# Patient Record
Sex: FEMALE | Race: WHITE | NOT HISPANIC OR LATINO | Employment: OTHER | URBAN - METROPOLITAN AREA
[De-identification: names, ages, dates, MRNs, and addresses within clinical notes are randomized per-mention and may not be internally consistent; named-entity substitution may affect disease eponyms.]

---

## 2023-02-01 PROBLEM — M25.562 LEFT KNEE PAIN: Status: ACTIVE | Noted: 2023-02-01

## 2023-02-01 PROBLEM — R41.82 CHANGE IN MENTAL STATUS: Status: ACTIVE | Noted: 2023-02-01

## 2023-02-01 PROBLEM — M25.561 RIGHT KNEE PAIN: Status: ACTIVE | Noted: 2023-02-01

## 2023-02-01 PROBLEM — M25.511 RIGHT SHOULDER PAIN: Status: ACTIVE | Noted: 2023-02-01

## 2023-02-01 PROBLEM — N18.32 ANEMIA DUE TO STAGE 3B CHRONIC KIDNEY DISEASE (MULTI): Status: ACTIVE | Noted: 2023-02-01

## 2023-02-01 PROBLEM — M48.061 SPINAL STENOSIS OF LUMBAR REGION WITHOUT NEUROGENIC CLAUDICATION: Status: ACTIVE | Noted: 2023-02-01

## 2023-02-01 PROBLEM — E83.52 HYPERCALCEMIA: Status: ACTIVE | Noted: 2023-02-01

## 2023-02-01 PROBLEM — E03.9 ACQUIRED HYPOTHYROIDISM: Status: ACTIVE | Noted: 2023-02-01

## 2023-02-01 PROBLEM — D63.1 ANEMIA DUE TO STAGE 3B CHRONIC KIDNEY DISEASE (MULTI): Status: ACTIVE | Noted: 2023-02-01

## 2023-02-01 PROBLEM — I10 ESSENTIAL HYPERTENSION: Status: ACTIVE | Noted: 2023-02-01

## 2023-02-01 PROBLEM — M54.16 LUMBAR RADICULOPATHY, CHRONIC: Status: ACTIVE | Noted: 2023-02-01

## 2023-02-01 PROBLEM — M19.012 ARTHRITIS OF LEFT SHOULDER REGION: Status: ACTIVE | Noted: 2023-02-01

## 2023-02-01 PROBLEM — E55.9 VITAMIN D DEFICIENCY: Status: ACTIVE | Noted: 2023-02-01

## 2023-02-01 PROBLEM — D53.9 MACROCYTIC ANEMIA: Status: ACTIVE | Noted: 2023-02-01

## 2023-02-01 PROBLEM — C90.00 MULTIPLE MYELOMA (MULTI): Status: ACTIVE | Noted: 2023-02-01

## 2023-02-01 PROBLEM — R05.1 ACUTE COUGH: Status: ACTIVE | Noted: 2023-02-01

## 2023-02-01 PROBLEM — G56.01 CARPAL TUNNEL SYNDROME OF RIGHT WRIST: Status: ACTIVE | Noted: 2023-02-01

## 2023-02-01 PROBLEM — M54.9 CHRONIC BACK PAIN: Status: ACTIVE | Noted: 2023-02-01

## 2023-02-01 PROBLEM — E73.9 LACTOSE INTOLERANCE: Status: ACTIVE | Noted: 2023-02-01

## 2023-02-01 PROBLEM — K21.9 GERD (GASTROESOPHAGEAL REFLUX DISEASE): Status: ACTIVE | Noted: 2023-02-01

## 2023-02-01 PROBLEM — G56.02 CARPAL TUNNEL SYNDROME ON LEFT: Status: ACTIVE | Noted: 2023-02-01

## 2023-02-01 PROBLEM — D47.2 MONOCLONAL GAMMOPATHY OF UNKNOWN SIGNIFICANCE: Status: ACTIVE | Noted: 2023-02-01

## 2023-02-01 PROBLEM — N18.2 CKD (CHRONIC KIDNEY DISEASE), STAGE II: Status: ACTIVE | Noted: 2023-02-01

## 2023-02-01 PROBLEM — M54.2 CERVICALGIA: Status: ACTIVE | Noted: 2023-02-01

## 2023-02-01 PROBLEM — G89.29 CHRONIC BACK PAIN: Status: ACTIVE | Noted: 2023-02-01

## 2023-02-01 PROBLEM — M46.1 SACROILIITIS (CMS-HCC): Status: ACTIVE | Noted: 2023-02-01

## 2023-02-01 PROBLEM — M75.112 NONTRAUMATIC INCOMPLETE TEAR OF LEFT ROTATOR CUFF: Status: ACTIVE | Noted: 2023-02-01

## 2023-02-01 PROBLEM — G60.9 IDIOPATHIC PERIPHERAL NEUROPATHY: Status: ACTIVE | Noted: 2023-02-01

## 2023-02-01 PROBLEM — I51.89 DIASTOLIC DYSFUNCTION: Status: ACTIVE | Noted: 2023-02-01

## 2023-02-01 RX ORDER — CHLORTHALIDONE 25 MG/1
0.5 TABLET ORAL DAILY
COMMUNITY
Start: 2022-06-01 | End: 2023-04-17

## 2023-02-01 RX ORDER — ACETAMINOPHEN 500 MG
2 TABLET ORAL EVERY 8 HOURS PRN
COMMUNITY
Start: 2015-06-29

## 2023-02-01 RX ORDER — LOSARTAN POTASSIUM 100 MG/1
1 TABLET ORAL DAILY
COMMUNITY
Start: 2019-10-22 | End: 2023-05-12

## 2023-02-01 RX ORDER — LEVOTHYROXINE SODIUM 88 UG/1
1 TABLET ORAL DAILY
COMMUNITY
Start: 2013-12-31 | End: 2023-03-17 | Stop reason: DRUGHIGH

## 2023-02-01 RX ORDER — ONDANSETRON HYDROCHLORIDE 8 MG/1
1 TABLET, FILM COATED ORAL EVERY 8 HOURS PRN
COMMUNITY
Start: 2022-12-08 | End: 2023-07-19 | Stop reason: ALTCHOICE

## 2023-02-01 RX ORDER — AMLODIPINE BESYLATE 2.5 MG/1
1 TABLET ORAL DAILY
COMMUNITY
Start: 2016-01-18 | End: 2023-04-17

## 2023-02-01 RX ORDER — LORATADINE 10 MG/1
1 TABLET ORAL DAILY
COMMUNITY
Start: 2021-01-22

## 2023-02-01 RX ORDER — MELOXICAM 15 MG/1
1 TABLET ORAL DAILY
COMMUNITY
Start: 2022-10-18 | End: 2023-10-13 | Stop reason: HOSPADM

## 2023-02-01 RX ORDER — MULTIVIT,IRON,MINERALS/LUTEIN
1 TABLET ORAL DAILY
COMMUNITY

## 2023-02-01 RX ORDER — OMEPRAZOLE 40 MG/1
1 CAPSULE, DELAYED RELEASE ORAL DAILY
COMMUNITY
Start: 2013-07-02 | End: 2023-12-08 | Stop reason: SDUPTHER

## 2023-02-01 RX ORDER — GABAPENTIN 100 MG/1
3 CAPSULE ORAL NIGHTLY
COMMUNITY
Start: 2020-07-23 | End: 2024-01-05 | Stop reason: SDUPTHER

## 2023-02-01 RX ORDER — PRILOCAINE 100 %
POWDER (GRAM) MISCELLANEOUS
COMMUNITY
Start: 2022-10-18 | End: 2023-03-15 | Stop reason: ALTCHOICE

## 2023-02-01 RX ORDER — CHOLECALCIFEROL (VITAMIN D3) 125 MCG
1 CAPSULE ORAL AS NEEDED
COMMUNITY
Start: 2017-03-31

## 2023-03-15 ENCOUNTER — OFFICE VISIT (OUTPATIENT)
Dept: PRIMARY CARE | Facility: CLINIC | Age: 88
End: 2023-03-15
Payer: MEDICARE

## 2023-03-15 ENCOUNTER — LAB (OUTPATIENT)
Dept: LAB | Facility: LAB | Age: 88
End: 2023-03-15
Payer: MEDICARE

## 2023-03-15 VITALS
HEART RATE: 73 BPM | OXYGEN SATURATION: 96 % | DIASTOLIC BLOOD PRESSURE: 66 MMHG | WEIGHT: 154 LBS | SYSTOLIC BLOOD PRESSURE: 140 MMHG | BODY MASS INDEX: 29.07 KG/M2 | HEIGHT: 61 IN

## 2023-03-15 DIAGNOSIS — C90.01 MULTIPLE MYELOMA IN REMISSION (MULTI): ICD-10-CM

## 2023-03-15 DIAGNOSIS — M46.1 SACROILIITIS (CMS-HCC): ICD-10-CM

## 2023-03-15 DIAGNOSIS — N18.32 ANEMIA DUE TO STAGE 3B CHRONIC KIDNEY DISEASE (MULTI): ICD-10-CM

## 2023-03-15 DIAGNOSIS — Z23 NEED FOR INFLUENZA VACCINATION: Primary | ICD-10-CM

## 2023-03-15 DIAGNOSIS — D63.1 ANEMIA DUE TO STAGE 3B CHRONIC KIDNEY DISEASE (MULTI): ICD-10-CM

## 2023-03-15 DIAGNOSIS — E03.9 ACQUIRED HYPOTHYROIDISM: ICD-10-CM

## 2023-03-15 DIAGNOSIS — L98.491 SKIN ULCER, LIMITED TO BREAKDOWN OF SKIN (MULTI): ICD-10-CM

## 2023-03-15 LAB — THYROTROPIN (MIU/L) IN SER/PLAS BY DETECTION LIMIT <= 0.05 MIU/L: 6.22 MIU/L (ref 0.44–3.98)

## 2023-03-15 PROCEDURE — 36415 COLL VENOUS BLD VENIPUNCTURE: CPT

## 2023-03-15 PROCEDURE — 1160F RVW MEDS BY RX/DR IN RCRD: CPT | Performed by: INTERNAL MEDICINE

## 2023-03-15 PROCEDURE — 1170F FXNL STATUS ASSESSED: CPT | Performed by: INTERNAL MEDICINE

## 2023-03-15 PROCEDURE — 99214 OFFICE O/P EST MOD 30 MIN: CPT | Performed by: INTERNAL MEDICINE

## 2023-03-15 PROCEDURE — 84443 ASSAY THYROID STIM HORMONE: CPT

## 2023-03-15 PROCEDURE — G0439 PPPS, SUBSEQ VISIT: HCPCS | Performed by: INTERNAL MEDICINE

## 2023-03-15 PROCEDURE — 1159F MED LIST DOCD IN RCRD: CPT | Performed by: INTERNAL MEDICINE

## 2023-03-15 PROCEDURE — 3078F DIAST BP <80 MM HG: CPT | Performed by: INTERNAL MEDICINE

## 2023-03-15 PROCEDURE — 3077F SYST BP >= 140 MM HG: CPT | Performed by: INTERNAL MEDICINE

## 2023-03-15 PROCEDURE — 1036F TOBACCO NON-USER: CPT | Performed by: INTERNAL MEDICINE

## 2023-03-15 RX ORDER — ELECTROLYTES/DEXTROSE
5 SOLUTION, ORAL ORAL DAILY
COMMUNITY

## 2023-03-15 RX ORDER — LORAZEPAM 2 MG/1
TABLET ORAL
COMMUNITY
Start: 2023-02-20 | End: 2023-03-27

## 2023-03-15 ASSESSMENT — ENCOUNTER SYMPTOMS
DEPRESSION: 0
LOSS OF SENSATION IN FEET: 0
OCCASIONAL FEELINGS OF UNSTEADINESS: 1

## 2023-03-15 ASSESSMENT — ACTIVITIES OF DAILY LIVING (ADL)
HEARING - LEFT EAR: FUNCTIONAL
HEARING - RIGHT EAR: FUNCTIONAL
ADEQUATE_TO_COMPLETE_ADL: YES
TOILETING: INDEPENDENT
FEEDING YOURSELF: INDEPENDENT
TAKING_MEDICATION: INDEPENDENT
MANAGING_FINANCES: INDEPENDENT
GROOMING: INDEPENDENT
WALKS IN HOME: INDEPENDENT
BATHING: INDEPENDENT
JUDGMENT_ADEQUATE_SAFELY_COMPLETE_DAILY_ACTIVITIES: YES
DOING_HOUSEWORK: INDEPENDENT
ASSISTIVE_DEVICE: EYEGLASSES;CANE;DENTURES PARTIAL
GROCERY_SHOPPING: INDEPENDENT
PATIENT'S MEMORY ADEQUATE TO SAFELY COMPLETE DAILY ACTIVITIES?: YES
DRESSING YOURSELF: INDEPENDENT

## 2023-03-15 ASSESSMENT — COLUMBIA-SUICIDE SEVERITY RATING SCALE - C-SSRS
2. HAVE YOU ACTUALLY HAD ANY THOUGHTS OF KILLING YOURSELF?: NO
6. HAVE YOU EVER DONE ANYTHING, STARTED TO DO ANYTHING, OR PREPARED TO DO ANYTHING TO END YOUR LIFE?: NO

## 2023-03-15 NOTE — PROGRESS NOTES
Subjective   Reason for Visit: Bindu Cali is an 89 y.o. female here for a Medicare Wellness visit.     Past Medical, Surgical, and Family History reviewed and updated in chart.    Reviewed all medications by prescribing practitioner or clinical pharmacist (such as prescriptions, OTCs, herbal therapies and supplements) and documented in the medical record.    HPI  Patient doing well now receiving chemotherapy every 2 weeks and tolerating very well.  Did have a fall and scraped the left forearm suffering a an abrasion that is slowly been healing much better never treated with antibiotics just surface treatment    Patient Self Assessment of Health Status  Patient Self Assessment: Good    Nutrition and Exercise  Current Diet: Well Balanced Diet  Adequate Fluid Intake: Yes  Caffeine: No  Exercise Frequency: Regularly    Functional Ability/Level of Safety  Cognitive Impairment Observed: No cognitive impairment observed    Home Safety Risk Factors: None    Patient Care Team:  Radha Garcia MD as PCP - General     Review of Systems  Gen-no wt loss,wt gain,fevers,apnea at night,snoring  HEENT- no ear pain, no oral pain, no hearing loss no sinus pain or drainage  Pulm- no sob,weezing,cough,hemoptysis,productive mucous  Cardio-no cp,dash,orthopnea,palpitations  Gi- no diarrhea,constipation,n,v,hematemesis,dysphagia,change in bowel habits  Endo- no polydipsia,polyuria,wt gain,extreme fatigue,polyphagia,blurred vision  Neuro-no ha,loc,paresthesias,transient limb weakness,acute memory loss,visual changes  Heme- no blood loss,transfusions,unusual bleeding from skin and/or mucosa  Urology- no dysuria,hematuria,urinary frequency,bladder pain  Gyn- no pelvic pain,vaginal dc,vaginal bleeding,  Psych- no depression,mood swings,anxiety,insomnia,hallucinations  Musculoskeletal- no joint aches / pains,muscle pains,new back pain  Vascular- no pain in calve w walkin,discolored painful toes    Objective   Vitals:  /66   " Pulse 73   Ht 1.549 m (5' 1\")   Wt 69.9 kg (154 lb)   SpO2 96%   BMI 29.10 kg/m²       Physical Exam NAD  HEENT- nl ear tm bilat,nl pharynx, mucosa moist,no icterus  Lungs clear, good AE, no wheezing  Heart-normal S1-S2 no murmur gallop rub, rhythm normal  Neuro alert and oriented ×3, cranial nerves II through XII normal,   Extrem- no edema, no tenderness, no cords  Skin- nl turgor, excoriated area irregularly shaped 7 cm long 2 to 3 cm wide redness around the edges but no active cellulitis    Assessment/Plan   Problem List Items Addressed This Visit          Genitourinary    Anemia due to stage 3b chronic kidney disease       Musculoskeletal    Sacroiliitis (CMS/HCC)       Endocrine/Metabolic    Acquired hypothyroidism    Relevant Orders    TSH       Other    Multiple myeloma (CMS/HCC)     Other Visit Diagnoses       Need for influenza vaccination    -  Primary    Skin ulcer, limited to breakdown of skin (CMS/HCC)                   "

## 2023-03-15 NOTE — PROGRESS NOTES
Subjective   Reason for Visit: Bindu Cali is an 89 y.o. female here for a Medicare Wellness visit.     Past Medical, Surgical, and Family History reviewed and updated in chart.    Reviewed all medications by prescribing practitioner or clinical pharmacist (such as prescriptions, OTCs, herbal therapies and supplements) and documented in the medical record.    HPI  Patient did fall accidentally several days ago scraping left arm suffering abrasions treating at home just with superficial treatment no antibiotics provided  Otherwise compliant with medications doing well receiving chemotherapy every 2 weeks    Patient Self Assessment of Health Status  Patient Self Assessment: Good    Nutrition and Exercise  Current Diet: Well Balanced Diet  Adequate Fluid Intake: Yes  Caffeine: No  Exercise Frequency: Regularly    Functional Ability/Level of Safety  Cognitive Impairment Observed: No cognitive impairment observed    Home Safety Risk Factors: None    Patient Care Team:  Radha Garcia MD as PCP - General     Review of Systems  Gen-no wt loss,wt gain,fevers,apnea at night,snoring  HEENT- no ear pain, no oral pain, no hearing loss no sinus pain or drainage  Pulm- no sob,weezing,cough,hemoptysis,productive mucous  Cardio-no cp,dash,orthopnea,palpitations  Gi- no diarrhea,constipation,n,v,hematemesis,dysphagia,change in bowel habits  Endo- no polydipsia,polyuria,wt gain,extreme fatigue,polyphagia,blurred vision  Neuro-no ha,loc,paresthesias,transient limb weakness,acute memory loss,visual changes  Heme- no blood loss,transfusions,unusual bleeding from skin and/or mucosa  Urology- no dysuria,hematuria,urinary frequency,bladder pain  Gyn- no pelvic pain,vaginal dc,vaginal bleeding,  Psych- no depression,mood swings,anxiety,insomnia,hallucinations  Musculoskeletal- no joint aches / pains,muscle pains,new back pain  Vascular- no pain in calve w walkin,discolored painful toes    Objective   Vitals:  /66    "Pulse 73   Ht 1.549 m (5' 1\")   Wt 69.9 kg (154 lb)   SpO2 96%   BMI 29.10 kg/m²       Physical Exam  NAD  HEENT- nl ear tm bilat,nl pharynx, mucosa moist,no icterus  Lungs clear, good AE, no wheezing  Heart-normal S1-S2 no murmur gallop rub, rhythm normal  Neuro alert and oriented ×3, cranial nerves II through XII normal, motor function symmetrical,sensory exam symmetrical ,gait nl  Extrem- no edema, no tenderness, no cords  Skin- nl turgor, irregularly shaped abrasion 3 x 7 cm rim with redness no active cellulitis no discharge        Assessment/Plan   Problem List Items Addressed This Visit          Genitourinary    Anemia due to stage 3b chronic kidney disease       Musculoskeletal    Sacroiliitis (CMS/HCC)       Endocrine/Metabolic    Acquired hypothyroidism    Relevant Orders    TSH       Other    Multiple myeloma (CMS/HCC)     Other Visit Diagnoses       Need for influenza vaccination    -  Primary    Skin ulcer, limited to breakdown of skin (CMS/HCC)                   "

## 2023-03-17 ENCOUNTER — DOCUMENTATION (OUTPATIENT)
Dept: PRIMARY CARE | Facility: CLINIC | Age: 88
End: 2023-03-17
Payer: MEDICARE

## 2023-03-17 DIAGNOSIS — E03.9 ACQUIRED HYPOTHYROIDISM: Primary | ICD-10-CM

## 2023-03-17 RX ORDER — LEVOTHYROXINE SODIUM 100 UG/1
100 TABLET ORAL DAILY
Qty: 90 TABLET | Refills: 1 | Status: SHIPPED | OUTPATIENT
Start: 2023-03-17 | End: 2023-10-18

## 2023-03-27 DIAGNOSIS — E03.9 ACQUIRED HYPOTHYROIDISM: ICD-10-CM

## 2023-03-27 RX ORDER — LEVOTHYROXINE SODIUM 100 UG/1
100 TABLET ORAL
COMMUNITY
End: 2023-03-27 | Stop reason: SDUPTHER

## 2023-03-27 RX ORDER — LEVOTHYROXINE SODIUM 100 UG/1
100 TABLET ORAL
Qty: 90 TABLET | Refills: 1 | Status: SHIPPED | OUTPATIENT
Start: 2023-03-27 | End: 2023-05-17 | Stop reason: SDUPTHER

## 2023-03-31 ENCOUNTER — DOCUMENTATION (OUTPATIENT)
Dept: PRIMARY CARE | Facility: CLINIC | Age: 88
End: 2023-03-31
Payer: MEDICARE

## 2023-03-31 ENCOUNTER — TELEPHONE (OUTPATIENT)
Dept: PRIMARY CARE | Facility: CLINIC | Age: 88
End: 2023-03-31
Payer: MEDICARE

## 2023-03-31 NOTE — TELEPHONE ENCOUNTER
Not sure why your legs are swelling. Need to be seen to make sure. I am not here next week and you should come in to be seen by other doctors.

## 2023-04-26 LAB
ALANINE AMINOTRANSFERASE (SGPT) (U/L) IN SER/PLAS: 6 U/L (ref 7–45)
ALBUMIN (G/DL) IN SER/PLAS: 3 G/DL (ref 3.4–5)
ALKALINE PHOSPHATASE (U/L) IN SER/PLAS: 49 U/L (ref 33–136)
ANION GAP IN SER/PLAS: 13 MMOL/L (ref 10–20)
ASPARTATE AMINOTRANSFERASE (SGOT) (U/L) IN SER/PLAS: 31 U/L (ref 9–39)
BILIRUBIN TOTAL (MG/DL) IN SER/PLAS: 0.5 MG/DL (ref 0–1.2)
CALCIUM (MG/DL) IN SER/PLAS: 9.1 MG/DL (ref 8.6–10.3)
CARBON DIOXIDE, TOTAL (MMOL/L) IN SER/PLAS: 25 MMOL/L (ref 21–32)
CHLORIDE (MMOL/L) IN SER/PLAS: 94 MMOL/L (ref 98–107)
CREATININE (MG/DL) IN SER/PLAS: 1.01 MG/DL (ref 0.5–1.05)
ERYTHROCYTE DISTRIBUTION WIDTH (RATIO) BY AUTOMATED COUNT: 12.7 % (ref 11.5–14.5)
ERYTHROCYTE MEAN CORPUSCULAR HEMOGLOBIN CONCENTRATION (G/DL) BY AUTOMATED: 32.3 G/DL (ref 32–36)
ERYTHROCYTE MEAN CORPUSCULAR VOLUME (FL) BY AUTOMATED COUNT: 95 FL (ref 80–100)
ERYTHROCYTES (10*6/UL) IN BLOOD BY AUTOMATED COUNT: 2.77 X10E12/L (ref 4–5.2)
GFR FEMALE: 53 ML/MIN/1.73M2
GLUCOSE (MG/DL) IN SER/PLAS: 95 MG/DL (ref 74–99)
HEMATOCRIT (%) IN BLOOD BY AUTOMATED COUNT: 26.3 % (ref 36–46)
HEMOGLOBIN (G/DL) IN BLOOD: 8.5 G/DL (ref 12–16)
LEUKOCYTES (10*3/UL) IN BLOOD BY AUTOMATED COUNT: 9.6 X10E9/L (ref 4.4–11.3)
PLATELETS (10*3/UL) IN BLOOD AUTOMATED COUNT: 297 X10E9/L (ref 150–450)
POTASSIUM (MMOL/L) IN SER/PLAS: 4.4 MMOL/L (ref 3.5–5.3)
PROTEIN TOTAL: 5.4 G/DL (ref 6.4–8.2)
SODIUM (MMOL/L) IN SER/PLAS: 128 MMOL/L (ref 136–145)
UREA NITROGEN (MG/DL) IN SER/PLAS: 19 MG/DL (ref 6–23)

## 2023-04-27 LAB
ANION GAP IN SER/PLAS: 13 MMOL/L (ref 10–20)
CALCIUM (MG/DL) IN SER/PLAS: 9.4 MG/DL (ref 8.6–10.3)
CARBON DIOXIDE, TOTAL (MMOL/L) IN SER/PLAS: 28 MMOL/L (ref 21–32)
CHLORIDE (MMOL/L) IN SER/PLAS: 91 MMOL/L (ref 98–107)
CREATININE (MG/DL) IN SER/PLAS: 1.19 MG/DL (ref 0.5–1.05)
ERYTHROCYTE DISTRIBUTION WIDTH (RATIO) BY AUTOMATED COUNT: 12.9 % (ref 11.5–14.5)
ERYTHROCYTE MEAN CORPUSCULAR HEMOGLOBIN CONCENTRATION (G/DL) BY AUTOMATED: 31.6 G/DL (ref 32–36)
ERYTHROCYTE MEAN CORPUSCULAR VOLUME (FL) BY AUTOMATED COUNT: 95 FL (ref 80–100)
ERYTHROCYTES (10*6/UL) IN BLOOD BY AUTOMATED COUNT: 2.88 X10E12/L (ref 4–5.2)
GFR FEMALE: 43 ML/MIN/1.73M2
GLUCOSE (MG/DL) IN SER/PLAS: 97 MG/DL (ref 74–99)
HEMATOCRIT (%) IN BLOOD BY AUTOMATED COUNT: 27.5 % (ref 36–46)
HEMOGLOBIN (G/DL) IN BLOOD: 8.7 G/DL (ref 12–16)
LEUKOCYTES (10*3/UL) IN BLOOD BY AUTOMATED COUNT: 8.8 X10E9/L (ref 4.4–11.3)
PLATELETS (10*3/UL) IN BLOOD AUTOMATED COUNT: 337 X10E9/L (ref 150–450)
POTASSIUM (MMOL/L) IN SER/PLAS: 4.7 MMOL/L (ref 3.5–5.3)
SODIUM (MMOL/L) IN SER/PLAS: 127 MMOL/L (ref 136–145)
UREA NITROGEN (MG/DL) IN SER/PLAS: 24 MG/DL (ref 6–23)

## 2023-04-28 LAB
ANION GAP IN SER/PLAS: 15 MMOL/L (ref 10–20)
CALCIUM (MG/DL) IN SER/PLAS: 9.5 MG/DL (ref 8.6–10.3)
CARBON DIOXIDE, TOTAL (MMOL/L) IN SER/PLAS: 25 MMOL/L (ref 21–32)
CHLORIDE (MMOL/L) IN SER/PLAS: 91 MMOL/L (ref 98–107)
CREATININE (MG/DL) IN SER/PLAS: 1.32 MG/DL (ref 0.5–1.05)
GFR FEMALE: 38 ML/MIN/1.73M2
GLUCOSE (MG/DL) IN SER/PLAS: 101 MG/DL (ref 74–99)
POTASSIUM (MMOL/L) IN SER/PLAS: 4.6 MMOL/L (ref 3.5–5.3)
SODIUM (MMOL/L) IN SER/PLAS: 126 MMOL/L (ref 136–145)
UREA NITROGEN (MG/DL) IN SER/PLAS: 29 MG/DL (ref 6–23)

## 2023-04-29 LAB
ANION GAP IN SER/PLAS: 13 MMOL/L (ref 10–20)
CALCIUM (MG/DL) IN SER/PLAS: 9.1 MG/DL (ref 8.6–10.3)
CARBON DIOXIDE, TOTAL (MMOL/L) IN SER/PLAS: 25 MMOL/L (ref 21–32)
CHLORIDE (MMOL/L) IN SER/PLAS: 92 MMOL/L (ref 98–107)
CREATININE (MG/DL) IN SER/PLAS: 1.23 MG/DL (ref 0.5–1.05)
GFR FEMALE: 42 ML/MIN/1.73M2
GLUCOSE (MG/DL) IN SER/PLAS: 95 MG/DL (ref 74–99)
POTASSIUM (MMOL/L) IN SER/PLAS: 4.9 MMOL/L (ref 3.5–5.3)
SODIUM (MMOL/L) IN SER/PLAS: 125 MMOL/L (ref 136–145)
UREA NITROGEN (MG/DL) IN SER/PLAS: 26 MG/DL (ref 6–23)

## 2023-05-02 LAB
ALANINE AMINOTRANSFERASE (SGPT) (U/L) IN SER/PLAS: 12 U/L (ref 7–45)
ALBUMIN (G/DL) IN SER/PLAS: 3.5 G/DL (ref 3.4–5)
ALKALINE PHOSPHATASE (U/L) IN SER/PLAS: 69 U/L (ref 33–136)
ANION GAP IN SER/PLAS: 14 MMOL/L (ref 10–20)
ASPARTATE AMINOTRANSFERASE (SGOT) (U/L) IN SER/PLAS: 22 U/L (ref 9–39)
BILIRUBIN TOTAL (MG/DL) IN SER/PLAS: 0.5 MG/DL (ref 0–1.2)
CALCIUM (MG/DL) IN SER/PLAS: 9.5 MG/DL (ref 8.6–10.3)
CARBON DIOXIDE, TOTAL (MMOL/L) IN SER/PLAS: 26 MMOL/L (ref 21–32)
CHLORIDE (MMOL/L) IN SER/PLAS: 90 MMOL/L (ref 98–107)
CREATININE (MG/DL) IN SER/PLAS: 1.33 MG/DL (ref 0.5–1.05)
ERYTHROCYTE DISTRIBUTION WIDTH (RATIO) BY AUTOMATED COUNT: 12.7 % (ref 11.5–14.5)
ERYTHROCYTE MEAN CORPUSCULAR HEMOGLOBIN CONCENTRATION (G/DL) BY AUTOMATED: 33.3 G/DL (ref 32–36)
ERYTHROCYTE MEAN CORPUSCULAR VOLUME (FL) BY AUTOMATED COUNT: 95 FL (ref 80–100)
ERYTHROCYTES (10*6/UL) IN BLOOD BY AUTOMATED COUNT: 2.86 X10E12/L (ref 4–5.2)
GFR FEMALE: 38 ML/MIN/1.73M2
GLUCOSE (MG/DL) IN SER/PLAS: 87 MG/DL (ref 74–99)
HEMATOCRIT (%) IN BLOOD BY AUTOMATED COUNT: 27.3 % (ref 36–46)
HEMOGLOBIN (G/DL) IN BLOOD: 9.1 G/DL (ref 12–16)
LEUKOCYTES (10*3/UL) IN BLOOD BY AUTOMATED COUNT: 8 X10E9/L (ref 4.4–11.3)
PLATELETS (10*3/UL) IN BLOOD AUTOMATED COUNT: 522 X10E9/L (ref 150–450)
POTASSIUM (MMOL/L) IN SER/PLAS: 5.2 MMOL/L (ref 3.5–5.3)
PROTEIN TOTAL: 6.6 G/DL (ref 6.4–8.2)
SODIUM (MMOL/L) IN SER/PLAS: 125 MMOL/L (ref 136–145)
UREA NITROGEN (MG/DL) IN SER/PLAS: 27 MG/DL (ref 6–23)

## 2023-05-04 LAB
ANION GAP IN SER/PLAS: 16 MMOL/L (ref 10–20)
CALCIUM (MG/DL) IN SER/PLAS: 9 MG/DL (ref 8.6–10.3)
CARBON DIOXIDE, TOTAL (MMOL/L) IN SER/PLAS: 26 MMOL/L (ref 21–32)
CHLORIDE (MMOL/L) IN SER/PLAS: 91 MMOL/L (ref 98–107)
CREATININE (MG/DL) IN SER/PLAS: 1.19 MG/DL (ref 0.5–1.05)
ERYTHROCYTE DISTRIBUTION WIDTH (RATIO) BY AUTOMATED COUNT: 12.9 % (ref 11.5–14.5)
ERYTHROCYTE MEAN CORPUSCULAR HEMOGLOBIN CONCENTRATION (G/DL) BY AUTOMATED: 31.5 G/DL (ref 32–36)
ERYTHROCYTE MEAN CORPUSCULAR VOLUME (FL) BY AUTOMATED COUNT: 97 FL (ref 80–100)
ERYTHROCYTES (10*6/UL) IN BLOOD BY AUTOMATED COUNT: 2.67 X10E12/L (ref 4–5.2)
GFR FEMALE: 43 ML/MIN/1.73M2
GLUCOSE (MG/DL) IN SER/PLAS: 74 MG/DL (ref 74–99)
HEMATOCRIT (%) IN BLOOD BY AUTOMATED COUNT: 26 % (ref 36–46)
HEMOGLOBIN (G/DL) IN BLOOD: 8.2 G/DL (ref 12–16)
LEUKOCYTES (10*3/UL) IN BLOOD BY AUTOMATED COUNT: 6.5 X10E9/L (ref 4.4–11.3)
PLATELETS (10*3/UL) IN BLOOD AUTOMATED COUNT: 555 X10E9/L (ref 150–450)
POTASSIUM (MMOL/L) IN SER/PLAS: 5.1 MMOL/L (ref 3.5–5.3)
SODIUM (MMOL/L) IN SER/PLAS: 128 MMOL/L (ref 136–145)
UREA NITROGEN (MG/DL) IN SER/PLAS: 25 MG/DL (ref 6–23)

## 2023-05-07 PROCEDURE — G0180 MD CERTIFICATION HHA PATIENT: HCPCS | Performed by: INTERNAL MEDICINE

## 2023-05-11 DIAGNOSIS — I10 ESSENTIAL HYPERTENSION: Primary | ICD-10-CM

## 2023-05-12 RX ORDER — LOSARTAN POTASSIUM 100 MG/1
TABLET ORAL
Qty: 90 TABLET | Refills: 0 | Status: SHIPPED | OUTPATIENT
Start: 2023-05-12 | End: 2023-06-21 | Stop reason: SDUPTHER

## 2023-05-17 ENCOUNTER — OFFICE VISIT (OUTPATIENT)
Dept: PRIMARY CARE | Facility: CLINIC | Age: 88
End: 2023-05-17
Payer: MEDICARE

## 2023-05-17 VITALS
DIASTOLIC BLOOD PRESSURE: 50 MMHG | HEIGHT: 61 IN | HEART RATE: 75 BPM | BODY MASS INDEX: 28.32 KG/M2 | OXYGEN SATURATION: 97 % | WEIGHT: 150 LBS | SYSTOLIC BLOOD PRESSURE: 120 MMHG

## 2023-05-17 DIAGNOSIS — N18.2 CKD (CHRONIC KIDNEY DISEASE), STAGE II: ICD-10-CM

## 2023-05-17 DIAGNOSIS — I51.89 DIASTOLIC DYSFUNCTION: ICD-10-CM

## 2023-05-17 DIAGNOSIS — K21.9 GASTROESOPHAGEAL REFLUX DISEASE WITHOUT ESOPHAGITIS: ICD-10-CM

## 2023-05-17 DIAGNOSIS — I10 ESSENTIAL HYPERTENSION: ICD-10-CM

## 2023-05-17 DIAGNOSIS — Z96.659 PERIPROSTHETIC FRACTURE OF PROXIMAL END OF TIBIA, INITIAL ENCOUNTER: ICD-10-CM

## 2023-05-17 DIAGNOSIS — G60.9 IDIOPATHIC PERIPHERAL NEUROPATHY: Primary | ICD-10-CM

## 2023-05-17 DIAGNOSIS — M97.8XXA PERIPROSTHETIC FRACTURE OF PROXIMAL END OF TIBIA, INITIAL ENCOUNTER: ICD-10-CM

## 2023-05-17 PROBLEM — S82.209A TIBIA FRACTURE: Status: ACTIVE | Noted: 2023-05-17

## 2023-05-17 PROBLEM — S61.419A HAND LACERATION: Status: ACTIVE | Noted: 2023-05-17

## 2023-05-17 PROBLEM — Z96.653 STATUS POST TOTAL BILATERAL KNEE REPLACEMENT: Status: ACTIVE | Noted: 2023-05-17
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
## 2023-05-17 PROBLEM — M19.90 ARTHRITIS: Status: ACTIVE | Noted: 2023-05-17

## 2023-05-17 PROCEDURE — 1160F RVW MEDS BY RX/DR IN RCRD: CPT | Performed by: INTERNAL MEDICINE

## 2023-05-17 PROCEDURE — 3078F DIAST BP <80 MM HG: CPT | Performed by: INTERNAL MEDICINE

## 2023-05-17 PROCEDURE — 1159F MED LIST DOCD IN RCRD: CPT | Performed by: INTERNAL MEDICINE

## 2023-05-17 PROCEDURE — 1036F TOBACCO NON-USER: CPT | Performed by: INTERNAL MEDICINE

## 2023-05-17 PROCEDURE — 99214 OFFICE O/P EST MOD 30 MIN: CPT | Performed by: INTERNAL MEDICINE

## 2023-05-17 PROCEDURE — 3074F SYST BP LT 130 MM HG: CPT | Performed by: INTERNAL MEDICINE

## 2023-05-17 RX ORDER — ASPIRIN 81 MG/1
TABLET ORAL
COMMUNITY
Start: 2023-05-06 | End: 2023-07-19 | Stop reason: ALTCHOICE

## 2023-05-17 RX ORDER — MULTIVITAMIN
TABLET ORAL 2 TIMES DAILY
COMMUNITY
Start: 2023-04-25 | End: 2023-05-24

## 2023-05-17 RX ORDER — AMLODIPINE BESYLATE 5 MG/1
5 TABLET ORAL DAILY
COMMUNITY
End: 2023-06-02 | Stop reason: ALTCHOICE

## 2023-05-17 RX ORDER — TRAMADOL HYDROCHLORIDE 50 MG/1
TABLET ORAL
COMMUNITY
Start: 2023-05-06 | End: 2023-05-26 | Stop reason: SDUPTHER

## 2023-05-17 RX ORDER — CHOLECALCIFEROL (VITAMIN D3) 50 MCG
1 TABLET ORAL DAILY
COMMUNITY
End: 2023-10-13 | Stop reason: HOSPADM

## 2023-05-17 NOTE — PROGRESS NOTES
"Subjective   Patient ID: Bindu Cali is a 89 y.o. female who presents for Hospital Follow-up.  HPI        Post fibular fracture, overall doing well, compliant with medications  Daughters present   Doing well  No new meds now        :        ROS:      General: denies fever/chills/weight loss      Head: denies HA/trauma/masses/dizziness      Eyes: denies vision change/loss of vision/blurry vision/diplopia/eye pain      Ears: denies hearing loss/tinnitus/otalgia/otorrhea      Nose: denies nasal drainage/anosmia      Throat: denies dysphagia/odynophagia      Lymphatics: denies lymph node swelling      Cardiac: denies CP/palpitations/orthopnea/PND      Pulmonary: denies dyspnea/cough/wheezing      GI: denies abd pain/n/v/diarrhea/melena/hematochezia/hematemesis      : denies dysuria/hematuria/change frequency      Genital: denies genital discharge/lesions      Skin: denies rashes/lesions/masses      MSK: denies weakness/swelling/edema/gait imbalance/pain      Neuro: denies paresthesias/seizures/dysarthria      Psych: denies depression/anxiety/suicidal or homicidal ideations            Objective   /50   Pulse 75   Ht 1.549 m (5' 1\")   Wt 68 kg (150 lb)   SpO2 97%   BMI 28.34 kg/m²      Physical Exam:     General: AO3, NAD       Breast: not examined     Chest: no deformity or tenderness to palpation     Pulm: CTA b/l, no wheeze/rhonchi/rales. nonlabored     Cardiac: RRR +s1s2, no m/r/g.      GI: soft, NT/ND. Normoactive Bsx4. No rebound/guarding.     Rectal: no examined     MSK: 5/5 strength UE LE. No edema/clubbing/cyanosis     Skin: no rashes/lesions, surgical wound healing well.     Vascular: 2+ palp DP PT radials b/l. Negative carotid bruit     Neuro: CNII-XII intact. No focal deficits. Reflexes 2/4 brachioradialis bicep tricep patellar achilles. Finger to nose intact.     Psych: appropriate mood/affect                    No results found for: BMPR1A, CBCDIF      Assessment/Plan   Diagnoses and all " orders for this visit:  Idiopathic peripheral neuropathy  Essential hypertension  Diastolic dysfunction  CKD (chronic kidney disease), stage II  Periprosthetic fracture of proximal end of tibia, initial encounter  Gastroesophageal reflux disease without esophagitis    Np new issues continue esperanza Garcia MD

## 2023-05-26 DIAGNOSIS — I10 ESSENTIAL HYPERTENSION: ICD-10-CM

## 2023-05-26 DIAGNOSIS — M19.90 ARTHRITIS: ICD-10-CM

## 2023-05-26 RX ORDER — TRAMADOL HYDROCHLORIDE 50 MG/1
50 TABLET ORAL EVERY 6 HOURS PRN
Qty: 10 TABLET | Refills: 0 | Status: SHIPPED | OUTPATIENT
Start: 2023-05-26 | End: 2023-07-19 | Stop reason: ALTCHOICE

## 2023-05-26 RX ORDER — CHLORTHALIDONE 25 MG/1
TABLET ORAL
Qty: 45 TABLET | Refills: 1 | Status: SHIPPED | OUTPATIENT
Start: 2023-05-26 | End: 2023-10-13 | Stop reason: HOSPADM

## 2023-06-02 DIAGNOSIS — I10 ESSENTIAL HYPERTENSION: Primary | ICD-10-CM

## 2023-06-02 RX ORDER — AMLODIPINE BESYLATE 2.5 MG/1
2.5 TABLET ORAL DAILY
Qty: 90 TABLET | Refills: 1 | Status: SHIPPED | OUTPATIENT
Start: 2023-06-02 | End: 2023-10-25

## 2023-06-02 RX ORDER — AMLODIPINE BESYLATE 2.5 MG/1
2.5 TABLET ORAL DAILY
COMMUNITY
End: 2023-06-02 | Stop reason: SDUPTHER

## 2023-06-16 ENCOUNTER — APPOINTMENT (OUTPATIENT)
Dept: PRIMARY CARE | Facility: CLINIC | Age: 88
End: 2023-06-16
Payer: MEDICARE

## 2023-06-21 DIAGNOSIS — I10 ESSENTIAL HYPERTENSION: ICD-10-CM

## 2023-06-25 RX ORDER — LOSARTAN POTASSIUM 100 MG/1
100 TABLET ORAL DAILY
Qty: 90 TABLET | Refills: 1 | Status: SHIPPED | OUTPATIENT
Start: 2023-06-25 | End: 2024-03-05 | Stop reason: SDUPTHER

## 2023-07-06 PROCEDURE — G0179 MD RECERTIFICATION HHA PT: HCPCS | Performed by: INTERNAL MEDICINE

## 2023-07-19 ENCOUNTER — OFFICE VISIT (OUTPATIENT)
Dept: PRIMARY CARE | Facility: CLINIC | Age: 88
End: 2023-07-19
Payer: MEDICARE

## 2023-07-19 VITALS
HEIGHT: 61 IN | BODY MASS INDEX: 26.81 KG/M2 | OXYGEN SATURATION: 97 % | HEART RATE: 75 BPM | SYSTOLIC BLOOD PRESSURE: 148 MMHG | DIASTOLIC BLOOD PRESSURE: 64 MMHG | WEIGHT: 142 LBS

## 2023-07-19 DIAGNOSIS — N18.32 ANEMIA DUE TO STAGE 3B CHRONIC KIDNEY DISEASE (MULTI): ICD-10-CM

## 2023-07-19 DIAGNOSIS — I51.89 DIASTOLIC DYSFUNCTION: ICD-10-CM

## 2023-07-19 DIAGNOSIS — E03.9 ACQUIRED HYPOTHYROIDISM: ICD-10-CM

## 2023-07-19 DIAGNOSIS — L03.011 PARONYCHIA OF FINGER OF RIGHT HAND: Primary | ICD-10-CM

## 2023-07-19 DIAGNOSIS — D63.1 ANEMIA DUE TO STAGE 3B CHRONIC KIDNEY DISEASE (MULTI): ICD-10-CM

## 2023-07-19 DIAGNOSIS — I10 ESSENTIAL HYPERTENSION: ICD-10-CM

## 2023-07-19 DIAGNOSIS — N18.2 CKD (CHRONIC KIDNEY DISEASE), STAGE II: ICD-10-CM

## 2023-07-19 PROBLEM — R05.1 ACUTE COUGH: Status: RESOLVED | Noted: 2023-02-01 | Resolved: 2023-07-19

## 2023-07-19 PROBLEM — R41.82 CHANGE IN MENTAL STATUS: Status: RESOLVED | Noted: 2023-02-01 | Resolved: 2023-07-19

## 2023-07-19 PROCEDURE — 1159F MED LIST DOCD IN RCRD: CPT | Performed by: INTERNAL MEDICINE

## 2023-07-19 PROCEDURE — 1160F RVW MEDS BY RX/DR IN RCRD: CPT | Performed by: INTERNAL MEDICINE

## 2023-07-19 PROCEDURE — 99214 OFFICE O/P EST MOD 30 MIN: CPT | Performed by: INTERNAL MEDICINE

## 2023-07-19 PROCEDURE — 3078F DIAST BP <80 MM HG: CPT | Performed by: INTERNAL MEDICINE

## 2023-07-19 PROCEDURE — 3077F SYST BP >= 140 MM HG: CPT | Performed by: INTERNAL MEDICINE

## 2023-07-19 PROCEDURE — 1036F TOBACCO NON-USER: CPT | Performed by: INTERNAL MEDICINE

## 2023-07-19 RX ORDER — IBUPROFEN 200 MG
400 TABLET ORAL EVERY 6 HOURS PRN
COMMUNITY
End: 2023-11-03 | Stop reason: HOSPADM

## 2023-07-19 RX ORDER — CEPHALEXIN 500 MG/1
500 CAPSULE ORAL 2 TIMES DAILY
Qty: 14 CAPSULE | Refills: 0 | Status: SHIPPED | OUTPATIENT
Start: 2023-07-19 | End: 2023-07-19 | Stop reason: SDUPTHER

## 2023-07-19 RX ORDER — CEPHALEXIN 500 MG/1
500 CAPSULE ORAL 2 TIMES DAILY
Qty: 14 CAPSULE | Refills: 0 | Status: SHIPPED | OUTPATIENT
Start: 2023-07-19 | End: 2023-07-26

## 2023-07-19 NOTE — PROGRESS NOTES
"Subjective   Patient ID: Bindu Cali is a 89 y.o. female who presents for Follow-up, referral (For hearing test.), and Hand Pain (Right first finger pain, thinks it may be infected.).  HPI        Can only use walker. Able to ambulate beter. Has a swollen index finger  Hearing loss reported by daughter  To visit wit ortho in am            ROS:      General: denies fever/chills/weight loss      Head: denies HA/trauma/masses/dizziness      Eyes: denies vision change/loss of vision/blurry vision/diplopia/eye pain      Ears: denies hearing loss/tinnitus/otalgia/otorrhea      Nose: denies nasal drainage/anosmia      Throat: denies dysphagia/odynophagia      Lymphatics: denies lymph node swelling      Cardiac: denies CP/palpitations/orthopnea/PND      Pulmonary: denies dyspnea/cough/wheezing      GI: denies abd pain/n/v/diarrhea/melena/hematochezia/hematemesis      : denies dysuria/hematuria/change frequency      Genital: denies genital discharge/lesions      Skin: denies rashes/lesions/masses      MSK: denies weakness/swelling/edema/gait imbalance/pain      Neuro: denies paresthesias/seizures/dysarthria      Psych: denies depression/anxiety/suicidal or homicidal ideations            Objective   /64   Pulse 75   Ht 1.549 m (5' 1\")   Wt 64.4 kg (142 lb)   SpO2 97%   BMI 26.83 kg/m²      Physical Exam:     General: AO3, NAD     Head: atraumatic/NC        Breast: not examined        Pulm: CTA b/l, no wheeze/rhonchi/rales. nonlabored     Cardiac: RRR +s1s2, no m/r/g.      GI: soft, NT/ND. Normoactive Bsx4. No rebound/guarding.     Rectal: no examined        Skin: no rashes/lesions     Vascular: 2+ palp DP PT radials b/l. Negative carotid bruit     Neuro: CNII-XII intact. No focal deficits. Reflexes 2/4 brachioradialis bicep tricep patellar achilles. Finger to nose intact.        Assessment/Plan   Diagnoses and all orders for this visit:  Paronychia of finger of right hand  -     cephalexin (Keflex) 500 mg " capsule; Take 1 capsule (500 mg) by mouth 2 times a day for 7 days.  Diastolic dysfunction  Essential hypertension  Acquired hypothyroidism  Anemia due to stage 3b chronic kidney disease  CKD (chronic kidney disease), stage II     Will treat with keflex bid prn,    Radha Garcia MD

## 2023-09-20 VITALS — BODY MASS INDEX: 28.62 KG/M2 | WEIGHT: 141.98 LBS | HEIGHT: 59 IN

## 2023-09-20 DIAGNOSIS — C90.00 MULTIPLE MYELOMA, REMISSION STATUS UNSPECIFIED (MULTI): Primary | ICD-10-CM

## 2023-09-20 RX ORDER — DIPHENHYDRAMINE HYDROCHLORIDE 50 MG/ML
50 INJECTION INTRAMUSCULAR; INTRAVENOUS AS NEEDED
Status: CANCELLED | OUTPATIENT
Start: 2023-11-02

## 2023-09-20 RX ORDER — HEPARIN 100 UNIT/ML
500 SYRINGE INTRAVENOUS AS NEEDED
Status: CANCELLED | OUTPATIENT
Start: 2023-10-02

## 2023-09-20 RX ORDER — PROCHLORPERAZINE MALEATE 5 MG
10 TABLET ORAL ONCE
Status: CANCELLED
Start: 2023-11-02

## 2023-09-20 RX ORDER — EPINEPHRINE 0.3 MG/.3ML
0.3 INJECTION SUBCUTANEOUS EVERY 5 MIN PRN
Status: CANCELLED | OUTPATIENT
Start: 2023-10-19

## 2023-09-20 RX ORDER — METHYLPREDNISOLONE SODIUM SUCCINATE 40 MG/ML
40 INJECTION INTRAMUSCULAR; INTRAVENOUS AS NEEDED
Status: CANCELLED | OUTPATIENT
Start: 2023-10-19

## 2023-09-20 RX ORDER — DIPHENHYDRAMINE HYDROCHLORIDE 50 MG/ML
50 INJECTION INTRAMUSCULAR; INTRAVENOUS AS NEEDED
Status: CANCELLED | OUTPATIENT
Start: 2023-10-19

## 2023-09-20 RX ORDER — ALBUTEROL SULFATE 0.83 MG/ML
3 SOLUTION RESPIRATORY (INHALATION) AS NEEDED
Status: CANCELLED | OUTPATIENT
Start: 2023-11-02

## 2023-09-20 RX ORDER — ALBUTEROL SULFATE 0.83 MG/ML
3 SOLUTION RESPIRATORY (INHALATION) AS NEEDED
Status: CANCELLED | OUTPATIENT
Start: 2023-10-19

## 2023-09-20 RX ORDER — FAMOTIDINE 10 MG/ML
20 INJECTION INTRAVENOUS ONCE AS NEEDED
Status: CANCELLED | OUTPATIENT
Start: 2023-10-19

## 2023-09-20 RX ORDER — HEPARIN SODIUM,PORCINE/PF 10 UNIT/ML
50 SYRINGE (ML) INTRAVENOUS AS NEEDED
Status: CANCELLED | OUTPATIENT
Start: 2023-10-02

## 2023-09-20 RX ORDER — PROCHLORPERAZINE MALEATE 5 MG
10 TABLET ORAL ONCE
Status: CANCELLED
Start: 2023-10-19

## 2023-09-20 RX ORDER — FAMOTIDINE 10 MG/ML
20 INJECTION INTRAVENOUS ONCE AS NEEDED
Status: CANCELLED | OUTPATIENT
Start: 2023-11-02

## 2023-09-20 RX ORDER — METHYLPREDNISOLONE SODIUM SUCCINATE 40 MG/ML
40 INJECTION INTRAMUSCULAR; INTRAVENOUS AS NEEDED
Status: CANCELLED | OUTPATIENT
Start: 2023-11-02

## 2023-09-20 RX ORDER — EPINEPHRINE 0.3 MG/.3ML
0.3 INJECTION SUBCUTANEOUS EVERY 5 MIN PRN
Status: CANCELLED | OUTPATIENT
Start: 2023-11-02

## 2023-10-03 ENCOUNTER — TREATMENT (OUTPATIENT)
Dept: PHYSICAL THERAPY | Facility: CLINIC | Age: 88
End: 2023-10-03
Payer: MEDICARE

## 2023-10-03 DIAGNOSIS — S82.101D CLOSED FRACTURE OF PROXIMAL END OF RIGHT TIBIA WITH ROUTINE HEALING, UNSPECIFIED FRACTURE MORPHOLOGY, SUBSEQUENT ENCOUNTER: ICD-10-CM

## 2023-10-03 DIAGNOSIS — S82.209A TIBIA FRACTURE: Primary | ICD-10-CM

## 2023-10-03 DIAGNOSIS — M25.561 ACUTE PAIN OF RIGHT KNEE: ICD-10-CM

## 2023-10-03 PROCEDURE — 97110 THERAPEUTIC EXERCISES: CPT | Mod: GP | Performed by: PHYSICAL THERAPIST

## 2023-10-03 ASSESSMENT — ENCOUNTER SYMPTOMS
OCCASIONAL FEELINGS OF UNSTEADINESS: 1
DEPRESSION: 0
LOSS OF SENSATION IN FEET: 0

## 2023-10-03 ASSESSMENT — PATIENT HEALTH QUESTIONNAIRE - PHQ9
1. LITTLE INTEREST OR PLEASURE IN DOING THINGS: NOT AT ALL
2. FEELING DOWN, DEPRESSED OR HOPELESS: NOT AT ALL
SUM OF ALL RESPONSES TO PHQ9 QUESTIONS 1 AND 2: 0

## 2023-10-03 ASSESSMENT — PAIN SCALES - GENERAL: PAINLEVEL_OUTOF10: 0 - NO PAIN

## 2023-10-03 ASSESSMENT — PAIN - FUNCTIONAL ASSESSMENT: PAIN_FUNCTIONAL_ASSESSMENT: 0-10

## 2023-10-03 NOTE — LETTER
October 3, 2023    Ivana Umaña, PT  [address] [facility]  Re[facility] Services, [address]  [facility] [address]    Patient: Bindu Cali   YOB: 1932   Date of Visit: 10/3/2023       Dear No referring provider defined for this encounter.    The attached plan of care is being sent to you because your patient’s medical reimbursement requires that you certify the plan of care. Your signature is required to allow uninterrupted insurance coverage.      You may indicate your approval by signing below and faxing this form back to us at Dept Fax: [phone].    Please call Dept: [phone] with any questions or concerns.    Thank you for this referral,        Ivana Umaña, PT  [facility] [address] [facility]  [address] [facility] [id number] [phone]    Payer: Payor: MEDICARE / Plan: MEDICARE PART A AND B / Product Type: *No Product type* /                                                                         Date:     Dear Ivana Umaña, PT,     Re: Ms. Bindu Cali, MRN:[id number]    I certify that I have reviewed the attached plan of care and it is medically necessary for Ms. Bindu Cali (10/14/1932) who is under my care.          ______________________________________                    _________________  Provider name and credentials                                           Date and time                                                                                           Plan of Care 8/8/23   Effective from: 8/8/2023  Effective to: 11/8/2023    Plan ID: [id number]            Participants as of Finalize on 10/3/2023    Name Type Comments Contact Info    Ivana Umaña PT Physical Therapist  [phone]    Missy Hermosillo PA-C Physician Assistant PT POC was never signed by referring provider. Please sign. Thank you! [phone]       Last Plan Note     Author: Ivana Umaña PT Status: Incomplete Last  "edited: 10/3/2023 11:00 AM       Physical Therapy    Physical Therapy Treatment    Patient Name: Bindu Cali  MRN: [id number]  Today's Date: 10/3/2023     Visit #: 14  Auth Required? No    Cert dates: 8/8/23-11/8/23    Assessment:  Focused on progressing static and dynamic balance exercises in clinic today as she is still unstable when ambulating with cane. Pt did well but requires CGAX1 for both static and dynamic balance exercises.  Good muscle fatigue achieved today and pt demonstrates understanding of addition to HEP.  Pt continues to be a good candidate for skilled PT intervention to improve quality of gait, decrease fall risk, and improve functional mobilty.        Plan:   Continue 1x per week for the month of October    Current Problem  1. Tibia fracture        2. Acute pain of right knee            Subjective  Pt states she doesn't have pain often but there is a small area of incision that isn't healing well.  Last week they cleaned out wound and now she has to pack it each day with fresh gauze.      Pain  Pain Assessment: 0-10  Pain Score: 0 - No pain    Precautions:  High fall risk    Objective      Treatments:  - Nustep L4 8 minutes (legs only)  - slant board stretch 30\" x 3  All balance exercises SB A x1 or CG A x 1  - dynamic marching and butt kicks at p-bar 2 laps each-attempting not to hold on  - DL narrow stance on airex EO 60\"  - \"\" with manual perturbations at pelvis and shoulders all directions 60\"  -modified tandem stance on level 30\"x2 R/L  -toe taps 4 inch without UE support 10x R/Lx2  -obstacle course next to bar: forward (4 inch step, over 1/2 foam, on airex, around 3 cones) x 2, lateral x 2  -alt forward lunge on bosu with UE support (flat side up) 15x R/L      Added to HEP:  Access Code: [email or web address]  URL: [email or web address]  Date: 10/03/2023  Prepared by: Ivana Umaña    Exercises  - Standing Romberg to 3/4 Tandem Stance  - 2 x daily - 7 x weekly - 2 sets - " 3 reps - 30 hold  - Standing Toe Taps  - 2 x daily - 7 x weekly - 2 sets - 10 reps    Plan of Care    Goals: Goals set and discussed today.     1. Decrease both pain frequency and intensity <0-1/10 with ADL'S and recreational activities  2. Pt will increase RLE strength >/=4/5 throughout to help ascend/descend stairs safely, stoop for dressing/cleaning, etc.  3. Pt will walk with least restrictive AD > 1 block without pain or loss of balance to help patient return to normal exercise routine  4. LEFS > 50  5. Pt will tolerate standing > 5-10 minutes continuous to cook light meals, by week 12     Planned interventions include: aquatic therapy, biofeedback, cryotherapy, dry needling, education/instruction, electrical stimulation, gait training, home program, hot pack, kinesiotaping, manual therapy, neuromuscular re-education, self care/home management, therapeutic activities, therapeutic exercises and vasopneumatic device w/ cold.   Frequency and duration: 1-2 time(s) a week, for 8-10 weeks.   Potential to achieve rehab goals is good      Plan of care was developed with input and agreement by the patient and pt son present.                 Current Participants as of 10/3/2023    Name Type Comments Contact Info    Ivana Umaña, PT Physical Therapist  [phone]    Signature pending    Missy Hermosillo PA-C Physician Assistant PT POC was never signed by referring provider. Please sign. Thank you! [phone]    Signature pending

## 2023-10-05 ENCOUNTER — LAB (OUTPATIENT)
Dept: LAB | Facility: CLINIC | Age: 88
End: 2023-10-05
Payer: MEDICARE

## 2023-10-05 ENCOUNTER — INFUSION (OUTPATIENT)
Dept: HEMATOLOGY/ONCOLOGY | Facility: CLINIC | Age: 88
DRG: 857 | End: 2023-10-05
Payer: MEDICARE

## 2023-10-05 ENCOUNTER — APPOINTMENT (OUTPATIENT)
Dept: LAB | Facility: CLINIC | Age: 88
End: 2023-10-05
Payer: MEDICARE

## 2023-10-05 ENCOUNTER — TELEPHONE (OUTPATIENT)
Dept: ORTHOPEDIC SURGERY | Facility: CLINIC | Age: 88
End: 2023-10-05
Payer: MEDICARE

## 2023-10-05 VITALS
OXYGEN SATURATION: 96 % | SYSTOLIC BLOOD PRESSURE: 161 MMHG | BODY MASS INDEX: 28.68 KG/M2 | HEART RATE: 77 BPM | WEIGHT: 141.31 LBS | DIASTOLIC BLOOD PRESSURE: 68 MMHG | TEMPERATURE: 98.2 F | RESPIRATION RATE: 20 BRPM

## 2023-10-05 DIAGNOSIS — C90.00 MULTIPLE MYELOMA, REMISSION STATUS UNSPECIFIED (MULTI): ICD-10-CM

## 2023-10-05 DIAGNOSIS — L24.A9 WOUND DRAINAGE: ICD-10-CM

## 2023-10-05 PROBLEM — D63.8 ANEMIA IN OTHER CHRONIC DISEASES CLASSIFIED ELSEWHERE: Status: ACTIVE | Noted: 2023-10-05

## 2023-10-05 LAB
ALBUMIN SERPL BCP-MCNC: 3.5 G/DL (ref 3.4–5)
ALP SERPL-CCNC: 67 U/L (ref 33–136)
ALT SERPL W P-5'-P-CCNC: 12 U/L (ref 7–45)
ANION GAP SERPL CALC-SCNC: 14 MMOL/L (ref 10–20)
AST SERPL W P-5'-P-CCNC: 20 U/L (ref 9–39)
BASOPHILS # BLD AUTO: 0.04 X10*3/UL (ref 0–0.1)
BASOPHILS NFR BLD AUTO: 0.2 %
BILIRUB SERPL-MCNC: 0.5 MG/DL (ref 0–1.2)
BUN SERPL-MCNC: 37 MG/DL (ref 6–23)
CALCIUM SERPL-MCNC: 10.6 MG/DL (ref 8.6–10.3)
CHLORIDE SERPL-SCNC: 105 MMOL/L (ref 98–107)
CO2 SERPL-SCNC: 23 MMOL/L (ref 21–32)
CREAT SERPL-MCNC: 1.53 MG/DL (ref 0.5–1.05)
EOSINOPHIL # BLD AUTO: 0.16 X10*3/UL (ref 0–0.4)
EOSINOPHIL NFR BLD AUTO: 0.7 %
ERYTHROCYTE [DISTWIDTH] IN BLOOD BY AUTOMATED COUNT: 15.8 % (ref 11.5–14.5)
GFR SERPL CREATININE-BSD FRML MDRD: 32 ML/MIN/1.73M*2
GLUCOSE SERPL-MCNC: 154 MG/DL (ref 74–99)
HBV CORE AB SER QL: NONREACTIVE
HBV SURFACE AB SER-ACNC: <3.1 MIU/ML
HBV SURFACE AG SERPL QL IA: NONREACTIVE
HCT VFR BLD AUTO: 28.9 % (ref 36–46)
HGB BLD-MCNC: 9.3 G/DL (ref 12–16)
IMM GRANULOCYTES # BLD AUTO: 0.14 X10*3/UL (ref 0–0.5)
IMM GRANULOCYTES NFR BLD AUTO: 0.6 % (ref 0–0.9)
LYMPHOCYTES # BLD AUTO: 1.22 X10*3/UL (ref 0.8–3)
LYMPHOCYTES NFR BLD AUTO: 5.2 %
MCH RBC QN AUTO: 29.4 PG (ref 26–34)
MCHC RBC AUTO-ENTMCNC: 32.2 G/DL (ref 32–36)
MCV RBC AUTO: 92 FL (ref 80–100)
MONOCYTES # BLD AUTO: 1.42 X10*3/UL (ref 0.05–0.8)
MONOCYTES NFR BLD AUTO: 6 %
NEUTROPHILS # BLD AUTO: 20.7 X10*3/UL (ref 1.6–5.5)
NEUTROPHILS NFR BLD AUTO: 87.3 %
NRBC BLD-RTO: 0 /100 WBCS (ref 0–0)
PLATELET # BLD AUTO: 392 X10*3/UL (ref 150–450)
PMV BLD AUTO: 9.8 FL (ref 7.5–11.5)
POTASSIUM SERPL-SCNC: 4 MMOL/L (ref 3.5–5.3)
PROT SERPL-MCNC: 6.8 G/DL (ref 6.4–8.2)
PROT SERPL-MCNC: 6.8 G/DL (ref 6.4–8.2)
RBC # BLD AUTO: 3.16 X10*6/UL (ref 4–5.2)
SODIUM SERPL-SCNC: 138 MMOL/L (ref 136–145)
WBC # BLD AUTO: 23.7 X10*3/UL (ref 4.4–11.3)

## 2023-10-05 PROCEDURE — 82232 ASSAY OF BETA-2 PROTEIN: CPT | Mod: CMCLAB,PARLAB | Performed by: INTERNAL MEDICINE

## 2023-10-05 PROCEDURE — 84155 ASSAY OF PROTEIN SERUM: CPT | Performed by: INTERNAL MEDICINE

## 2023-10-05 PROCEDURE — 36415 COLL VENOUS BLD VENIPUNCTURE: CPT | Mod: PARLAB

## 2023-10-05 PROCEDURE — 85025 COMPLETE CBC W/AUTO DIFF WBC: CPT | Performed by: INTERNAL MEDICINE

## 2023-10-05 PROCEDURE — 86320 SERUM IMMUNOELECTROPHORESIS: CPT | Performed by: INTERNAL MEDICINE

## 2023-10-05 PROCEDURE — 83521 IG LIGHT CHAINS FREE EACH: CPT | Mod: CMCLAB,PARLAB | Performed by: INTERNAL MEDICINE

## 2023-10-05 PROCEDURE — 80053 COMPREHEN METABOLIC PANEL: CPT | Performed by: INTERNAL MEDICINE

## 2023-10-05 PROCEDURE — 86704 HEP B CORE ANTIBODY TOTAL: CPT | Mod: CMCLAB,PARLAB | Performed by: INTERNAL MEDICINE

## 2023-10-05 PROCEDURE — 82784 ASSAY IGA/IGD/IGG/IGM EACH: CPT | Mod: CMCLAB,PARLAB | Performed by: INTERNAL MEDICINE

## 2023-10-05 PROCEDURE — 84165 PROTEIN E-PHORESIS SERUM: CPT | Mod: CMCLAB,PARLAB | Performed by: INTERNAL MEDICINE

## 2023-10-05 PROCEDURE — 87340 HEPATITIS B SURFACE AG IA: CPT | Mod: CMCLAB,PARLAB | Performed by: INTERNAL MEDICINE

## 2023-10-05 PROCEDURE — 84165 PROTEIN E-PHORESIS SERUM: CPT | Performed by: INTERNAL MEDICINE

## 2023-10-05 PROCEDURE — 86706 HEP B SURFACE ANTIBODY: CPT | Mod: CMCLAB,PARLAB | Performed by: INTERNAL MEDICINE

## 2023-10-05 RX ORDER — SULFAMETHOXAZOLE AND TRIMETHOPRIM 800; 160 MG/1; MG/1
1 TABLET ORAL 2 TIMES DAILY
Qty: 20 TABLET | Refills: 0 | Status: CANCELLED | OUTPATIENT
Start: 2023-10-05 | End: 2023-10-15

## 2023-10-05 RX ORDER — OXYCODONE HYDROCHLORIDE 5 MG/1
5 TABLET ORAL EVERY 4 HOURS PRN
COMMUNITY
Start: 2015-06-21 | End: 2023-10-26 | Stop reason: ENTERED-IN-ERROR

## 2023-10-05 ASSESSMENT — PATIENT HEALTH QUESTIONNAIRE - PHQ9
8. MOVING OR SPEAKING SO SLOWLY THAT OTHER PEOPLE COULD HAVE NOTICED. OR THE OPPOSITE, BEING SO FIGETY OR RESTLESS THAT YOU HAVE BEEN MOVING AROUND A LOT MORE THAN USUAL: NOT AT ALL
3. TROUBLE FALLING OR STAYING ASLEEP OR SLEEPING TOO MUCH: NOT AT ALL
5. POOR APPETITE OR OVEREATING: NOT AT ALL
9. THOUGHTS THAT YOU WOULD BE BETTER OFF DEAD, OR OF HURTING YOURSELF: 0
9. THOUGHTS THAT YOU WOULD BE BETTER OFF DEAD, OR OF HURTING YOURSELF: NOT AT ALL
SUM OF ALL RESPONSES TO PHQ QUESTIONS 1-9: 0
4. FEELING TIRED OR HAVING LITTLE ENERGY: NOT AT ALL
6. FEELING BAD ABOUT YOURSELF - OR THAT YOU ARE A FAILURE OR HAVE LET YOURSELF OR YOUR FAMILY DOWN: 0
2. FEELING DOWN, DEPRESSED, IRRITABLE, OR HOPELESS: NOT AT ALL
9. THOUGHTS THAT YOU WOULD BE BETTER OFF DEAD, OR OF HURTING YOURSELF: NOT AT ALL
1. LITTLE INTEREST OR PLEASURE IN DOING THINGS: NOT AT ALL
7. TROUBLE CONCENTRATING ON THINGS, SUCH AS READING THE NEWSPAPER OR WATCHING TELEVISION: NOT AT ALL
2. FEELING DOWN, DEPRESSED OR HOPELESS: NOT AT ALL
6. FEELING BAD ABOUT YOURSELF - OR THAT YOU ARE A FAILURE OR HAVE LET YOURSELF OR YOUR FAMILY DOWN: NOT AT ALL
SUM OF ALL RESPONSES TO PHQ QUESTIONS 1-9: 0
2. FEELING DOWN, DEPRESSED OR HOPELESS: NOT AT ALL
3. TROUBLE FALLING OR STAYING ASLEEP OR SLEEPING TOO MUCH: NOT AT ALL
1. LITTLE INTEREST OR PLEASURE IN DOING THINGS: NOT AT ALL
1. LITTLE INTEREST OR PLEASURE IN DOING THINGS: 0
5. POOR APPETITE OR OVEREATING: 0
2. FEELING DOWN, DEPRESSED, IRRITABLE, OR HOPELESS: 0
4. FEELING TIRED OR HAVING LITTLE ENERGY: 0
6. FEELING BAD ABOUT YOURSELF - OR THAT YOU ARE A FAILURE OR HAVE LET YOURSELF OR YOUR FAMILY DOWN: NOT AT ALL
8. MOVING OR SPEAKING SO SLOWLY THAT OTHER PEOPLE COULD HAVE NOTICED. OR THE OPPOSITE, BEING SO FIGETY OR RESTLESS THAT YOU HAVE BEEN MOVING AROUND A LOT MORE THAN USUAL: 0
3. TROUBLE FALLING OR STAYING ASLEEP OR SLEEPING TOO MUCH: 0
7. TROUBLE CONCENTRATING ON THINGS, SUCH AS READING THE NEWSPAPER OR WATCHING TELEVISION: NOT AT ALL
4. FEELING TIRED OR HAVING LITTLE ENERGY: NOT AT ALL
7. TROUBLE CONCENTRATING ON THINGS, SUCH AS READING THE NEWSPAPER OR WATCHING TELEVISION: 0
10. IF YOU CHECKED OFF ANY PROBLEMS, HOW DIFFICULT HAVE THESE PROBLEMS MADE IT FOR YOU TO DO YOUR WORK, TAKE CARE OF THINGS AT HOME, OR GET ALONG WITH OTHER PEOPLE: NOT DIFFICULT AT ALL
8. MOVING OR SPEAKING SO SLOWLY THAT OTHER PEOPLE COULD HAVE NOTICED. OR THE OPPOSITE, BEING SO FIGETY OR RESTLESS THAT YOU HAVE BEEN MOVING AROUND A LOT MORE THAN USUAL: NOT AT ALL
1. LITTLE INTEREST OR PLEASURE IN DOING THINGS: NOT AT ALL
10. IF YOU CHECKED OFF ANY PROBLEMS, HOW DIFFICULT HAVE THESE PROBLEMS MADE IT FOR YOU TO DO YOUR WORK, TAKE CARE OF THINGS AT HOME, OR GET ALONG WITH OTHER PEOPLE: NOT DIFFICULT AT ALL
5. POOR APPETITE OR OVEREATING: NOT AT ALL
SUM OF ALL RESPONSES TO PHQ9 QUESTIONS 1 AND 2: 0

## 2023-10-05 ASSESSMENT — PAIN SCALES - GENERAL: PAINLEVEL: 8

## 2023-10-05 ASSESSMENT — COLUMBIA-SUICIDE SEVERITY RATING SCALE - C-SSRS
1. IN THE PAST MONTH, HAVE YOU WISHED YOU WERE DEAD OR WISHED YOU COULD GO TO SLEEP AND NOT WAKE UP?: NO
2. HAVE YOU ACTUALLY HAD ANY THOUGHTS OF KILLING YOURSELF?: NO
6. HAVE YOU EVER DONE ANYTHING, STARTED TO DO ANYTHING, OR PREPARED TO DO ANYTHING TO END YOUR LIFE?: NO

## 2023-10-05 NOTE — TELEPHONE ENCOUNTER
Pt is calling from her oncology office. She said she was not allowed to get her infusion d/t elevated WBC. Her wound is draining serosanguineous drainage, which was increased more than her LOV. Her leg is swollen and red and painful to walk on .      Per Dr. Weller fill Bactrim DS for 10 days. To see us next week.     Pt scheduled for 10/12/2023 at 9am w Dr. Weller.     Siena Loaiza LPN

## 2023-10-06 ENCOUNTER — APPOINTMENT (OUTPATIENT)
Dept: RADIOLOGY | Facility: HOSPITAL | Age: 88
DRG: 857 | End: 2023-10-06
Payer: MEDICARE

## 2023-10-06 ENCOUNTER — HOSPITAL ENCOUNTER (INPATIENT)
Facility: HOSPITAL | Age: 88
LOS: 7 days | Discharge: LEFT BEFORE DISCHARGE | DRG: 857 | End: 2023-10-13
Attending: EMERGENCY MEDICINE | Admitting: INTERNAL MEDICINE
Payer: MEDICARE

## 2023-10-06 DIAGNOSIS — M97.8XXA PERIPROSTHETIC FRACTURE OF PROXIMAL END OF TIBIA, INITIAL ENCOUNTER: ICD-10-CM

## 2023-10-06 DIAGNOSIS — L03.115 CELLULITIS AND ABSCESS OF RIGHT LOWER EXTREMITY: Primary | ICD-10-CM

## 2023-10-06 DIAGNOSIS — M16.9 OSTEOARTHRITIS OF HIP, UNSPECIFIED LATERALITY, UNSPECIFIED OSTEOARTHRITIS TYPE: ICD-10-CM

## 2023-10-06 DIAGNOSIS — M86.9 OSTEOMYELITIS, UNSPECIFIED SITE, UNSPECIFIED TYPE (MULTI): ICD-10-CM

## 2023-10-06 DIAGNOSIS — T81.49XA SURGICAL SITE INFECTION: ICD-10-CM

## 2023-10-06 DIAGNOSIS — Z96.659 PERIPROSTHETIC FRACTURE OF PROXIMAL END OF TIBIA, INITIAL ENCOUNTER: ICD-10-CM

## 2023-10-06 DIAGNOSIS — L02.415 CELLULITIS AND ABSCESS OF RIGHT LOWER EXTREMITY: Primary | ICD-10-CM

## 2023-10-06 LAB
ABO GROUP (TYPE) IN BLOOD: NORMAL
ALBUMIN SERPL BCP-MCNC: 3.7 G/DL (ref 3.4–5)
ALP SERPL-CCNC: 92 U/L (ref 33–136)
ALT SERPL W P-5'-P-CCNC: 22 U/L (ref 7–45)
ANION GAP SERPL CALC-SCNC: 16 MMOL/L (ref 10–20)
ANTIBODY SCREEN: NORMAL
APTT PPP: 31 SECONDS (ref 27–38)
AST SERPL W P-5'-P-CCNC: 24 U/L (ref 9–39)
B2 MICROGLOB SERPL-MCNC: 6.7 MG/L (ref 0.7–2.2)
BASOPHILS # BLD AUTO: 0.05 X10*3/UL (ref 0–0.1)
BASOPHILS NFR BLD AUTO: 0.2 %
BILIRUB SERPL-MCNC: 0.4 MG/DL (ref 0–1.2)
BUN SERPL-MCNC: 45 MG/DL (ref 6–23)
CALCIUM SERPL-MCNC: 11.4 MG/DL (ref 8.6–10.6)
CHLORIDE SERPL-SCNC: 101 MMOL/L (ref 98–107)
CO2 SERPL-SCNC: 23 MMOL/L (ref 21–32)
CREAT SERPL-MCNC: 1.72 MG/DL (ref 0.5–1.05)
CRP SERPL-MCNC: 24.69 MG/DL
EOSINOPHIL # BLD AUTO: 0.03 X10*3/UL (ref 0–0.4)
EOSINOPHIL NFR BLD AUTO: 0.1 %
ERYTHROCYTE [DISTWIDTH] IN BLOOD BY AUTOMATED COUNT: 15.5 % (ref 11.5–14.5)
ERYTHROCYTE [SEDIMENTATION RATE] IN BLOOD BY WESTERGREN METHOD: 90 MM/H (ref 0–30)
GFR SERPL CREATININE-BSD FRML MDRD: 28 ML/MIN/1.73M*2
GLUCOSE SERPL-MCNC: 109 MG/DL (ref 74–99)
HCT VFR BLD AUTO: 28.6 % (ref 36–46)
HGB BLD-MCNC: 9.6 G/DL (ref 12–16)
IGA SERPL-MCNC: 889 MG/DL (ref 70–400)
IGG SERPL-MCNC: 460 MG/DL (ref 700–1600)
IGM SERPL-MCNC: 12 MG/DL (ref 40–230)
IMM GRANULOCYTES # BLD AUTO: 0.19 X10*3/UL (ref 0–0.5)
IMM GRANULOCYTES NFR BLD AUTO: 0.6 % (ref 0–0.9)
INR PPP: 1.1 (ref 0.9–1.1)
KAPPA LC SERPL-MCNC: 16.73 MG/DL (ref 0.33–1.94)
KAPPA LC/LAMBDA SER: 12.67 {RATIO} (ref 0.26–1.65)
LAMBDA LC SERPL-MCNC: 1.32 MG/DL (ref 0.57–2.63)
LYMPHOCYTES # BLD AUTO: 2.58 X10*3/UL (ref 0.8–3)
LYMPHOCYTES NFR BLD AUTO: 8.1 %
MCH RBC QN AUTO: 29.6 PG (ref 26–34)
MCHC RBC AUTO-ENTMCNC: 33.6 G/DL (ref 32–36)
MCV RBC AUTO: 88 FL (ref 80–100)
MONOCYTES # BLD AUTO: 1.79 X10*3/UL (ref 0.05–0.8)
MONOCYTES NFR BLD AUTO: 5.6 %
NEUTROPHILS # BLD AUTO: 27.1 X10*3/UL (ref 1.6–5.5)
NEUTROPHILS NFR BLD AUTO: 85.4 %
NRBC BLD-RTO: 0 /100 WBCS (ref 0–0)
PLATELET # BLD AUTO: 387 X10*3/UL (ref 150–450)
PMV BLD AUTO: 10.4 FL (ref 7.5–11.5)
POTASSIUM SERPL-SCNC: 4.2 MMOL/L (ref 3.5–5.3)
PROT SERPL-MCNC: 6.9 G/DL (ref 6.4–8.2)
PROTHROMBIN TIME: 12.6 SECONDS (ref 9.8–12.8)
RBC # BLD AUTO: 3.24 X10*6/UL (ref 4–5.2)
RH FACTOR (ANTIGEN D): NORMAL
SODIUM SERPL-SCNC: 136 MMOL/L (ref 136–145)
WBC # BLD AUTO: 31.7 X10*3/UL (ref 4.4–11.3)

## 2023-10-06 PROCEDURE — 96365 THER/PROPH/DIAG IV INF INIT: CPT

## 2023-10-06 PROCEDURE — 85610 PROTHROMBIN TIME: CPT | Performed by: EMERGENCY MEDICINE

## 2023-10-06 PROCEDURE — 99285 EMERGENCY DEPT VISIT HI MDM: CPT | Performed by: EMERGENCY MEDICINE

## 2023-10-06 PROCEDURE — 36415 COLL VENOUS BLD VENIPUNCTURE: CPT

## 2023-10-06 PROCEDURE — 73564 X-RAY EXAM KNEE 4 OR MORE: CPT | Mod: RIGHT SIDE | Performed by: RADIOLOGY

## 2023-10-06 PROCEDURE — 83930 ASSAY OF BLOOD OSMOLALITY: CPT | Performed by: NURSE PRACTITIONER

## 2023-10-06 PROCEDURE — 86140 C-REACTIVE PROTEIN: CPT

## 2023-10-06 PROCEDURE — 86900 BLOOD TYPING SEROLOGIC ABO: CPT | Performed by: EMERGENCY MEDICINE

## 2023-10-06 PROCEDURE — 85025 COMPLETE CBC W/AUTO DIFF WBC: CPT | Performed by: EMERGENCY MEDICINE

## 2023-10-06 PROCEDURE — 36415 COLL VENOUS BLD VENIPUNCTURE: CPT | Performed by: EMERGENCY MEDICINE

## 2023-10-06 PROCEDURE — G1004 CDSM NDSC: HCPCS

## 2023-10-06 PROCEDURE — 73700 CT LOWER EXTREMITY W/O DYE: CPT | Mod: RT,MG

## 2023-10-06 PROCEDURE — 2500000004 HC RX 250 GENERAL PHARMACY W/ HCPCS (ALT 636 FOR OP/ED)

## 2023-10-06 PROCEDURE — 76377 3D RENDER W/INTRP POSTPROCES: CPT | Mod: RIGHT SIDE | Performed by: RADIOLOGY

## 2023-10-06 PROCEDURE — 85730 THROMBOPLASTIN TIME PARTIAL: CPT | Performed by: EMERGENCY MEDICINE

## 2023-10-06 PROCEDURE — 73590 X-RAY EXAM OF LOWER LEG: CPT | Mod: RT,FY,FR

## 2023-10-06 PROCEDURE — 73590 X-RAY EXAM OF LOWER LEG: CPT | Mod: RIGHT SIDE | Performed by: RADIOLOGY

## 2023-10-06 PROCEDURE — 73701 CT LOWER EXTREMITY W/DYE: CPT | Mod: RIGHT SIDE | Performed by: RADIOLOGY

## 2023-10-06 PROCEDURE — 73564 X-RAY EXAM KNEE 4 OR MORE: CPT | Mod: RT

## 2023-10-06 PROCEDURE — 84075 ASSAY ALKALINE PHOSPHATASE: CPT | Performed by: EMERGENCY MEDICINE

## 2023-10-06 PROCEDURE — 1210000001 HC SEMI-PRIVATE ROOM DAILY

## 2023-10-06 PROCEDURE — 85652 RBC SED RATE AUTOMATED: CPT

## 2023-10-06 RX ORDER — AMLODIPINE BESYLATE 5 MG/1
2.5 TABLET ORAL DAILY
Status: DISCONTINUED | OUTPATIENT
Start: 2023-10-07 | End: 2023-10-10

## 2023-10-06 RX ORDER — LEVOTHYROXINE SODIUM 50 UG/1
100 TABLET ORAL DAILY
Status: DISCONTINUED | OUTPATIENT
Start: 2023-10-07 | End: 2023-10-07

## 2023-10-06 RX ORDER — LORATADINE 10 MG/1
10 TABLET ORAL DAILY PRN
Status: DISCONTINUED | OUTPATIENT
Start: 2023-10-06 | End: 2023-10-13 | Stop reason: HOSPADM

## 2023-10-06 RX ORDER — CHOLECALCIFEROL (VITAMIN D3) 25 MCG
50 TABLET ORAL DAILY
Status: DISCONTINUED | OUTPATIENT
Start: 2023-10-07 | End: 2023-10-13 | Stop reason: HOSPADM

## 2023-10-06 RX ORDER — GABAPENTIN 300 MG/1
300 CAPSULE ORAL NIGHTLY
Status: DISCONTINUED | OUTPATIENT
Start: 2023-10-06 | End: 2023-10-07

## 2023-10-06 RX ORDER — PANTOPRAZOLE SODIUM 40 MG/1
40 TABLET, DELAYED RELEASE ORAL
Status: DISCONTINUED | OUTPATIENT
Start: 2023-10-07 | End: 2023-10-13 | Stop reason: HOSPADM

## 2023-10-06 RX ORDER — VANCOMYCIN HYDROCHLORIDE 1 G/200ML
1000 INJECTION, SOLUTION INTRAVENOUS ONCE
Status: DISCONTINUED | OUTPATIENT
Start: 2023-10-06 | End: 2023-10-06

## 2023-10-06 RX ADMIN — TAZOBACTAM SODIUM AND PIPERACILLIN SODIUM 2.25 G: 250; 2 INJECTION, SOLUTION INTRAVENOUS at 21:02

## 2023-10-06 ASSESSMENT — PAIN DESCRIPTION - FREQUENCY: FREQUENCY: CONSTANT/CONTINUOUS

## 2023-10-06 ASSESSMENT — COLUMBIA-SUICIDE SEVERITY RATING SCALE - C-SSRS
2. HAVE YOU ACTUALLY HAD ANY THOUGHTS OF KILLING YOURSELF?: NO
6. HAVE YOU EVER DONE ANYTHING, STARTED TO DO ANYTHING, OR PREPARED TO DO ANYTHING TO END YOUR LIFE?: NO
1. IN THE PAST MONTH, HAVE YOU WISHED YOU WERE DEAD OR WISHED YOU COULD GO TO SLEEP AND NOT WAKE UP?: NO

## 2023-10-06 ASSESSMENT — PAIN SCALES - GENERAL
PAINLEVEL_OUTOF10: 5 - MODERATE PAIN
PAINLEVEL_OUTOF10: 8

## 2023-10-06 ASSESSMENT — PAIN DESCRIPTION - LOCATION: LOCATION: KNEE

## 2023-10-06 ASSESSMENT — LIFESTYLE VARIABLES
HAVE PEOPLE ANNOYED YOU BY CRITICIZING YOUR DRINKING: NO
EVER FELT BAD OR GUILTY ABOUT YOUR DRINKING: NO
HAVE YOU EVER FELT YOU SHOULD CUT DOWN ON YOUR DRINKING: NO
EVER HAD A DRINK FIRST THING IN THE MORNING TO STEADY YOUR NERVES TO GET RID OF A HANGOVER: NO

## 2023-10-06 ASSESSMENT — PAIN DESCRIPTION - PAIN TYPE: TYPE: ACUTE PAIN

## 2023-10-06 ASSESSMENT — PAIN DESCRIPTION - ORIENTATION: ORIENTATION: RIGHT

## 2023-10-06 ASSESSMENT — PAIN - FUNCTIONAL ASSESSMENT: PAIN_FUNCTIONAL_ASSESSMENT: 0-10

## 2023-10-06 ASSESSMENT — PAIN DESCRIPTION - PROGRESSION: CLINICAL_PROGRESSION: GRADUALLY WORSENING

## 2023-10-06 ASSESSMENT — PAIN DESCRIPTION - DESCRIPTORS: DESCRIPTORS: ACHING

## 2023-10-06 NOTE — ED PROVIDER NOTES
HPI   Chief Complaint   Patient presents with    Wound Infection       HPI  Ms. Bindu Cali, 89 yo, F, with PMH of HTN, CKD, Hypothyroidism, GERD, Multiple Myeloma presenting with rt leg wound.    Pt is presenting with an open wound on the rt shin. She was operated for an ORIF rt tibia # in 04/2023 which was healing with secondary intention. She has regular wound cleaning at [facility] dept for the same. According to the pt, since her last wound dressing on 09/28/2023, she has noticed a a whitish discharge from the wound. Over the last week, her rt lower leg has become progressively worse with redness, swelling, pain and increasing discharge. Pt states she hasn't been able to walk and drive since the last few days due to pain. Denies any fever, chills, night sweats. Denies any change in sensation or motor weakness in the rt leg. Denies any other systemic symptoms including chest pain, breathlessness, abdominal pain, headache, n/v.    Pt was diagnosed with Multiple myeloma in Dec 2022 and receives Bortezomib infusion every 2 weeks. She was scheduled for an infusion yesterday which was canceled due to elevated WBC count and risk for systemic infection.                   [place] Coma Scale Score: 15                  Patient History   Past Medical History:   Diagnosis Date    Chronic kidney disease, stage 3b (CMS/HCC) 02/25/2022    Anemia due to stage 3b chronic kidney disease    Essential (primary) hypertension 12/14/2022    Essential hypertension    Other specified postprocedural states     H/O colonoscopy    Personal history of other medical treatment     History of mammogram    Personal history of other medical treatment     History of bone density study    Personal history of other medical treatment     H/O bone density study    Procedure and treatment not carried out because of patient's decision for unspecified reasons     Colonoscopy refused    Vitamin D deficiency, unspecified     Vitamin D  insufficiency     Past Surgical History:   Procedure Laterality Date    BREAST BIOPSY  01/22/2015    Biopsy Breast Open    CHOLECYSTECTOMY  01/22/2015    Cholecystectomy    HYSTERECTOMY  01/22/2015    Hysterectomy    OTHER SURGICAL HISTORY  04/28/2022    Shoulder surgery    ROTATOR CUFF REPAIR  01/22/2015    Rotator Cuff Repair    TONSILLECTOMY  01/22/2015    Tonsillectomy    TOTAL KNEE ARTHROPLASTY  01/22/2015    Knee Replacement     Family History   Problem Relation Name Age of Onset    Cancer Mother      Other (cardiac disorder) Mother      Other (cardiac disorder) Father      Other (cardiac disorder) Brother      Other (CVA) Brother      Other (HEART BASE TUMOR) Brother       Social History     Tobacco Use    Smoking status: Never    Smokeless tobacco: Never   Substance Use Topics    Alcohol use: Yes     Alcohol/week: 1.0 standard drink of alcohol     Types: 1 Glasses of wine per week    Drug use: Never       Physical Exam   ED Triage Vitals   Temp Heart Rate Resp BP   10/06/23 1331 10/06/23 1331 10/06/23 1331 10/06/23 1331   36.5 °C (97.7 °F) 80 16 130/60      SpO2 Temp Source Heart Rate Source Patient Position   10/06/23 1331 10/06/23 1646 -- 10/06/23 1646   100 % Tympanic  Sitting      BP Location FiO2 (%)     10/06/23 1646 --     Right arm        Physical Exam  Constitutional:       Appearance: Normal appearance.   HENT:      Head: Normocephalic and atraumatic.   Eyes:      Extraocular Movements: Extraocular movements intact.      Conjunctiva/sclera: Conjunctivae normal.      Pupils: Pupils are equal, round, and reactive to light.   Cardiovascular:      Rate and Rhythm: Normal rate and regular rhythm.      Heart sounds: No murmur heard.  Pulmonary:      Effort: Pulmonary effort is normal.      Breath sounds: Normal breath sounds. No stridor. No wheezing.   Abdominal:      Palpations: Abdomen is soft.      Tenderness: There is no abdominal tenderness.   Musculoskeletal:      Comments: Rt LE:  Edema +,  Erythema +, Tender to palpation  Extending from the ankle to the knee   Skin:     Comments: 2cm open wound on the anterior aspect of the rt lower leg, packing +, thick whitish discharge +   Neurological:      Mental Status: She is alert.   Psychiatric:         Mood and Affect: Mood normal.         Behavior: Behavior normal.       ED Course & MDM   Diagnoses as of 10/06/23 2248   Cellulitis and abscess of right lower extremity       Medical Decision Making    Presentation at arrival most suggestive of Rt Lower leg cellulitis with suspicion for infected hardware    Labs ordered: CBC, CMP, Coag screen, CRP, ESR, Type and Screen    Imaging ordered: XR rt knee and rt tibia-fibula.     Treatments: Pt was started on Vancomycin and Zo-syn empirically. Labs reviewed and notable for leucocytosis and CKD. Imaging reviewed. No acute changes noted On XR knee and lower leg. Ortho consulted and want the pt admitted to Medicine. They will be taking her for surgical debridement in the AM. Ortho team requested that antibiotics be discontinued and that they will culture in the OR.    On re-examination of patient, pt is stable.     I reviewed the case with the attending ED physician, Dr. Chadd Willard. The attending ED physician agrees with the plan. Patient and/or patient's representative was counseled regarding labs, imaging, likely diagnosis, and plan. All questions were answered.  ------------------------------------------------------------  Assessment: Ms. Bindu Cali, 89 yo, F, with PMH of HTN, CKD, Hypothyroidism, GERD, Multiple Myeloma presenting with rt leg wound with cellulitis. Stopped antibiotics as per Ortho recs. Pt admitted to medicine and will be taken for surgical debridement tomorrow AM.  ------------------------------------------------------------  Procedure  Procedures      Pura Celestin MD  Resident  10/06/23 2248    Attending Note:    I saw and evaluated the patient.  I personally obtained the key and  critical portions of the history and physical exam or was physically present for key and critical portions performed by the resident/fellow/ALVIN.  I reviewed the resident/fellow/ALVIN’s documentation and discussed the patient with the resident/fellow/ALVIN.  I agree with the resident/fellow/ALVIN’s medical decision making as documented in the resident/fellow/ALVIN’s note with the exception/addition of the following:         HPI:   Bindu Cali is a 89 year old female 89 year old female with history of HTN, CKD, hypothyroidism, GERD, MGUS, MM, and right periprosthetic tibia fracture (4/2023 s/p ORIF), presenting to the ED for concern for wound infection.  The patient reports a chronic open wound associated with ORIF since 4/2023.  She notes that it was left to close by secondary intention.  She reports that the “scab fell off” last week and she subsequently developed cloudy discharge and associated erythema, with pain with ROM of the lower extremity.  She reports that she called her orthopedic surgeon's office and was told someone would call in a prescription for her but she never received a return call.  She notes that she went to her oncology appointment today for an infusion for MM, but was told her “WBC count was too high.” Per chart review, labs notable for leukocytosis of 23.7.  She denies fevers, chills, night sweats, trauma or fall.     Additional History Obtained from: See HPI       Physical Exam:  General: Grossly well-developed, awake, alert, NAD    Head: Normocephalic, no overt external signs of trauma    ENT: Mucus membranes moist, nares patent    Neck: Supple, trachea midline    Neurologic: A&Ox4, FS, TM, EOMI, PERRL, WU x 4 with grossly symmetric strength, SILT grossly intact BUE and BLE    Cardiovascular: RRR, no MRG, pulses grossly symmetric    Respiratory: CTA B/L, no wheeze, rales or rhonchi    Abdomen: Soft, not appreciably tender, no evident rebound/guarding, no pulsatile masses palpable. Negative  Bennett’s sign.      Back: No apparent CVA TTP, no midline TLS spine TTP, stepoffs, or acute deformities    MSK: RLE with approximately 1 cm x 0.5 cm circular wound to proximal lower RLE with associated white discharge.  RLE with associated pretibial erythema and increased warmth to touch, no crepitus or fluctuance otherwise.  No gross bony deformities in BUE and BLE    Skin/Integumentary: Warm and dry    Psychiatric: Calm and cooperative. Normal mood and affect.      Differential Diagnoses Considered:  See MDM below    Chronic Medical Conditions Significantly Affecting Care:  See MDM below    External Records Reviewed:  I reviewed recent and relevant outside records as noted in HPI above and MDM below.     Independent Interpretation of Studies:    Laboratory/Imaging Studies:  Laboratory results personally reviewed and independently interpreted:  CBC with leukocytosis of 31.7, H/H 9.6/28.6  Metabolic panel without HAGMA or significant electrolyte anomalies. Creatinine 1.72, prior 1.53 and 1.37.   HS troponin not elevated.  Delta troponin stable  CRP 24.69  ESR pending  INR 1.1  APTT 31  A positive    Radiology imaging and preliminary and/or final reports personally reviewed/independently interpreted:  XR knee RLE  IMPRESSION:  No acute abnormality seen. Postsurgical changes in the knee.        Social Determinants of Health Significantly Affecting Care:  See HPI/MDM    Prescription Drug Consideration:  See MDM    Diagnostic testing considered:  See MDM and relevant sections      Medical Decision Making/Course:  89 year old female 89 year old female with history of HTN, CKD, hypothyroidism, GERD, MGUS, MM, and right periprosthetic tibia fracture (4/2023 s/p ORIF), presenting to the ED for concern for wound infection. The patient was afebrile, non-toxic and HDS.  She had no crepitus, rapidly progressive erythema, or signs of necrotizing fasciitis.  She was neurovascularly intact in the affected extremity.  She was  ordered broad spectrum antibiotics and orthopedics was consulted.  They requested that antibiotics be held for now for intraoperative cultures.  She was normotensive, afebrile and without signs of sepsis.  Orthopedics requested that the patient be admitted to medicine due to age, and she was accepted to medicine service.  She remained stable during ED shift. An opportunity to ask questions was provided and all were addressed at that time.  The patient and family member verbalized understanding and were agreeable to plan.         Escalation of Care:  Appropriate for: Admission    Discussion of Management with Other Providers:  We discussed the patient/results with: Orthopedics, internal medicine admissions coordinator     Chadd Willard MD  10/07/23 2024

## 2023-10-06 NOTE — ED TRIAGE NOTES
Pt had a broken right tibia in April. Had surgery then. Right leg has been red for a week. Wound is infected

## 2023-10-06 NOTE — TELEPHONE ENCOUNTER
Spoke with Dr. Janee Otto and Missy[facility] , this afternoon, and she told me that the patient is now in the ED at [facility] for infection and worsening knee pain. Disregard request for ATB.     Just RUPERT Loaiza LPN

## 2023-10-07 PROBLEM — T81.49XA SURGICAL SITE INFECTION: Status: ACTIVE | Noted: 2023-10-06

## 2023-10-07 LAB — OSMOLALITY SERPL: 291 MOSM/KG (ref 280–300)

## 2023-10-07 PROCEDURE — 2500000004 HC RX 250 GENERAL PHARMACY W/ HCPCS (ALT 636 FOR OP/ED): Performed by: INTERNAL MEDICINE

## 2023-10-07 PROCEDURE — 36415 COLL VENOUS BLD VENIPUNCTURE: CPT | Performed by: NURSE PRACTITIONER

## 2023-10-07 PROCEDURE — 2500000001 HC RX 250 WO HCPCS SELF ADMINISTERED DRUGS (ALT 637 FOR MEDICARE OP): Performed by: NURSE PRACTITIONER

## 2023-10-07 PROCEDURE — 81001 URINALYSIS AUTO W/SCOPE: CPT | Performed by: NURSE PRACTITIONER

## 2023-10-07 PROCEDURE — 2500000004 HC RX 250 GENERAL PHARMACY W/ HCPCS (ALT 636 FOR OP/ED): Performed by: NURSE PRACTITIONER

## 2023-10-07 PROCEDURE — 1210000001 HC SEMI-PRIVATE ROOM DAILY

## 2023-10-07 PROCEDURE — 87040 BLOOD CULTURE FOR BACTERIA: CPT | Performed by: NURSE PRACTITIONER

## 2023-10-07 PROCEDURE — 2500000001 HC RX 250 WO HCPCS SELF ADMINISTERED DRUGS (ALT 637 FOR MEDICARE OP): Performed by: INTERNAL MEDICINE

## 2023-10-07 PROCEDURE — 99223 1ST HOSP IP/OBS HIGH 75: CPT | Performed by: NURSE PRACTITIONER

## 2023-10-07 RX ORDER — BUTYROSPERMUM PARKII(SHEA BUTTER), SIMMONDSIA CHINENSIS (JOJOBA) SEED OIL, ALOE BARBADENSIS LEAF EXTRACT .01; 1; 3.5 G/100G; G/100G; G/100G
250 LIQUID TOPICAL 2 TIMES DAILY
Status: DISCONTINUED | OUTPATIENT
Start: 2023-10-07 | End: 2023-10-13 | Stop reason: HOSPADM

## 2023-10-07 RX ORDER — OXYCODONE HYDROCHLORIDE 5 MG/1
5 TABLET ORAL EVERY 6 HOURS PRN
Status: DISCONTINUED | OUTPATIENT
Start: 2023-10-07 | End: 2023-10-13 | Stop reason: HOSPADM

## 2023-10-07 RX ORDER — VANCOMYCIN HYDROCHLORIDE 1 G/200ML
15 INJECTION, SOLUTION INTRAVENOUS ONCE
Status: COMPLETED | OUTPATIENT
Start: 2023-10-07 | End: 2023-10-07

## 2023-10-07 RX ORDER — ACETAMINOPHEN 325 MG/1
650 TABLET ORAL EVERY 6 HOURS PRN
Status: DISCONTINUED | OUTPATIENT
Start: 2023-10-07 | End: 2023-10-08

## 2023-10-07 RX ORDER — SODIUM CHLORIDE 9 MG/ML
100 INJECTION, SOLUTION INTRAVENOUS CONTINUOUS
Status: DISCONTINUED | OUTPATIENT
Start: 2023-10-07 | End: 2023-10-08

## 2023-10-07 RX ORDER — MULTIVIT-MIN/IRON FUM/FOLIC AC 7.5 MG-4
1 TABLET ORAL DAILY
Status: DISCONTINUED | OUTPATIENT
Start: 2023-10-07 | End: 2023-10-13 | Stop reason: HOSPADM

## 2023-10-07 RX ORDER — LEVOTHYROXINE SODIUM 50 UG/1
100 TABLET ORAL
Status: DISCONTINUED | OUTPATIENT
Start: 2023-10-08 | End: 2023-10-13 | Stop reason: HOSPADM

## 2023-10-07 RX ORDER — GABAPENTIN 300 MG/1
300 CAPSULE ORAL NIGHTLY
Status: DISCONTINUED | OUTPATIENT
Start: 2023-10-07 | End: 2023-10-13 | Stop reason: HOSPADM

## 2023-10-07 RX ADMIN — GABAPENTIN 300 MG: 300 CAPSULE ORAL at 01:58

## 2023-10-07 RX ADMIN — VANCOMYCIN HYDROCHLORIDE 1000 MG: 1 INJECTION, SOLUTION INTRAVENOUS at 15:45

## 2023-10-07 RX ADMIN — PIPERACILLIN SODIUM AND TAZOBACTAM SODIUM 2.25 G: 2; .25 INJECTION, SOLUTION INTRAVENOUS at 13:00

## 2023-10-07 RX ADMIN — AMLODIPINE BESYLATE 2.5 MG: 5 TABLET ORAL at 08:26

## 2023-10-07 RX ADMIN — SODIUM CHLORIDE 100 ML/HR: 9 INJECTION, SOLUTION INTRAVENOUS at 13:00

## 2023-10-07 RX ADMIN — PANTOPRAZOLE SODIUM 40 MG: 40 TABLET, DELAYED RELEASE ORAL at 06:25

## 2023-10-07 RX ADMIN — PIPERACILLIN SODIUM AND TAZOBACTAM SODIUM 2.25 G: 2; .25 INJECTION, SOLUTION INTRAVENOUS at 17:03

## 2023-10-07 RX ADMIN — PIPERACILLIN SODIUM AND TAZOBACTAM SODIUM 2.25 G: 2; .25 INJECTION, SOLUTION INTRAVENOUS at 21:33

## 2023-10-07 RX ADMIN — LEVOTHYROXINE SODIUM 100 MCG: 50 TABLET ORAL at 08:26

## 2023-10-07 RX ADMIN — Medication 250 MG: at 18:49

## 2023-10-07 RX ADMIN — GABAPENTIN 300 MG: 300 CAPSULE ORAL at 21:22

## 2023-10-07 RX ADMIN — SODIUM CHLORIDE 100 ML/HR: 9 INJECTION, SOLUTION INTRAVENOUS at 01:58

## 2023-10-07 RX ADMIN — PSYLLIUM HUSK 1 PACKET: 3.4 POWDER ORAL at 17:15

## 2023-10-07 RX ADMIN — Medication 50 MCG: at 08:26

## 2023-10-07 RX ADMIN — Medication 1 TABLET: at 08:26

## 2023-10-07 SDOH — SOCIAL STABILITY: SOCIAL INSECURITY: DO YOU FEEL ANYONE HAS EXPLOITED OR TAKEN ADVANTAGE OF YOU FINANCIALLY OR OF YOUR PERSONAL PROPERTY?: NO

## 2023-10-07 SDOH — HEALTH STABILITY: PHYSICAL HEALTH: ON AVERAGE, HOW MANY MINUTES DO YOU ENGAGE IN EXERCISE AT THIS LEVEL?: 0 MIN

## 2023-10-07 SDOH — SOCIAL STABILITY: SOCIAL INSECURITY
WITHIN THE LAST YEAR, HAVE YOU BEEN KICKED, HIT, SLAPPED, OR OTHERWISE PHYSICALLY HURT BY YOUR PARTNER OR EX-PARTNER?: NO

## 2023-10-07 SDOH — SOCIAL STABILITY: SOCIAL INSECURITY: ABUSE: ADULT

## 2023-10-07 SDOH — SOCIAL STABILITY: SOCIAL NETWORK: IN A TYPICAL WEEK, HOW MANY TIMES DO YOU TALK ON THE PHONE WITH FAMILY, FRIENDS, OR NEIGHBORS?: PATIENT DECLINED

## 2023-10-07 SDOH — SOCIAL STABILITY: SOCIAL INSECURITY: WITHIN THE LAST YEAR, HAVE YOU BEEN AFRAID OF YOUR PARTNER OR EX-PARTNER?: NO

## 2023-10-07 SDOH — ECONOMIC STABILITY: TRANSPORTATION INSECURITY
IN THE PAST 12 MONTHS, HAS THE LACK OF TRANSPORTATION KEPT YOU FROM MEDICAL APPOINTMENTS OR FROM GETTING MEDICATIONS?: NO

## 2023-10-07 SDOH — ECONOMIC STABILITY: TRANSPORTATION INSECURITY
IN THE PAST 12 MONTHS, HAS LACK OF TRANSPORTATION KEPT YOU FROM MEETINGS, WORK, OR FROM GETTING THINGS NEEDED FOR DAILY LIVING?: NO

## 2023-10-07 SDOH — ECONOMIC STABILITY: INCOME INSECURITY: IN THE PAST 12 MONTHS, HAS THE ELECTRIC, GAS, OIL, OR WATER COMPANY THREATENED TO SHUT OFF SERVICE IN YOUR HOME?: NO

## 2023-10-07 SDOH — ECONOMIC STABILITY: INCOME INSECURITY: IN THE LAST 12 MONTHS, WAS THERE A TIME WHEN YOU WERE NOT ABLE TO PAY THE MORTGAGE OR RENT ON TIME?: NO

## 2023-10-07 SDOH — SOCIAL STABILITY: SOCIAL INSECURITY: WITHIN THE LAST YEAR, HAVE YOU BEEN HUMILIATED OR EMOTIONALLY ABUSED IN OTHER WAYS BY YOUR PARTNER OR EX-PARTNER?: NO

## 2023-10-07 SDOH — ECONOMIC STABILITY: FOOD INSECURITY: WITHIN THE PAST 12 MONTHS, THE FOOD YOU BOUGHT JUST DIDN'T LAST AND YOU DIDN'T HAVE MONEY TO GET MORE.: NEVER TRUE

## 2023-10-07 SDOH — ECONOMIC STABILITY: FOOD INSECURITY: WITHIN THE PAST 12 MONTHS, YOU WORRIED THAT YOUR FOOD WOULD RUN OUT BEFORE YOU GOT MONEY TO BUY MORE.: NEVER TRUE

## 2023-10-07 SDOH — SOCIAL STABILITY: SOCIAL INSECURITY
WITHIN THE LAST YEAR, HAVE TO BEEN RAPED OR FORCED TO HAVE ANY KIND OF SEXUAL ACTIVITY BY YOUR PARTNER OR EX-PARTNER?: NO

## 2023-10-07 SDOH — SOCIAL STABILITY: SOCIAL NETWORK: HOW OFTEN DO YOU ATTENT MEETINGS OF THE CLUB OR ORGANIZATION YOU BELONG TO?: PATIENT DECLINED

## 2023-10-07 SDOH — SOCIAL STABILITY: SOCIAL INSECURITY: WERE YOU ABLE TO COMPLETE ALL THE BEHAVIORAL HEALTH SCREENINGS?: YES

## 2023-10-07 SDOH — HEALTH STABILITY: PHYSICAL HEALTH: ON AVERAGE, HOW MANY DAYS PER WEEK DO YOU ENGAGE IN MODERATE TO STRENUOUS EXERCISE (LIKE A BRISK WALK)?: 0 DAYS

## 2023-10-07 SDOH — SOCIAL STABILITY: SOCIAL INSECURITY: ARE YOU OR HAVE YOU BEEN THREATENED OR ABUSED PHYSICALLY, EMOTIONALLY, OR SEXUALLY BY ANYONE?: NO

## 2023-10-07 SDOH — HEALTH STABILITY: MENTAL HEALTH
STRESS IS WHEN SOMEONE FEELS TENSE, NERVOUS, ANXIOUS, OR CAN'T SLEEP AT NIGHT BECAUSE THEIR MIND IS TROUBLED. HOW STRESSED ARE YOU?: NOT AT ALL

## 2023-10-07 SDOH — ECONOMIC STABILITY: HOUSING INSECURITY
IN THE LAST 12 MONTHS, WAS THERE A TIME WHEN YOU DID NOT HAVE A STEADY PLACE TO SLEEP OR SLEPT IN A SHELTER (INCLUDING NOW)?: NO

## 2023-10-07 SDOH — SOCIAL STABILITY: SOCIAL INSECURITY: HAVE YOU HAD THOUGHTS OF HARMING ANYONE ELSE?: NO

## 2023-10-07 SDOH — SOCIAL STABILITY: SOCIAL NETWORK: HOW OFTEN DO YOU GET TOGETHER WITH FRIENDS OR RELATIVES?: PATIENT DECLINED

## 2023-10-07 SDOH — SOCIAL STABILITY: SOCIAL NETWORK
DO YOU BELONG TO ANY CLUBS OR ORGANIZATIONS SUCH AS CHURCH GROUPS UNIONS, FRATERNAL OR ATHLETIC GROUPS, OR SCHOOL GROUPS?: PATIENT DECLINED

## 2023-10-07 SDOH — SOCIAL STABILITY: SOCIAL INSECURITY: ARE THERE ANY APPARENT SIGNS OF INJURIES/BEHAVIORS THAT COULD BE RELATED TO ABUSE/NEGLECT?: NO

## 2023-10-07 SDOH — ECONOMIC STABILITY: HOUSING INSECURITY: IN THE LAST 12 MONTHS, HOW MANY PLACES HAVE YOU LIVED?: 1

## 2023-10-07 SDOH — SOCIAL STABILITY: SOCIAL NETWORK: ARE YOU MARRIED, WIDOWED, DIVORCED, SEPARATED, NEVER MARRIED, OR LIVING WITH A PARTNER?: PATIENT DECLINED

## 2023-10-07 SDOH — SOCIAL STABILITY: SOCIAL INSECURITY: HAS ANYONE EVER THREATENED TO HURT YOUR FAMILY OR YOUR PETS?: NO

## 2023-10-07 SDOH — SOCIAL STABILITY: SOCIAL NETWORK: HOW OFTEN DO YOU ATTEND CHURCH OR RELIGIOUS SERVICES?: PATIENT DECLINED

## 2023-10-07 SDOH — ECONOMIC STABILITY: INCOME INSECURITY: HOW HARD IS IT FOR YOU TO PAY FOR THE VERY BASICS LIKE FOOD, HOUSING, MEDICAL CARE, AND HEATING?: NOT HARD AT ALL

## 2023-10-07 SDOH — SOCIAL STABILITY: SOCIAL INSECURITY: DOES ANYONE TRY TO KEEP YOU FROM HAVING/CONTACTING OTHER FRIENDS OR DOING THINGS OUTSIDE YOUR HOME?: NO

## 2023-10-07 SDOH — SOCIAL STABILITY: SOCIAL INSECURITY: DO YOU FEEL UNSAFE GOING BACK TO THE PLACE WHERE YOU ARE LIVING?: NO

## 2023-10-07 ASSESSMENT — LIFESTYLE VARIABLES
AUDIT-C TOTAL SCORE: 1
SKIP TO QUESTIONS 9-10: 1
PRESCIPTION_ABUSE_PAST_12_MONTHS: NO
SUBSTANCE_ABUSE_PAST_12_MONTHS: NO
HOW OFTEN DO YOU HAVE 6 OR MORE DRINKS ON ONE OCCASION: NEVER
AUDIT-C TOTAL SCORE: 1
HOW MANY STANDARD DRINKS CONTAINING ALCOHOL DO YOU HAVE ON A TYPICAL DAY: 1 OR 2
HOW OFTEN DO YOU HAVE A DRINK CONTAINING ALCOHOL: MONTHLY OR LESS

## 2023-10-07 ASSESSMENT — ACTIVITIES OF DAILY LIVING (ADL)
FEEDING YOURSELF: INDEPENDENT
HEARING - LEFT EAR: FUNCTIONAL
BATHING: INDEPENDENT
GROOMING: INDEPENDENT
JUDGMENT_ADEQUATE_SAFELY_COMPLETE_DAILY_ACTIVITIES: YES
ADEQUATE_TO_COMPLETE_ADL: YES
ASSISTIVE_DEVICE: WALKER
HEARING - RIGHT EAR: FUNCTIONAL
DRESSING YOURSELF: INDEPENDENT
PATIENT'S MEMORY ADEQUATE TO SAFELY COMPLETE DAILY ACTIVITIES?: YES
TOILETING: INDEPENDENT
WALKS IN HOME: INDEPENDENT

## 2023-10-07 ASSESSMENT — ENCOUNTER SYMPTOMS
CHILLS: 0
DIZZINESS: 0
NAUSEA: 0
MUSCULOSKELETAL NEGATIVE: 1
ENDOCRINE NEGATIVE: 1
COLOR CHANGE: 1
PALPITATIONS: 0
WHEEZING: 0
TROUBLE SWALLOWING: 0
VOMITING: 0
DIAPHORESIS: 0
DIARRHEA: 0
DYSURIA: 0
NERVOUS/ANXIOUS: 0
SLEEP DISTURBANCE: 0
FREQUENCY: 0
FEVER: 0
FLANK PAIN: 0
WOUND: 1
CONSTIPATION: 0
ACTIVITY CHANGE: 0
APPETITE CHANGE: 0
EYES NEGATIVE: 1
ABDOMINAL PAIN: 0
AGITATION: 0
HEMATURIA: 0
SORE THROAT: 0
SHORTNESS OF BREATH: 0
TREMORS: 0
COUGH: 0
NUMBNESS: 0
CONFUSION: 0

## 2023-10-07 ASSESSMENT — COGNITIVE AND FUNCTIONAL STATUS - GENERAL
WALKING IN HOSPITAL ROOM: A LITTLE
PATIENT BASELINE BEDBOUND: NO
DAILY ACTIVITIY SCORE: 23
MOBILITY SCORE: 23
DAILY ACTIVITIY SCORE: 24
CLIMB 3 TO 5 STEPS WITH RAILING: A LITTLE
MOBILITY SCORE: 21
MOVING TO AND FROM BED TO CHAIR: A LITTLE
DRESSING REGULAR LOWER BODY CLOTHING: A LITTLE
CLIMB 3 TO 5 STEPS WITH RAILING: A LITTLE

## 2023-10-07 ASSESSMENT — PATIENT HEALTH QUESTIONNAIRE - PHQ9
2. FEELING DOWN, DEPRESSED OR HOPELESS: NOT AT ALL
SUM OF ALL RESPONSES TO PHQ9 QUESTIONS 1 & 2: 0
1. LITTLE INTEREST OR PLEASURE IN DOING THINGS: NOT AT ALL

## 2023-10-07 ASSESSMENT — COLUMBIA-SUICIDE SEVERITY RATING SCALE - C-SSRS
6. HAVE YOU EVER DONE ANYTHING, STARTED TO DO ANYTHING, OR PREPARED TO DO ANYTHING TO END YOUR LIFE?: NO
1. IN THE PAST MONTH, HAVE YOU WISHED YOU WERE DEAD OR WISHED YOU COULD GO TO SLEEP AND NOT WAKE UP?: NO
2. HAVE YOU ACTUALLY HAD ANY THOUGHTS OF KILLING YOURSELF?: NO

## 2023-10-07 NOTE — NURSING NOTE
This morning patient ambulated to bathroom. Then patient had an accident so this nurse changed patients linen, gown, and underwear. Patient then needed assistance ambulating to bathroom again. Afterwards patient complained of dry mouth. This nurse took wet sponges swabbed throughout the patients mouth. The patient is ready to get the surgery process going and home before her birthday. Her daughter was able to visit today but she is ready to get home to her other 6 children.

## 2023-10-07 NOTE — PROGRESS NOTES
Vancomycin Dosing by Pharmacy- INITIAL    Bindu Cali is a 89 y.o. year old female who Pharmacy has been consulted for vancomycin dosing for cellulitis, skin and soft tissue and prosthetic device infection. Based on the patient's indication and renal status this patient will be dosed based on a goal trough/random level of 15-20 due to severity of infection (probes to bone per ortho note)    Renal function is currently stable but at baseline patient has CKD.     Visit Vitals  /56   Pulse 68   Temp 37 °C (98.6 °F)   Resp 18        Lab Results   Component Value Date    CREATININE 1.72 (H) 10/06/2023    CREATININE 1.53 (H) 10/05/2023    CREATININE 1.37 (H) 09/21/2023    CREATININE 1.25 (H) 09/07/2023    CREATININE 1.22 (H) 08/24/2023    CREATININE 1.42 (H) 08/10/2023        Patient weight is 64 kg    10/7 blood cx pending, other cultures to be obtained in the OR today    I/O last 3 completed shifts:  In: 50 (0.8 mL/kg) [IV Piggyback:50]  Out: - (0 mL/kg)   Weight: 64 kg       Afebrile      Assessment/Plan     Patient will be given a loading dose of 1000 mg. (15mg/kg)  Will plan to dose Q24H based on patient-specific clearance, but no additional doses ordered at this time.   Follow-up level will be ordered on 10/8 at AM labs unless clinically indicated sooner.  Will continue to monitor renal function daily while on vancomycin and order serum creatinine at least every 48 hours if not already ordered.  Follow for continued vancomycin needs, clinical response, and signs/symptoms of toxicity.       Luz Maria Staley, PharmD

## 2023-10-07 NOTE — CONSULTS
Orthopaedic Surgery Consult H&P    HPI:   Orthopaedic Problems/Injuries: SSI R proximal tibia  Other Injuries: None    89 y.o. female 89F PMH HTN, CKD2, hypothyroid, GERD, Multiple Myeloma (actively on chemo vs immunotherapy), s/p ORIF R periprosthetic proximal tibia fx on 4/23/23 c/b wound dehiscence. Seen in clinic on 9/28, had <2mm open wound at that time managed with wet to dry dressings. Since  9/28, has had progressive worsening of wound and erythema.     PMH: per above/EMR  PSH: per above/EMR  SocHx:      -  Denies tobacco use      -  Denies EtOH use      -  Denies other drug use  FamHx:  Non-contributory to this patient's acute orthopaedic problem.   Allergies: Reviewed in EMR  Meds: Reviewed in EMR    ROS      - 14 point ROS negative except as above    Physical Exam:  Gen: AOx3, NAD  HEENT: normocephalic atraumatic  Psych: appropriate mood and affect  Resp: nonlabored breathing  Cardiac: Extremities WWP, RRR to peripheral palpation  Neuro: CN 2-12 grossly intact  Skin: no rashes    Right lower extremity:  - 1x1 cm pus-filled wound with draining sinus tract in distal ? aspect of otherwise healed surgical incision, probes to bone.   - Fires EHL/DF/PF.  - Sensation intact to light touch in sural, saphenous, superficial/deep peroneal, tibial nerve distributions.  - 2+ DP pulse, < 2 seconds capillary refill.    A full secondary exam was performed and all relevant findings discussed and noted above.    Imaging:  AP and lateral radiographs of the R knee display well fixed hardware.    CT R tibia displays fracture healing.    Assessment:  Orthopaedic Problems/Injuries: R proximal tibia SSI    89F PMH HTN, CKD2, hypothyroid, GERD, Multiple Myeloma (actively on chemo vs immunotherapy), s/p ORIF R periprosthetic proximal tibia fx on 4/23/23 c/b wound dehiscence. Seen in clinic on 9/28, had <2mm open wound at that time managed with wet to dry dressings. Since  9/28, has had progressive worsening of wound and erythema.  Exam: 1x1 cm pus-filled wound with draining sinus tract in distal ? aspect of otherwise healed surgical incision, probes to bone. WBC 31.7, CRP 24.7, ESR 90. XR with well fixed hardware. CT demonstrates fx healing.       Plan:  - Consented, posted R proximal tibia Removal of hardware + I&D w/ Dr Dominguez 10/7  - WB: WBAT RLE  -appreciate documentation of medical optimization per medicine team  - Abx: hold until OR if able  - Diet: NPO  - DVT: hold until after OR if able  - Preop labs obtained    - Dispo: Pending OR    Kwasi Barton MD  PGY-3 Orthopaedic Surgery  On-call Resident    This patient was seen and staffed within 30 minutes of initial consult.  _________________________________________________________    While admitted, this patient will be followed by the Ortho Trauma Team. Please contact below residents with any questions (available via Epic Chat).     First call: Laci Vang, PGY-2   Second call: Narayan Inman, PGY-3

## 2023-10-07 NOTE — H&P
History Of Present Illness  Bindu Cali is a 89 y.o. female who presented to the ED for further evaluation of right leg wound on her right shin. Pt had ORIF R periprosthetic proximal tibia fracture on 4/23/23 and surgical incision was healing with secondary intention. Bindu receives wound care at [facility] with ortho. She reports that last drsg change was done on 9/28/23. Since her last drsg change she has noticed a whitish drainage from the wound, but over the last week wound has become painful with increased drainage, and started to swell. Erythema is also present around wound on R shin. Bindu has not been able to walk or drive due to pain in her right leg. She denies fever. Pt is not sure if she had chills because she gets cold easily. Pain is only present in right leg when pt attempts to bind R knee or bears weight to RLE. Pt also reports that her Bortezomib infusion for multiple myeloma had to be held yesterday due to leukocytosis. Pt normally gets infusions every 2 wks. Ortho was consulted by ED provider and pt was seen by the consulting team while in the ED. Plan per ortho is to go to OR tomorrow for washout with plans to obtain cultures. Labs obtained on admit notable for BIBIANA, anemia, leukocytosis, and elevated inflammatory markers. Decision made to admit pt to medicine service for further treatment and management of RLE cellulitis and BIBIANA.      Past Medical History  Past Medical History:   Diagnosis Date    Chronic kidney disease, stage 3b (CMS/HCC) 02/25/2022    Anemia due to stage 3b chronic kidney disease    Essential (primary) hypertension 12/14/2022    Essential hypertension    Other specified postprocedural states     H/O colonoscopy    Personal history of other medical treatment     History of mammogram    Personal history of other medical treatment     History of bone density study    Personal history of other medical treatment     H/O bone density study    Procedure and treatment  not carried out because of patient's decision for unspecified reasons     Colonoscopy refused    Vitamin D deficiency, unspecified     Vitamin D insufficiency       Surgical History  Past Surgical History:   Procedure Laterality Date    BREAST BIOPSY  01/22/2015    Biopsy Breast Open    CHOLECYSTECTOMY  01/22/2015    Cholecystectomy    HYSTERECTOMY  01/22/2015    Hysterectomy    OTHER SURGICAL HISTORY  04/28/2022    Shoulder surgery    ROTATOR CUFF REPAIR  01/22/2015    Rotator Cuff Repair    TONSILLECTOMY  01/22/2015    Tonsillectomy    TOTAL KNEE ARTHROPLASTY  01/22/2015    Knee Replacement        Social History  She reports that she has never smoked. She has never used smokeless tobacco. She reports current alcohol use of about 1.0 standard drink of alcohol per week. She reports that she does not use drugs.    Family History  Family History   Problem Relation Name Age of Onset    Cancer Mother      Other (cardiac disorder) Mother      Other (cardiac disorder) Father      Other (cardiac disorder) Brother      Other (CVA) Brother      Other (HEART BASE TUMOR) Brother          Allergies  Nsaids (non-steroidal anti-inflammatory drug), Codeine, Morphine, and Pollen extracts    Review of Systems   Review of Systems   Constitutional:  Negative for activity change, appetite change, chills, diaphoresis and fever.   HENT:  Negative for congestion, hearing loss, sore throat and trouble swallowing.    Eyes: Negative.    Respiratory:  Negative for cough, shortness of breath and wheezing.    Cardiovascular:  Negative for chest pain and palpitations.   Gastrointestinal:  Negative for abdominal pain, constipation, diarrhea, nausea and vomiting.   Endocrine: Negative.    Genitourinary:  Negative for dysuria, flank pain, frequency, hematuria and urgency.   Musculoskeletal: Negative.    Skin:  Positive for color change and wound. Negative for pallor.   Neurological:  Negative for dizziness, tremors, syncope and numbness.  "  Psychiatric/Behavioral:  Negative for agitation, confusion and sleep disturbance. The patient is not nervous/anxious.       Physical Exam  Physical Exam  Constitutional:       General: She is not in acute distress.     Appearance: Normal appearance. She is not ill-appearing.   HENT:      Head: Normocephalic and atraumatic.      Right Ear: External ear normal.      Left Ear: External ear normal.      Nose: Nose normal.      Mouth/Throat:      Mouth: Mucous membranes are moist.      Pharynx: Oropharynx is clear.   Eyes:      Extraocular Movements: Extraocular movements intact.      Conjunctiva/sclera: Conjunctivae normal.      Pupils: Pupils are equal, round, and reactive to light.   Cardiovascular:      Rate and Rhythm: Normal rate and regular rhythm.      Pulses: Normal pulses.      Heart sounds: Normal heart sounds. No murmur heard.  Pulmonary:      Effort: Pulmonary effort is normal. No respiratory distress.      Breath sounds: Normal breath sounds. No wheezing.   Abdominal:      General: Abdomen is flat. Bowel sounds are normal.      Palpations: Abdomen is soft.   Musculoskeletal:         General: Normal range of motion.      Cervical back: Normal range of motion and neck supple.   Skin:     General: Skin is warm and dry.      Comments: Janice sized wound under R knee with surrounding erythema and whitish drainage   Neurological:      Mental Status: She is alert and oriented to person, place, and time. Mental status is at baseline.      Motor: No weakness.   Psychiatric:         Mood and Affect: Mood normal.         Behavior: Behavior normal.         Thought Content: Thought content normal.           Last Recorded Vitals  Blood pressure 132/58, pulse 60, temperature 36.6 °C (97.9 °F), temperature source Oral, resp. rate 16, height 1.473 m (4' 10\"), weight 64 kg (141 lb), SpO2 96 %.    Relevant Results  Scheduled medications  amLODIPine, 2.5 mg, oral, Daily  cholecalciferol, 50 mcg, oral, Daily  gabapentin, 300 " mg, oral, Nightly  levothyroxine, 100 mcg, oral, Daily  multivitamin with minerals, 1 tablet, oral, Daily  pantoprazole, 40 mg, oral, Daily before breakfast      Continuous medications  sodium chloride 0.9%, 100 mL/hr      PRN medications  PRN medications: acetaminophen, loratadine, oxyCODONE        CT tibia fibula right wo IV contrast    Result Date: 10/6/2023  Interpreted By:  Finkelstein, Evan,  and Leonardo Villagran STUDY: CT TIBIA FIBULA RIGHT WO IV CONTRAST;  10/6/2023 11:11 pm   INDICATION: Signs/Symptoms:SSI. needs hardware removed. Evaluate for healing of proximal tibia fx.   COMPARISON: Same day radiographs of the tibia/fibula. CT lower extremity 04/22/2023   ACCESSION NUMBER(S): [id number]   ORDERING CLINICIAN: ELIOT SUGGS   TECHNIQUE: Multiple contiguous axial images of the  were obtained after the intravenous administration of  Omnipaque 350 contrast in the arterial phase.  Coronal and sagittal reformatted images were reconstructed from the axial data. MIP images and 3D reconstructions were created on an independent workstation and reviewed.   FINDINGS: OSSEOUS STRUCTURES: Periprosthetic proximal tibial fracture is again seen with persistent sclerotic fracture margins consistent with healing fracture. No new, acute fractures or malalignment. Postsurgical changes from total knee arthroplasty and plate and lateral screw fixation of the proximal tibia. The bones are demineralized.   SOFT TISSUES: Moderate knee joint effusion. Extensive soft tissue edema of the lower extremity. No definite fluid seen deep to the fascia. No subcutaneous gas. No hematoma or other focal fluid collection. No evidence of radiodense foreign body.       1. Postsurgical changes from total knee arthroplasty and lateral plate and screw fixation of the proximal tibia due to a remote periprosthetic fracture. No definite new, acute fracture is appreciated. 2. Moderate knee joint effusion. 3. Lower extremity edema. 4. Osteopenia.      I personally reviewed the images/study and I agree with the findings as stated. This study was interpreted at [facility], [place], [place].   MACRO: None.   Signed by: Evan Finkelstein 10/6/2023 11:11 PM Dictation workstation:   [id number]    XR tibia fibula right 2 views    Result Date: 10/6/2023  STUDY: Tibia and Fibula Radiographs; 10/06/2023 5:51 PM. INDICATION: Infected hardware after ORIF. COMPARISON: 04/22/2023 CT Lower Extremity Without Contrast. ACCESSION NUMBER(S): [id number] ORDERING CLINICIAN: BLAKE CRUZ TECHNIQUE:  Two views (three images) of the right tibia and fibula. FINDINGS:  Right knee arthroplasty is seen, with additional plate and screws traversing the right proximal tibia. Alignment is anatomic. There is no acute fracture or dislocation. There is no potential hardware loosening or dislocation. Minimal edema is seen, especially near the right ankle.    Postsurgical changes at the right knee and right proximal tibia, as described. Remainder as above. Signed by Jm Galeas MD    XR knee right 4+ views    Result Date: 10/6/2023  Interpreted By:  Edwina Payne, STUDY: XR KNEE RIGHT 4+ VIEWS; ;  10/6/2023 6:50 pm   INDICATION: Signs/Symptoms:infected hardware s/p prosthesis.   COMPARISON: 09/28/2023   ACCESSION NUMBER(S): [id number]   ORDERING CLINICIAN: BLAKE CRUZ   FINDINGS: Right knee, four views   Right total knee arthroplasty in place. Lateral plate and screw fixation of the proximal tibia. No acute fracture dislocation seen. Normal alignment. There is a small knee effusion. There is inferior subluxation of the patella, similar to the prior       No acute abnormality seen. Postsurgical changes in the knee.     MACRO: None   Signed by: Edwina Payne 10/6/2023 6:56 PM Dictation workstation:   [id number]    XR knee    Result Date: 9/28/2023  Interpreted By:  EDWINA PAYNE MD MRN: [id number] Patient Name: SUMIT OTT  STUDY: TIBIA ;  KNEE; 1 OR 2 VIEWS;  9/28/2023 11:13 am  INDICATION: s/p fracture  S82.209A: Tibia fracture.  COMPARISON: 07/20/2023  ACCESSION NUMBER(S): [id number]; [id number]  ORDERING CLINICIAN: GILSON GOODWIN  FINDINGS: Right knee, two views. Right tibia, two views.  Redemonstration of plate and screw fixation of proximal tibial fracture deformity. Right total knee arthroplasty in place. No new periprosthetic fracture lucency seen. There is no dislocation. There is no effusion. Redemonstration of inferior subluxation of the patella, similar to the prior. Otherwise there is no malalignment.      Redemonstration of ORIF of proximal tibial periprosthetic fracture. No change in alignment otherwise. Right total knee arthroplasty in place. Inferior subluxation of the patella is similar to the prior and may reflect a prior injury to the quadriceps tendon    XR tibia and fibia    Result Date: 9/28/2023  Interpreted By:  EDWINA EDMONDSON MD MRN: [id number] Patient Name: SUMIT OTT  STUDY: TIBIA ; KNEE; 1 OR 2 VIEWS;  9/28/2023 11:13 am  INDICATION: s/p fracture  S82.209A: Tibia fracture.  COMPARISON: 07/20/2023  ACCESSION NUMBER(S): [id number]; [id number]  ORDERING CLINICIAN: GILSON GOODWIN  FINDINGS: Right knee, two views. Right tibia, two views.  Redemonstration of plate and screw fixation of proximal tibial fracture deformity. Right total knee arthroplasty in place. No new periprosthetic fracture lucency seen. There is no dislocation. There is no effusion. Redemonstration of inferior subluxation of the patella, similar to the prior. Otherwise there is no malalignment.      Redemonstration of ORIF of proximal tibial periprosthetic fracture. No change in alignment otherwise. Right total knee arthroplasty in place. Inferior subluxation of the patella is similar to the prior and may reflect a prior injury to the quadriceps tendon     Results for orders placed or performed during the hospital encounter of 10/06/23 (from the  past 96 hour(s))   Comprehensive metabolic panel   Result Value Ref Range    Glucose 109 (H) 74 - 99 mg/dL    Sodium 136 136 - 145 mmol/L    Potassium 4.2 3.5 - 5.3 mmol/L    Chloride 101 98 - 107 mmol/L    Bicarbonate 23 21 - 32 mmol/L    Anion Gap 16 10 - 20 mmol/L    Urea Nitrogen 45 (H) 6 - 23 mg/dL    Creatinine 1.72 (H) 0.50 - 1.05 mg/dL    eGFR 28 (L) >60 mL/min/1.73m*2    Calcium 11.4 (H) 8.6 - 10.6 mg/dL    Albumin 3.7 3.4 - 5.0 g/dL    Alkaline Phosphatase 92 33 - 136 U/L    Total Protein 6.9 6.4 - 8.2 g/dL    AST 24 9 - 39 U/L    Bilirubin, Total 0.4 0.0 - 1.2 mg/dL    ALT 22 7 - 45 U/L   CBC and Auto Differential   Result Value Ref Range    WBC 31.7 (H) 4.4 - 11.3 x10*3/uL    nRBC 0.0 0.0 - 0.0 /100 WBCs    RBC 3.24 (L) 4.00 - 5.20 x10*6/uL    Hemoglobin 9.6 (L) 12.0 - 16.0 g/dL    Hematocrit 28.6 (L) 36.0 - 46.0 %    MCV 88 80 - 100 fL    MCH 29.6 26.0 - 34.0 pg    MCHC 33.6 32.0 - 36.0 g/dL    RDW 15.5 (H) 11.5 - 14.5 %    Platelets 387 150 - 450 x10*3/uL    MPV 10.4 7.5 - 11.5 fL    Neutrophils % 85.4 40.0 - 80.0 %    Immature Granulocytes %, Automated 0.6 0.0 - 0.9 %    Lymphocytes % 8.1 13.0 - 44.0 %    Monocytes % 5.6 2.0 - 10.0 %    Eosinophils % 0.1 0.0 - 6.0 %    Basophils % 0.2 0.0 - 2.0 %    Neutrophils Absolute 27.10 (H) 1.60 - 5.50 x10*3/uL    Immature Granulocytes Absolute, Automated 0.19 0.00 - 0.50 x10*3/uL    Lymphocytes Absolute 2.58 0.80 - 3.00 x10*3/uL    Monocytes Absolute 1.79 (H) 0.05 - 0.80 x10*3/uL    Eosinophils Absolute 0.03 0.00 - 0.40 x10*3/uL    Basophils Absolute 0.05 0.00 - 0.10 x10*3/uL   Type and screen   Result Value Ref Range    ABO TYPE A     Rh TYPE POS     ANTIBODY SCREEN NEG    Coagulation Screen   Result Value Ref Range    Protime 12.6 9.8 - 12.8 seconds    INR 1.1 0.9 - 1.1    aPTT 31 27 - 38 seconds   Sedimentation rate, automated   Result Value Ref Range    Sedimentation Rate 90 (H) 0 - 30 mm/h   C-reactive protein   Result Value Ref Range    C-Reactive Protein  24.69 (H) <1.00 mg/dL        Assessment/Plan   Principal Problem:    Cellulitis and abscess of right lower extremity    #RLE cellulitis  #leukocytosis  - WBC 31.7 on admit, ESR 90, and CRP 24.69  - ortho consulted, appreciate recs  - NPO at [facility] 10/7 for washout with ortho in AM  - Pain regimen: Tylenol 650 mg q6h PRN mild pain and Oxycodone 5 mg q6h PRN severe pain  - Antibiotic regimen: Vancomycin Rph to dose (10/7-) and Zosyn 2.25g q6h (10/6-)  - PT/OT consulted  - pt likely optimized for surgical intervention with ortho in AM    #BIBIANA on CKD  - Cr 1.72 GFR 28 on admit, Cr 1.22 GFR 42 on 8/24/23  - Etiology possibly dehydration vs medication induced  - holding home dose of Chlorthalidone and Losartan d/t BIBIANA  - NS @ 100 cc/hr   - Goal urine output >0.5cc/kg/hr.   - Avoid nephrotoxic medication administration   - Labs ordered: Urine electrolytes, Serum osmolality, Urine osmolality, UA C&S  - Nephrology consult if renal function does not improve  - Monitor kidney function with repeat labs as well as urine output   - RFP in AM    #hypothyroidism  - continue home dose of Synthroid 100 mcg PO every day    #HTN  - last /53  - continue to monitor BP  - continue home dose of Norvasc 2.5 mg PO every day  - holding home dose of Chlorthalidone and Losartan d/t BIBIANA         I spent 90 minutes in the professional and overall care of this patient.      Lakshmi Kahn, APRN-CNP

## 2023-10-07 NOTE — CARE PLAN
The patient's goals for the shift include  calling prior to ambulating.     Pt will verbalize all needs. Will continue to monitor and assess.

## 2023-10-07 NOTE — H&P
H&P reviewed. The patient was examined and there are no changes to the H&P. Patient electing to proceed with surgery. Patient marked and consented.     Kwasi Barton MD  PGY-3 Orthopedic Surgery  [facility]  Phone: [phone]  Pager: [phone]  Available by Epic Message

## 2023-10-08 ENCOUNTER — ANESTHESIA (OUTPATIENT)
Dept: OPERATING ROOM | Facility: HOSPITAL | Age: 88
DRG: 857 | End: 2023-10-08
Payer: MEDICARE

## 2023-10-08 ENCOUNTER — ANESTHESIA EVENT (OUTPATIENT)
Dept: OPERATING ROOM | Facility: HOSPITAL | Age: 88
DRG: 857 | End: 2023-10-08
Payer: MEDICARE

## 2023-10-08 ENCOUNTER — APPOINTMENT (OUTPATIENT)
Dept: RADIOLOGY | Facility: HOSPITAL | Age: 88
DRG: 857 | End: 2023-10-08
Payer: MEDICARE

## 2023-10-08 LAB
ALBUMIN SERPL BCP-MCNC: 3.2 G/DL (ref 3.4–5)
ALP SERPL-CCNC: 78 U/L (ref 33–136)
ALT SERPL W P-5'-P-CCNC: 15 U/L (ref 7–45)
ANION GAP SERPL CALC-SCNC: 15 MMOL/L (ref 10–20)
APPEARANCE UR: CLEAR
AST SERPL W P-5'-P-CCNC: 15 U/L (ref 9–39)
BASOPHILS # BLD AUTO: 0.04 X10*3/UL (ref 0–0.1)
BASOPHILS NFR BLD AUTO: 0.4 %
BILIRUB SERPL-MCNC: 0.5 MG/DL (ref 0–1.2)
BILIRUB UR STRIP.AUTO-MCNC: NEGATIVE MG/DL
BUN SERPL-MCNC: 27 MG/DL (ref 6–23)
CALCIUM SERPL-MCNC: 11 MG/DL (ref 8.6–10.6)
CHLORIDE SERPL-SCNC: 106 MMOL/L (ref 98–107)
CO2 SERPL-SCNC: 23 MMOL/L (ref 21–32)
COLOR UR: YELLOW
CREAT SERPL-MCNC: 1.37 MG/DL (ref 0.5–1.05)
EOSINOPHIL # BLD AUTO: 0.32 X10*3/UL (ref 0–0.4)
EOSINOPHIL NFR BLD AUTO: 3.1 %
ERYTHROCYTE [DISTWIDTH] IN BLOOD BY AUTOMATED COUNT: 15.8 % (ref 11.5–14.5)
GFR SERPL CREATININE-BSD FRML MDRD: 37 ML/MIN/1.73M*2
GLUCOSE SERPL-MCNC: 79 MG/DL (ref 74–99)
GLUCOSE UR STRIP.AUTO-MCNC: NEGATIVE MG/DL
HCT VFR BLD AUTO: 27.2 % (ref 36–46)
HGB BLD-MCNC: 8.6 G/DL (ref 12–16)
IMM GRANULOCYTES # BLD AUTO: 0.03 X10*3/UL (ref 0–0.5)
IMM GRANULOCYTES NFR BLD AUTO: 0.3 % (ref 0–0.9)
KETONES UR STRIP.AUTO-MCNC: NEGATIVE MG/DL
LEUKOCYTE ESTERASE UR QL STRIP.AUTO: NEGATIVE
LYMPHOCYTES # BLD AUTO: 1.34 X10*3/UL (ref 0.8–3)
LYMPHOCYTES NFR BLD AUTO: 13 %
MAGNESIUM SERPL-MCNC: 1.62 MG/DL (ref 1.6–2.4)
MCH RBC QN AUTO: 28.7 PG (ref 26–34)
MCHC RBC AUTO-ENTMCNC: 31.6 G/DL (ref 32–36)
MCV RBC AUTO: 91 FL (ref 80–100)
MONOCYTES # BLD AUTO: 1 X10*3/UL (ref 0.05–0.8)
MONOCYTES NFR BLD AUTO: 9.7 %
NEUTROPHILS # BLD AUTO: 7.55 X10*3/UL (ref 1.6–5.5)
NEUTROPHILS NFR BLD AUTO: 73.5 %
NITRITE UR QL STRIP.AUTO: NEGATIVE
NRBC BLD-RTO: 0 /100 WBCS (ref 0–0)
PH UR STRIP.AUTO: 6 [PH]
PLATELET # BLD AUTO: 380 X10*3/UL (ref 150–450)
PMV BLD AUTO: 9.9 FL (ref 7.5–11.5)
POTASSIUM SERPL-SCNC: 3.8 MMOL/L (ref 3.5–5.3)
PROT SERPL-MCNC: 6 G/DL (ref 6.4–8.2)
PROT UR STRIP.AUTO-MCNC: ABNORMAL MG/DL
RBC # BLD AUTO: 3 X10*6/UL (ref 4–5.2)
RBC # UR STRIP.AUTO: NEGATIVE /UL
RBC #/AREA URNS AUTO: NORMAL /HPF
SODIUM SERPL-SCNC: 140 MMOL/L (ref 136–145)
SP GR UR STRIP.AUTO: 1.01
UROBILINOGEN UR STRIP.AUTO-MCNC: <2 MG/DL
VANCOMYCIN SERPL-MCNC: 8.4 UG/ML (ref 5–20)
WBC # BLD AUTO: 10.3 X10*3/UL (ref 4.4–11.3)
WBC #/AREA URNS AUTO: NORMAL /HPF

## 2023-10-08 PROCEDURE — 2500000004 HC RX 250 GENERAL PHARMACY W/ HCPCS (ALT 636 FOR OP/ED): Performed by: ANESTHESIOLOGIST ASSISTANT

## 2023-10-08 PROCEDURE — 1210000001 HC SEMI-PRIVATE ROOM DAILY

## 2023-10-08 PROCEDURE — 3600000008 HC OR TIME - EACH INCREMENTAL 1 MINUTE - PROCEDURE LEVEL THREE: Performed by: ORTHOPAEDIC SURGERY

## 2023-10-08 PROCEDURE — 87070 CULTURE OTHR SPECIMN AEROBIC: CPT | Mod: OUT | Performed by: INTERNAL MEDICINE

## 2023-10-08 PROCEDURE — 83735 ASSAY OF MAGNESIUM: CPT | Performed by: INTERNAL MEDICINE

## 2023-10-08 PROCEDURE — 2500000004 HC RX 250 GENERAL PHARMACY W/ HCPCS (ALT 636 FOR OP/ED): Performed by: INTERNAL MEDICINE

## 2023-10-08 PROCEDURE — 0QBG0ZZ EXCISION OF RIGHT TIBIA, OPEN APPROACH: ICD-10-PCS | Performed by: ORTHOPAEDIC SURGERY

## 2023-10-08 PROCEDURE — 2500000004 HC RX 250 GENERAL PHARMACY W/ HCPCS (ALT 636 FOR OP/ED): Performed by: ORTHOPAEDIC SURGERY

## 2023-10-08 PROCEDURE — 7100000002 HC RECOVERY ROOM TIME - EACH INCREMENTAL 1 MINUTE: Performed by: ORTHOPAEDIC SURGERY

## 2023-10-08 PROCEDURE — 2500000005 HC RX 250 GENERAL PHARMACY W/O HCPCS: Performed by: STUDENT IN AN ORGANIZED HEALTH CARE EDUCATION/TRAINING PROGRAM

## 2023-10-08 PROCEDURE — 2500000004 HC RX 250 GENERAL PHARMACY W/ HCPCS (ALT 636 FOR OP/ED): Performed by: STUDENT IN AN ORGANIZED HEALTH CARE EDUCATION/TRAINING PROGRAM

## 2023-10-08 PROCEDURE — 80053 COMPREHEN METABOLIC PANEL: CPT | Performed by: INTERNAL MEDICINE

## 2023-10-08 PROCEDURE — 76000 FLUOROSCOPY <1 HR PHYS/QHP: CPT

## 2023-10-08 PROCEDURE — 36415 COLL VENOUS BLD VENIPUNCTURE: CPT | Performed by: INTERNAL MEDICINE

## 2023-10-08 PROCEDURE — 2500000005 HC RX 250 GENERAL PHARMACY W/O HCPCS: Performed by: ANESTHESIOLOGIST ASSISTANT

## 2023-10-08 PROCEDURE — 99233 SBSQ HOSP IP/OBS HIGH 50: CPT | Performed by: INTERNAL MEDICINE

## 2023-10-08 PROCEDURE — 3600000003 HC OR TIME - INITIAL BASE CHARGE - PROCEDURE LEVEL THREE: Performed by: ORTHOPAEDIC SURGERY

## 2023-10-08 PROCEDURE — 99222 1ST HOSP IP/OBS MODERATE 55: CPT | Performed by: ORTHOPAEDIC SURGERY

## 2023-10-08 PROCEDURE — 7100000001 HC RECOVERY ROOM TIME - INITIAL BASE CHARGE: Performed by: ORTHOPAEDIC SURGERY

## 2023-10-08 PROCEDURE — 87206 SMEAR FLUORESCENT/ACID STAI: CPT | Performed by: ORTHOPAEDIC SURGERY

## 2023-10-08 PROCEDURE — 3700000002 HC GENERAL ANESTHESIA TIME - EACH INCREMENTAL 1 MINUTE: Performed by: ORTHOPAEDIC SURGERY

## 2023-10-08 PROCEDURE — 2720000007 HC OR 272 NO HCPCS: Performed by: ORTHOPAEDIC SURGERY

## 2023-10-08 PROCEDURE — 99222 1ST HOSP IP/OBS MODERATE 55: CPT | Performed by: STUDENT IN AN ORGANIZED HEALTH CARE EDUCATION/TRAINING PROGRAM

## 2023-10-08 PROCEDURE — 87070 CULTURE OTHR SPECIMN AEROBIC: CPT

## 2023-10-08 PROCEDURE — 87186 SC STD MICRODIL/AGAR DIL: CPT | Mod: CMCLAB | Performed by: ORTHOPAEDIC SURGERY

## 2023-10-08 PROCEDURE — 87075 CULTR BACTERIA EXCEPT BLOOD: CPT

## 2023-10-08 PROCEDURE — 2500000001 HC RX 250 WO HCPCS SELF ADMINISTERED DRUGS (ALT 637 FOR MEDICARE OP): Performed by: INTERNAL MEDICINE

## 2023-10-08 PROCEDURE — 87102 FUNGUS ISOLATION CULTURE: CPT | Performed by: ORTHOPAEDIC SURGERY

## 2023-10-08 PROCEDURE — A4217 STERILE WATER/SALINE, 500 ML: HCPCS | Performed by: ORTHOPAEDIC SURGERY

## 2023-10-08 PROCEDURE — 0QPG04Z REMOVAL OF INTERNAL FIXATION DEVICE FROM RIGHT TIBIA, OPEN APPROACH: ICD-10-PCS | Performed by: ORTHOPAEDIC SURGERY

## 2023-10-08 PROCEDURE — 3700000001 HC GENERAL ANESTHESIA TIME - INITIAL BASE CHARGE: Performed by: ORTHOPAEDIC SURGERY

## 2023-10-08 PROCEDURE — 13160 SEC CLSR SURG WND/DEHSN XTN: CPT | Performed by: ORTHOPAEDIC SURGERY

## 2023-10-08 PROCEDURE — 80202 ASSAY OF VANCOMYCIN: CPT | Performed by: INTERNAL MEDICINE

## 2023-10-08 PROCEDURE — 2500000001 HC RX 250 WO HCPCS SELF ADMINISTERED DRUGS (ALT 637 FOR MEDICARE OP): Performed by: NURSE PRACTITIONER

## 2023-10-08 PROCEDURE — 85025 COMPLETE CBC W/AUTO DIFF WBC: CPT | Performed by: INTERNAL MEDICINE

## 2023-10-08 PROCEDURE — 2580000001 HC RX 258 IV SOLUTIONS: Performed by: STUDENT IN AN ORGANIZED HEALTH CARE EDUCATION/TRAINING PROGRAM

## 2023-10-08 PROCEDURE — 27640 PARTIAL REMOVAL OF TIBIA: CPT | Performed by: ORTHOPAEDIC SURGERY

## 2023-10-08 RX ORDER — ONDANSETRON HYDROCHLORIDE 2 MG/ML
INJECTION, SOLUTION INTRAVENOUS AS NEEDED
Status: DISCONTINUED | OUTPATIENT
Start: 2023-10-08 | End: 2023-10-08

## 2023-10-08 RX ORDER — LIDOCAINE HYDROCHLORIDE 20 MG/ML
INJECTION, SOLUTION INFILTRATION; PERINEURAL AS NEEDED
Status: DISCONTINUED | OUTPATIENT
Start: 2023-10-08 | End: 2023-10-08

## 2023-10-08 RX ORDER — FENTANYL CITRATE 50 UG/ML
50 INJECTION, SOLUTION INTRAMUSCULAR; INTRAVENOUS EVERY 5 MIN PRN
Status: DISCONTINUED | OUTPATIENT
Start: 2023-10-08 | End: 2023-10-08 | Stop reason: HOSPADM

## 2023-10-08 RX ORDER — ACETAMINOPHEN 325 MG/1
650 TABLET ORAL EVERY 4 HOURS PRN
Status: DISCONTINUED | OUTPATIENT
Start: 2023-10-08 | End: 2023-10-08 | Stop reason: HOSPADM

## 2023-10-08 RX ORDER — LIDOCAINE HYDROCHLORIDE 10 MG/ML
0.1 INJECTION, SOLUTION EPIDURAL; INFILTRATION; INTRACAUDAL; PERINEURAL ONCE
Status: DISCONTINUED | OUTPATIENT
Start: 2023-10-08 | End: 2023-10-08 | Stop reason: HOSPADM

## 2023-10-08 RX ORDER — SODIUM CHLORIDE, SODIUM LACTATE, POTASSIUM CHLORIDE, CALCIUM CHLORIDE 600; 310; 30; 20 MG/100ML; MG/100ML; MG/100ML; MG/100ML
100 INJECTION, SOLUTION INTRAVENOUS CONTINUOUS
Status: DISCONTINUED | OUTPATIENT
Start: 2023-10-08 | End: 2023-10-08 | Stop reason: HOSPADM

## 2023-10-08 RX ORDER — FENTANYL CITRATE 50 UG/ML
INJECTION, SOLUTION INTRAMUSCULAR; INTRAVENOUS AS NEEDED
Status: DISCONTINUED | OUTPATIENT
Start: 2023-10-08 | End: 2023-10-08

## 2023-10-08 RX ORDER — CEFAZOLIN 1 G/1
INJECTION, POWDER, FOR SOLUTION INTRAVENOUS AS NEEDED
Status: DISCONTINUED | OUTPATIENT
Start: 2023-10-08 | End: 2023-10-08

## 2023-10-08 RX ORDER — HYDRALAZINE HYDROCHLORIDE 20 MG/ML
INJECTION INTRAMUSCULAR; INTRAVENOUS AS NEEDED
Status: DISCONTINUED | OUTPATIENT
Start: 2023-10-08 | End: 2023-10-08

## 2023-10-08 RX ORDER — ROCURONIUM BROMIDE 10 MG/ML
INJECTION, SOLUTION INTRAVENOUS AS NEEDED
Status: DISCONTINUED | OUTPATIENT
Start: 2023-10-08 | End: 2023-10-08

## 2023-10-08 RX ORDER — ACETAMINOPHEN 325 MG/1
650 TABLET ORAL EVERY 6 HOURS PRN
Status: DISCONTINUED | OUTPATIENT
Start: 2023-10-08 | End: 2023-10-13 | Stop reason: HOSPADM

## 2023-10-08 RX ORDER — SODIUM CHLORIDE, SODIUM LACTATE, POTASSIUM CHLORIDE, CALCIUM CHLORIDE 600; 310; 30; 20 MG/100ML; MG/100ML; MG/100ML; MG/100ML
INJECTION, SOLUTION INTRAVENOUS CONTINUOUS PRN
Status: DISCONTINUED | OUTPATIENT
Start: 2023-10-08 | End: 2023-10-08

## 2023-10-08 RX ORDER — FENTANYL CITRATE 50 UG/ML
25 INJECTION, SOLUTION INTRAMUSCULAR; INTRAVENOUS EVERY 5 MIN PRN
Status: DISCONTINUED | OUTPATIENT
Start: 2023-10-08 | End: 2023-10-08 | Stop reason: HOSPADM

## 2023-10-08 RX ORDER — VANCOMYCIN HYDROCHLORIDE 1 G/20ML
INJECTION, POWDER, LYOPHILIZED, FOR SOLUTION INTRAVENOUS AS NEEDED
Status: DISCONTINUED | OUTPATIENT
Start: 2023-10-08 | End: 2023-10-08 | Stop reason: HOSPADM

## 2023-10-08 RX ORDER — PROPOFOL 10 MG/ML
INJECTION, EMULSION INTRAVENOUS AS NEEDED
Status: DISCONTINUED | OUTPATIENT
Start: 2023-10-08 | End: 2023-10-08

## 2023-10-08 RX ORDER — SODIUM CHLORIDE 0.9 G/100ML
IRRIGANT IRRIGATION AS NEEDED
Status: DISCONTINUED | OUTPATIENT
Start: 2023-10-08 | End: 2023-10-08 | Stop reason: HOSPADM

## 2023-10-08 RX ORDER — ONDANSETRON HYDROCHLORIDE 2 MG/ML
4 INJECTION, SOLUTION INTRAVENOUS ONCE AS NEEDED
Status: DISCONTINUED | OUTPATIENT
Start: 2023-10-08 | End: 2023-10-08 | Stop reason: HOSPADM

## 2023-10-08 RX ORDER — VANCOMYCIN HYDROCHLORIDE 750 MG/150ML
750 INJECTION, SOLUTION INTRAVENOUS EVERY 24 HOURS
Status: DISCONTINUED | OUTPATIENT
Start: 2023-10-08 | End: 2023-10-13

## 2023-10-08 RX ADMIN — Medication 250 MG: at 20:07

## 2023-10-08 RX ADMIN — DEXAMETHASONE SODIUM PHOSPHATE 8 MG: 4 INJECTION, SOLUTION INTRAMUSCULAR; INTRAVENOUS at 11:55

## 2023-10-08 RX ADMIN — HYDRALAZINE HYDROCHLORIDE 2 MG: 20 INJECTION INTRAMUSCULAR; INTRAVENOUS at 12:40

## 2023-10-08 RX ADMIN — LEVOTHYROXINE SODIUM 100 MCG: 50 TABLET ORAL at 06:35

## 2023-10-08 RX ADMIN — PROPOFOL 30 MG: 10 INJECTION, EMULSION INTRAVENOUS at 12:34

## 2023-10-08 RX ADMIN — PROPOFOL 40 MG: 10 INJECTION, EMULSION INTRAVENOUS at 12:52

## 2023-10-08 RX ADMIN — ACETAMINOPHEN 650 MG: 325 TABLET ORAL at 17:18

## 2023-10-08 RX ADMIN — AMLODIPINE BESYLATE 2.5 MG: 5 TABLET ORAL at 08:55

## 2023-10-08 RX ADMIN — VANCOMYCIN HYDROCHLORIDE 750 MG: 750 INJECTION, SOLUTION INTRAVENOUS at 17:18

## 2023-10-08 RX ADMIN — PANTOPRAZOLE SODIUM 40 MG: 40 TABLET, DELAYED RELEASE ORAL at 06:35

## 2023-10-08 RX ADMIN — FENTANYL CITRATE 25 MCG: 50 INJECTION, SOLUTION INTRAMUSCULAR; INTRAVENOUS at 12:49

## 2023-10-08 RX ADMIN — PSYLLIUM HUSK 1 PACKET: 3.4 POWDER ORAL at 21:00

## 2023-10-08 RX ADMIN — ROCURONIUM BROMIDE 50 MG: 10 INJECTION, SOLUTION INTRAVENOUS at 11:46

## 2023-10-08 RX ADMIN — SODIUM CHLORIDE, POTASSIUM CHLORIDE, SODIUM LACTATE AND CALCIUM CHLORIDE: 600; 310; 30; 20 INJECTION, SOLUTION INTRAVENOUS at 11:38

## 2023-10-08 RX ADMIN — Medication 1 TABLET: at 08:55

## 2023-10-08 RX ADMIN — LIDOCAINE HYDROCHLORIDE 70 MG: 20 INJECTION, SOLUTION INFILTRATION; PERINEURAL at 11:46

## 2023-10-08 RX ADMIN — PIPERACILLIN SODIUM AND TAZOBACTAM SODIUM 2.25 G: 2; .25 INJECTION, SOLUTION INTRAVENOUS at 22:31

## 2023-10-08 RX ADMIN — PROPOFOL 30 MG: 10 INJECTION, EMULSION INTRAVENOUS at 13:00

## 2023-10-08 RX ADMIN — PIPERACILLIN SODIUM AND TAZOBACTAM SODIUM 2.25 G: 2; .25 INJECTION, SOLUTION INTRAVENOUS at 16:11

## 2023-10-08 RX ADMIN — HYDRALAZINE HYDROCHLORIDE 4 MG: 20 INJECTION INTRAMUSCULAR; INTRAVENOUS at 12:52

## 2023-10-08 RX ADMIN — GABAPENTIN 300 MG: 300 CAPSULE ORAL at 20:06

## 2023-10-08 RX ADMIN — SUGAMMADEX 200 MG: 100 INJECTION, SOLUTION INTRAVENOUS at 12:52

## 2023-10-08 RX ADMIN — FENTANYL CITRATE 50 MCG: 50 INJECTION, SOLUTION INTRAMUSCULAR; INTRAVENOUS at 11:46

## 2023-10-08 RX ADMIN — HYDRALAZINE HYDROCHLORIDE 2 MG: 20 INJECTION INTRAMUSCULAR; INTRAVENOUS at 12:26

## 2023-10-08 RX ADMIN — FENTANYL CITRATE 25 MCG: 50 INJECTION, SOLUTION INTRAMUSCULAR; INTRAVENOUS at 12:53

## 2023-10-08 RX ADMIN — PROPOFOL 70 MG: 10 INJECTION, EMULSION INTRAVENOUS at 11:46

## 2023-10-08 RX ADMIN — PROPOFOL 20 MG: 10 INJECTION, EMULSION INTRAVENOUS at 13:05

## 2023-10-08 RX ADMIN — PIPERACILLIN SODIUM AND TAZOBACTAM SODIUM 2.25 G: 2; .25 INJECTION, SOLUTION INTRAVENOUS at 04:30

## 2023-10-08 RX ADMIN — Medication 50 MCG: at 08:55

## 2023-10-08 RX ADMIN — CEFAZOLIN SODIUM 2 G: 1 POWDER, FOR SOLUTION INTRAMUSCULAR; INTRAVENOUS at 11:51

## 2023-10-08 RX ADMIN — ONDANSETRON 4 MG: 2 INJECTION INTRAMUSCULAR; INTRAVENOUS at 12:52

## 2023-10-08 RX ADMIN — FENTANYL CITRATE 25 MCG: 50 INJECTION, SOLUTION INTRAMUSCULAR; INTRAVENOUS at 13:54

## 2023-10-08 RX ADMIN — HYDRALAZINE HYDROCHLORIDE 2 MG: 20 INJECTION INTRAMUSCULAR; INTRAVENOUS at 12:31

## 2023-10-08 SDOH — HEALTH STABILITY: MENTAL HEALTH: CURRENT SMOKER: 0

## 2023-10-08 ASSESSMENT — COGNITIVE AND FUNCTIONAL STATUS - GENERAL
DAILY ACTIVITIY SCORE: 20
DRESSING REGULAR UPPER BODY CLOTHING: A LITTLE
WALKING IN HOSPITAL ROOM: A LOT
MOVING FROM LYING ON BACK TO SITTING ON SIDE OF FLAT BED WITH BEDRAILS: A LITTLE
MOVING TO AND FROM BED TO CHAIR: A LITTLE
DRESSING REGULAR LOWER BODY CLOTHING: A LITTLE
TOILETING: A LITTLE
CLIMB 3 TO 5 STEPS WITH RAILING: TOTAL
MOBILITY SCORE: 15
STANDING UP FROM CHAIR USING ARMS: A LITTLE
HELP NEEDED FOR BATHING: A LITTLE
TURNING FROM BACK TO SIDE WHILE IN FLAT BAD: A LITTLE

## 2023-10-08 ASSESSMENT — PAIN SCALES - GENERAL
PAINLEVEL_OUTOF10: 2
PAINLEVEL_OUTOF10: 2
PAIN_LEVEL: 0
PAINLEVEL_OUTOF10: 6
PAINLEVEL_OUTOF10: 6
PAINLEVEL_OUTOF10: 0 - NO PAIN

## 2023-10-08 ASSESSMENT — PAIN - FUNCTIONAL ASSESSMENT
PAIN_FUNCTIONAL_ASSESSMENT: 0-10

## 2023-10-08 NOTE — PROGRESS NOTES
Bindu Cali is a 89 y.o. female on day 2 of admission presenting with Cellulitis and abscess of right lower extremity.      Subjective   Patient doing well.  Yesterday did not go for surgery.  This morning going for surgery around 10:15 AM.  Daughter accompanying the patient at bedside.  No significant drainage on right shin area.       Objective     Last Recorded Vitals  /61   Pulse 73   Temp 36.6 °C (97.9 °F)   Resp 18   Wt 64 kg (141 lb)   SpO2 96%   Intake/Output last 3 Shifts:    Intake/Output Summary (Last 24 hours) at 10/8/2023 1223  Last data filed at 10/8/2023 0022  Gross per 24 hour   Intake 50 ml   Output 1 ml   Net 49 ml       Admission Weight  Weight: 64 kg (141 lb) (10/06/23 1331)    Daily Weight  10/06/23 : 64 kg (141 lb)      Physical Exam:  General: Not in acute distress, alert  HEENT: PERRLA, head intact and normocephalic  Neck: Normal to inspection  Lungs: Clear to auscultation, work of breathing within normal limit  Cardiac: Regular rate and rhythm  Abdomen: Soft nontender, positive bowel sounds  : Exam deferred  Skin: Small open area noted on right shin area with erythema around it.  There is purulent drainage at the base  Hematology: No petechia or excessive ecchymosis  Musculoskeletal: As mentioned above  Neurological: Alert awake oriented, no focal deficit, cranial nerves grossly intact  Psych: No suicidal ideation or homicidal ideation    Relevant Results  Scheduled medications  amLODIPine, 2.5 mg, oral, Daily  cholecalciferol, 50 mcg, oral, Daily  gabapentin, 300 mg, oral, Nightly  levothyroxine, 100 mcg, oral, Daily  multivitamin with minerals, 1 tablet, oral, Daily  pantoprazole, 40 mg, oral, Daily before breakfast  piperacillin-tazobactam, 2.25 g, intravenous, q6h  psyllium, 1 packet, oral, TID  saccharomyces boulardii, 250 mg, oral, BID      Continuous medications  sodium chloride 0.9%, 100 mL/hr, Last Rate: 100 mL/hr (10/07/23 1300)      PRN medications  PRN  medications: acetaminophen, loratadine, oxyCODONE, sodium chloride   Results for orders placed or performed during the hospital encounter of 10/06/23 (from the past 24 hour(s))   CBC and Auto Differential   Result Value Ref Range    WBC 10.3 4.4 - 11.3 x10*3/uL    nRBC 0.0 0.0 - 0.0 /100 WBCs    RBC 3.00 (L) 4.00 - 5.20 x10*6/uL    Hemoglobin 8.6 (L) 12.0 - 16.0 g/dL    Hematocrit 27.2 (L) 36.0 - 46.0 %    MCV 91 80 - 100 fL    MCH 28.7 26.0 - 34.0 pg    MCHC 31.6 (L) 32.0 - 36.0 g/dL    RDW 15.8 (H) 11.5 - 14.5 %    Platelets 380 150 - 450 x10*3/uL    MPV 9.9 7.5 - 11.5 fL    Neutrophils % 73.5 40.0 - 80.0 %    Immature Granulocytes %, Automated 0.3 0.0 - 0.9 %    Lymphocytes % 13.0 13.0 - 44.0 %    Monocytes % 9.7 2.0 - 10.0 %    Eosinophils % 3.1 0.0 - 6.0 %    Basophils % 0.4 0.0 - 2.0 %    Neutrophils Absolute 7.55 (H) 1.60 - 5.50 x10*3/uL    Immature Granulocytes Absolute, Automated 0.03 0.00 - 0.50 x10*3/uL    Lymphocytes Absolute 1.34 0.80 - 3.00 x10*3/uL    Monocytes Absolute 1.00 (H) 0.05 - 0.80 x10*3/uL    Eosinophils Absolute 0.32 0.00 - 0.40 x10*3/uL    Basophils Absolute 0.04 0.00 - 0.10 x10*3/uL   Comprehensive Metabolic Panel   Result Value Ref Range    Glucose 79 74 - 99 mg/dL    Sodium 140 136 - 145 mmol/L    Potassium 3.8 3.5 - 5.3 mmol/L    Chloride 106 98 - 107 mmol/L    Bicarbonate 23 21 - 32 mmol/L    Anion Gap 15 10 - 20 mmol/L    Urea Nitrogen 27 (H) 6 - 23 mg/dL    Creatinine 1.37 (H) 0.50 - 1.05 mg/dL    eGFR 37 (L) >60 mL/min/1.73m*2    Calcium 11.0 (H) 8.6 - 10.6 mg/dL    Albumin 3.2 (L) 3.4 - 5.0 g/dL    Alkaline Phosphatase 78 33 - 136 U/L    Total Protein 6.0 (L) 6.4 - 8.2 g/dL    AST 15 9 - 39 U/L    Bilirubin, Total 0.5 0.0 - 1.2 mg/dL    ALT 15 7 - 45 U/L   Magnesium   Result Value Ref Range    Magnesium 1.62 1.60 - 2.40 mg/dL   Vancomycin   Result Value Ref Range    Vancomycin 8.4 5.0 - 20.0 ug/mL        CT tibia fibula right wo IV contrast    Result Date: 10/6/2023  Interpreted  By:  Finkelstein, Evan, and Stephens Katherine STUDY: CT TIBIA FIBULA RIGHT WO IV CONTRAST;  10/6/2023 11:11 pm   INDICATION: Signs/Symptoms:SSI. needs hardware removed. Evaluate for healing of proximal tibia fx.   COMPARISON: Same day radiographs of the tibia/fibula. CT lower extremity 04/22/2023   ACCESSION NUMBER(S): [id number]   ORDERING CLINICIAN: ELIOT SUGGS   TECHNIQUE: Multiple contiguous axial images of the  were obtained after the intravenous administration of  Omnipaque 350 contrast in the arterial phase.  Coronal and sagittal reformatted images were reconstructed from the axial data. MIP images and 3D reconstructions were created on an independent workstation and reviewed.   FINDINGS: OSSEOUS STRUCTURES: Periprosthetic proximal tibial fracture is again seen with persistent sclerotic fracture margins consistent with healing fracture. No new, acute fractures or malalignment. Postsurgical changes from total knee arthroplasty and plate and lateral screw fixation of the proximal tibia. The bones are demineralized.   SOFT TISSUES: Moderate knee joint effusion. Extensive soft tissue edema of the lower extremity. No definite fluid seen deep to the fascia. No subcutaneous gas. No hematoma or other focal fluid collection. No evidence of radiodense foreign body.       1. Postsurgical changes from total knee arthroplasty and lateral plate and screw fixation of the proximal tibia due to a remote periprosthetic fracture. No definite new, acute fracture is appreciated. 2. Moderate knee joint effusion. 3. Lower extremity edema. 4. Osteopenia.     I personally reviewed the images/study and I agree with the findings as stated. This study was interpreted at [place], [place].   MACRO: None.   Signed by: Evan Finkelstein 10/6/2023 11:11 PM Dictation workstation:   [id number]    XR tibia fibula right 2 views    Result Date: 10/6/2023  STUDY: Tibia and Fibula Radiographs;  10/06/2023 5:51 PM. INDICATION: Infected hardware after ORIF. COMPARISON: 04/22/2023 CT Lower Extremity Without Contrast. ACCESSION NUMBER(S): [id number] ORDERING CLINICIAN: BLAKE CRUZ TECHNIQUE:  Two views (three images) of the right tibia and fibula. FINDINGS:  Right knee arthroplasty is seen, with additional plate and screws traversing the right proximal tibia. Alignment is anatomic. There is no acute fracture or dislocation. There is no potential hardware loosening or dislocation. Minimal edema is seen, especially near the right ankle.    Postsurgical changes at the right knee and right proximal tibia, as described. Remainder as above. Signed by Jm Galeas MD    XR knee right 4+ views    Result Date: 10/6/2023  Interpreted By:  Edwina Payne, STUDY: XR KNEE RIGHT 4+ VIEWS; ;  10/6/2023 6:50 pm   INDICATION: Signs/Symptoms:infected hardware s/p prosthesis.   COMPARISON: 09/28/2023   ACCESSION NUMBER(S): [id number]   ORDERING CLINICIAN: BLAKE CRUZ   FINDINGS: Right knee, four views   Right total knee arthroplasty in place. Lateral plate and screw fixation of the proximal tibia. No acute fracture dislocation seen. Normal alignment. There is a small knee effusion. There is inferior subluxation of the patella, similar to the prior       No acute abnormality seen. Postsurgical changes in the knee.     MACRO: None   Signed by: Edwina Payne 10/6/2023 6:56 PM Dictation workstation:   [id number]    XR knee    Result Date: 9/28/2023  Interpreted By:  EDWINA PAYNE MD MRN: [id number] Patient Name: SUMIT OTT  STUDY: TIBIA ; KNEE; 1 OR 2 VIEWS;  9/28/2023 11:13 am  INDICATION: s/p fracture  S82.209A: Tibia fracture.  COMPARISON: 07/20/2023  ACCESSION NUMBER(S): [id number]; [id number]  ORDERING CLINICIAN: GILSON GOODWIN  FINDINGS: Right knee, two views. Right tibia, two views.  Redemonstration of plate and screw fixation of proximal tibial fracture deformity. Right total knee arthroplasty in  place. No new periprosthetic fracture lucency seen. There is no dislocation. There is no effusion. Redemonstration of inferior subluxation of the patella, similar to the prior. Otherwise there is no malalignment.      Redemonstration of ORIF of proximal tibial periprosthetic fracture. No change in alignment otherwise. Right total knee arthroplasty in place. Inferior subluxation of the patella is similar to the prior and may reflect a prior injury to the quadriceps tendon    XR tibia and fibia    Result Date: 9/28/2023  Interpreted By:  EDWINA EDMONDSON MD MRN: [id number] Patient Name: BINDU OTT  STUDY: TIBIA ; KNEE; 1 OR 2 VIEWS;  9/28/2023 11:13 am  INDICATION: s/p fracture  S82.209A: Tibia fracture.  COMPARISON: 07/20/2023  ACCESSION NUMBER(S): [id number]; [id number]  ORDERING CLINICIAN: GILSON GOODWIN  FINDINGS: Right knee, two views. Right tibia, two views.  Redemonstration of plate and screw fixation of proximal tibial fracture deformity. Right total knee arthroplasty in place. No new periprosthetic fracture lucency seen. There is no dislocation. There is no effusion. Redemonstration of inferior subluxation of the patella, similar to the prior. Otherwise there is no malalignment.      Redemonstration of ORIF of proximal tibial periprosthetic fracture. No change in alignment otherwise. Right total knee arthroplasty in place. Inferior subluxation of the patella is similar to the prior and may reflect a prior injury to the quadriceps tendon          Assessment/Plan   Bindu Ott is a 89 y.o. female on day 2 of admission presenting with Cellulitis and abscess of right lower extremity.  Principal Problem:    Cellulitis and abscess of right lower extremity  Active Problems:    Hypothyroidism    Anemia due to stage 3b chronic kidney disease (CMS/HCC)    Diastolic dysfunction    Essential hypertension    GERD (gastroesophageal reflux disease)    Hypertension    Surgical site  infection    Plan:    Acute kidney injury has resolved  Anemia of chronic disease  Antibiotics after the procedure  Appreciate orthopedic surgery input  Patient can stop IV fluids  Follow-up culture results  PT OT postprocedure  Pain control  We will continue with vancomycin and Zosyn  Patient had some chills after receiving vancomycin which resolved after receiving Tylenol  Spoke to nurse regarding premedicating for vancomycin with Tylenol alone 30 minutes prior to administration  Pharmacy to dose vancomycin  Continue with other chronic home medication  Continue with gabapentin, levothyroxine, Protonix  Bowel regimen     Plan discussed with patient and daughter at bedside    High level of MDM based on above issue and discussing plan  Disposition: Potential discharge in 48 to 72 hours based on final recommendation from orthopedic surgery and PT OT recommendations.  This note is created using voice recognition software. All efforts are made to minimize errors, if there are errors there due to transcription.    Destin Marrufo  Hospitalist

## 2023-10-08 NOTE — ANESTHESIA PREPROCEDURE EVALUATION
Patient: Bindu Cali    Procedure Information       Date/Time: 10/08/23 1600    Procedures:       Debridement Lower Extremity (Right)      Removal Hardware Lower Extremity (Right) - R tibia    Location: [facility] OR 01 / [facility]    Surgeons: Tyler Dominguez MD            Relevant Problems   Cardiovascular   (+) Essential hypertension   (+) HTN (hypertension)      Endocrine   (+) Acquired hypothyroidism      GI   (+) GERD (gastroesophageal reflux disease)      /Renal   (+) Anemia due to stage 3b chronic kidney disease (CMS/HCC)   (+) CKD (chronic kidney disease), stage II      Neuro/Psych   (+) Carpal tunnel syndrome of right wrist   (+) Carpal tunnel syndrome on left   (+) Idiopathic peripheral neuropathy   (+) Lumbar radiculopathy, chronic      Hematology   (+) Anemia   (+) Anemia due to stage 3b chronic kidney disease (CMS/HCC)   (+) Anemia in other chronic diseases classified elsewhere   (+) Macrocytic anemia   (+) Multiple myeloma (CMS/HCC)      Musculoskeletal   (+) Carpal tunnel syndrome of right wrist   (+) Carpal tunnel syndrome on left   (+) Lumbar spondylosis   (+) Osteoarthritis of hip   (+) Spinal stenosis   (+) Spinal stenosis of lumbar region without neurogenic claudication      Infectious Disease   (+) Surgical site infection      Other   (+) Arthritis   (+) Sacroiliitis (CMS/HCC)       Clinical information reviewed:   Tobacco  Allergies  Meds  Problems  Med Hx  Surg Hx  OB Status    Fam Hx  Soc Hx        NPO Detail:  No data recorded     Physical Exam    Airway  Mallampati: I  TM distance: >3 FB  Neck ROM: full     Cardiovascular   Rhythm: regular     Dental    Pulmonary   Breath sounds clear to auscultation     Abdominal            Anesthesia Plan    ASA 3     general     The patient is not a current smoker.  Patient was not previously instructed to abstain from smoking on day of procedure.  Patient did not smoke on day of procedure.    intravenous induction    Anesthetic plan and risks discussed with patient.  Use of blood products discussed with patient who consented to blood products.    Plan discussed with attending and resident.

## 2023-10-08 NOTE — PROGRESS NOTES
"Vancomycin Dosing by Pharmacy- FOLLOW UP    Bindu Cali is a 89 y.o. year old female who Pharmacy has been consulted for vancomycin dosing for bone and joint infection. Based on the patient's indication and renal status this patient is being dosed based on a goal AUC of 400-600.     Renal function is currently stable but poor    Current vancomycin dose: 1000 mg given as a loading dose    Estimated vancomycin AUC on current dose: 226 mg/L.hr     Visit Vitals  /72   Pulse 76   Temp 36.8 °C (98.2 °F) (Skin)   Resp 15        Lab Results   Component Value Date    CREATININE 1.37 (H) 10/08/2023    CREATININE 1.72 (H) 10/06/2023    CREATININE 1.53 (H) 10/05/2023    CREATININE 1.37 (H) 09/21/2023    CREATININE 1.25 (H) 09/07/2023    CREATININE 1.22 (H) 08/24/2023    CREATININE 1.42 (H) 08/10/2023        Patient weight is No results found for: \"PTWEIGHT\"    No results found for: \"CULTURE\"     I/O last 3 completed shifts:  In: 100 (1.6 mL/kg) [IV Piggyback:100]  Out: 1 (0 mL/kg) [Other:1]  Weight: 64 kg   [unfilled]    No results found for: \"PATIENTTEMP\"     Assessment/Plan    Below goal AUC. Orders placed for new vancomcyin regimen of 750 every 24 hours to begin at now. Provider requested antibiotics be held today until after the patient returned from the OR    This dosing regimen is predicted by [employer] to result in the following pharmacokinetic parameters:  Loading dose: N/A  Regimen: 750 mg IV every 24 hours.  Start time: 15:11 on 10/08/2023  Exposure target: AUC24 (range)400-600 mg/L.hr   AUC24,ss: 514 mg/L.hr  Probability of AUC24 > 400: 87 %  Ctrough,ss: 17.4 mg/L  Probability of Ctrough,ss > 20: 30 %  Probability of nephrotoxicity (Lodise MARKELL 2009): 13 %    The next level will be obtained on 10/9 at with AM labs. May be obtained sooner if clinically indicated.   Will continue to monitor renal function daily while on vancomycin and order serum creatinine at least every 48 hours if not already " ordered.  Follow for continued vancomycin needs, clinical response, and signs/symptoms of toxicity.       Savanna Law, PharmD

## 2023-10-08 NOTE — CARE PLAN
Problem: Fall/Injury  Goal: Not fall by end of shift  Outcome: Progressing     Problem: Fall/Injury  Goal: Be free from injury by end of the shift  Outcome: Progressing     Problem: Fall/Injury  Goal: Verbalize understanding of personal risk factors for fall in the hospital  Outcome: Progressing     Problem: Fall/Injury  Goal: Verbalize understanding of risk factor reduction measures to prevent injury from fall in the home  Outcome: Progressing     Problem: Infection related to problem list condition  Goal: Infection will resolve through treatment  Outcome: Progressing

## 2023-10-08 NOTE — ANESTHESIA POSTPROCEDURE EVALUATION
Patient: Bindu Cali    Procedure Summary       Date: 10/08/23 Room / Location: [facility] OR 01 / [facility]    Anesthesia Start: 1130 Anesthesia Stop: 1331    Procedures:       Debridement Lower Extremity (Right: Leg Lower)      Removal Hardware Lower Extremity (Right: Leg Lower) Diagnosis:       Surgical site infection      (Surgical site infection [T81.49XA])    Surgeons: Tyler Dominguez MD Responsible Provider: Sergey Pritchard MD    Anesthesia Type: general ASA Status: 3            Anesthesia Type: general    Vitals Value Taken Time   /100 10/08/23 1331   Temp 36.8 °C (98.2 °F) 10/08/23 1329   Pulse 71 10/08/23 1329   Resp 18 10/08/23 1329   SpO2 99 % 10/08/23 1329       Anesthesia Post Evaluation    Patient location during evaluation: bedside  Patient participation: complete - patient participated  Pain score: 0  Pain management: adequate  Multimodal analgesia pain management approach  Airway patency: patent  Cardiovascular status: acceptable  Respiratory status: acceptable  Hydration status: acceptable        No notable events documented.

## 2023-10-08 NOTE — ANESTHESIA PROCEDURE NOTES
Airway  Date/Time: 10/8/2023 11:47 AM  Urgency: elective    Airway not difficult    Staffing  Performed: resident   Authorized by: Narendra Reynaga DO    Performed by: Narendra Reynaga DO  Patient location during procedure: OR    Indications and Patient Condition  Indications for airway management: anesthesia  Spontaneous Ventilation: absent  Sedation level: deep  Preoxygenated: yes  Patient position: sniffing  Mask difficulty assessment: 1 - vent by mask    Final Airway Details  Final airway type: endotracheal airway      Successful airway: ETT  Cuffed: yes   Successful intubation technique: direct laryngoscopy  Facilitating devices/methods: intubating stylet and cricoid pressure  Endotracheal tube insertion site: oral  Blade: [place]  Blade size: #4  ETT size (mm): 7.0  Cormack-Lehane Classification: grade IIa - partial view of glottis  Placement verified by: chest auscultation and capnometry   Cuff volume (mL): 5  Measured from: lips  ETT to lips (cm): 21  Number of attempts at approach: 1

## 2023-10-08 NOTE — H&P
H&P reviewed. The patient was examined and there are no changes to the H&P. Patient electing to proceed with surgery. Patient marked and consented.      Juan Manuel Hidalgo MD  PGY-2 Orthopedic Surgery  [facility]  [place] Message Preferred  Pager: [phone]

## 2023-10-08 NOTE — PROGRESS NOTES
"Orthopaedic Surgery Progress Note    S:  No acute events overnight. Pain well controlled. Denies chest pain, shortness of breath, or fevers.    O:  /54   Pulse 69   Temp 36.6 °C (97.9 °F)   Resp 16   Ht 1.473 m (4' 10\")   Wt 64 kg (141 lb)   SpO2 94%   BMI 29.47 kg/m²     Gen: arousable, NAD, appropriately conversational  Cardiac: RRR to peripheral palpation  Resp: nonlabored on RA  GI: soft, nondistended    MSK:  Right Lower Extremity:   - Dressing c/d/i  -Fires DF/PF/EHL/FHL  -SILT in saph/sural/SPN/DPN distributions  -Foot warm, well perfused  -Palpable DP pulse, brisk cap refill  -Compartments soft and compressible      A/P: 89 y.o. female with R proximal tibia surgical site infection, plan for right proximal tibia removal of hardware and I&D with Dr Dominguez today 10/8/23    Plan:  - appreciate documentation of medical optimization per medicine team  - Weight bearing: WBAT RLE  - DVT ppx: hold for OR if able  - Diet: NPO since midnight  - Antibiotics: hold until OR if able  - preop labs obtained    Dispo: Pending OR    Juan Manuel Hidalgo MD  PGY-2 Orthopedic Surgery  [facility]  [place] Message Preferred  Pager: [phone]    While admitted, this patient will be followed by the Ortho Trauma Team. Please contact below residents with any questions (available via Epic Chat).     First call: Laci Vang, PGY-2   Second call: Narayan Inman, PGY-3        "

## 2023-10-08 NOTE — CONSULTS
Inpatient consult to Infectious Diseases  Consult performed by: Mo Aguilar MD  Consult ordered by: Destin Marrufo MD        Referred by: Dr. Destin Marrufo    Primary MD: Radha Garcia MD    Reason For Consult: Infection in lower extremity with hardware involvement    History Of Present Illness  Bindu Cali is a 89 year old female with pmh of CKD III, HTN, hypothyroid, MM on Bortezomib, and ORIF of right proximal tibiain 4/23/23 due to fall with healing with secondary intention presenting with purulent discharge from wound. ID consulted for wound.     Shara presented on 10/7 for purulent drainage and erythema around wound on R. Barker. On admission WBC was 31.7. Patient had ESR of 90 and CRP of 24.69. NPO with possible plan for OR today. Of note patient was given a course of 7 days of cephalexin in 7/2023.      Past Medical History  She has a past medical history of Chronic kidney disease, stage 3b (CMS/HCC) (02/25/2022), Essential (primary) hypertension (12/14/2022), Other specified postprocedural states, Personal history of other medical treatment, Personal history of other medical treatment, Personal history of other medical treatment, Procedure and treatment not carried out because of patient's decision for unspecified reasons, and Vitamin D deficiency, unspecified.    Surgical History  She has a past surgical history that includes Other surgical history (04/28/2022); Total knee arthroplasty (01/22/2015); Cholecystectomy (01/22/2015); Tonsillectomy (01/22/2015); Rotator cuff repair (01/22/2015); Hysterectomy (01/22/2015); and Breast biopsy (01/22/2015).     Social History     Occupational History    Not on file   Tobacco Use    Smoking status: Never    Smokeless tobacco: Never   Substance and Sexual Activity    Alcohol use: Yes     Alcohol/week: 1.0 standard drink of alcohol     Types: 1 Glasses of wine per week    Drug use: Never    Sexual activity: Defer     Travel History   Travel since  09/08/23    No documented travel since 09/08/23              Family History  Family History   Problem Relation Name Age of Onset    Cancer Mother      Other (cardiac disorder) Mother      Other (cardiac disorder) Father      Other (cardiac disorder) Brother      Other (CVA) Brother      Other (HEART BASE TUMOR) Brother       Allergies  Nsaids (non-steroidal anti-inflammatory drug), Codeine, Morphine, and Pollen extracts     Immunization History   Administered Date(s) Administered    Hepatitis A vaccine, pediatric/adolescent (HAVRIX, VAQTA) 04/12/2007    Influenza, High Dose Seasonal, Preservative Free 12/02/2018, 02/13/2022    Influenza, Seasonal, Quadrivalent, Adjuvanted 10/27/2020    Moderna SARS-CoV-2 Vaccination 01/26/2021, 02/23/2021    Pneumococcal conjugate vaccine, 13-valent (PREVNAR 13) 12/01/2016, 01/04/2017    Pneumococcal polysaccharide vaccine, 23-valent, age 2 years and older (PNEUMOVAX 23) 10/28/2010, 01/04/2017    Tdap vaccine, age 7 year and older (BOOSTRIX) 06/01/2018    Tetanus toxoid, adsorbed 04/12/2007    Zoster, live 05/01/2011     Medications  Home medications:  Medications Prior to Admission   Medication Sig Dispense Refill Last Dose    acetaminophen (Tylenol) 500 mg tablet Take 2 tablets (1,000 mg) by mouth every 8 hours.       amLODIPine (Norvasc) 2.5 mg tablet Take 1 tablet (2.5 mg) by mouth once daily. 90 tablet 1     biotin 5 mg capsule Take by mouth.       chlorthalidone (Hygroton) 25 mg tablet TAKE ONE-HALF (1/2) TABLET DAILY 45 tablet 1     cholecalciferol (Vitamin D-3) 50 MCG (2000 UT) tablet Take 1 tablet (50 mcg) by mouth once daily.       gabapentin (Neurontin) 100 mg capsule Take 3 capsules (300 mg) by mouth once daily at bedtime.       ibuprofen 200 mg tablet Take by mouth every 6 hours if needed for mild pain (1 - 3).       lactase (Lactaid) 3,000 unit tablet Take 1 tablet (3,000 Units) by mouth once daily.       levothyroxine (Synthroid, Levoxyl) 100 mcg tablet Take 1 tablet  (100 mcg) by mouth once daily. 90 tablet 1     loratadine (Claritin) 10 mg tablet Take 1 tablet (10 mg) by mouth once daily as needed.       losartan (Cozaar) 100 mg tablet Take 1 tablet (100 mg) by mouth once daily. 90 tablet 1     meloxicam (Mobic) 15 mg tablet Take 1 tablet (15 mg) by mouth once daily.       multivit-min-iron-FA-lutein (Centrum Silver Women) 8 mg iron-400 mcg-300 mcg tablet Take by mouth.       omeprazole (PriLOSEC) 40 mg DR capsule Take 1 capsule (40 mg) by mouth 2 times a day.       oxyCODONE (Roxicodone) 5 mg immediate release tablet Take 1 tablet (5 mg) by mouth every 4 hours if needed.        Current medications:  Scheduled medications  amLODIPine, 2.5 mg, oral, Daily  cholecalciferol, 50 mcg, oral, Daily  gabapentin, 300 mg, oral, Nightly  levothyroxine, 100 mcg, oral, Daily  multivitamin with minerals, 1 tablet, oral, Daily  pantoprazole, 40 mg, oral, Daily before breakfast  piperacillin-tazobactam, 2.25 g, intravenous, q6h  psyllium, 1 packet, oral, TID  saccharomyces boulardii, 250 mg, oral, BID      Continuous medications  sodium chloride 0.9%, 100 mL/hr, Last Rate: 100 mL/hr (10/07/23 1300)      PRN medications  PRN medications: acetaminophen, loratadine, oxyCODONE    Review of Systems    Review of systems otherwise negative    Objective  Range of Vitals (last 24 hours)  Heart Rate:  [68-98]   Temp:  [36.3 °C (97.3 °F)-37 °C (98.6 °F)]   Resp:  [16-18]   BP: (133-177)/(54-99)   SpO2:  [94 %-95 %]   Daily Weight  10/06/23 : 64 kg (141 lb)    Body mass index is 29.47 kg/m².     Physical Exam     General: in no acute distress, able to answer questions  HEENT: no conjunctival injection. anicteric.  CVS: RRR. Normal S1 and S2. No m/r/g.   RESP: ctab no w/r/r, no increased wob  Abd: +BS. Soft and lax. ND.   Ext: Right lower extremity with wound vac on side, no surrounding erythema  Neuro: Answers questions appropriately.  Integumentary: no obvious lesions   Rheumatologic: No joint swelling  "or edema    Relevant Results    Labs  Results from last 72 hours   Lab Units 10/08/23  0552 10/06/23  1645 10/05/23  0945   WBC AUTO x10*3/uL 10.3 31.7* 23.7*   HEMOGLOBIN g/dL 8.6* 9.6* 9.3*   HEMATOCRIT % 27.2* 28.6* 28.9*   PLATELETS AUTO x10*3/uL 380 387 392   NEUTROS PCT AUTO % 73.5 85.4 87.3   LYMPHS PCT AUTO % 13.0 8.1 5.2   MONOS PCT AUTO % 9.7 5.6 6.0   EOS PCT AUTO % 3.1 0.1 0.7     Results from last 72 hours   Lab Units 10/06/23  1645 10/05/23  0945   SODIUM mmol/L 136 138   POTASSIUM mmol/L 4.2 4.0   CHLORIDE mmol/L 101 105   CO2 mmol/L 23 23   BUN mg/dL 45* 37*   CREATININE mg/dL 1.72* 1.53*   GLUCOSE mg/dL 109* 154*   CALCIUM mg/dL 11.4* 10.6*   ANION GAP mmol/L 16 14   EGFR mL/min/1.73m*2 28* 32*     Results from last 72 hours   Lab Units 10/06/23  1645 10/05/23  0945   ALK PHOS U/L 92 67   BILIRUBIN TOTAL mg/dL 0.4 0.5   PROTEIN TOTAL g/dL 6.9 6.8  6.8   ALT U/L 22 12   AST U/L 24 20   ALBUMIN g/dL 3.7 3.5     Estimated Creatinine Clearance: 16.5 mL/min (A) (by C-G formula based on SCr of 1.72 mg/dL (H)).  C-Reactive Protein   Date Value Ref Range Status   10/06/2023 24.69 (H) <1.00 mg/dL Final     Sedimentation Rate   Date Value Ref Range Status   10/06/2023 90 (H) 0 - 30 mm/h Final     No results found for: \"HIV1X2\", \"HIVCONF\", \"ZCTETW4KU\"  No results found for: \"HEPCABINIT\", \"HEPCAB\", \"HCVPCRQUANT\"  Microbiology  No results found for the last 14 days.    Micro 10/7 BC NGTD    Abx:  Vancomycin (10/6-current)  Zosyn (10/6-current)    Imaging    CT tibia (10/6/23)  IMPRESSION:  1. Postsurgical changes from total knee arthroplasty and lateral  plate and screw fixation of the proximal tibia due to a remote  periprosthetic fracture. No definite new, acute fracture is  appreciated.  2. Moderate knee joint effusion.  3. Lower extremity edema.  4. Osteopenia.    Xray knee  IMPRESSION:  No acute abnormality seen. Postsurgical changes in the knee.    Xray tibia  IMPRESSION:  Postsurgical changes at the " right knee and right proximal tibia, as  described. Remainder as above.  Signed by Jm Galeas MD'      Assessment/Plan     #Chronic sinus tract with osteomyelitis, s/p washout and hardware removal on 10/8/23  #ORIF of right proximal tibia in 4/23/23     Patient with continued sinus tract since initial surgery in 4/23/23. Patient did have hardware that was removed in OR on 10/8/23. Cultures showing gram positive organisms    -Continue vancomycin  -Continue zosyn  -We will continue to follow cx    ID will continue to follow. If any questions regarding this patient please call Team pager.  Discussed with Dr. Connor Aguilar  Infectious disease, PGY IV  Team B pager [phone]  General ID pager [phone]

## 2023-10-08 NOTE — OP NOTE
ORTHOPAEDIC SURGERY OPERATIVE REPORT      Date of Surgery: 10/8/2023    Surgeon: Tyler Dominguez MD    Assistants:  Juan Manuel Paige MD    Preoperative Diagnosis:  Deep surgical site infection with wound dehiscence, right tibial plateau  Right tibia osteomyelitis and infected hardware    Postoperative Diagnosis:  Deep surgical site infection with wound dehiscence, right tibial plateau  Right tibia osteomyelitis and infected hardware    Procedures Performed:  I&D Right lower extremity abscess  Saucerization right tibial plateau  Removal of hardware right tibial plateau  Secondary wound closure right leg    Anesthesia: General anesthesia with regional block    IV Fluids: Per anesthesia record    Tourniquet Time: 25 minutes at 300 mm Hg    Estimated Blood Loss:  50 ml    Complications: None    Implants: None    Specimens: Tibia fluid cultures x3, tissue cultures x2    Intraoperative Findings: Draining sinus tract that probed deep to hardware with gross purulence, healed fracture    Indications For Procedure:  This is a 89 year-old F who presented to the ED on 10/6with a draining sinus tract from the right proximal tibia.  She underwent ORIF of a right periprosthetic proximal tibia fracture in April 2023 with Dr. Weller. She recovered uneventfully but over the last month or so developed wound dehiscence of the mid substance of her lateral incision with purulent drainage.  She was last seen in clinic on 20 September and was found to have a less than 1 cm area of wound dehiscence which was treated with wet-to-dry dressings.  She returned to the emergency department on the sixth due to worsening of her symptoms.  Examination and imaging studies demonstrated a 2 x 2 cm area of wound dehiscence at the mid area of her incision with purulent drainage with exposed hardware as well as healed prior fracture on imaging. Surgery was recommended to debride infection, improve pain, provide stability, expedite healing, and improve  function. Risks, benefits, and possible complications of the surgery were discussed with the patient/family and informed consent was obtained. Patient/family agreed to proceed with irrigation debridement and removal of hardware of right tibia. All questions about the procedure were answered to the patient's/family's satisfaction.     Procedure Detail:  On the day of surgery, Patient was met in the preoperative holding bay prior to surgery.  Informed consent was reviewed.  All risk and benefits were explained which included but were not limited to bleeding, infection, damage to surrounding structures, blood clots, need for further surgery, anesthesia complications, and death.  All questions were answered.  The operative extremity was marked.  Patient was then brought back from the preoperative holding bay to the operating room.   A timeout was performed confirming correct patient identity, laterality, OR safety, and planned procedure.  Patient was then transferred from the hospital bed to the operating room table.  They were placed under general anesthesia without complication. Appropriate antibiotics were given by the anesthesia team. Patient was positioned supine.  All bony prominences were padded.  Compressive devices placed on all nonoperative lower extremities. The patient's right lower extremity was then prepped and draped in the usual sterile fashion. A preprocedure timeout was performed which again confirmed the patient's identity, procedure to be performed, and correct operative site. A tourniquet was inflated and let down prior to 120 minutes.     We began by making incision over the site of the prior incision from her tibial plateau ORIF, but we did not use the whole incident.  We made an ellipse for resection of the sinus tract ensuring that there was enough skin mobility for closure after the case.  We followed the incision deep down to the fascia and noted that the sinus tract probed directly to the  plate.  We followed this sinus tract to the plate and dissected anterior and posterior to the plate to completely free of soft tissue attachments.  There were multiple areas with significant gross purulent abscesses and at this time we sent 3 fluid and 2 tissue cultures for antibiotic specification.  We then removed all the screws and the plate from the lateral aspect of the tibia confirmed on fluoroscopy that all hardware was removed.  At the hardware removal we removed any necrotic appearing bone including the screw holes and debrided any further areas of abscess within the soft tissues. We performed extensive excisional debridement of the abscess pockets and the tibia bone.  Being satisfied with the debridement we copiously irrigated with 6 L of normal saline while continually debriding as the wound was washed.  After irrigation and debridement the wound looked clean and had excellent bleeding tissue throughout the wound bed.  Vancomycin and tobramycin powder were placed into the wound.  The remnant fascial layer was closed with 0 PDS in a running fashion.  The subcutaneous layer was closed with limited 2-0 Monocryl.  Skin was closed with 2-0 nylons.  A Prevena dressing was placed over the wound to assist with wound healing.  Patient was awoken from anesthesia and transferred to the PACU without complication.    Postoperative Plan:  Patient will be admitted to the medicine service postoperatively.  They will continue antibiotics per the primary team and will receive an infectious disease consult for antibiotic management.  They may be weightbearing as tolerated on the right lower extremity.  They will maintain the Nahomi for 7 days which point may be removed and dry dressings can be utilized until there is no further wound drainage.  Patient should receive DVT prophylaxis for 6 weeks per the primary team. The patient will follow up with Dr. Weller in approximately 2 weeks time for clinical check, suture  removal, and 2 view xrays of the knee at that time.      Dr. Tyler Dominguez MD was present for the critical portions of the case and was otherwise immediately available to assist.      Juan Manuel Paige MD, PGY-4  Orthopedic Surgery  [phone]  Available on [employer]/Epic Chat

## 2023-10-08 NOTE — SIGNIFICANT EVENT
Orthopedic surgical consent is in media tab and on call antoine resident has original paper consent available if needed    Kwasi Barton MD  PGY-3 Orthopedic Surgery  [facility]  Phone: [phone]  Pager: [phone]  Available by Epic Message

## 2023-10-08 NOTE — PROGRESS NOTES
"Orthopaedic Surgery Progress Note    S:    Evaluated post-operatively. Pain controlled on current regimen.  Denies any new onset numbness, tingling or weakness. Denies nausea, vomiting, chest pain, dyspnea, or calf tenderness. Denies any fever or chills.     O:  /72   Pulse 75   Temp 36.8 °C (98.2 °F) (Skin)   Resp 19   Ht 1.473 m (4' 10\")   Wt 64 kg (141 lb)   SpO2 96%   BMI 29.47 kg/m²     GEN - NAD, resting comfortably in hospital bed  HEENT - MMM, EOMI, NCAT   CV - RRR by peripheral palpation, limbs wwp  PULM - NWOB on RA  ABD - Non-distended  NEURO - WU spontaneously, CNs II - XII grossly intact  PSYCH - Appropriate mood and affect    MSK:  RLE:   -Post-operative dressing/splint in place without strikethrough bleeding. Prevena in place holding suction  -Motor intact in DF/PF/EHL/FHL, SILT in saph/sural/SPN/DPN/tibial distributions    -Foot wwp, brisk cap refill, 2+ DP/PT pulse  -Compartments soft and compressible, no pain with passive dorsiflexion      A/P: 89 y.o. female PMH HTN, CKD2, hypothyroid, GERD, Multiple Myeloma (actively on chemo vs immunotherapy) s/p I&D, OPAL R Tibia on 10/8/23 with Dr. Dominguez.    Plan:  - Weight bearing: WBAT RLE  - DVT ppx: SCDs, okay for chemo PPx per primary; recommend at least ASA 81 mg BID for 4 weeks post-op  - Diet: Okay for diet from orthopedic perspective   - Perioperative Antibiotics: broad spectrum per primary/ID; follow up cultures from OR  - Please send with Calcium (as carbonate)-Vitamin D 600mg-400IU PO BID for 30 days upon discharge   - Drain: Maintain prevena 7 days  - Post-operative Imaging: None   - No plans to return to the OR with orthopedic surgery this admission.     This plan was discussed with the attending, Dr. Dominguez.    Juan Manuel Paige MD, PGY-4  Orthopedic Surgery  [phone]  Epic Chat Preferred'    After 0700, this patient will be followed by the orthopedic trauma team. Please page with questions/updates.    Laci Vang, PGY-2 " ([phone])  Narayan Inman, PGY-3 ([phone])    Please call on call resident ([phone]) nights after 6pm and weekends.

## 2023-10-09 LAB
ANION GAP SERPL CALC-SCNC: 14 MMOL/L (ref 10–20)
BASOPHILS # BLD AUTO: 0.01 X10*3/UL (ref 0–0.1)
BASOPHILS NFR BLD AUTO: 0.1 %
BUN SERPL-MCNC: 25 MG/DL (ref 6–23)
CALCIUM SERPL-MCNC: 10.2 MG/DL (ref 8.6–10.6)
CHLORIDE SERPL-SCNC: 104 MMOL/L (ref 98–107)
CO2 SERPL-SCNC: 26 MMOL/L (ref 21–32)
CREAT SERPL-MCNC: 1.44 MG/DL (ref 0.5–1.05)
EOSINOPHIL # BLD AUTO: 0.02 X10*3/UL (ref 0–0.4)
EOSINOPHIL NFR BLD AUTO: 0.2 %
ERYTHROCYTE [DISTWIDTH] IN BLOOD BY AUTOMATED COUNT: 15.5 % (ref 11.5–14.5)
GFR SERPL CREATININE-BSD FRML MDRD: 35 ML/MIN/1.73M*2
GLUCOSE SERPL-MCNC: 125 MG/DL (ref 74–99)
HCT VFR BLD AUTO: 23.9 % (ref 36–46)
HGB BLD-MCNC: 7.6 G/DL (ref 12–16)
IMM GRANULOCYTES # BLD AUTO: 0.04 X10*3/UL (ref 0–0.5)
IMM GRANULOCYTES NFR BLD AUTO: 0.4 % (ref 0–0.9)
LYMPHOCYTES # BLD AUTO: 0.79 X10*3/UL (ref 0.8–3)
LYMPHOCYTES NFR BLD AUTO: 8.8 %
MAGNESIUM SERPL-MCNC: 1.62 MG/DL (ref 1.6–2.4)
MCH RBC QN AUTO: 29.6 PG (ref 26–34)
MCHC RBC AUTO-ENTMCNC: 31.8 G/DL (ref 32–36)
MCV RBC AUTO: 93 FL (ref 80–100)
MONOCYTES # BLD AUTO: 0.84 X10*3/UL (ref 0.05–0.8)
MONOCYTES NFR BLD AUTO: 9.3 %
NEUTROPHILS # BLD AUTO: 7.31 X10*3/UL (ref 1.6–5.5)
NEUTROPHILS NFR BLD AUTO: 81.2 %
NRBC BLD-RTO: 0 /100 WBCS (ref 0–0)
PLATELET # BLD AUTO: 334 X10*3/UL (ref 150–450)
PMV BLD AUTO: 10.1 FL (ref 7.5–11.5)
POTASSIUM SERPL-SCNC: 3.8 MMOL/L (ref 3.5–5.3)
RBC # BLD AUTO: 2.57 X10*6/UL (ref 4–5.2)
SODIUM SERPL-SCNC: 140 MMOL/L (ref 136–145)
VANCOMYCIN SERPL-MCNC: 11.5 UG/ML (ref 5–20)
WBC # BLD AUTO: 9 X10*3/UL (ref 4.4–11.3)

## 2023-10-09 PROCEDURE — 2500000004 HC RX 250 GENERAL PHARMACY W/ HCPCS (ALT 636 FOR OP/ED): Performed by: INTERNAL MEDICINE

## 2023-10-09 PROCEDURE — 2500000001 HC RX 250 WO HCPCS SELF ADMINISTERED DRUGS (ALT 637 FOR MEDICARE OP): Performed by: INTERNAL MEDICINE

## 2023-10-09 PROCEDURE — 36415 COLL VENOUS BLD VENIPUNCTURE: CPT | Performed by: INTERNAL MEDICINE

## 2023-10-09 PROCEDURE — 80048 BASIC METABOLIC PNL TOTAL CA: CPT | Performed by: INTERNAL MEDICINE

## 2023-10-09 PROCEDURE — 97161 PT EVAL LOW COMPLEX 20 MIN: CPT | Mod: GP

## 2023-10-09 PROCEDURE — 99232 SBSQ HOSP IP/OBS MODERATE 35: CPT | Performed by: INTERNAL MEDICINE

## 2023-10-09 PROCEDURE — 2500000001 HC RX 250 WO HCPCS SELF ADMINISTERED DRUGS (ALT 637 FOR MEDICARE OP): Performed by: NURSE PRACTITIONER

## 2023-10-09 PROCEDURE — 97530 THERAPEUTIC ACTIVITIES: CPT | Mod: GP

## 2023-10-09 PROCEDURE — 83735 ASSAY OF MAGNESIUM: CPT | Performed by: INTERNAL MEDICINE

## 2023-10-09 PROCEDURE — 85025 COMPLETE CBC W/AUTO DIFF WBC: CPT | Performed by: INTERNAL MEDICINE

## 2023-10-09 PROCEDURE — 80202 ASSAY OF VANCOMYCIN: CPT | Performed by: INTERNAL MEDICINE

## 2023-10-09 PROCEDURE — 1210000001 HC SEMI-PRIVATE ROOM DAILY

## 2023-10-09 RX ADMIN — PANTOPRAZOLE SODIUM 40 MG: 40 TABLET, DELAYED RELEASE ORAL at 06:20

## 2023-10-09 RX ADMIN — Medication 50 MCG: at 15:18

## 2023-10-09 RX ADMIN — ACETAMINOPHEN 650 MG: 325 TABLET ORAL at 22:47

## 2023-10-09 RX ADMIN — Medication 1 TABLET: at 08:53

## 2023-10-09 RX ADMIN — AMLODIPINE BESYLATE 2.5 MG: 5 TABLET ORAL at 08:53

## 2023-10-09 RX ADMIN — LEVOTHYROXINE SODIUM 100 MCG: 50 TABLET ORAL at 06:20

## 2023-10-09 RX ADMIN — PIPERACILLIN SODIUM AND TAZOBACTAM SODIUM 2.25 G: 2; .25 INJECTION, SOLUTION INTRAVENOUS at 22:44

## 2023-10-09 RX ADMIN — Medication 250 MG: at 10:29

## 2023-10-09 RX ADMIN — PIPERACILLIN SODIUM AND TAZOBACTAM SODIUM 2.25 G: 2; .25 INJECTION, SOLUTION INTRAVENOUS at 10:29

## 2023-10-09 RX ADMIN — VANCOMYCIN HYDROCHLORIDE 750 MG: 750 INJECTION, SOLUTION INTRAVENOUS at 15:18

## 2023-10-09 RX ADMIN — PIPERACILLIN SODIUM AND TAZOBACTAM SODIUM 2.25 G: 2; .25 INJECTION, SOLUTION INTRAVENOUS at 16:36

## 2023-10-09 RX ADMIN — PSYLLIUM HUSK 1 PACKET: 3.4 POWDER ORAL at 20:45

## 2023-10-09 RX ADMIN — Medication 250 MG: at 20:24

## 2023-10-09 RX ADMIN — GABAPENTIN 300 MG: 300 CAPSULE ORAL at 20:24

## 2023-10-09 RX ADMIN — ACETAMINOPHEN 650 MG: 325 TABLET ORAL at 10:57

## 2023-10-09 RX ADMIN — PIPERACILLIN SODIUM AND TAZOBACTAM SODIUM 2.25 G: 2; .25 INJECTION, SOLUTION INTRAVENOUS at 04:24

## 2023-10-09 ASSESSMENT — COGNITIVE AND FUNCTIONAL STATUS - GENERAL
HELP NEEDED FOR BATHING: A LITTLE
DRESSING REGULAR LOWER BODY CLOTHING: A LITTLE
DRESSING REGULAR UPPER BODY CLOTHING: A LITTLE
CLIMB 3 TO 5 STEPS WITH RAILING: A LITTLE
TURNING FROM BACK TO SIDE WHILE IN FLAT BAD: A LITTLE
MOVING FROM LYING ON BACK TO SITTING ON SIDE OF FLAT BED WITH BEDRAILS: A LITTLE
CLIMB 3 TO 5 STEPS WITH RAILING: A LOT
MOBILITY SCORE: 19
STANDING UP FROM CHAIR USING ARMS: A LITTLE
TOILETING: A LITTLE
MOVING TO AND FROM BED TO CHAIR: A LITTLE
MOBILITY SCORE: 18
DAILY ACTIVITIY SCORE: 20
WALKING IN HOSPITAL ROOM: A LITTLE
WALKING IN HOSPITAL ROOM: A LITTLE
STANDING UP FROM CHAIR USING ARMS: A LITTLE
MOVING TO AND FROM BED TO CHAIR: A LITTLE

## 2023-10-09 ASSESSMENT — PAIN SCALES - GENERAL
PAINLEVEL_OUTOF10: 2
PAINLEVEL_OUTOF10: 0 - NO PAIN
PAINLEVEL_OUTOF10: 3
PAINLEVEL_OUTOF10: 0 - NO PAIN

## 2023-10-09 ASSESSMENT — PAIN - FUNCTIONAL ASSESSMENT
PAIN_FUNCTIONAL_ASSESSMENT: 0-10
PAIN_FUNCTIONAL_ASSESSMENT: 0-10

## 2023-10-09 NOTE — CARE PLAN
The patient's goals for the shift include      Problem: Fall/Injury  Goal: Not fall by end of shift  Outcome: Progressing  Goal: Be free from injury by end of the shift  Outcome: Progressing  Goal: Verbalize understanding of personal risk factors for fall in the hospital  Outcome: Progressing  Goal: Verbalize understanding of risk factor reduction measures to prevent injury from fall in the home  Outcome: Progressing  Goal: Use assistive devices by end of the shift  Outcome: Progressing     Problem: Infection related to problem list condition  Goal: Infection will resolve through treatment  Outcome: Progressing     Problem: Skin  Goal: Decreased wound size/increased tissue granulation at next dressing change  Outcome: Progressing  Goal: Participates in plan/prevention/treatment measures  Outcome: Progressing  Goal: Prevent/manage excess moisture  Outcome: Progressing  Goal: Prevent/minimize sheer/friction injuries  Outcome: Progressing  Goal: Promote/optimize nutrition  Outcome: Progressing  Goal: Promote skin healing  Outcome: Progressing     The clinical goals for the shift include The pt will remain safe and free of fall/injury during this shift.

## 2023-10-09 NOTE — CARE PLAN
Problem: Mobility  Goal: STG - Patient will ascend and descend four to six stairs with (S).  Outcome: Progressing     Problem: Pain  Goal: Pt will be able to increase bouts of activity without an increase in pain.   Outcome: Progressing

## 2023-10-09 NOTE — PROGRESS NOTES
"Vancomycin Dosing by Pharmacy- FOLLOW UP    Bindu Cali is a 89 y.o. year old female who Pharmacy has been consulted for vancomycin dosing for Prosthetic Device Infection. Based on the patient's indication and renal status this patient is being dosed based on a goal AUC of 400-600.     Renal function is currently stable.    Current vancomycin dose: 750 mg given every 24 hours    Estimated vancomycin AUC on current dose: 546 mg/L.hr     Visit Vitals  /55   Pulse 62   Temp 37.5 °C (99.5 °F)   Resp 16        Lab Results   Component Value Date    CREATININE 1.44 (H) 10/09/2023    CREATININE 1.37 (H) 10/08/2023    CREATININE 1.72 (H) 10/06/2023    CREATININE 1.53 (H) 10/05/2023        Patient weight is 64 Kg.    No results found for: \"CULTURE\"     I/O last 3 completed shifts:  In: 560 (8.8 mL/kg) [P.O.:360; IV Piggyback:200]  Out: 1 (0 mL/kg) [Other:1]  Weight: 64 kg   [unfilled]    No results found for: \"PATIENTTEMP\"     Assessment/Plan    Within goal AUC range. Continue current vancomycin regimen.    This dosing regimen is predicted by [employer] to result in the following pharmacokinetic parameters:  Loading dose: N/A  Regimen: 750 mg IV every 24 hours.  Start time: 15:18 on 10/10/2023  Exposure target: AUC24 (range)400-600 mg/L.hr   AUC24,ss: 546 mg/L.hr  Probability of AUC24 > 400: 93 %  Ctrough,ss: 18.7 mg/L  Probability of Ctrough,ss > 20: 40 %  Probability of nephrotoxicity (Lodise MARKELL 2009): 15 %      The next level will be obtained on 10/12 at AM labs. May be obtained sooner if clinically indicated.   Will continue to monitor renal function daily while on vancomycin and order serum creatinine at least every 48 hours if not already ordered.  Follow for continued vancomycin needs, clinical response, and signs/symptoms of toxicity.       Jose Woody, PharmD           "

## 2023-10-09 NOTE — PROGRESS NOTES
10/9/23 1892 Discharge Planning Notes:    Met with patient to complete discharge assessment. Patient lives alone but has support from her daughters. She uses a walker for ambulation assistance. PT/OT evaluated patient and recommending low intensity. Patient agreeable to using [facility] if they can accept. Daughters will transport patient home at time of discharge.

## 2023-10-09 NOTE — PROGRESS NOTES
"Bindu Cali is a 89 y.o. female on day 3 of admission presenting with Cellulitis and abscess of right lower extremity.    Subjective     Seen and evaluated at bedside. Reports extremity pain improved.        Objective     Physical Exam    Alert and oriented x3  Chest good air entry  Cardiac s1 and s2  Abdomen soft  RLE with wound vac, mild erythema and warmth surrounding    Last Recorded Vitals  Blood pressure 153/55, pulse 62, temperature 37.5 °C (99.5 °F), resp. rate 16, height 1.473 m (4' 10\"), weight 64 kg (141 lb), SpO2 94 %.  Intake/Output last 3 Shifts:  I/O last 3 completed shifts:  In: 560 (8.8 mL/kg) [P.O.:360; IV Piggyback:200]  Out: 1 (0 mL/kg) [Other:1]  Weight: 64 kg     Relevant Results  Scheduled medications  amLODIPine, 2.5 mg, oral, Daily  cholecalciferol, 50 mcg, oral, Daily  gabapentin, 300 mg, oral, Nightly  levothyroxine, 100 mcg, oral, Daily  multivitamin with minerals, 1 tablet, oral, Daily  pantoprazole, 40 mg, oral, Daily before breakfast  piperacillin-tazobactam, 2.25 g, intravenous, q6h  psyllium, 1 packet, oral, TID  saccharomyces boulardii, 250 mg, oral, BID  vancomycin, 750 mg, intravenous, q24h      Continuous medications     PRN medications  PRN medications: acetaminophen, loratadine, oxyCODONE  '                       Assessment/Plan   Principal Problem:    Cellulitis and abscess of right lower extremity  Active Problems:    Hypothyroidism    Anemia due to stage 3b chronic kidney disease (CMS/HCC)    Diastolic dysfunction    Essential hypertension    GERD (gastroesophageal reflux disease)    Hypertension    Surgical site infection    Cellulitis and abscess in RLE s/p I&D   OPAL R tibia on 10/8/23     Cultures no growth  On vanc and zosyn  ID consulted by prior provider, will follow recommendations  Per Ortho  Calcium (as carbonate)-Vitamin D 600mg-400IU PO BID for 30 days upon discharge   Drain: Maintain prevena 7 days then ok to remove and leave wound to air  PT/OT " consulted      BIBIANA  Avoid nephrotoxic medications  On vanc and zosyn, hoping to de-escalate     MM    Hypothyroidism  On Levothyroxine    Plan of care discussed with patient and grand daughter who was at bedside     I spent 30  minutes in the professional and overall care of this patient.      Anthony Dye MD

## 2023-10-09 NOTE — CARE PLAN
Problem: Mobility  Goal: STG - Patient will ambulate 150 ft with (S)->mod (I)  Outcome: Progressing

## 2023-10-09 NOTE — PROGRESS NOTES
"Orthopaedic Surgery Progress Note    S:    Evaluated post-operatively. Pain controlled on current regimen.  Denies any new onset numbness, tingling or weakness. Denies nausea, vomiting, chest pain, dyspnea, or calf tenderness. Denies any fever or chills.     O:  /53   Pulse 65   Temp 36.8 °C (98.2 °F) (Temporal)   Resp 18   Ht 1.473 m (4' 10\")   Wt 64 kg (141 lb)   SpO2 97%   BMI 29.47 kg/m²     GEN - NAD, resting comfortably in hospital bed  HEENT - MMM, EOMI, NCAT   CV - RRR by peripheral palpation, limbs wwp  PULM - NWOB on RA  ABD - Non-distended  NEURO - WU spontaneously, CNs II - XII grossly intact  PSYCH - Appropriate mood and affect    MSK:  RLE:   -Post-operative dressing/splint in place without strikethrough bleeding. Prevena in place holding suction  -Motor intact in DF/PF/EHL/FHL, SILT in saph/sural/SPN/DPN/tibial distributions    -Foot wwp, brisk cap refill, 2+ DP/PT pulse  -Compartments soft and compressible, no pain with passive dorsiflexion      A/P: 89 y.o. female PMH HTN, CKD2, hypothyroid, GERD, Multiple Myeloma (actively on chemo vs immunotherapy) s/p I&D, OPAL R Tibia on 10/8/23 with Dr. Dominguez.    Plan:  - No plan to RTOR during admission   - Weight bearing: WBAT RLE  - DVT ppx: SCDs, okay for chemo PPx per primary; recommend at least ASA 81 mg BID for 4 weeks post-op  - Diet: Okay for diet from orthopedic perspective   - Perioperative Antibiotics: broad spectrum per primary/ID; follow up cultures from OR, ID consult  - Please send with Calcium (as carbonate)-Vitamin D 600mg-400IU PO BID for 30 days upon discharge   - Drain: Maintain prevena 7 days then ok to remove and leave wound to air   - Post-operative Imaging: None     -Orthopaedics will follow peripherally while the patient remains in the hospital.  -Please have the patient follow up with Dr. Weller in 2 weeks for a postoperative visit. Appointments made by having the patient/their family call " [phone].  -Postoperative dressing may be removed on POD7 and incision left open to air. Reinforce with ABDs as necessarry. No baths/hot tubs, no scrubbing on incision, no lotions or creams on incision.        This plan was discussed with the attending, Dr. Janee Inman MD  Orthopedic Surgery, PGY3  P: [phone] (Doc halo Preferred)    Please page [phone] after 6pm or on weekends for urgent questions.

## 2023-10-09 NOTE — PROGRESS NOTES
Physical Therapy    Physical Therapy Evaluation & Treatment    Patient Name: Bindu Cali  MRN: [id number]  Today's Date: 10/9/2023    [id number]    Assessment/Plan   PT Assessment  PT Assessment Results: Impaired balance, Decreased mobility  Rehab Prognosis: Good  Medical Staff Made Aware: Yes  End of Session Communication: Bedside nurse  Assessment Comment: SPT: supervising therapist present and assisted with clinical decisions  End of Session Patient Position: Alarm off, not on at start of session (EOB with all needs within reach)  IP OR SWING BED PT PLAN  Inpatient or Swing Bed: Inpatient  PT Plan  Treatment/Interventions: Transfer training, Gait training, Stair training, Balance training, Strengthening, Therapeutic exercise, Therapeutic activity  PT Plan: Skilled PT  PT Frequency: 3 times per week  PT Discharge Recommendations: Low intensity level of continued care      Subjective     General Visit Information:  General  Reason for Referral: cellulitis and abscess of RLE s/p I&D, saucerization of R tibial plateau, OPAL on 10/8.  Past Medical History Relevant to Rehab: HTN, CKD, hypothyroidism, GERD (recovering from R LE wound debridement on 10/8)  Prior to Session Communication: Bedside nurse  General Comment: Supine in bed upon entry. Agreeable to work with. Verbalized several times that she would prefer to have FWW as opposed to standard walker in her room for her use during her stay. Able to locate FWW for her. Very appreciative. Will continue to follow.  Home Living:  Home Living  Type of Home: House  Lives With: Adult children (son works in the mornings, cooks and cleans for her)  Home Layout: Two level (able to dwell on main level, 2 LEILANI that pt can navigate)  Prior Level of Function:  Prior Function Per Pt/Caregiver Report  Level of [place]: Independent with ADLs and functional transfers, Independent with homemaking with ambulation (furniture walks and cane for indoor dwelling, uses FWW when out  of house)  Precautions:  Precautions  LE Weight Bearing Status: Weight Bearing as Tolerated (R LE)       Objective   Pain:  Pain Assessment  Pain Assessment: 0-10 (stated that 3 days ago, pain in R LE was 10/10 when she believed infection started)  Pain Score: 0 - No pain (0/10 upon entry, 2/10 in R LE after amb)  Cognition:  Cognition  Overall Cognitive Status: Within Functional Limits (A&Ox4, verbablized that she calls for help from nursing staff prior to walking to restroom)    General Assessments:    Static Sitting Balance  Static Sitting-Balance Support: No upper extremity supported  Static Sitting-Level of Assistance: Close supervision  Static Sitting-Comment/Number of Minutes: 2    Static Standing Balance  Static Standing-Balance Support: Bilateral upper extremity supported (FWW)  Static Standing-Level of Assistance: Close supervision  Dynamic Standing Balance  Dynamic Standing-Balance Support: Bilateral upper extremity supported (FWW)  Dynamic Standing-Comments:  (pt able to put some weight through RLE, honoring WBAT status)  Functional Assessments:  Bed Mobility  Bed Mobility: Yes  Bed Mobility 1  Bed Mobility 1: Supine to sitting  Level of Assistance 1: Close supervision  Bed Mobility Comments 1: pt able to complete mobility with ease and in one fluid motion    Transfers  Transfer: Yes  Transfer 1  Transfer From 1: Sit to  Transfer to 1: Stand  Transfer Level of Assistance 1: Contact guard  Transfers 2  Transfer From 2: Stand to  Transfer to 2: Sit  Transfer Level of Assistance 2: Contact guard  Trials/Comments 2: pt opted to stay seated EOB upon conclusion of eval stating her granddtr was bringing her lunch soon    Ambulation/Gait Training  Ambulation/Gait Training Performed: Yes  Ambulation/Gait Training 1  Surface 1: Level tile  Device 1:  (FWW)  Assistance 1: Contact guard  Comments/Distance (ft) 1: 2 x 30 feet (pt inquired about her gait pattern and was told that she demonstrates good posture and  weight acceptance on R LE)       Extremity/Trunk Assessments:  RLE   RLE : Within Functional Limits  LLE   LLE : Within Functional Limits  Treatments:  Therapeutic Exercise  Therapeutic Exercise Performed: Yes  Therapeutic Exercise Activity 1: 5x BL ankle pumps (pt noted slight pain in her R ankle during this)  Therapeutic Exercise Activity 2: 5x BL heel slides  Therapeutic Exercise Activity 3: 5x BL EOB marching (pt able to perform without UE suport)         Bed Mobility  Bed Mobility: Yes  Bed Mobility 1  Bed Mobility 1: Supine to sitting  Level of Assistance 1: Close supervision  Bed Mobility Comments 1: pt able to complete mobility with ease and in one fluid motion    Ambulation/Gait Training  Ambulation/Gait Training Performed: Yes  Ambulation/Gait Training 1  Surface 1: Level tile  Device 1:  (FWW)  Assistance 1: Contact guard  Comments/Distance (ft) 1: 2 x 30 feet (pt inquired about her gait pattern and was told that she demonstrates good posture and weight acceptance on R LE)  Transfers  Transfer: Yes  Transfer 1  Transfer From 1: Sit to  Transfer to 1: Stand  Transfer Level of Assistance 1: Contact guard  Transfers 2  Transfer From 2: Stand to  Transfer to 2: Sit  Transfer Level of Assistance 2: Contact guard  Trials/Comments 2: pt opted to stay seated EOB upon conclusion of eval stating her granddtr was bringing her lunch soon  Outcome Measures:  [facility] Basic Mobility  Turning from your back to your side while in a flat bed without using bedrails: A little  Moving from lying on your back to sitting on the side of a flat bed without using bedrails: A little  Moving to and from bed to chair (including a wheelchair): None  Standing up from a chair using your arms (e.g. wheelchair or bedside chair): None  To walk in hospital room: A little  Climbing 3-5 steps with railing: A little  Basic Mobility - Total Score: 20    Encounter Problems       Encounter Problems (Active)       Compromised Skin Integrity        STG - Patient exhibits signs of wound healing. (Progressing)       Start:  10/09/23    Expected End:  10/23/23               Mobility       STG - Patient will ascend and descend four to six stairs (Progressing)       Start:  10/09/23    Expected End:  10/23/23               Pain       Pt will be able to increase bouts of activity without an increase in pain.  (Progressing)       Start:  10/09/23    Expected End:  10/23/23

## 2023-10-10 ENCOUNTER — LAB REQUISITION (OUTPATIENT)
Dept: LAB | Facility: HOSPITAL | Age: 88
End: 2023-10-10
Payer: MEDICARE

## 2023-10-10 ENCOUNTER — APPOINTMENT (OUTPATIENT)
Dept: PHYSICAL THERAPY | Facility: CLINIC | Age: 88
End: 2023-10-10
Payer: MEDICARE

## 2023-10-10 DIAGNOSIS — T81.49XA INFECTION FOLLOWING A PROCEDURE, OTHER SURGICAL SITE, INITIAL ENCOUNTER: ICD-10-CM

## 2023-10-10 LAB
ANION GAP SERPL CALC-SCNC: 14 MMOL/L (ref 10–20)
BASOPHILS # BLD AUTO: 0.03 X10*3/UL (ref 0–0.1)
BASOPHILS NFR BLD AUTO: 0.4 %
BUN SERPL-MCNC: 27 MG/DL (ref 6–23)
CALCIUM SERPL-MCNC: 10 MG/DL (ref 8.6–10.6)
CHLORIDE SERPL-SCNC: 105 MMOL/L (ref 98–107)
CO2 SERPL-SCNC: 22 MMOL/L (ref 21–32)
CREAT SERPL-MCNC: 1.44 MG/DL (ref 0.5–1.05)
EOSINOPHIL # BLD AUTO: 0.3 X10*3/UL (ref 0–0.4)
EOSINOPHIL NFR BLD AUTO: 4 %
ERYTHROCYTE [DISTWIDTH] IN BLOOD BY AUTOMATED COUNT: 15.6 % (ref 11.5–14.5)
FERRITIN SERPL-MCNC: 134 NG/ML (ref 8–150)
GFR SERPL CREATININE-BSD FRML MDRD: 35 ML/MIN/1.73M*2
GLUCOSE SERPL-MCNC: 89 MG/DL (ref 74–99)
HCT VFR BLD AUTO: 22.5 % (ref 36–46)
HGB BLD-MCNC: 7.2 G/DL (ref 12–16)
IMM GRANULOCYTES # BLD AUTO: 0.04 X10*3/UL (ref 0–0.5)
IMM GRANULOCYTES NFR BLD AUTO: 0.5 % (ref 0–0.9)
IRON SATN MFR SERPL: 13 % (ref 25–45)
IRON SERPL-MCNC: 30 UG/DL (ref 35–150)
LYMPHOCYTES # BLD AUTO: 1.02 X10*3/UL (ref 0.8–3)
LYMPHOCYTES NFR BLD AUTO: 13.5 %
MCH RBC QN AUTO: 28.9 PG (ref 26–34)
MCHC RBC AUTO-ENTMCNC: 32 G/DL (ref 32–36)
MCV RBC AUTO: 90 FL (ref 80–100)
MONOCYTES # BLD AUTO: 0.82 X10*3/UL (ref 0.05–0.8)
MONOCYTES NFR BLD AUTO: 10.8 %
NEUTROPHILS # BLD AUTO: 5.37 X10*3/UL (ref 1.6–5.5)
NEUTROPHILS NFR BLD AUTO: 70.8 %
NRBC BLD-RTO: 0 /100 WBCS (ref 0–0)
PLATELET # BLD AUTO: 302 X10*3/UL (ref 150–450)
PMV BLD AUTO: 9.9 FL (ref 7.5–11.5)
POTASSIUM SERPL-SCNC: 3.7 MMOL/L (ref 3.5–5.3)
RBC # BLD AUTO: 2.49 X10*6/UL (ref 4–5.2)
SODIUM SERPL-SCNC: 137 MMOL/L (ref 136–145)
TIBC SERPL-MCNC: 230 UG/DL (ref 240–445)
UIBC SERPL-MCNC: 200 UG/DL (ref 110–370)
WBC # BLD AUTO: 7.6 X10*3/UL (ref 4.4–11.3)

## 2023-10-10 PROCEDURE — 83550 IRON BINDING TEST: CPT | Performed by: INTERNAL MEDICINE

## 2023-10-10 PROCEDURE — 36415 COLL VENOUS BLD VENIPUNCTURE: CPT | Performed by: INTERNAL MEDICINE

## 2023-10-10 PROCEDURE — 2500000004 HC RX 250 GENERAL PHARMACY W/ HCPCS (ALT 636 FOR OP/ED): Performed by: INTERNAL MEDICINE

## 2023-10-10 PROCEDURE — 99232 SBSQ HOSP IP/OBS MODERATE 35: CPT | Performed by: INTERNAL MEDICINE

## 2023-10-10 PROCEDURE — 2500000001 HC RX 250 WO HCPCS SELF ADMINISTERED DRUGS (ALT 637 FOR MEDICARE OP): Performed by: INTERNAL MEDICINE

## 2023-10-10 PROCEDURE — 2500000001 HC RX 250 WO HCPCS SELF ADMINISTERED DRUGS (ALT 637 FOR MEDICARE OP): Performed by: NURSE PRACTITIONER

## 2023-10-10 PROCEDURE — 85025 COMPLETE CBC W/AUTO DIFF WBC: CPT | Performed by: INTERNAL MEDICINE

## 2023-10-10 PROCEDURE — 87116 MYCOBACTERIA CULTURE: CPT

## 2023-10-10 PROCEDURE — 82374 ASSAY BLOOD CARBON DIOXIDE: CPT | Performed by: INTERNAL MEDICINE

## 2023-10-10 PROCEDURE — 83540 ASSAY OF IRON: CPT | Performed by: INTERNAL MEDICINE

## 2023-10-10 PROCEDURE — 87102 FUNGUS ISOLATION CULTURE: CPT

## 2023-10-10 PROCEDURE — 87015 SPECIMEN INFECT AGNT CONCNTJ: CPT

## 2023-10-10 PROCEDURE — 99231 SBSQ HOSP IP/OBS SF/LOW 25: CPT | Performed by: STUDENT IN AN ORGANIZED HEALTH CARE EDUCATION/TRAINING PROGRAM

## 2023-10-10 PROCEDURE — 82728 ASSAY OF FERRITIN: CPT | Performed by: INTERNAL MEDICINE

## 2023-10-10 PROCEDURE — 1210000001 HC SEMI-PRIVATE ROOM DAILY

## 2023-10-10 RX ORDER — AMLODIPINE BESYLATE 5 MG/1
5 TABLET ORAL DAILY
Status: DISCONTINUED | OUTPATIENT
Start: 2023-10-10 | End: 2023-10-13 | Stop reason: HOSPADM

## 2023-10-10 RX ORDER — HYDRALAZINE HYDROCHLORIDE 20 MG/ML
5 INJECTION INTRAMUSCULAR; INTRAVENOUS ONCE
Status: COMPLETED | OUTPATIENT
Start: 2023-10-10 | End: 2023-10-10

## 2023-10-10 RX ORDER — HYDRALAZINE HYDROCHLORIDE 20 MG/ML
5 INJECTION INTRAMUSCULAR; INTRAVENOUS EVERY 6 HOURS PRN
Status: DISCONTINUED | OUTPATIENT
Start: 2023-10-10 | End: 2023-10-13 | Stop reason: HOSPADM

## 2023-10-10 RX ADMIN — ACETAMINOPHEN 650 MG: 325 TABLET ORAL at 20:49

## 2023-10-10 RX ADMIN — PIPERACILLIN SODIUM AND TAZOBACTAM SODIUM 2.25 G: 2; .25 INJECTION, SOLUTION INTRAVENOUS at 04:50

## 2023-10-10 RX ADMIN — Medication 1 TABLET: at 09:58

## 2023-10-10 RX ADMIN — Medication 250 MG: at 09:59

## 2023-10-10 RX ADMIN — HYDRALAZINE HYDROCHLORIDE 5 MG: 20 INJECTION INTRAMUSCULAR; INTRAVENOUS at 15:02

## 2023-10-10 RX ADMIN — PIPERACILLIN SODIUM AND TAZOBACTAM SODIUM 2.25 G: 2; .25 INJECTION, SOLUTION INTRAVENOUS at 22:19

## 2023-10-10 RX ADMIN — Medication 250 MG: at 20:42

## 2023-10-10 RX ADMIN — PIPERACILLIN SODIUM AND TAZOBACTAM SODIUM 2.25 G: 2; .25 INJECTION, SOLUTION INTRAVENOUS at 16:50

## 2023-10-10 RX ADMIN — Medication 50 MCG: at 09:58

## 2023-10-10 RX ADMIN — PSYLLIUM HUSK 1 PACKET: 3.4 POWDER ORAL at 09:00

## 2023-10-10 RX ADMIN — ACETAMINOPHEN 650 MG: 325 TABLET ORAL at 09:59

## 2023-10-10 RX ADMIN — GABAPENTIN 300 MG: 300 CAPSULE ORAL at 20:41

## 2023-10-10 RX ADMIN — VANCOMYCIN HYDROCHLORIDE 750 MG: 750 INJECTION, SOLUTION INTRAVENOUS at 15:05

## 2023-10-10 RX ADMIN — PANTOPRAZOLE SODIUM 40 MG: 40 TABLET, DELAYED RELEASE ORAL at 06:05

## 2023-10-10 RX ADMIN — PIPERACILLIN SODIUM AND TAZOBACTAM SODIUM 2.25 G: 2; .25 INJECTION, SOLUTION INTRAVENOUS at 09:57

## 2023-10-10 RX ADMIN — AMLODIPINE BESYLATE 5 MG: 5 TABLET ORAL at 15:05

## 2023-10-10 RX ADMIN — HYDRALAZINE HYDROCHLORIDE 5 MG: 20 INJECTION INTRAMUSCULAR; INTRAVENOUS at 18:06

## 2023-10-10 RX ADMIN — LEVOTHYROXINE SODIUM 100 MCG: 50 TABLET ORAL at 06:05

## 2023-10-10 ASSESSMENT — PAIN SCALES - GENERAL
PAINLEVEL_OUTOF10: 3
PAINLEVEL_OUTOF10: 0 - NO PAIN
PAINLEVEL_OUTOF10: 3
PAINLEVEL_OUTOF10: 0 - NO PAIN

## 2023-10-10 ASSESSMENT — PAIN - FUNCTIONAL ASSESSMENT
PAIN_FUNCTIONAL_ASSESSMENT: 0-10

## 2023-10-10 ASSESSMENT — COGNITIVE AND FUNCTIONAL STATUS - GENERAL
CLIMB 3 TO 5 STEPS WITH RAILING: A LOT
TURNING FROM BACK TO SIDE WHILE IN FLAT BAD: A LITTLE
STANDING UP FROM CHAIR USING ARMS: A LITTLE
MOBILITY SCORE: 18
DAILY ACTIVITIY SCORE: 23
MOVING TO AND FROM BED TO CHAIR: A LITTLE
WALKING IN HOSPITAL ROOM: A LITTLE
EATING MEALS: A LITTLE

## 2023-10-10 NOTE — PROGRESS NOTES
"Bindu Cali is a 89 y.o. female on day 4 of admission presenting with Cellulitis and abscess of right lower extremity.    Subjective     Seen and evaluated at bedside. No active complaints     Objective     Physical Exam    Alert and oriented x3  Chest good air entry  Cardiac s1 and s2  Abdomen soft  RLE with wound vac, mild erythema and warmth surrounding    Last Recorded Vitals  Blood pressure 178/65, pulse 59, temperature 36.5 °C (97.7 °F), resp. rate 16, height 1.473 m (4' 10\"), weight 64 kg (141 lb), SpO2 93 %.  Intake/Output last 3 Shifts:  I/O last 3 completed shifts:  In: 300 (4.7 mL/kg) [P.O.:300]  Out: 0 (0 mL/kg)   Weight: 64 kg     Relevant Results  Scheduled medications  amLODIPine, 2.5 mg, oral, Daily  cholecalciferol, 50 mcg, oral, Daily  gabapentin, 300 mg, oral, Nightly  levothyroxine, 100 mcg, oral, Daily  multivitamin with minerals, 1 tablet, oral, Daily  pantoprazole, 40 mg, oral, Daily before breakfast  piperacillin-tazobactam, 2.25 g, intravenous, q6h  psyllium, 1 packet, oral, TID  saccharomyces boulardii, 250 mg, oral, BID  vancomycin, 750 mg, intravenous, q24h      Continuous medications     PRN medications  PRN medications: acetaminophen, loratadine, oxyCODONE  '                       Assessment/Plan   Principal Problem:    Cellulitis and abscess of right lower extremity  Active Problems:    Hypothyroidism    Anemia due to stage 3b chronic kidney disease (CMS/HCC)    Diastolic dysfunction    Essential hypertension    GERD (gastroesophageal reflux disease)    Hypertension    Surgical site infection    Cellulitis and abscess in RLE s/p I&D   Tibial OM  OPAL R tibia on 10/8/23     Cultures no growth  On vanc and zosyn  ID following appreciate recommendations   Per Ortho  Calcium (as carbonate)-Vitamin D 600mg-400IU PO BID for 30 days upon discharge   Drain: Maintain prevena 7 days then ok to remove and leave wound to air  PT/OT consulted      BIBIANA  Avoid nephrotoxic medications  Renal " function currently stable     MM    Hypothyroidism  On Levothyroxine    Plan of care discussed with patient and grand daughter who was at bedside     I spent 30  minutes in the professional and overall care of this patient.      Anthony Dye MD

## 2023-10-10 NOTE — PROGRESS NOTES
Occupational Therapy    Evaluation    Patient Name: Bindu Cali  MRN: [id number]  Today's Date: 10/10/2023       Assessment    OT Assessment: No acute needs; Evaluation not complete; services discontinued      General:    Reason for Referral: (OT for ADL and safety assessment s/p I&D RLE abscess, saucerization of R tibial plateau with wound vac applied to site)    General Comment: Pt educated on reason for OT consultation and acute services  Pt demos understanding of services and denies need for OT skilled intervention this acute LOS.

## 2023-10-10 NOTE — TELEPHONE ENCOUNTER
Update, Mrs. Cali had a washout and OPAL by Dr. Dominguez this past weekend. She is following up with Missy on 10/26/2023.

## 2023-10-10 NOTE — CARE PLAN
Problem: Fall/Injury  Goal: Not fall by end of shift  Outcome: Progressing  Goal: Be free from injury by end of the shift  Outcome: Progressing  Goal: Verbalize understanding of personal risk factors for fall in the hospital  Outcome: Progressing  Goal: Verbalize understanding of risk factor reduction measures to prevent injury from fall in the home  Outcome: Progressing  Goal: Use assistive devices by end of the shift  Outcome: Progressing  Goal: Pace activities to prevent fatigue by end of the shift  Outcome: Progressing     Problem: Infection related to problem list condition  Goal: Infection will resolve through treatment  Outcome: Progressing   The patient's goals for the shift include  remain safe and comfortable throughout shift.     The clinical goals for the shift include receive antibiotics as prescribed. Ambulate throughout the shift.

## 2023-10-10 NOTE — PROGRESS NOTES
Bindu Cali is a 89 y.o. female on day 4 of admission presenting with Cellulitis and abscess of right lower extremity.    Subjective   Interval History: No fevers overnight    Review of Systems    Review of systems otherwise negative    Objective   Range of Vitals (last 24 hours)  Heart Rate:  [62-70]   Temp:  [36.8 °C (98.2 °F)-37.5 °C (99.5 °F)]   Resp:  [16-18]   BP: ()/(53-64)   SpO2:  [94 %-97 %]   Daily Weight  10/06/23 : 64 kg (141 lb)    Body mass index is 29.47 kg/m².    Physical Exam  General: in no acute distress, able to answer questions  HEENT: no conjunctival injection. anicteric.  CVS: RRR. Normal S1 and S2. No m/r/g.   RESP: ctab no w/r/r, no increased wob  Abd: +BS. Soft and lax. ND.   Ext: Right lower extremity with wound vac on side, no surrounding erythema  Neuro: Answers questions appropriately.  Integumentary: no obvious lesions   Rheumatologic: No joint swelling or edema    Antibiotics  vancomycin in dextrose 5 % (Vancocin) IVPB 1,000 mg  piperacillin-tazobactam-dextrose (Zosyn) IV 2.25 g  amLODIPine (Norvasc) tablet 2.5 mg  cholecalciferol (Vitamin D-3) tablet 50 mcg  gabapentin (Neurontin) capsule 300 mg  levothyroxine (Synthroid, Levoxyl) tablet 100 mcg  loratadine (Claritin) tablet 10 mg  multivit-min-iron-FA-vit K-lut 8 mg iron-400 mcg-50 mcg tablet 1 tablet  pantoprazole (ProtoNix) EC tablet 40 mg  multivitamin 1 capsule  sodium chloride 0.9% infusion  acetaminophen (Tylenol) tablet 650 mg  multivitamin with minerals 1 tablet  oxyCODONE (Roxicodone) immediate release tablet 5 mg  gabapentin (Neurontin) capsule 300 mg  piperacillin-tazobactam-dextrose (Zosyn) IV 2.25 g  vancomycin in dextrose 5 % (Vancocin) IVPB 1,000 mg  levothyroxine (Synthroid, Levoxyl) tablet 100 mcg  psyllium (Metamucil) 3.4 gram packet 1 packet  saccharomyces boulardii (Florastor) capsule 250 mg  sodium chloride 0.9 % irrigation solution  vancomycin (Vancocin) vial for injection  lidocaine PF  (Xylocaine) 10 mg/mL (1 %) injection 1 mg  lactated Ringer's infusion  acetaminophen (Tylenol) tablet 650 mg  promethazine (Phenergan) 6.25 mg in sodium chloride 0.9% 50 mL IV  ondansetron (Zofran) injection 4 mg  fentaNYL PF (Sublimaze) injection 50 mcg  fentaNYL PF (Sublimaze) injection 25 mcg  vancomycin in dextrose 5 % (Vancocin) IVPB 750 mg  acetaminophen (Tylenol) tablet 650 mg      Relevant Results  Labs  Results from last 72 hours   Lab Units 10/10/23  0537 10/09/23  1143 10/08/23  0552   WBC AUTO x10*3/uL 7.6 9.0 10.3   HEMOGLOBIN g/dL 7.2* 7.6* 8.6*   HEMATOCRIT % 22.5* 23.9* 27.2*   PLATELETS AUTO x10*3/uL 302 334 380   NEUTROS PCT AUTO % 70.8 81.2 73.5   LYMPHS PCT AUTO % 13.5 8.8 13.0   MONOS PCT AUTO % 10.8 9.3 9.7   EOS PCT AUTO % 4.0 0.2 3.1     Results from last 72 hours   Lab Units 10/10/23  0537 10/09/23  1143 10/08/23  0552   SODIUM mmol/L 137 140 140   POTASSIUM mmol/L 3.7 3.8 3.8   CHLORIDE mmol/L 105 104 106   CO2 mmol/L 22 26 23   BUN mg/dL 27* 25* 27*   CREATININE mg/dL 1.44* 1.44* 1.37*   GLUCOSE mg/dL 89 125* 79   CALCIUM mg/dL 10.0 10.2 11.0*   ANION GAP mmol/L 14 14 15   EGFR mL/min/1.73m*2 35* 35* 37*     Results from last 72 hours   Lab Units 10/08/23  0552   ALK PHOS U/L 78   BILIRUBIN TOTAL mg/dL 0.5   PROTEIN TOTAL g/dL 6.0*   ALT U/L 15   AST U/L 15   ALBUMIN g/dL 3.2*     Estimated Creatinine Clearance: 19.7 mL/min (A) (by C-G formula based on SCr of 1.44 mg/dL (H)).  C-Reactive Protein   Date Value Ref Range Status   10/06/2023 24.69 (H) <1.00 mg/dL Final     Microbiology  No results found for the last 14 days.  Micro 10/7 BC NGTD  10/8 OR cx GPC     Abx:  Vancomycin (10/6-current)  Zosyn (10/6-current)            Assessment/Plan   #Chronic sinus tract with osteomyelitis, s/p washout and hardware removal on 10/8/23  #ORIF of right proximal tibia in 4/23/23      Patient with continued sinus tract since initial surgery in 4/23/23. Patient did have hardware that was removed in OR on  10/8/23. Cultures showing gram positive organisms    -Continue vancomycin  -Continue zosyn  -We will continue to follow cultures      ID will continue to follow. If any questions regarding this patient please call Team pager.  Discussed with Dr. Connor Aguilar MD

## 2023-10-11 LAB
ANION GAP SERPL CALC-SCNC: 15 MMOL/L (ref 10–20)
BACTERIA BLD CULT: NORMAL
BACTERIA BLD CULT: NORMAL
BASOPHILS # BLD AUTO: 0.04 X10*3/UL (ref 0–0.1)
BASOPHILS NFR BLD AUTO: 0.8 %
BUN SERPL-MCNC: 19 MG/DL (ref 6–23)
CALCIUM SERPL-MCNC: 9.9 MG/DL (ref 8.6–10.6)
CHLORIDE SERPL-SCNC: 105 MMOL/L (ref 98–107)
CO2 SERPL-SCNC: 23 MMOL/L (ref 21–32)
CREAT SERPL-MCNC: 1.36 MG/DL (ref 0.5–1.05)
EOSINOPHIL # BLD AUTO: 0.41 X10*3/UL (ref 0–0.4)
EOSINOPHIL NFR BLD AUTO: 8 %
ERYTHROCYTE [DISTWIDTH] IN BLOOD BY AUTOMATED COUNT: 15.1 % (ref 11.5–14.5)
GFR SERPL CREATININE-BSD FRML MDRD: 37 ML/MIN/1.73M*2
GLUCOSE SERPL-MCNC: 147 MG/DL (ref 74–99)
HCT VFR BLD AUTO: 23.9 % (ref 36–46)
HGB BLD-MCNC: 7.9 G/DL (ref 12–16)
IMM GRANULOCYTES # BLD AUTO: 0.06 X10*3/UL (ref 0–0.5)
IMM GRANULOCYTES NFR BLD AUTO: 1.2 % (ref 0–0.9)
LYMPHOCYTES # BLD AUTO: 0.73 X10*3/UL (ref 0.8–3)
LYMPHOCYTES NFR BLD AUTO: 14.3 %
MCH RBC QN AUTO: 28.6 PG (ref 26–34)
MCHC RBC AUTO-ENTMCNC: 33.1 G/DL (ref 32–36)
MCV RBC AUTO: 87 FL (ref 80–100)
MONOCYTES # BLD AUTO: 0.8 X10*3/UL (ref 0.05–0.8)
MONOCYTES NFR BLD AUTO: 15.7 %
NEUTROPHILS # BLD AUTO: 3.07 X10*3/UL (ref 1.6–5.5)
NEUTROPHILS NFR BLD AUTO: 60 %
NRBC BLD-RTO: 0 /100 WBCS (ref 0–0)
PLATELET # BLD AUTO: 387 X10*3/UL (ref 150–450)
PMV BLD AUTO: 10.3 FL (ref 7.5–11.5)
POTASSIUM SERPL-SCNC: 3.7 MMOL/L (ref 3.5–5.3)
RBC # BLD AUTO: 2.76 X10*6/UL (ref 4–5.2)
SODIUM SERPL-SCNC: 139 MMOL/L (ref 136–145)
WBC # BLD AUTO: 5.1 X10*3/UL (ref 4.4–11.3)

## 2023-10-11 PROCEDURE — 97110 THERAPEUTIC EXERCISES: CPT | Mod: GP,CQ

## 2023-10-11 PROCEDURE — 1210000001 HC SEMI-PRIVATE ROOM DAILY

## 2023-10-11 PROCEDURE — 2500000004 HC RX 250 GENERAL PHARMACY W/ HCPCS (ALT 636 FOR OP/ED): Performed by: INTERNAL MEDICINE

## 2023-10-11 PROCEDURE — 36415 COLL VENOUS BLD VENIPUNCTURE: CPT | Performed by: INTERNAL MEDICINE

## 2023-10-11 PROCEDURE — 97116 GAIT TRAINING THERAPY: CPT | Mod: GP,CQ

## 2023-10-11 PROCEDURE — 2500000001 HC RX 250 WO HCPCS SELF ADMINISTERED DRUGS (ALT 637 FOR MEDICARE OP): Performed by: NURSE PRACTITIONER

## 2023-10-11 PROCEDURE — 99232 SBSQ HOSP IP/OBS MODERATE 35: CPT | Performed by: INTERNAL MEDICINE

## 2023-10-11 PROCEDURE — 2500000001 HC RX 250 WO HCPCS SELF ADMINISTERED DRUGS (ALT 637 FOR MEDICARE OP): Performed by: INTERNAL MEDICINE

## 2023-10-11 PROCEDURE — 85025 COMPLETE CBC W/AUTO DIFF WBC: CPT | Performed by: INTERNAL MEDICINE

## 2023-10-11 PROCEDURE — 80048 BASIC METABOLIC PNL TOTAL CA: CPT | Performed by: INTERNAL MEDICINE

## 2023-10-11 PROCEDURE — 99231 SBSQ HOSP IP/OBS SF/LOW 25: CPT | Performed by: INTERNAL MEDICINE

## 2023-10-11 RX ORDER — LOSARTAN POTASSIUM 50 MG/1
50 TABLET ORAL DAILY
Status: DISCONTINUED | OUTPATIENT
Start: 2023-10-11 | End: 2023-10-13 | Stop reason: HOSPADM

## 2023-10-11 RX ADMIN — PIPERACILLIN SODIUM AND TAZOBACTAM SODIUM 2.25 G: 2; .25 INJECTION, SOLUTION INTRAVENOUS at 04:05

## 2023-10-11 RX ADMIN — PIPERACILLIN SODIUM AND TAZOBACTAM SODIUM 2.25 G: 2; .25 INJECTION, SOLUTION INTRAVENOUS at 22:56

## 2023-10-11 RX ADMIN — LEVOTHYROXINE SODIUM 100 MCG: 50 TABLET ORAL at 06:28

## 2023-10-11 RX ADMIN — Medication 1 TABLET: at 08:19

## 2023-10-11 RX ADMIN — Medication 250 MG: at 08:18

## 2023-10-11 RX ADMIN — Medication 50 MCG: at 08:18

## 2023-10-11 RX ADMIN — VANCOMYCIN HYDROCHLORIDE 750 MG: 750 INJECTION, SOLUTION INTRAVENOUS at 18:29

## 2023-10-11 RX ADMIN — Medication 250 MG: at 20:50

## 2023-10-11 RX ADMIN — GABAPENTIN 300 MG: 300 CAPSULE ORAL at 20:50

## 2023-10-11 RX ADMIN — PSYLLIUM HUSK 1 PACKET: 3.4 POWDER ORAL at 09:00

## 2023-10-11 RX ADMIN — ACETAMINOPHEN 650 MG: 325 TABLET ORAL at 23:06

## 2023-10-11 RX ADMIN — PIPERACILLIN SODIUM AND TAZOBACTAM SODIUM 2.25 G: 2; .25 INJECTION, SOLUTION INTRAVENOUS at 10:20

## 2023-10-11 RX ADMIN — PANTOPRAZOLE SODIUM 40 MG: 40 TABLET, DELAYED RELEASE ORAL at 06:28

## 2023-10-11 RX ADMIN — AMLODIPINE BESYLATE 5 MG: 5 TABLET ORAL at 08:18

## 2023-10-11 RX ADMIN — ACETAMINOPHEN 650 MG: 325 TABLET ORAL at 08:41

## 2023-10-11 RX ADMIN — LOSARTAN POTASSIUM 50 MG: 50 TABLET, FILM COATED ORAL at 08:40

## 2023-10-11 RX ADMIN — PIPERACILLIN SODIUM AND TAZOBACTAM SODIUM 2.25 G: 2; .25 INJECTION, SOLUTION INTRAVENOUS at 16:18

## 2023-10-11 ASSESSMENT — COGNITIVE AND FUNCTIONAL STATUS - GENERAL
WALKING IN HOSPITAL ROOM: A LITTLE
WALKING IN HOSPITAL ROOM: A LITTLE
CLIMB 3 TO 5 STEPS WITH RAILING: A LITTLE
PERSONAL GROOMING: A LITTLE
TURNING FROM BACK TO SIDE WHILE IN FLAT BAD: A LITTLE
STANDING UP FROM CHAIR USING ARMS: A LITTLE
DRESSING REGULAR LOWER BODY CLOTHING: A LITTLE
MOBILITY SCORE: 18
CLIMB 3 TO 5 STEPS WITH RAILING: A LITTLE
DAILY ACTIVITIY SCORE: 21
MOVING TO AND FROM BED TO CHAIR: A LITTLE
STANDING UP FROM CHAIR USING ARMS: A LITTLE
CLIMB 3 TO 5 STEPS WITH RAILING: A LITTLE
MOBILITY SCORE: 21
MOBILITY SCORE: 21
DAILY ACTIVITIY SCORE: 24
DRESSING REGULAR UPPER BODY CLOTHING: A LITTLE
STANDING UP FROM CHAIR USING ARMS: A LITTLE
MOVING FROM LYING ON BACK TO SITTING ON SIDE OF FLAT BED WITH BEDRAILS: A LITTLE
WALKING IN HOSPITAL ROOM: A LITTLE

## 2023-10-11 ASSESSMENT — PAIN DESCRIPTION - DESCRIPTORS: DESCRIPTORS: ACHING

## 2023-10-11 ASSESSMENT — PAIN SCALES - GENERAL
PAINLEVEL_OUTOF10: 2
PAINLEVEL_OUTOF10: 0 - NO PAIN
PAINLEVEL_OUTOF10: 3

## 2023-10-11 ASSESSMENT — PAIN - FUNCTIONAL ASSESSMENT
PAIN_FUNCTIONAL_ASSESSMENT: 0-10

## 2023-10-11 NOTE — CARE PLAN
Problem: Infection related to problem list condition  Goal: Infection will resolve through treatment  Outcome: Progressing     Problem: Skin  Goal: Decreased wound size/increased tissue granulation at next dressing change  Outcome: Progressing  Goal: Promote/optimize nutrition  Outcome: Progressing  Goal: Promote skin healing  Outcome: Progressing     Problem: Fall/Injury  Goal: Not fall by end of shift  10/11/2023 0817 by Estefani Huertas RN  Outcome: Met  10/11/2023 0738 by Estefani Huertas RN  Outcome: Progressing  Goal: Be free from injury by end of the shift  10/11/2023 0817 by Estefani Huertas RN  Outcome: Met  10/11/2023 0738 by Estefani Huertas RN  Outcome: Progressing  Goal: Verbalize understanding of personal risk factors for fall in the hospital  10/11/2023 0817 by Estefani Huertas RN  Outcome: Met  10/11/2023 0738 by Estefani Huertas RN  Outcome: Progressing  Goal: Verbalize understanding of risk factor reduction measures to prevent injury from fall in the home  Outcome: Met  Goal: Use assistive devices by end of the shift  Outcome: Met  Goal: Pace activities to prevent fatigue by end of the shift  Outcome: Met   The patient's goals for the shift include  to have no sign and symptoms of infection.    Over the shift, the patient  made progress toward the following goals. Patient remained safe and stable throughout the shift.Patient slept well last . Patient complained of headache and tylenol was given . Patient  has no sign and symptoms of infection. Vitals are stable .

## 2023-10-11 NOTE — PROGRESS NOTES
Physical Therapy    Physical Therapy Treatment    Patient Name: Bindu Cali  MRN: [id number]  Today's Date: 10/11/2023  Time Calculation  Start Time: 1429  Stop Time: 1500  Time Calculation (min): 31 min       Assessment/Plan   PT Assessment  PT Assessment Results: Decreased strength  Rehab Prognosis: Excellent  Evaluation/Treatment Tolerance: Patient tolerated treatment well  Medical Staff Made Aware: Yes  Strengths: Attitude of self  End of Session Communication: Bedside nurse  Assessment Comment: Pt is very motivated to engage in PT session. Pt is improving in regards to mobility and is able to ambulate 150 feet with FWW and SBA x1 and ascend/descend 4 stairs with HHA x1. Pt looking forward to going home and states that she has FWW already at home.  End of Session Patient Position:  (seated on EOB)  PT Plan  Inpatient/Swing Bed or Outpatient: Inpatient  PT Plan  Treatment/Interventions: Transfer training, Gait training, Stair training, Balance training, Strengthening, Therapeutic exercise, Therapeutic activity  PT Plan: Skilled PT  PT Frequency: 3 times per week  PT Discharge Recommendations: Low intensity level of continued care      General Visit Information:   PT  Visit  PT Received On: 10/11/23  Response to Previous Treatment: Patient with no complaints from previous session.  General  Reason for Referral: cellulitis and abscess of RLE  Past Medical History Relevant to Rehab: HTN, CKD, hypothyroidism, GERD, TKA, (R) tibia ORIF 4/2023  General Comment: Pt pleasant and agreeable to engage in PT session-stating that she has been getting up and walking down the [place] with mobility aid.    Subjective   Precautions:  Precautions  LE Weight Bearing Status: Weight Bearing as Tolerated (R LE)  Medical Precautions: Fall precautions    Objective   Pain:  Pain Assessment  Pain Assessment: 0-10  Pain Score: 0 - No pain  Cognition:  Cognition  Overall Cognitive Status: Within Functional Limits  Orientation Level:  Oriented X4    Treatments:  Therapeutic Exercise  Therapeutic Exercise Performed: Yes  Therapeutic Exercise Activity 1: Seated Bilateral LE: ankle pumps, LAQ, marching, Hip ABD/ADD, GS x10 each    Ambulation/Gait Training  Ambulation/Gait Training Performed: Yes  Ambulation/Gait Training 1  Surface 1: Level tile  Device 1: Rolling walker  Assistance 1: Close supervision  Quality of Gait 1:  (no LOB demonstrated)  Comments/Distance (ft) 1:  (150 feet)  Transfer 1  Transfer From 1: Sit to  Transfer to 1: Stand  Transfer Device 1: Walker  Transfer Level of Assistance 1: Close supervision  Transfers 2  Transfer From 2: Stand to  Transfer to 2: Sit  Transfer Device 2: Walker  Transfer Level of Assistance 2: Close supervision    Stairs  Stairs: Yes  Stairs  Rails 1: Left  Device 1: No device  Assistance 1: Hand held assistance  Comment/Number of Steps 1: 4 steps    Outcome Measures:  [facility] Basic Mobility  Turning from your back to your side while in a flat bed without using bedrails: A little  Moving from lying on your back to sitting on the side of a flat bed without using bedrails: A little  Moving to and from bed to chair (including a wheelchair): A little  Standing up from a chair using your arms (e.g. wheelchair or bedside chair): A little  To walk in hospital room: A little  Climbing 3-5 steps with railing: A little  Basic Mobility - Total Score: 18    Education Documentation  Mobility Training, taught by Yaneth Quiñonez PTA at 10/11/2023  3:47 PM.  Learner: Patient  Readiness: Eager  Method: Explanation  Response: Verbalizes Understanding    Education Comments  No comments found.      Encounter Problems       Encounter Problems (Active)       Compromised Skin Integrity       STG - Patient exhibits signs of wound healing. (Progressing)       Start:  10/09/23    Expected End:  10/23/23                  Encounter Problems (Resolved)       Mobility       STG - Patient will ascend and descend four to six stairs (Met)        Start:  10/09/23    Expected End:  10/23/23    Resolved:  10/11/23            Mobility       STG - Patient will ambulate 150 ft with (S)->mod (I) (Met)       Start:  10/09/23    Expected End:  10/23/23    Resolved:  10/11/23            Pain       Pt will be able to increase bouts of activity without an increase in pain.  (Met)       Start:  10/09/23    Expected End:  10/23/23    Resolved:  10/11/23

## 2023-10-11 NOTE — PROGRESS NOTES
10/11/23 1331 Transitional Care Coordinator Notes:    Per MD, awaiting for final antibiotic therapy recommendation from [facility] when patient discharges. Will continue to monitor for discharge needs.                    Assessment/Plan   Principal Problem:    Cellulitis and abscess of right lower extremity  Active Problems:    Hypothyroidism    Anemia due to stage 3b chronic kidney disease (CMS/HCC)    Diastolic dysfunction    Essential hypertension    GERD (gastroesophageal reflux disease)    Hypertension    Surgical site infection    Discharge Plan: discharge home with home care           Brianne Correia RN

## 2023-10-11 NOTE — CARE PLAN
The patient's goals for the shift include      The clinical goals for the shift include pt will remain safe throughout shift.    Over the shift, the patient did not make progress toward the following goals. Barriers to progression include   Problem: Fall/Injury  Goal: Not fall by end of shift  Outcome: Progressing   . Recommendations to address these barriers include ***.

## 2023-10-11 NOTE — PROGRESS NOTES
"Bindu Cali is a 89 y.o. female on day 5 of admission presenting with Cellulitis and abscess of right lower extremity.    Subjective     Seen and evaluated at bedside. No active complaints. Asking about potential dc    Objective     Physical Exam    Alert and oriented x3  Chest good air entry  Cardiac s1 and s2  Abdomen soft  RLE with wound vac, 1+ edema    Last Recorded Vitals  Blood pressure (!) 182/70, pulse 63, temperature 36.5 °C (97.7 °F), temperature source Temporal, resp. rate 18, height 1.473 m (4' 10\"), weight 64 kg (141 lb), SpO2 94 %.  Intake/Output last 3 Shifts:  I/O last 3 completed shifts:  In: 390 (6.1 mL/kg) [P.O.:240; IV Piggyback:150]  Out: 0 (0 mL/kg)   Weight: 64 kg     Relevant Results  Scheduled medications  amLODIPine, 5 mg, oral, Daily  cholecalciferol, 50 mcg, oral, Daily  gabapentin, 300 mg, oral, Nightly  levothyroxine, 100 mcg, oral, Daily  losartan, 50 mg, oral, Daily  multivitamin with minerals, 1 tablet, oral, Daily  pantoprazole, 40 mg, oral, Daily before breakfast  piperacillin-tazobactam, 2.25 g, intravenous, q6h  psyllium, 1 packet, oral, TID  saccharomyces boulardii, 250 mg, oral, BID  vancomycin, 750 mg, intravenous, q24h      Continuous medications     PRN medications  PRN medications: acetaminophen, hydrALAZINE, loratadine, oxyCODONE  '           Assessment/Plan   Principal Problem:    Cellulitis and abscess of right lower extremity  Active Problems:    Hypothyroidism    Anemia due to stage 3b chronic kidney disease (CMS/HCC)    Diastolic dysfunction    Essential hypertension    GERD (gastroesophageal reflux disease)    Hypertension    Surgical site infection    Cellulitis and abscess in RLE s/p I&D   Tibial OM  OPAL R tibia on 10/8/23     Tissue biopsy from 10/8 with no growth. AFB and Fungal culture added yest  On vanc and zosyn  ID following appreciate recommendations   Per Ortho  Calcium (as carbonate)-Vitamin D 600mg-400IU PO BID for 30 days upon discharge "   Drain: Maintain prevena 7 days then ok to remove and leave wound to air  PT/OT following       CKD.  Concern for BIBIANA on admission  Avoid nephrotoxic medications    Hypertension  On home meds norvasc  Resumed losartan     MM    Hypothyroidism  On Levothyroxine    Plan of care discussed with patient and grand daughter who was at bedside     I spent 30  minutes in the professional and overall care of this patient.      Anthony Dye MD

## 2023-10-12 ENCOUNTER — APPOINTMENT (OUTPATIENT)
Dept: ORTHOPEDIC SURGERY | Facility: CLINIC | Age: 88
End: 2023-10-12
Payer: MEDICARE

## 2023-10-12 LAB
ANION GAP SERPL CALC-SCNC: 14 MMOL/L (ref 10–20)
BACTERIA SPEC CULT: ABNORMAL
BASOPHILS # BLD AUTO: 0.03 X10*3/UL (ref 0–0.1)
BASOPHILS NFR BLD AUTO: 0.6 %
BUN SERPL-MCNC: 17 MG/DL (ref 6–23)
CALCIUM SERPL-MCNC: 9.7 MG/DL (ref 8.6–10.6)
CHLORIDE SERPL-SCNC: 105 MMOL/L (ref 98–107)
CO2 SERPL-SCNC: 25 MMOL/L (ref 21–32)
CREAT SERPL-MCNC: 1.08 MG/DL (ref 0.5–1.05)
EOSINOPHIL # BLD AUTO: 0.51 X10*3/UL (ref 0–0.4)
EOSINOPHIL NFR BLD AUTO: 9.6 %
ERYTHROCYTE [DISTWIDTH] IN BLOOD BY AUTOMATED COUNT: 15.8 % (ref 11.5–14.5)
GFR SERPL CREATININE-BSD FRML MDRD: 49 ML/MIN/1.73M*2
GLUCOSE SERPL-MCNC: 86 MG/DL (ref 74–99)
GRAM STN SPEC: ABNORMAL
GRAM STN SPEC: ABNORMAL
HCT VFR BLD AUTO: 24 % (ref 36–46)
HGB BLD-MCNC: 7.7 G/DL (ref 12–16)
IMM GRANULOCYTES # BLD AUTO: 0.06 X10*3/UL (ref 0–0.5)
IMM GRANULOCYTES NFR BLD AUTO: 1.1 % (ref 0–0.9)
LYMPHOCYTES # BLD AUTO: 1.1 X10*3/UL (ref 0.8–3)
LYMPHOCYTES NFR BLD AUTO: 20.8 %
MCH RBC QN AUTO: 28.9 PG (ref 26–34)
MCHC RBC AUTO-ENTMCNC: 32.1 G/DL (ref 32–36)
MCV RBC AUTO: 90 FL (ref 80–100)
MONOCYTES # BLD AUTO: 0.82 X10*3/UL (ref 0.05–0.8)
MONOCYTES NFR BLD AUTO: 15.5 %
NEUTROPHILS # BLD AUTO: 2.78 X10*3/UL (ref 1.6–5.5)
NEUTROPHILS NFR BLD AUTO: 52.4 %
NRBC BLD-RTO: 0 /100 WBCS (ref 0–0)
PLATELET # BLD AUTO: 391 X10*3/UL (ref 150–450)
PMV BLD AUTO: 9.7 FL (ref 7.5–11.5)
POTASSIUM SERPL-SCNC: 3.8 MMOL/L (ref 3.5–5.3)
RBC # BLD AUTO: 2.66 X10*6/UL (ref 4–5.2)
SODIUM SERPL-SCNC: 140 MMOL/L (ref 136–145)
VANCOMYCIN SERPL-MCNC: 17.1 UG/ML (ref 5–20)
WBC # BLD AUTO: 5.3 X10*3/UL (ref 4.4–11.3)

## 2023-10-12 PROCEDURE — 2500000001 HC RX 250 WO HCPCS SELF ADMINISTERED DRUGS (ALT 637 FOR MEDICARE OP): Performed by: INTERNAL MEDICINE

## 2023-10-12 PROCEDURE — 36415 COLL VENOUS BLD VENIPUNCTURE: CPT | Performed by: INTERNAL MEDICINE

## 2023-10-12 PROCEDURE — 80202 ASSAY OF VANCOMYCIN: CPT | Performed by: INTERNAL MEDICINE

## 2023-10-12 PROCEDURE — 1210000001 HC SEMI-PRIVATE ROOM DAILY

## 2023-10-12 PROCEDURE — 2500000001 HC RX 250 WO HCPCS SELF ADMINISTERED DRUGS (ALT 637 FOR MEDICARE OP): Performed by: NURSE PRACTITIONER

## 2023-10-12 PROCEDURE — 80048 BASIC METABOLIC PNL TOTAL CA: CPT | Performed by: INTERNAL MEDICINE

## 2023-10-12 PROCEDURE — 97116 GAIT TRAINING THERAPY: CPT | Mod: GP,CQ

## 2023-10-12 PROCEDURE — 99232 SBSQ HOSP IP/OBS MODERATE 35: CPT | Performed by: INTERNAL MEDICINE

## 2023-10-12 PROCEDURE — 97530 THERAPEUTIC ACTIVITIES: CPT | Mod: GP,CQ

## 2023-10-12 PROCEDURE — 99231 SBSQ HOSP IP/OBS SF/LOW 25: CPT | Performed by: INTERNAL MEDICINE

## 2023-10-12 PROCEDURE — 2500000004 HC RX 250 GENERAL PHARMACY W/ HCPCS (ALT 636 FOR OP/ED): Performed by: INTERNAL MEDICINE

## 2023-10-12 PROCEDURE — 85025 COMPLETE CBC W/AUTO DIFF WBC: CPT | Performed by: INTERNAL MEDICINE

## 2023-10-12 RX ORDER — CEFAZOLIN 1 G/1
2 INJECTION, POWDER, FOR SOLUTION INTRAVENOUS EVERY 12 HOURS
Status: DISCONTINUED | OUTPATIENT
Start: 2023-10-12 | End: 2023-10-13

## 2023-10-12 RX ORDER — LABETALOL HYDROCHLORIDE 5 MG/ML
10 INJECTION, SOLUTION INTRAVENOUS ONCE
Status: COMPLETED | OUTPATIENT
Start: 2023-10-12 | End: 2023-10-12

## 2023-10-12 RX ADMIN — Medication 1 TABLET: at 08:53

## 2023-10-12 RX ADMIN — PIPERACILLIN SODIUM AND TAZOBACTAM SODIUM 2.25 G: 2; .25 INJECTION, SOLUTION INTRAVENOUS at 04:22

## 2023-10-12 RX ADMIN — LABETALOL HYDROCHLORIDE 10 MG: 5 INJECTION INTRAVENOUS at 10:24

## 2023-10-12 RX ADMIN — PSYLLIUM HUSK 1 PACKET: 3.4 POWDER ORAL at 21:00

## 2023-10-12 RX ADMIN — AMLODIPINE BESYLATE 5 MG: 5 TABLET ORAL at 08:53

## 2023-10-12 RX ADMIN — PANTOPRAZOLE SODIUM 40 MG: 40 TABLET, DELAYED RELEASE ORAL at 07:04

## 2023-10-12 RX ADMIN — Medication 250 MG: at 08:53

## 2023-10-12 RX ADMIN — GABAPENTIN 300 MG: 300 CAPSULE ORAL at 20:40

## 2023-10-12 RX ADMIN — VANCOMYCIN HYDROCHLORIDE 750 MG: 750 INJECTION, SOLUTION INTRAVENOUS at 16:31

## 2023-10-12 RX ADMIN — Medication 50 MCG: at 08:53

## 2023-10-12 RX ADMIN — LOSARTAN POTASSIUM 50 MG: 50 TABLET, FILM COATED ORAL at 08:53

## 2023-10-12 RX ADMIN — Medication 250 MG: at 20:40

## 2023-10-12 RX ADMIN — LEVOTHYROXINE SODIUM 100 MCG: 50 TABLET ORAL at 05:53

## 2023-10-12 RX ADMIN — ACETAMINOPHEN 650 MG: 325 TABLET ORAL at 20:45

## 2023-10-12 RX ADMIN — ACETAMINOPHEN 650 MG: 325 TABLET ORAL at 08:53

## 2023-10-12 ASSESSMENT — PAIN SCALES - GENERAL
PAINLEVEL_OUTOF10: 0 - NO PAIN
PAINLEVEL_OUTOF10: 2

## 2023-10-12 ASSESSMENT — COGNITIVE AND FUNCTIONAL STATUS - GENERAL
MOBILITY SCORE: 18
MOVING TO AND FROM BED TO CHAIR: A LITTLE
WALKING IN HOSPITAL ROOM: A LITTLE
TURNING FROM BACK TO SIDE WHILE IN FLAT BAD: A LITTLE
STANDING UP FROM CHAIR USING ARMS: A LITTLE
MOVING FROM LYING ON BACK TO SITTING ON SIDE OF FLAT BED WITH BEDRAILS: A LITTLE
CLIMB 3 TO 5 STEPS WITH RAILING: A LITTLE

## 2023-10-12 ASSESSMENT — PAIN - FUNCTIONAL ASSESSMENT
PAIN_FUNCTIONAL_ASSESSMENT: 0-10
PAIN_FUNCTIONAL_ASSESSMENT: 0-10

## 2023-10-12 NOTE — PROGRESS NOTES
Physical Therapy    Physical Therapy Treatment    Patient Name: Bindu Cali  MRN: [id number]  Today's Date: 10/12/2023  Time Calculation  Start Time: 1033  Stop Time: 1058  Time Calculation (min): 25 min     Assessment/Plan   PT Assessment  PT Assessment Results: Decreased strength  Rehab Prognosis: Excellent  Evaluation/Treatment Tolerance: Patient tolerated treatment well  Medical Staff Made Aware: Yes  Strengths: Attitude of self, Support of extended family/friends  End of Session Communication: Bedside nurse  Assessment Comment: Pt is consistently very motivated to engage in PT session. Pt is able to ambulate increased distances with FWW and SBA x1. Pt expressed that her 89st birthday is in a few days and that she hopes to be home in time to celebrate it.  End of Session Patient Position: Up in chair  PT Plan  Inpatient/Swing Bed or Outpatient: Inpatient  PT Plan  Treatment/Interventions: Transfer training, Gait training, Stair training, Balance training, Strengthening, Therapeutic exercise, Therapeutic activity  PT Plan: Skilled PT  PT Frequency: 3 times per week  PT Discharge Recommendations: Low intensity level of continued care    General Visit Information:   PT  Visit  PT Received On: 10/12/23  Response to Previous Treatment: Patient with no complaints from previous session.  General  Reason for Referral: cellulitis and abscess of RLE  Past Medical History Relevant to Rehab: HTN, CKD, hypothyroidism, GERD, TKA, (R) tibia ORIF 4/2023  Patient Position Received: Up in chair  General Comment: Pt pleasant and agreeable to engage in PT session.    Subjective   Precautions:  Precautions  LE Weight Bearing Status: Weight Bearing as Tolerated (R LE)  Medical Precautions: Fall precautions    Objective   Pain:  Pain Assessment  Pain Assessment: 0-10  Pain Score: 0 - No pain  Cognition:  Cognition  Overall Cognitive Status: Within Functional Limits  Orientation Level: Oriented X4    Activity  Tolerance:  Activity Tolerance  Endurance: Tolerates 10 - 20 min exercise with multiple rests  Treatments:  Therapeutic Exercise  Therapeutic Exercise Performed: Yes  Therapeutic Exercise Activity 1: Seated Bilateral LE: ankle pumps, LAQ, marching, Hip ABD/ADD, GS x10 each    Ambulation/Gait Training  Ambulation/Gait Training Performed: Yes  Ambulation/Gait Training 1  Surface 1: Level tile  Device 1: Rolling walker  Assistance 1: Close supervision  Quality of Gait 1:  (no LOB demonstrated)  Comments/Distance (ft) 1: 100 feet  Transfer 1  Transfer From 1: Sit to  Transfer to 1: Stand  Transfer Device 1: Walker  Transfer Level of Assistance 1: Close supervision  Transfers 2  Transfer From 2: Stand to  Transfer to 2: Sit  Transfer Device 2: Walker  Transfer Level of Assistance 2: Close supervision    Stairs  Stairs: Yes  Stairs  Rails 1: Left  Device 1: No device  Assistance 1: Hand held assistance  Comment/Number of Steps 1: 4 steps    Outcome Measures:  [facility] Basic Mobility  Turning from your back to your side while in a flat bed without using bedrails: A little  Moving from lying on your back to sitting on the side of a flat bed without using bedrails: A little  Moving to and from bed to chair (including a wheelchair): A little  Standing up from a chair using your arms (e.g. wheelchair or bedside chair): A little  To walk in hospital room: A little  Climbing 3-5 steps with railing: A little  Basic Mobility - Total Score: 18    Education Documentation  Mobility Training, taught by Yaneth Quiñonez PTA at 10/12/2023  1:33 PM.  Learner: Patient  Readiness: Acceptance  Method: Explanation  Response: Verbalizes Understanding    Mobility Training, taught by Yaneth Quiñonez PTA at 10/11/2023  3:47 PM.  Learner: Patient  Readiness: Eager  Method: Explanation  Response: Verbalizes Understanding    Education Comments  No comments found.        OP EDUCATION:       Encounter Problems       Encounter Problems (Active)        Compromised Skin Integrity       STG - Patient exhibits signs of wound healing. (Progressing)       Start:  10/09/23    Expected End:  10/23/23                  Encounter Problems (Resolved)       Mobility       STG - Patient will ascend and descend four to six stairs (Met)       Start:  10/09/23    Expected End:  10/23/23    Resolved:  10/11/23            Mobility       STG - Patient will ambulate 150 ft with (S)->mod (I) (Met)       Start:  10/09/23    Expected End:  10/23/23    Resolved:  10/11/23            Pain       Pt will be able to increase bouts of activity without an increase in pain.  (Met)       Start:  10/09/23    Expected End:  10/23/23    Resolved:  10/11/23

## 2023-10-12 NOTE — PROGRESS NOTES
Bindu aCli is a 89 y.o. female on day 6 of admission presenting with Cellulitis and abscess of right lower extremity.    Subjective   Interval History: No fevers overnight.     Review of Systems    Review of systems otherwise negative    Objective   Range of Vitals (last 24 hours)  Heart Rate:  [68-72]   Temp:  [36.2 °C (97.2 °F)-37 °C (98.6 °F)]   Resp:  [16-18]   BP: (157-194)/(58-70)   SpO2:  [94 %-98 %]   Daily Weight  10/06/23 : 64 kg (141 lb)    Body mass index is 29.47 kg/m².    Relevant Results  Labs  Results from last 72 hours   Lab Units 10/12/23  0523 10/11/23  1023 10/10/23  0537   WBC AUTO x10*3/uL 5.3 5.1 7.6   HEMOGLOBIN g/dL 7.7* 7.9* 7.2*   HEMATOCRIT % 24.0* 23.9* 22.5*   PLATELETS AUTO x10*3/uL 391 387 302   NEUTROS PCT AUTO % 52.4 60.0 70.8   LYMPHS PCT AUTO % 20.8 14.3 13.5   MONOS PCT AUTO % 15.5 15.7 10.8   EOS PCT AUTO % 9.6 8.0 4.0       Results from last 72 hours   Lab Units 10/12/23  0523 10/11/23  1023 10/10/23  0537   SODIUM mmol/L 140 139 137   POTASSIUM mmol/L 3.8 3.7 3.7   CHLORIDE mmol/L 105 105 105   CO2 mmol/L 25 23 22   BUN mg/dL 17 19 27*   CREATININE mg/dL 1.08* 1.36* 1.44*   GLUCOSE mg/dL 86 147* 89   CALCIUM mg/dL 9.7 9.9 10.0   ANION GAP mmol/L 14 15 14   EGFR mL/min/1.73m*2 49* 37* 35*             Estimated Creatinine Clearance: 26.2 mL/min (A) (by C-G formula based on SCr of 1.08 mg/dL (H)).  C-Reactive Protein   Date Value Ref Range Status   10/06/2023 24.69 (H) <1.00 mg/dL Final     Microbiology  Susceptibility data from last 14 days.  Collected Specimen Info Organism Clindamycin Erythromycin Oxacillin Tetracycline Trimethoprim/Sulfamethoxazole Vancomycin   10/08/23 Swab from ABSCESS Methicillin Susceptible Staphylococcus aureus (MSSA) R R S S S S   10/08/23 Tissue/Biopsy Staphylococcus aureus           Micro 10/7 BC NGTD  10/8 OR cx S. aureus     Abx:  Vancomycin (10/6-current)  Zosyn (10/6-current)       Assessment/Plan   #Chronic sinus tract with  osteomyelitis, s/p washout and hardware removal on 10/8/23  #ORIF of right proximal tibia in 4/23/23      Patient with continued sinus tract since initial surgery in 4/23/23. Patient did have hardware that was removed in OR on 10/8/23. Cultures showing MSSA. Would stop vanco, and switch to cefazolin for a total 6 week course.    Recommendations  -Please discontinue vancomycin and switch to cefazolin 2g q12h: end date 11/16  -Please insert PICC line for longterm antibiotics   -Please arrange home health care for PICC line care and weekly labs including CBC with diff and BMP and fax labs to ID office (fax:[phone]).   -Patient is advised to follow up with Dr. Whitley on 11/14/23 at 10 am ([address], [address] [address]. Tel:[phone]. Fax: [phone]).  -Patient is advised to call ID office if any adverse effects from antibiotics or fever or diarrhea.    Discussed with Dr. Ryan. Please text me via Epic chat if you have any questions or concerns regarding this patient. ID will sign off.    Vandana Hays MD  ID fellow [id number]  Team B Pager [phone]

## 2023-10-12 NOTE — PROGRESS NOTES
Bindu Cali is a 89 y.o. female on day 5 of admission presenting with Cellulitis and abscess of right lower extremity.    Subjective   Interval History: No fevers overnight. She stated doing well and can walk with side walker.    Review of Systems    Review of systems otherwise negative    Objective   Range of Vitals (last 24 hours)  Heart Rate:  [63-69]   Temp:  [36.5 °C (97.7 °F)-37 °C (98.6 °F)]   Resp:  [18-19]   BP: (155-182)/(57-70)   SpO2:  [92 %-97 %]   Daily Weight  10/06/23 : 64 kg (141 lb)    Body mass index is 29.47 kg/m².    Physical Exam  General: in no acute distress, able to answer questions  HEENT: no conjunctival injection. anicteric.  CVS: RRR. Normal S1 and S2. No m/r/g.   RESP: ctab no w/r/r, no increased wob  Abd: +BS. Soft and lax. ND.   Ext: Right lower extremity with wound vac on side, no surrounding erythema  Neuro: Answers questions appropriately.  Integumentary: no obvious lesions   Rheumatologic: No joint swelling or edema    Antibiotics  vancomycin in dextrose 5 % (Vancocin) IVPB 1,000 mg  piperacillin-tazobactam-dextrose (Zosyn) IV 2.25 g  amLODIPine (Norvasc) tablet 2.5 mg  cholecalciferol (Vitamin D-3) tablet 50 mcg  gabapentin (Neurontin) capsule 300 mg  levothyroxine (Synthroid, Levoxyl) tablet 100 mcg  loratadine (Claritin) tablet 10 mg  multivit-min-iron-FA-vit K-lut 8 mg iron-400 mcg-50 mcg tablet 1 tablet  pantoprazole (ProtoNix) EC tablet 40 mg  multivitamin 1 capsule  sodium chloride 0.9% infusion  acetaminophen (Tylenol) tablet 650 mg  multivitamin with minerals 1 tablet  oxyCODONE (Roxicodone) immediate release tablet 5 mg  gabapentin (Neurontin) capsule 300 mg  piperacillin-tazobactam-dextrose (Zosyn) IV 2.25 g  vancomycin in dextrose 5 % (Vancocin) IVPB 1,000 mg  levothyroxine (Synthroid, Levoxyl) tablet 100 mcg  psyllium (Metamucil) 3.4 gram packet 1 packet  saccharomyces boulardii (Florastor) capsule 250 mg  sodium chloride 0.9 % irrigation solution  vancomycin  (Vancocin) vial for injection  lidocaine PF (Xylocaine) 10 mg/mL (1 %) injection 1 mg  lactated Ringer's infusion  acetaminophen (Tylenol) tablet 650 mg  promethazine (Phenergan) 6.25 mg in sodium chloride 0.9% 50 mL IV  ondansetron (Zofran) injection 4 mg  fentaNYL PF (Sublimaze) injection 50 mcg  fentaNYL PF (Sublimaze) injection 25 mcg  vancomycin in dextrose 5 % (Vancocin) IVPB 750 mg  acetaminophen (Tylenol) tablet 650 mg      Relevant Results  Labs  Results from last 72 hours   Lab Units 10/11/23  1023 10/10/23  0537 10/09/23  1143   WBC AUTO x10*3/uL 5.1 7.6 9.0   HEMOGLOBIN g/dL 7.9* 7.2* 7.6*   HEMATOCRIT % 23.9* 22.5* 23.9*   PLATELETS AUTO x10*3/uL 387 302 334   NEUTROS PCT AUTO % 60.0 70.8 81.2   LYMPHS PCT AUTO % 14.3 13.5 8.8   MONOS PCT AUTO % 15.7 10.8 9.3   EOS PCT AUTO % 8.0 4.0 0.2       Results from last 72 hours   Lab Units 10/11/23  1023 10/10/23  0537 10/09/23  1143   SODIUM mmol/L 139 137 140   POTASSIUM mmol/L 3.7 3.7 3.8   CHLORIDE mmol/L 105 105 104   CO2 mmol/L 23 22 26   BUN mg/dL 19 27* 25*   CREATININE mg/dL 1.36* 1.44* 1.44*   GLUCOSE mg/dL 147* 89 125*   CALCIUM mg/dL 9.9 10.0 10.2   ANION GAP mmol/L 15 14 14   EGFR mL/min/1.73m*2 37* 35* 35*             Estimated Creatinine Clearance: 20.8 mL/min (A) (by C-G formula based on SCr of 1.36 mg/dL (H)).  C-Reactive Protein   Date Value Ref Range Status   10/06/2023 24.69 (H) <1.00 mg/dL Final     Microbiology  Susceptibility data from last 14 days.  Collected Specimen Info Organism   10/08/23 Swab from ABSCESS Staphylococcus aureus     Micro 10/7 BC NGTD  10/8 OR cx S. aureus     Abx:  Vancomycin (10/6-current)  Zosyn (10/6-current)       Assessment/Plan   #Chronic sinus tract with osteomyelitis, s/p washout and hardware removal on 10/8/23  #ORIF of right proximal tibia in 4/23/23      Patient with continued sinus tract since initial surgery in 4/23/23. Patient did have hardware that was removed in OR on 10/8/23. Cultures showing S.  Aureus (susceptibility pending). Would stop Zosyn and continue vanco, and await the finalized result.     Recommendations  -Please discontinue Zosyn  -Please continue vancomycin per pharmacy protocol  -Will follow to the identification from OR on 10/8  -Anticipate a 6 week course of antibiotics therapy    Discussed with Dr. Ryan. Please text me via Epic chat if you have any questions or concerns regarding this patient. ID will continue to follow up this patient.    Vadnana Hays MD  ID fellow [id number]  Team B Pager [phone]  General ID pager [phone]

## 2023-10-12 NOTE — PROGRESS NOTES
"Bindu Cali is a 89 y.o. female on day 6 of admission presenting with Cellulitis and abscess of right lower extremity.    Subjective     Seen and evaluated at bedside. No active complaints.     Objective     Physical Exam    Alert and oriented x3  Chest good air entry  Cardiac s1 and s2  Abdomen soft  RLE with wound vac, 1+ edema    Last Recorded Vitals  Blood pressure (!) 194/68, pulse 72, temperature 36.5 °C (97.7 °F), temperature source Temporal, resp. rate 17, height 1.473 m (4' 10\"), weight 64 kg (141 lb), SpO2 96 %.  Intake/Output last 3 Shifts:  I/O last 3 completed shifts:  In: 1160 (18.1 mL/kg) [P.O.:960; IV Piggyback:200]  Out: 0 (0 mL/kg)   Weight: 64 kg     Relevant Results  Scheduled medications  amLODIPine, 5 mg, oral, Daily  cholecalciferol, 50 mcg, oral, Daily  gabapentin, 300 mg, oral, Nightly  levothyroxine, 100 mcg, oral, Daily  losartan, 50 mg, oral, Daily  multivitamin with minerals, 1 tablet, oral, Daily  pantoprazole, 40 mg, oral, Daily before breakfast  psyllium, 1 packet, oral, TID  saccharomyces boulardii, 250 mg, oral, BID  vancomycin, 750 mg, intravenous, q24h      Continuous medications     PRN medications  PRN medications: acetaminophen, hydrALAZINE, loratadine, oxyCODONE  '           Assessment/Plan   Principal Problem:    Cellulitis and abscess of right lower extremity  Active Problems:    Hypothyroidism    Anemia due to stage 3b chronic kidney disease (CMS/HCC)    Diastolic dysfunction    Essential hypertension    GERD (gastroesophageal reflux disease)    Hypertension    Surgical site infection    Cellulitis and abscess in RLE s/p I&D   Tibial OM  OPAL R tibia on 10/8/23     Tissue biopsy from 10/8 . AFB and fungal   Discontinued vanco. Continue zosyn   ID following appreciate recommendations   Per Ortho  Calcium (as carbonate)-Vitamin D 600mg-400IU PO BID for 30 days upon discharge   Drain: Maintain prevena 7 days then ok to remove and leave wound to air  PT/OT following "     CKD.  Concern for BIBIANA on admission  Avoid nephrotoxic medications    Hypertension  On norvasc and losartan     MM    Hypothyroidism  On Levothyroxine    Plan of care discussed with patient and grand daughter who was at bedside     I spent 30  minutes in the professional and overall care of this patient.      Anthony Dye MD

## 2023-10-12 NOTE — CARE PLAN
Problem: Infection related to problem list condition  Goal: Infection will resolve through treatment  Outcome: Progressing     Problem: Skin  Goal: Decreased wound size/increased tissue granulation at next dressing change  Outcome: Progressing  Goal: Participates in plan/prevention/treatment measures  Outcome: Progressing  Goal: Prevent/manage excess moisture  Outcome: Progressing  Goal: Prevent/minimize sheer/friction injuries  Outcome: Progressing  Goal: Promote/optimize nutrition  Outcome: Progressing  Goal: Promote skin healing  Outcome: Progressing       The patient's goals for the shift to have no signs and symptoms of infection.      The clinical goals for the shift include pt will remain safe throughout shift.    Over the shift, the patient did  make progress toward the following goals. Patient slept well last night. Vitals stable except BP. Plan is discharge to home with PICC Line .

## 2023-10-12 NOTE — PROGRESS NOTES
Vancomycin Dosing by Pharmacy- FOLLOW UP    Bindu Cali is a 89 y.o. year old female who Pharmacy has been consulted for vancomycin dosing for other prosthetic device infection . Based on the patient's indication and renal status this patient is being dosed based on a goal AUC of 400-600.     Renal function is currently improving (BIBIANA on CKD).     Current vancomycin dose: 750 mg given every 24 hours    Estimated vancomycin AUC on current dose: 417 mg/L.hr     Visit Vitals  BP (!) 192/70 (BP Location: Right arm, Patient Position: Lying)   Pulse 68   Temp 36.5 °C (97.7 °F) (Temporal)   Resp 17        Lab Results   Component Value Date    CREATININE 1.08 (H) 10/12/2023    CREATININE 1.36 (H) 10/11/2023    CREATININE 1.44 (H) 10/10/2023    CREATININE 1.44 (H) 10/09/2023        I/O last 3 completed shifts:  In: 1160 (18.1 mL/kg) [P.O.:960; IV Piggyback:200]  Out: 0 (0 mL/kg)   Weight: 64 kg     Assessment/Plan    Within goal AUC range. Continue current vancomycin regimen.    This dosing regimen is predicted by [employer] to result in the following pharmacokinetic parameters:    Regimen: 750 mg IV every 24 hours.  Start time: 18:29 on 10/12/2023  Exposure target: AUC24 (range)400-600 mg/L.hr   AUC24,ss: 417 mg/L.hr  Probability of AUC24 > 400: 58 %  Ctrough,ss: 13.3 mg/L  Probability of Ctrough,ss > 20: 8 %  Probability of nephrotoxicity (Lodise MARKELL 2009): 8 %    The next level will be obtained on 10/15 with AM labs. May be obtained sooner if clinically indicated.   Will continue to monitor renal function daily while on vancomycin and order serum creatinine at least every 48 hours if not already ordered.  Follow for continued vancomycin needs, clinical response, and signs/symptoms of toxicity.       Tara Henry [facility]

## 2023-10-13 ENCOUNTER — HOME HEALTH ADMISSION (OUTPATIENT)
Dept: HOME HEALTH SERVICES | Facility: HOME HEALTH | Age: 88
End: 2023-10-13
Payer: MEDICARE

## 2023-10-13 VITALS
RESPIRATION RATE: 16 BRPM | SYSTOLIC BLOOD PRESSURE: 171 MMHG | HEIGHT: 58 IN | OXYGEN SATURATION: 95 % | DIASTOLIC BLOOD PRESSURE: 66 MMHG | HEART RATE: 84 BPM | TEMPERATURE: 98.8 F | BODY MASS INDEX: 29.6 KG/M2 | WEIGHT: 141 LBS

## 2023-10-13 LAB
BACTERIA SPEC CULT: ABNORMAL
GRAM STN SPEC: ABNORMAL
GRAM STN SPEC: ABNORMAL

## 2023-10-13 PROCEDURE — 2500000001 HC RX 250 WO HCPCS SELF ADMINISTERED DRUGS (ALT 637 FOR MEDICARE OP): Performed by: INTERNAL MEDICINE

## 2023-10-13 PROCEDURE — 2500000001 HC RX 250 WO HCPCS SELF ADMINISTERED DRUGS (ALT 637 FOR MEDICARE OP): Performed by: NURSE PRACTITIONER

## 2023-10-13 PROCEDURE — 2500000004 HC RX 250 GENERAL PHARMACY W/ HCPCS (ALT 636 FOR OP/ED): Performed by: INTERNAL MEDICINE

## 2023-10-13 PROCEDURE — C1751 CATH, INF, PER/CENT/MIDLINE: HCPCS

## 2023-10-13 PROCEDURE — 99239 HOSP IP/OBS DSCHRG MGMT >30: CPT | Performed by: INTERNAL MEDICINE

## 2023-10-13 PROCEDURE — 36568 INSJ PICC <5 YR W/O IMAGING: CPT

## 2023-10-13 PROCEDURE — 2780000003 HC OR 278 NO HCPCS

## 2023-10-13 PROCEDURE — 2500000005 HC RX 250 GENERAL PHARMACY W/O HCPCS: Performed by: INTERNAL MEDICINE

## 2023-10-13 PROCEDURE — 02HV33Z INSERTION OF INFUSION DEVICE INTO SUPERIOR VENA CAVA, PERCUTANEOUS APPROACH: ICD-10-PCS | Performed by: INTERNAL MEDICINE

## 2023-10-13 RX ORDER — CEFAZOLIN 1 G/1
2 INJECTION, POWDER, FOR SOLUTION INTRAVENOUS EVERY 12 HOURS
Qty: 140000 MG | Refills: 0 | Status: ON HOLD
Start: 2023-10-13 | End: 2023-11-02 | Stop reason: SDUPTHER

## 2023-10-13 RX ORDER — CALCIUM CARBONATE 600 MG
600 TABLET ORAL
Qty: 60 TABLET | Refills: 0 | Status: SHIPPED
Start: 2023-10-13 | End: 2023-10-13 | Stop reason: SDUPTHER

## 2023-10-13 RX ORDER — CYANOCOBALAMIN (VITAMIN B-12) 500 MCG
400 TABLET ORAL 2 TIMES DAILY
Qty: 60 TABLET | Refills: 0 | Status: ON HOLD
Start: 2023-10-13 | End: 2023-11-02 | Stop reason: SDUPTHER

## 2023-10-13 RX ORDER — LIDOCAINE HYDROCHLORIDE 10 MG/ML
5 INJECTION INFILTRATION; PERINEURAL ONCE
Status: COMPLETED | OUTPATIENT
Start: 2023-10-13 | End: 2023-10-13

## 2023-10-13 RX ORDER — CEFAZOLIN SODIUM 2 G/100ML
2 INJECTION, SOLUTION INTRAVENOUS EVERY 12 HOURS
Status: DISCONTINUED | OUTPATIENT
Start: 2023-10-13 | End: 2023-10-13 | Stop reason: HOSPADM

## 2023-10-13 RX ORDER — CALCIUM CARBONATE 600 MG
600 TABLET ORAL
Qty: 60 TABLET | Refills: 0 | Status: SHIPPED | OUTPATIENT
Start: 2023-10-13 | End: 2023-10-26 | Stop reason: ENTERED-IN-ERROR

## 2023-10-13 RX ORDER — CEFAZOLIN 1 G/1
2 INJECTION, POWDER, FOR SOLUTION INTRAVENOUS EVERY 12 HOURS
Status: DISCONTINUED | OUTPATIENT
Start: 2023-10-13 | End: 2023-10-13 | Stop reason: HOSPADM

## 2023-10-13 RX ADMIN — ACETAMINOPHEN 650 MG: 325 TABLET ORAL at 16:33

## 2023-10-13 RX ADMIN — AMLODIPINE BESYLATE 5 MG: 5 TABLET ORAL at 08:23

## 2023-10-13 RX ADMIN — Medication 50 MCG: at 08:23

## 2023-10-13 RX ADMIN — PANTOPRAZOLE SODIUM 40 MG: 40 TABLET, DELAYED RELEASE ORAL at 06:14

## 2023-10-13 RX ADMIN — Medication 250 MG: at 08:23

## 2023-10-13 RX ADMIN — LOSARTAN POTASSIUM 50 MG: 50 TABLET, FILM COATED ORAL at 08:23

## 2023-10-13 RX ADMIN — LIDOCAINE HYDROCHLORIDE 50 MG: 10 INJECTION, SOLUTION INFILTRATION; PERINEURAL at 11:00

## 2023-10-13 RX ADMIN — CEFAZOLIN SODIUM 2 G: 2 INJECTION, SOLUTION INTRAVENOUS at 20:23

## 2023-10-13 RX ADMIN — Medication 250 MG: at 21:33

## 2023-10-13 RX ADMIN — Medication 1 TABLET: at 08:23

## 2023-10-13 RX ADMIN — GABAPENTIN 300 MG: 300 CAPSULE ORAL at 21:33

## 2023-10-13 RX ADMIN — LEVOTHYROXINE SODIUM 100 MCG: 50 TABLET ORAL at 06:14

## 2023-10-13 ASSESSMENT — PAIN SCALES - GENERAL: PAINLEVEL_OUTOF10: 0 - NO PAIN

## 2023-10-13 ASSESSMENT — COGNITIVE AND FUNCTIONAL STATUS - GENERAL
DAILY ACTIVITIY SCORE: 21
WALKING IN HOSPITAL ROOM: A LITTLE
CLIMB 3 TO 5 STEPS WITH RAILING: A LITTLE
DRESSING REGULAR UPPER BODY CLOTHING: A LITTLE
HELP NEEDED FOR BATHING: A LITTLE
STANDING UP FROM CHAIR USING ARMS: A LITTLE
DRESSING REGULAR LOWER BODY CLOTHING: A LITTLE
MOBILITY SCORE: 21

## 2023-10-13 NOTE — PROCEDURES
Vascular Access Team Procedure Note     Visit Date: 10/13/2023      Patient Name: Bindu Cali         MRN: [id number]                Procedure:PICC    Pre-Procedure Checklist:  Emergent Line Insertion: No  Type of Line to be Placed: PICC  Consent Obtained: Yes  Emergency Medication Necessary: No  Patient Identified with 2 Independent Identifiers: Yes  Review of Allergies, Anticoagulation, Relevant Labs, ECG/Telemetry: Yes  Risks/Benefits/Alternatives Discussed with Patient/POA/Legal Representative: Yes  Stop Sign on Door: Yes  Time Out Performed: Yes  Catheter Exchange: No    Positioning Checklist:  All People, Including Patient, in the Room with Cap and Mask: Yes  Fluoroscopy Used to Identify Vessel and Guide Insertion: No   Sterile Cover Used: Yes  Full Barrier Precautions Followed (Mask, Cap, Gown, Gloves): Yes  Hands Washed: Yes  Monitors Attached with Sound Alarms On: No  Full Body Sterile Drape (Head-to-Toe) Used to Cover Patient: Yes  Trendelenburg Position (For IJ and Subclavian): No  CHG Skin Prep Used and Allowed to Air Dry to Skin Procedure: Yes    Procedure Checklist:  Blood Aspirated From All Lumens, All Ports Subsequently Flushed: Yes  Catheter Caps Placed on All Lumens; Lumens Clamped: Yes  Maintain Guidewire Control Throughout, Ensuring Guidewire Removal: Yes  Maintain Sterile Field Throughout Insertion: Yes  Catheter Secured: Yes  Confirmatory Test of Venous Placement: Non-Pulsatile Blood    Post Procedure Checklist:  Date and Time Written on Dressing: Yes  Sharp and Wire Count and Safe Disposal of all Sharps/Wires: Yes  Sterile Dressing Applied Per Protocol: Yes  X-ray Ordered or ECG Image: Yes    PICC Insertion Details:  Size (Fr): 4  Lumen Type: single lumen  Catheter to Vein Ratio Less Than 50%: Yes  Total Length (cm): 29.5  External Length (cm): 0  Orientation: right  Location: brachial  Site Prep: Chlorohexidine; Usual sterile procedure followed  Local Anesthetic:  Injectable/Subcutaneous  Indication: antibiotics  Insertion Team Members in the Room: Nurse, LPN  Initial Extremity Circumference (cm): 28  Insertion Attempts:1  Patient Tolerance: Tolerated Well, Age Appropriate  Comfort Measures: Subcutaneous anesthetic; Verbal  Procedure Location: Bedside  Safety Measures: Patient specific safety measures addressed with RN  Estimated Blood Loss (mL): 0  Vessel Fully Compressible Proximally and Distally to Insertion Site: Yes  Brisk Blood Return Obtained and Line Draws Easily: Yes  Tip Location: Superior Vena Cava  Line Confirmation: 3CG  Lot #:[id number]  : Bard  PICC Line Exp Date:04/30/2024  Securement: Stat Lock  Post Procedure Checklist: Handoff with RN; Obtain all new IV tubing prior to use; Bed at lowest level and wheels locked; Line discharge information at bedside.  Additional Details: Line was inserted using Modified Seldinger's Technique.   Placed by: Kamini Florence RN-[facility]   PICC - Adult 10/13/23 Single lumen Right Brachial vein (Active)   Placement Date/Time: 10/13/23 1442   Earliest Known Present: 10/13/23  Hand Hygiene Completed: Yes  Catheter Time Out Checklist Completed: Yes  Size (Fr): 4  Lumen Type: Single lumen  Catheter to Vein Ratio Less Than 50%: Yes  Total Length (cm): 29.5 ...   Number of days: 0              PICC - Adult 10/13/23 Single lumen Right Brachial vein (Active)   Placement Date/Time: 10/13/23 1442   Earliest Known Present: 10/13/23  Hand Hygiene Completed: Yes  Catheter Time Out Checklist Completed: Yes  Size (Fr): 4  Lumen Type: Single lumen  Catheter to Vein Ratio Less Than 50%: Yes  Total Length (cm): 29.5 ...   Number of days: 0              Peripheral IV 10/11/23 20 G Right;Anterior Forearm (Active)   Placement Date/Time: 10/11/23 2030   Size (Gauge): 20 G  Orientation: Right;Anterior  Location: Forearm   Number of days: 1               Kamini Florence RN  10/13/2023  2:50 PM

## 2023-10-13 NOTE — DISCHARGE INSTRUCTIONS
-Patient is advised to follow up with Dr. Whitley on 11/14/23 at 10 am ([address], [address] [address] [address]. Tel:[phone]. Fax: [phone]).  -Patient is advised to call Infectious disease office if any adverse effects from antibiotics or fever or diarrhea.      -Please have the patient follow up with Dr. Weller in 2 weeks for a postoperative visit. Appointments made by having the patient/their family call [phone].

## 2023-10-13 NOTE — DISCHARGE SUMMARY
Discharge Diagnosis  Cellulitis and abscess of right lower extremity    Issues Requiring Follow-Up    Follow up with ID     Test Results Pending At Discharge  Pending Labs       Order Current Status    Urine electrolytes Collected (10/07/23 0707)    AFB Culture/Smear Preliminary result    AFB Culture/Smear Preliminary result    Fungal Culture/Smear Preliminary result    Fungal Culture/Smear Preliminary result            Hospital Course   Bindu Cali is a 89 y.o. female who presented to the ED for further evaluation of right leg wound on her right shin. Pt had ORIF R periprosthetic proximal tibia fracture on 4/23/23 and surgical incision was healing with secondary intention. Admitted and treated for chronic sinus tract with Oseto. Underwent wash out and hardware removal on 10/8. Was on vanco and zosyn then switched to ancef 2 g q 12 hours per ID recommendations as cultures grew MSSA. Per ortho plan for OP follow up. Home care was set up for IV antibiotics. PICC line ordered. Patient seen and evaluated day of dc with no active complaints. ROS negative     Pertinent Physical Exam At Time of Discharge  Physical Exam  Alert and oriented x3  Chest good air entry  Cardiac s1 and s2  Abdomen soft  RLE with wound vac  1+ edema  Home Medications     Medication List      START taking these medications     calcium carbonate 600 mg calcium (1,500 mg) tablet; Take 1 tablet (600   mg) by mouth 2 times a day with meals.   ceFAZolin 1 gram injection; Commonly known as: Ancef; Infuse 2,000 mg (2   g) into a venous catheter every 12 hours.     CHANGE how you take these medications     cholecalciferol 10 MCG (400 UNIT) tablet; Commonly known as: Vitamin   D-3; Take 1 tablet (400 Units) by mouth 2 times a day.; What changed:   medication strength, how much to take, when to take this     CONTINUE taking these medications     acetaminophen 500 mg tablet; Commonly known as: Tylenol   amLODIPine 2.5 mg tablet; Commonly known as:  Norvasc; Take 1 tablet (2.5   mg) by mouth once daily.   biotin 5 mg capsule   Centrum Silver Women 8 mg iron-400 mcg-50 mcg tablet; Generic drug:   multivit-min-iron-FA-vit K-lut   gabapentin 100 mg capsule; Commonly known as: Neurontin   ibuprofen 200 mg tablet   lactase 3,000 unit tablet; Commonly known as: Lactaid   levothyroxine 100 mcg tablet; Commonly known as: Synthroid, Levoxyl;   Take 1 tablet (100 mcg) by mouth once daily.   loratadine 10 mg tablet; Commonly known as: Claritin   losartan 100 mg tablet; Commonly known as: Cozaar; Take 1 tablet (100   mg) by mouth once daily.   omeprazole 40 mg DR capsule; Commonly known as: PriLOSEC   oxyCODONE 5 mg immediate release tablet; Commonly known as: Roxicodone     STOP taking these medications     chlorthalidone 25 mg tablet; Commonly known as: [place]   meloxicam 15 mg tablet; Commonly known as: Mobic     ASK your doctor about these medications     alteplase 2 mg injection; Commonly known as: Cathflo Activase; 2 mL (2   mg) by intra-catheter route 1 time for 1 dose.; Ask about: Should I take   this medication?       Outpatient Follow-Up  Future Appointments   Date Time Provider Department Center   10/19/2023 10:00 AM PAR [facility] MARC3 [facility]   10/19/2023 10:30 AM INF 02 [facility]   10/26/2023  9:35 AM Missy Hermosillo PA-C [id number] [facility]   11/2/2023 10:00 AM PAR [facility] M[facility]3 [facility]   11/2/2023 10:30 AM INF 02 [facility] [place]   11/14/2023 10:00 AM Valery SANDHU MD [id number] [facility]   11/16/2023 10:00 AM PAR [facility] MARC3 [facility]   11/16/2023 10:30 AM INF 02 [facility] [place]   11/30/2023 10:00 AM PAR [facility] MARC3 [facility]   11/30/2023 10:30 AM INF 02 [facility] [place]   12/14/2023  9:30 AM PAR [facility]3  [facility] [place]   12/14/2023  9:50 AM Leopoldo Gray MD [id number] [place]   12/14/2023 10:30 AM INF 02 [facility] [id number] [place]   12/28/2023 10:00 AM PAR  [facility]  [facility] [place]   12/28/2023 10:30 AM INF 02 [facility]       Anthony Dye MD

## 2023-10-13 NOTE — CARE PLAN
Problem: Infection related to problem list condition  Goal: Infection will resolve through treatment  Outcome: Progressing   The patient's goals for the shift include      The clinical goals for the shift include Pt will remain free of falls and injury during the shift

## 2023-10-14 ENCOUNTER — HOME HEALTH ADMISSION (OUTPATIENT)
Dept: HOME HEALTH SERVICES | Facility: HOME HEALTH | Age: 88
End: 2023-10-14
Payer: MEDICARE

## 2023-10-14 DIAGNOSIS — L03.90 CELLULITIS, UNSPECIFIED CELLULITIS SITE: Primary | ICD-10-CM

## 2023-10-14 LAB
ALBUMIN: 3.5 G/DL (ref 3.4–5)
ALPHA 1 GLOBULIN: 0.5 G/DL (ref 0.2–0.6)
ALPHA 2 GLOBULIN: 0.8 G/DL (ref 0.4–1.1)
BETA GLOBULIN: 1.6 G/DL (ref 0.5–1.2)
GAMMA GLOBULIN: 0.3 G/DL (ref 0.5–1.4)
IMMUNOFIXATION COMMENT: ABNORMAL
M-PROTEIN 1: 0.4 G/DL
PATH REVIEW - SERUM IMMUNOFIXATION: ABNORMAL
PATH REVIEW-SERUM PROTEIN ELECTROPHORESIS: ABNORMAL
PROTEIN ELECTROPHORESIS COMMENT: ABNORMAL

## 2023-10-14 NOTE — SIGNIFICANT EVENT
Nurse paged provider, patient wishes to leave AMA.  Went to see patient at bedside, she reports that her PICC line was placed but she is tired of waiting.  Patient has pending home care set up with pending antibiotics.  She reports feeling well at this time, reports her PCP is Dr. Garcia that she will try to follow-up with him tomorrow.  Patient has a wound VAC in place and has a friend or family member who will try to give her a dose of antibiotics tomorrow as well as pending home wound care for Sunday.  Advised patient that leaving now could cause risk of worsening infection, patient said she understands but wants to leave anyway.  Patient signed AMA form in view of nurse who signed as witness.  Advised nurse to try to notify care coordinator for day team for possible follow-up.    Arjun Pritchard MD, MPH, [facility]  Hospitalist

## 2023-10-14 NOTE — CARE PLAN
Unfortunately despite attempts to get home care set up with  home care, informed by home care agency they wont be able to get nursing till Monday due to staff availability. Informed CM we will work on finding another home care agency for hopeful dc Saturday. Unfortunately patient opted to leave AMA

## 2023-10-15 ENCOUNTER — HOSPITAL ENCOUNTER (EMERGENCY)
Facility: HOSPITAL | Age: 88
Discharge: HOME HEALTH | End: 2023-10-15
Attending: EMERGENCY MEDICINE
Payer: MEDICARE

## 2023-10-15 ENCOUNTER — HOME INFUSION (OUTPATIENT)
Dept: INFUSION THERAPY | Age: 88
End: 2023-10-15

## 2023-10-15 VITALS
HEART RATE: 76 BPM | HEIGHT: 60 IN | OXYGEN SATURATION: 94 % | SYSTOLIC BLOOD PRESSURE: 150 MMHG | TEMPERATURE: 96.6 F | DIASTOLIC BLOOD PRESSURE: 64 MMHG | BODY MASS INDEX: 27.68 KG/M2 | RESPIRATION RATE: 16 BRPM | WEIGHT: 141 LBS

## 2023-10-15 DIAGNOSIS — T81.40XD POSTOPERATIVE INFECTION, UNSPECIFIED TYPE, SUBSEQUENT ENCOUNTER: Primary | ICD-10-CM

## 2023-10-15 LAB
ANION GAP SERPL CALC-SCNC: 13 MMOL/L (ref 10–20)
BASOPHILS # BLD AUTO: 0.04 X10*3/UL (ref 0–0.1)
BASOPHILS NFR BLD AUTO: 0.4 %
BUN SERPL-MCNC: 19 MG/DL (ref 6–23)
CALCIUM SERPL-MCNC: 10.5 MG/DL (ref 8.6–10.3)
CHLORIDE SERPL-SCNC: 100 MMOL/L (ref 98–107)
CO2 SERPL-SCNC: 26 MMOL/L (ref 21–32)
CREAT SERPL-MCNC: 1.04 MG/DL (ref 0.5–1.05)
EOSINOPHIL # BLD AUTO: 0.32 X10*3/UL (ref 0–0.4)
EOSINOPHIL NFR BLD AUTO: 3.5 %
ERYTHROCYTE [DISTWIDTH] IN BLOOD BY AUTOMATED COUNT: 15.9 % (ref 11.5–14.5)
GFR SERPL CREATININE-BSD FRML MDRD: 51 ML/MIN/1.73M*2
GLUCOSE SERPL-MCNC: 98 MG/DL (ref 74–99)
HCT VFR BLD AUTO: 26 % (ref 36–46)
HGB BLD-MCNC: 8.9 G/DL (ref 12–16)
IMM GRANULOCYTES # BLD AUTO: 0.04 X10*3/UL (ref 0–0.5)
IMM GRANULOCYTES NFR BLD AUTO: 0.4 % (ref 0–0.9)
LYMPHOCYTES # BLD AUTO: 1.34 X10*3/UL (ref 0.8–3)
LYMPHOCYTES NFR BLD AUTO: 14.6 %
MCH RBC QN AUTO: 29.1 PG (ref 26–34)
MCHC RBC AUTO-ENTMCNC: 34.2 G/DL (ref 32–36)
MCV RBC AUTO: 85 FL (ref 80–100)
MONOCYTES # BLD AUTO: 0.85 X10*3/UL (ref 0.05–0.8)
MONOCYTES NFR BLD AUTO: 9.2 %
NEUTROPHILS # BLD AUTO: 6.61 X10*3/UL (ref 1.6–5.5)
NEUTROPHILS NFR BLD AUTO: 71.9 %
NRBC BLD-RTO: 0 /100 WBCS (ref 0–0)
PLATELET # BLD AUTO: 463 X10*3/UL (ref 150–450)
PMV BLD AUTO: 9.2 FL (ref 7.5–11.5)
POTASSIUM SERPL-SCNC: 4 MMOL/L (ref 3.5–5.3)
RBC # BLD AUTO: 3.06 X10*6/UL (ref 4–5.2)
SODIUM SERPL-SCNC: 135 MMOL/L (ref 136–145)
WBC # BLD AUTO: 9.2 X10*3/UL (ref 4.4–11.3)

## 2023-10-15 PROCEDURE — 2500000004 HC RX 250 GENERAL PHARMACY W/ HCPCS (ALT 636 FOR OP/ED): Performed by: STUDENT IN AN ORGANIZED HEALTH CARE EDUCATION/TRAINING PROGRAM

## 2023-10-15 PROCEDURE — 96365 THER/PROPH/DIAG IV INF INIT: CPT

## 2023-10-15 PROCEDURE — 80048 BASIC METABOLIC PNL TOTAL CA: CPT | Performed by: STUDENT IN AN ORGANIZED HEALTH CARE EDUCATION/TRAINING PROGRAM

## 2023-10-15 PROCEDURE — 2500000004 HC RX 250 GENERAL PHARMACY W/ HCPCS (ALT 636 FOR OP/ED): Performed by: EMERGENCY MEDICINE

## 2023-10-15 PROCEDURE — 99284 EMERGENCY DEPT VISIT MOD MDM: CPT | Performed by: EMERGENCY MEDICINE

## 2023-10-15 PROCEDURE — 96366 THER/PROPH/DIAG IV INF ADDON: CPT

## 2023-10-15 PROCEDURE — 85025 COMPLETE CBC W/AUTO DIFF WBC: CPT | Performed by: STUDENT IN AN ORGANIZED HEALTH CARE EDUCATION/TRAINING PROGRAM

## 2023-10-15 RX ORDER — CEFAZOLIN SODIUM 2 G/100ML
2 INJECTION, SOLUTION INTRAVENOUS ONCE
Status: COMPLETED | OUTPATIENT
Start: 2023-10-15 | End: 2023-10-15

## 2023-10-15 RX ADMIN — CEFAZOLIN SODIUM 2 G: 2 INJECTION, SOLUTION INTRAVENOUS at 10:51

## 2023-10-15 RX ADMIN — CEFAZOLIN SODIUM 2 G: 2 INJECTION, SOLUTION INTRAVENOUS at 21:57

## 2023-10-15 ASSESSMENT — COLUMBIA-SUICIDE SEVERITY RATING SCALE - C-SSRS
1. IN THE PAST MONTH, HAVE YOU WISHED YOU WERE DEAD OR WISHED YOU COULD GO TO SLEEP AND NOT WAKE UP?: NO
6. HAVE YOU EVER DONE ANYTHING, STARTED TO DO ANYTHING, OR PREPARED TO DO ANYTHING TO END YOUR LIFE?: NO
2. HAVE YOU ACTUALLY HAD ANY THOUGHTS OF KILLING YOURSELF?: NO

## 2023-10-15 ASSESSMENT — PAIN SCALES - GENERAL: PAINLEVEL_OUTOF10: 0 - NO PAIN

## 2023-10-15 ASSESSMENT — PAIN - FUNCTIONAL ASSESSMENT: PAIN_FUNCTIONAL_ASSESSMENT: 0-10

## 2023-10-15 NOTE — ED PROVIDER NOTES
[facility]   Chief Complaint   Patient presents with    antibiotics     Pt is coming in today because she needs a dose of antibiotics. Was suppose to get a dose during the weekend but home health couldn't make it out. Home health couldnt be schedule so pt left. Last infusion was Saturday morning.        HPI     Patient is a 89-year-old female with a past medical history of a right periprosthetic proximal tibia fracture complicated by chronic sinus tract with osteomyelitis s/p washout and hardware removal 10/8, multiple myeloma, hypertension presenting to the emergency department with concern for antibiotic needs.  Patient was initially scheduled to be discharged on Friday with home health to manage scheduled IV Ancef through PICC line with an end date of 11/16.  Additional needs include removing the patient's wound VAC over her right lower extremity scheduled for tomorrow.  However, although the patient was set up for discharge on Friday, at the last moment, patient was told that the home health of been unable to be arranged to meet these needs and they were recommending that she stay for another 3 days until Monday.  Patient then left AMA.  Reportedly, last dose of Ancef was Saturday morning.  Patient is coming in today requesting a dose of IV antibiotics through her PICC line.  States that she has been in contact with a home health organization who are supposed to call her back at 1 PM today to help arrange for home health and IV antibiotics.  She denies any acute medical complaints including no fevers, chills, chest pain, abdominal pain, nausea, vomiting, pain with urination, blood in urine or stool, pain near her surgical site, leg pain, rash.  Presents with daughter who gives ancillary history.               No data recorded                Patient History   Past Medical History:   Diagnosis Date    Chronic kidney disease, stage 3b (CMS/HCC) 02/25/2022    Anemia due to stage 3b chronic kidney disease    Essential  (primary) hypertension 12/14/2022    Essential hypertension    Other specified postprocedural states     H/O colonoscopy    Personal history of other medical treatment     History of mammogram    Personal history of other medical treatment     History of bone density study    Personal history of other medical treatment     H/O bone density study    Procedure and treatment not carried out because of patient's decision for unspecified reasons     Colonoscopy refused    Vitamin D deficiency, unspecified     Vitamin D insufficiency     Past Surgical History:   Procedure Laterality Date    BREAST BIOPSY  01/22/2015    Biopsy Breast Open    CHOLECYSTECTOMY  01/22/2015    Cholecystectomy    HYSTERECTOMY  01/22/2015    Hysterectomy    OTHER SURGICAL HISTORY  04/28/2022    Shoulder surgery    ROTATOR CUFF REPAIR  01/22/2015    Rotator Cuff Repair    TONSILLECTOMY  01/22/2015    Tonsillectomy    TOTAL KNEE ARTHROPLASTY  01/22/2015    Knee Replacement     Family History   Problem Relation Name Age of Onset    Cancer Mother      Other (cardiac disorder) Mother      Other (cardiac disorder) Father      Other (cardiac disorder) Brother      Other (CVA) Brother      Other (HEART BASE TUMOR) Brother       Social History     Tobacco Use    Smoking status: Never    Smokeless tobacco: Never   Substance Use Topics    Alcohol use: Yes     Alcohol/week: 1.0 standard drink of alcohol     Types: 1 Glasses of wine per week    Drug use: Never       Physical Exam   ED Triage Vitals [10/15/23 1023]   Temp Heart Rate Resp BP   35.9 °C (96.6 °F) 77 16 172/59      SpO2 Temp Source Heart Rate Source Patient Position   97 % Temporal -- --      BP Location FiO2 (%)     -- --       Physical Exam  Constitutional:       Appearance: She is not toxic-appearing or diaphoretic.   HENT:      Head: Normocephalic and atraumatic.      Nose: Nose normal.   Eyes:      General: No scleral icterus.        Right eye: No discharge.         Left eye: No discharge.       Conjunctiva/sclera: Conjunctivae normal.   Cardiovascular:      Rate and Rhythm: Normal rate.      Heart sounds: No murmur heard.     No friction rub. No gallop.   Pulmonary:      Effort: No respiratory distress.      Breath sounds: Normal breath sounds.   Abdominal:      General: There is no distension.      Palpations: Abdomen is soft.   Musculoskeletal:         General: No deformity.      Cervical back: Neck supple. No rigidity.      Comments: Wound VAC in place over a right tibial surgical site.  No obvious overlying cellulitis or purulence.  Somewhat obscured by the presence of the wound VAC   Skin:     General: Skin is warm and dry.   Neurological:      Mental Status: She is alert.   Psychiatric:         Mood and Affect: Mood normal.         Behavior: Behavior normal.         ED Course & MDM        Medical Decision Making  Patient is a 89-year-old female presenting to the emergency department with concern for a need for IV antibiotics.  Social work was consulted to evaluate for the patient's at home needs.  First dose of Ancef was ordered.  Given most recent progress note, patient had been scheduled for weekly labs as well including a CBC and a BMP which were ordered to evaluate for new or severe anemia, electrolyte derangements in the setting of her treatment course.  Patient's labs resulted normal.  Discussed patient extensively with social work.  They were able to call the companyto deliver their IV medicines and get a receipt proving that they would be delivered late this evening.  Patient was given her first and second doses of her antibiotics on a every 12 schedule while here in the emergency department.  Will be subsequently discharged in stable condition with an intact plan for outpatient follow-up with a company there was a liver her IV medicines to be delivered at home through her PICC line and follow-up with discharge planning.    Procedure  Procedures     Elbert Marroquin MD  Resident  10/15/23  [id number]

## 2023-10-15 NOTE — Clinical Note
Bindu Cali was seen and treated in our emergency department on 10/15/2023.  She may return to work on 10/16/2023.       If you have any questions or concerns, please don't hesitate to call.      Arnulfo Tarango MD

## 2023-10-15 NOTE — PROGRESS NOTES
DIAGNOSIS RLE abscess/OM  Allergies   Allergen Reactions    Nsaids (Non-Steroidal Anti-Inflammatory Drug) Unknown     RACING HEART- Vioxx, Celebrex. Aleve and Naprosyn.     CAN TAKE ADVIL    Codeine Unknown    Morphine Unknown and Nausea/vomiting    Pollen Extracts Other     Runny nose, sneezing, PND affecting throat and voice. Used to take allergy shots, now controlled with Claritin daily.      REVIEW OF LABS AT DISCHARGE  LINE INFO: PT HAS A SL PICC TO BE MANAGED PER [facility] PROTOCOL  PT ORDERED cefazolin 2 gm q12h THRU 11/17/23  LABS ORDERED INCLUDE CBC, BMP  SYSTEM: IV push  CARE PLAN DONE    Bindu Cali IS A 89 y.o. female ORDERED cefazolin  FOLLOWED BY Dr Whitley    [facility] spoke with pt and daughter Jesi regarding IV therapy and delivery. They verbalized understanding of receipt and storage of package. 3-day supply of meds with supplies and flushes to match to be delivered to [address] [facility] [address] [place] 10/15. Ok to leave on front porch if no one home. Daughter Nancy to be likely learner, phone #[phone].    RX DISPENSED THE FOLLOWING WITH SUPPLIES TO MATCH SUN 10/15:  6x cefazolin syringes    DOS 10/16-10/18    FOLLOW UP 10/17 PROGRESS, INV, DELIVERY OVN

## 2023-10-15 NOTE — PROGRESS NOTES
Pharmacy Medication History Review    Bindu Cali is a 89 y.o. female admitted for No Principal Problem: There is no principal problem currently on the Problem List. Please update the Problem List and refresh.. Pharmacy reviewed the patient's [id number] medications and allergies for accuracy.    The list below reflectives the updated PTA list. Please review each medication in order reconciliation for additional clarification and justification.  Prior to Admission Medications   Prescriptions Last Dose Informant Patient Reported? Taking?   acetaminophen (Tylenol) 500 mg tablet   Yes No   Sig: Take 2 tablets (1,000 mg) by mouth every 8 hours.   alteplase (Cathflo Activase) 2 mg injection   No No   Si mL (2 mg) by intra-catheter route 1 time for 1 dose.   alteplase (Cathflo Activase) 2 mg injection   No No   Si mL (2 mg) by intra-catheter route 1 time for 1 dose.   amLODIPine (Norvasc) 2.5 mg tablet 10/15/2023  No No   Sig: Take 1 tablet (2.5 mg) by mouth once daily.   biotin 5 mg capsule 10/15/2023  Yes No   Sig: Take by mouth.   calcium carbonate 600 mg calcium (1,500 mg) tablet   No No   Sig: Take 1 tablet (600 mg) by mouth 2 times a day with meals.   ceFAZolin 1 gram injection   No No   Sig: Infuse 2,000 mg (2 g) into a venous catheter every 12 hours.   cholecalciferol (Vitamin D-3) 10 MCG (400 UNIT) tablet 10/15/2023  No No   Sig: Take 1 tablet (400 Units) by mouth 2 times a day.   Patient taking differently: Take 1 tablet (10 mcg) by mouth once daily.   gabapentin (Neurontin) 100 mg capsule 10/14/2023  Yes No   Sig: Take 3 capsules (300 mg) by mouth once daily at bedtime.   ibuprofen 200 mg tablet   Yes No   Sig: Take by mouth every 6 hours if needed for mild pain (1 - 3).   lactase (Lactaid) 3,000 unit tablet   Yes No   Sig: Take 1 tablet (3,000 Units) by mouth once daily.   levothyroxine (Synthroid, Levoxyl) 100 mcg tablet 10/15/2023  No No   Sig: Take 1 tablet (100 mcg) by mouth once  daily.   loratadine (Claritin) 10 mg tablet   Yes No   Sig: Take 1 tablet (10 mg) by mouth once daily as needed.   losartan (Cozaar) 100 mg tablet 10/15/2023  No No   Sig: Take 1 tablet (100 mg) by mouth once daily.   multivit-min-iron-FA-lutein (Centrum Silver Women) 8 mg iron-400 mcg-300 mcg tablet 10/15/2023  Yes No   Sig: Take 1 tablet by mouth once daily.   omeprazole (PriLOSEC) 40 mg DR capsule 10/15/2023  Yes No   Sig: Take 1 capsule (40 mg) by mouth 2 times a day.   oxyCODONE (Roxicodone) 5 mg immediate release tablet   Yes No   Sig: Take 1 tablet (5 mg) by mouth every 4 hours if needed.      Facility-Administered Medications: None         The list below reflectives the updated allergy list. Please review each documented allergy for additional clarification and justification.  Allergies  Reviewed by Lolis Lopez CPhT on 10/15/2023        Severity Reactions Comments    Nsaids (non-steroidal Anti-inflammatory Drug) Medium Unknown RACING HEART- Vioxx, Celebrex. Aleve and Naprosyn.     CAN TAKE ADVIL    Codeine Not Specified Unknown     Morphine Low Unknown, Nausea/vomiting     Pollen Extracts Low Other Runny nose, sneezing, PND affecting throat and voice. Used to take allergy shots, now controlled with Claritin daily.            Below are additional concerns with the patient's PTA list.  Patient verified all medications and doses    Lolis Lopez CPhT

## 2023-10-15 NOTE — Clinical Note
Was contacted by Lakia, nursing supervisor to see if HHC could be set up for patient.  It appears that patient was at [facility] and was set up with [facility] for IV infusions.  Patient left [facility] AMA with PICC in place because she was upset that SOC was not the next day.      I discussed the patient with AUDREY Ritchie in the ED.  It sounds like the patient only needs IV antibiotics through Monday.  I suggested to Erma and Dr Marroquin that it would be best for patient to be admitted for the last day of antibiotics.  I do not think patient would be able to be set up with infusions for tomorrow.  Have not heard back from provider.    [facility] are checking with their scheduling dept to see SOC for the patient.  I do not know what pharmacy they were using for the antibiotics.

## 2023-10-16 ENCOUNTER — APPOINTMENT (OUTPATIENT)
Dept: HOME HEALTH SERVICES | Facility: HOME HEALTH | Age: 88
End: 2023-10-16
Payer: MEDICARE

## 2023-10-16 ENCOUNTER — PATIENT OUTREACH (OUTPATIENT)
Dept: PRIMARY CARE | Facility: CLINIC | Age: 88
End: 2023-10-16

## 2023-10-16 ENCOUNTER — HOME CARE VISIT (OUTPATIENT)
Dept: HOME HEALTH SERVICES | Facility: HOME HEALTH | Age: 88
End: 2023-10-16
Payer: MEDICARE

## 2023-10-16 ENCOUNTER — DOCUMENTATION (OUTPATIENT)
Dept: PRIMARY CARE | Facility: CLINIC | Age: 88
End: 2023-10-16

## 2023-10-16 DIAGNOSIS — E03.9 ACQUIRED HYPOTHYROIDISM: ICD-10-CM

## 2023-10-16 PROCEDURE — 1090000002 HH PPS REVENUE DEBIT

## 2023-10-16 PROCEDURE — 169592 NO-PAY CLAIM PROCEDURE

## 2023-10-16 PROCEDURE — 0023 HH SOC

## 2023-10-16 PROCEDURE — 1090000001 HH PPS REVENUE CREDIT

## 2023-10-16 PROCEDURE — G0299 HHS/HOSPICE OF RN EA 15 MIN: HCPCS | Mod: HHH

## 2023-10-16 ASSESSMENT — ENCOUNTER SYMPTOMS
STOOL FREQUENCY: TWICE DAILY
BOWEL PATTERN NORMAL: 1
LAST BOWEL MOVEMENT: 66763
APPETITE LEVEL: GOOD
CHANGE IN APPETITE: UNCHANGED

## 2023-10-16 NOTE — PROGRESS NOTES
Discharge Facility: [facility]   Discharge Diagnosis: Cellulitis and abscess of right lower ext.   Admission Date: 10/7/2023   Discharge Date: 10/13/2023     PCP Appointment Date: 10/25/2023   Specialist Appointment Date: I.D on 11/14/2023   Hospital Encounter and Summary:Linked     See discharge assessment below for further details     Engagement  Call Start Time: 1222 (10/16/2023 12:22 PM)    Medications  Medications reviewed with patient/caregiver?: Yes (10/16/2023 12:22 PM)  Is the patient having any side effects they believe may be caused by any medication additions or changes?: No (10/16/2023 12:22 PM)  Does the patient have all medications ordered at discharge?: Yes (10/16/2023 12:22 PM)  Care Management Interventions: No intervention needed (10/16/2023 12:22 PM)  Prescription Comments: Patient following DC medication list (10/16/2023 12:22 PM)  Is the patient taking all medications as directed (includes completed medication regime)?: Yes (10/16/2023 12:22 PM)  Care Management Interventions: Provided patient education (10/16/2023 12:22 PM)  Medication Comments: Patient sttaes she has no questions (10/16/2023 12:22 PM)    Appointments  Does the patient have a primary care provider?: Yes (10/16/2023 12:22 PM)  Care Management Interventions: Verified appointment date/time/provider (Set up with PCP 10/25) (10/16/2023 12:22 PM)  Has the patient kept scheduled appointments due by today?: Yes (10/16/2023 12:22 PM)  Care Management Interventions: Advised patient to keep appointment; Advised to schedule with specialist (Patient has I.D set up 11/14) (10/16/2023 12:22 PM)    Self Management  What is the home health agency?: [facility] in home this am (10/16/2023 12:22 PM)  Has home health visited the patient within 72 hours of discharge?: Yes (10/16/2023 12:22 PM)  What Durable Medical Equipment (DME) was ordered?: NA (10/16/2023 12:22 PM)    Patient Teaching  Does the patient have access to their discharge  instructions?: Yes (10/16/2023 12:22 PM)  Care Management Interventions: Reviewed instructions with patient (10/16/2023 12:22 PM)  What is the patient's perception of their health status since discharge?: Improving (10/16/2023 12:22 PM)  Is the patient/caregiver able to teach back the hierarchy of who to call/visit for symptoms/problems? PCP, Specialist, Home Health nurse, Urgent Care, ED, 911: Yes (10/16/2023 12:22 PM)  Patient/Caregiver Education Comments: Patient aware to call providers fro any questions concerns or change  in condition (10/16/2023 12:22 PM)

## 2023-10-17 ENCOUNTER — HOME INFUSION (OUTPATIENT)
Dept: INFUSION THERAPY | Age: 88
End: 2023-10-17

## 2023-10-17 ENCOUNTER — APPOINTMENT (OUTPATIENT)
Dept: PHYSICAL THERAPY | Facility: CLINIC | Age: 88
End: 2023-10-17
Payer: MEDICARE

## 2023-10-17 VITALS — BODY MASS INDEX: 27.54 KG/M2 | SYSTOLIC BLOOD PRESSURE: 150 MMHG | HEIGHT: 60 IN | DIASTOLIC BLOOD PRESSURE: 80 MMHG

## 2023-10-17 PROCEDURE — 1090000002 HH PPS REVENUE DEBIT

## 2023-10-17 PROCEDURE — 1090000001 HH PPS REVENUE CREDIT

## 2023-10-17 ASSESSMENT — ENCOUNTER SYMPTOMS
LOSS OF SENSATION IN FEET: 1
PAIN: 1
MUSCLE WEAKNESS: 1
PERSON REPORTING PAIN: PATIENT
OCCASIONAL FEELINGS OF UNSTEADINESS: 0
SUBJECTIVE PAIN PROGRESSION: GRADUALLY IMPROVING
PAIN SEVERITY GOAL: 1/10
HIGHEST PAIN SEVERITY IN PAST 24 HOURS: 4/10
LOWEST PAIN SEVERITY IN PAST 24 HOURS: 5/10
DEPRESSION: 0

## 2023-10-17 ASSESSMENT — PAIN SCALES - PAIN ASSESSMENT IN ADVANCED DEMENTIA (PAINAD)
BODYLANGUAGE: 0 - RELAXED.
NEGVOCALIZATION: 0 - NONE.
FACIALEXPRESSION: 0
BREATHING: 0
BODYLANGUAGE: 0
FACIALEXPRESSION: 0 - SMILING OR INEXPRESSIVE.
NEGVOCALIZATION: 0
CONSOLABILITY: 0 - NO NEED TO CONSOLE.
CONSOLABILITY: 0
TOTALSCORE: 0

## 2023-10-17 ASSESSMENT — ACTIVITIES OF DAILY LIVING (ADL)
DRESSING_UB_CURRENT_FUNCTION: MODERATE ASSIST
AMBULATION ASSISTANCE: STAND BY ASSIST
ENTERING_EXITING_HOME: CONTACT GUARD ASSIST
DRESSING_LB_CURRENT_FUNCTION: MODERATE ASSIST
AMBULATION ASSISTANCE: 1

## 2023-10-17 NOTE — PROGRESS NOTES
DIAGNOSIS Cellulitis of Right Lower extremity  Allergies   Allergen Reactions    Nsaids (Non-Steroidal Anti-Inflammatory Drug) Unknown     RACING HEART- Vioxx, Celebrex. Aleve and Naprosyn.     CAN TAKE ADVIL    Codeine Unknown    Morphine Unknown and Nausea/vomiting    Pollen Extracts Other     Runny nose, sneezing, PND affecting throat and voice. Used to take allergy shots, now controlled with Claritin daily.      REVIEW OF LABS AT DISCHARGE  LINE INFO: PT HAS A sl PICC TO BE MANAGED PER [facility] PROTOCOL  PT ORDERED cefazolin 2Gm IV q12h THRU 11/17/23  LABS ORDERED INCLUDE cbc, bmp  SYSTEM IV Push  CARE PLAN DONE    Bindu Cali IS A 89 y.o. female ORDERED cefazolin through 11/17/23  FOLLOWED BY Dr Valery Whitley    Home Care RN note reviewed. No new labs at this time.  [facility] called and left message with patient checking on progress and that delivery of 1 week supply will be made 10/18/23 by 6pm. No callback at this time. [facility].    RX DISPENSED THE FOLLOWING WITH SUPPLIES TO MATCH WITH DELIVERY BY 6pm on 10/18/23  14 syringes cefazolin  DOS 10/19/23 through 10/25/23    FOLLOW UP 10/24/23 with PROGRESS, LABS, DELIVERY OVN

## 2023-10-18 ENCOUNTER — DOCUMENTATION (OUTPATIENT)
Dept: PHARMACY | Facility: CLINIC | Age: 88
End: 2023-10-18

## 2023-10-18 PROCEDURE — 1090000001 HH PPS REVENUE CREDIT

## 2023-10-18 PROCEDURE — 1090000002 HH PPS REVENUE DEBIT

## 2023-10-18 RX ORDER — LEVOTHYROXINE SODIUM 100 UG/1
TABLET ORAL
Qty: 90 TABLET | Refills: 0 | Status: SHIPPED | OUTPATIENT
Start: 2023-10-18 | End: 2024-05-13

## 2023-10-18 NOTE — PROGRESS NOTES
CALLED PT AND LEFT VM - DELIVERY IS SCHEDULED FOR TODAY BY  6  PM.  LET PT KNOW WE'RE SENDING STND SUPPLIES AND ASKED TO CALL W/ ANY QUESTIONS.

## 2023-10-19 ENCOUNTER — APPOINTMENT (OUTPATIENT)
Dept: HEMATOLOGY/ONCOLOGY | Facility: CLINIC | Age: 88
End: 2023-10-19
Payer: MEDICARE

## 2023-10-19 PROCEDURE — 1090000001 HH PPS REVENUE CREDIT

## 2023-10-19 PROCEDURE — 1090000002 HH PPS REVENUE DEBIT

## 2023-10-20 PROCEDURE — 1090000001 HH PPS REVENUE CREDIT

## 2023-10-20 PROCEDURE — 1090000002 HH PPS REVENUE DEBIT

## 2023-10-21 DIAGNOSIS — I10 ESSENTIAL HYPERTENSION: ICD-10-CM

## 2023-10-21 PROCEDURE — 1090000001 HH PPS REVENUE CREDIT

## 2023-10-21 PROCEDURE — 1090000002 HH PPS REVENUE DEBIT

## 2023-10-22 PROCEDURE — 1090000002 HH PPS REVENUE DEBIT

## 2023-10-22 PROCEDURE — 1090000001 HH PPS REVENUE CREDIT

## 2023-10-23 ENCOUNTER — HOME CARE VISIT (OUTPATIENT)
Dept: HOME HEALTH SERVICES | Facility: HOME HEALTH | Age: 88
End: 2023-10-23
Payer: MEDICARE

## 2023-10-23 VITALS
DIASTOLIC BLOOD PRESSURE: 72 MMHG | OXYGEN SATURATION: 96 % | HEART RATE: 73 BPM | SYSTOLIC BLOOD PRESSURE: 150 MMHG | TEMPERATURE: 97.2 F | RESPIRATION RATE: 16 BRPM

## 2023-10-23 LAB
FUNGUS SPEC CULT: NORMAL
FUNGUS SPEC FUNGUS STN: NORMAL

## 2023-10-23 PROCEDURE — G0299 HHS/HOSPICE OF RN EA 15 MIN: HCPCS | Mod: HHH

## 2023-10-23 PROCEDURE — 1090000002 HH PPS REVENUE DEBIT

## 2023-10-23 PROCEDURE — 85027 COMPLETE CBC AUTOMATED: CPT

## 2023-10-23 PROCEDURE — 1090000001 HH PPS REVENUE CREDIT

## 2023-10-23 PROCEDURE — 1090000003 HH PPS REVENUE ADJ

## 2023-10-23 PROCEDURE — 80048 BASIC METABOLIC PNL TOTAL CA: CPT

## 2023-10-24 ENCOUNTER — DOCUMENTATION (OUTPATIENT)
Dept: PHARMACY | Facility: CLINIC | Age: 88
End: 2023-10-24

## 2023-10-24 ENCOUNTER — HOME INFUSION (OUTPATIENT)
Dept: INFUSION THERAPY | Age: 88
End: 2023-10-24
Payer: MEDICARE

## 2023-10-24 ENCOUNTER — APPOINTMENT (OUTPATIENT)
Dept: PHYSICAL THERAPY | Facility: CLINIC | Age: 88
End: 2023-10-24
Payer: MEDICARE

## 2023-10-24 PROCEDURE — 1090000002 HH PPS REVENUE DEBIT

## 2023-10-24 PROCEDURE — 1090000001 HH PPS REVENUE CREDIT

## 2023-10-24 NOTE — PROGRESS NOTES
PATIENT CONTINUES IV CEFAZOLIN THROUGH 11/17 FOR RLE CELLULITIS.  LABS THIS WEEK REVIEWED AND THEY ARE STABLE.  RBC/H/H ARE ALL RUNNING LOW.  SPOKE WITH PATIENT AND SHE IS DOING FINE.  EXCEPT FOR A BOUT OF DIARRHEA SHE IS HAVING.  SHE TOOK SOME IMODIUM AND IS DOING MUCH BETTER.  SHE WOULD LIKE DELIVERY TONIGHT 5-7 PM BECAUSE SHE HAS MD APPT. WED AND WON'T BE HOME.  PROCESSED IFLL FOR 14 CEFAZOLIN FOR MIX AND DEL. TODAY.  DOS ARE 10/26 THRU 11/1.   NF FOR 10/31

## 2023-10-24 NOTE — PROGRESS NOTES
SPOKE W/ PT - DELIVERY IS SCHEDULED FOR TODAY BY 5-7 PM.  ASKED PT IF THERE ARE ANY QUESTIONS TO A PHARMACIST. PT SAID: NO QUESTIONS.

## 2023-10-25 ENCOUNTER — HOSPITAL ENCOUNTER (INPATIENT)
Facility: HOSPITAL | Age: 88
LOS: 9 days | Discharge: NURSING, REHAB OR LONG-TERM CARE | DRG: 641 | End: 2023-11-03
Attending: EMERGENCY MEDICINE | Admitting: STUDENT IN AN ORGANIZED HEALTH CARE EDUCATION/TRAINING PROGRAM
Payer: MEDICARE

## 2023-10-25 ENCOUNTER — HOME CARE VISIT (OUTPATIENT)
Dept: HOME HEALTH SERVICES | Facility: HOME HEALTH | Age: 88
End: 2023-10-25
Payer: MEDICARE

## 2023-10-25 ENCOUNTER — OFFICE VISIT (OUTPATIENT)
Dept: PRIMARY CARE | Facility: CLINIC | Age: 88
End: 2023-10-25
Payer: MEDICARE

## 2023-10-25 VITALS
HEIGHT: 60 IN | WEIGHT: 139 LBS | SYSTOLIC BLOOD PRESSURE: 168 MMHG | HEART RATE: 71 BPM | BODY MASS INDEX: 27.29 KG/M2 | OXYGEN SATURATION: 98 % | DIASTOLIC BLOOD PRESSURE: 70 MMHG

## 2023-10-25 DIAGNOSIS — N18.32 ANEMIA DUE TO STAGE 3B CHRONIC KIDNEY DISEASE (MULTI): ICD-10-CM

## 2023-10-25 DIAGNOSIS — I10 ESSENTIAL HYPERTENSION: Primary | ICD-10-CM

## 2023-10-25 DIAGNOSIS — N17.9 ACUTE KIDNEY INJURY (CMS-HCC): ICD-10-CM

## 2023-10-25 DIAGNOSIS — L03.115 CELLULITIS AND ABSCESS OF RIGHT LOWER EXTREMITY: ICD-10-CM

## 2023-10-25 DIAGNOSIS — I10 ESSENTIAL HYPERTENSION: ICD-10-CM

## 2023-10-25 DIAGNOSIS — A04.72 CLOSTRIDIUM DIFFICILE COLITIS: ICD-10-CM

## 2023-10-25 DIAGNOSIS — E83.52 HYPERCALCEMIA: ICD-10-CM

## 2023-10-25 DIAGNOSIS — M86.9 OSTEOMYELITIS, UNSPECIFIED SITE, UNSPECIFIED TYPE (MULTI): ICD-10-CM

## 2023-10-25 DIAGNOSIS — E83.42 HYPOMAGNESEMIA: ICD-10-CM

## 2023-10-25 DIAGNOSIS — D63.1 ANEMIA DUE TO STAGE 3B CHRONIC KIDNEY DISEASE (MULTI): ICD-10-CM

## 2023-10-25 DIAGNOSIS — E83.52 HYPERCALCEMIA: Primary | ICD-10-CM

## 2023-10-25 DIAGNOSIS — C90.00 MULTIPLE MYELOMA, REMISSION STATUS UNSPECIFIED (MULTI): ICD-10-CM

## 2023-10-25 DIAGNOSIS — L02.415 CELLULITIS AND ABSCESS OF RIGHT LOWER EXTREMITY: ICD-10-CM

## 2023-10-25 DIAGNOSIS — M16.9 OSTEOARTHRITIS OF HIP, UNSPECIFIED LATERALITY, UNSPECIFIED OSTEOARTHRITIS TYPE: ICD-10-CM

## 2023-10-25 DIAGNOSIS — R33.9 URINARY RETENTION: ICD-10-CM

## 2023-10-25 DIAGNOSIS — I27.20 PULMONARY HYPERTENSION, UNSPECIFIED (MULTI): ICD-10-CM

## 2023-10-25 LAB
ALBUMIN SERPL BCP-MCNC: 3.6 G/DL (ref 3.4–5)
ALP SERPL-CCNC: 75 U/L (ref 33–136)
ALT SERPL W P-5'-P-CCNC: <3 U/L (ref 7–45)
ANION GAP SERPL CALC-SCNC: 15 MMOL/L (ref 10–20)
APPEARANCE UR: CLEAR
APTT PPP: 28 SECONDS (ref 27–38)
AST SERPL W P-5'-P-CCNC: 17 U/L (ref 9–39)
BASOPHILS # BLD AUTO: 0.04 X10*3/UL (ref 0–0.1)
BASOPHILS NFR BLD AUTO: 0.6 %
BILIRUB DIRECT SERPL-MCNC: 0.1 MG/DL (ref 0–0.3)
BILIRUB SERPL-MCNC: 0.3 MG/DL (ref 0–1.2)
BILIRUB UR STRIP.AUTO-MCNC: NEGATIVE MG/DL
BUN SERPL-MCNC: 20 MG/DL (ref 6–23)
CALCIUM SERPL-MCNC: 13.1 MG/DL (ref 8.6–10.3)
CHLORIDE SERPL-SCNC: 96 MMOL/L (ref 98–107)
CO2 SERPL-SCNC: 24 MMOL/L (ref 21–32)
COLOR UR: YELLOW
CREAT SERPL-MCNC: 1.58 MG/DL (ref 0.5–1.05)
EOSINOPHIL # BLD AUTO: 0.24 X10*3/UL (ref 0–0.4)
EOSINOPHIL NFR BLD AUTO: 3.5 %
ERYTHROCYTE [DISTWIDTH] IN BLOOD BY AUTOMATED COUNT: 15.9 % (ref 11.5–14.5)
GFR SERPL CREATININE-BSD FRML MDRD: 31 ML/MIN/1.73M*2
GLUCOSE SERPL-MCNC: 101 MG/DL (ref 74–99)
GLUCOSE UR STRIP.AUTO-MCNC: NEGATIVE MG/DL
HCT VFR BLD AUTO: 28.3 % (ref 36–46)
HGB BLD-MCNC: 9.1 G/DL (ref 12–16)
HYALINE CASTS #/AREA URNS AUTO: ABNORMAL /LPF
IMM GRANULOCYTES # BLD AUTO: 0.02 X10*3/UL (ref 0–0.5)
IMM GRANULOCYTES NFR BLD AUTO: 0.3 % (ref 0–0.9)
INR PPP: 1.1 (ref 0.9–1.1)
KETONES UR STRIP.AUTO-MCNC: NEGATIVE MG/DL
LEUKOCYTE ESTERASE UR QL STRIP.AUTO: ABNORMAL
LYMPHOCYTES # BLD AUTO: 1.33 X10*3/UL (ref 0.8–3)
LYMPHOCYTES NFR BLD AUTO: 19.6 %
MAGNESIUM SERPL-MCNC: 1.24 MG/DL (ref 1.6–2.4)
MCH RBC QN AUTO: 29.7 PG (ref 26–34)
MCHC RBC AUTO-ENTMCNC: 32.2 G/DL (ref 32–36)
MCV RBC AUTO: 93 FL (ref 80–100)
MONOCYTES # BLD AUTO: 1.04 X10*3/UL (ref 0.05–0.8)
MONOCYTES NFR BLD AUTO: 15.3 %
MUCOUS THREADS #/AREA URNS AUTO: ABNORMAL /LPF
NEUTROPHILS # BLD AUTO: 4.11 X10*3/UL (ref 1.6–5.5)
NEUTROPHILS NFR BLD AUTO: 60.7 %
NITRITE UR QL STRIP.AUTO: NEGATIVE
NRBC BLD-RTO: 0 /100 WBCS (ref 0–0)
PH UR STRIP.AUTO: 5 [PH]
PLATELET # BLD AUTO: 315 X10*3/UL (ref 150–450)
PMV BLD AUTO: 10.2 FL (ref 7.5–11.5)
POTASSIUM SERPL-SCNC: 3.6 MMOL/L (ref 3.5–5.3)
PROT SERPL-MCNC: 6.8 G/DL (ref 6.4–8.2)
PROT UR STRIP.AUTO-MCNC: ABNORMAL MG/DL
PROTHROMBIN TIME: 12.2 SECONDS (ref 9.8–12.8)
RBC # BLD AUTO: 3.06 X10*6/UL (ref 4–5.2)
RBC # UR STRIP.AUTO: NEGATIVE /UL
RBC #/AREA URNS AUTO: ABNORMAL /HPF
SODIUM SERPL-SCNC: 131 MMOL/L (ref 136–145)
SP GR UR STRIP.AUTO: 1.01
UROBILINOGEN UR STRIP.AUTO-MCNC: <2 MG/DL
WBC # BLD AUTO: 6.8 X10*3/UL (ref 4.4–11.3)
WBC #/AREA URNS AUTO: ABNORMAL /HPF

## 2023-10-25 PROCEDURE — 36415 COLL VENOUS BLD VENIPUNCTURE: CPT | Performed by: NURSE PRACTITIONER

## 2023-10-25 PROCEDURE — 2500000004 HC RX 250 GENERAL PHARMACY W/ HCPCS (ALT 636 FOR OP/ED): Performed by: EMERGENCY MEDICINE

## 2023-10-25 PROCEDURE — 81001 URINALYSIS AUTO W/SCOPE: CPT | Performed by: NURSE PRACTITIONER

## 2023-10-25 PROCEDURE — 1090000002 HH PPS REVENUE DEBIT

## 2023-10-25 PROCEDURE — 1210000001 HC SEMI-PRIVATE ROOM DAILY

## 2023-10-25 PROCEDURE — 99285 EMERGENCY DEPT VISIT HI MDM: CPT | Performed by: EMERGENCY MEDICINE

## 2023-10-25 PROCEDURE — 1125F AMNT PAIN NOTED PAIN PRSNT: CPT | Performed by: INTERNAL MEDICINE

## 2023-10-25 PROCEDURE — 3078F DIAST BP <80 MM HG: CPT | Performed by: INTERNAL MEDICINE

## 2023-10-25 PROCEDURE — 1159F MED LIST DOCD IN RCRD: CPT | Performed by: INTERNAL MEDICINE

## 2023-10-25 PROCEDURE — 80053 COMPREHEN METABOLIC PANEL: CPT | Performed by: NURSE PRACTITIONER

## 2023-10-25 PROCEDURE — 82248 BILIRUBIN DIRECT: CPT | Performed by: NURSE PRACTITIONER

## 2023-10-25 PROCEDURE — 1111F DSCHRG MED/CURRENT MED MERGE: CPT | Performed by: INTERNAL MEDICINE

## 2023-10-25 PROCEDURE — 83735 ASSAY OF MAGNESIUM: CPT | Performed by: NURSE PRACTITIONER

## 2023-10-25 PROCEDURE — 1036F TOBACCO NON-USER: CPT | Performed by: INTERNAL MEDICINE

## 2023-10-25 PROCEDURE — 87040 BLOOD CULTURE FOR BACTERIA: CPT | Mod: PARLAB | Performed by: NURSE PRACTITIONER

## 2023-10-25 PROCEDURE — 1160F RVW MEDS BY RX/DR IN RCRD: CPT | Performed by: INTERNAL MEDICINE

## 2023-10-25 PROCEDURE — 96375 TX/PRO/DX INJ NEW DRUG ADDON: CPT

## 2023-10-25 PROCEDURE — 85025 COMPLETE CBC W/AUTO DIFF WBC: CPT | Performed by: NURSE PRACTITIONER

## 2023-10-25 PROCEDURE — 1090000001 HH PPS REVENUE CREDIT

## 2023-10-25 PROCEDURE — 96374 THER/PROPH/DIAG INJ IV PUSH: CPT

## 2023-10-25 PROCEDURE — 3077F SYST BP >= 140 MM HG: CPT | Performed by: INTERNAL MEDICINE

## 2023-10-25 PROCEDURE — 99215 OFFICE O/P EST HI 40 MIN: CPT | Performed by: INTERNAL MEDICINE

## 2023-10-25 PROCEDURE — 85610 PROTHROMBIN TIME: CPT | Performed by: NURSE PRACTITIONER

## 2023-10-25 RX ORDER — FUROSEMIDE 10 MG/ML
40 INJECTION INTRAMUSCULAR; INTRAVENOUS ONCE
Status: COMPLETED | OUTPATIENT
Start: 2023-10-25 | End: 2023-10-25

## 2023-10-25 RX ORDER — MAGNESIUM SULFATE 1 G/100ML
1 INJECTION INTRAVENOUS ONCE
Status: COMPLETED | OUTPATIENT
Start: 2023-10-25 | End: 2023-10-26

## 2023-10-25 RX ORDER — AMLODIPINE BESYLATE 2.5 MG/1
2.5 TABLET ORAL DAILY
Qty: 90 TABLET | Refills: 0 | Status: ON HOLD | OUTPATIENT
Start: 2023-10-25 | End: 2023-11-03 | Stop reason: SDUPTHER

## 2023-10-25 RX ADMIN — FUROSEMIDE 40 MG: 10 INJECTION, SOLUTION INTRAVENOUS at 22:21

## 2023-10-25 RX ADMIN — MAGNESIUM SULFATE HEPTAHYDRATE 1 G: 1 INJECTION, SOLUTION INTRAVENOUS at 22:21

## 2023-10-25 RX ADMIN — SODIUM CHLORIDE, POTASSIUM CHLORIDE, SODIUM LACTATE AND CALCIUM CHLORIDE 1000 ML: 600; 310; 30; 20 INJECTION, SOLUTION INTRAVENOUS at 22:25

## 2023-10-25 ASSESSMENT — PAIN SCALES - PAIN ASSESSMENT IN ADVANCED DEMENTIA (PAINAD)
BODYLANGUAGE: 0 - RELAXED.
FACIALEXPRESSION: 0
CONSOLABILITY: 0
NEGVOCALIZATION: 0 - NONE.
CONSOLABILITY: 0 - NO NEED TO CONSOLE.
FACIALEXPRESSION: 0 - SMILING OR INEXPRESSIVE.
TOTALSCORE: 0
BODYLANGUAGE: 0
BREATHING: 0
NEGVOCALIZATION: 0

## 2023-10-25 ASSESSMENT — LIFESTYLE VARIABLES
HAVE PEOPLE ANNOYED YOU BY CRITICIZING YOUR DRINKING: NO
EVER HAD A DRINK FIRST THING IN THE MORNING TO STEADY YOUR NERVES TO GET RID OF A HANGOVER: NO
EVER FELT BAD OR GUILTY ABOUT YOUR DRINKING: NO
HAVE YOU EVER FELT YOU SHOULD CUT DOWN ON YOUR DRINKING: NO
REASON UNABLE TO ASSESS: NO

## 2023-10-25 ASSESSMENT — ACTIVITIES OF DAILY LIVING (ADL)
SHOPPING: NEEDS ASSISTANCE
AMBULATION ASSISTANCE: STAND BY ASSIST
SHOPPING ASSESSED: 1
AMBULATION ASSISTANCE: 1

## 2023-10-25 ASSESSMENT — ENCOUNTER SYMPTOMS
BOWEL PATTERN NORMAL: 1
HYPERTENSION: 1
LAST BOWEL MOVEMENT: 66770

## 2023-10-25 NOTE — ED TRIAGE NOTES
Pt presents to ED sent by PCP for elevated calcium. Pt has multiple myeloma, currently undergoing treatment. Pt reports feeling increased weakness and diarrhea x 5 days. Pt denies n/v. Denies abdominal pain, SOB, chest pain. Pt currently on abx for left knee infection.

## 2023-10-25 NOTE — PROGRESS NOTES
Chief complaint   Hospital follow up, shin infection.    HPI  Here with son. Feels very tired.  Nurse came in yesterday to help with pic line.  Confused,  turning off tv with telephone.   Diarrhea for last 5 days.busing abx for cellkulitis.  ROS  Gen-no wt loss,wt gain,fevers,apnea at night,snoring  HEENT- no ear pain, no oral pain, no hearing loss no sinus pain or drainage  Pulm- no sob,weezing,cough,hemoptysis,productive mucous  Cardio-no cp,dash,orthopnea,palpitations  Gi- no diarrhea,constipation,n,v,hematemesis,dysphagia,change in bowel habits  Endo- no polydipsia,polyuria,wt gain,extreme fatigue,polyphagia,blurred vision  Neuro-no ha,loc,paresthesias,transient limb weakness,visual changes  Heme- no blood loss,transfusions,unusual bleeding from skin and/or mucosa  Urology- no dysuria,hematuria,urinary frequency,bladder pain  Gyn- no pelvic pain,vaginal dc,vaginal bleeding,  Psych- no depression,mood swings,anxiety,insomnia,hallucinations  Musculoskeletal- no joint aches / pains,muscle pains,new back pain  Vascular- no pain in calve w walkin,discolored painful toes      Vitals:    10/25/23 1446   BP: 168/70   Pulse: 71   SpO2: 98%       Physical Exam  NAD  HEENT- nl ear tm bilat,nl pharynx, mucosa moist,no icterus  Lungs clear, good AE, no wheezing  Heart-normal S1-S2 no murmur gallop rub, rhythm normal  Neck-no JVD, thyroid normal, no carotid bruits, no LN  Neuro alert and oriented ×3, speech slowed, appears very fatigued cranial nerves II through XII normal, motor function symmetrical,sensory exam symmetrical ,gait nl  Extrem- no edema, no tenderness, no cords       Assessment/Plan   Diagnoses and all orders for this visit:  Essential hypertension  Hypercalcemia  Multiple myeloma, remission status unspecified (CMS/HCC)  Anemia due to stage 3b chronic kidney disease (CMS/HCC)  Pulmonary hypertension, unspecified (CMS/HCC)    Symptomatic hypercalcemia referred to emergency room at [facility] spoke to ER physician  related to patient's diarrhea, active lower extremity cellulitis, and hypocalcemia with symptomatic change in mental status

## 2023-10-25 NOTE — ED TRIAGE NOTES
This patient was seen and examined in triage    HPI:  Patient is a nontoxic-appearing 89-year-old female with past medical history of anemia due to stage III chronic kidney disease, hypertension, vitamin D insufficiency, cholecystectomy, knee replacement surgery, presents emergency room today for complaint of elevated calcium level.  Patient states she was in to see primary care provider earlier today.  Patient states she was told she has elevated calcium level and to go to the emergency room.  Patient family at the bedside and states patient is more confused and attempted to turn off the TV with cell phone last night.  He states patient has been in the hospital several times due to hardware infection complications from recent surgery to the right lower extremity.  Patient denies any bleeding or drainage or pain from surgical site.  Denies any chest pain, shortness of breath, fever, shaking, or chills.    Focused PE:  Cardiac: Regular rate and rhythm  Pulmonary: No adventitious lung sounds auscultated  Abdomen: No reproducible abdominal pain note, physical exam may be limited by patient positioning sitting up in a chair  Skin: Surgical site to right lower extremity is intact with no dehiscence, stitches in place, no active bleeding or drainage present, no underlying fluctuance or induration    Plan:  Lab work was ordered from triage    For the remainder the patient's work-up and ED course, please see the main ED provider note.  We discussed need for diagnostic testing including lab studies and imaging.  We also discussed that they may be asked to wait in the waiting room while these test are pending.  They understand that if they choose to leave without having the testing completed or resulted that we cannot rule out acute life-threatening illnesses and the risks involved to lead to worsening condition, permanent disability or even death.

## 2023-10-25 NOTE — Clinical Note
ED UM Review Complete - Patient Meets Inpatient Criteria  @[email or web address] [email or web address]@

## 2023-10-26 ENCOUNTER — HOME CARE VISIT (OUTPATIENT)
Dept: HOME HEALTH SERVICES | Facility: HOME HEALTH | Age: 88
End: 2023-10-26
Payer: MEDICARE

## 2023-10-26 ENCOUNTER — APPOINTMENT (OUTPATIENT)
Dept: RADIOLOGY | Facility: CLINIC | Age: 88
End: 2023-10-26
Payer: MEDICARE

## 2023-10-26 ENCOUNTER — APPOINTMENT (OUTPATIENT)
Dept: ORTHOPEDIC SURGERY | Facility: CLINIC | Age: 88
End: 2023-10-26
Payer: MEDICARE

## 2023-10-26 LAB
ANION GAP SERPL CALC-SCNC: 13 MMOL/L (ref 10–20)
BUN SERPL-MCNC: 23 MG/DL (ref 6–23)
CALCIUM SERPL-MCNC: 12.6 MG/DL (ref 8.6–10.3)
CHLORIDE SERPL-SCNC: 96 MMOL/L (ref 98–107)
CO2 SERPL-SCNC: 26 MMOL/L (ref 21–32)
CREAT SERPL-MCNC: 1.46 MG/DL (ref 0.5–1.05)
GFR SERPL CREATININE-BSD FRML MDRD: 34 ML/MIN/1.73M*2
GLUCOSE BLD MANUAL STRIP-MCNC: 118 MG/DL (ref 74–99)
GLUCOSE SERPL-MCNC: 93 MG/DL (ref 74–99)
MAGNESIUM SERPL-MCNC: 1.44 MG/DL (ref 1.6–2.4)
POTASSIUM SERPL-SCNC: 3.1 MMOL/L (ref 3.5–5.3)
SODIUM SERPL-SCNC: 132 MMOL/L (ref 136–145)

## 2023-10-26 PROCEDURE — 99223 1ST HOSP IP/OBS HIGH 75: CPT

## 2023-10-26 PROCEDURE — 2500000004 HC RX 250 GENERAL PHARMACY W/ HCPCS (ALT 636 FOR OP/ED)

## 2023-10-26 PROCEDURE — 1090000001 HH PPS REVENUE CREDIT

## 2023-10-26 PROCEDURE — 2500000004 HC RX 250 GENERAL PHARMACY W/ HCPCS (ALT 636 FOR OP/ED): Performed by: STUDENT IN AN ORGANIZED HEALTH CARE EDUCATION/TRAINING PROGRAM

## 2023-10-26 PROCEDURE — 83735 ASSAY OF MAGNESIUM: CPT | Performed by: STUDENT IN AN ORGANIZED HEALTH CARE EDUCATION/TRAINING PROGRAM

## 2023-10-26 PROCEDURE — 99222 1ST HOSP IP/OBS MODERATE 55: CPT

## 2023-10-26 PROCEDURE — 1090000002 HH PPS REVENUE DEBIT

## 2023-10-26 PROCEDURE — 2500000001 HC RX 250 WO HCPCS SELF ADMINISTERED DRUGS (ALT 637 FOR MEDICARE OP)

## 2023-10-26 PROCEDURE — 1200000002 HC GENERAL ROOM WITH TELEMETRY DAILY

## 2023-10-26 PROCEDURE — 82947 ASSAY GLUCOSE BLOOD QUANT: CPT

## 2023-10-26 PROCEDURE — 82652 VIT D 1 25-DIHYDROXY: CPT

## 2023-10-26 PROCEDURE — 82374 ASSAY BLOOD CARBON DIOXIDE: CPT

## 2023-10-26 RX ORDER — AMLODIPINE BESYLATE 5 MG/1
2.5 TABLET ORAL DAILY
Status: DISCONTINUED | OUTPATIENT
Start: 2023-10-26 | End: 2023-10-28

## 2023-10-26 RX ORDER — GABAPENTIN 300 MG/1
300 CAPSULE ORAL NIGHTLY
Status: DISCONTINUED | OUTPATIENT
Start: 2023-10-26 | End: 2023-11-03 | Stop reason: HOSPADM

## 2023-10-26 RX ORDER — CEFAZOLIN 1 G/1
2 INJECTION, POWDER, FOR SOLUTION INTRAVENOUS EVERY 12 HOURS
Status: DISCONTINUED | OUTPATIENT
Start: 2023-10-26 | End: 2023-10-26

## 2023-10-26 RX ORDER — CEFAZOLIN SODIUM 1 G/50ML
1 SOLUTION INTRAVENOUS EVERY 12 HOURS
Status: DISCONTINUED | OUTPATIENT
Start: 2023-10-26 | End: 2023-10-29

## 2023-10-26 RX ORDER — CEFAZOLIN SODIUM 2 G/100ML
2 INJECTION, SOLUTION INTRAVENOUS EVERY 12 HOURS
Status: DISCONTINUED | OUTPATIENT
Start: 2023-10-26 | End: 2023-10-26

## 2023-10-26 RX ORDER — PANTOPRAZOLE SODIUM 40 MG/1
40 TABLET, DELAYED RELEASE ORAL
Status: DISCONTINUED | OUTPATIENT
Start: 2023-10-26 | End: 2023-11-03 | Stop reason: HOSPADM

## 2023-10-26 RX ORDER — MAGNESIUM SULFATE HEPTAHYDRATE 40 MG/ML
2 INJECTION, SOLUTION INTRAVENOUS ONCE
Status: COMPLETED | OUTPATIENT
Start: 2023-10-26 | End: 2023-10-26

## 2023-10-26 RX ORDER — ACETAMINOPHEN 325 MG/1
650 TABLET ORAL EVERY 6 HOURS PRN
Status: DISCONTINUED | OUTPATIENT
Start: 2023-10-26 | End: 2023-11-03 | Stop reason: HOSPADM

## 2023-10-26 RX ORDER — CHLORTHALIDONE 25 MG/1
0.5 TABLET ORAL DAILY
COMMUNITY
End: 2023-11-03 | Stop reason: HOSPADM

## 2023-10-26 RX ORDER — SODIUM CHLORIDE 0.9 % (FLUSH) 0.9 %
10 SYRINGE (ML) INJECTION DAILY
Status: DISCONTINUED | OUTPATIENT
Start: 2023-10-26 | End: 2023-11-03 | Stop reason: HOSPADM

## 2023-10-26 RX ORDER — SODIUM CHLORIDE 9 MG/ML
125 INJECTION, SOLUTION INTRAVENOUS CONTINUOUS
Status: DISCONTINUED | OUTPATIENT
Start: 2023-10-26 | End: 2023-10-27

## 2023-10-26 RX ORDER — LEVOTHYROXINE SODIUM 100 UG/1
100 TABLET ORAL DAILY
Status: DISCONTINUED | OUTPATIENT
Start: 2023-10-26 | End: 2023-11-03 | Stop reason: HOSPADM

## 2023-10-26 RX ORDER — ENOXAPARIN SODIUM 100 MG/ML
30 INJECTION SUBCUTANEOUS EVERY 24 HOURS
Status: DISCONTINUED | OUTPATIENT
Start: 2023-10-26 | End: 2023-11-03 | Stop reason: HOSPADM

## 2023-10-26 RX ORDER — POTASSIUM CHLORIDE 20 MEQ/1
40 TABLET, EXTENDED RELEASE ORAL ONCE
Status: COMPLETED | OUTPATIENT
Start: 2023-10-26 | End: 2023-10-26

## 2023-10-26 RX ORDER — LOSARTAN POTASSIUM 50 MG/1
100 TABLET ORAL DAILY
Status: DISCONTINUED | OUTPATIENT
Start: 2023-10-26 | End: 2023-11-03 | Stop reason: HOSPADM

## 2023-10-26 RX ADMIN — LEVOTHYROXINE SODIUM 100 MCG: 100 TABLET ORAL at 06:44

## 2023-10-26 RX ADMIN — AMLODIPINE BESYLATE 2.5 MG: 5 TABLET ORAL at 10:06

## 2023-10-26 RX ADMIN — ENOXAPARIN SODIUM 30 MG: 30 INJECTION SUBCUTANEOUS at 04:15

## 2023-10-26 RX ADMIN — Medication 10 ML: at 12:26

## 2023-10-26 RX ADMIN — SODIUM CHLORIDE 125 ML/HR: 9 INJECTION, SOLUTION INTRAVENOUS at 12:25

## 2023-10-26 RX ADMIN — LOSARTAN POTASSIUM 100 MG: 50 TABLET, FILM COATED ORAL at 22:08

## 2023-10-26 RX ADMIN — MAGNESIUM SULFATE HEPTAHYDRATE 2 G: 40 INJECTION, SOLUTION INTRAVENOUS at 10:05

## 2023-10-26 RX ADMIN — PANTOPRAZOLE SODIUM 40 MG: 40 TABLET, DELAYED RELEASE ORAL at 10:05

## 2023-10-26 RX ADMIN — GABAPENTIN 300 MG: 300 CAPSULE ORAL at 22:09

## 2023-10-26 RX ADMIN — SODIUM CHLORIDE 125 ML/HR: 9 INJECTION, SOLUTION INTRAVENOUS at 04:15

## 2023-10-26 RX ADMIN — SODIUM CHLORIDE 125 ML/HR: 9 INJECTION, SOLUTION INTRAVENOUS at 21:58

## 2023-10-26 RX ADMIN — CEFAZOLIN SODIUM 1 G: 1 INJECTION, SOLUTION INTRAVENOUS at 21:58

## 2023-10-26 RX ADMIN — POTASSIUM CHLORIDE 40 MEQ: 1500 TABLET, EXTENDED RELEASE ORAL at 10:06

## 2023-10-26 RX ADMIN — CEFAZOLIN SODIUM 1 G: 1 INJECTION, SOLUTION INTRAVENOUS at 10:00

## 2023-10-26 SDOH — SOCIAL STABILITY: SOCIAL INSECURITY: ABUSE: ADULT

## 2023-10-26 SDOH — SOCIAL STABILITY: SOCIAL INSECURITY: DOES ANYONE TRY TO KEEP YOU FROM HAVING/CONTACTING OTHER FRIENDS OR DOING THINGS OUTSIDE YOUR HOME?: NO

## 2023-10-26 SDOH — SOCIAL STABILITY: SOCIAL INSECURITY: ARE YOU OR HAVE YOU BEEN THREATENED OR ABUSED PHYSICALLY, EMOTIONALLY, OR SEXUALLY BY ANYONE?: NO

## 2023-10-26 SDOH — SOCIAL STABILITY: SOCIAL INSECURITY: HAVE YOU HAD THOUGHTS OF HARMING ANYONE ELSE?: NO

## 2023-10-26 SDOH — SOCIAL STABILITY: SOCIAL INSECURITY: DO YOU FEEL UNSAFE GOING BACK TO THE PLACE WHERE YOU ARE LIVING?: NO

## 2023-10-26 SDOH — SOCIAL STABILITY: SOCIAL INSECURITY: HAS ANYONE EVER THREATENED TO HURT YOUR FAMILY OR YOUR PETS?: NO

## 2023-10-26 SDOH — SOCIAL STABILITY: SOCIAL INSECURITY: WERE YOU ABLE TO COMPLETE ALL THE BEHAVIORAL HEALTH SCREENINGS?: YES

## 2023-10-26 SDOH — SOCIAL STABILITY: SOCIAL INSECURITY: ARE THERE ANY APPARENT SIGNS OF INJURIES/BEHAVIORS THAT COULD BE RELATED TO ABUSE/NEGLECT?: NO

## 2023-10-26 SDOH — SOCIAL STABILITY: SOCIAL INSECURITY: DO YOU FEEL ANYONE HAS EXPLOITED OR TAKEN ADVANTAGE OF YOU FINANCIALLY OR OF YOUR PERSONAL PROPERTY?: NO

## 2023-10-26 ASSESSMENT — COGNITIVE AND FUNCTIONAL STATUS - GENERAL
MOBILITY SCORE: 18
PERSONAL GROOMING: A LITTLE
STANDING UP FROM CHAIR USING ARMS: A LITTLE
CLIMB 3 TO 5 STEPS WITH RAILING: A LOT
WALKING IN HOSPITAL ROOM: A LITTLE
CLIMB 3 TO 5 STEPS WITH RAILING: A LOT
DRESSING REGULAR UPPER BODY CLOTHING: A LITTLE
HELP NEEDED FOR BATHING: A LITTLE
WALKING IN HOSPITAL ROOM: A LITTLE
TURNING FROM BACK TO SIDE WHILE IN FLAT BAD: A LITTLE
STANDING UP FROM CHAIR USING ARMS: A LITTLE
TOILETING: A LITTLE
PERSONAL GROOMING: A LITTLE
PATIENT BASELINE BEDBOUND: NO
DAILY ACTIVITIY SCORE: 19
MOVING TO AND FROM BED TO CHAIR: A LITTLE
DRESSING REGULAR LOWER BODY CLOTHING: A LITTLE
PATIENT BASELINE BEDBOUND: NO
DRESSING REGULAR LOWER BODY CLOTHING: A LITTLE
DRESSING REGULAR UPPER BODY CLOTHING: A LITTLE
MOBILITY SCORE: 19
MOVING TO AND FROM BED TO CHAIR: A LITTLE
TOILETING: A LITTLE

## 2023-10-26 ASSESSMENT — ACTIVITIES OF DAILY LIVING (ADL)
ASSISTIVE_DEVICE: WALKER
JUDGMENT_ADEQUATE_SAFELY_COMPLETE_DAILY_ACTIVITIES: YES
HEARING - RIGHT EAR: FUNCTIONAL
GROOMING: INDEPENDENT
LACK_OF_TRANSPORTATION: NO
DRESSING YOURSELF: INDEPENDENT
WALKS IN HOME: INDEPENDENT
PATIENT'S MEMORY ADEQUATE TO SAFELY COMPLETE DAILY ACTIVITIES?: YES
FEEDING YOURSELF: INDEPENDENT
TOILETING: INDEPENDENT
ADEQUATE_TO_COMPLETE_ADL: YES
BATHING: INDEPENDENT
HEARING - LEFT EAR: FUNCTIONAL

## 2023-10-26 ASSESSMENT — LIFESTYLE VARIABLES
HOW MANY STANDARD DRINKS CONTAINING ALCOHOL DO YOU HAVE ON A TYPICAL DAY: 1 OR 2
HOW OFTEN DO YOU HAVE A DRINK CONTAINING ALCOHOL: MONTHLY OR LESS
AUDIT-C TOTAL SCORE: 2
HOW OFTEN DO YOU HAVE 6 OR MORE DRINKS ON ONE OCCASION: LESS THAN MONTHLY
SKIP TO QUESTIONS 9-10: 0
AUDIT-C TOTAL SCORE: 2

## 2023-10-26 ASSESSMENT — PATIENT HEALTH QUESTIONNAIRE - PHQ9
2. FEELING DOWN, DEPRESSED OR HOPELESS: SEVERAL DAYS
SUM OF ALL RESPONSES TO PHQ9 QUESTIONS 1 & 2: 1
1. LITTLE INTEREST OR PLEASURE IN DOING THINGS: NOT AT ALL

## 2023-10-26 ASSESSMENT — PAIN SCALES - GENERAL
PAINLEVEL_OUTOF10: 0 - NO PAIN
PAINLEVEL_OUTOF10: 0 - NO PAIN

## 2023-10-26 ASSESSMENT — PAIN - FUNCTIONAL ASSESSMENT: PAIN_FUNCTIONAL_ASSESSMENT: 0-10

## 2023-10-26 NOTE — PROGRESS NOTES
Bindu Cali is a 89 y.o. female on day 1 of admission presenting with Hypercalcemia.    SUBJECTIVE  No acute overnight events.  Patient continues to have weakness and fatigue.  Patient was alert and oriented to self, place, and time.  Patient denying any pain, shortness of breath, fevers, chills, or coughs.  Patient states this episode feels similar to her prior hospitalization for hypercalcemia.    OBJECTIVE    Physical Exam:  General:  Laying down comfortably. Pleasant and cooperative. No apparent distress.  HEENT:  Normocephalic, atraumatic  Chest:  Clear to auscultation bilaterally. No wheezes, rales, or rhonchi.  CV:  Regular rate and rhythm. No murmurs    Abdomen: Abdomen is soft, non-tender, non-distended  Extremities:  No lower extremity edema or cyanosis.   Neurological:  AAOx3. No focal deficits.  Skin:  Warm and dry.      Last Recorded Vitals  Blood pressure 160/70, pulse 61, temperature 36.6 °C (97.9 °F), temperature source Temporal, resp. rate 16, height 1.524 m (5'), weight 63 kg (139 lb), SpO2 92 %.  Intake/Output last 3 Shifts:  I/O last 3 completed shifts:  In: 1000 (15.9 mL/kg) [IV Piggyback:1000]  Out: 900 (14.3 mL/kg) [Urine:900 (0.4 mL/kg/hr)]  Weight: 63 kg     Last CBC & BMP  Lab Results   Component Value Date    GLUCOSE 93 10/26/2023    CALCIUM 12.6 (H) 10/26/2023     (L) 10/26/2023    K 3.1 (L) 10/26/2023    CO2 26 10/26/2023    CL 96 (L) 10/26/2023    BUN 23 10/26/2023    CREATININE 1.46 (H) 10/26/2023     Lab Results   Component Value Date    WBC 6.8 10/25/2023    HGB 9.1 (L) 10/25/2023    HCT 28.3 (L) 10/25/2023    MCV 93 10/25/2023     10/25/2023       ASSESSMENT & PLAN    Hypercalcemia secondary to multiple myeloma  Weakness & Fatique   -Patient's hypercalcemia is likely secondary to her multiple myeloma.  She has had similar episode in the past requiring hospitalization in past her calcium was the main problem  Plan:   -Patient given 1 L  in ED  -Continue with 125  NS continuous  -Heme-onc consulted for further recs  -Bisphosphonate/calcitonin not indicated at this time  -Corrected calcium 12.9, albumin 3.6  -Repeat calcium later today      Right proximal tibial fracture complicated by hardware infection  Cellulitis  Plan:   -Continue with Ancef, end date 11/17    Chronic kidney disease stage II   Creatinine elevation  Plan:   -Continue to trend, low concern for BIBIANA  -Patient getting fluids     Electrolyte abnormalities:  Hypomagnesemia  Hyperkalemia  Plan:   -Replete K & Mg as needed     Chronic medical conditions:  Hypertension  Hypothyroidism  GERD  Plan:   -Continue with home amlodipine, Lasix, gabapentin, Synthroid, PPIs  -Held losartan     DVT ppx: Lovenox   GI ppx: PPI  IVF: 125 NS  Diet: Regular  Consults: Heme/Onc    Juvenal Ugarte MD   PGY-1, Internal Medicine  Please SecureChat for any further questions  This is a preliminary note, please await attending attestation for final A/P

## 2023-10-26 NOTE — H&P
History Of Present Illness  Bindu Cali is a 89 y.o. female with past medical history of HTN, CKD 2, hypothyroidism, GERD, MGUS, multiple myeloma presenting to [facility] with chief complaint of weakness and confusion.  Patient states that she has had progressively worsening weakness and confusion for the past week.  The patient was recently admitted for lower extremity cellulitis and hardware infection.  The patient underwent surgical debridement and had a PICC line placed and has been on Ancef twice daily since discharge.  Due to recent hospitalization patient missed her at her oncologist office where she normally receives infusions for her multiple myeloma.  Patient denies any fever/chills, nausea/vomiting, abdominal pain, changes in BM or urination, chest pain or palpitations.  On presentation to the ED patient's /70, HR 64, satting 95% on room air.  Initial labs show WBC 6.8, Hgb 9.1, , creatinine 1.58, calcium 13.1, magnesium 1.24.  In the ED the patient was given 1 L LR bolus, 40 mg Lasix and magnesium was repleted.  Patient will be admitted for hypercalcemia.     Past Medical History  Past Medical History:   Diagnosis Date    Chronic kidney disease, stage 3b (CMS/HCC) 02/25/2022    Anemia due to stage 3b chronic kidney disease    Essential (primary) hypertension 12/14/2022    Essential hypertension    Hypertension 11/07/2011    Multiple myeloma (CMS/[facility])     Other specified postprocedural states     H/O colonoscopy    Personal history of other medical treatment     History of mammogram    Personal history of other medical treatment     History of bone density study    Personal history of other medical treatment     H/O bone density study    Procedure and treatment not carried out because of patient's decision for unspecified reasons     Colonoscopy refused    Vitamin D deficiency, unspecified     Vitamin D insufficiency       Surgical History  Past Surgical History:   Procedure Laterality  Date    BREAST BIOPSY  01/22/2015    Biopsy Breast Open    CHOLECYSTECTOMY  01/22/2015    Cholecystectomy    HYSTERECTOMY  01/22/2015    Hysterectomy    OTHER SURGICAL HISTORY  04/28/2022    Shoulder surgery    ROTATOR CUFF REPAIR  01/22/2015    Rotator Cuff Repair    TONSILLECTOMY  01/22/2015    Tonsillectomy    TOTAL KNEE ARTHROPLASTY  01/22/2015    Knee Replacement        Social History  She reports that she has never smoked. She has never used smokeless tobacco. She reports current alcohol use of about 1.0 standard drink of alcohol per week. She reports that she does not use drugs.    Family History  Family History   Problem Relation Name Age of Onset    Cancer Mother      Other (cardiac disorder) Mother      Other (cardiac disorder) Father      Other (cardiac disorder) Brother      Other (CVA) Brother      Other (HEART BASE TUMOR) Brother          Allergies  Nsaids (non-steroidal anti-inflammatory drug), Codeine, Morphine, and Pollen extracts    Review of Systems  10 point ROS was completed and is negative unless otherwise stated in the HPI  Physical Exam   General: No acute distress  HEENT:  Normocephalic, atraumatic, mucus membranes moist.   Neck:  Trachea midline.  No JVD.    Chest: clear on auscultation bilaterally.  CV:  Regular rate and rhythm.  Positive S1/S2.   Abdomen: Bowel sounds present in all four quadrants, abdomen is soft, non-tender, non-distended.   Extremities:  Mild lower extremity edema or cyanosis.   Neurological:  AAOx3. No focal deficits.  Skin:  Warm and dry  Last Recorded Vitals  Blood pressure (!) 197/83, pulse 64, temperature 36.6 °C (97.9 °F), temperature source Tympanic, resp. rate 20, height 1.524 m (5'), weight 63 kg (139 lb), SpO2 96 %.    Relevant Results           Assessment/Plan   Principal Problem:    Hypercalcemia    #Hypercalcemia  #Multiple myeloma  -Patient received 1 L LR bolus in ED, will start  MLS per hour  -Recheck BMP in a.m., will consider administration of  calcitonin and bisphosphonates if needed  -Patient follows with Dr. Gray, recently missed infusion appointment for multiple myeloma due to hospitalization, will consult oncology    #Right proximal tibial fracture complicated by hardware infection  -Continue Ancef    #Hypomagnesemia  -Replete electrolytes as needed    Chronic conditions  #HTN  #CKD 2  #Hypothyroidism  #GERD  -Restart home medications as appropriate    Code: Full  Diet: Regular  Dispo: General medical floor             Josafat Bahena DO

## 2023-10-26 NOTE — PROGRESS NOTES
Care transitions assessment completed via bedside with patient. TCC introduced self and explained role. Demographics verified. Patient from home with son.  Independent PTA. Uses a walker. Patient active w/ [facility] for IV abx cefazolin 2 gm q12h and PT/OT. Son, Hank, and daughter, Nancy, [employer] for IV infusion at home.  Denies SW needs at this time. Dispo pending hospital course, PT/OT evals. Patient's family to transport home at time of discharge. TCC to continue to follow for discharge planning needs.      PCP: Radha Garcia Last seen: 10/25/23       10/26/23 8848   Discharge Planning   Living Arrangements Children   Support Systems Children   Assistance Needed Independent w/ walker   Type of Residence Private residence   Number of Stairs to Enter Residence 2  (w/ railing)   Number of Stairs Within Residence 0  (1st floor living)   Do you have animals or pets at home? No   Who is requesting discharge planning? Provider   Home or Post Acute Services In home services   Type of Home Care Services Home nursing visits;Home OT;Home PT   Patient expects to be discharged to: Home w/ R HC   Does the patient need discharge transport arranged? No   Financial Resource Strain   How hard is it for you to pay for the very basics like food, housing, medical care, and heating? Not hard   Housing Stability   In the last 12 months, was there a time when you were not able to pay the mortgage or rent on time? N   In the last 12 months, how many places have you lived? 1   In the last 12 months, was there a time when you did not have a steady place to sleep or slept in a shelter (including now)? N   Transportation Needs   In the past 12 months, has lack of transportation kept you from medical appointments or from getting medications? no   In the past 12 months, has lack of transportation kept you from meetings, work, or from getting things needed for daily living? No   Patient Choice   Provider Choice list and CMS website  (https://medicare.gov/care-compare#search) for post-acute Quality and Resource Measure Data were provided and reviewed with: Patient   Patient / Family choosing to utilize agency / facility established prior to hospitalization Yes           Shira Carrillo RN ED TCC

## 2023-10-26 NOTE — ED PROVIDER NOTES
HPI   Chief Complaint   Patient presents with    Weakness, Gen       Patient is a very pleasant 89-year-old female, medical history significant for chronic kidney disease, hypertension, multiple myeloma who presents to the emergency department with confusion and elevated calcium.  Patient has had a recent complex medical course.  She was hospitalized with lower extremity cellulitis and hardware infection.  She underwent operative debridement and had a PICC line placed.  She has been on Ancef at direction of infectious disease twice a day.  During her recent hospitalization, she missed her last appointment for oncology where she gets treatment for her multiple myeloma.  She has been at home and had been doing well.  However, over the past 24 hours, she has had some confusion.  She did see her primary care in the office today who did lab work.  She was found to have an elevated calcium and a mildly hypomagnesia.                          No data recorded                Patient History   Past Medical History:   Diagnosis Date    Chronic kidney disease, stage 3b (CMS/HCC) 02/25/2022    Anemia due to stage 3b chronic kidney disease    Essential (primary) hypertension 12/14/2022    Essential hypertension    Hypertension 11/07/2011    Multiple myeloma (CMS/[facility])     Other specified postprocedural states     H/O colonoscopy    Personal history of other medical treatment     History of mammogram    Personal history of other medical treatment     History of bone density study    Personal history of other medical treatment     H/O bone density study    Procedure and treatment not carried out because of patient's decision for unspecified reasons     Colonoscopy refused    Vitamin D deficiency, unspecified     Vitamin D insufficiency     Past Surgical History:   Procedure Laterality Date    BREAST BIOPSY  01/22/2015    Biopsy Breast Open    CHOLECYSTECTOMY  01/22/2015    Cholecystectomy    HYSTERECTOMY  01/22/2015    Hysterectomy     OTHER SURGICAL HISTORY  04/28/2022    Shoulder surgery    ROTATOR CUFF REPAIR  01/22/2015    Rotator Cuff Repair    TONSILLECTOMY  01/22/2015    Tonsillectomy    TOTAL KNEE ARTHROPLASTY  01/22/2015    Knee Replacement     Family History   Problem Relation Name Age of Onset    Cancer Mother      Other (cardiac disorder) Mother      Other (cardiac disorder) Father      Other (cardiac disorder) Brother      Other (CVA) Brother      Other (HEART BASE TUMOR) Brother       Social History     Tobacco Use    Smoking status: Never    Smokeless tobacco: Never   Substance Use Topics    Alcohol use: Yes     Alcohol/week: 1.0 standard drink of alcohol     Types: 1 Glasses of wine per week    Drug use: Never       Physical Exam   ED Triage Vitals [10/25/23 1736]   Temp Heart Rate Resp BP   36.6 °C (97.9 °F) 70 20 (!) 211/84      SpO2 Temp Source Heart Rate Source Patient Position   97 % Tympanic Monitor Sitting      BP Location FiO2 (%)     Left arm --       Physical Exam  Vitals and nursing note reviewed.   Constitutional:       General: She is not in acute distress.     Appearance: She is well-developed.   HENT:      Head: Normocephalic and atraumatic.   Eyes:      Conjunctiva/sclera: Conjunctivae normal.   Cardiovascular:      Rate and Rhythm: Normal rate and regular rhythm.      Heart sounds: No murmur heard.  Pulmonary:      Effort: Pulmonary effort is normal. No respiratory distress.      Breath sounds: Normal breath sounds.   Abdominal:      General: Abdomen is flat. There is no distension.      Palpations: Abdomen is soft.      Tenderness: There is no abdominal tenderness.   Musculoskeletal:         General: No swelling (incision well approximated right lower). Normal range of motion.      Cervical back: Normal range of motion and neck supple. No rigidity.   Skin:     General: Skin is warm and dry.      Capillary Refill: Capillary refill takes less than 2 seconds.   Neurological:      General: No focal deficit present.       Mental Status: She is alert and oriented to person, place, and time.      Cranial Nerves: No cranial nerve deficit.      Motor: No weakness.   Psychiatric:         Mood and Affect: Mood normal.         ED Course & MDM   ED Course as of 10/25/23 2254   Wed Oct 25, 2023   2137 Labs were obtained in triage and reviewed.  Patient is mildly hypomagnesemic.  She is markedly hypercalcemic.  Renal function is mildly elevated anion gap is normal. [MK]   2137 Stable chronic anemia of 9.1.  Liver functions normal. [MK]   2137 EKG obtained in triage.  Sinus rhythm.  First-degree AV block.  Rate of 65.  Mild leftward axis.  No acute ischemia.  No STEMI. [MK]      ED Course User Index  [MK] Jose Grullon MD         Diagnoses as of 10/25/23 2254   Hypercalcemia   Acute kidney injury (CMS/HCC)   Hypomagnesemia   Multiple myeloma, remission status unspecified (CMS/HCC)       Medical Decision Making  Medical Decision Making: Patient presents to the emergency department with transient confusion, some myalgias, and elevated serum calcium.  She did not get her last multiple myeloma therapy as she was inpatient secondary to lower extremity infection.  On arrival, the patient is awake and alert.  Labs were done in triage.  Patient does have mild renal insufficiency.  She is also hypercalcemic.  Patient was ordered fluids, diuretics, and magnesium replacement.  Given lab changes, she will be admitted.  I did discuss this with her primary physician.    EKG interpreted by myself (ED attending physician): Reviewed    Differential Diagnoses Considered: Hypercalcemia, worsening renal dysfunction, other acute electrolyte disturbance, UTI    Chronic Medical Conditions Significantly Affecting Care: Multiple myeloma, chronic kidney disease    External Records Reviewed: I reviewed recent and relevant outside records including: Primary care visit from today including laboratory evaluation    Independent Interpretation of Studies:  I  independently interpreted: None completed    Escalation of Care:  Appropriate for hospitalization for treatment of hypercalcemia    Social Determinants of Health Significantly Affecting Care:  No social determinants    Prescription Drug Consideration: Fluids, magnesium, diuretics    Diagnostic testing considered: [PERC, D-Dimer, PECARN, etc.]    Discussion of Management with Other Providers:   I discussed the patient/results with: Admitting provider agrees plan of care          Procedure  Procedures     Jose Grullon MD  10/25/23 2322

## 2023-10-26 NOTE — CONSULTS
Consults    Reason For Consult  Multiple myeloma    History Of Present Illness  Bindu Cali is a 89 y.o. female with history of multiple myeloma (October 2022, follows with Dr. Gray), hypertension, chronic kidney disease, hypothyroidism presented overnight with complaints of weakness and mental status changes.  Patient alone at the time of my interview.  States has been feeling weak for the past week.  States she herself noticed mental status changes yesterday prior to her arrival to the emergency department, particularly with word retrieval.  She does note is not having as much difficulty with words today, feeling more like herself.  Was recently discharged from the hospital on 10/13 after receiving treatment for hardware infection (had ORIF right periprosthetic proximal tibia fracture 4/23/2023, status post washout and hardware removal on 10/8/2023.  Regarding her multiple myeloma, receives Bortezomib every 2 weeks infusions, however missed her last appointment due to recent hospitalization.    Calcium 12.3 on arrival to ED, 10.5 today (initiated on fluids overnight). Currently has an BIBIANA, creatinine 1.46 today (baseline 1.04-1.08).    10 systems reviewed and negative except otherwise noted above in HPI     Physical Exam  GENERAL: Elderly, awake/alert/oriented x3, no distress, alert and cooperative  HEENT: PERRL, EOMI  NECK: Normal Inspection  CARDIOVASCULAR: RRR, no murmurs, 2+ equal pulses of the extremities, normal S1 and S 2  RESPIRATORY: CTAB, normal breath sounds with good chest expansion, No Wheezes, Rales or Rhonchi  ABDOMEN: Soft, Non-Tender, Normal Bowel Sounds, No Distention  SKIN: Warm and dry  EXTREMITIES: normal extremities, no lower extremity edema  NEURO: No appreciable deficits, comfortably having a conversation  PSYCH: Appropriate mood and behavior    Last Recorded Vitals  /67   Pulse 67   Temp 36.6 °C (97.9 °F) (Temporal)   Resp 18   Wt 63 kg (139 lb)   SpO2 95%         Assessment/Plan     89-year-old female with history of multiple myeloma (October 2022, follows with Dr. Gray, receives Bortezomib every 2 weeks presents with weakness and altered mental status. Missed her last infusion due to recent hardware infection related hospitalization, now with hypercalcemia and BIBIANA.     Plan:  -Calcium downtrending, responsive to fluids. Mental status also improving, reassuring. Continue supportive management with fluids.  -resume infusions every 2 weeks after discharge. Will follow as an outpatient, may need to potentially adjust infusion schedule and have goals of care conversation with patient and family thereafter.      Maryuri Monsalve,

## 2023-10-26 NOTE — PROGRESS NOTES
Pharmacy Medication History Review    Bindu Cali is a 89 y.o. female admitted for Hypercalcemia. Pharmacy reviewed the patient's [id number] medications and allergies for accuracy.    The list below reflectives the updated PTA list. Please review each medication in order reconciliation for additional clarification and justification.  (Not in a hospital admission)       The list below reflectives the updated allergy list. Please review each documented allergy for additional clarification and justification.  Allergies  Reviewed by Melvina Dover CPhT on 10/26/2023        Severity Reactions Comments    Morphine Medium Nausea/vomiting     Pollen Extracts Medium Itching, Other Runny nose, sneezing, PND affecting throat and voice. Used to take allergy shots, now controlled with Claritin daily.    Aleve [naproxen Sodium] Low Palpitations     Codeine Low Dizziness, Confusion     Erythromycin Low Nausea/vomiting             Below are additional concerns with the patient's PTA list.    See PTA med list    Melvina Dover CPhT

## 2023-10-27 LAB
ALBUMIN SERPL BCP-MCNC: 2.9 G/DL (ref 3.4–5)
ALBUMIN SERPL BCP-MCNC: 3.3 G/DL (ref 3.4–5)
ANION GAP SERPL CALC-SCNC: 11 MMOL/L (ref 10–20)
ANION GAP SERPL CALC-SCNC: 8 MMOL/L (ref 10–20)
BUN SERPL-MCNC: 15 MG/DL (ref 6–23)
BUN SERPL-MCNC: 18 MG/DL (ref 6–23)
CALCIUM SERPL-MCNC: 12.6 MG/DL (ref 8.6–10.3)
CALCIUM SERPL-MCNC: 12.7 MG/DL (ref 8.6–10.3)
CHLORIDE SERPL-SCNC: 100 MMOL/L (ref 98–107)
CHLORIDE SERPL-SCNC: 102 MMOL/L (ref 98–107)
CO2 SERPL-SCNC: 26 MMOL/L (ref 21–32)
CO2 SERPL-SCNC: 28 MMOL/L (ref 21–32)
CREAT SERPL-MCNC: 1.12 MG/DL (ref 0.5–1.05)
CREAT SERPL-MCNC: 1.27 MG/DL (ref 0.5–1.05)
ERYTHROCYTE [DISTWIDTH] IN BLOOD BY AUTOMATED COUNT: 16.4 % (ref 11.5–14.5)
GFR SERPL CREATININE-BSD FRML MDRD: 40 ML/MIN/1.73M*2
GFR SERPL CREATININE-BSD FRML MDRD: 47 ML/MIN/1.73M*2
GLUCOSE SERPL-MCNC: 105 MG/DL (ref 74–99)
GLUCOSE SERPL-MCNC: 87 MG/DL (ref 74–99)
HCT VFR BLD AUTO: 24 % (ref 36–46)
HGB BLD-MCNC: 7.6 G/DL (ref 12–16)
MAGNESIUM SERPL-MCNC: 1.15 MG/DL (ref 1.6–2.4)
MAGNESIUM SERPL-MCNC: 2.14 MG/DL (ref 1.6–2.4)
MCH RBC QN AUTO: 29.3 PG (ref 26–34)
MCHC RBC AUTO-ENTMCNC: 31.7 G/DL (ref 32–36)
MCV RBC AUTO: 93 FL (ref 80–100)
NRBC BLD-RTO: 0 /100 WBCS (ref 0–0)
PHOSPHATE SERPL-MCNC: 1.6 MG/DL (ref 2.5–4.9)
PHOSPHATE SERPL-MCNC: 2.1 MG/DL (ref 2.5–4.9)
PLATELET # BLD AUTO: 266 X10*3/UL (ref 150–450)
PMV BLD AUTO: 10.3 FL (ref 7.5–11.5)
POTASSIUM SERPL-SCNC: 3.2 MMOL/L (ref 3.5–5.3)
POTASSIUM SERPL-SCNC: 3.6 MMOL/L (ref 3.5–5.3)
RBC # BLD AUTO: 2.59 X10*6/UL (ref 4–5.2)
SODIUM SERPL-SCNC: 133 MMOL/L (ref 136–145)
SODIUM SERPL-SCNC: 135 MMOL/L (ref 136–145)
WBC # BLD AUTO: 5.7 X10*3/UL (ref 4.4–11.3)

## 2023-10-27 PROCEDURE — 2500000004 HC RX 250 GENERAL PHARMACY W/ HCPCS (ALT 636 FOR OP/ED): Mod: JZ | Performed by: INTERNAL MEDICINE

## 2023-10-27 PROCEDURE — 80069 RENAL FUNCTION PANEL: CPT | Performed by: STUDENT IN AN ORGANIZED HEALTH CARE EDUCATION/TRAINING PROGRAM

## 2023-10-27 PROCEDURE — 2500000001 HC RX 250 WO HCPCS SELF ADMINISTERED DRUGS (ALT 637 FOR MEDICARE OP)

## 2023-10-27 PROCEDURE — 99232 SBSQ HOSP IP/OBS MODERATE 35: CPT

## 2023-10-27 PROCEDURE — 2500000004 HC RX 250 GENERAL PHARMACY W/ HCPCS (ALT 636 FOR OP/ED): Performed by: STUDENT IN AN ORGANIZED HEALTH CARE EDUCATION/TRAINING PROGRAM

## 2023-10-27 PROCEDURE — 99233 SBSQ HOSP IP/OBS HIGH 50: CPT

## 2023-10-27 PROCEDURE — 83735 ASSAY OF MAGNESIUM: CPT | Performed by: STUDENT IN AN ORGANIZED HEALTH CARE EDUCATION/TRAINING PROGRAM

## 2023-10-27 PROCEDURE — 36591 DRAW BLOOD OFF VENOUS DEVICE: CPT | Performed by: STUDENT IN AN ORGANIZED HEALTH CARE EDUCATION/TRAINING PROGRAM

## 2023-10-27 PROCEDURE — 1200000002 HC GENERAL ROOM WITH TELEMETRY DAILY

## 2023-10-27 PROCEDURE — 97165 OT EVAL LOW COMPLEX 30 MIN: CPT | Mod: GO

## 2023-10-27 PROCEDURE — 96372 THER/PROPH/DIAG INJ SC/IM: CPT

## 2023-10-27 PROCEDURE — 1090000002 HH PPS REVENUE DEBIT

## 2023-10-27 PROCEDURE — 2500000004 HC RX 250 GENERAL PHARMACY W/ HCPCS (ALT 636 FOR OP/ED)

## 2023-10-27 PROCEDURE — 1090000001 HH PPS REVENUE CREDIT

## 2023-10-27 PROCEDURE — 96372 THER/PROPH/DIAG INJ SC/IM: CPT | Performed by: INTERNAL MEDICINE

## 2023-10-27 PROCEDURE — 82565 ASSAY OF CREATININE: CPT | Performed by: STUDENT IN AN ORGANIZED HEALTH CARE EDUCATION/TRAINING PROGRAM

## 2023-10-27 PROCEDURE — 97161 PT EVAL LOW COMPLEX 20 MIN: CPT | Mod: GP

## 2023-10-27 PROCEDURE — 2500000005 HC RX 250 GENERAL PHARMACY W/O HCPCS

## 2023-10-27 PROCEDURE — 85027 COMPLETE CBC AUTOMATED: CPT | Performed by: STUDENT IN AN ORGANIZED HEALTH CARE EDUCATION/TRAINING PROGRAM

## 2023-10-27 RX ORDER — MAGNESIUM SULFATE HEPTAHYDRATE 40 MG/ML
4 INJECTION, SOLUTION INTRAVENOUS ONCE
Status: COMPLETED | OUTPATIENT
Start: 2023-10-27 | End: 2023-10-27

## 2023-10-27 RX ORDER — POTASSIUM CHLORIDE 20 MEQ/1
40 TABLET, EXTENDED RELEASE ORAL ONCE
Status: COMPLETED | OUTPATIENT
Start: 2023-10-27 | End: 2023-10-27

## 2023-10-27 RX ORDER — HYDRALAZINE HYDROCHLORIDE 20 MG/ML
5 INJECTION INTRAMUSCULAR; INTRAVENOUS EVERY 6 HOURS PRN
Status: DISCONTINUED | OUTPATIENT
Start: 2023-10-27 | End: 2023-10-28

## 2023-10-27 RX ORDER — FUROSEMIDE 10 MG/ML
40 INJECTION INTRAMUSCULAR; INTRAVENOUS ONCE
Status: COMPLETED | OUTPATIENT
Start: 2023-10-27 | End: 2023-10-27

## 2023-10-27 RX ADMIN — LOSARTAN POTASSIUM 100 MG: 50 TABLET, FILM COATED ORAL at 08:44

## 2023-10-27 RX ADMIN — SODIUM CHLORIDE 125 ML/HR: 9 INJECTION, SOLUTION INTRAVENOUS at 21:45

## 2023-10-27 RX ADMIN — GABAPENTIN 300 MG: 300 CAPSULE ORAL at 21:55

## 2023-10-27 RX ADMIN — ENOXAPARIN SODIUM 30 MG: 30 INJECTION SUBCUTANEOUS at 23:40

## 2023-10-27 RX ADMIN — POTASSIUM PHOSPHATE, MONOBASIC POTASSIUM PHOSPHATE, DIBASIC 21 MMOL: 224; 236 INJECTION, SOLUTION, CONCENTRATE INTRAVENOUS at 21:57

## 2023-10-27 RX ADMIN — POTASSIUM CHLORIDE 40 MEQ: 1500 TABLET, EXTENDED RELEASE ORAL at 08:44

## 2023-10-27 RX ADMIN — CEFAZOLIN SODIUM 1 G: 1 INJECTION, SOLUTION INTRAVENOUS at 08:41

## 2023-10-27 RX ADMIN — FUROSEMIDE 40 MG: 10 INJECTION, SOLUTION INTRAMUSCULAR; INTRAVENOUS at 23:38

## 2023-10-27 RX ADMIN — CEFAZOLIN SODIUM 1 G: 1 INJECTION, SOLUTION INTRAVENOUS at 21:45

## 2023-10-27 RX ADMIN — Medication 10 ML: at 08:47

## 2023-10-27 RX ADMIN — HYDRALAZINE HYDROCHLORIDE 5 MG: 20 INJECTION INTRAMUSCULAR; INTRAVENOUS at 23:38

## 2023-10-27 RX ADMIN — MAGNESIUM SULFATE HEPTAHYDRATE 4 G: 40 INJECTION, SOLUTION INTRAVENOUS at 08:41

## 2023-10-27 RX ADMIN — DENOSUMAB 60 MG: 60 INJECTION SUBCUTANEOUS at 14:00

## 2023-10-27 RX ADMIN — PANTOPRAZOLE SODIUM 40 MG: 40 TABLET, DELAYED RELEASE ORAL at 05:07

## 2023-10-27 RX ADMIN — HYDRALAZINE HYDROCHLORIDE 5 MG: 20 INJECTION INTRAMUSCULAR; INTRAVENOUS at 16:38

## 2023-10-27 RX ADMIN — SODIUM CHLORIDE 125 ML/HR: 9 INJECTION, SOLUTION INTRAVENOUS at 05:13

## 2023-10-27 RX ADMIN — AMLODIPINE BESYLATE 2.5 MG: 5 TABLET ORAL at 08:45

## 2023-10-27 RX ADMIN — SODIUM CHLORIDE 125 ML/HR: 9 INJECTION, SOLUTION INTRAVENOUS at 13:56

## 2023-10-27 RX ADMIN — LEVOTHYROXINE SODIUM 100 MCG: 100 TABLET ORAL at 05:07

## 2023-10-27 RX ADMIN — ENOXAPARIN SODIUM 30 MG: 30 INJECTION SUBCUTANEOUS at 00:11

## 2023-10-27 ASSESSMENT — PAIN - FUNCTIONAL ASSESSMENT
PAIN_FUNCTIONAL_ASSESSMENT: 0-10
PAIN_FUNCTIONAL_ASSESSMENT: 0-10

## 2023-10-27 ASSESSMENT — COGNITIVE AND FUNCTIONAL STATUS - GENERAL
DAILY ACTIVITIY SCORE: 14
MOVING FROM LYING ON BACK TO SITTING ON SIDE OF FLAT BED WITH BEDRAILS: A LITTLE
WALKING IN HOSPITAL ROOM: A LOT
HELP NEEDED FOR BATHING: A LOT
MOBILITY SCORE: 14
DRESSING REGULAR LOWER BODY CLOTHING: TOTAL
STANDING UP FROM CHAIR USING ARMS: A LOT
TURNING FROM BACK TO SIDE WHILE IN FLAT BAD: A LITTLE
DRESSING REGULAR UPPER BODY CLOTHING: A LOT
TOILETING: A LOT
PERSONAL GROOMING: A LITTLE
CLIMB 3 TO 5 STEPS WITH RAILING: A LOT
MOVING TO AND FROM BED TO CHAIR: A LOT

## 2023-10-27 ASSESSMENT — ACTIVITIES OF DAILY LIVING (ADL)
ADL_ASSISTANCE: INDEPENDENT
BATHING_ASSISTANCE: MAXIMAL

## 2023-10-27 ASSESSMENT — PAIN SCALES - GENERAL
PAINLEVEL_OUTOF10: 0 - NO PAIN
PAINLEVEL_OUTOF10: 0 - NO PAIN

## 2023-10-27 NOTE — PROGRESS NOTES
Physical Therapy    Physical Therapy    Physical Therapy Evaluation    Patient Name: Bindu Cali  MRN: [id number]  Today's Date: 10/27/2023   Time Calculation  Start Time: 0944  Stop Time: 1006  Time Calculation (min): 22 min    Assessment/Plan   PT Assessment  PT Assessment Results: Decreased strength, Decreased range of motion, Decreased endurance, Impaired balance, Decreased mobility  Rehab Prognosis: Good  Evaluation/Treatment Tolerance: Patient limited by fatigue  Medical Staff Made Aware: Yes  End of Session Communication: Bedside nurse  Assessment Comment: Pt presents /c above impairments and decline from functional baseline. Pt will benefit from continued PT services at Mod intensity to address above and maximize functional mobility.  End of Session Patient Position: Bed, 2 rail up, Alarm on  IP OR SWING BED PT PLAN  Inpatient or Swing Bed: Inpatient  PT Plan  Treatment/Interventions: Bed mobility, Transfer training, Gait training, Balance training, Neuromuscular re-education, Strengthening, Endurance training, Therapeutic exercise, Therapeutic activity  PT Plan: Skilled PT  PT Frequency: 4 times per week  PT Discharge Recommendations: Moderate intensity level of continued care    Subjective     Current Problem:  Patient Active Problem List   Diagnosis    Hypothyroidism    Anemia due to stage 3b chronic kidney disease (CMS/HCC)    Arthritis of left shoulder region    Carpal tunnel syndrome of right wrist    Carpal tunnel syndrome on left    Cervicalgia    Chronic back pain    CKD (chronic kidney disease), stage II    Diastolic dysfunction    Essential hypertension    GERD (gastroesophageal reflux disease)    Hypercalcemia    Idiopathic peripheral neuropathy    Lactose intolerance    Left knee pain    Lumbar radiculopathy, chronic    Macrocytic anemia    Monoclonal gammopathy of unknown significance    Multiple myeloma (CMS/HCC)    Nontraumatic incomplete tear of left rotator cuff    Right knee pain     Right shoulder pain    Sacroiliitis (CMS/HCC)    Spinal stenosis of lumbar region without neurogenic claudication    Vitamin D deficiency    Arthritis    Fall    Hand laceration    Periprosthetic fracture of proximal end of tibia    Status post total bilateral knee replacement    Tibia fracture    Anemia in other chronic diseases classified elsewhere    Hip pain    Neurogenic claudication due to lumbar spinal stenosis    Iliotibial band syndrome of left side    Anemia    Spinal stenosis    History of total knee arthroplasty    Abnormal mammogram    Fatigue    Female stress incontinence    Lumbar spondylosis    Osteoarthritis of hip    Cellulitis and abscess of right lower extremity    Surgical site infection    Pulmonary hypertension, unspecified (CMS/HCC)       General Visit Information:  General  Reason for Referral: PT eval and treat  Referred By: Calvin  Prior to Session Communication: Bedside nurse  Patient Position Received: Bed, 2 rail up, Alarm on  General Comment: Pt presents with weakness, diarrhea x 5days, elevated calcium and confusion. Pt currently receiving HHC ABX through PICC line for R knee infection.    Home Living:  Home Living  Home Living Comments:  (Pt lives /c son, all needs 1 floor, 2 steps /c rail to enter.)    Prior Level of Function:  Prior Function Per Pt/Caregiver Report  Prior Function Comments:  (Pt indep /c ADLs, son for IADLs, use of WW prior, active /c HHC PT. Family for transportation. Pt denies recent falls. Pt states R TKR in April 2023 followed by infection and further sx d/t above.)    Precautions:  Precautions  Precautions Comment:  (falls, recent RLE surgery)         Objective     Pain:  Pain Assessment  Pain Assessment: 0-10  Pain Score: 0 - No pain    Cognition:  Cognition  Overall Cognitive Status: Within Functional Limits    General Assessments:      Activity Tolerance  Endurance: Decreased tolerance for upright activites  Sensation  Sensation Comment:  (denies n/t  throughout)  Strength  Strength Comments:  (LLE 4/5, RLE 3/5)           Dynamic Sitting Balance  Dynamic Sitting-Comments:  (f)  Dynamic Standing Balance  Dynamic Standing-Comments:  (f)    Functional Assessments:     Bed Mobility  Bed Mobility:  (Supine-sit /c min AX1, sit-supine ModAx1.Pt tolerates EOB sitting ~8min /c /c min-modA d/t posterior lean to complete hygiene tasks.)  Transfers  Transfer:  (sit-stand /c mod Ax2, safety cues for walker placement.)  Ambulation/Gait Training  Ambulation/Gait Training Performed:  (Pt ambulates EOB /c WW, Mod Ax2. Discontinuous steps, narrow ROSSY, downward gaze. Limited d/t fatigue.)          Extremity/Trunk Assessments:        RLE   RLE :  (RLE AROM ~15-115deg)       Outcome Measures:  [facility] Basic Mobility  Turning from your back to your side while in a flat bed without using bedrails: A little  Moving from lying on your back to sitting on the side of a flat bed without using bedrails: A little  Moving to and from bed to chair (including a wheelchair): A lot  Standing up from a chair using your arms (e.g. wheelchair or bedside chair): A lot  To walk in hospital room: A lot  Climbing 3-5 steps with railing: A lot  Basic Mobility - Total Score: 14          Goals:  Encounter Problems       Encounter Problems (Active)       PT Problem       STG - Pt will transition supine <> sitting with Mod I (Progressing)       Start:  10/27/23    Expected End:  11/10/23            STG - Pt will transfer STS with Mod I (Progressing)       Start:  10/27/23    Expected End:  11/10/23            STG - Pt will amb 75' using WW with Sup. (Progressing)       Start:  10/27/23    Expected End:  11/10/23            STG - Pt will perform a B LE ther ex program of 2-3 sets of 10 (Progressing)       Start:  10/27/23    Expected End:  11/10/23            STG - Pt will maintain F+ dynamic standing balance to decrease fall risk (Progressing)       Start:  10/27/23    Expected End:  11/10/23                  Education Documentation  Mobility Training, taught by Alejandrina Serrano, PT at 10/27/2023  2:15 PM.  Learner: Patient  Readiness: Acceptance  Method: Explanation  Response: Verbalizes Understanding, Needs Reinforcement    Education Comments  No comments found.

## 2023-10-27 NOTE — PROGRESS NOTES
Bindu Cali is a 89 y.o. female on day 2 of admission presenting with Hypercalcemia.      Subjective    No overnight events. Mental status improved, still feeling a little tired however improved from a few days ago. No other issues. Urinated 800 ml yesterday.    Objective   Physical Exam  GENERAL: Elderly, awake/alert/oriented x3, no distress, alert and cooperative  HEENT: PERRL, EOMI  NECK: Normal Inspection  CARDIOVASCULAR: RRR, no murmurs, 2+ equal pulses of the extremities, normal S1 and S 2  RESPIRATORY: CTAB, normal breath sounds with good chest expansion, No Wheezes, Rales or Rhonchi  ABDOMEN: Soft, Non-Tender, Normal Bowel Sounds, No Distention  SKIN: Warm and dry  EXTREMITIES: normal extremities, no lower extremity edema  NEURO: No appreciable deficits, comfortably having a conversation  PSYCH: Appropriate mood and behavior    Last Recorded Vitals  BP (!) 219/82   Pulse 73   Temp 36.6 °C (97.9 °F)   Resp 17   Wt 63 kg (139 lb)   SpO2 91%     Assessment/Plan      89-year-old female with history of multiple myeloma (October 2022, follows with Dr. Gray, receives Bortezomib every 2 weeks presents with weakness and altered mental status. Missed her last infusion due to recent hardware infection related hospitalization, now with hypercalcemia (unchaged) and BIBIANA (resolving).      Plan:  -Corrected calcium unchanged from yesterday. Patient with renal insufficiency currently, CrCl <35, bisphosphanates contraindicated. Given this, will administer denosumab. Mental status remains improved, reassuring. Can continue fluids for now.  -resume infusions every 2 weeks after discharge. Will follow as an outpatient, may need to potentially adjust infusion schedule and have goals of care conversation with patient and family thereafter.       Maryuri Monsalve DO

## 2023-10-27 NOTE — CARE PLAN
Problem: Pain  Goal: My pain/discomfort is manageable  Outcome: Progressing     Problem: Safety  Goal: Patient will be injury free during hospitalization  Outcome: Progressing  Goal: I will remain free of falls  Outcome: Progressing     Problem: Daily Care  Goal: Daily care needs are met  Outcome: Progressing     Problem: Psychosocial Needs  Goal: Demonstrates ability to cope with hospitalization/illness  Outcome: Progressing  Goal: Collaborate with me, my family, and caregiver to identify my specific goals  Outcome: Progressing  Flowsheets (Taken 10/26/2023 2003)  Cultural Requests During Hospitalization: none  Spiritual Requests During Hospitalization: none     Problem: Discharge Barriers  Goal: My discharge needs are met  Outcome: Progressing    The clinical goals for the shift include pt will remain comfortable

## 2023-10-27 NOTE — PROGRESS NOTES
Bindu Cali is a 89 y.o. female on day 2 of admission presenting with Hypercalcemia.    SUBJECTIVE  No acute overnight events.  Patient continues to endorse weakness and fatigue but states she feels improved today.  Patient denies any other new symptoms such as abdominal pain, shortness of breath, chest pain, fevers, chills, or coughs.    OBJECTIVE    Physical Exam:  General:  Laying down comfortably. Pleasant and cooperative. No apparent distress.  HEENT:  Normocephalic, atraumatic  Chest:  Clear to auscultation bilaterally. No wheezes, rales, or rhonchi.  CV:  Regular rate and rhythm. No murmurs    Abdomen: Abdomen is soft, non-tender, non-distended  Extremities:  No lower extremity edema or cyanosis.   Neurological:  AAOx3. No focal deficits.  Skin:  Warm and dry.      Last Recorded Vitals  Blood pressure (!) 219/82, pulse 73, temperature 36.6 °C (97.9 °F), resp. rate 17, height 1.524 m (5'), weight 63 kg (139 lb), SpO2 91 %.  Intake/Output last 3 Shifts:  I/O last 3 completed shifts:  In: 3618.8 (57.4 mL/kg) [I.V.:2568.8 (40.7 mL/kg); IV Piggyback:1050]  Out: 900 (14.3 mL/kg) [Urine:900 (0.4 mL/kg/hr)]  Weight: 63 kg     Last CBC & BMP  Lab Results   Component Value Date    GLUCOSE 87 10/27/2023    CALCIUM 12.6 (H) 10/27/2023     (L) 10/27/2023    K 3.2 (L) 10/27/2023    CO2 28 10/27/2023     10/27/2023    BUN 18 10/27/2023    CREATININE 1.27 (H) 10/27/2023     Lab Results   Component Value Date    WBC 5.7 10/27/2023    HGB 7.6 (L) 10/27/2023    HCT 24.0 (L) 10/27/2023    MCV 93 10/27/2023     10/27/2023       ASSESSMENT & PLAN    Hypercalcemia secondary to multiple myeloma  Weakness & Fatique   -Patient's hypercalcemia is likely secondary to her multiple myeloma.  She has had similar episode in the past requiring hospitalization in past her calcium was the main problem  Plan:   -Patient given 1 L  in ED  -Continue with 125 NS continuous  -Heme-onc consulted for further  recs  -Patient's calcium remains elevated, reached out to heme-onc to see what recommendations are for calcium lowering medications as patient is not a candidate for bisphosphonates due to decreased creatinine clearance, may consider denosumab      Right proximal tibial fracture complicated by hardware infection  Cellulitis  Plan:   -Continue with Ancef, end date 11/17    Chronic kidney disease stage II   Creatinine elevation  Plan:   -Continue to trend, low concern for BIBIANA  -Patient getting fluids     Electrolyte abnormalities:  Hypomagnesemia  Hyperkalemia  Plan:   -Replete K & Mg as needed     Chronic medical conditions:  Hypertension  Hypothyroidism  GERD  Plan:   -Continue with home amlodipine, Lasix, gabapentin, Synthroid, PPIs  -Held losartan     DVT ppx: Lovenox   GI ppx: PPI  IVF: 125 NS  Diet: Regular  Consults: Heme/Onc    Juvenal Ugarte MD   PGY-1, Internal Medicine  Please SecureChat for any further questions  This is a preliminary note, please await attending attestation for final A/P

## 2023-10-27 NOTE — PROGRESS NOTES
Occupational Therapy    Evaluation/Treatment    Patient Name: Bindu Cali  MRN: [id number]  : 10/14/1932  Today's Date: 10/27/23  Time Calculation  Start Time: 943  Stop Time: 100  Time Calculation (min): 23 min       Assessment:  End of Session Communication: Bedside nurse  End of Session Patient Position: Bed, 2 rail up, Alarm on       Plan:  Treatment Interventions: ADL retraining, Visual perceptual retraining, UE strengthening/ROM, Functional transfer training, Endurance training, Patient/family training, Fine motor coordination activities  OT Frequency: 3 times per week  OT Discharge Recommendations: Moderate intensity level of continued care  Treatment Interventions: ADL retraining, Visual perceptual retraining, UE strengthening/ROM, Functional transfer training, Endurance training, Patient/family training, Fine motor coordination activities  Subjective     Current Problem:  Patient Active Problem List   Diagnosis    Hypothyroidism    Anemia due to stage 3b chronic kidney disease (CMS/HCC)    Arthritis of left shoulder region    Carpal tunnel syndrome of right wrist    Carpal tunnel syndrome on left    Cervicalgia    Chronic back pain    CKD (chronic kidney disease), stage II    Diastolic dysfunction    Essential hypertension    GERD (gastroesophageal reflux disease)    Hypercalcemia    Idiopathic peripheral neuropathy    Lactose intolerance    Left knee pain    Lumbar radiculopathy, chronic    Macrocytic anemia    Monoclonal gammopathy of unknown significance    Multiple myeloma (CMS/HCC)    Nontraumatic incomplete tear of left rotator cuff    Right knee pain    Right shoulder pain    Sacroiliitis (CMS/HCC)    Spinal stenosis of lumbar region without neurogenic claudication    Vitamin D deficiency    Arthritis    Fall    Hand laceration    Periprosthetic fracture of proximal end of tibia    Status post total bilateral knee replacement    Tibia fracture    Anemia in other chronic diseases  classified elsewhere    Hip pain    Neurogenic claudication due to lumbar spinal stenosis    Iliotibial band syndrome of left side    Anemia    Spinal stenosis    History of total knee arthroplasty    Abnormal mammogram    Fatigue    Female stress incontinence    Lumbar spondylosis    Osteoarthritis of hip    Cellulitis and abscess of right lower extremity    Surgical site infection    Pulmonary hypertension, unspecified (CMS/HCC)       General:   OT Received On: 10/27/23  General  Reason for Referral: OT eval and treat  Referred By: Calvin  Prior to Session Communication: Bedside nurse  Patient Position Received: Bed, 2 rail up, Alarm on  General Comment: 89 y/oF presents with weakness, diarrhea x 5days, elevated calcium and confusion. Pt currently receiving HHC ABX through PICC line for R knee infection.    Precautions:  Medical Precautions: Fall precautions  Precautions Comment: recent RLE sx    Pain:  Pain Assessment  Pain Assessment: 0-10  Pain Score: 0 - No pain  Objective     Cognition:  Overall Cognitive Status: Within Functional Limits  Orientation Level: Oriented X4           Home Living:  Type of Home: House  Lives With: Adult children  Home Adaptive Equipment: Walker rolling or standard  Home Layout: Able to live on main level with bedroom/bathroom  Home Access: Stairs to enter with rails  Entrance Stairs-Number of Steps: 2  Bathroom Shower/Tub: Walk-in shower  Bathroom Equipment: Shower chair with back, Grab bars in shower    Prior Function:  Level of [place]: Independent with ADLs and functional transfers, Needs assistance with homemaking  Receives Help From: Family  ADL Assistance: Independent  Homemaking Assistance: Needs assistance  Ambulatory Assistance: Independent (uses FWW)  Prior Function Comments: Pt indep /c ADLs, son for IADLs, use of WW prior, active /c HHC PT. Family for transportation. Pt denies recent falls. Pt states R TKR in April 2023 followed by infection and further sx d/t  above.    IADL History:       ADL History:  Eating Assistance: Independent  Grooming Assistance: Minimal (to brush teeth seated EOB; min A to manage fasteners)  Bathing Assistance: Maximal  UE Dressing Assistance: Moderate  LE Dressing Assistance: Total  Toileting Assistance with Device: Maximal  ADL Comments: Pt completes side steps to HOB using FWW @ modx2      Activity Tolerance:  Endurance: Decreased tolerance for upright activites    Balance:  Static sit poor Dynamic sit poor  Static stand fair Dynamic stand poor    Bed Mobility/Transfers: Bed Mobility  Bed Mobility: Yes  Bed Mobility 1  Bed Mobility 1: Supine to sitting, Sitting to supine  Level of Assistance 1: Minimum assistance, Moderate assistance  Bed Mobility Comments 1: Supine-sit /c min AX1, sit-supine ModAx1.Pt tolerates EOB sitting ~8min /c /c min-modA d/t posterior lean to complete grooming tasks.  Transfers  Transfer: Yes  Transfer 1  Technique 1: Sit to stand, Stand to sit  Transfer Device 1: Walker  Transfer Level of Assistance 1: Moderate assistance, +2  Trials/Comments 1: cues for safety and technique                Sensation:  Light Touch: No apparent deficits    Strength:  Strength Comments: bilat UE proximally 2/5, distally 3+/5      Hand Function:  Hand Function  Gross Grasp: Functional  Coordination: Functional    Extremities: RUE   RUE : Exceptions to WFL (60 deg should flex, distally WFL) and LUE   LUE: Exceptions to WFL (60 deg should flex, distally WFL)    Outcome Measures: [facility] Daily Activity  Putting on and taking off regular lower body clothing: Total  Bathing (including washing, rinsing, drying): A lot  Putting on and taking off regular upper body clothing: A lot  Toileting, which includes using toilet, bedpan or urinal: A lot  Taking care of personal grooming such as brushing teeth: A little  Eating Meals: None  Daily Activity - Total Score: 14  Education Documentation  Body Mechanics, taught by Holley Montoya OT at 10/27/2023   3:02 PM.  Learner: Patient  Readiness: Acceptance  Method: Explanation  Response: Verbalizes Understanding, Needs Reinforcement    Precautions, taught by Holley Montoya OT at 10/27/2023  3:02 PM.  Learner: Patient  Readiness: Acceptance  Method: Explanation  Response: Verbalizes Understanding, Needs Reinforcement    ADL Training, taught by Holley Montoya OT at 10/27/2023  3:02 PM.  Learner: Patient  Readiness: Acceptance  Method: Explanation  Response: Verbalizes Understanding, Needs Reinforcement    Education Comments  No comments found.        EDUCATION:  Education  Individual(s) Educated: Patient  Education Provided: Ergonomics and postural realignment, Joint protection and energy conservation, Fall precautons    Goals:  Encounter Problems       Encounter Problems (Active)       OT Goals       Mod I for all fxnl transfers (Progressing)       Start:  10/27/23    Expected End:  11/10/23            Mod I for LB dressing (Progressing)       Start:  10/27/23    Expected End:  11/10/23            Mod I for all toileting tasks (Progressing)       Start:  10/27/23    Expected End:  11/10/23            Increase dynamic standing tolerance 2-5 minutes in preparation for higher level ADLs (Progressing)       Start:  10/27/23    Expected End:  11/10/23

## 2023-10-27 NOTE — CONSULTS
"Nutrition Note  Reason for Assessment  Reason for Assessment: Admission nursing screening (missed MST=2 for weight loss and decreased po intakes)  Patient admitted for BIBIANA, weakness, confusion, hyprcalcemia     Recommendation(s):  Diet as ordered   2.    Patrice twice daily ( For an additional 90 kcals, 2.5 gm protein, supplemental glutamin and arginine in each packet)  and Ensure high protein daily (For an additional 160 kcals, 16 gm protein each)    3.    Consider daily MVI   4.    Reweigh at least every 5 days       Subjective      Energy Intake:  (intakes not established yet)  Food and Nutrient History: LIves with son  Pt unavailable at time of attempted visit today     Difficulty chewing: none noted   Difficulty swallowing: none noted     Objective   Per Flowsheet Percent Meal intake:  not established yet  Dietary Orders (From admission, onward)       Start     Ordered    10/27/23 1319  Oral nutritional supplements  Until discontinued        Question Answer Comment   Deliver with Dinner    Deliver with Lunch    Select supplement: Patrice        10/27/23 1318    10/27/23 1318  Oral nutritional supplements  Until discontinued        Question Answer Comment   Deliver with Breakfast    Select supplement: Ensure High Protein        10/27/23 1318    10/26/23 0110  Adult diet Regular  Diet effective now        Question:  Diet type  Answer:  Regular    10/26/23 0109                     Results from last 7 days   Lab Units 10/27/23  0506 10/26/23  0417 10/25/23  1817   GLUCOSE mg/dL 87 93 101*   SODIUM mmol/L 135* 132* 131*   POTASSIUM mmol/L 3.2* 3.1* 3.6   CHLORIDE mmol/L 102 96* 96*   CO2 mmol/L 28 26 24   BUN mg/dL 18 23 20   CREATININE mg/dL 1.27* 1.46* 1.58*   EGFR mL/min/1.73m*2 40* 34* 31*   CALCIUM mg/dL 12.6* 12.6* 13.1*   PHOSPHORUS mg/dL 2.1*  --   --    MAGNESIUM mg/dL 1.15* 1.44* 1.24*     No results found for: \"HGBA1C\"  Results from last 7 days   Lab Units 10/26/23  2037   POCT GLUCOSE mg/dL 118*     GI per " flowsheet:  Gastrointestinal  Gastrointestinal (WDL): Within Defined Limits  Last BM Date: 10/27/23  Bowel Incontinence: No  Stool Appearance: Loose  Stool Color: Brown  Last bowel movement documented: 10/27/23  PMH: HTN, CKD 2, GERD, MGUS, hypothyroidism, multiple myeloma (dx 10/2022)  Allergies: Morphine, Pollen extracts, Aleve [naproxen sodium], Codeine, and Erythromycin     Anthropometrics:  Height: 152.4 cm (5')  Weight: 63 kg (138 lb 14.2 oz) (stated)  BMI (Calculated): 27.13         IBW: 45.4 kg  %IBW: 138 %         Weight History / % Weight Change: 10/15 64kg, 9/20 64.4kg, 7/19 64.4kg, 4/22 70.9kg (11% loss x6mo) , 10/2022 65.3kg  Significant Weight Loss: Yes  Interpretation of Weight Loss: >10% in 6 months    Estimated Nutritional Needs:  Method for Estimating Needs: 1169-9022 kcals (27-30 kcals/kg IBW)    Method for Estimating Needs: 54-68g (1.2-1.5 g/kg IBW)    Method for Estimating Needs: 1ml/kcal or per MD    Nutrition Focused Physical Findings:   Orbital Fat Pads:  (deferred, unavailable)         Edema  Edema: none    Skin:  (RLE wound; s/p ORIF R proximal tibia fx 4/23/23; s/p wsahout and hardware removal 10/8/23; ?wound vac)  Pain Score: 0 - No pain     Nutrition Diagnosis   Patient has Malnutrition Diagnosis: No    Patient has Nutrition Diagnosis: Yes  New  Nutrition Diagnosis 1: Increased nutrient needs  Related to (1): physiological causes increasing nutrient needs  As Evidenced by (1): wound healing needs to RLE       Intervention:     Interventions: Meals and snacks  Meals and Snacks: General healthful diet  Goal: consume >75% of meals  Additional Interventions: will order Patrice twice daily and Ensure high protein once daily  Individualized Nutrition Prescription Provided for : Regular diet    Nutrition Monitoring and Evaluation   Weights  Labs  PO intake  Skin integrity and wound healing     Goals: oral intake >75% of meals, consume oral nutrition supplement, adequate PO fluid intake, labs WNL,  promote healing, maintain stable weight, and promote regular bowel movements  Progress towards goals:  Will assess at follow up     Education Documentation  No documentation found.      Time Spent (min): 45 minutes  Last Date of Nutrition Visit: 10/27/23  Nutrition Follow-Up Needed?: 3-5 days  Follow up Comment: 11/1 TG

## 2023-10-28 ENCOUNTER — APPOINTMENT (OUTPATIENT)
Dept: RADIOLOGY | Facility: HOSPITAL | Age: 88
DRG: 641 | End: 2023-10-28
Payer: MEDICARE

## 2023-10-28 LAB
1,25(OH)2D3 SERPL-MCNC: 31.7 PG/ML (ref 19.9–79.3)
ALBUMIN SERPL BCP-MCNC: 3.1 G/DL (ref 3.4–5)
AMMONIA PLAS-SCNC: 28 UMOL/L (ref 16–53)
ANION GAP SERPL CALC-SCNC: 9 MMOL/L (ref 10–20)
BUN SERPL-MCNC: 14 MG/DL (ref 6–23)
CALCIUM SERPL-MCNC: 12.7 MG/DL (ref 8.6–10.3)
CHLORIDE SERPL-SCNC: 97 MMOL/L (ref 98–107)
CO2 SERPL-SCNC: 29 MMOL/L (ref 21–32)
CREAT SERPL-MCNC: 1.08 MG/DL (ref 0.5–1.05)
ERYTHROCYTE [DISTWIDTH] IN BLOOD BY AUTOMATED COUNT: 16.3 % (ref 11.5–14.5)
GFR SERPL CREATININE-BSD FRML MDRD: 49 ML/MIN/1.73M*2
GLUCOSE BLD MANUAL STRIP-MCNC: 160 MG/DL (ref 74–99)
GLUCOSE SERPL-MCNC: 93 MG/DL (ref 74–99)
HCT VFR BLD AUTO: 27 % (ref 36–46)
HGB BLD-MCNC: 9 G/DL (ref 12–16)
LDH SERPL L TO P-CCNC: 116 U/L (ref 84–246)
MAGNESIUM SERPL-MCNC: 1.51 MG/DL (ref 1.6–2.4)
MCH RBC QN AUTO: 29.9 PG (ref 26–34)
MCHC RBC AUTO-ENTMCNC: 33.3 G/DL (ref 32–36)
MCV RBC AUTO: 90 FL (ref 80–100)
NRBC BLD-RTO: 0 /100 WBCS (ref 0–0)
PHOSPHATE SERPL-MCNC: 2.9 MG/DL (ref 2.5–4.9)
PLATELET # BLD AUTO: 303 X10*3/UL (ref 150–450)
PMV BLD AUTO: 10.3 FL (ref 7.5–11.5)
POTASSIUM SERPL-SCNC: 3.3 MMOL/L (ref 3.5–5.3)
RBC # BLD AUTO: 3.01 X10*6/UL (ref 4–5.2)
SODIUM SERPL-SCNC: 132 MMOL/L (ref 136–145)
TSH SERPL-ACNC: 1.86 MIU/L (ref 0.44–3.98)
WBC # BLD AUTO: 9.4 X10*3/UL (ref 4.4–11.3)

## 2023-10-28 PROCEDURE — 85027 COMPLETE CBC AUTOMATED: CPT

## 2023-10-28 PROCEDURE — 83615 LACTATE (LD) (LDH) ENZYME: CPT

## 2023-10-28 PROCEDURE — 2500000001 HC RX 250 WO HCPCS SELF ADMINISTERED DRUGS (ALT 637 FOR MEDICARE OP)

## 2023-10-28 PROCEDURE — 36600 WITHDRAWAL OF ARTERIAL BLOOD: CPT

## 2023-10-28 PROCEDURE — 70450 CT HEAD/BRAIN W/O DYE: CPT | Mod: ME

## 2023-10-28 PROCEDURE — 82947 ASSAY GLUCOSE BLOOD QUANT: CPT

## 2023-10-28 PROCEDURE — 71250 CT THORAX DX C-: CPT | Mod: ME

## 2023-10-28 PROCEDURE — 2500000001 HC RX 250 WO HCPCS SELF ADMINISTERED DRUGS (ALT 637 FOR MEDICARE OP): Performed by: STUDENT IN AN ORGANIZED HEALTH CARE EDUCATION/TRAINING PROGRAM

## 2023-10-28 PROCEDURE — 84132 ASSAY OF SERUM POTASSIUM: CPT | Performed by: STUDENT IN AN ORGANIZED HEALTH CARE EDUCATION/TRAINING PROGRAM

## 2023-10-28 PROCEDURE — 2500000004 HC RX 250 GENERAL PHARMACY W/ HCPCS (ALT 636 FOR OP/ED)

## 2023-10-28 PROCEDURE — 83735 ASSAY OF MAGNESIUM: CPT

## 2023-10-28 PROCEDURE — 80069 RENAL FUNCTION PANEL: CPT | Performed by: STUDENT IN AN ORGANIZED HEALTH CARE EDUCATION/TRAINING PROGRAM

## 2023-10-28 PROCEDURE — 71250 CT THORAX DX C-: CPT | Performed by: RADIOLOGY

## 2023-10-28 PROCEDURE — 1200000002 HC GENERAL ROOM WITH TELEMETRY DAILY

## 2023-10-28 PROCEDURE — 1090000001 HH PPS REVENUE CREDIT

## 2023-10-28 PROCEDURE — 82140 ASSAY OF AMMONIA: CPT | Performed by: STUDENT IN AN ORGANIZED HEALTH CARE EDUCATION/TRAINING PROGRAM

## 2023-10-28 PROCEDURE — 1090000002 HH PPS REVENUE DEBIT

## 2023-10-28 PROCEDURE — 99233 SBSQ HOSP IP/OBS HIGH 50: CPT

## 2023-10-28 PROCEDURE — 2500000004 HC RX 250 GENERAL PHARMACY W/ HCPCS (ALT 636 FOR OP/ED): Performed by: STUDENT IN AN ORGANIZED HEALTH CARE EDUCATION/TRAINING PROGRAM

## 2023-10-28 PROCEDURE — 84443 ASSAY THYROID STIM HORMONE: CPT | Performed by: STUDENT IN AN ORGANIZED HEALTH CARE EDUCATION/TRAINING PROGRAM

## 2023-10-28 PROCEDURE — 70450 CT HEAD/BRAIN W/O DYE: CPT | Performed by: RADIOLOGY

## 2023-10-28 RX ORDER — MAGNESIUM SULFATE HEPTAHYDRATE 40 MG/ML
2 INJECTION, SOLUTION INTRAVENOUS ONCE
Status: COMPLETED | OUTPATIENT
Start: 2023-10-28 | End: 2023-10-28

## 2023-10-28 RX ORDER — HYDRALAZINE HYDROCHLORIDE 20 MG/ML
10 INJECTION INTRAMUSCULAR; INTRAVENOUS EVERY 6 HOURS PRN
Status: DISCONTINUED | OUTPATIENT
Start: 2023-10-28 | End: 2023-10-28

## 2023-10-28 RX ORDER — TALC
3 POWDER (GRAM) TOPICAL NIGHTLY
Status: DISCONTINUED | OUTPATIENT
Start: 2023-10-28 | End: 2023-11-03 | Stop reason: HOSPADM

## 2023-10-28 RX ORDER — GUAIFENESIN/DEXTROMETHORPHAN 100-10MG/5
5 SYRUP ORAL EVERY 4 HOURS PRN
Status: DISCONTINUED | OUTPATIENT
Start: 2023-10-28 | End: 2023-11-03 | Stop reason: HOSPADM

## 2023-10-28 RX ORDER — AMLODIPINE BESYLATE 5 MG/1
5 TABLET ORAL DAILY
Status: DISCONTINUED | OUTPATIENT
Start: 2023-10-28 | End: 2023-11-03 | Stop reason: HOSPADM

## 2023-10-28 RX ORDER — POTASSIUM CHLORIDE 20 MEQ/1
40 TABLET, EXTENDED RELEASE ORAL ONCE
Status: COMPLETED | OUTPATIENT
Start: 2023-10-28 | End: 2023-10-28

## 2023-10-28 RX ORDER — MAGNESIUM SULFATE HEPTAHYDRATE 40 MG/ML
4 INJECTION, SOLUTION INTRAVENOUS ONCE
Status: DISCONTINUED | OUTPATIENT
Start: 2023-10-28 | End: 2023-10-28

## 2023-10-28 RX ORDER — METOPROLOL TARTRATE 1 MG/ML
5 INJECTION, SOLUTION INTRAVENOUS EVERY 6 HOURS PRN
Status: DISCONTINUED | OUTPATIENT
Start: 2023-10-28 | End: 2023-11-03 | Stop reason: HOSPADM

## 2023-10-28 RX ORDER — HYDRALAZINE HYDROCHLORIDE 20 MG/ML
5 INJECTION INTRAMUSCULAR; INTRAVENOUS EVERY 6 HOURS PRN
Status: DISCONTINUED | OUTPATIENT
Start: 2023-10-28 | End: 2023-11-03 | Stop reason: HOSPADM

## 2023-10-28 RX ORDER — BENZONATATE 100 MG/1
100 CAPSULE ORAL 3 TIMES DAILY
Status: DISCONTINUED | OUTPATIENT
Start: 2023-10-28 | End: 2023-11-03 | Stop reason: HOSPADM

## 2023-10-28 RX ADMIN — POTASSIUM CHLORIDE 40 MEQ: 1500 TABLET, EXTENDED RELEASE ORAL at 10:14

## 2023-10-28 RX ADMIN — MAGNESIUM SULFATE HEPTAHYDRATE 2 G: 40 INJECTION, SOLUTION INTRAVENOUS at 17:08

## 2023-10-28 RX ADMIN — HYDRALAZINE HYDROCHLORIDE 10 MG: 20 INJECTION INTRAMUSCULAR; INTRAVENOUS at 10:14

## 2023-10-28 RX ADMIN — AMLODIPINE BESYLATE 5 MG: 5 TABLET ORAL at 10:14

## 2023-10-28 RX ADMIN — LOSARTAN POTASSIUM 100 MG: 50 TABLET, FILM COATED ORAL at 10:15

## 2023-10-28 RX ADMIN — ACETAMINOPHEN 650 MG: 325 TABLET ORAL at 17:08

## 2023-10-28 RX ADMIN — LEVOTHYROXINE SODIUM 100 MCG: 100 TABLET ORAL at 06:42

## 2023-10-28 RX ADMIN — CEFAZOLIN SODIUM 1 G: 1 INJECTION, SOLUTION INTRAVENOUS at 20:10

## 2023-10-28 RX ADMIN — PANTOPRAZOLE SODIUM 40 MG: 40 TABLET, DELAYED RELEASE ORAL at 06:42

## 2023-10-28 RX ADMIN — BENZONATATE 100 MG: 100 CAPSULE ORAL at 20:06

## 2023-10-28 RX ADMIN — CEFAZOLIN SODIUM 1 G: 1 INJECTION, SOLUTION INTRAVENOUS at 10:14

## 2023-10-28 ASSESSMENT — PAIN - FUNCTIONAL ASSESSMENT
PAIN_FUNCTIONAL_ASSESSMENT: 0-10

## 2023-10-28 ASSESSMENT — COGNITIVE AND FUNCTIONAL STATUS - GENERAL
PERSONAL GROOMING: A LOT
TURNING FROM BACK TO SIDE WHILE IN FLAT BAD: A LITTLE
HELP NEEDED FOR BATHING: A LOT
CLIMB 3 TO 5 STEPS WITH RAILING: A LOT
TOILETING: A LOT
WALKING IN HOSPITAL ROOM: A LOT
STANDING UP FROM CHAIR USING ARMS: A LOT
MOVING TO AND FROM BED TO CHAIR: A LOT
DRESSING REGULAR LOWER BODY CLOTHING: A LOT
MOVING FROM LYING ON BACK TO SITTING ON SIDE OF FLAT BED WITH BEDRAILS: A LITTLE
MOBILITY SCORE: 14
DRESSING REGULAR UPPER BODY CLOTHING: A LOT
DAILY ACTIVITIY SCORE: 14

## 2023-10-28 ASSESSMENT — PAIN SCALES - GENERAL
PAINLEVEL_OUTOF10: 0 - NO PAIN

## 2023-10-28 NOTE — NURSING NOTE
Called rapid response for sudden increase in lethargy. Ammonia, ABGs, lactate and CT head/chest ordered.

## 2023-10-28 NOTE — PROGRESS NOTES
Bindu Cali is a 89 y.o. female on day 3 of admission presenting with Hypercalcemia.    SUBJECTIVE  No acute overnight events.  Patient drowsy this morning. Not having any new symptoms or complaints.    OBJECTIVE    Physical Exam:  General:  Laying down comfortably. Pleasant and cooperative. No apparent distress.  HEENT:  Normocephalic, atraumatic  Chest:  Clear to auscultation bilaterally. No wheezes, rales, or rhonchi.  CV:  Regular rate and rhythm. No murmurs    Abdomen: Abdomen is soft, non-tender, non-distended  Extremities:  No lower extremity edema or cyanosis.   Neurological:  AAOx3. No focal deficits.  Skin:  Warm and dry.      Last Recorded Vitals  Blood pressure (!) 194/80, pulse 86, temperature 37.1 °C (98.8 °F), temperature source Temporal, resp. rate 18, height 1.524 m (5'), weight 63 kg (138 lb 14.2 oz), SpO2 94 %.  Intake/Output last 3 Shifts:  I/O last 3 completed shifts:  In: 4422.9 (70.2 mL/kg) [I.V.:4022.9 (63.9 mL/kg); IV Piggyback:400]  Out: 900 (14.3 mL/kg) [Urine:900 (0.4 mL/kg/hr)]  Weight: 63 kg     Last CBC & BMP  Lab Results   Component Value Date    GLUCOSE 93 10/28/2023    CALCIUM 12.7 (H) 10/28/2023     (L) 10/28/2023    K 3.3 (L) 10/28/2023    CO2 29 10/28/2023    CL 97 (L) 10/28/2023    BUN 14 10/28/2023    CREATININE 1.08 (H) 10/28/2023     Lab Results   Component Value Date    WBC 9.4 10/28/2023    HGB 9.0 (L) 10/28/2023    HCT 27.0 (L) 10/28/2023    MCV 90 10/28/2023     10/28/2023       ASSESSMENT & PLAN    Hypercalcemia secondary to multiple myeloma  Weakness & Fatique   -Patient's hypercalcemia is likely secondary to her multiple myeloma.  She has had similar episode in the past requiring hospitalization in past her calcium was the main problem  Plan:   -Patients corrected calcium remains unchanged today   -Fluids held  -Heme-onc following, started denosuab  -Given Lasix 40 overnight     Coughs  Plan:   -Order CT chest  -Follow up Speech    Persistent  Hypertension  Plan:   -Continue with homer Losartan 100  -Amlodipine increased to 5  -Hydralazine increased to 10    Right proximal tibial fracture complicated by hardware infection  Cellulitis  Plan:   -Continue with Ancef, end date 11/17    Chronic kidney disease stage II   Creatinine elevation  Plan:   -Continue to trend, low concern for BIBIANA    Electrolyte abnormalities:  Hypomagnesemia  Hyperkalemia  Plan:   -Replete K & Mg as needed     Chronic medical conditions:  Hypertension  Hypothyroidism  GERD  Plan:   -Continue with home meds as appropriate       DVT ppx: Lovenox   GI ppx: PPI  IVF: None  Diet: Regular  Consults: Heme/Onc    Juvenal Ugarte MD   PGY-1, Internal Medicine  Please SecureChat for any further questions  This is a preliminary note, please await attending attestation for final A/P

## 2023-10-28 NOTE — SIGNIFICANT EVENT
Rapid response  Called: 14: 20    Bindu Cali is a 89 y.o. female with past medical history of HTN, CKD 2, hypothyroidism, GERD, MGUS, multiple myeloma presenting to [facility] with chief complaint of weakness and confusion.  Patient is admitted for hypercalcemia and weakness/fatigue.  A rapid response was called for lethargy and drowsiness.  Patient during event for possible systolic 160, 90 to 91%, heart rate 86.  Patient was able to engage in conversation and appeared very drowsy, often falling asleep after a few exchanges.  The patient had been seen an hour an a half prior and was eating lunch and more awake.  Patient denying any symptoms.     Physical Exam:  General:  Pleasant and cooperative. Very drowsy/sleepy  HEENT:  Normocephalic, atraumatic  Chest:  Clear to auscultation bilaterally. No wheezes, rales, or rhonchi.  CV:  Regular rate and rhythm. No murmurs    Abdomen: Abdomen is soft, non-tender, non-distended. BS +   Extremities:  No lower extremity edema or cyanosis.   Neurological:  AAOx3. No focal deficits. Normal muscle strength and sensation. No facial nerve deficits.  Skin:  Warm and dry.    Weakness/Confusion/Drowsiness  -Unclear cause for patient's symptoms. It is likely secondary to her hypercalcemia.  Plan:  -Ordered TSH & ammonia   -Ordered head CT to rule out alternative causes  -Ordered ABG    Juvenal Ugarte, PGY-1

## 2023-10-29 LAB
ACANTHOCYTES BLD QL SMEAR: ABNORMAL
ALBUMIN SERPL BCP-MCNC: 2.9 G/DL (ref 3.4–5)
ANION GAP SERPL CALC-SCNC: 11 MMOL/L (ref 10–20)
BASOPHILS # BLD MANUAL: 0 X10*3/UL (ref 0–0.1)
BASOPHILS NFR BLD MANUAL: 0 %
BUN SERPL-MCNC: 19 MG/DL (ref 6–23)
CALCIUM SERPL-MCNC: 12.4 MG/DL (ref 8.6–10.3)
CHLORIDE SERPL-SCNC: 96 MMOL/L (ref 98–107)
CO2 SERPL-SCNC: 26 MMOL/L (ref 21–32)
CREAT SERPL-MCNC: 1.31 MG/DL (ref 0.5–1.05)
EOSINOPHIL # BLD MANUAL: 0.13 X10*3/UL (ref 0–0.4)
EOSINOPHIL NFR BLD MANUAL: 1 %
ERYTHROCYTE [DISTWIDTH] IN BLOOD BY AUTOMATED COUNT: 16.2 % (ref 11.5–14.5)
ERYTHROCYTE [DISTWIDTH] IN BLOOD BY AUTOMATED COUNT: 16.2 % (ref 11.5–14.5)
GFR SERPL CREATININE-BSD FRML MDRD: 39 ML/MIN/1.73M*2
GLUCOSE SERPL-MCNC: 102 MG/DL (ref 74–99)
HCT VFR BLD AUTO: 26.1 % (ref 36–46)
HCT VFR BLD AUTO: 26.1 % (ref 36–46)
HGB BLD-MCNC: 8.6 G/DL (ref 12–16)
HGB BLD-MCNC: 8.6 G/DL (ref 12–16)
HYPOCHROMIA BLD QL SMEAR: ABNORMAL
IMM GRANULOCYTES # BLD AUTO: 0.18 X10*3/UL (ref 0–0.5)
IMM GRANULOCYTES NFR BLD AUTO: 1.3 % (ref 0–0.9)
LYMPHOCYTES # BLD MANUAL: 0.79 X10*3/UL (ref 0.8–3)
LYMPHOCYTES NFR BLD MANUAL: 6 %
MAGNESIUM SERPL-MCNC: 1.67 MG/DL (ref 1.6–2.4)
MCH RBC QN AUTO: 29.7 PG (ref 26–34)
MCH RBC QN AUTO: 29.7 PG (ref 26–34)
MCHC RBC AUTO-ENTMCNC: 33 G/DL (ref 32–36)
MCHC RBC AUTO-ENTMCNC: 33 G/DL (ref 32–36)
MCV RBC AUTO: 90 FL (ref 80–100)
MCV RBC AUTO: 90 FL (ref 80–100)
MONOCYTES # BLD MANUAL: 0.53 X10*3/UL (ref 0.05–0.8)
MONOCYTES NFR BLD MANUAL: 4 %
NEUTROPHILS # BLD MANUAL: 11.75 X10*3/UL (ref 1.6–5.5)
NEUTS BAND # BLD MANUAL: 8.32 X10*3/UL (ref 0–0.5)
NEUTS BAND NFR BLD MANUAL: 63 %
NEUTS SEG # BLD MANUAL: 3.43 X10*3/UL (ref 1.6–5)
NEUTS SEG NFR BLD MANUAL: 26 %
NRBC BLD-RTO: 0 /100 WBCS (ref 0–0)
NRBC BLD-RTO: 0 /100 WBCS (ref 0–0)
PHOSPHATE SERPL-MCNC: 1.7 MG/DL (ref 2.5–4.9)
PLATELET # BLD AUTO: 297 X10*3/UL (ref 150–450)
PLATELET # BLD AUTO: 297 X10*3/UL (ref 150–450)
PMV BLD AUTO: 9.8 FL (ref 7.5–11.5)
PMV BLD AUTO: 9.8 FL (ref 7.5–11.5)
POTASSIUM SERPL-SCNC: 3.5 MMOL/L (ref 3.5–5.3)
RBC # BLD AUTO: 2.9 X10*6/UL (ref 4–5.2)
RBC # BLD AUTO: 2.9 X10*6/UL (ref 4–5.2)
RBC MORPH BLD: ABNORMAL
SARS-COV-2 RNA RESP QL NAA+PROBE: NOT DETECTED
SODIUM SERPL-SCNC: 129 MMOL/L (ref 136–145)
TOTAL CELLS COUNTED BLD: 100
WBC # BLD AUTO: 13.2 X10*3/UL (ref 4.4–11.3)
WBC # BLD AUTO: 13.2 X10*3/UL (ref 4.4–11.3)

## 2023-10-29 PROCEDURE — 85027 COMPLETE CBC AUTOMATED: CPT

## 2023-10-29 PROCEDURE — 1090000002 HH PPS REVENUE DEBIT

## 2023-10-29 PROCEDURE — 2500000004 HC RX 250 GENERAL PHARMACY W/ HCPCS (ALT 636 FOR OP/ED): Performed by: STUDENT IN AN ORGANIZED HEALTH CARE EDUCATION/TRAINING PROGRAM

## 2023-10-29 PROCEDURE — 1090000001 HH PPS REVENUE CREDIT

## 2023-10-29 PROCEDURE — 2500000004 HC RX 250 GENERAL PHARMACY W/ HCPCS (ALT 636 FOR OP/ED)

## 2023-10-29 PROCEDURE — 83735 ASSAY OF MAGNESIUM: CPT

## 2023-10-29 PROCEDURE — 87040 BLOOD CULTURE FOR BACTERIA: CPT | Mod: PARLAB | Performed by: STUDENT IN AN ORGANIZED HEALTH CARE EDUCATION/TRAINING PROGRAM

## 2023-10-29 PROCEDURE — 2500000005 HC RX 250 GENERAL PHARMACY W/O HCPCS: Performed by: STUDENT IN AN ORGANIZED HEALTH CARE EDUCATION/TRAINING PROGRAM

## 2023-10-29 PROCEDURE — 96372 THER/PROPH/DIAG INJ SC/IM: CPT

## 2023-10-29 PROCEDURE — 87635 SARS-COV-2 COVID-19 AMP PRB: CPT | Performed by: STUDENT IN AN ORGANIZED HEALTH CARE EDUCATION/TRAINING PROGRAM

## 2023-10-29 PROCEDURE — 2500000001 HC RX 250 WO HCPCS SELF ADMINISTERED DRUGS (ALT 637 FOR MEDICARE OP): Performed by: STUDENT IN AN ORGANIZED HEALTH CARE EDUCATION/TRAINING PROGRAM

## 2023-10-29 PROCEDURE — 99233 SBSQ HOSP IP/OBS HIGH 50: CPT

## 2023-10-29 PROCEDURE — 36415 COLL VENOUS BLD VENIPUNCTURE: CPT | Performed by: STUDENT IN AN ORGANIZED HEALTH CARE EDUCATION/TRAINING PROGRAM

## 2023-10-29 PROCEDURE — 85007 BL SMEAR W/DIFF WBC COUNT: CPT | Performed by: STUDENT IN AN ORGANIZED HEALTH CARE EDUCATION/TRAINING PROGRAM

## 2023-10-29 PROCEDURE — 80069 RENAL FUNCTION PANEL: CPT | Performed by: STUDENT IN AN ORGANIZED HEALTH CARE EDUCATION/TRAINING PROGRAM

## 2023-10-29 PROCEDURE — 36415 COLL VENOUS BLD VENIPUNCTURE: CPT | Mod: PARLAB | Performed by: STUDENT IN AN ORGANIZED HEALTH CARE EDUCATION/TRAINING PROGRAM

## 2023-10-29 PROCEDURE — 85027 COMPLETE CBC AUTOMATED: CPT | Performed by: STUDENT IN AN ORGANIZED HEALTH CARE EDUCATION/TRAINING PROGRAM

## 2023-10-29 PROCEDURE — 2500000001 HC RX 250 WO HCPCS SELF ADMINISTERED DRUGS (ALT 637 FOR MEDICARE OP)

## 2023-10-29 PROCEDURE — 1200000002 HC GENERAL ROOM WITH TELEMETRY DAILY

## 2023-10-29 RX ORDER — SODIUM CHLORIDE 9 MG/ML
75 INJECTION, SOLUTION INTRAVENOUS CONTINUOUS
Status: DISCONTINUED | OUTPATIENT
Start: 2023-10-29 | End: 2023-10-30

## 2023-10-29 RX ORDER — POTASSIUM CHLORIDE 20 MEQ/1
20 TABLET, EXTENDED RELEASE ORAL ONCE
Status: COMPLETED | OUTPATIENT
Start: 2023-10-29 | End: 2023-10-29

## 2023-10-29 RX ORDER — VANCOMYCIN HYDROCHLORIDE 1 G/200ML
15 INJECTION, SOLUTION INTRAVENOUS ONCE
Status: COMPLETED | OUTPATIENT
Start: 2023-10-29 | End: 2023-10-29

## 2023-10-29 RX ADMIN — PIPERACILLIN SODIUM AND TAZOBACTAM SODIUM 2.25 G: 2; .25 INJECTION, SOLUTION INTRAVENOUS at 14:56

## 2023-10-29 RX ADMIN — ACETAMINOPHEN 650 MG: 325 TABLET ORAL at 09:01

## 2023-10-29 RX ADMIN — VANCOMYCIN HYDROCHLORIDE 1000 MG: 1 INJECTION, SOLUTION INTRAVENOUS at 18:34

## 2023-10-29 RX ADMIN — PIPERACILLIN SODIUM AND TAZOBACTAM SODIUM 2.25 G: 2; .25 INJECTION, SOLUTION INTRAVENOUS at 20:13

## 2023-10-29 RX ADMIN — LOSARTAN POTASSIUM 100 MG: 50 TABLET, FILM COATED ORAL at 08:56

## 2023-10-29 RX ADMIN — SODIUM PHOSPHATE, MONOBASIC, MONOHYDRATE AND SODIUM PHOSPHATE, DIBASIC, ANHYDROUS 15 MMOL: 142; 276 INJECTION, SOLUTION INTRAVENOUS at 10:33

## 2023-10-29 RX ADMIN — BENZONATATE 100 MG: 100 CAPSULE ORAL at 14:56

## 2023-10-29 RX ADMIN — Medication 3 MG: at 20:13

## 2023-10-29 RX ADMIN — AMLODIPINE BESYLATE 5 MG: 5 TABLET ORAL at 08:57

## 2023-10-29 RX ADMIN — SODIUM CHLORIDE 75 ML/HR: 9 INJECTION, SOLUTION INTRAVENOUS at 14:57

## 2023-10-29 RX ADMIN — ENOXAPARIN SODIUM 30 MG: 30 INJECTION SUBCUTANEOUS at 00:50

## 2023-10-29 RX ADMIN — POTASSIUM CHLORIDE 20 MEQ: 1500 TABLET, EXTENDED RELEASE ORAL at 08:56

## 2023-10-29 RX ADMIN — CEFAZOLIN SODIUM 1 G: 1 INJECTION, SOLUTION INTRAVENOUS at 08:56

## 2023-10-29 RX ADMIN — LEVOTHYROXINE SODIUM 100 MCG: 100 TABLET ORAL at 05:16

## 2023-10-29 RX ADMIN — BENZONATATE 100 MG: 100 CAPSULE ORAL at 08:57

## 2023-10-29 RX ADMIN — PANTOPRAZOLE SODIUM 40 MG: 40 TABLET, DELAYED RELEASE ORAL at 05:16

## 2023-10-29 RX ADMIN — BENZONATATE 100 MG: 100 CAPSULE ORAL at 20:13

## 2023-10-29 ASSESSMENT — COGNITIVE AND FUNCTIONAL STATUS - GENERAL
STANDING UP FROM CHAIR USING ARMS: A LOT
STANDING UP FROM CHAIR USING ARMS: A LOT
MOBILITY SCORE: 14
HELP NEEDED FOR BATHING: A LOT
CLIMB 3 TO 5 STEPS WITH RAILING: A LOT
MOVING TO AND FROM BED TO CHAIR: A LOT
TOILETING: A LOT
DRESSING REGULAR UPPER BODY CLOTHING: A LOT
MOVING TO AND FROM BED TO CHAIR: A LOT
TURNING FROM BACK TO SIDE WHILE IN FLAT BAD: A LITTLE
WALKING IN HOSPITAL ROOM: A LOT
MOBILITY SCORE: 14
PERSONAL GROOMING: A LOT
DRESSING REGULAR LOWER BODY CLOTHING: A LOT
TURNING FROM BACK TO SIDE WHILE IN FLAT BAD: A LITTLE
MOVING FROM LYING ON BACK TO SITTING ON SIDE OF FLAT BED WITH BEDRAILS: A LITTLE
WALKING IN HOSPITAL ROOM: A LOT
MOVING FROM LYING ON BACK TO SITTING ON SIDE OF FLAT BED WITH BEDRAILS: A LITTLE
CLIMB 3 TO 5 STEPS WITH RAILING: A LOT
DAILY ACTIVITIY SCORE: 14

## 2023-10-29 ASSESSMENT — PAIN SCALES - GENERAL: PAINLEVEL_OUTOF10: 0 - NO PAIN

## 2023-10-29 ASSESSMENT — PAIN - FUNCTIONAL ASSESSMENT: PAIN_FUNCTIONAL_ASSESSMENT: 0-10

## 2023-10-29 NOTE — CARE PLAN
Problem: Pain  Goal: My pain/discomfort is manageable  Outcome: Progressing     Problem: Safety  Goal: Patient will be injury free during hospitalization  Outcome: Progressing  Goal: I will remain free of falls  Outcome: Progressing     Problem: Daily Care  Goal: Daily care needs are met  Outcome: Progressing     Problem: Psychosocial Needs  Goal: Demonstrates ability to cope with hospitalization/illness  Outcome: Progressing  Goal: Collaborate with me, my family, and caregiver to identify my specific goals  Outcome: Progressing     Problem: Discharge Barriers  Goal: My discharge needs are met  Outcome: Progressing      The clinical goals for the shift include pt's BP with stay WDL

## 2023-10-29 NOTE — PROGRESS NOTES
Bindu Cali is a 89 y.o. female on day 4 of admission presenting with Hypercalcemia.    SUBJECTIVE    Overnight, patient developed a fever.  Continues to have coughs.  Continues to be confused and seems more lethargic/weak since admission.  Patient knows her name and that she is in the hospital but does not know the location, date, or year.  Per family patient is normally very sharp.  Patient endorsing fevers, coughs, and had a dark brown bowel movement.  Patient denying any chest pain, abdominal pain, or trouble breathing.  Patient denying any changes in urinary frequency or burning with urination.  He denies headaches, lightheadedness, dizziness.    OBJECTIVE    Physical Exam:  General:  Laying down. Pleasant and cooperative. No apparent distress. Warm to the touch  HEENT:  Normocephalic, atraumatic  Chest:  Clear to auscultation bilaterally. No wheezes, rales, or rhonchi.  CV:  Regular rate and rhythm. No murmurs    Abdomen: Abdomen is soft, non-tender, non-distended  Extremities:  No lower extremity edema or cyanosis.   Neurological:  AAOx1. No focal deficits.  Skin:  Warm and dry.      Last Recorded Vitals  Blood pressure 166/80, pulse 89, temperature 37.4 °C (99.3 °F), temperature source Temporal, resp. rate 20, height 1.524 m (5'), weight 63 kg (138 lb 14.2 oz), SpO2 95 %.  Intake/Output last 3 Shifts:  I/O last 3 completed shifts:  In: 650 (10.3 mL/kg) [I.V.:350 (5.6 mL/kg); IV Piggyback:300]  Out: 1300 (20.6 mL/kg) [Urine:1300 (0.6 mL/kg/hr)]  Weight: 63 kg     Last CBC & BMP  Lab Results   Component Value Date    GLUCOSE 102 (H) 10/29/2023    CALCIUM 12.4 (H) 10/29/2023     (L) 10/29/2023    K 3.5 10/29/2023    CO2 26 10/29/2023    CL 96 (L) 10/29/2023    BUN 19 10/29/2023    CREATININE 1.31 (H) 10/29/2023     Lab Results   Component Value Date    WBC 13.2 (H) 10/29/2023    WBC 13.2 (H) 10/29/2023    HGB 8.6 (L) 10/29/2023    HGB 8.6 (L) 10/29/2023    HCT 26.1 (L) 10/29/2023    HCT 26.1 (L)  10/29/2023    MCV 90 10/29/2023    MCV 90 10/29/2023     10/29/2023     10/29/2023       ASSESSMENT & PLAN    Hypercalcemia secondary to multiple myeloma  Weakness & Fatique   -Patient's hypercalcemia is likely secondary to her multiple myeloma.  She has had similar episode in the past requiring hospitalization in past her calcium was the main problem  Plan:   -Patients corrected calcium remains unchanged today   -Fluids resumed  -Heme-onc following, started denosuab    Right proximal tibial fracture complicated by hardware infection with cellulitis (from prior hospitalization)  Coughs  Fever  Elevated procal  Plan:   -COVID test ordered, returned negative  -New blood cultures ordered, old ones continue to show no growth to date  -UA reordered  -Follow up Speech  -Ancef discontinued and switched to Vanc/Zosyn, 11/17  -Awaiting CT chest results     Persistent Hypertension  Plan:   -Continue with homer Losartan 100  -Amlodipine increased to 5  -Hydralazine increased to 10    Chronic kidney disease stage II   Creatinine elevation  Plan:   -Continue to trend, low concern for BIBIANA    Electrolyte abnormalities:  Hypomagnesemia  Hyperkalemia  Plan:   -Replete K & Mg as needed     Chronic medical conditions:  Hypertension  Hypothyroidism  GERD  Plan:   -Continue with, losartan, omeprazole, levothyroxine, held jd     DVT ppx: Lovenox   GI ppx: PPI  IVF: 75 NS  Diet: Regular  Consults: Heme/Onc    Juvenal Ugarte MD   PGY-1, Internal Medicine  Please SecureChat for any further questions  This is a preliminary note, please await attending attestation for final A/P

## 2023-10-29 NOTE — PROGRESS NOTES
"Vancomycin Dosing by Pharmacy- INITIAL    Bindujennifer Cali is a 89 y.o. year old female who Pharmacy has been consulted for vancomycin dosing for other unknown source  . Based on the patient's indication and renal status this patient will be dosed based on a goal trough 15- 20     Renal function is currently fluctuating but trending up , with age of 89 and crcl of 23 ml/min , will give 1 dose of 1 gm once an dtrough on 10/30 @1400 , will need to dose daily till we make sure renal is stable            Lab Results   Component Value Date    CREATININE 1.31 (H) 10/29/2023    CREATININE 1.08 (H) 10/28/2023    CREATININE 1.12 (H) 10/27/2023    CREATININE 1.27 (H) 10/27/2023        Patient weight is No results found for: \"PTWEIGHT\"    No results found for: \"CULTURE\"     I/O last 3 completed shifts:  In: 650 (10.3 mL/kg) [I.V.:350 (5.6 mL/kg); IV Piggyback:300]  Out: 1300 (20.6 mL/kg) [Urine:1300 (0.6 mL/kg/hr)]  Weight: 63 kg   [unfilled]    No results found for: \"PATIENTTEMP\"             Myranda [facility]       "

## 2023-10-30 ENCOUNTER — HOME CARE VISIT (OUTPATIENT)
Dept: HOME HEALTH SERVICES | Facility: HOME HEALTH | Age: 88
End: 2023-10-30
Payer: MEDICARE

## 2023-10-30 ENCOUNTER — APPOINTMENT (OUTPATIENT)
Dept: RADIOLOGY | Facility: HOSPITAL | Age: 88
DRG: 641 | End: 2023-10-30
Payer: MEDICARE

## 2023-10-30 LAB
ACANTHOCYTES BLD QL SMEAR: ABNORMAL
ALBUMIN SERPL BCP-MCNC: 2.8 G/DL (ref 3.4–5)
ANION GAP BLDA CALCULATED.4IONS-SCNC: 8 MMO/L (ref 10–25)
ANION GAP SERPL CALC-SCNC: 12 MMOL/L (ref 10–20)
APPEARANCE UR: CLEAR
BACTERIA BLD CULT: NORMAL
BACTERIA BLD CULT: NORMAL
BASE EXCESS BLDA CALC-SCNC: 4 MMOL/L (ref -2–3)
BASOPHILS # BLD MANUAL: 0 X10*3/UL (ref 0–0.1)
BASOPHILS NFR BLD MANUAL: 0 %
BILIRUB UR STRIP.AUTO-MCNC: NEGATIVE MG/DL
BODY TEMPERATURE: ABNORMAL
BUN SERPL-MCNC: 24 MG/DL (ref 6–23)
C DIF TOX TCDA+TCDB STL QL NAA+PROBE: DETECTED
C DIF TOX TCDA+TCDB STL QL NAA+PROBE: DETECTED
C DIFF TOX A+B STL QL IA: POSITIVE
CA-I BLDA-SCNC: 1.86 MMOL/L (ref 1.1–1.33)
CALCIUM SERPL-MCNC: 11.4 MG/DL (ref 8.6–10.3)
CHLORIDE BLDA-SCNC: 97 MMOL/L (ref 98–107)
CHLORIDE SERPL-SCNC: 97 MMOL/L (ref 98–107)
CO2 SERPL-SCNC: 23 MMOL/L (ref 21–32)
COLOR UR: YELLOW
CREAT SERPL-MCNC: 1.36 MG/DL (ref 0.5–1.05)
EOSINOPHIL # BLD MANUAL: 0 X10*3/UL (ref 0–0.4)
EOSINOPHIL NFR BLD MANUAL: 0 %
ERYTHROCYTE [DISTWIDTH] IN BLOOD BY AUTOMATED COUNT: 16.1 % (ref 11.5–14.5)
GFR SERPL CREATININE-BSD FRML MDRD: 37 ML/MIN/1.73M*2
GLUCOSE BLDA-MCNC: 155 MG/DL (ref 74–99)
GLUCOSE SERPL-MCNC: 94 MG/DL (ref 74–99)
GLUCOSE UR STRIP.AUTO-MCNC: NEGATIVE MG/DL
HCO3 BLDA-SCNC: 27.3 MMOL/L (ref 22–26)
HCT VFR BLD AUTO: 27 % (ref 36–46)
HCT VFR BLD EST: 28 % (ref 36–46)
HGB BLD-MCNC: 9 G/DL (ref 12–16)
HGB BLDA-MCNC: 9.4 G/DL (ref 12–16)
IMM GRANULOCYTES # BLD AUTO: 0.27 X10*3/UL (ref 0–0.5)
IMM GRANULOCYTES NFR BLD AUTO: 0.9 % (ref 0–0.9)
INHALED O2 CONCENTRATION: 21 %
KETONES UR STRIP.AUTO-MCNC: NEGATIVE MG/DL
LACTATE BLDA-SCNC: 1.3 MMOL/L (ref 0.4–2)
LEUKOCYTE ESTERASE UR QL STRIP.AUTO: NEGATIVE
LYMPHOCYTES # BLD MANUAL: 0.86 X10*3/UL (ref 0.8–3)
LYMPHOCYTES NFR BLD MANUAL: 3 %
MAGNESIUM SERPL-MCNC: 1.35 MG/DL (ref 1.6–2.4)
MCH RBC QN AUTO: 29.9 PG (ref 26–34)
MCHC RBC AUTO-ENTMCNC: 33.3 G/DL (ref 32–36)
MCV RBC AUTO: 90 FL (ref 80–100)
MONOCYTES # BLD MANUAL: 0.29 X10*3/UL (ref 0.05–0.8)
MONOCYTES NFR BLD MANUAL: 1 %
NEUTROPHILS # BLD MANUAL: 27.46 X10*3/UL (ref 1.6–5.5)
NEUTS BAND # BLD MANUAL: 16.02 X10*3/UL (ref 0–0.5)
NEUTS BAND NFR BLD MANUAL: 56 %
NEUTS SEG # BLD MANUAL: 11.44 X10*3/UL (ref 1.6–5)
NEUTS SEG NFR BLD MANUAL: 40 %
NITRITE UR QL STRIP.AUTO: NEGATIVE
NRBC BLD-RTO: 0 /100 WBCS (ref 0–0)
OXYHGB MFR BLDA: 93.4 % (ref 94–98)
PCO2 BLDA: 35 MM HG (ref 38–42)
PH BLDA: 7.5 PH (ref 7.38–7.42)
PH UR STRIP.AUTO: 5 [PH]
PHOSPHATE SERPL-MCNC: 2.1 MG/DL (ref 2.5–4.9)
PLATELET # BLD AUTO: 290 X10*3/UL (ref 150–450)
PMV BLD AUTO: 10 FL (ref 7.5–11.5)
PO2 BLDA: 67 MM HG (ref 85–95)
POTASSIUM BLDA-SCNC: 3.9 MMOL/L (ref 3.5–5.3)
POTASSIUM SERPL-SCNC: 3.3 MMOL/L (ref 3.5–5.3)
PROT UR STRIP.AUTO-MCNC: ABNORMAL MG/DL
RBC # BLD AUTO: 3.01 X10*6/UL (ref 4–5.2)
RBC # UR STRIP.AUTO: NEGATIVE /UL
RBC #/AREA URNS AUTO: NORMAL /HPF
RBC MORPH BLD: ABNORMAL
SAO2 % BLDA: 96 % (ref 94–100)
SODIUM BLDA-SCNC: 128 MMOL/L (ref 136–145)
SODIUM SERPL-SCNC: 129 MMOL/L (ref 136–145)
SP GR UR STRIP.AUTO: 1.01
TOTAL CELLS COUNTED BLD: 100
UROBILINOGEN UR STRIP.AUTO-MCNC: <2 MG/DL
VANCOMYCIN TROUGH SERPL-MCNC: 8.7 UG/ML (ref 5–20)
WBC # BLD AUTO: 28.6 X10*3/UL (ref 4.4–11.3)
WBC #/AREA URNS AUTO: NORMAL /HPF

## 2023-10-30 PROCEDURE — 73560 X-RAY EXAM OF KNEE 1 OR 2: CPT | Mod: RIGHT SIDE | Performed by: RADIOLOGY

## 2023-10-30 PROCEDURE — 1090000001 HH PPS REVENUE CREDIT

## 2023-10-30 PROCEDURE — 73590 X-RAY EXAM OF LOWER LEG: CPT | Mod: RT

## 2023-10-30 PROCEDURE — 80069 RENAL FUNCTION PANEL: CPT | Performed by: STUDENT IN AN ORGANIZED HEALTH CARE EDUCATION/TRAINING PROGRAM

## 2023-10-30 PROCEDURE — 99233 SBSQ HOSP IP/OBS HIGH 50: CPT

## 2023-10-30 PROCEDURE — 96372 THER/PROPH/DIAG INJ SC/IM: CPT

## 2023-10-30 PROCEDURE — 2500000004 HC RX 250 GENERAL PHARMACY W/ HCPCS (ALT 636 FOR OP/ED)

## 2023-10-30 PROCEDURE — 85027 COMPLETE CBC AUTOMATED: CPT | Performed by: STUDENT IN AN ORGANIZED HEALTH CARE EDUCATION/TRAINING PROGRAM

## 2023-10-30 PROCEDURE — 2500000001 HC RX 250 WO HCPCS SELF ADMINISTERED DRUGS (ALT 637 FOR MEDICARE OP)

## 2023-10-30 PROCEDURE — 80202 ASSAY OF VANCOMYCIN: CPT

## 2023-10-30 PROCEDURE — 73590 X-RAY EXAM OF LOWER LEG: CPT | Mod: RIGHT SIDE | Performed by: RADIOLOGY

## 2023-10-30 PROCEDURE — 99221 1ST HOSP IP/OBS SF/LOW 40: CPT | Performed by: ORTHOPAEDIC SURGERY

## 2023-10-30 PROCEDURE — 81001 URINALYSIS AUTO W/SCOPE: CPT | Performed by: STUDENT IN AN ORGANIZED HEALTH CARE EDUCATION/TRAINING PROGRAM

## 2023-10-30 PROCEDURE — 1090000002 HH PPS REVENUE DEBIT

## 2023-10-30 PROCEDURE — 1200000002 HC GENERAL ROOM WITH TELEMETRY DAILY

## 2023-10-30 PROCEDURE — 73560 X-RAY EXAM OF KNEE 1 OR 2: CPT | Mod: RT

## 2023-10-30 PROCEDURE — 92610 EVALUATE SWALLOWING FUNCTION: CPT | Mod: GN | Performed by: IN HOME SUPPORTIVE CARE

## 2023-10-30 PROCEDURE — 87493 C DIFF AMPLIFIED PROBE: CPT | Mod: PARLAB

## 2023-10-30 PROCEDURE — 2500000001 HC RX 250 WO HCPCS SELF ADMINISTERED DRUGS (ALT 637 FOR MEDICARE OP): Performed by: STUDENT IN AN ORGANIZED HEALTH CARE EDUCATION/TRAINING PROGRAM

## 2023-10-30 PROCEDURE — 2500000004 HC RX 250 GENERAL PHARMACY W/ HCPCS (ALT 636 FOR OP/ED): Performed by: STUDENT IN AN ORGANIZED HEALTH CARE EDUCATION/TRAINING PROGRAM

## 2023-10-30 PROCEDURE — 85007 BL SMEAR W/DIFF WBC COUNT: CPT | Performed by: STUDENT IN AN ORGANIZED HEALTH CARE EDUCATION/TRAINING PROGRAM

## 2023-10-30 PROCEDURE — 83735 ASSAY OF MAGNESIUM: CPT

## 2023-10-30 PROCEDURE — 87324 CLOSTRIDIUM AG IA: CPT | Mod: 59,PARLAB

## 2023-10-30 PROCEDURE — 99232 SBSQ HOSP IP/OBS MODERATE 35: CPT

## 2023-10-30 RX ORDER — CEFAZOLIN SODIUM 1 G/50ML
1 SOLUTION INTRAVENOUS EVERY 12 HOURS
Status: DISCONTINUED | OUTPATIENT
Start: 2023-10-30 | End: 2023-11-02

## 2023-10-30 RX ORDER — VANCOMYCIN HYDROCHLORIDE 125 MG/1
125 CAPSULE ORAL 4 TIMES DAILY
Status: DISCONTINUED | OUTPATIENT
Start: 2023-10-30 | End: 2023-11-03 | Stop reason: HOSPADM

## 2023-10-30 RX ORDER — SODIUM CHLORIDE 9 MG/ML
75 INJECTION, SOLUTION INTRAVENOUS CONTINUOUS
Status: DISCONTINUED | OUTPATIENT
Start: 2023-10-30 | End: 2023-11-02

## 2023-10-30 RX ORDER — POTASSIUM CHLORIDE 20 MEQ/1
40 TABLET, EXTENDED RELEASE ORAL ONCE
Status: COMPLETED | OUTPATIENT
Start: 2023-10-30 | End: 2023-10-30

## 2023-10-30 RX ORDER — MAGNESIUM SULFATE HEPTAHYDRATE 40 MG/ML
4 INJECTION, SOLUTION INTRAVENOUS ONCE
Status: COMPLETED | OUTPATIENT
Start: 2023-10-30 | End: 2023-10-30

## 2023-10-30 RX ADMIN — Medication 10 ML: at 10:19

## 2023-10-30 RX ADMIN — BENZONATATE 100 MG: 100 CAPSULE ORAL at 10:32

## 2023-10-30 RX ADMIN — AMLODIPINE BESYLATE 5 MG: 5 TABLET ORAL at 10:32

## 2023-10-30 RX ADMIN — BENZONATATE 100 MG: 100 CAPSULE ORAL at 20:35

## 2023-10-30 RX ADMIN — LEVOTHYROXINE SODIUM 100 MCG: 100 TABLET ORAL at 06:18

## 2023-10-30 RX ADMIN — BENZONATATE 100 MG: 100 CAPSULE ORAL at 14:34

## 2023-10-30 RX ADMIN — PIPERACILLIN SODIUM AND TAZOBACTAM SODIUM 2.25 G: 2; .25 INJECTION, SOLUTION INTRAVENOUS at 02:22

## 2023-10-30 RX ADMIN — PANTOPRAZOLE SODIUM 40 MG: 40 TABLET, DELAYED RELEASE ORAL at 06:18

## 2023-10-30 RX ADMIN — PIPERACILLIN SODIUM AND TAZOBACTAM SODIUM 2.25 G: 2; .25 INJECTION, SOLUTION INTRAVENOUS at 14:27

## 2023-10-30 RX ADMIN — POTASSIUM CHLORIDE 40 MEQ: 1500 TABLET, EXTENDED RELEASE ORAL at 10:32

## 2023-10-30 RX ADMIN — Medication 3 MG: at 20:35

## 2023-10-30 RX ADMIN — VANCOMYCIN HYDROCHLORIDE 125 MG: 125 CAPSULE ORAL at 20:35

## 2023-10-30 RX ADMIN — ENOXAPARIN SODIUM 30 MG: 30 INJECTION SUBCUTANEOUS at 00:37

## 2023-10-30 RX ADMIN — LOSARTAN POTASSIUM 100 MG: 50 TABLET, FILM COATED ORAL at 10:32

## 2023-10-30 RX ADMIN — CEFAZOLIN SODIUM 1 G: 1 INJECTION, SOLUTION INTRAVENOUS at 20:35

## 2023-10-30 RX ADMIN — MAGNESIUM SULFATE HEPTAHYDRATE 4 G: 40 INJECTION, SOLUTION INTRAVENOUS at 10:10

## 2023-10-30 RX ADMIN — PIPERACILLIN SODIUM AND TAZOBACTAM SODIUM 2.25 G: 2; .25 INJECTION, SOLUTION INTRAVENOUS at 10:14

## 2023-10-30 RX ADMIN — SODIUM CHLORIDE 75 ML/HR: 9 INJECTION, SOLUTION INTRAVENOUS at 10:14

## 2023-10-30 RX ADMIN — ACETAMINOPHEN 650 MG: 325 TABLET ORAL at 10:36

## 2023-10-30 ASSESSMENT — PAIN SCALES - GENERAL
PAINLEVEL_OUTOF10: 0 - NO PAIN
PAINLEVEL_OUTOF10: 1
PAINLEVEL_OUTOF10: 1
PAINLEVEL_OUTOF10: 0 - NO PAIN
PAINLEVEL_OUTOF10: 3

## 2023-10-30 ASSESSMENT — COGNITIVE AND FUNCTIONAL STATUS - GENERAL
MOVING TO AND FROM BED TO CHAIR: A LOT
STANDING UP FROM CHAIR USING ARMS: A LOT
TOILETING: A LITTLE
WALKING IN HOSPITAL ROOM: A LOT
TURNING FROM BACK TO SIDE WHILE IN FLAT BAD: A LITTLE
STANDING UP FROM CHAIR USING ARMS: A LOT
DAILY ACTIVITIY SCORE: 18
MOBILITY SCORE: 14
HELP NEEDED FOR BATHING: A LITTLE
MOVING TO AND FROM BED TO CHAIR: A LOT
MOBILITY SCORE: 14
MOVING FROM LYING ON BACK TO SITTING ON SIDE OF FLAT BED WITH BEDRAILS: A LITTLE
CLIMB 3 TO 5 STEPS WITH RAILING: A LOT
DRESSING REGULAR LOWER BODY CLOTHING: A LITTLE
PERSONAL GROOMING: A LITTLE
EATING MEALS: A LITTLE
MOVING FROM LYING ON BACK TO SITTING ON SIDE OF FLAT BED WITH BEDRAILS: A LITTLE
WALKING IN HOSPITAL ROOM: A LOT
CLIMB 3 TO 5 STEPS WITH RAILING: A LOT
DRESSING REGULAR UPPER BODY CLOTHING: A LITTLE
TURNING FROM BACK TO SIDE WHILE IN FLAT BAD: A LITTLE

## 2023-10-30 ASSESSMENT — PAIN - FUNCTIONAL ASSESSMENT
PAIN_FUNCTIONAL_ASSESSMENT: 0-10

## 2023-10-30 ASSESSMENT — PAIN DESCRIPTION - DESCRIPTORS: DESCRIPTORS: ACHING

## 2023-10-30 NOTE — PROGRESS NOTES
Bindu Cali is a 89 y.o. female on day 5 of admission presenting with Hypercalcemia.      Subjective    Over the weekend, had a fever, increasing white count.  Had another fever this a.m.  Had a loose stool, smelly per report and nurse.  Mental status waxing waning, however is improved today.  However, family increasingly concerned regarding hospital course.    Objective   Physical Exam  GENERAL: Elderly, awake/alert/oriented x2-3, no distress, sleepy however cooperative  HEENT: PERRL, EOMI  NECK: Normal Inspection  CARDIOVASCULAR: RRR, no murmurs, 2+ equal pulses of the extremities, normal S1 and S 2  RESPIRATORY: CTAB, normal breath sounds with good chest expansion, No Wheezes, Rales or Rhonchi  ABDOMEN: Soft, Non-Tender, Normal Bowel Sounds, No Distention  SKIN: Warm and dry  EXTREMITIES: normal extremities, no lower extremity edema, no knee joint erythema, PICC line without surrounding erythema or drainage (nontender to palpation)  NEURO: No appreciable deficits  PSYCH: Appropriate 70 C. difficile rule out that I cannot remember who used to    Assessment/Plan     89-year-old female with history of multiple myeloma (October 2022, follows with Dr. Gray, receives Bortezomib every 2 weeks presents with weakness and altered mental status. Missed her last infusion due to recent hardware infection related hospitalization, now with hypercalcemia (unchaged) and BIBIANA (resolving).      Plan:  -Corrected calcium improved today from yesterday, likely secondary to fluids.  Denosumab administered on Friday, typically takes 3 to 5 days for it to be effective.  Continue maintenance fluids for now.  -resume infusions every 2 weeks after discharge. Will follow as an outpatient, may need to potentially adjust infusion schedule a  -Further, has developed fevers and leukocytosis likely secondary to C. difficile in setting of long-term antibiotic use or hardware associated/PICC line/bacteremia complicating her hospital  course.  Goals of care conversation would be appropriate with the family.       Maryuri Monsalve, DO

## 2023-10-30 NOTE — CONSULTS
Reason For Consult  Status post removal of hardware and abscess incision and drainage    History Of Present Illness  Bindu Cali is a 89 y.o. female presenting with hypercalcemia and was admitted for this.  She has been treated medically for this reason.  However she is recently status post a right tibia removal of hardware for an abscess and osteomyelitis after an ORIF of her right tibia in April of this year.  She was scheduled to have her sutures removed within the past week.  However she was admitted because of the hypocalcemia.  Orthopedics was consulted because of more recent surgery.     Past Medical History  She has a past medical history of Chronic kidney disease, stage 3b (CMS/HCC) (02/25/2022), Essential (primary) hypertension (12/14/2022), Hypertension (11/07/2011), Multiple myeloma (CMS/[facility]), Other specified postprocedural states, Personal history of other medical treatment, Personal history of other medical treatment, Personal history of other medical treatment, Procedure and treatment not carried out because of patient's decision for unspecified reasons, and Vitamin D deficiency, unspecified.    Surgical History  She has a past surgical history that includes Other surgical history (04/28/2022); Total knee arthroplasty (01/22/2015); Cholecystectomy (01/22/2015); Tonsillectomy (01/22/2015); Rotator cuff repair (01/22/2015); Hysterectomy (01/22/2015); and Breast biopsy (01/22/2015).     Social History  She reports that she has never smoked. She has never used smokeless tobacco. She reports current alcohol use of about 1.0 standard drink of alcohol per week. She reports that she does not use drugs.    Family History  Family History   Problem Relation Name Age of Onset    Cancer Mother      Other (cardiac disorder) Mother      Other (cardiac disorder) Father      Other (cardiac disorder) Brother      Other (CVA) Brother      Other (HEART BASE TUMOR) Brother          Allergies  Morphine, Pollen  extracts, Aleve [naproxen sodium], Codeine, and Erythromycin      Physical Exam  Patient was examined today in her hospital bed.  She was in no acute distress.  Exam of her right leg reveals her incision is well-healed.  There is no warmth there is no erythema.  There is no obvious sinus still noted.  Sutures were removed.  She had no tenderness to palpation over the proximal tibia.     Last Recorded Vitals  Blood pressure 112/57, pulse 71, temperature 37.4 °C (99.3 °F), temperature source Temporal, resp. rate 20, height 1.524 m (5'), weight 63 kg (138 lb 14.2 oz), SpO2 92 %.    Relevant Results  X-rays of her right knee and tibia were personally reviewed.  They show evidence of a total knee replacement in appropriate alignment.  There is evidence of removal of tibial hardware.  There is no new fracture.  There is no evidence of any osteolysis in the proximal tibia.  Scheduled medications  amLODIPine, 5 mg, oral, Daily  benzonatate, 100 mg, oral, TID  enoxaparin, 30 mg, subcutaneous, q24h  [Held by provider] gabapentin, 300 mg, oral, Nightly  levothyroxine, 100 mcg, oral, Daily  losartan, 100 mg, oral, Daily  melatonin, 3 mg, oral, Nightly  pantoprazole, 40 mg, oral, Daily before breakfast  piperacillin-tazobactam, 2.25 g, intravenous, q6h  sodium chloride 0.9%, 10 mL, intravenous, Daily      Continuous medications  sodium chloride 0.9%, 75 mL/hr, Last Rate: 75 mL/hr (10/30/23 1014)      PRN medications  PRN medications: acetaminophen, benzocaine-menthol, dextromethorphan-guaifenesin, hydrALAZINE, metoprolol  Results for orders placed or performed during the hospital encounter of 10/25/23 (from the past 24 hour(s))   Magnesium   Result Value Ref Range    Magnesium 1.35 (L) 1.60 - 2.40 mg/dL   Renal Function Panel   Result Value Ref Range    Glucose 94 74 - 99 mg/dL    Sodium 129 (L) 136 - 145 mmol/L    Potassium 3.3 (L) 3.5 - 5.3 mmol/L    Chloride 97 (L) 98 - 107 mmol/L    Bicarbonate 23 21 - 32 mmol/L    Anion Gap  12 10 - 20 mmol/L    Urea Nitrogen 24 (H) 6 - 23 mg/dL    Creatinine 1.36 (H) 0.50 - 1.05 mg/dL    eGFR 37 (L) >60 mL/min/1.73m*2    Calcium 11.4 (H) 8.6 - 10.3 mg/dL    Phosphorus 2.1 (L) 2.5 - 4.9 mg/dL    Albumin 2.8 (L) 3.4 - 5.0 g/dL   CBC and Auto Differential   Result Value Ref Range    WBC 28.6 (H) 4.4 - 11.3 x10*3/uL    nRBC 0.0 0.0 - 0.0 /100 WBCs    RBC 3.01 (L) 4.00 - 5.20 x10*6/uL    Hemoglobin 9.0 (L) 12.0 - 16.0 g/dL    Hematocrit 27.0 (L) 36.0 - 46.0 %    MCV 90 80 - 100 fL    MCH 29.9 26.0 - 34.0 pg    MCHC 33.3 32.0 - 36.0 g/dL    RDW 16.1 (H) 11.5 - 14.5 %    Platelets 290 150 - 450 x10*3/uL    MPV 10.0 7.5 - 11.5 fL    Immature Granulocytes %, Automated 0.9 0.0 - 0.9 %    Immature Granulocytes Absolute, Automated 0.27 0.00 - 0.50 x10*3/uL   Manual Differential   Result Value Ref Range    Neutrophils %, Manual 40.0 40.0 - 80.0 %    Bands %, Manual 56.0 0.0 - 5.0 %    Lymphocytes %, Manual 3.0 13.0 - 44.0 %    Monocytes %, Manual 1.0 2.0 - 10.0 %    Eosinophils %, Manual 0.0 0.0 - 6.0 %    Basophils %, Manual 0.0 0.0 - 2.0 %    Seg Neutrophils Absolute, Manual 11.44 (H) 1.60 - 5.00 x10*3/uL    Bands Absolute, Manual 16.02 (H) 0.00 - 0.50 x10*3/uL    Lymphocytes Absolute, Manual 0.86 0.80 - 3.00 x10*3/uL    Monocytes Absolute, Manual 0.29 0.05 - 0.80 x10*3/uL    Eosinophils Absolute, Manual 0.00 0.00 - 0.40 x10*3/uL    Basophils Absolute, Manual 0.00 0.00 - 0.10 x10*3/uL    Total Cells Counted 100     Neutrophils Absolute, Manual 27.46 (H) 1.60 - 5.50 x10*3/uL    RBC Morphology See Below     Acanthocytes Few    Urinalysis with Reflex Microscopic and Culture   Result Value Ref Range    Color, Urine Yellow Straw, Yellow    Appearance, Urine Clear Clear    Specific Gravity, Urine 1.011 1.005 - 1.035    pH, Urine 5.0 5.0, 5.5, 6.0, 6.5, 7.0, 7.5, 8.0    Protein, Urine 30 (1+) (N) NEGATIVE mg/dL    Glucose, Urine NEGATIVE NEGATIVE mg/dL    Blood, Urine NEGATIVE NEGATIVE    Ketones, Urine NEGATIVE  NEGATIVE mg/dL    Bilirubin, Urine NEGATIVE NEGATIVE    Urobilinogen, Urine <2.0 <2.0 mg/dL    Nitrite, Urine NEGATIVE NEGATIVE    Leukocyte Esterase, Urine NEGATIVE NEGATIVE   Urinalysis Microscopic   Result Value Ref Range    WBC, Urine 1-5 1-5, NONE /HPF    RBC, Urine NONE NONE, 1-2, 3-5 /HPF   Vancomycin, Trough   Result Value Ref Range    Vancomycin, Trough 8.7 5.0 - 20.0 ug/mL        Assessment/Plan     At this time the patient is doing satisfactory following her surgeries by physicians at [facility] for an infected right tibial ORIF.  At this point time the sutures are removed.  She will continue on antibiotics as prescribed by infectious disease at her last visit the [facility].  Any further concerns regarding this surgery should be taken up with the treating surgeons.    Alan Phillips MD

## 2023-10-30 NOTE — CARE PLAN
The patient's goals for the shift include      The clinical goals for the shift include pts BP will stay WDL      Problem: Pain  Goal: My pain/discomfort is manageable  Outcome: Progressing     Problem: Safety  Goal: Patient will be injury free during hospitalization  Outcome: Progressing  Goal: I will remain free of falls  Outcome: Progressing     Problem: Daily Care  Goal: Daily care needs are met  Outcome: Progressing     Problem: Psychosocial Needs  Goal: Demonstrates ability to cope with hospitalization/illness  Outcome: Progressing  Goal: Collaborate with me, my family, and caregiver to identify my specific goals  Outcome: Progressing     Problem: Discharge Barriers  Goal: My discharge needs are met  Outcome: Progressing

## 2023-10-30 NOTE — CARE PLAN
Problem: Pain  Goal: My pain/discomfort is manageable  Outcome: Progressing     Problem: Safety  Goal: Patient will be injury free during hospitalization  Outcome: Progressing  Goal: I will remain free of falls  Outcome: Progressing     Problem: Daily Care  Goal: Daily care needs are met  Outcome: Progressing     Problem: Psychosocial Needs  Goal: Demonstrates ability to cope with hospitalization/illness  Outcome: Progressing  Goal: Collaborate with me, my family, and caregiver to identify my specific goals  Outcome: Progressing     Problem: Discharge Barriers  Goal: My discharge needs are met  Outcome: Progressing   The patient's goals for the shift include      The clinical goals for the shift include pt's BP with stay WDL

## 2023-10-30 NOTE — PROGRESS NOTES
Speech-Language Pathology    Inpatient Speech-Language Pathology Clinical Swallow Evaluation    Patient Name: Bindu Cali  MRN: [id number]  Today's Date: 10/30/2023   Time Calculation  Start Time: 1050  Stop Time: 1112  Time Calculation (min): 22 min     Current Problem:   1. Hypercalcemia        2. Acute kidney injury (CMS/HCC)        3. Hypomagnesemia        4. Multiple myeloma, remission status unspecified (CMS/HCC)          Recommendations:  Risk for Aspiration: Yes  Solid Diet Recommendations : Soft & bite sized/chopped (IDDSI Level 6)  Liquid Diet Recommendations: Nectar thick/mildly thick (IDDS Level 2)  Compensatory Swallowing Strategies: Upright 90 degrees as possible for all oral intake, Single sips, Eat/feed slowly, may require assistance in eating d/t medical status, only feed when awake and alert.  Medication Administration Recommendations: Whole, With Pureed    Assessment:  Prognosis: Good  Medical Staff Made Aware: Yes  Barriers: Medication Status  Patient presented with mild oral stage dysphagia and suspected pharyngeal stage dysphagia.  Problems include: increased mastication time for soft solids, delayed swallow onset and dis coordinated swallow on palpation during thin liquid trials characterized by immediate cough across trials.  Overt s/s of penetration and/or aspiration eliminated with mildly thick liquids.  Purees tolerated without difficulty and when given increased time to chew soft solids were also tolerated.  Regular solids deferred at this time.  Overall, patient appears weak.    Plan:  Treatment/Interventions: Assess diet tolerance, Diet recommendations, Patient/family education  SLP Plan: Skilled SLP  SLP Frequency: 3x per week  Duration: 1 week   Pt will tolerate prescribed diet without evidence of dysphagia during observed meals.   Reassessment of swallow for diet advancement as appropriate.  Pt/family education.    Subjective   Current Problem:  Dysphagia related to current  "weakness and medical condition.    General Visit Information:  Living Environment: Home  Referred By: Dr. Loyola  Past Medical History Relevant to Rehab:  (HTN, CKD 2, hypothyroidism, GERD, MGUS, multiple myeloma presenting to [facility] with chief complaint of weakness and confusion.   The patient was recently admitted for lower extremity cellulitis and hardware infection.)  Date of Onset: 10/28/23  [employer] Diet: Regular/thin  Current Diet : Regular/thin  Patient pleasant and cooperative; did fatigue quickly and feels warm to the touch.  RN endorses patient being febrile.  Able to answer questions about recent hospitalization and recent tibia fracture.    Vital Signs:  WBC:  28.6  CXR: + lung node; otherwise clear with no evidence of infiltrate    Objective     Baseline Assessment:  Respiratory Status: Room air    Pain:  Pain Assessment: 0-10  Pain Score: 1     Oral/Motor Assessment:  Dentition: Dentures (Upper Full)/Natural lower    Consistencies Trialed:  Consistencies Trialed: Yes  Consistencies Trialed: Thin (IDDSI Level 0) - Straw, Thin (IDDSI Level 0) - Spoon, Nectar thick/mildly thick (IDDSI Level 2) - Straw, Nectar thick/mildly thick (IDDSI Level 2) - Cup, Nectar thick/mildly thick (IDDSI Level 2) - Spoon, Pureed/extremely thick (IDDSI Level 4), Soft & bite sized/chopped (IDDSI Level 6)    Clinical Observations:  Patient Positioning: Upright in Bed      Inpatient:  Education Documentation    Education:  Learner patient; RN   Barriers to Learning acuteness of illness barrier   Method verbal; written- (Written \"Swallowing Guidelines\" posted at patient's bedside)   Education - Topic ST provided patient education regarding role of ST, purpose of assessment, clinical impressions, goals of treatment, and plan of care. Patient verbalized questionable full comprehension, consistent with cognitive status. Education will be reinforced. ST further coordinated with RN regarding recommendations and precautions per this " assessment, with RN verbalizing understanding.     Outcome    Verbalized understanding and agreement;  needs review/reinforcement      Education Comments  No comments found.

## 2023-10-30 NOTE — PROGRESS NOTES
Met with pt this afternoon, pt slightly confused, requested to talk with family and pt agreed to let me call her family.  Pt remains with fever, not medically stable, [facility]  14, IV ABX changed to IV Vanco and IV Zosyn, this pt was at home with iv infusion with IV Ancef, will follow up with MD regarding plan of care at discharge.,  Rebecca Blackwood RN TCC

## 2023-10-30 NOTE — PROGRESS NOTES
Bindu Cali is a 89 y.o. female on day 5 of admission presenting with Hypercalcemia.    SUBJECTIVE    Overnight, patient had episodes of diarrhea and night team ordered a C. difficile test.  Patient was also found to have 1 L of urine retained and was received folley catheter.  Patient's temperature remained elevated.  Patient was able to answer all orientation questions correctly.  Patient was very drowsy and would often fall asleep mid questioning.  Patient denies any chest pain, abdominal pain, fevers, urinary changes, burning with urination, sore throat, or any other new symptoms.  Patient continuing to have occasional coughs.    OBJECTIVE    Physical Exam:  General:  Very drowsy.No apparent distress. Warm to the touch  HEENT:  Normocephalic, atraumatic  Chest:  Clear to auscultation bilaterally. No wheezes, rales, or rhonchi.  CV:  Regular rate and rhythm. No murmurs    Abdomen: Abdomen is soft, non-tender, non-distended  Extremities:  No lower extremity edema or cyanosis.   Neurological:  AAOx1. No focal deficits.  Skin:  Warm and dry.      Last Recorded Vitals  Blood pressure 149/65, pulse 89, temperature 38.6 °C (101.5 °F), temperature source Temporal, resp. rate 20, height 1.524 m (5'), weight 63 kg (138 lb 14.2 oz), SpO2 91 %.  Intake/Output last 3 Shifts:  I/O last 3 completed shifts:  In: 978.8 (15.5 mL/kg) [I.V.:678.8 (10.8 mL/kg); IV Piggyback:300]  Out: 1800 (28.6 mL/kg) [Urine:1800 (0.8 mL/kg/hr)]  Weight: 63 kg     Last CBC & BMP  Lab Results   Component Value Date    GLUCOSE 94 10/30/2023    CALCIUM 11.4 (H) 10/30/2023     (L) 10/30/2023    K 3.3 (L) 10/30/2023    CO2 23 10/30/2023    CL 97 (L) 10/30/2023    BUN 24 (H) 10/30/2023    CREATININE 1.36 (H) 10/30/2023     Lab Results   Component Value Date    WBC 28.6 (H) 10/30/2023    HGB 9.0 (L) 10/30/2023    HCT 27.0 (L) 10/30/2023    MCV 90 10/30/2023     10/30/2023       ASSESSMENT & PLAN    Hypercalcemia secondary to multiple  myeloma  Weakness & Fatique   -Patient's hypercalcemia is likely secondary to her multiple myeloma.  She has had similar episode in the past requiring hospitalization in past her calcium was the main problem  Plan:   -Patients corrected calcium remains unchanged today   -Fluids at 75 NS  -Heme-onc following, received denosuab    Right proximal tibial fracture complicated by hardware infection with cellulitis (from prior hospitalization)  Coughs  Fever  Elevated procal  Plan:   -Ancef discontinued and switched to Vanc/Zosyn for broader coverage, 11/17  -CT chest/UA/BC/Covid unremarkable  -Ordered C.Diff  -Ordered Xray of right leg and consulted Ortho    Persistent Hypertension  Plan:   -Continue with home Losartan 100  -Amlodipine increased to 5  -Hydralazine 5 PRN    Chronic kidney disease stage II   Creatinine elevation  Plan:   -Continue to trend, low concern for BIBIANA    Electrolyte abnormalities:  Hypomagnesemia  Hyperkalemia  Plan:   -Replete K & Mg as needed     Finding of pulmonary nodule on CT  Plan:   -Follow up outpatient    Chronic medical conditions:  Hypertension  Hypothyroidism  GERD  Plan:   -Continue with, losartan, omeprazole, levothyroxine, held jd     DVT ppx: Lovenox   GI ppx: PPI  IVF: 75 NS  Diet: Regular  Consults: Heme/Onc    Juvenal Ugarte MD   PGY-1, Internal Medicine  Please SecureChat for any further questions  This is a preliminary note, please await attending attestation for final A/P

## 2023-10-31 LAB
ALBUMIN SERPL BCP-MCNC: 2.3 G/DL (ref 3.4–5)
ANION GAP SERPL CALC-SCNC: 11 MMOL/L (ref 10–20)
BUN SERPL-MCNC: 38 MG/DL (ref 6–23)
CALCIUM SERPL-MCNC: 10.7 MG/DL (ref 8.6–10.3)
CHLORIDE SERPL-SCNC: 100 MMOL/L (ref 98–107)
CO2 SERPL-SCNC: 23 MMOL/L (ref 21–32)
CREAT SERPL-MCNC: 1.68 MG/DL (ref 0.5–1.05)
ERYTHROCYTE [DISTWIDTH] IN BLOOD BY AUTOMATED COUNT: 16.5 % (ref 11.5–14.5)
GFR SERPL CREATININE-BSD FRML MDRD: 29 ML/MIN/1.73M*2
GLUCOSE SERPL-MCNC: 101 MG/DL (ref 74–99)
HCT VFR BLD AUTO: 25.7 % (ref 36–46)
HGB BLD-MCNC: 8.4 G/DL (ref 12–16)
MAGNESIUM SERPL-MCNC: 2.36 MG/DL (ref 1.6–2.4)
MCH RBC QN AUTO: 29.5 PG (ref 26–34)
MCHC RBC AUTO-ENTMCNC: 32.7 G/DL (ref 32–36)
MCV RBC AUTO: 90 FL (ref 80–100)
NRBC BLD-RTO: 0 /100 WBCS (ref 0–0)
PHOSPHATE SERPL-MCNC: 2.5 MG/DL (ref 2.5–4.9)
PLATELET # BLD AUTO: 286 X10*3/UL (ref 150–450)
PMV BLD AUTO: 10.2 FL (ref 7.5–11.5)
POTASSIUM SERPL-SCNC: 3.9 MMOL/L (ref 3.5–5.3)
RBC # BLD AUTO: 2.85 X10*6/UL (ref 4–5.2)
SODIUM SERPL-SCNC: 130 MMOL/L (ref 136–145)
WBC # BLD AUTO: 39.7 X10*3/UL (ref 4.4–11.3)

## 2023-10-31 PROCEDURE — 96372 THER/PROPH/DIAG INJ SC/IM: CPT

## 2023-10-31 PROCEDURE — 80069 RENAL FUNCTION PANEL: CPT | Performed by: STUDENT IN AN ORGANIZED HEALTH CARE EDUCATION/TRAINING PROGRAM

## 2023-10-31 PROCEDURE — 99233 SBSQ HOSP IP/OBS HIGH 50: CPT

## 2023-10-31 PROCEDURE — 1090000002 HH PPS REVENUE DEBIT

## 2023-10-31 PROCEDURE — 92526 ORAL FUNCTION THERAPY: CPT | Mod: GN | Performed by: IN HOME SUPPORTIVE CARE

## 2023-10-31 PROCEDURE — 1200000002 HC GENERAL ROOM WITH TELEMETRY DAILY

## 2023-10-31 PROCEDURE — 83735 ASSAY OF MAGNESIUM: CPT

## 2023-10-31 PROCEDURE — 1090000001 HH PPS REVENUE CREDIT

## 2023-10-31 PROCEDURE — 2500000004 HC RX 250 GENERAL PHARMACY W/ HCPCS (ALT 636 FOR OP/ED): Performed by: STUDENT IN AN ORGANIZED HEALTH CARE EDUCATION/TRAINING PROGRAM

## 2023-10-31 PROCEDURE — 2500000001 HC RX 250 WO HCPCS SELF ADMINISTERED DRUGS (ALT 637 FOR MEDICARE OP): Performed by: STUDENT IN AN ORGANIZED HEALTH CARE EDUCATION/TRAINING PROGRAM

## 2023-10-31 PROCEDURE — 2500000004 HC RX 250 GENERAL PHARMACY W/ HCPCS (ALT 636 FOR OP/ED)

## 2023-10-31 PROCEDURE — 2500000001 HC RX 250 WO HCPCS SELF ADMINISTERED DRUGS (ALT 637 FOR MEDICARE OP)

## 2023-10-31 PROCEDURE — 85027 COMPLETE CBC AUTOMATED: CPT

## 2023-10-31 RX ADMIN — VANCOMYCIN HYDROCHLORIDE 125 MG: 125 CAPSULE ORAL at 16:00

## 2023-10-31 RX ADMIN — ENOXAPARIN SODIUM 30 MG: 30 INJECTION SUBCUTANEOUS at 05:37

## 2023-10-31 RX ADMIN — BENZONATATE 100 MG: 100 CAPSULE ORAL at 08:00

## 2023-10-31 RX ADMIN — CEFAZOLIN SODIUM 1 G: 1 INJECTION, SOLUTION INTRAVENOUS at 18:42

## 2023-10-31 RX ADMIN — AMLODIPINE BESYLATE 5 MG: 5 TABLET ORAL at 08:00

## 2023-10-31 RX ADMIN — Medication 3 MG: at 20:24

## 2023-10-31 RX ADMIN — BENZONATATE 100 MG: 100 CAPSULE ORAL at 20:24

## 2023-10-31 RX ADMIN — BENZONATATE 100 MG: 100 CAPSULE ORAL at 15:16

## 2023-10-31 RX ADMIN — SODIUM CHLORIDE 75 ML/HR: 9 INJECTION, SOLUTION INTRAVENOUS at 12:41

## 2023-10-31 RX ADMIN — VANCOMYCIN HYDROCHLORIDE 125 MG: 125 CAPSULE ORAL at 12:33

## 2023-10-31 RX ADMIN — LEVOTHYROXINE SODIUM 100 MCG: 100 TABLET ORAL at 05:38

## 2023-10-31 RX ADMIN — PANTOPRAZOLE SODIUM 40 MG: 40 TABLET, DELAYED RELEASE ORAL at 07:46

## 2023-10-31 RX ADMIN — Medication 10 ML: at 08:00

## 2023-10-31 RX ADMIN — VANCOMYCIN HYDROCHLORIDE 125 MG: 125 CAPSULE ORAL at 07:46

## 2023-10-31 RX ADMIN — CEFAZOLIN SODIUM 1 G: 1 INJECTION, SOLUTION INTRAVENOUS at 07:46

## 2023-10-31 RX ADMIN — VANCOMYCIN HYDROCHLORIDE 125 MG: 125 CAPSULE ORAL at 20:24

## 2023-10-31 ASSESSMENT — COGNITIVE AND FUNCTIONAL STATUS - GENERAL
DRESSING REGULAR UPPER BODY CLOTHING: A LITTLE
CLIMB 3 TO 5 STEPS WITH RAILING: A LOT
MOVING TO AND FROM BED TO CHAIR: A LITTLE
HELP NEEDED FOR BATHING: A LITTLE
TOILETING: A LITTLE
DAILY ACTIVITIY SCORE: 18
EATING MEALS: A LITTLE
STANDING UP FROM CHAIR USING ARMS: A LITTLE
MOBILITY SCORE: 16
DRESSING REGULAR LOWER BODY CLOTHING: A LITTLE
TURNING FROM BACK TO SIDE WHILE IN FLAT BAD: A LITTLE
MOVING FROM LYING ON BACK TO SITTING ON SIDE OF FLAT BED WITH BEDRAILS: A LITTLE
PERSONAL GROOMING: A LITTLE
WALKING IN HOSPITAL ROOM: A LOT

## 2023-10-31 ASSESSMENT — PAIN - FUNCTIONAL ASSESSMENT: PAIN_FUNCTIONAL_ASSESSMENT: 0-10

## 2023-10-31 ASSESSMENT — PAIN SCALES - GENERAL
PAINLEVEL_OUTOF10: 0 - NO PAIN
PAINLEVEL_OUTOF10: 0 - NO PAIN

## 2023-10-31 NOTE — PROGRESS NOTES
Speech-Language Pathology    Inpatient  Speech-Language Pathology Treatment     Patient Name: Bindu Cali  MRN: [id number]  Today's Date: 10/31/2023  Time Calculation  Start Time: 0945  Stop Time: 1000  Time Calculation (min): 15 min     Current Problem:   1. Hypercalcemia        2. Acute kidney injury (CMS/HCC)        3. Hypomagnesemia        4. Multiple myeloma, remission status unspecified (CMS/HCC)        Positive for C-diff    SLP Assessment:  Prognosis: Good  Treatment Provided: Yes   Medical Staff Made Aware: Yes; d/w RN who reports good tolerance of a.m. meal and pills taken 1 at a time. During attempt to take 2 she had to spit one out and take it separate.  Barriers: Medication Status  Patient more awake and alert this a.m. Soft solids at bedside that had been moistened with thick liquids provided to patient. Improved mastication time and oral clearing this date.  Patient reports good tolerance of the soft food.  Mildly thick liquids consumed via straw sips without overt s/s of penetration and/or aspiration.  Patient did fatigue quickly; thin liquid trials deferred this date.      Plan:  SLP Frequency: 3x per week  Duration: 1 week  Will continue to monitor for diet advancement as patient's medical status improves.    Subjective   Current Problem:  Dysphagia related to generalized weakness    Most Recent Visit:  SLP Received On: 10/31/23    General Visit Information:   Referred By: Dr. Loyola  Past Medical History Relevant to Rehab:  (HTN, CKD 2, hypothyroidism, GERD, MGUS, multiple myeloma presenting to [facility] with chief complaint of weakness and confusion.   The patient was recently admitted for lower extremity cellulitis and hardware infection.)      Pain Assessment:   Pain Assessment: 0-10  Pain Score: 1      Objective   Therapeutic Swallow:  Therapeutic Swallow Intervention : PO Trials  Solid Diet Recommendations: Soft & bite sized/chopped (IDDSI Level 6)  Liquid Diet Recommendations: Nectar  "thick/mildly thick (IDDS Level 2)      Inpatient:  Education Documentation    Education:  Learner patient;    Barriers to Learning acuteness of illness barrier   Method  verbal; written- (Written \"Swallowing Guidelines\" posted at patient's bedside)   Education - Topic ST provided patient education regarding role of ST, purpose of assessment, clinical impressions, goals of treatment, and plan of care. Patient verbalized understanding.  Education will be reinforced. ST further coordinated with RN regarding recommendations and precautions per this assessment, with RN verbalizing understanding.     Outcome    Verbalized understanding and agreement;              "

## 2023-10-31 NOTE — PROGRESS NOTES
Met with pt and her daughter this afternoon, pt much more awake and oriented today.  NOTE:  pt is positive for C. Diff.  Plan is for home back with infusion and was later discussed that pt would go back on IV  ANCEF until Nov 17th  BID.  Per daughter plan is probable for home, will have therapy see pt for treatment and to ensure safety for pt if plan is for home.  Will keep pt'd daughter Nancy updated, she is reachable at [phone].  Updated Dr Simpson.  Rebecca Blackwood RN T[facility]

## 2023-10-31 NOTE — CARE PLAN
The patient's goals for the shift include  feel better    The clinical goals for the shift include Patient LOC will return to baseline    Patient is more awake today and making progress towards baseline

## 2023-10-31 NOTE — PROGRESS NOTES
Bindu Cali is a 89 y.o. female on day 6 of admission presenting with Hypercalcemia.    SUBJECTIVE    No acute overnight events.  Patient continues to be more awake today prior to previous days.  Patient endorsing mild abdominal pain, an episode of diarrhea, and occasional coughs.  Patient denying chest pain, shortness of breath, or any other new/acute symptoms.    OBJECTIVE    Physical Exam:  General: No apparent distress. Sitting upright eating breakfast   HEENT:  Normocephalic, atraumatic  Chest:  Clear to auscultation bilaterally. No wheezes, rales, or rhonchi.  CV:  Regular rate and rhythm. No murmurs    Abdomen: Abdomen is soft, non-tender, non-distended  Extremities:  No lower extremity edema or cyanosis.   Neurological:  AAOx3. No focal deficits.  Skin:  Warm and dry.      Last Recorded Vitals  Blood pressure 152/74, pulse 80, temperature 36.8 °C (98.2 °F), resp. rate 17, height 1.524 m (5'), weight 63 kg (138 lb 14.2 oz), SpO2 95 %.  Intake/Output last 3 Shifts:  I/O last 3 completed shifts:  In: 2210 (35.1 mL/kg) [P.O.:120; I.V.:1690 (26.8 mL/kg); IV Piggyback:400]  Out: 1850 (29.4 mL/kg) [Urine:1850 (0.8 mL/kg/hr)]  Weight: 63 kg     Last CBC & BMP  Lab Results   Component Value Date    GLUCOSE 101 (H) 10/31/2023    CALCIUM 10.7 (H) 10/31/2023     (L) 10/31/2023    K 3.9 10/31/2023    CO2 23 10/31/2023     10/31/2023    BUN 38 (H) 10/31/2023    CREATININE 1.68 (H) 10/31/2023     Lab Results   Component Value Date    WBC 39.7 (H) 10/31/2023    HGB 8.4 (L) 10/31/2023    HCT 25.7 (L) 10/31/2023    MCV 90 10/31/2023     10/31/2023       ASSESSMENT & PLAN    Hypercalcemia secondary to multiple myeloma  Weakness & Fatique   -Patient's hypercalcemia is likely secondary to her multiple myeloma.  She has had similar episode in the past requiring hospitalization in past her calcium was the main problem  Plan:   -Patients corrected calcium slowly improving  -Fluids at 75 NS  -Heme-onc  following, received denosuab    Nonbloody Diarrhea with C.Diff   Fever (Resolved)  Elevated procal  Leukocytosis (Worsening)  Plan:   -Patient placed on contact precautions  -Patient started on oral vancomycin while on ancef and for a week following the end of the ancef course    Right proximal tibial fracture complicated by hardware infection with cellulitis (from prior hospitalization)  Plan:   -Ancef resumed given no significant findings of x-ray  -CT chest/UA/BC/Covid unremarkable  -Ortho saw patient and did not believe her current plan needed any changes    Persistent Hypertension  Plan:   -Continue with home Losartan 100  -Amlodipine increased to 5  -Hydralazine 5 PRN    Chronic kidney disease stage II   Creatinine elevation  Hyponatremia  Plan:   -Continue to trend, low concern for BIBIANA  -Patient's hyponatremia and creatinine elevation are likely secondary to diarrhea and decreased oral intake, and diarrhea over the last 2-3 days  -Patient will continue to get fluids    Electrolyte abnormalities:  Hypomagnesemia  Hyperkalemia  Plan:   -Replete K & Mg as needed     Finding of pulmonary nodule on CT  Plan:   -Follow up outpatient    Chronic medical conditions:  Hypertension  Hypothyroidism  GERD  Plan:   -Continue with, losartan, omeprazole, levothyroxine, held jd     DVT ppx: Lovenox   GI ppx: PPI  IVF: 75 NS  Diet: Regular  Consults: Heme/Onc    Juvenal Ugarte MD   PGY-1, Internal Medicine  Please SecureChat for any further questions  This is a preliminary note, please await attending attestation for final A/P

## 2023-11-01 LAB
ALBUMIN SERPL BCP-MCNC: 2.2 G/DL (ref 3.4–5)
ANION GAP SERPL CALC-SCNC: 11 MMOL/L (ref 10–20)
BUN SERPL-MCNC: 45 MG/DL (ref 6–23)
CALCIUM SERPL-MCNC: 10.2 MG/DL (ref 8.6–10.3)
CHLORIDE SERPL-SCNC: 99 MMOL/L (ref 98–107)
CO2 SERPL-SCNC: 23 MMOL/L (ref 21–32)
CREAT SERPL-MCNC: 1.53 MG/DL (ref 0.5–1.05)
ERYTHROCYTE [DISTWIDTH] IN BLOOD BY AUTOMATED COUNT: 16.4 % (ref 11.5–14.5)
GFR SERPL CREATININE-BSD FRML MDRD: 32 ML/MIN/1.73M*2
GLUCOSE SERPL-MCNC: 104 MG/DL (ref 74–99)
HCT VFR BLD AUTO: 25 % (ref 36–46)
HGB BLD-MCNC: 8.3 G/DL (ref 12–16)
MAGNESIUM SERPL-MCNC: 2.12 MG/DL (ref 1.6–2.4)
MCH RBC QN AUTO: 29.4 PG (ref 26–34)
MCHC RBC AUTO-ENTMCNC: 33.2 G/DL (ref 32–36)
MCV RBC AUTO: 89 FL (ref 80–100)
NRBC BLD-RTO: 0 /100 WBCS (ref 0–0)
PHOSPHATE SERPL-MCNC: 1.6 MG/DL (ref 2.5–4.9)
PLATELET # BLD AUTO: 280 X10*3/UL (ref 150–450)
PMV BLD AUTO: 9.9 FL (ref 7.5–11.5)
POTASSIUM SERPL-SCNC: 3.6 MMOL/L (ref 3.5–5.3)
RBC # BLD AUTO: 2.82 X10*6/UL (ref 4–5.2)
SODIUM SERPL-SCNC: 129 MMOL/L (ref 136–145)
WBC # BLD AUTO: 42.7 X10*3/UL (ref 4.4–11.3)

## 2023-11-01 PROCEDURE — 1090000001 HH PPS REVENUE CREDIT

## 2023-11-01 PROCEDURE — 1090000002 HH PPS REVENUE DEBIT

## 2023-11-01 PROCEDURE — 1200000002 HC GENERAL ROOM WITH TELEMETRY DAILY

## 2023-11-01 PROCEDURE — 2500000001 HC RX 250 WO HCPCS SELF ADMINISTERED DRUGS (ALT 637 FOR MEDICARE OP): Performed by: STUDENT IN AN ORGANIZED HEALTH CARE EDUCATION/TRAINING PROGRAM

## 2023-11-01 PROCEDURE — 97116 GAIT TRAINING THERAPY: CPT | Mod: GP,CQ

## 2023-11-01 PROCEDURE — 92526 ORAL FUNCTION THERAPY: CPT | Mod: GN

## 2023-11-01 PROCEDURE — 85027 COMPLETE CBC AUTOMATED: CPT

## 2023-11-01 PROCEDURE — 2500000005 HC RX 250 GENERAL PHARMACY W/O HCPCS: Performed by: STUDENT IN AN ORGANIZED HEALTH CARE EDUCATION/TRAINING PROGRAM

## 2023-11-01 PROCEDURE — 80069 RENAL FUNCTION PANEL: CPT | Performed by: STUDENT IN AN ORGANIZED HEALTH CARE EDUCATION/TRAINING PROGRAM

## 2023-11-01 PROCEDURE — 2500000004 HC RX 250 GENERAL PHARMACY W/ HCPCS (ALT 636 FOR OP/ED): Performed by: STUDENT IN AN ORGANIZED HEALTH CARE EDUCATION/TRAINING PROGRAM

## 2023-11-01 PROCEDURE — 96372 THER/PROPH/DIAG INJ SC/IM: CPT

## 2023-11-01 PROCEDURE — 97535 SELF CARE MNGMENT TRAINING: CPT | Mod: CO,GO

## 2023-11-01 PROCEDURE — 2500000001 HC RX 250 WO HCPCS SELF ADMINISTERED DRUGS (ALT 637 FOR MEDICARE OP)

## 2023-11-01 PROCEDURE — 99232 SBSQ HOSP IP/OBS MODERATE 35: CPT

## 2023-11-01 PROCEDURE — 2500000004 HC RX 250 GENERAL PHARMACY W/ HCPCS (ALT 636 FOR OP/ED)

## 2023-11-01 PROCEDURE — 83735 ASSAY OF MAGNESIUM: CPT

## 2023-11-01 RX ADMIN — VANCOMYCIN HYDROCHLORIDE 125 MG: 125 CAPSULE ORAL at 17:19

## 2023-11-01 RX ADMIN — VANCOMYCIN HYDROCHLORIDE 125 MG: 125 CAPSULE ORAL at 06:44

## 2023-11-01 RX ADMIN — Medication 10 ML: at 11:56

## 2023-11-01 RX ADMIN — SODIUM CHLORIDE 75 ML/HR: 9 INJECTION, SOLUTION INTRAVENOUS at 05:13

## 2023-11-01 RX ADMIN — BENZONATATE 100 MG: 100 CAPSULE ORAL at 17:19

## 2023-11-01 RX ADMIN — SODIUM PHOSPHATE, MONOBASIC, MONOHYDRATE AND SODIUM PHOSPHATE, DIBASIC, ANHYDROUS 15 MMOL: 142; 276 INJECTION, SOLUTION INTRAVENOUS at 12:01

## 2023-11-01 RX ADMIN — BENZONATATE 100 MG: 100 CAPSULE ORAL at 20:19

## 2023-11-01 RX ADMIN — ENOXAPARIN SODIUM 30 MG: 30 INJECTION SUBCUTANEOUS at 05:31

## 2023-11-01 RX ADMIN — LEVOTHYROXINE SODIUM 100 MCG: 100 TABLET ORAL at 05:32

## 2023-11-01 RX ADMIN — Medication 3 MG: at 20:19

## 2023-11-01 RX ADMIN — AMLODIPINE BESYLATE 5 MG: 5 TABLET ORAL at 11:54

## 2023-11-01 RX ADMIN — CEFAZOLIN SODIUM 1 G: 1 INJECTION, SOLUTION INTRAVENOUS at 06:44

## 2023-11-01 RX ADMIN — PANTOPRAZOLE SODIUM 40 MG: 40 TABLET, DELAYED RELEASE ORAL at 11:55

## 2023-11-01 RX ADMIN — SODIUM CHLORIDE 75 ML/HR: 9 INJECTION, SOLUTION INTRAVENOUS at 20:18

## 2023-11-01 RX ADMIN — VANCOMYCIN HYDROCHLORIDE 125 MG: 125 CAPSULE ORAL at 12:07

## 2023-11-01 RX ADMIN — CEFAZOLIN SODIUM 1 G: 1 INJECTION, SOLUTION INTRAVENOUS at 20:13

## 2023-11-01 RX ADMIN — VANCOMYCIN HYDROCHLORIDE 125 MG: 125 CAPSULE ORAL at 20:19

## 2023-11-01 RX ADMIN — BENZONATATE 100 MG: 100 CAPSULE ORAL at 11:55

## 2023-11-01 ASSESSMENT — COGNITIVE AND FUNCTIONAL STATUS - GENERAL
CLIMB 3 TO 5 STEPS WITH RAILING: TOTAL
EATING MEALS: A LITTLE
WALKING IN HOSPITAL ROOM: A LOT
TOILETING: TOTAL
DAILY ACTIVITIY SCORE: 11
MOVING TO AND FROM BED TO CHAIR: A LOT
TURNING FROM BACK TO SIDE WHILE IN FLAT BAD: A LITTLE
DRESSING REGULAR LOWER BODY CLOTHING: TOTAL
STANDING UP FROM CHAIR USING ARMS: A LITTLE
MOVING FROM LYING ON BACK TO SITTING ON SIDE OF FLAT BED WITH BEDRAILS: A LITTLE
MOBILITY SCORE: 16
WALKING IN HOSPITAL ROOM: A LOT
HELP NEEDED FOR BATHING: A LOT
TOILETING: TOTAL
MOVING TO AND FROM BED TO CHAIR: A LITTLE
DRESSING REGULAR UPPER BODY CLOTHING: A LOT
MOVING FROM LYING ON BACK TO SITTING ON SIDE OF FLAT BED WITH BEDRAILS: A LOT
WALKING IN HOSPITAL ROOM: A LOT
DRESSING REGULAR UPPER BODY CLOTHING: A LOT
HELP NEEDED FOR BATHING: A LOT
DRESSING REGULAR LOWER BODY CLOTHING: TOTAL
TURNING FROM BACK TO SIDE WHILE IN FLAT BAD: A LITTLE
MOVING TO AND FROM BED TO CHAIR: A LOT
MOBILITY SCORE: 11
MOVING FROM LYING ON BACK TO SITTING ON SIDE OF FLAT BED WITH BEDRAILS: A LITTLE
CLIMB 3 TO 5 STEPS WITH RAILING: A LOT
DAILY ACTIVITIY SCORE: 11
PERSONAL GROOMING: A LOT
STANDING UP FROM CHAIR USING ARMS: A LOT
EATING MEALS: A LITTLE
CLIMB 3 TO 5 STEPS WITH RAILING: A LOT
STANDING UP FROM CHAIR USING ARMS: A LOT
TURNING FROM BACK TO SIDE WHILE IN FLAT BAD: A LOT
PERSONAL GROOMING: A LOT
MOBILITY SCORE: 14

## 2023-11-01 ASSESSMENT — PAIN SCALES - GENERAL
PAINLEVEL_OUTOF10: 0 - NO PAIN

## 2023-11-01 ASSESSMENT — PAIN - FUNCTIONAL ASSESSMENT
PAIN_FUNCTIONAL_ASSESSMENT: 0-10

## 2023-11-01 ASSESSMENT — ACTIVITIES OF DAILY LIVING (ADL): HOME_MANAGEMENT_TIME_ENTRY: 25

## 2023-11-01 NOTE — PROGRESS NOTES
Nutrition Note      Recommendation(s):  Diet as ordered   Magic cup twice daily (For an additional 290 kcals, 9 gm protein each)  and Mighty shakes twice daily (For an additional 220 kcals, 6 gm protein each)    3.    Consider daily MVI   4.    Reweigh at least every 5 days       Subjective      Energy Intake:  (only one meal intake recorded on flow sheet ( 5% on 10/30))  Food and Nutrient History: LIves with son  Pt eating breakfast at time of attempted visit. Diet was downgraded on 10/30 due to mild oral dysphagia and suspected pharyngeal dysphagia. Secure chat sent to RN regarding patient's meal intakes as there is only one recorded on flow sheet since admit.  Pt remains in isolation for C-diff.      Difficulty chewing: yes, texture modified diet   Difficulty swallowing: yes, texture modified diet and thickened liquids    Objective   Per Flowsheet Percent Meal intake: Other (Comment) (5%)  Dietary Orders (From admission, onward)       Start     Ordered    11/01/23 1013  Oral nutritional supplements  Until discontinued        Question Answer Comment   Deliver with Lunch    Deliver with Dinner    Select supplement: Magic Cup        11/01/23 1012    11/01/23 1012  Oral nutritional supplements  Until discontinued        Question Answer Comment   Deliver with Breakfast    Deliver with Dinner    Select supplement: Mighty Shake        11/01/23 1012    10/30/23 1318  Adult diet Regular; Bite size food 6; Mild thick 2  Diet effective now        Question Answer Comment   Diet type Regular    Texture Bite size food 6    Fluid consistency Mild thick 2        10/30/23 1318                  Independent after set-up  Results from last 7 days   Lab Units 11/01/23  0515 10/31/23  0510 10/30/23  0611   GLUCOSE mg/dL 104* 101* 94   SODIUM mmol/L 129* 130* 129*   POTASSIUM mmol/L 3.6 3.9 3.3*   CHLORIDE mmol/L 99 100 97*   CO2 mmol/L 23 23 23   BUN mg/dL 45* 38* 24*   CREATININE mg/dL 1.53* 1.68* 1.36*   EGFR mL/min/1.73m*2 32* 29*  "37*   CALCIUM mg/dL 10.2 10.7* 11.4*   PHOSPHORUS mg/dL 1.6* 2.5 2.1*   MAGNESIUM mg/dL 2.12 2.36 1.35*     No results found for: \"HGBA1C\"  Results from last 7 days   Lab Units 10/28/23  1500 10/26/23  2037   POCT GLUCOSE mg/dL 160* 118*     GI per flowsheet:  Gastrointestinal  Gastrointestinal (WDL): Exceptions to WDL  Abdominal Tenderness: Soft  Bowel Sounds: All quadrants  Bowel Sounds (All Quadrants): Active  Last BM Date: 10/31/23  Bowel Incontinence: Yes  Stool Appearance: Loose  Stool Color: Brown  Last bowel movement documented: 10/31/23  PMH: reviewed   Allergies: Morphine, Pollen extracts, Aleve [naproxen sodium], Codeine, and Erythromycin     Anthropometrics:  Height: 152.4 cm (5')  Weight: 63 kg (138 lb 14.2 oz) (stated)  BMI (Calculated): 27.13       IBW: 45.4 kg  %IBW: 138 %       Weight History / % Weight Change: 10/15 64kg, 9/20 64.4kg, 7/19 64.4kg, 4/22 70.9kg (11% loss x6mo) , 10/2022 65.3kg  Significant Weight Loss: Yes  Interpretation of Weight Loss: >10% in 6 months    Estimated Nutritional Needs:  Method for Estimating Needs: 8288-5902 kcals (27-30 kcals/kg IBW)    Method for Estimating Needs: 54-68g (1.2-1.5 g/kg IBW)    Method for Estimating Needs: 1ml/kcal or per MD    Nutrition Focused Physical Findings:   Orbital Fat Pads:  (deferred, eating breakfast)       Edema  Edema: none    Skin: Positive (RLE with wound vac)  Pain Score: 0 - No pain     Nutrition Diagnosis   Patient has Malnutrition Diagnosis: No    Patient has Nutrition Diagnosis: Yes  Ongoing  Nutrition Diagnosis 1: Increased nutrient needs  Related to (1): physiological causes increasing nutrient needs  As Evidenced by (1): wound healing needs to RLE       Plan    Interventions  Collaboration and Referral of Nutrition Care: Collaboration by nutrition professional with other providers (Zee VENTURA)  Interventions: Meals and snacks  Meals and Snacks: Texture-modified diet, Fluid-modified diet  Goal: consume >75% of " meals  Additional Interventions: Patrice and Ensure high protein discontinued due to mildly thickened liquids ordered; Will order Mighty shakes and Magic cup both twice daily  Individualized Nutrition Prescription Provided for : Soft bite sized diet with Mildly thickened liquids ordered 10/30    Nutrition Monitoring and Evaluation   Body Composition/Growth/Weight History  Monitoring and Evaluation Plan: Weight  Weight: Measured weight  Criteria: maintain weight, reweigh at least every 5 days  Food/Nutrient Related History Monitoring  Monitoring and Evaluation Plan: Energy intake, Fluid intake, Amount of food  Criteria: intake of meals and ONS to meet >75% of estimated nutrition needs  Criteria: intake of fluids to meet >75% of estimated needs  Criteria: intake >75% of meals/ONS    Progress towards goals: Not Met    Education Documentation  No documentation found.      Time Spent (min): 30 minutes  Last Date of Nutrition Visit: 11/01/23  Nutrition Follow-Up Needed?: 3-5 days  Follow up Comment: 11/6 TG

## 2023-11-01 NOTE — PROGRESS NOTES
Bindu Cali is a 89 y.o. female on day 7 of admission presenting with Hypercalcemia.    SUBJECTIVE    No acute overnight events.  Patient continues to be more awake today prior to previous days.  Patient endorsing mild abdominal pain, an episode of diarrhea, and occasional coughs.  Patient denying chest pain, shortness of breath, or any other new/acute symptoms.    OBJECTIVE    Physical Exam:  General: No apparent distress. Sitting upright eating breakfast   HEENT:  Normocephalic, atraumatic  Chest:  Clear to auscultation bilaterally. No wheezes, rales, or rhonchi.  CV:  Regular rate and rhythm. No murmurs    Abdomen: Abdomen is soft, non-tender, non-distended  Extremities:  No lower extremity edema or cyanosis.   Neurological:  AAOx3. No focal deficits.  Skin:  Warm and dry.      Last Recorded Vitals  Blood pressure 155/69, pulse 77, temperature 36.5 °C (97.7 °F), temperature source Temporal, resp. rate 18, height 1.524 m (5'), weight 63 kg (138 lb 14.2 oz), SpO2 95 %.  Intake/Output last 3 Shifts:  I/O last 3 completed shifts:  In: 2998.8 (47.6 mL/kg) [P.O.:150; I.V.:2798.8 (44.4 mL/kg); IV Piggyback:50]  Out: 1025 (16.3 mL/kg) [Urine:1025 (0.5 mL/kg/hr)]  Weight: 63 kg     Last CBC & BMP  Lab Results   Component Value Date    GLUCOSE 104 (H) 11/01/2023    CALCIUM 10.2 11/01/2023     (L) 11/01/2023    K 3.6 11/01/2023    CO2 23 11/01/2023    CL 99 11/01/2023    BUN 45 (H) 11/01/2023    CREATININE 1.53 (H) 11/01/2023     Lab Results   Component Value Date    WBC 42.7 (H) 11/01/2023    HGB 8.3 (L) 11/01/2023    HCT 25.0 (L) 11/01/2023    MCV 89 11/01/2023     11/01/2023       ASSESSMENT & PLAN    Hypercalcemia secondary to multiple myeloma  Weakness & Fatique   -Patient's hypercalcemia is likely secondary to her multiple myeloma.  She has had similar episode in the past requiring hospitalization in past her calcium was the main problem  Plan:   -Patients corrected calcium slowly  improving  -Fluids at 75 NS  -Heme-onc following, received denosuab    Nonbloody Diarrhea with C.Diff   Fever (Resolved)  Elevated procal  Leukocytosis (Worsening)  Plan:   -Patient placed on contact precautions  -Patient started on oral vancomycin while on ancef and for a week following the end of the ancef course    Right proximal tibial fracture complicated by hardware infection with cellulitis (from prior hospitalization)  Plan:   -Ancef resumed given no significant findings of x-ray  -CT chest/UA/BC/Covid unremarkable  -Ortho saw patient and did not believe her current plan needed any changes    Persistent Hypertension  Plan:   -Continue with home Losartan 100  -Amlodipine increased to 5  -Hydralazine 5 PRN    Chronic kidney disease stage II   Creatinine elevation  Hyponatremia  Plan:   -Continue to trend, low concern for BIBIANA  -Patient's hyponatremia and creatinine elevation are likely secondary to diarrhea and decreased oral intake, and diarrhea over the last 2-3 days  -Patient will continue to get fluids    Electrolyte abnormalities:  Hypomagnesemia  Hyperkalemia  Plan:   -Replete K & Mg as needed     Finding of pulmonary nodule on CT  Plan:   -Follow up outpatient    Chronic medical conditions:  Hypertension  Hypothyroidism  GERD  Plan:   -Continue with, losartan, omeprazole, levothyroxine, held jd     DVT ppx: Lovenox   GI ppx: PPI  IVF: 75 NS  Diet: Regular  Consults: Heme/Onc    Juvenal Ugarte MD   PGY-1, Internal Medicine  Please SecureChat for any further questions  This is a preliminary note, please await attending attestation for final A/P

## 2023-11-01 NOTE — PROGRESS NOTES
Physical Therapy    Physical Therapy Treatment    Patient Name: Bindu Cali  MRN: [id number]  Today's Date: 11/1/2023  Time Calculation  Start Time: 0955  Stop Time: 1019  Time Calculation (min): 24 min       Assessment/Plan   PT Assessment  PT Assessment Results: Decreased strength, Decreased range of motion, Decreased endurance, Impaired balance, Decreased mobility  Rehab Prognosis: Good  Evaluation/Treatment Tolerance: Patient limited by fatigue  Medical Staff Made Aware: Yes  End of Session Communication: Bedside nurse  Assessment Comment: Pt presents /c above impairments and decline from functional baseline. Pt will benefit from continued PT services at Mod intensity to address above and maximize functional mobility.  End of Session Patient Position: Up in chair, Alarm on     PT Plan  Treatment/Interventions: Bed mobility, Transfer training, Gait training, Balance training, Neuromuscular re-education, Strengthening, Endurance training, Therapeutic exercise, Therapeutic activity  PT Plan: Skilled PT  PT Frequency: 4 times per week  PT Discharge Recommendations: Moderate intensity level of continued care      General Visit Information:   PT  Visit  PT Received On: 11/01/23  General  Co-Treatment: OT  Prior to Session Communication: Bedside nurse  Patient Position Received: Bed, 2 rail up, Alarm on    Subjective   Precautions:  Precautions  Medical Precautions: Fall precautions, Infection precautions  Precautions Comment:  (C-diff positive.  pt with tele, IV, and avila catheter.)       Objective   Pain:  Pain Assessment  Pain Assessment: 0-10  Pain Score: 0 - No pain     Treatments:  Bed Mobility  Bed Mobility:  (sup -> sit with mod A x 1 using bed rail and draw sheet to assist)    Ambulation/Gait Training  Ambulation/Gait Training Performed:  (pt ambulates ~3 ft bed -> chair with FWW and mod A x 2.  VC for sequencing.  very slow pace with much increased double stance time.  decreased step/stride lengths  and minimal foot clearance.)  pt also able to maintain additional trial static stance for 2 min. at FWW with min A x 2.    Transfers  Transfer:  (sit <-> stand x 2 trials with FWW and mod A x 2.  repeated VC for hand placement for increased safety and functional independence.  pt also tending to be somewhat forward flexed; VC for upright posture.)    Outcome Measures:  [facility] Basic Mobility  Turning from your back to your side while in a flat bed without using bedrails: A lot  Moving from lying on your back to sitting on the side of a flat bed without using bedrails: A lot  Moving to and from bed to chair (including a wheelchair): A lot  Standing up from a chair using your arms (e.g. wheelchair or bedside chair): A lot  To walk in hospital room: A lot  Climbing 3-5 steps with railing: Total  Basic Mobility - Total Score: 11    Education Documentation  Mobility Training, taught by Carol Santo PTA at 11/1/2023 10:30 AM.  Learner: Patient  Readiness: Acceptance  Method: Explanation  Response: Verbalizes Understanding, Needs Reinforcement    Education Comments  No comments found.        EDUCATION:       Encounter Problems       Encounter Problems (Active)       PT Problem       STG - Pt will transition supine <> sitting with Mod I (Progressing)       Start:  10/27/23    Expected End:  11/10/23            STG - Pt will transfer STS with Mod I (Progressing)       Start:  10/27/23    Expected End:  11/10/23            STG - Pt will amb 75' using WW with Sup. (Progressing)       Start:  10/27/23    Expected End:  11/10/23            STG - Pt will perform a B LE ther ex program of 2-3 sets of 10 (Progressing)       Start:  10/27/23    Expected End:  11/10/23            STG - Pt will maintain F+ dynamic standing balance to decrease fall risk (Progressing)       Start:  10/27/23    Expected End:  11/10/23

## 2023-11-01 NOTE — PROGRESS NOTES
Met with MD and explained they will need to write for home infusion [facility] at discharge, order resumption [facility], labs required ad fax number for results.  Also contacted the home infusion RN Riddhi Rocha.  Tentative discharge this Friday.  Rebecca Blackwood RN TCC

## 2023-11-01 NOTE — CARE PLAN
The patient's goals for the shift include      The clinical goals for the shift include remain AOx3      Problem: Pain  Goal: My pain/discomfort is manageable  Outcome: Progressing     Problem: Safety  Goal: Patient will be injury free during hospitalization  Outcome: Progressing  Goal: I will remain free of falls  Outcome: Progressing     Problem: Daily Care  Goal: Daily care needs are met  Outcome: Progressing     Problem: Psychosocial Needs  Goal: Demonstrates ability to cope with hospitalization/illness  Outcome: Progressing  Goal: Collaborate with me, my family, and caregiver to identify my specific goals  Outcome: Progressing     Problem: Discharge Barriers  Goal: My discharge needs are met  Outcome: Progressing     Problem: Skin  Goal: Decreased wound size/increased tissue granulation at next dressing change  Outcome: Progressing  Goal: Participates in plan/prevention/treatment measures  Outcome: Progressing  Goal: Prevent/manage excess moisture  Outcome: Progressing  Goal: Prevent/minimize sheer/friction injuries  Outcome: Progressing  Goal: Promote/optimize nutrition  Outcome: Progressing  Goal: Promote skin healing  Outcome: Progressing

## 2023-11-01 NOTE — PROGRESS NOTES
Occupational Therapy    OT Treatment    Patient Name: Bindu Cali  MRN: [id number]  Today's Date: 11/1/2023  Time Calculation  Start Time: 1015  Stop Time: 1040  Time Calculation (min): 25 min                   Subjective   General:         Objective    Activities of Daily Living: LE Dressing  LE Dressing:  (attempted to have pt. arrange sock while seated at e.o.b., however pt. was dependent to complete task.)  Functional Standing Tolerance:  Time:  (pt. tolerated 1.0 min. x2 of static stand balance with mod a x2.)  Bed Mobility/Transfers: Bed Mobility  Bed Mobility:  (pt. able to move supine to sit with mod a.)    Transfers  Transfer:  (pt. able to move sit to stand and complete bed to chair transfer with mod a x2 using a wheeled walker.)                  Outcome Measures:[facility] Daily Activity  Putting on and taking off regular lower body clothing: Total  Bathing (including washing, rinsing, drying): A lot  Putting on and taking off regular upper body clothing: A lot  Toileting, which includes using toilet, bedpan or urinal: Total  Taking care of personal grooming such as brushing teeth: A lot  Eating Meals: A little  Daily Activity - Total Score: 11        Education Documentation  Body Mechanics, taught by ROBERTO CARLOS Palm at 11/1/2023 12:45 PM.  Learner: Patient  Readiness: Acceptance  Method: Explanation  Response: Needs Reinforcement    Precautions, taught by ROBERTO CARLOS Palm at 11/1/2023 12:45 PM.  Learner: Patient  Readiness: Acceptance  Method: Explanation  Response: Needs Reinforcement    ADL Training, taught by ROBERTO CARLOS Palm at 11/1/2023 12:45 PM.  Learner: Patient  Readiness: Acceptance  Method: Explanation  Response: Needs Reinforcement    Education Comments  No comments found.        OP EDUCATION:  Education  Individual(s) Educated: Patient  Education Provided: Ergonomics and postural realignment, Joint protection and energy conservation, Fall precautons    Goals:  Encounter Problems        Encounter Problems (Active)       OT Goals       Mod I for all fxnl transfers (Progressing)       Start:  10/27/23    Expected End:  11/10/23            Mod I for LB dressing (Progressing)       Start:  10/27/23    Expected End:  11/10/23            Mod I for all toileting tasks (Progressing)       Start:  10/27/23    Expected End:  11/10/23            Increase dynamic standing tolerance 2-5 minutes in preparation for higher level ADLs (Progressing)       Start:  10/27/23    Expected End:  11/10/23

## 2023-11-01 NOTE — CARE PLAN
The patient's goals for the shift include      The clinical goals for the shift include remain AOx3      Problem: Skin  Goal: Decreased wound size/increased tissue granulation at next dressing change  11/1/2023 0607 by Lauren Boyle RN  Outcome: Progressing  10/31/2023 2135 by Lauren Boyle RN  Outcome: Progressing     Problem: Skin  Goal: Participates in plan/prevention/treatment measures  11/1/2023 0607 by Lauren Boyle RN  Outcome: Progressing  10/31/2023 2135 by Lauren Boyle RN  Outcome: Progressing     Problem: Skin  Goal: Prevent/manage excess moisture  11/1/2023 0607 by Lauren Boyle RN  Outcome: Progressing  Flowsheets (Taken 10/30/2023 1651 by Zee Grider RN)  Prevent/manage excess moisture: Monitor for/manage infection if present  10/31/2023 2135 by Lauren Boyle RN  Outcome: Progressing

## 2023-11-01 NOTE — CARE PLAN
The patient's goals for the shift include      The clinical goals for the shift include Patient will consume more food.      Problem: Pain  Goal: My pain/discomfort is manageable  Outcome: Progressing     Problem: Safety  Goal: Patient will be injury free during hospitalization  Outcome: Progressing  Goal: I will remain free of falls  Outcome: Progressing     Problem: Daily Care  Goal: Daily care needs are met  Outcome: Progressing     Problem: Psychosocial Needs  Goal: Demonstrates ability to cope with hospitalization/illness  Outcome: Progressing  Goal: Collaborate with me, my family, and caregiver to identify my specific goals  Outcome: Progressing     Problem: Discharge Barriers  Goal: My discharge needs are met  Outcome: Progressing     Problem: Skin  Goal: Decreased wound size/increased tissue granulation at next dressing change  Outcome: Progressing  Flowsheets (Taken 11/1/2023 1253)  Decreased wound size/increased tissue granulation at next dressing change: Promote sleep for wound healing  Goal: Participates in plan/prevention/treatment measures  Outcome: Progressing  Flowsheets (Taken 11/1/2023 1253)  Participates in plan/prevention/treatment measures: Discuss with provider PT/OT consult  Goal: Prevent/manage excess moisture  Outcome: Progressing  Flowsheets (Taken 11/1/2023 1253)  Prevent/manage excess moisture: Cleanse incontinence/protect with barrier cream  Goal: Prevent/minimize sheer/friction injuries  Outcome: Progressing  Flowsheets (Taken 11/1/2023 1253)  Prevent/minimize sheer/friction injuries: Increase activity/out of bed for meals  Goal: Promote/optimize nutrition  Outcome: Progressing  Flowsheets (Taken 11/1/2023 1253)  Promote/optimize nutrition:   Assist with feeding   Offer water/supplements/favorite foods  Goal: Promote skin healing  Outcome: Progressing  Flowsheets (Taken 11/1/2023 1253)  Promote skin healing:   Assess skin/pad under line(s)/device(s)   Rotate device position/do not  position patient on device

## 2023-11-01 NOTE — PROGRESS NOTES
Speech-Language Pathology    Inpatient  Speech-Language Pathology Treatment     Patient Name: Bindu Cali  MRN: [id number]  Today's Date: 11/1/2023  Time Calculation  Start Time: 1605  Stop Time: 1623  Time Calculation (min): 18 min         Current Problem:   1. Hypercalcemia        2. Acute kidney injury (CMS/HCC)        3. Hypomagnesemia        4. Multiple myeloma, remission status unspecified (CMS/HCC)            Inpatient      Plan:  Treatment/Interventions: Assess diet tolerance, Diet recommendations, Patient/family education  SLP Plan: Skilled SLP  SLP Frequency: 3x per week  Duration: 1 week   Pt will tolerate prescribed diet without evidence of dysphagia during observed meals. GOAL ONGOING 11/1    Reassessment of swallow for diet advancement as appropriate. GOAL PROGRESSING with liquids advanced to thins with SB6 still the appropriate -best oral diet diet.   GOAL Initiated   11/1   Pt/family education. Goal initiated 11/1          Subjective , Alert, Ox3, weak and not drinking the Mildly thick liquids, asking for water .   Current Problem:  High WBC , Generalized weakness r/t acute illness and BIBIANA    Most Recent Visit:  SLP Received On: 11/01/23    General Visit Information:   Prior to Session Communication: Bedside nurse Latonia    Pt on C-diff Precautions d/t active C-Diff     Pain Assessment:   Pain Assessment: 0-10  Pain Score: 0 - No pain        Therapeutic Swallow:     Inpatient:    Nurse paged SLP services to see pt as pt asking for thins . Poor liquid consumption of the Mildly thick liquids per the nurse.  Family was in and stated to the nurse the PLOF- thin  liquids and regular solids.  Today, pt seated upright in a chair at the side of the bed and self fed cup sips of water x10 via small sips by choice and Soft textured PO via vanilla yogurt and reva crackers dipped in the yogurt with Mildly slow mastication effort and oral bolus manipulation skills 2/2 overall generalized weakness.   Pt 's  swallow onset palpated with a timely laryngeal rise felt and visualized across the cup sips of thins liquids ( water ) with no change to the pt's vocal quality or respiratory skills.   Pt is on RA with no SOB noted or s/s of aspiration across the thin liquid trials.     Pt naturally takes small sips by cup and by straw observed.  Slow rate of self feeding noted with liquid washes used to aid full oral clearance of the min coating of the yogurt on the pt's tongue body.     Overall pt remains weak, but can compensate and take small sips without overt or subtle s/s of aspiration this encounter.           Education Documentation    Swallowing intervention, diet recs, and POC-goals reviewed with the pt and RN with education ongoing .   Pt excited for thins this date and demonstrated improved swallowing control and coordination with no s/s of aspiration .   Pt/RN aware of the thin liquid diet upgrade given swallowing controls for small sips, out of bed postioning , and  upright postioning  across PO.

## 2023-11-02 ENCOUNTER — APPOINTMENT (OUTPATIENT)
Dept: HEMATOLOGY/ONCOLOGY | Facility: CLINIC | Age: 88
End: 2023-11-02
Payer: MEDICARE

## 2023-11-02 LAB
ALBUMIN SERPL BCP-MCNC: 2.2 G/DL (ref 3.4–5)
ANION GAP SERPL CALC-SCNC: 9 MMOL/L (ref 10–20)
BUN SERPL-MCNC: 42 MG/DL (ref 6–23)
CALCIUM SERPL-MCNC: 9.4 MG/DL (ref 8.6–10.3)
CHLORIDE SERPL-SCNC: 103 MMOL/L (ref 98–107)
CO2 SERPL-SCNC: 22 MMOL/L (ref 21–32)
CREAT SERPL-MCNC: 1.19 MG/DL (ref 0.5–1.05)
ERYTHROCYTE [DISTWIDTH] IN BLOOD BY AUTOMATED COUNT: 17 % (ref 11.5–14.5)
GFR SERPL CREATININE-BSD FRML MDRD: 43 ML/MIN/1.73M*2
GLUCOSE SERPL-MCNC: 93 MG/DL (ref 74–99)
HCT VFR BLD AUTO: 23.7 % (ref 36–46)
HGB BLD-MCNC: 7.9 G/DL (ref 12–16)
MAGNESIUM SERPL-MCNC: 1.9 MG/DL (ref 1.6–2.4)
MCH RBC QN AUTO: 29.8 PG (ref 26–34)
MCHC RBC AUTO-ENTMCNC: 33.3 G/DL (ref 32–36)
MCV RBC AUTO: 89 FL (ref 80–100)
NRBC BLD-RTO: 0 /100 WBCS (ref 0–0)
PHOSPHATE SERPL-MCNC: 1.6 MG/DL (ref 2.5–4.9)
PLATELET # BLD AUTO: 312 X10*3/UL (ref 150–450)
POTASSIUM SERPL-SCNC: 3.2 MMOL/L (ref 3.5–5.3)
RBC # BLD AUTO: 2.65 X10*6/UL (ref 4–5.2)
SODIUM SERPL-SCNC: 131 MMOL/L (ref 136–145)
WBC # BLD AUTO: 27.5 X10*3/UL (ref 4.4–11.3)

## 2023-11-02 PROCEDURE — 2500000001 HC RX 250 WO HCPCS SELF ADMINISTERED DRUGS (ALT 637 FOR MEDICARE OP)

## 2023-11-02 PROCEDURE — 80069 RENAL FUNCTION PANEL: CPT | Performed by: STUDENT IN AN ORGANIZED HEALTH CARE EDUCATION/TRAINING PROGRAM

## 2023-11-02 PROCEDURE — 2500000004 HC RX 250 GENERAL PHARMACY W/ HCPCS (ALT 636 FOR OP/ED)

## 2023-11-02 PROCEDURE — 85027 COMPLETE CBC AUTOMATED: CPT

## 2023-11-02 PROCEDURE — 99232 SBSQ HOSP IP/OBS MODERATE 35: CPT

## 2023-11-02 PROCEDURE — 83735 ASSAY OF MAGNESIUM: CPT

## 2023-11-02 PROCEDURE — 1200000002 HC GENERAL ROOM WITH TELEMETRY DAILY

## 2023-11-02 PROCEDURE — 1090000002 HH PPS REVENUE DEBIT

## 2023-11-02 PROCEDURE — 2500000005 HC RX 250 GENERAL PHARMACY W/O HCPCS: Performed by: STUDENT IN AN ORGANIZED HEALTH CARE EDUCATION/TRAINING PROGRAM

## 2023-11-02 PROCEDURE — 1090000001 HH PPS REVENUE CREDIT

## 2023-11-02 PROCEDURE — 2500000001 HC RX 250 WO HCPCS SELF ADMINISTERED DRUGS (ALT 637 FOR MEDICARE OP): Performed by: STUDENT IN AN ORGANIZED HEALTH CARE EDUCATION/TRAINING PROGRAM

## 2023-11-02 PROCEDURE — 2500000004 HC RX 250 GENERAL PHARMACY W/ HCPCS (ALT 636 FOR OP/ED): Performed by: STUDENT IN AN ORGANIZED HEALTH CARE EDUCATION/TRAINING PROGRAM

## 2023-11-02 PROCEDURE — 96372 THER/PROPH/DIAG INJ SC/IM: CPT

## 2023-11-02 RX ORDER — CEFAZOLIN 1 G/1
2 INJECTION, POWDER, FOR SOLUTION INTRAVENOUS EVERY 12 HOURS
Qty: 60000 MG | Refills: 0
Start: 2023-11-02 | End: 2023-11-17

## 2023-11-02 RX ORDER — CYANOCOBALAMIN (VITAMIN B-12) 500 MCG
400 TABLET ORAL DAILY
Qty: 30 TABLET | Refills: 0 | Status: SHIPPED | OUTPATIENT
Start: 2023-11-02

## 2023-11-02 RX ORDER — POTASSIUM CHLORIDE 1.5 G/1.58G
40 POWDER, FOR SOLUTION ORAL ONCE
Status: COMPLETED | OUTPATIENT
Start: 2023-11-02 | End: 2023-11-02

## 2023-11-02 RX ORDER — CEFAZOLIN 1 G/1
2 INJECTION, POWDER, FOR SOLUTION INTRAVENOUS EVERY 12 HOURS
Qty: 60000 MG | Refills: 0 | Status: SHIPPED
Start: 2023-11-02 | End: 2023-11-02 | Stop reason: HOSPADM

## 2023-11-02 RX ORDER — POTASSIUM CHLORIDE 1.5 G/1.58G
20 POWDER, FOR SOLUTION ORAL ONCE
Status: DISCONTINUED | OUTPATIENT
Start: 2023-11-02 | End: 2023-11-02

## 2023-11-02 RX ADMIN — Medication 3 MG: at 20:39

## 2023-11-02 RX ADMIN — POTASSIUM CHLORIDE 40 MEQ: 1.5 POWDER, FOR SOLUTION ORAL at 08:43

## 2023-11-02 RX ADMIN — BENZONATATE 100 MG: 100 CAPSULE ORAL at 20:39

## 2023-11-02 RX ADMIN — VANCOMYCIN HYDROCHLORIDE 125 MG: 125 CAPSULE ORAL at 20:39

## 2023-11-02 RX ADMIN — CEFAZOLIN 2 G: 10 INJECTION, POWDER, FOR SOLUTION INTRAVENOUS at 20:37

## 2023-11-02 RX ADMIN — VANCOMYCIN HYDROCHLORIDE 125 MG: 125 CAPSULE ORAL at 06:12

## 2023-11-02 RX ADMIN — LEVOTHYROXINE SODIUM 100 MCG: 100 TABLET ORAL at 05:11

## 2023-11-02 RX ADMIN — BENZONATATE 100 MG: 100 CAPSULE ORAL at 15:16

## 2023-11-02 RX ADMIN — LOSARTAN POTASSIUM 100 MG: 50 TABLET, FILM COATED ORAL at 08:43

## 2023-11-02 RX ADMIN — ENOXAPARIN SODIUM 30 MG: 30 INJECTION SUBCUTANEOUS at 05:11

## 2023-11-02 RX ADMIN — POTASSIUM PHOSPHATE, MONOBASIC POTASSIUM PHOSPHATE, DIBASIC 21 MMOL: 224; 236 INJECTION, SOLUTION, CONCENTRATE INTRAVENOUS at 08:43

## 2023-11-02 RX ADMIN — BENZONATATE 100 MG: 100 CAPSULE ORAL at 08:43

## 2023-11-02 RX ADMIN — PANTOPRAZOLE SODIUM 40 MG: 40 TABLET, DELAYED RELEASE ORAL at 08:43

## 2023-11-02 RX ADMIN — CEFAZOLIN SODIUM 1 G: 1 INJECTION, SOLUTION INTRAVENOUS at 06:11

## 2023-11-02 RX ADMIN — VANCOMYCIN HYDROCHLORIDE 125 MG: 125 CAPSULE ORAL at 12:15

## 2023-11-02 RX ADMIN — VANCOMYCIN HYDROCHLORIDE 125 MG: 125 CAPSULE ORAL at 17:30

## 2023-11-02 RX ADMIN — AMLODIPINE BESYLATE 5 MG: 5 TABLET ORAL at 08:43

## 2023-11-02 RX ADMIN — Medication 10 ML: at 08:43

## 2023-11-02 ASSESSMENT — PAIN - FUNCTIONAL ASSESSMENT
PAIN_FUNCTIONAL_ASSESSMENT: 0-10

## 2023-11-02 ASSESSMENT — COGNITIVE AND FUNCTIONAL STATUS - GENERAL
MOVING TO AND FROM BED TO CHAIR: A LOT
CLIMB 3 TO 5 STEPS WITH RAILING: A LOT
EATING MEALS: A LITTLE
MOBILITY SCORE: 14
DAILY ACTIVITIY SCORE: 11
CLIMB 3 TO 5 STEPS WITH RAILING: A LOT
STANDING UP FROM CHAIR USING ARMS: A LOT
DRESSING REGULAR LOWER BODY CLOTHING: TOTAL
TOILETING: TOTAL
HELP NEEDED FOR BATHING: A LOT
MOBILITY SCORE: 14
MOVING FROM LYING ON BACK TO SITTING ON SIDE OF FLAT BED WITH BEDRAILS: A LITTLE
PERSONAL GROOMING: TOTAL
HELP NEEDED FOR BATHING: A LOT
MOVING FROM LYING ON BACK TO SITTING ON SIDE OF FLAT BED WITH BEDRAILS: A LITTLE
TURNING FROM BACK TO SIDE WHILE IN FLAT BAD: A LITTLE
TURNING FROM BACK TO SIDE WHILE IN FLAT BAD: A LITTLE
DRESSING REGULAR LOWER BODY CLOTHING: TOTAL
PERSONAL GROOMING: A LOT
DRESSING REGULAR UPPER BODY CLOTHING: A LOT
EATING MEALS: A LITTLE
MOVING TO AND FROM BED TO CHAIR: A LOT
WALKING IN HOSPITAL ROOM: A LOT
DAILY ACTIVITIY SCORE: 11
WALKING IN HOSPITAL ROOM: A LOT
TOILETING: A LOT
DRESSING REGULAR UPPER BODY CLOTHING: A LOT
STANDING UP FROM CHAIR USING ARMS: A LOT

## 2023-11-02 ASSESSMENT — PAIN SCALES - GENERAL
PAINLEVEL_OUTOF10: 0 - NO PAIN

## 2023-11-02 NOTE — DISCHARGE INSTRUCTIONS
CBC and BMP weekly for 2 weeks. Fax results to [phone]    Continue IV cefazolin 2 g every 12 hours until 11/17/2023

## 2023-11-02 NOTE — PROGRESS NOTES
Bindu Cali is a 89 y.o. female on day 8 of admission presenting with Hypercalcemia.    SUBJECTIVE    No acute overnight events.  Patient continuing to have episodes of diarrhea.  Patient denying abdominal pain, fever, chills, or other new/acute symptoms.  Discussion was had with patient and her family regarding plans at discharge,  was brought on and referrals were sent for SNF.    OBJECTIVE    Physical Exam:  General: No apparent distress. Sitting upright eating breakfast   HEENT:  Normocephalic, atraumatic  Chest:  Clear to auscultation bilaterally. No wheezes, rales, or rhonchi.  CV:  Regular rate and rhythm. No murmurs    Abdomen: Abdomen is soft, non-tender, non-distended  Extremities:  No lower extremity edema or cyanosis.   Neurological:  AAOx3. No focal deficits.  Skin:  Warm and dry.      Last Recorded Vitals  Blood pressure 163/71, pulse 71, temperature 37.2 °C (99 °F), temperature source Temporal, resp. rate 18, height 1.524 m (5'), weight 63 kg (138 lb 14.2 oz), SpO2 97 %.  Intake/Output last 3 Shifts:  I/O last 3 completed shifts:  In: 3699.8 (58.7 mL/kg) [P.O.:390; I.V.:2959.8 (47 mL/kg); IV Piggyback:350]  Out: 1025 (16.3 mL/kg) [Urine:1025 (0.5 mL/kg/hr)]  Weight: 63 kg     Last CBC & BMP  Lab Results   Component Value Date    GLUCOSE 93 11/02/2023    CALCIUM 9.4 11/02/2023     (L) 11/02/2023    K 3.2 (L) 11/02/2023    CO2 22 11/02/2023     11/02/2023    BUN 42 (H) 11/02/2023    CREATININE 1.19 (H) 11/02/2023     Lab Results   Component Value Date    WBC 27.5 (H) 11/02/2023    HGB 7.9 (L) 11/02/2023    HCT 23.7 (L) 11/02/2023    MCV 89 11/02/2023     11/02/2023       ASSESSMENT & PLAN    Hypercalcemia secondary to multiple myeloma  Weakness & Fatique   -Patient's hypercalcemia is likely secondary to her multiple myeloma.  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 (from prior hospitalization)  Plan:   -Ancef resumed given no significant findings of x-ray  -CT chest/UA/BC/Covid unremarkable  -Ortho saw patient and did not believe her current plan needed any changes    Persistent Hypertension  Plan:   -Continue with home Losartan 100  -Amlodipine 5  -Hydralazine 5 PRN    Chronic kidney disease stage II   Creatinine elevation (resolved)  Hyponatremia  Plan:   -Continue to trend, low concern for BIBIANA  -Continue to follow bmp    Electrolyte abnormalities:  Hypomagnesemia  Hyperkalemia  Plan:   -Replete K & Mg as needed     Finding of pulmonary nodule on CT  Plan:   -Follow up outpatient    Chronic medical conditions:  Hypertension  Hypothyroidism  GERD  Plan:   -Continue with, losartan, omeprazole, levothyroxine, held jd     DVT ppx: Lovenox   GI ppx: PPI  IVF: None  Diet: Regular  Consults: Heme/Onc    Juvenal Ugarte MD   PGY-1, Internal Medicine  Please SecureChat for any further questions  This is a preliminary note, please await attending attestation for final A/P

## 2023-11-02 NOTE — CARE PLAN
Problem: Skin  Goal: Decreased wound size/increased tissue granulation at next dressing change  11/1/2023 2256 by Lauren Boyle RN  Outcome: Progressing  11/1/2023 2255 by Lauren Boyle RN  Outcome: Progressing   The patient's goals for the shift include      The clinical goals for the shift include patient to increase PO intake today    Problem: Skin  Goal: Participates in plan/prevention/treatment measures  11/1/2023 2256 by Lauren Boyle RN  Outcome: Progressing  11/1/2023 2255 by Lauren Boyle RN  Outcome: Progressing     Problem: Skin  Goal: Prevent/manage excess moisture  11/1/2023 2256 by Lauren Boyle RN  Outcome: Progressing  11/1/2023 2255 by Lauren Boyle RN  Outcome: Progressing     Problem: Skin  Goal: Prevent/minimize sheer/friction injuries  11/1/2023 2256 by Lauren Boyle RN  Outcome: Progressing  11/1/2023 2255 by Lauren Boyle RN  Outcome: Progressing

## 2023-11-02 NOTE — CARE PLAN
The patient's goals for the shift include      The clinical goals for the shift include patient to increase PO intake today      Problem: Skin  Goal: Prevent/manage excess moisture  11/2/2023 0047 by Lauren Boyle RN  Flowsheets (Taken 11/2/2023 0047)  Prevent/manage excess moisture: Cleanse incontinence/protect with barrier cream  11/1/2023 2256 by Lauren Boyle RN  Outcome: Progressing  11/1/2023 2255 by Lauren Boyle RN  Outcome: Progressing

## 2023-11-02 NOTE — PROGRESS NOTES
11/02/23 1510   Discharge Planning   Home or Post Acute Services Post acute facilities (Rehab/SNF/etc)   Type of Post Acute Facility Services Skilled nursing   Patient expects to be discharged to: SNF   Does the patient need discharge transport arranged? Yes   RoundTrip coordination needed? Yes     1510:  Notified by [facility] SNF preferences are 1. [facility] 2. [facility] 3. [facility].  Referrals will be submitted to the above facilities for review.      1808:  Received responses from 1. [facility] reviewing for acceptance 2. [facility] can accept 3. [facility] can accept as long as patient can go out for infusions.

## 2023-11-02 NOTE — CARE PLAN
The patient's goals for the shift include      The clinical goals for the shift include increase PO intake    Over the shift, the patient did not make progress toward the following goals. Barriers to progression include motivation. Recommendations to address these barriers include reinforcement.

## 2023-11-02 NOTE — PROGRESS NOTES
Met with pt and her daughter, [facility] 11, family venting fear of caring for pt at home and may need skilled care , list was provided to them from [facility] , will follow up.  Rebecca Blackwood RN TCC    1407  references are for skilled care are  1.  [facility],  2.  [facility],  3.  [facility], team updated,   Rebecca Blackwood RN TCC

## 2023-11-02 NOTE — CARE PLAN
The patient's goals for the shift include      The clinical goals for the shift include patient to increase PO intake today      Problem: Pain  Goal: My pain/discomfort is manageable  Outcome: Progressing     Problem: Safety  Goal: Patient will be injury free during hospitalization  Outcome: Progressing  Goal: I will remain free of falls  Outcome: Progressing     Problem: Daily Care  Goal: Daily care needs are met  Outcome: Progressing     Problem: Psychosocial Needs  Goal: Demonstrates ability to cope with hospitalization/illness  Outcome: Progressing  Goal: Collaborate with me, my family, and caregiver to identify my specific goals  Outcome: Progressing  Flowsheets (Taken 11/1/2023 2200)  Cultural Requests During Hospitalization: none  Spiritual Requests During Hospitalization: none     Problem: Discharge Barriers  Goal: My discharge needs are met  Outcome: Progressing     Problem: Skin  Goal: Decreased wound size/increased tissue granulation at next dressing change  Outcome: Progressing  Goal: Participates in plan/prevention/treatment measures  Outcome: Progressing  Goal: Prevent/manage excess moisture  Outcome: Progressing  Goal: Prevent/minimize sheer/friction injuries  Outcome: Progressing  Goal: Promote/optimize nutrition  Outcome: Progressing  Goal: Promote skin healing  Outcome: Progressing

## 2023-11-03 VITALS
BODY MASS INDEX: 27.27 KG/M2 | HEIGHT: 60 IN | HEART RATE: 73 BPM | WEIGHT: 138.89 LBS | OXYGEN SATURATION: 97 % | RESPIRATION RATE: 18 BRPM | DIASTOLIC BLOOD PRESSURE: 84 MMHG | SYSTOLIC BLOOD PRESSURE: 187 MMHG | TEMPERATURE: 98.8 F

## 2023-11-03 PROBLEM — E87.6 HYPOKALEMIA: Status: ACTIVE | Noted: 2023-11-03

## 2023-11-03 PROBLEM — R33.8 ACUTE URINARY RETENTION: Status: ACTIVE | Noted: 2023-11-03

## 2023-11-03 PROBLEM — N18.9 ACUTE KIDNEY INJURY SUPERIMPOSED ON CKD (CMS-HCC): Status: ACTIVE | Noted: 2023-11-03

## 2023-11-03 PROBLEM — E83.42 HYPOMAGNESEMIA: Status: ACTIVE | Noted: 2023-11-03

## 2023-11-03 PROBLEM — N17.9 ACUTE KIDNEY INJURY SUPERIMPOSED ON CKD (CMS-HCC): Status: ACTIVE | Noted: 2023-11-03

## 2023-11-03 PROBLEM — A04.72 CLOSTRIDIUM DIFFICILE COLITIS: Status: ACTIVE | Noted: 2023-11-03

## 2023-11-03 PROBLEM — R33.9 URINARY RETENTION: Status: ACTIVE | Noted: 2023-11-03

## 2023-11-03 LAB
ALBUMIN SERPL BCP-MCNC: 2.3 G/DL (ref 3.4–5)
ANION GAP SERPL CALC-SCNC: 9 MMOL/L (ref 10–20)
BACTERIA BLD CULT: NORMAL
BACTERIA BLD CULT: NORMAL
BUN SERPL-MCNC: 36 MG/DL (ref 6–23)
CALCIUM SERPL-MCNC: 9 MG/DL (ref 8.6–10.3)
CHLORIDE SERPL-SCNC: 102 MMOL/L (ref 98–107)
CO2 SERPL-SCNC: 22 MMOL/L (ref 21–32)
CREAT SERPL-MCNC: 1.03 MG/DL (ref 0.5–1.05)
ERYTHROCYTE [DISTWIDTH] IN BLOOD BY AUTOMATED COUNT: 17.1 % (ref 11.5–14.5)
GFR SERPL CREATININE-BSD FRML MDRD: 51 ML/MIN/1.73M*2
GLUCOSE SERPL-MCNC: 99 MG/DL (ref 74–99)
HCT VFR BLD AUTO: 24.7 % (ref 36–46)
HGB BLD-MCNC: 8.2 G/DL (ref 12–16)
MCH RBC QN AUTO: 29.2 PG (ref 26–34)
MCHC RBC AUTO-ENTMCNC: 33.2 G/DL (ref 32–36)
MCV RBC AUTO: 88 FL (ref 80–100)
NRBC BLD-RTO: 0 /100 WBCS (ref 0–0)
PHOSPHATE SERPL-MCNC: 1.9 MG/DL (ref 2.5–4.9)
PLATELET # BLD AUTO: 342 X10*3/UL (ref 150–450)
POTASSIUM SERPL-SCNC: 3.4 MMOL/L (ref 3.5–5.3)
RBC # BLD AUTO: 2.81 X10*6/UL (ref 4–5.2)
SODIUM SERPL-SCNC: 130 MMOL/L (ref 136–145)
WBC # BLD AUTO: 16.4 X10*3/UL (ref 4.4–11.3)

## 2023-11-03 PROCEDURE — 2500000004 HC RX 250 GENERAL PHARMACY W/ HCPCS (ALT 636 FOR OP/ED): Performed by: STUDENT IN AN ORGANIZED HEALTH CARE EDUCATION/TRAINING PROGRAM

## 2023-11-03 PROCEDURE — 99239 HOSP IP/OBS DSCHRG MGMT >30: CPT

## 2023-11-03 PROCEDURE — 2500000001 HC RX 250 WO HCPCS SELF ADMINISTERED DRUGS (ALT 637 FOR MEDICARE OP)

## 2023-11-03 PROCEDURE — 2500000004 HC RX 250 GENERAL PHARMACY W/ HCPCS (ALT 636 FOR OP/ED)

## 2023-11-03 PROCEDURE — 1090000002 HH PPS REVENUE DEBIT

## 2023-11-03 PROCEDURE — 85027 COMPLETE CBC AUTOMATED: CPT

## 2023-11-03 PROCEDURE — 1090000001 HH PPS REVENUE CREDIT

## 2023-11-03 PROCEDURE — 2500000001 HC RX 250 WO HCPCS SELF ADMINISTERED DRUGS (ALT 637 FOR MEDICARE OP): Performed by: STUDENT IN AN ORGANIZED HEALTH CARE EDUCATION/TRAINING PROGRAM

## 2023-11-03 PROCEDURE — 80069 RENAL FUNCTION PANEL: CPT | Performed by: STUDENT IN AN ORGANIZED HEALTH CARE EDUCATION/TRAINING PROGRAM

## 2023-11-03 PROCEDURE — 96372 THER/PROPH/DIAG INJ SC/IM: CPT

## 2023-11-03 RX ORDER — VANCOMYCIN HYDROCHLORIDE 125 MG/1
125 CAPSULE ORAL 4 TIMES DAILY
Qty: 84 CAPSULE | Refills: 0
Start: 2023-11-03 | End: 2023-11-24

## 2023-11-03 RX ORDER — AMLODIPINE BESYLATE 5 MG/1
5 TABLET ORAL DAILY
Start: 2023-11-03 | End: 2023-12-08 | Stop reason: SDUPTHER

## 2023-11-03 RX ORDER — POTASSIUM CHLORIDE 1.5 G/1.58G
20 POWDER, FOR SOLUTION ORAL ONCE
Status: COMPLETED | OUTPATIENT
Start: 2023-11-03 | End: 2023-11-03

## 2023-11-03 RX ADMIN — VANCOMYCIN HYDROCHLORIDE 125 MG: 125 CAPSULE ORAL at 16:45

## 2023-11-03 RX ADMIN — BENZOCAINE AND MENTHOL 1 LOZENGE: 15; 3.6 LOZENGE ORAL at 08:38

## 2023-11-03 RX ADMIN — VANCOMYCIN HYDROCHLORIDE 125 MG: 125 CAPSULE ORAL at 06:34

## 2023-11-03 RX ADMIN — CEFAZOLIN 2 G: 10 INJECTION, POWDER, FOR SOLUTION INTRAVENOUS at 08:41

## 2023-11-03 RX ADMIN — POTASSIUM PHOSPHATE, MONOBASIC 500 MG: 500 TABLET, SOLUBLE ORAL at 10:12

## 2023-11-03 RX ADMIN — POTASSIUM PHOSPHATE, MONOBASIC 500 MG: 500 TABLET, SOLUBLE ORAL at 16:47

## 2023-11-03 RX ADMIN — AMLODIPINE BESYLATE 5 MG: 5 TABLET ORAL at 08:41

## 2023-11-03 RX ADMIN — PANTOPRAZOLE SODIUM 40 MG: 40 TABLET, DELAYED RELEASE ORAL at 08:41

## 2023-11-03 RX ADMIN — BENZONATATE 100 MG: 100 CAPSULE ORAL at 08:40

## 2023-11-03 RX ADMIN — LOSARTAN POTASSIUM 100 MG: 50 TABLET, FILM COATED ORAL at 08:40

## 2023-11-03 RX ADMIN — ENOXAPARIN SODIUM 30 MG: 30 INJECTION SUBCUTANEOUS at 05:09

## 2023-11-03 RX ADMIN — LEVOTHYROXINE SODIUM 100 MCG: 100 TABLET ORAL at 05:09

## 2023-11-03 RX ADMIN — POTASSIUM CHLORIDE 20 MEQ: 1.5 POWDER, FOR SOLUTION ORAL at 08:39

## 2023-11-03 RX ADMIN — Medication 10 ML: at 08:58

## 2023-11-03 RX ADMIN — BENZONATATE 100 MG: 100 CAPSULE ORAL at 16:46

## 2023-11-03 ASSESSMENT — PAIN SCALES - GENERAL: PAINLEVEL_OUTOF10: 0 - NO PAIN

## 2023-11-03 ASSESSMENT — PAIN - FUNCTIONAL ASSESSMENT: PAIN_FUNCTIONAL_ASSESSMENT: 0-10

## 2023-11-03 NOTE — PROGRESS NOTES
Called and spoke with pt's daughter to update her on her preference choice of the [facility] that they  can accept her mother, the pt aware also and tentative discharge today, no precert will be needed.  Rebecca Blackwood RN TCC

## 2023-11-03 NOTE — PROGRESS NOTES
11/03/23 1141   Discharge Planning   Type of Post Acute Facility Services Skilled nursing   Patient expects to be discharged to: [facility]   Does the patient need discharge transport arranged? Yes   RoundTrip coordination needed? Yes     1030:  [facility] can accept and per TCC is facility preference.  Will not need precert.

## 2023-11-03 NOTE — DISCHARGE SUMMARY
Discharge Diagnosis  Hypercalcemia    Issues Requiring Follow-Up  Follow up Uro, Heme/Onc, PCP    Discharge Meds     Your medication list        START taking these medications        Instructions Last Dose Given Next Dose Due   vancomycin 125 mg capsule  Commonly known as: Vancocin      Take 1 capsule (125 mg) by mouth 4 times a day for 21 days. End date 11/24/2023              CHANGE how you take these medications        Instructions Last Dose Given Next Dose Due   amLODIPine 5 mg tablet  Commonly known as: Norvasc  What changed:   medication strength  how much to take      Take 1 tablet (5 mg) by mouth once daily.              CONTINUE taking these medications        Instructions Last Dose Given Next Dose Due   acetaminophen 500 mg tablet  Commonly known as: Tylenol           biotin 5 mg capsule           ceFAZolin 1 gram injection  Commonly known as: Ancef      Infuse 2,000 mg (2 g) into a venous catheter every 12 hours for 15 days. Continue cefazolin 2 g every 12 hours until 11/17.       Centrum Silver Women 8 mg iron-400 mcg-50 mcg tablet  Generic drug: multivit-min-iron-FA-vit K-lut           cholecalciferol 10 MCG (400 UNIT) tablet  Commonly known as: Vitamin D-3      Take 1 tablet (400 Units) by mouth once daily.       gabapentin 100 mg capsule  Commonly known as: Neurontin           HEPARIN (PORCINE) LOCK FLUSH IV           lactase 3,000 unit tablet  Commonly known as: Lactaid           loratadine 10 mg tablet  Commonly known as: Claritin           losartan 100 mg tablet  Commonly known as: Cozaar      Take 1 tablet (100 mg) by mouth once daily.       Normal Saline Flush flush  Generic drug: sodium chloride 0.9%           omeprazole 40 mg DR capsule  Commonly known as: PriLOSEC           Synthroid 100 mcg tablet  Generic drug: levothyroxine      TAKE 1 TABLET EVERY MORNING, TAKE BEFORE MEALS FOR HYPOTHYROIDISM              STOP taking these medications      chlorthalidone 25 mg tablet  Commonly known as:  Hygroton        ibuprofen 200 mg tablet                  Where to Get Your Medications        These medications were sent to [id number] - [place], [place] - [address] [facility]  [address] [facility] [address]      Phone: [phone]   cholecalciferol 10 MCG (400 UNIT) tablet       Information about where to get these medications is not yet available    Ask your nurse or doctor about these medications  amLODIPine 5 mg tablet  ceFAZolin 1 gram injection  vancomycin 125 mg capsule         Test Results Pending At Discharge  Pending Labs       Order Current Status    Extra Urine Gray Tube Collected (10/30/23 0613)    Urinalysis with Reflex Microscopic and Culture In process            Hospital Course   Bindu Cali is a 89 y.o. female with past medical history of HTN, CKD 2, hypothyroidism, GERD, MGUS, multiple myeloma presenting to [facility] with chief complaint of weakness and confusion.  Patient had a previous admission for lower extremity cellulitis and hardware infection.  Patient had a surgical debridement underwent PICC line placement. She was discharged with Ancef until November 17.  Due to the previous hospitalization, patient had missed her oncology appointments and was unable to receive 3-4 transfusions for her multiple myeloma.  Patient re-presented on 10/26 for weakness and confusion.  She was found to be hypercalcemic and received treatment with fluids and denosumab.  During patient's hospital stay, she was found to have symptomatic C. difficile and was treated with oral vancomycin. On 11/3/2023, patient was deemed medically stable for discharge to a skilled nursing facility for the purpose of short-term physical rehabilitation.    Pertinent Physical Exam At Time of Discharge  Physical Exam  General: No apparent distress. Sitting upright eating breakfast   HEENT:  Normocephalic, atraumatic  Chest:  Clear to auscultation bilaterally. No wheezes, rales, or  rhonchi.  CV:  Regular rate and rhythm. No murmurs    Abdomen: Abdomen is soft, non-tender, non-distended  Extremities:  No lower extremity edema or cyanosis.   Neurological:  AAOx3. No focal deficits.  Skin:  Warm and dry.    Outpatient Follow-Up  Future Appointments   Date Time Provider Department Center   11/14/2023 10:00 AM Valery SANDHU MD [id number] [facility]   11/16/2023 10:00 AM PAR SCC [email or web address]  [facility]   11/16/2023 10:30 AM INF 02 PARMA [id number] [place]   11/30/2023 10:00 AM PAR SCC MARC3 [facility]   11/30/2023 10:30 AM INF 02 PARMA [id number] [place]   12/14/2023  9:30 AM PAR SCC MARC3  [facility]   12/14/2023  9:50 AM Leopoldo Gray MD [id number] [place]   12/14/2023 10:30 AM INF 02 PARMA [id number] [place]   12/28/2023 10:00 AM PAR SCC MARC3  [facility]   12/28/2023 10:30 AM INF 02 PARMA [id number] [place]         Juvenal Ugarte MD

## 2023-11-04 PROCEDURE — 1090000002 HH PPS REVENUE DEBIT

## 2023-11-04 PROCEDURE — 1090000001 HH PPS REVENUE CREDIT

## 2023-11-05 PROCEDURE — 1090000002 HH PPS REVENUE DEBIT

## 2023-11-05 PROCEDURE — 1090000001 HH PPS REVENUE CREDIT

## 2023-11-06 ENCOUNTER — NURSING HOME VISIT (OUTPATIENT)
Dept: POST ACUTE CARE | Facility: EXTERNAL LOCATION | Age: 88
End: 2023-11-06
Payer: MEDICARE

## 2023-11-06 ENCOUNTER — TELEPHONE (OUTPATIENT)
Dept: PRIMARY CARE | Facility: CLINIC | Age: 88
End: 2023-11-06

## 2023-11-06 DIAGNOSIS — T84.53XA INFECTION OF TOTAL RIGHT KNEE REPLACEMENT, INITIAL ENCOUNTER (CMS-HCC): ICD-10-CM

## 2023-11-06 DIAGNOSIS — Z96.653 STATUS POST TOTAL BILATERAL KNEE REPLACEMENT: ICD-10-CM

## 2023-11-06 DIAGNOSIS — I27.20 PULMONARY HYPERTENSION, UNSPECIFIED (MULTI): ICD-10-CM

## 2023-11-06 DIAGNOSIS — C90.00 MULTIPLE MYELOMA, REMISSION STATUS UNSPECIFIED (MULTI): Primary | ICD-10-CM

## 2023-11-06 DIAGNOSIS — D47.2 MONOCLONAL GAMMOPATHY OF UNKNOWN SIGNIFICANCE: ICD-10-CM

## 2023-11-06 LAB
FUNGUS SPEC CULT: NORMAL
FUNGUS SPEC FUNGUS STN: NORMAL

## 2023-11-06 PROCEDURE — 1090000002 HH PPS REVENUE DEBIT

## 2023-11-06 PROCEDURE — 99305 1ST NF CARE MODERATE MDM 35: CPT | Performed by: INTERNAL MEDICINE

## 2023-11-06 PROCEDURE — 1090000001 HH PPS REVENUE CREDIT

## 2023-11-06 NOTE — PROGRESS NOTES
HISTORY & PHYSICAL    Subjective   Chief complaint: Bindu Cali is a 89 y.o. female who is a acute skilled care patient being seen and evaluated for multiple medical problems.  Patient presents for weakness.    HPI:  89 years old white female history of chronic kidney disease, anemia of chronic disease and, hypertension, hyperlipidemia, multiple myeloma patient presented with a infection of right total hip she was given antibiotic she has a PICC line to finish her antibiotic patient stated that she had the hardware removed.  She is on a course of antibiotic for 6 weeks she had a PICC line in her right arm she is admitted for IV antibiotic and therapy and rehab.        Past Medical History:   Diagnosis Date    Chronic kidney disease, stage 3b (CMS/HCC) 02/25/2022    Anemia due to stage 3b chronic kidney disease    Essential (primary) hypertension 12/14/2022    Essential hypertension    Hypertension 11/07/2011    Multiple myeloma (CMS/[facility])     Other specified postprocedural states     H/O colonoscopy    Personal history of other medical treatment     History of mammogram    Personal history of other medical treatment     History of bone density study    Personal history of other medical treatment     H/O bone density study    Procedure and treatment not carried out because of patient's decision for unspecified reasons     Colonoscopy refused    Vitamin D deficiency, unspecified     Vitamin D insufficiency       Past Surgical History:   Procedure Laterality Date    BREAST BIOPSY  01/22/2015    Biopsy Breast Open    CHOLECYSTECTOMY  01/22/2015    Cholecystectomy    HYSTERECTOMY  01/22/2015    Hysterectomy    OTHER SURGICAL HISTORY  04/28/2022    Shoulder surgery    ROTATOR CUFF REPAIR  01/22/2015    Rotator Cuff Repair    TONSILLECTOMY  01/22/2015    Tonsillectomy    TOTAL KNEE ARTHROPLASTY  01/22/2015    Knee Replacement       Family History   Problem Relation Name Age of Onset    Cancer Mother      Other  (cardiac disorder) Mother      Other (cardiac disorder) Father      Other (cardiac disorder) Brother      Other (CVA) Brother      Other (HEART BASE TUMOR) Brother         Social History     Socioeconomic History    Marital status:      Spouse name: Not on file    Number of children: Not on file    Years of education: Not on file    Highest education level: Not on file   Occupational History    Not on file   Tobacco Use    Smoking status: Never    Smokeless tobacco: Never   Substance and Sexual Activity    Alcohol use: Yes     Alcohol/week: 1.0 standard drink of alcohol     Types: 1 Glasses of wine per week    Drug use: Never    Sexual activity: Defer   Other Topics Concern    Not on file   Social History Narrative    Not on file     Social Determinants of Health     Financial Resource Strain: Low Risk  (10/26/2023)    Overall Financial Resource Strain (CARDIA)     Difficulty of Paying Living Expenses: Not hard at all   Food Insecurity: No Food Insecurity (10/7/2023)    Hunger Vital Sign     Worried About Running Out of Food in the Last Year: Never true     Ran Out of Food in the Last Year: Never true   Transportation Needs: No Transportation Needs (10/26/2023)    PRAPARE - Transportation     Lack of Transportation (Medical): No     Lack of Transportation (Non-Medical): No   Physical Activity: Inactive (10/7/2023)    Exercise Vital Sign     Days of Exercise per Week: 0 days     Minutes of Exercise per Session: 0 min   Stress: No Stress Concern Present (10/7/2023)    [employer] [place] [employer] - Occupational Stress Questionnaire     Feeling of Stress : Not at all   Social Connections: Feeling Socially Integrated (10/16/2023)    OASIS : Social Isolation     Frequency of experiencing loneliness or isolation: Never   Intimate Partner Violence: Not At Risk (10/7/2023)    Humiliation, Afraid, Rape, and Kick questionnaire     Fear of Current or Ex-Partner: No     Emotionally Abused: No      Physically Abused: No     Sexually Abused: No   Housing Stability: Low Risk  (10/26/2023)    Housing Stability Vital Sign     Unable to Pay for Housing in the Last Year: No     Number of Places Lived in the Last Year: 1     Unstable Housing in the Last Year: No       Vital signs: 133/57, 98.1, 83, 18, 97%    Objective   Physical Exam  Vitals reviewed.   Constitutional:       Appearance: Normal appearance.   HENT:      Head: Normocephalic and atraumatic.   Cardiovascular:      Rate and Rhythm: Normal rate and regular rhythm.   Pulmonary:      Effort: Pulmonary effort is normal.      Breath sounds: Normal breath sounds.   Abdominal:      General: Bowel sounds are normal.      Palpations: Abdomen is soft.   Musculoskeletal:      Cervical back: Neck supple.   Skin:     General: Skin is warm and dry.   Neurological:      General: No focal deficit present.      Mental Status: She is alert.   Psychiatric:         Mood and Affect: Mood normal.         Behavior: Behavior is cooperative.         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    Provider Attestation - Scribe documentation  All medical record entries made by the Scribe were at my direction and personally dictated by me. I have reviewed the chart and agree that the record accurately reflects my personal performance of the history, physical exam, discussion and plan.    Paul Guerrier MD

## 2023-11-06 NOTE — LETTER
Patient: Bindu Cali  : 10/14/1932    Encounter Date: 2023    HISTORY & PHYSICAL    Subjective  Chief complaint: Bindu Cali is a 89 y.o. female who is a acute skilled care patient being seen and evaluated for multiple medical problems.  Patient presents for weakness.    HPI:  89 years old white female history of chronic kidney disease, anemia of chronic disease and, hypertension, hyperlipidemia, multiple myeloma patient presented with a infection of right total hip she was given antibiotic she has a PICC line to finish her antibiotic patient stated that she had the hardware removed.  She is on a course of antibiotic for 6 weeks she had a PICC line in her right arm she is admitted for IV antibiotic and therapy and rehab.        Past Medical History:   Diagnosis Date   • Chronic kidney disease, stage 3b (CMS/HCC) 2022    Anemia due to stage 3b chronic kidney disease   • Essential (primary) hypertension 2022    Essential hypertension   • Hypertension 2011   • Multiple myeloma (CMS/[facility])    • Other specified postprocedural states     H/O colonoscopy   • Personal history of other medical treatment     History of mammogram   • Personal history of other medical treatment     History of bone density study   • Personal history of other medical treatment     H/O bone density study   • Procedure and treatment not carried out because of patient's decision for unspecified reasons     Colonoscopy refused   • Vitamin D deficiency, unspecified     Vitamin D insufficiency       Past Surgical History:   Procedure Laterality Date   • BREAST BIOPSY  2015    Biopsy Breast Open   • CHOLECYSTECTOMY  2015    Cholecystectomy   • HYSTERECTOMY  2015    Hysterectomy   • OTHER SURGICAL HISTORY  2022    Shoulder surgery   • ROTATOR CUFF REPAIR  2015    Rotator Cuff Repair   • TONSILLECTOMY  2015    Tonsillectomy   • TOTAL KNEE ARTHROPLASTY  2015    Knee  Replacement       Family History   Problem Relation Name Age of Onset   • Cancer Mother     • Other (cardiac disorder) Mother     • Other (cardiac disorder) Father     • Other (cardiac disorder) Brother     • Other (CVA) Brother     • Other (HEART BASE TUMOR) Brother         Social History     Socioeconomic History   • Marital status:      Spouse name: Not on file   • Number of children: Not on file   • Years of education: Not on file   • Highest education level: Not on file   Occupational History   • Not on file   Tobacco Use   • Smoking status: Never   • Smokeless tobacco: Never   Substance and Sexual Activity   • Alcohol use: Yes     Alcohol/week: 1.0 standard drink of alcohol     Types: 1 Glasses of wine per week   • Drug use: Never   • Sexual activity: Defer   Other Topics Concern   • Not on file   Social History Narrative   • Not on file     Social Determinants of Health     Financial Resource Strain: Low Risk  (10/26/2023)    Overall Financial Resource Strain (CARDIA)    • Difficulty of Paying Living Expenses: Not hard at all   Food Insecurity: No Food Insecurity (10/7/2023)    Hunger Vital Sign    • Worried About Running Out of Food in the Last Year: Never true    • Ran Out of Food in the Last Year: Never true   Transportation Needs: No Transportation Needs (10/26/2023)    PRAPARE - Transportation    • Lack of Transportation (Medical): No    • Lack of Transportation (Non-Medical): No   Physical Activity: Inactive (10/7/2023)    Exercise Vital Sign    • Days of Exercise per Week: 0 days    • Minutes of Exercise per Session: 0 min   Stress: No Stress Concern Present (10/7/2023)    [employer] [place] [employer] - Occupational Stress Questionnaire    • Feeling of Stress : Not at all   Social Connections: Feeling Socially Integrated (10/16/2023)    OASIS : Social Isolation    • Frequency of experiencing loneliness or isolation: Never   Intimate Partner Violence: Not At Risk (10/7/2023)     Humiliation, Afraid, Rape, and Kick questionnaire    • Fear of Current or Ex-Partner: No    • Emotionally Abused: No    • Physically Abused: No    • Sexually Abused: No   Housing Stability: Low Risk  (10/26/2023)    Housing Stability Vital Sign    • Unable to Pay for Housing in the Last Year: No    • Number of Places Lived in the Last Year: 1    • Unstable Housing in the Last Year: No       Vital signs: 133/57, 98.1, 83, 18, 97%    Objective  Physical Exam  Vitals reviewed.   Constitutional:       Appearance: Normal appearance.   HENT:      Head: Normocephalic and atraumatic.   Cardiovascular:      Rate and Rhythm: Normal rate and regular rhythm.   Pulmonary:      Effort: Pulmonary effort is normal.      Breath sounds: Normal breath sounds.   Abdominal:      General: Bowel sounds are normal.      Palpations: Abdomen is soft.   Musculoskeletal:      Cervical back: Neck supple.   Skin:     General: Skin is warm and dry.   Neurological:      General: No focal deficit present.      Mental Status: She is alert.   Psychiatric:         Mood and Affect: Mood normal.         Behavior: Behavior is cooperative.         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    Provider Attestation - Scribe documentation  All medical record entries made by the Scribe were at my direction and personally dictated by me. I have reviewed the chart and agree that the record accurately reflects my personal performance of the history,  physical exam, discussion and plan.    Paul Guerrier MD          Electronically Signed By: Paul Guerrier MD   11/6/23  6:31 PM

## 2023-11-06 NOTE — TELEPHONE ENCOUNTER
Saw you 10/25/23 in office and was sent to [facility]. Dtr Jesi Ford wants to know what went down while in hospital.  She is now back at nursing facility.

## 2023-11-07 ENCOUNTER — NURSING HOME VISIT (OUTPATIENT)
Dept: POST ACUTE CARE | Facility: EXTERNAL LOCATION | Age: 88
End: 2023-11-07

## 2023-11-07 DIAGNOSIS — A04.72 CLOSTRIDIUM DIFFICILE COLITIS: ICD-10-CM

## 2023-11-07 DIAGNOSIS — L03.115 CELLULITIS AND ABSCESS OF RIGHT LOWER EXTREMITY: ICD-10-CM

## 2023-11-07 DIAGNOSIS — L02.415 CELLULITIS AND ABSCESS OF RIGHT LOWER EXTREMITY: ICD-10-CM

## 2023-11-07 DIAGNOSIS — I10 ESSENTIAL HYPERTENSION: ICD-10-CM

## 2023-11-07 PROBLEM — M79.642 PAIN OF LEFT HAND: Status: ACTIVE | Noted: 2023-11-07

## 2023-11-07 PROCEDURE — 1090000002 HH PPS REVENUE DEBIT

## 2023-11-07 PROCEDURE — 1090000001 HH PPS REVENUE CREDIT

## 2023-11-07 PROCEDURE — 99309 SBSQ NF CARE MODERATE MDM 30: CPT | Performed by: INTERNAL MEDICINE

## 2023-11-07 PROCEDURE — G0180 MD CERTIFICATION HHA PATIENT: HCPCS | Performed by: STUDENT IN AN ORGANIZED HEALTH CARE EDUCATION/TRAINING PROGRAM

## 2023-11-07 ASSESSMENT — ACTIVITIES OF DAILY LIVING (ADL): OASIS_M1830: 03

## 2023-11-07 NOTE — PROGRESS NOTES
PROGRESS NOTE    Subjective   Chief complaint: Bindu Cali is a 89 y.o. female who is an acute skilled patient being seen and evaluated for weakness    HPI:  Patient has been working in therapy to improve strength, endurance, and ADLs.  Patient continues to work toward goals.  Patient has left hand pain and swelling. Xray negative. Possible gout. Patient continues on IV ATB therapy for cellulitis with no adverse reactions. Remains afebrile. No new concerns today.  Denies n/v/f/c pain.        Objective   Vital signs: 120/76,70,97%    Physical Exam  Vitals reviewed.   Constitutional:       Appearance: Normal appearance.   HENT:      Head: Normocephalic and atraumatic.   Cardiovascular:      Rate and Rhythm: Normal rate and regular rhythm.   Pulmonary:      Effort: Pulmonary effort is normal.      Breath sounds: Normal breath sounds.   Abdominal:      General: Bowel sounds are normal.      Palpations: Abdomen is soft.   Musculoskeletal:      Cervical back: Neck supple.   Skin:     General: Skin is warm and dry.   Neurological:      General: No focal deficit present.      Mental Status: She is alert.   Psychiatric:         Mood and Affect: Mood normal.         Behavior: Behavior is cooperative.         Assessment/Plan   Problem List Items Addressed This Visit       Essential hypertension     Continue antihypertensive  Monitor BP         Cellulitis and abscess of right lower extremity     IV ATB until 11/17  Monitor symptoms         Clostridium difficile colitis     Continue oral Vanco  Monitor stools           Medications, treatments, and labs reviewed  Continue medications and treatments as listed in EMR    Scribe Attestation  Jennie MICHAEL Scribe   attest that this documentation has been prepared under the direction and in the presence of Paul Guerrier MD.     Provider Attestation - Scribe documentation  All medical record entries made by the Scribe were at my direction and personally dictated by me.  I have reviewed the chart and agree that the record accurately reflects my personal performance of the history, physical exam, discussion and plan.   Paul Guerrier MD

## 2023-11-07 NOTE — LETTER
Patient: Bindu Cali  : 10/14/1932    Encounter Date: 2023    PROGRESS NOTE    Subjective  Chief complaint: Bindu Cali is a 89 y.o. female who is an acute skilled patient being seen and evaluated for weakness    HPI:  Patient has been working in therapy to improve strength, endurance, and ADLs.  Patient continues to work toward goals.  Patient has left hand pain and swelling. Xray negative. Possible gout. Patient continues on IV ATB therapy for cellulitis with no adverse reactions. Remains afebrile. No new concerns today.  Denies n/v/f/c pain.        Objective  Vital signs: 120/76,70,97%    Physical Exam  Vitals reviewed.   Constitutional:       Appearance: Normal appearance.   HENT:      Head: Normocephalic and atraumatic.   Cardiovascular:      Rate and Rhythm: Normal rate and regular rhythm.   Pulmonary:      Effort: Pulmonary effort is normal.      Breath sounds: Normal breath sounds.   Abdominal:      General: Bowel sounds are normal.      Palpations: Abdomen is soft.   Musculoskeletal:      Cervical back: Neck supple.   Skin:     General: Skin is warm and dry.   Neurological:      General: No focal deficit present.      Mental Status: She is alert.   Psychiatric:         Mood and Affect: Mood normal.         Behavior: Behavior is cooperative.         Assessment/Plan  Problem List Items Addressed This Visit       Essential hypertension     Continue antihypertensive  Monitor BP         Cellulitis and abscess of right lower extremity     IV ATB until   Monitor symptoms         Clostridium difficile colitis     Continue oral Vanco  Monitor stools           Medications, treatments, and labs reviewed  Continue medications and treatments as listed in EMR    Scribe Attestation  I, Lorenzo Thakur   attest that this documentation has been prepared under the direction and in the presence of Paul Guerrier MD.     Provider Attestation - Scribe documentation  All medical record  entries made by the Scribe were at my direction and personally dictated by me. I have reviewed the chart and agree that the record accurately reflects my personal performance of the history, physical exam, discussion and plan.   Paul Guerrier MD      Electronically Signed By: Paul Guerrier MD   11/7/23  5:05 PM

## 2023-11-07 NOTE — DOCUMENTATION CLARIFICATION NOTE
"    PATIENT:               SUMIT OTT  ACCT #:                  [id number]  MRN:                       [id number]  :                       10/14/1932  ADMIT DATE:       10/6/2023 4:35 PM  DISCH DATE:        10/13/2023 9:49 PM  RESPONDING PROVIDER #:        [id number]          PROVIDER RESPONSE TEXT:    Deep Surgical Site Infection    CDI QUERY TEXT:    [facility]_Generic    Instruction:    Based on your assessment of the patient and the clinical information, please provide the requested documentation by clicking on the appropriate radio button and enter any additional information if prompted.    Question: Please further clarify after study the source of the the patient's infection      When answering this query, please exercise your independent professional judgment. The fact that a question is being asked, does not imply that any particular answer is desired or expected.    The patient's clinical indicators include:  Clinical Information:  The patient is a 89 year old female who presented to the ED for further evaluation of right leg wound on her right shin.    Clinical Indicators: wound dehiscence, purulent drainage of the lateral incision,  draining sinus tract from the right proximal tibia.    Tissue/Wound Culture Smear from 10/08/2023: (2+) Few Methicillin Susceptible Staphylococcus Aureus (MSSA)    Per the OP Note from 10/08/2023 at 12:10:  \"Postoperative Diagnosis:  1. Deep surgical site infection with wound dehiscence, right tibial plateau  2. Right tibia osteomyelitis and infected hardware\"      Treatment: Ortho consult, Vancomycin and zosyn then switched to ancef    Procedures Performed:  1. I&D Right lower extremity abscess  2. Saucerization right tibial plateau  3. Removal of hardware right tibial plateau  4. Secondary wound closure right leg      Risk Factors:  Pt had ORIF R periprosthetic proximal tibia fracture on 23  Options provided:  -- Infected Hardware  -- Deep Surgical Site Infection  -- Other " - I will add my own diagnosis  -- Refer to Clinical Documentation Reviewer    Query created by: Mary Corley on 11/2/2023 5:52 PM      Electronically signed by:  DULCE BATISTA MD 11/7/2023 2:31 PM

## 2023-11-08 ENCOUNTER — NURSING HOME VISIT (OUTPATIENT)
Dept: POST ACUTE CARE | Facility: EXTERNAL LOCATION | Age: 88
End: 2023-11-08
Payer: MEDICARE

## 2023-11-08 DIAGNOSIS — D64.9 ANEMIA, UNSPECIFIED TYPE: ICD-10-CM

## 2023-11-08 DIAGNOSIS — R53.1 WEAKNESS: Primary | ICD-10-CM

## 2023-11-08 DIAGNOSIS — I10 ESSENTIAL HYPERTENSION: ICD-10-CM

## 2023-11-08 PROCEDURE — 1090000002 HH PPS REVENUE DEBIT

## 2023-11-08 PROCEDURE — 99308 SBSQ NF CARE LOW MDM 20: CPT | Performed by: REGISTERED NURSE

## 2023-11-08 PROCEDURE — 1090000001 HH PPS REVENUE CREDIT

## 2023-11-08 NOTE — LETTER
Patient: Bindu Cali  : 10/14/1932    Encounter Date: 2023    PROGRESS NOTE    Subjective  Chief complaint: Bindu Cali is a 89 y.o. female who is an acute skilled patient being seen and evaluated for weakness    HPI:  23 Patient seen and examined at bedside.  Patient denies n/v/f/c.  Continues working in therapy.  No new complaints.    Objective  Vital signs: 137/50, 96.8, 79,  97%    Physical Exam  Constitutional:       General: She is not in acute distress.  Eyes:      Extraocular Movements: Extraocular movements intact.   Pulmonary:      Effort: Pulmonary effort is normal.   Musculoskeletal:      Cervical back: Neck supple.   Neurological:      Mental Status: She is alert.   Psychiatric:         Mood and Affect: Mood normal.         Behavior: Behavior is cooperative.         Assessment/Plan  Problem List Items Addressed This Visit       Essential hypertension     Continue antihypertensive  Monitor BP         Anemia     Monitor labs         Weakness - Primary     Continue PT OT          Medications, treatments, and labs reviewed  Continue medications and treatments as listed in [facility]    Scribe Attestation  IJennifer Scribe   attest that this documentation has been prepared under the direction and in the presence of VERONICA Arana.    Provider Attestation - Scribe documentation  All medical record entries made by the Scribe were at my direction and personally dictated by me. I have reviewed the chart and agree that the record accurately reflects my personal performance of the history, physical exam, discussion and plan.    VERONICA Arana        Electronically Signed By: VERONICA Arana   23 10:55 AM

## 2023-11-09 ENCOUNTER — NURSING HOME VISIT (OUTPATIENT)
Dept: POST ACUTE CARE | Facility: EXTERNAL LOCATION | Age: 88
End: 2023-11-09
Payer: MEDICARE

## 2023-11-09 DIAGNOSIS — N39.3 FEMALE STRESS INCONTINENCE: ICD-10-CM

## 2023-11-09 DIAGNOSIS — R60.9 EDEMA, UNSPECIFIED TYPE: ICD-10-CM

## 2023-11-09 DIAGNOSIS — R19.7 DIARRHEA, UNSPECIFIED TYPE: ICD-10-CM

## 2023-11-09 DIAGNOSIS — R53.1 WEAKNESS: Primary | ICD-10-CM

## 2023-11-09 DIAGNOSIS — I10 ESSENTIAL HYPERTENSION: ICD-10-CM

## 2023-11-09 PROCEDURE — 1090000002 HH PPS REVENUE DEBIT

## 2023-11-09 PROCEDURE — 99309 SBSQ NF CARE MODERATE MDM 30: CPT | Performed by: INTERNAL MEDICINE

## 2023-11-09 PROCEDURE — 1090000001 HH PPS REVENUE CREDIT

## 2023-11-09 RX ORDER — DIPHENHYDRAMINE HYDROCHLORIDE 50 MG/ML
50 INJECTION INTRAMUSCULAR; INTRAVENOUS AS NEEDED
Status: CANCELLED | OUTPATIENT
Start: 2023-11-09

## 2023-11-09 RX ORDER — EPINEPHRINE 0.3 MG/.3ML
0.3 INJECTION SUBCUTANEOUS EVERY 5 MIN PRN
Status: CANCELLED | OUTPATIENT
Start: 2023-11-30

## 2023-11-09 RX ORDER — ALBUTEROL SULFATE 0.83 MG/ML
3 SOLUTION RESPIRATORY (INHALATION) AS NEEDED
Status: CANCELLED | OUTPATIENT
Start: 2023-11-09

## 2023-11-09 RX ORDER — FAMOTIDINE 10 MG/ML
20 INJECTION INTRAVENOUS ONCE AS NEEDED
Status: CANCELLED | OUTPATIENT
Start: 2023-11-09

## 2023-11-09 RX ORDER — PROCHLORPERAZINE MALEATE 10 MG
10 TABLET ORAL ONCE
Status: CANCELLED
Start: 2023-11-09

## 2023-11-09 RX ORDER — BORTEZOMIB 3.5 MG/1
1.3 INJECTION, POWDER, LYOPHILIZED, FOR SOLUTION INTRAVENOUS; SUBCUTANEOUS ONCE
Status: CANCELLED | OUTPATIENT
Start: 2023-11-30

## 2023-11-09 RX ORDER — EPINEPHRINE 0.3 MG/.3ML
0.3 INJECTION SUBCUTANEOUS EVERY 5 MIN PRN
Status: CANCELLED | OUTPATIENT
Start: 2023-11-09

## 2023-11-09 RX ORDER — FAMOTIDINE 10 MG/ML
20 INJECTION INTRAVENOUS ONCE AS NEEDED
Status: CANCELLED | OUTPATIENT
Start: 2023-11-30

## 2023-11-09 RX ORDER — PROCHLORPERAZINE MALEATE 10 MG
10 TABLET ORAL ONCE
Status: CANCELLED
Start: 2023-11-30

## 2023-11-09 RX ORDER — ALBUTEROL SULFATE 0.83 MG/ML
3 SOLUTION RESPIRATORY (INHALATION) AS NEEDED
Status: CANCELLED | OUTPATIENT
Start: 2023-11-30

## 2023-11-09 RX ORDER — BORTEZOMIB 3.5 MG/1
1.3 INJECTION, POWDER, LYOPHILIZED, FOR SOLUTION INTRAVENOUS; SUBCUTANEOUS ONCE
Status: CANCELLED | OUTPATIENT
Start: 2023-11-09

## 2023-11-09 RX ORDER — DIPHENHYDRAMINE HYDROCHLORIDE 50 MG/ML
50 INJECTION INTRAMUSCULAR; INTRAVENOUS AS NEEDED
Status: CANCELLED | OUTPATIENT
Start: 2023-11-30

## 2023-11-09 NOTE — LETTER
Patient: Bindu Cali  : 10/14/1932    Encounter Date: 2023    PROGRESS NOTE    Subjective  Chief complaint: Bindu Cali is a 89 y.o. female who is an acute skilled patient being seen and evaluated for weakness    HPI:  Patient working in therapy due to weakness and debility. Patient requires min assistance or sit to stand transfers. Patient has complaints of mouth pain and was started on Xylocaine and salt water mouth rinse. Patient also had complaints of loose stools and has Cdiff pending. Simethicone, Probiotic and Caltrate. Denies nausea and vomiting. Larson catheter was removed and Voiding trial initiated. She has some edema and compression stockings were started.  Denies chest pain or headache. No other concerns at this time.       Objective  Vital signs: 123/76, 98%    Physical Exam  Constitutional:       General: She is not in acute distress.  Eyes:      Extraocular Movements: Extraocular movements intact.   Cardiovascular:      Rate and Rhythm: Normal rate and regular rhythm.   Pulmonary:      Effort: Pulmonary effort is normal.      Breath sounds: Normal breath sounds.   Abdominal:      General: Bowel sounds are normal.      Palpations: Abdomen is soft.   Musculoskeletal:      Cervical back: Neck supple.      Right lower leg: No edema.      Left lower leg: No edema.   Neurological:      Mental Status: She is alert.   Psychiatric:         Mood and Affect: Mood normal.         Behavior: Behavior is cooperative.         Assessment/Plan  Problem List Items Addressed This Visit       Essential hypertension     Continue antihypertensive  Monitor BP         Weakness - Primary     therapy         Female stress incontinence     Larson cath removed  Voiding trial started         Diarrhea     Cdiff pending  Probiotic  Simethicone  Caltrate  monitor           Edema     Compression stockings          Medications, treatments, and labs reviewed  Continue medications and treatments as listed in  [facility]    Scribe Attestation  By signing my name below, I, Lorenzo Guaman   attest that this documentation has been prepared under the direction and in the presence of Paul Guerrier MD.    Provider Attestation - Scribe documentation  All medical record entries made by the Scribe were at my direction and personally dictated by me. I have reviewed the chart and agree that the record accurately reflects my personal performance of the history, physical exam, discussion and plan.  1. Weakness        2. Diarrhea, unspecified type        3. Female stress incontinence        4. Edema, unspecified type        5. Essential hypertension              Electronically Signed By: Paul Guerrier MD   11/9/23  3:10 PM

## 2023-11-09 NOTE — PROGRESS NOTES
PROGRESS NOTE    Subjective   Chief complaint: Bindu Cali is a 89 y.o. female who is an acute skilled patient being seen and evaluated for weakness    HPI:  Patient working in therapy due to weakness and debility. Patient requires min assistance or sit to stand transfers. Patient has complaints of mouth pain and was started on Xylocaine and salt water mouth rinse. Patient also had complaints of loose stools and has Cdiff pending. Simethicone, Probiotic and Caltrate. Denies nausea and vomiting. Larson catheter was removed and Voiding trial initiated. She has some edema and compression stockings were started.  Denies chest pain or headache. No other concerns at this time.       Objective   Vital signs: 123/76, 98%    Physical Exam  Constitutional:       General: She is not in acute distress.  Eyes:      Extraocular Movements: Extraocular movements intact.   Cardiovascular:      Rate and Rhythm: Normal rate and regular rhythm.   Pulmonary:      Effort: Pulmonary effort is normal.      Breath sounds: Normal breath sounds.   Abdominal:      General: Bowel sounds are normal.      Palpations: Abdomen is soft.   Musculoskeletal:      Cervical back: Neck supple.      Right lower leg: No edema.      Left lower leg: No edema.   Neurological:      Mental Status: She is alert.   Psychiatric:         Mood and Affect: Mood normal.         Behavior: Behavior is cooperative.         Assessment/Plan   Problem List Items Addressed This Visit       Essential hypertension     Continue antihypertensive  Monitor BP         Weakness - Primary     therapy         Female stress incontinence     Larson cath removed  Voiding trial started         Diarrhea     Cdiff pending  Probiotic  Simethicone  Caltrate  monitor           Edema     Compression stockings          Medications, treatments, and labs reviewed  Continue medications and treatments as listed in [facility]    Scribe Attestation  By signing my name below, ILakia Scribe    attest that this documentation has been prepared under the direction and in the presence of Paul Guerrier MD.    Provider Attestation - Scribe documentation  All medical record entries made by the Scribe were at my direction and personally dictated by me. I have reviewed the chart and agree that the record accurately reflects my personal performance of the history, physical exam, discussion and plan.  1. Weakness        2. Diarrhea, unspecified type        3. Female stress incontinence        4. Edema, unspecified type        5. Essential hypertension

## 2023-11-10 ENCOUNTER — NURSING HOME VISIT (OUTPATIENT)
Dept: POST ACUTE CARE | Facility: EXTERNAL LOCATION | Age: 88
End: 2023-11-10
Payer: MEDICARE

## 2023-11-10 DIAGNOSIS — R53.1 WEAKNESS: Primary | ICD-10-CM

## 2023-11-10 DIAGNOSIS — R33.8 ACUTE URINARY RETENTION: ICD-10-CM

## 2023-11-10 PROCEDURE — 1090000001 HH PPS REVENUE CREDIT

## 2023-11-10 PROCEDURE — 99308 SBSQ NF CARE LOW MDM 20: CPT | Performed by: REGISTERED NURSE

## 2023-11-10 PROCEDURE — 1090000002 HH PPS REVENUE DEBIT

## 2023-11-10 RX ORDER — DIPHENHYDRAMINE HYDROCHLORIDE 50 MG/ML
50 INJECTION INTRAMUSCULAR; INTRAVENOUS AS NEEDED
Status: CANCELLED | OUTPATIENT
Start: 2024-01-11

## 2023-11-10 RX ORDER — FAMOTIDINE 10 MG/ML
20 INJECTION INTRAVENOUS ONCE AS NEEDED
Status: CANCELLED | OUTPATIENT
Start: 2023-12-28

## 2023-11-10 RX ORDER — DIPHENHYDRAMINE HYDROCHLORIDE 50 MG/ML
50 INJECTION INTRAMUSCULAR; INTRAVENOUS AS NEEDED
Status: CANCELLED | OUTPATIENT
Start: 2023-12-28

## 2023-11-10 RX ORDER — PROCHLORPERAZINE MALEATE 10 MG
10 TABLET ORAL ONCE
Status: CANCELLED
Start: 2023-12-28

## 2023-11-10 RX ORDER — ALBUTEROL SULFATE 0.83 MG/ML
3 SOLUTION RESPIRATORY (INHALATION) AS NEEDED
Status: CANCELLED | OUTPATIENT
Start: 2023-12-28

## 2023-11-10 RX ORDER — EPINEPHRINE 0.3 MG/.3ML
0.3 INJECTION SUBCUTANEOUS EVERY 5 MIN PRN
Status: CANCELLED | OUTPATIENT
Start: 2024-01-11

## 2023-11-10 RX ORDER — BORTEZOMIB 3.5 MG/1
1.3 INJECTION, POWDER, LYOPHILIZED, FOR SOLUTION INTRAVENOUS; SUBCUTANEOUS ONCE
Status: CANCELLED | OUTPATIENT
Start: 2024-01-11

## 2023-11-10 RX ORDER — FAMOTIDINE 10 MG/ML
20 INJECTION INTRAVENOUS ONCE AS NEEDED
Status: CANCELLED | OUTPATIENT
Start: 2024-01-11

## 2023-11-10 RX ORDER — EPINEPHRINE 0.3 MG/.3ML
0.3 INJECTION SUBCUTANEOUS EVERY 5 MIN PRN
Status: CANCELLED | OUTPATIENT
Start: 2023-12-28

## 2023-11-10 RX ORDER — ALBUTEROL SULFATE 0.83 MG/ML
3 SOLUTION RESPIRATORY (INHALATION) AS NEEDED
Status: CANCELLED | OUTPATIENT
Start: 2024-01-11

## 2023-11-10 RX ORDER — BORTEZOMIB 3.5 MG/1
1.3 INJECTION, POWDER, LYOPHILIZED, FOR SOLUTION INTRAVENOUS; SUBCUTANEOUS ONCE
Status: CANCELLED | OUTPATIENT
Start: 2023-12-28

## 2023-11-10 RX ORDER — PROCHLORPERAZINE MALEATE 10 MG
10 TABLET ORAL ONCE
Status: CANCELLED
Start: 2024-01-11

## 2023-11-10 NOTE — LETTER
Patient: Bindu Cali  : 10/14/1932    Encounter Date: 11/10/2023    PROGRESS NOTE    Subjective  Chief complaint: Bindu Cali is a 89 y.o. female who is an acute skilled patient being seen and evaluated for weakness    HPI:  11/10/23 Patient has been working in therapy to improve strength, endurance, and ADLs.  Patient continues to work toward goals.  No new concerns today.  Denies n/v/f/c pain.      Objective  Vital signs: 121/55, 96.8, 86, 17, 98%    Physical Exam  Constitutional:       General: She is not in acute distress.  Eyes:      Extraocular Movements: Extraocular movements intact.   Pulmonary:      Effort: Pulmonary effort is normal.   Musculoskeletal:      Cervical back: Neck supple.   Neurological:      Mental Status: She is alert.   Psychiatric:         Mood and Affect: Mood normal.         Behavior: Behavior is cooperative.         Assessment/Plan  Problem List Items Addressed This Visit       Weakness - Primary     Continue PT OT         Acute urinary retention     Voiding trial unsuccessful Larson catheter replaced          Medications, treatments, and labs reviewed  Continue medications and treatments as listed in [facility]    Scribe Attestation  IJennifer Scribe   attest that this documentation has been prepared under the direction and in the presence of VERONICA Arana.    Provider Attestation - Scribe documentation  All medical record entries made by the Scribe were at my direction and personally dictated by me. I have reviewed the chart and agree that the record accurately reflects my personal performance of the history, physical exam, discussion and plan.    VERONICA Arana        Electronically Signed By: VERONICA Arana   23 12:31 PM

## 2023-11-11 PROCEDURE — 1090000001 HH PPS REVENUE CREDIT

## 2023-11-11 PROCEDURE — 1090000002 HH PPS REVENUE DEBIT

## 2023-11-12 PROCEDURE — 1090000002 HH PPS REVENUE DEBIT

## 2023-11-12 PROCEDURE — 1090000001 HH PPS REVENUE CREDIT

## 2023-11-13 ENCOUNTER — NURSING HOME VISIT (OUTPATIENT)
Dept: POST ACUTE CARE | Facility: EXTERNAL LOCATION | Age: 88
End: 2023-11-13
Payer: MEDICARE

## 2023-11-13 DIAGNOSIS — R53.1 WEAKNESS: Primary | ICD-10-CM

## 2023-11-13 DIAGNOSIS — A04.72 CLOSTRIDIUM DIFFICILE COLITIS: ICD-10-CM

## 2023-11-13 DIAGNOSIS — L02.415 CELLULITIS AND ABSCESS OF RIGHT LOWER EXTREMITY: ICD-10-CM

## 2023-11-13 DIAGNOSIS — R60.9 EDEMA, UNSPECIFIED TYPE: ICD-10-CM

## 2023-11-13 DIAGNOSIS — L03.115 CELLULITIS AND ABSCESS OF RIGHT LOWER EXTREMITY: ICD-10-CM

## 2023-11-13 PROCEDURE — 99309 SBSQ NF CARE MODERATE MDM 30: CPT | Performed by: INTERNAL MEDICINE

## 2023-11-13 PROCEDURE — 1090000002 HH PPS REVENUE DEBIT

## 2023-11-13 PROCEDURE — 1090000001 HH PPS REVENUE CREDIT

## 2023-11-13 NOTE — PROGRESS NOTES
PROGRESS NOTE    Subjective   Chief complaint: Bindu Cali is a 89 y.o. female who is an acute skilled patient being seen and evaluated for weakness.    HPI:  Patient has been working in therapy to improve strength, endurance, and ADLs.  Patient continues to work toward goals. Patient completing multiple therapeutic exercises to increase strength, mobility and flexibility. Gait training 81' x 1, 50' x 1, 85' x 1 with WW and CGA.  Ascend/descend three 4-inch steps with B HR and CGA of 1, SBA. Patient completed on car transfer with walker with CGA. Patient has increase in BLE edema. Continues on IV ATB therapy with no adverse reactions. Also she is on vanco for c-diff with no concerns. No new concerns today.  Denies n/v/f/c pain.        Objective   Vital signs: 138/61,79,96%    Physical Exam  Vitals reviewed.   Constitutional:       Appearance: Normal appearance.   HENT:      Head: Normocephalic and atraumatic.   Cardiovascular:      Rate and Rhythm: Normal rate and regular rhythm.   Pulmonary:      Effort: Pulmonary effort is normal.      Breath sounds: Normal breath sounds.   Abdominal:      General: Bowel sounds are normal.      Palpations: Abdomen is soft.   Musculoskeletal:      Cervical back: Neck supple.   Skin:     General: Skin is warm and dry.   Neurological:      General: No focal deficit present.      Mental Status: She is alert.   Psychiatric:         Mood and Affect: Mood normal.         Behavior: Behavior is cooperative.         Assessment/Plan   Problem List Items Addressed This Visit       Weakness - Primary     Continue therapy         Cellulitis and abscess of right lower extremity     IV ATB until 11/17  Monitor symptoms         Clostridium difficile colitis     Continue oral Vanco  Monitor stools          Edema     40mg lasix  10Meq potassium  BMP today and then 11/15  Monitor edema          Medications, treatments, and labs reviewed  Continue medications and treatments as listed in  [facility]    Scribe Attestation  I, Lorenzo Thakur   attest that this documentation has been prepared under the direction and in the presence of Paul Guerrier MD.     Provider Attestation - Scribe documentation  All medical record entries made by the Scribe were at my direction and personally dictated by me. I have reviewed the chart and agree that the record accurately reflects my personal performance of the history, physical exam, discussion and plan.   Paul Guerrier MD

## 2023-11-13 NOTE — PROGRESS NOTES
PROGRESS NOTE    Subjective   Chief complaint: Bindu Cali is a 89 y.o. female who is an acute skilled patient being seen and evaluated for weakness    HPI:  11/8/23 Patient seen and examined at bedside.  Patient denies n/v/f/c.  Continues working in therapy.  No new complaints.    Objective   Vital signs: 137/50, 96.8, 79,  97%    Physical Exam  Constitutional:       General: She is not in acute distress.  Eyes:      Extraocular Movements: Extraocular movements intact.   Pulmonary:      Effort: Pulmonary effort is normal.   Musculoskeletal:      Cervical back: Neck supple.   Neurological:      Mental Status: She is alert.   Psychiatric:         Mood and Affect: Mood normal.         Behavior: Behavior is cooperative.         Assessment/Plan   Problem List Items Addressed This Visit       Essential hypertension     Continue antihypertensive  Monitor BP         Anemia     Monitor labs         Weakness - Primary     Continue PT OT          Medications, treatments, and labs reviewed  Continue medications and treatments as listed in [facility]    Scribe Attestation  Jennifer MICHAEL Scribe   attest that this documentation has been prepared under the direction and in the presence of VERONICA Arana.    Provider Attestation - Scribe documentation  All medical record entries made by the Scribe were at my direction and personally dictated by me. I have reviewed the chart and agree that the record accurately reflects my personal performance of the history, physical exam, discussion and plan.    VERONICA Aarna

## 2023-11-13 NOTE — LETTER
Patient: Bindu Cali  : 10/14/1932    Encounter Date: 2023    PROGRESS NOTE    Subjective  Chief complaint: Bindu Cali is a 89 y.o. female who is an acute skilled patient being seen and evaluated for weakness.    HPI:  Patient has been working in therapy to improve strength, endurance, and ADLs.  Patient continues to work toward goals. Patient completing multiple therapeutic exercises to increase strength, mobility and flexibility. Gait training 81' x 1, 50' x 1, 85' x 1 with WW and CGA.  Ascend/descend three 4-inch steps with B HR and CGA of 1, SBA. Patient completed on car transfer with walker with CGA. Patient has increase in BLE edema. Continues on IV ATB therapy with no adverse reactions. Also she is on vanco for c-diff with no concerns. No new concerns today.  Denies n/v/f/c pain.        Objective  Vital signs: 138/61,79,96%    Physical Exam  Vitals reviewed.   Constitutional:       Appearance: Normal appearance.   HENT:      Head: Normocephalic and atraumatic.   Cardiovascular:      Rate and Rhythm: Normal rate and regular rhythm.   Pulmonary:      Effort: Pulmonary effort is normal.      Breath sounds: Normal breath sounds.   Abdominal:      General: Bowel sounds are normal.      Palpations: Abdomen is soft.   Musculoskeletal:      Cervical back: Neck supple.   Skin:     General: Skin is warm and dry.   Neurological:      General: No focal deficit present.      Mental Status: She is alert.   Psychiatric:         Mood and Affect: Mood normal.         Behavior: Behavior is cooperative.         Assessment/Plan  Problem List Items Addressed This Visit       Weakness - Primary     Continue therapy         Cellulitis and abscess of right lower extremity     IV ATB until   Monitor symptoms         Clostridium difficile colitis     Continue oral Vanco  Monitor stools          Edema     40mg lasix  10Meq potassium  BMP today and then 11/15  Monitor edema          Medications,  treatments, and labs reviewed  Continue medications and treatments as listed in EMR    Scribe Attestation  I, Lorenzo Thakur   attest that this documentation has been prepared under the direction and in the presence of Paul Guerrier MD.     Provider Attestation - Scribe documentation  All medical record entries made by the Scribe were at my direction and personally dictated by me. I have reviewed the chart and agree that the record accurately reflects my personal performance of the history, physical exam, discussion and plan.   Paul Guerrier MD      Electronically Signed By: Paul Guerrier MD   11/13/23  4:47 PM

## 2023-11-14 ENCOUNTER — NURSING HOME VISIT (OUTPATIENT)
Dept: POST ACUTE CARE | Facility: EXTERNAL LOCATION | Age: 88
End: 2023-11-14

## 2023-11-14 ENCOUNTER — FOLLOW-UP (OUTPATIENT)
Dept: INFECTIOUS DISEASES | Facility: CLINIC | Age: 88
End: 2023-11-14
Payer: MEDICARE

## 2023-11-14 VITALS
DIASTOLIC BLOOD PRESSURE: 59 MMHG | SYSTOLIC BLOOD PRESSURE: 126 MMHG | TEMPERATURE: 98.5 F | WEIGHT: 141 LBS | BODY MASS INDEX: 28.43 KG/M2 | HEART RATE: 83 BPM | HEIGHT: 59 IN

## 2023-11-14 DIAGNOSIS — T81.49XA SURGICAL SITE INFECTION: Primary | ICD-10-CM

## 2023-11-14 DIAGNOSIS — A04.72 CLOSTRIDIUM DIFFICILE COLITIS: ICD-10-CM

## 2023-11-14 DIAGNOSIS — L03.115 CELLULITIS AND ABSCESS OF RIGHT LOWER EXTREMITY: ICD-10-CM

## 2023-11-14 DIAGNOSIS — L02.415 CELLULITIS AND ABSCESS OF RIGHT LOWER EXTREMITY: ICD-10-CM

## 2023-11-14 DIAGNOSIS — I10 ESSENTIAL HYPERTENSION: ICD-10-CM

## 2023-11-14 DIAGNOSIS — R53.1 WEAKNESS: ICD-10-CM

## 2023-11-14 LAB — CRP SERPL-MCNC: 7.36 MG/DL

## 2023-11-14 PROCEDURE — 1090000001 HH PPS REVENUE CREDIT

## 2023-11-14 PROCEDURE — 1090000002 HH PPS REVENUE DEBIT

## 2023-11-14 PROCEDURE — 99215 OFFICE O/P EST HI 40 MIN: CPT | Performed by: INTERNAL MEDICINE

## 2023-11-14 PROCEDURE — 99308 SBSQ NF CARE LOW MDM 20: CPT | Performed by: INTERNAL MEDICINE

## 2023-11-14 PROCEDURE — 86140 C-REACTIVE PROTEIN: CPT

## 2023-11-14 PROCEDURE — 1090000003 HH PPS REVENUE ADJ

## 2023-11-14 RX ORDER — POTASSIUM CHLORIDE 750 MG/1
10 TABLET, FILM COATED, EXTENDED RELEASE ORAL DAILY
COMMUNITY

## 2023-11-14 RX ORDER — FUROSEMIDE 40 MG/1
40 TABLET ORAL DAILY
COMMUNITY

## 2023-11-14 NOTE — LETTER
Patient: Bindu Cali  : 10/14/1932    Encounter Date: 2023    PROGRESS NOTE    Subjective  Chief complaint: Bindu Cali is a 89 y.o. female who is an acute skilled patient being seen and evaluated for weakness.    HPI:  Patient seen and examined at bedside.  Patient denies n/v/f/c.  Continues working in therapy.  Fair minus standing for 5 minutes x1 attempts and 3 minutes x2 attempts. Stand pivot with fww cga. Gait training 81' x 1, 50' x 1, 85' x 1 with WW and CGA. Patient completing multiple therapeutic exercises to increase strength, mobility and flexibility. Labs reviewed BUN\CR 30;1.1. H&H 8.2;25.5. Patient continues on IV ATB therapy and vanco for cdiff. No adverse reactions noted, remains afebrile. No new complaints.      Objective  Vital signs: 117/57,83,96%    Physical Exam  Vitals reviewed.   Constitutional:       Appearance: Normal appearance.   HENT:      Head: Normocephalic and atraumatic.   Cardiovascular:      Rate and Rhythm: Normal rate and regular rhythm.   Pulmonary:      Effort: Pulmonary effort is normal.      Breath sounds: Normal breath sounds.   Abdominal:      General: Bowel sounds are normal.      Palpations: Abdomen is soft.   Musculoskeletal:      Cervical back: Neck supple.   Skin:     General: Skin is warm and dry.   Neurological:      General: No focal deficit present.      Mental Status: She is alert.   Psychiatric:         Mood and Affect: Mood normal.         Behavior: Behavior is cooperative.         Assessment/Plan  Problem List Items Addressed This Visit       Essential hypertension     Continue antihypertensive  Monitor BP         Weakness     Continue therapy         Cellulitis and abscess of right lower extremity     IV ATB until   Monitor symptoms         Clostridium difficile colitis     Continue oral Vanco  Monitor stools           Medications, treatments, and labs reviewed  Continue medications and treatments as listed in EMR    Scribe  Attestation  I, Lorenzo Thakur   attest that this documentation has been prepared under the direction and in the presence of Paul Guerrier MD.     Provider Attestation - Scribe documentation  All medical record entries made by the Scribe were at my direction and personally dictated by me. I have reviewed the chart and agree that the record accurately reflects my personal performance of the history, physical exam, discussion and plan.   Paul Guerrier MD      Electronically Signed By: Paul Guerrier MD   11/14/23  7:35 PM

## 2023-11-14 NOTE — PROGRESS NOTES
PROGRESS NOTE    Subjective   Chief complaint: Bindu Cali is a 89 y.o. female who is an acute skilled patient being seen and evaluated for weakness.    HPI:  Patient seen and examined at bedside.  Patient denies n/v/f/c.  Continues working in therapy.  Fair minus standing for 5 minutes x1 attempts and 3 minutes x2 attempts. Stand pivot with fww cga. Gait training 81' x 1, 50' x 1, 85' x 1 with WW and CGA. Patient completing multiple therapeutic exercises to increase strength, mobility and flexibility. Labs reviewed BUN\CR 30;1.1. H&H 8.2;25.5. Patient continues on IV ATB therapy and vanco for cdiff. No adverse reactions noted, remains afebrile. No new complaints.      Objective   Vital signs: 117/57,83,96%    Physical Exam  Vitals reviewed.   Constitutional:       Appearance: Normal appearance.   HENT:      Head: Normocephalic and atraumatic.   Cardiovascular:      Rate and Rhythm: Normal rate and regular rhythm.   Pulmonary:      Effort: Pulmonary effort is normal.      Breath sounds: Normal breath sounds.   Abdominal:      General: Bowel sounds are normal.      Palpations: Abdomen is soft.   Musculoskeletal:      Cervical back: Neck supple.   Skin:     General: Skin is warm and dry.   Neurological:      General: No focal deficit present.      Mental Status: She is alert.   Psychiatric:         Mood and Affect: Mood normal.         Behavior: Behavior is cooperative.         Assessment/Plan   Problem List Items Addressed This Visit       Essential hypertension     Continue antihypertensive  Monitor BP         Weakness     Continue therapy         Cellulitis and abscess of right lower extremity     IV ATB until 11/17  Monitor symptoms         Clostridium difficile colitis     Continue oral Vanco  Monitor stools           Medications, treatments, and labs reviewed  Continue medications and treatments as listed in EMR    Scribe Attestation  I, Lorenzo Thakur   attest that this documentation has been  prepared under the direction and in the presence of Paul Guerrier MD.     Provider Attestation - Scribe documentation  All medical record entries made by the Scribe were at my direction and personally dictated by me. I have reviewed the chart and agree that the record accurately reflects my personal performance of the history, physical exam, discussion and plan.   Paul Guerrier MD

## 2023-11-14 NOTE — PROGRESS NOTES
[facility] Follow-up:    Reason for Visit:   Chief Complaint   Patient presents with    Follow-up    Hospital Follow-up     Hospital follow up visit for infection of right leg        History of Current Issue  Bindu Cali is a 89 y.o. year old female being seen in hospital follow-up for admission spanning 10/6/23 - 10/13/23.  She was seen by my collegue, Dr. Chepe Ryan.  She is here today with her eldest son who has transported her to her appointment.  She has notable PMH of CKD III, HTN, hypothyroid, bilateral TKA (remote), Multiple Myeloma on Bortezomib, and ORIF of right proximal tibial on 4/23/23 due to fall.  In postop follow-up, her sutures were removed on May 11, 2023.  She had developed an eschar with surrounding erythema for which she was prescribed a 10-day course of Keflex on June 8, 2023.  Sometime between follow-up on June 8 and Minerva 15, her eschar had fallen off and left an open wound.  This was managed with Kerlex wet-to-dry dressing and healing by secondary intention.  At her follow-up visit on September 28, the residual opening was noted to be less than 2 mm in size.  There was no surrounding erythema at that time.  She presented to the emergency department on October 6, 2023 with purulent discharge from wound for which she was admitted.  ID consulted for wound infection.  On presentation, her white blood cell count was 31.7 with a C-reactive protein of 24.69.  She was taken to the operating room on October 8 for removal of all the tibia associated hardware from her recent fracture.  Her knee arthroplasty remained in place and it was a hinged replacement.  Operative cultures grew MSSA resistant only to clindamycin and erythromycin.  While hospitalized, she received vancomycin and piperacillin/tazobactam.  Dr. Ryan and the ID team recommended PICC line placement with discharge on cefazolin 2 g IV every 8 hours through November 16, 2023.  They recommended  weekly CBC with differential and BMP to be faxed to my office after discharge however I have received no laboratory data at this time.      Last labs available to me were collected on November 3, 2023 and did not include a C-reactive protein.  Her CBC was noted to have a white count of 16.4 with a hemoglobin of 8.2 and platelets of 342.    Today states she indicates that she has been receiving therapy for C. difficile since November 4, 2023.  She states that she was evaluated at [facility].  She is scheduled to have vancomycin orally 4 times daily from November 4, 2023 through November 24, 2023.  Currently, she describes her stools as formed.  She denies rash, itching, or vaginal dryness.  She describes they have not been able to get out of her PICC line.  We were able to obtain blood through her PICC line today while in clinic for C-reactive protein.        PAST MEDICAL HISTORY:  Past Medical History:   Diagnosis Date    Chronic kidney disease, stage 3b (CMS/HCC) 02/25/2022    Anemia due to stage 3b chronic kidney disease    Essential (primary) hypertension 12/14/2022    Essential hypertension    Hypertension 11/07/2011    Multiple myeloma (CMS/[facility])     Other specified postprocedural states     H/O colonoscopy    Personal history of other medical treatment     History of mammogram    Personal history of other medical treatment     History of bone density study    Personal history of other medical treatment     H/O bone density study    Procedure and treatment not carried out because of patient's decision for unspecified reasons     Colonoscopy refused    Vitamin D deficiency, unspecified     Vitamin D insufficiency       PAST SURGICAL HISTORY:  Past Surgical History:   Procedure Laterality Date    BREAST BIOPSY  01/22/2015    Biopsy Breast Open    CHOLECYSTECTOMY  01/22/2015    Cholecystectomy    HYSTERECTOMY  01/22/2015    Hysterectomy    OTHER SURGICAL HISTORY  04/28/2022    Shoulder surgery    ROTATOR CUFF REPAIR   01/22/2015    Rotator Cuff Repair    TONSILLECTOMY  01/22/2015    Tonsillectomy    TOTAL KNEE ARTHROPLASTY  01/22/2015    Knee Replacement       ALLERGIES:    Allergies   Allergen Reactions    Morphine Nausea/vomiting    Pollen Extracts Itching and Other     Runny nose, sneezing, PND affecting throat and voice. Used to take allergy shots, now controlled with Claritin daily.    Aleve [Naproxen Sodium] Palpitations    Codeine Dizziness and Confusion    Erythromycin Nausea/vomiting       MEDICATIONS:      Current Outpatient Medications:     acetaminophen (Tylenol) 500 mg tablet, Take 2 tablets (1,000 mg) by mouth every 8 hours if needed., Disp: , Rfl:     amLODIPine (Norvasc) 5 mg tablet, Take 1 tablet (5 mg) by mouth once daily., Disp: , Rfl:     biotin 5 mg capsule, Take 1 capsule (5 mg) by mouth once daily., Disp: , Rfl:     ceFAZolin 1 gram injection, Infuse 2,000 mg (2 g) into a venous catheter every 12 hours for 15 days. Continue cefazolin 2 g every 12 hours until 11/17., Disp: 60022 mg, Rfl: 0    cholecalciferol (Vitamin D-3) 10 MCG (400 UNIT) tablet, Take 1 tablet (400 Units) by mouth once daily., Disp: 30 tablet, Rfl: 0    furosemide (Lasix) 40 mg tablet, Take 1 tablet (40 mg) by mouth once daily., Disp: , Rfl:     gabapentin (Neurontin) 100 mg capsule, Take 3 capsules (300 mg) by mouth once daily at bedtime., Disp: , Rfl:     heparin flush 10 unit/mL injection, Infuse 5 mL (50 Units) into a venous catheter once daily., Disp: , Rfl:     heparin sodium,porcine/PF (HEPARIN, PORCINE, LOCK FLUSH IV), Inject 10 Int'l Units/mL as directed 2 times a day. use to flush iv picc line   Indications: FLUSHING, Disp: , Rfl:     lactase (Lactaid) 3,000 unit tablet, Take 1 tablet (3,000 Units) by mouth if needed., Disp: , Rfl:     loratadine (Claritin) 10 mg tablet, Take 1 tablet (10 mg) by mouth once daily., Disp: , Rfl:     losartan (Cozaar) 100 mg tablet, Take 1 tablet (100 mg) by mouth once daily., Disp: 90 tablet, Rfl:  "1    omeprazole (PriLOSEC) 40 mg DR capsule, Take 1 capsule (40 mg) by mouth once daily., Disp: , Rfl:     potassium chloride CR 10 mEq ER tablet, Take 1 tablet (10 mEq) by mouth once daily. Do not crush, chew, or split., Disp: , Rfl:     sodium chloride 0.9% (Normal Saline Flush) flush, Infuse 10 mL into a venous catheter once daily., Disp: , Rfl:     Synthroid 100 mcg tablet, TAKE 1 TABLET EVERY MORNING, TAKE BEFORE MEALS FOR HYPOTHYROIDISM, Disp: 90 tablet, Rfl: 0    vancomycin (Vancocin) 125 mg capsule, Take 1 capsule (125 mg) by mouth 4 times a day for 21 days. End date 11/24/2023, Disp: 84 capsule, Rfl: 0    multivit-min-iron-FA-lutein (Centrum Silver Women) 8 mg iron-400 mcg-300 mcg tablet, Take 1 tablet by mouth once daily., Disp: , Rfl:     REVIEW OF SYSTEMS:  All pertinent positives and negatives as documented in HPI.      PHYSICAL EXAMINATION:     Visit Vitals  /59 (BP Location: Left arm, Patient Position: Sitting, BP Cuff Size: Adult)   Pulse 83   Temp 36.9 °C (98.5 °F)   Ht 1.486 m (4' 10.5\")   Wt 64 kg (141 lb)   BMI 28.97 kg/m²   Smoking Status Never   BSA 1.63 m²        EXAM:   CONSTITUTIONAL: Well appearing. Sitting in a wheel chair. NAD, cooperative  SKIN:  No rashes or lesions appreciated.  EYES: PERRLA,  Sclera anicteric.    : Larson in place.  Bag was leaking through the purse like bag sent with patient.  Stop-cock was open.  We closed bag  EXT: Right knee without erythema.  There is no tenderness within her joint.  Her anterior shin surgical incision is well-healed.  There is no opening or crusting along the incision line.  She does have pitting edema in her lower extremities.  I was able to her flex her knee without pain.        DATA:  Laboratory Values and Test Results:   Lab Results   Component Value Date    CRP 24.69 (H) 10/06/2023     Lab Results   Component Value Date    GLUCOSE 99 11/03/2023    CALCIUM 9.0 11/03/2023     (L) 11/03/2023    K 3.4 (L) 11/03/2023    CO2 22 " 11/03/2023     11/03/2023    BUN 36 (H) 11/03/2023    CREATININE 1.03 11/03/2023     Lab Results   Component Value Date    WBC 16.4 (H) 11/03/2023    HGB 8.2 (L) 11/03/2023    HCT 24.7 (L) 11/03/2023    MCV 88 11/03/2023     11/03/2023       Microbiology: Operative culture October 8, 2023: MSSA resistant to clindamycin and erythromycin.        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  She suffered a fall in April 2023 with tibial plateau fracture for which she underwent ORIF with hardware in place.  After her sutures were removed, she developed some slight dehiscence of her surgical wound that was managed by dressing changes with Kerlix wet-to-dry.  It is healed to near completion by September 28 however she had a continued sinus tract which ultimately began draining purulent material prompting her to present to the emergency department.  She underwent operative debridement with removal of all tibial plating on October 8, 2023.  Operative cultures grew MSSA.  While hospitalized she received vancomycin and Zosyn however was transitioned to IV cefazolin after discharge.  She is set to stop this medication on November 16, 2023.    Her presenting C-reactive protein was elevated at 26 and unfortunately no follow-up CRPs have been performed.  We took the opportunity today to collect one in clinic for comparison.  Elevation may be affected by active multiple myeloma.  She has not had her bortezomib recently due to infection.    I have asked the facility to fax me all her laboratory data so that I could evaluate for improvement on therapy.    If her laboratory data suggests that her infection is improved, we will discontinue her IV cefazolin as planned.  If there is concern for ongoing infection such as elevation of  her WBC count then I may transition her to oral therapy.  She does have a treatment regimen to address C. Difficile that was identified on October 30, 2023.  I hope that we can transition her off of systemic antibiotics for this reason.  I have instructed the facility to contact us on November 16 to verify discontinuation of antibiotics.  No routine follow-up in infectious disease clinic is necessary at this time.      I spent 76 minutes in the professional and overall care of this patient.      Valery Whitley MD

## 2023-11-15 ENCOUNTER — TELEPHONE (OUTPATIENT)
Dept: ORTHOPEDIC SURGERY | Facility: CLINIC | Age: 88
End: 2023-11-15
Payer: MEDICARE

## 2023-11-15 PROCEDURE — 1090000001 HH PPS REVENUE CREDIT

## 2023-11-15 PROCEDURE — 0023 HH SOC

## 2023-11-15 PROCEDURE — 1090000002 HH PPS REVENUE DEBIT

## 2023-11-15 NOTE — TELEPHONE ENCOUNTER
Pt. Requested a call back from you. She is experiencing numbness and pain in the lt hand. She said she recently saw you in the ED. The pain keeps her up at night.

## 2023-11-16 ENCOUNTER — TELEPHONE (OUTPATIENT)
Dept: INFECTIOUS DISEASES | Facility: HOSPITAL | Age: 88
End: 2023-11-16

## 2023-11-16 ENCOUNTER — INFUSION (OUTPATIENT)
Dept: HEMATOLOGY/ONCOLOGY | Facility: CLINIC | Age: 88
End: 2023-11-16
Payer: MEDICARE

## 2023-11-16 ENCOUNTER — NURSING HOME VISIT (OUTPATIENT)
Dept: POST ACUTE CARE | Facility: EXTERNAL LOCATION | Age: 88
End: 2023-11-16

## 2023-11-16 ENCOUNTER — APPOINTMENT (OUTPATIENT)
Dept: LAB | Facility: CLINIC | Age: 88
End: 2023-11-16
Payer: MEDICARE

## 2023-11-16 VITALS
OXYGEN SATURATION: 100 % | BODY MASS INDEX: 29.58 KG/M2 | SYSTOLIC BLOOD PRESSURE: 160 MMHG | RESPIRATION RATE: 16 BRPM | HEART RATE: 82 BPM | TEMPERATURE: 98.1 F | DIASTOLIC BLOOD PRESSURE: 68 MMHG | WEIGHT: 143.96 LBS

## 2023-11-16 DIAGNOSIS — I12.9 BENIGN HYPERTENSION WITH CKD (CHRONIC KIDNEY DISEASE), STAGE II: Primary | ICD-10-CM

## 2023-11-16 DIAGNOSIS — T84.53XS INFECTION OF TOTAL RIGHT KNEE REPLACEMENT, SEQUELA: ICD-10-CM

## 2023-11-16 DIAGNOSIS — R53.1 WEAKNESS: ICD-10-CM

## 2023-11-16 DIAGNOSIS — Z86.19 HISTORY OF REMOVAL OF JOINT PROSTHESIS OF RIGHT HIP DUE TO INFECTION: ICD-10-CM

## 2023-11-16 DIAGNOSIS — N18.2 BENIGN HYPERTENSION WITH CKD (CHRONIC KIDNEY DISEASE), STAGE II: Primary | ICD-10-CM

## 2023-11-16 DIAGNOSIS — Z98.890 HISTORY OF REMOVAL OF JOINT PROSTHESIS OF RIGHT HIP DUE TO INFECTION: ICD-10-CM

## 2023-11-16 DIAGNOSIS — C90.00 MULTIPLE MYELOMA, REMISSION STATUS UNSPECIFIED (MULTI): Primary | ICD-10-CM

## 2023-11-16 LAB
ALBUMIN SERPL BCP-MCNC: 2.9 G/DL (ref 3.4–5)
ALP SERPL-CCNC: 63 U/L (ref 33–136)
ALT SERPL W P-5'-P-CCNC: <3 U/L (ref 7–45)
ANION GAP SERPL CALC-SCNC: 12 MMOL/L (ref 10–20)
AST SERPL W P-5'-P-CCNC: 13 U/L (ref 9–39)
BASOPHILS # BLD AUTO: 0.01 X10*3/UL (ref 0–0.1)
BASOPHILS NFR BLD AUTO: 0.1 %
BILIRUB SERPL-MCNC: 0.3 MG/DL (ref 0–1.2)
BUN SERPL-MCNC: 18 MG/DL (ref 6–23)
CALCIUM SERPL-MCNC: 7.6 MG/DL (ref 8.6–10.3)
CHLORIDE SERPL-SCNC: 100 MMOL/L (ref 98–107)
CO2 SERPL-SCNC: 25 MMOL/L (ref 21–32)
CREAT SERPL-MCNC: 1.03 MG/DL (ref 0.5–1.05)
EOSINOPHIL # BLD AUTO: 0.16 X10*3/UL (ref 0–0.4)
EOSINOPHIL NFR BLD AUTO: 1.8 %
ERYTHROCYTE [DISTWIDTH] IN BLOOD BY AUTOMATED COUNT: 17.2 % (ref 11.5–14.5)
GFR SERPL CREATININE-BSD FRML MDRD: 51 ML/MIN/1.73M*2
GLUCOSE SERPL-MCNC: 135 MG/DL (ref 74–99)
HCT VFR BLD AUTO: 23.3 % (ref 36–46)
HGB BLD-MCNC: 7.5 G/DL (ref 12–16)
IMM GRANULOCYTES # BLD AUTO: 0.08 X10*3/UL (ref 0–0.5)
IMM GRANULOCYTES NFR BLD AUTO: 0.9 % (ref 0–0.9)
LYMPHOCYTES # BLD AUTO: 0.55 X10*3/UL (ref 0.8–3)
LYMPHOCYTES NFR BLD AUTO: 6.2 %
MCH RBC QN AUTO: 29.5 PG (ref 26–34)
MCHC RBC AUTO-ENTMCNC: 32.2 G/DL (ref 32–36)
MCV RBC AUTO: 92 FL (ref 80–100)
MONOCYTES # BLD AUTO: 0.62 X10*3/UL (ref 0.05–0.8)
MONOCYTES NFR BLD AUTO: 7 %
NEUTROPHILS # BLD AUTO: 7.44 X10*3/UL (ref 1.6–5.5)
NEUTROPHILS NFR BLD AUTO: 84 %
NRBC BLD-RTO: 0 /100 WBCS (ref 0–0)
PLATELET # BLD AUTO: 333 X10*3/UL (ref 150–450)
POTASSIUM SERPL-SCNC: 4.3 MMOL/L (ref 3.5–5.3)
PROT SERPL-MCNC: 5.9 G/DL (ref 6.4–8.2)
RBC # BLD AUTO: 2.54 X10*6/UL (ref 4–5.2)
SODIUM SERPL-SCNC: 133 MMOL/L (ref 136–145)
WBC # BLD AUTO: 8.9 X10*3/UL (ref 4.4–11.3)

## 2023-11-16 PROCEDURE — 99308 SBSQ NF CARE LOW MDM 20: CPT | Performed by: INTERNAL MEDICINE

## 2023-11-16 PROCEDURE — 80053 COMPREHEN METABOLIC PANEL: CPT | Performed by: INTERNAL MEDICINE

## 2023-11-16 PROCEDURE — 1090000001 HH PPS REVENUE CREDIT

## 2023-11-16 PROCEDURE — 96401 CHEMO ANTI-NEOPL SQ/IM: CPT

## 2023-11-16 PROCEDURE — 1090000002 HH PPS REVENUE DEBIT

## 2023-11-16 PROCEDURE — 2500000004 HC RX 250 GENERAL PHARMACY W/ HCPCS (ALT 636 FOR OP/ED): Performed by: INTERNAL MEDICINE

## 2023-11-16 PROCEDURE — 85025 COMPLETE CBC W/AUTO DIFF WBC: CPT | Performed by: INTERNAL MEDICINE

## 2023-11-16 PROCEDURE — 2500000001 HC RX 250 WO HCPCS SELF ADMINISTERED DRUGS (ALT 637 FOR MEDICARE OP): Performed by: INTERNAL MEDICINE

## 2023-11-16 RX ORDER — PROCHLORPERAZINE MALEATE 10 MG
10 TABLET ORAL ONCE
Status: COMPLETED | OUTPATIENT
Start: 2023-11-16 | End: 2023-11-16

## 2023-11-16 RX ORDER — FAMOTIDINE 10 MG/ML
20 INJECTION INTRAVENOUS ONCE AS NEEDED
Status: DISCONTINUED | OUTPATIENT
Start: 2023-11-16 | End: 2023-11-16 | Stop reason: HOSPADM

## 2023-11-16 RX ORDER — ALBUTEROL SULFATE 0.83 MG/ML
3 SOLUTION RESPIRATORY (INHALATION) AS NEEDED
Status: DISCONTINUED | OUTPATIENT
Start: 2023-11-16 | End: 2023-11-16 | Stop reason: HOSPADM

## 2023-11-16 RX ORDER — DIPHENHYDRAMINE HYDROCHLORIDE 50 MG/ML
50 INJECTION INTRAMUSCULAR; INTRAVENOUS AS NEEDED
Status: DISCONTINUED | OUTPATIENT
Start: 2023-11-16 | End: 2023-11-16 | Stop reason: HOSPADM

## 2023-11-16 RX ORDER — HEPARIN SODIUM,PORCINE/PF 10 UNIT/ML
50 SYRINGE (ML) INTRAVENOUS AS NEEDED
Status: DISCONTINUED | OUTPATIENT
Start: 2023-11-16 | End: 2023-11-16 | Stop reason: HOSPADM

## 2023-11-16 RX ORDER — EPINEPHRINE 0.3 MG/.3ML
0.3 INJECTION SUBCUTANEOUS EVERY 5 MIN PRN
Status: DISCONTINUED | OUTPATIENT
Start: 2023-11-16 | End: 2023-11-16 | Stop reason: HOSPADM

## 2023-11-16 RX ORDER — HEPARIN 100 UNIT/ML
500 SYRINGE INTRAVENOUS AS NEEDED
Status: CANCELLED | OUTPATIENT
Start: 2023-11-16

## 2023-11-16 RX ORDER — HEPARIN SODIUM,PORCINE/PF 10 UNIT/ML
50 SYRINGE (ML) INTRAVENOUS AS NEEDED
Status: CANCELLED | OUTPATIENT
Start: 2023-11-16

## 2023-11-16 RX ORDER — BORTEZOMIB 3.5 MG/1
1.3 INJECTION, POWDER, LYOPHILIZED, FOR SOLUTION INTRAVENOUS; SUBCUTANEOUS ONCE
Status: COMPLETED | OUTPATIENT
Start: 2023-11-16 | End: 2023-11-16

## 2023-11-16 RX ADMIN — BORTEZOMIB 2.12 MG: 1 INJECTION, POWDER, LYOPHILIZED, FOR SOLUTION INTRAVENOUS; SUBCUTANEOUS at 11:59

## 2023-11-16 RX ADMIN — PROCHLORPERAZINE MALEATE 10 MG: 10 TABLET ORAL at 11:25

## 2023-11-16 ASSESSMENT — PATIENT HEALTH QUESTIONNAIRE - PHQ9
1. LITTLE INTEREST OR PLEASURE IN DOING THINGS: NOT AT ALL
6. FEELING BAD ABOUT YOURSELF - OR THAT YOU ARE A FAILURE OR HAVE LET YOURSELF OR YOUR FAMILY DOWN: 0
4. FEELING TIRED OR HAVING LITTLE ENERGY: NOT AT ALL
5. POOR APPETITE OR OVEREATING: NOT AT ALL
2. FEELING DOWN, DEPRESSED, IRRITABLE, OR HOPELESS: NOT AT ALL
4. FEELING TIRED OR HAVING LITTLE ENERGY: 0
5. POOR APPETITE OR OVEREATING: 0
4. FEELING TIRED OR HAVING LITTLE ENERGY: NOT AT ALL
2. FEELING DOWN, DEPRESSED OR HOPELESS: NOT AT ALL
SUM OF ALL RESPONSES TO PHQ QUESTIONS 1-9: 0
2. FEELING DOWN, DEPRESSED, IRRITABLE, OR HOPELESS: 0
6. FEELING BAD ABOUT YOURSELF - OR THAT YOU ARE A FAILURE OR HAVE LET YOURSELF OR YOUR FAMILY DOWN: NOT AT ALL
2. FEELING DOWN, DEPRESSED OR HOPELESS: NOT AT ALL
SUM OF ALL RESPONSES TO PHQ9 QUESTIONS 1 AND 2: 0
8. MOVING OR SPEAKING SO SLOWLY THAT OTHER PEOPLE COULD HAVE NOTICED. OR THE OPPOSITE, BEING SO FIGETY OR RESTLESS THAT YOU HAVE BEEN MOVING AROUND A LOT MORE THAN USUAL: 0
1. LITTLE INTEREST OR PLEASURE IN DOING THINGS: NOT AT ALL
9. THOUGHTS THAT YOU WOULD BE BETTER OFF DEAD, OR OF HURTING YOURSELF: 0
7. TROUBLE CONCENTRATING ON THINGS, SUCH AS READING THE NEWSPAPER OR WATCHING TELEVISION: NOT AT ALL
SUM OF ALL RESPONSES TO PHQ QUESTIONS 1-9: 0
8. MOVING OR SPEAKING SO SLOWLY THAT OTHER PEOPLE COULD HAVE NOTICED. OR THE OPPOSITE, BEING SO FIGETY OR RESTLESS THAT YOU HAVE BEEN MOVING AROUND A LOT MORE THAN USUAL: NOT AT ALL
7. TROUBLE CONCENTRATING ON THINGS, SUCH AS READING THE NEWSPAPER OR WATCHING TELEVISION: NOT AT ALL
1. LITTLE INTEREST OR PLEASURE IN DOING THINGS: NOT AT ALL
9. THOUGHTS THAT YOU WOULD BE BETTER OFF DEAD, OR OF HURTING YOURSELF: NOT AT ALL
6. FEELING BAD ABOUT YOURSELF - OR THAT YOU ARE A FAILURE OR HAVE LET YOURSELF OR YOUR FAMILY DOWN: NOT AT ALL
9. THOUGHTS THAT YOU WOULD BE BETTER OFF DEAD, OR OF HURTING YOURSELF: NOT AT ALL
5. POOR APPETITE OR OVEREATING: NOT AT ALL
3. TROUBLE FALLING OR STAYING ASLEEP OR SLEEPING TOO MUCH: NOT AT ALL
7. TROUBLE CONCENTRATING ON THINGS, SUCH AS READING THE NEWSPAPER OR WATCHING TELEVISION: 0
8. MOVING OR SPEAKING SO SLOWLY THAT OTHER PEOPLE COULD HAVE NOTICED. OR THE OPPOSITE, BEING SO FIGETY OR RESTLESS THAT YOU HAVE BEEN MOVING AROUND A LOT MORE THAN USUAL: NOT AT ALL
1. LITTLE INTEREST OR PLEASURE IN DOING THINGS: 0
10. IF YOU CHECKED OFF ANY PROBLEMS, HOW DIFFICULT HAVE THESE PROBLEMS MADE IT FOR YOU TO DO YOUR WORK, TAKE CARE OF THINGS AT HOME, OR GET ALONG WITH OTHER PEOPLE: NOT DIFFICULT AT ALL
10. IF YOU CHECKED OFF ANY PROBLEMS, HOW DIFFICULT HAVE THESE PROBLEMS MADE IT FOR YOU TO DO YOUR WORK, TAKE CARE OF THINGS AT HOME, OR GET ALONG WITH OTHER PEOPLE: NOT DIFFICULT AT ALL
3. TROUBLE FALLING OR STAYING ASLEEP OR SLEEPING TOO MUCH: 0
3. TROUBLE FALLING OR STAYING ASLEEP OR SLEEPING TOO MUCH: NOT AT ALL

## 2023-11-16 ASSESSMENT — PAIN SCALES - GENERAL: PAINLEVEL: 2

## 2023-11-16 NOTE — PROGRESS NOTES
PROGRESS NOTE    Subjective   Chief complaint: Bindu Cali is a 89 y.o. female who is an acute skilled patient being seen and evaluated for weakness    HPI:  11/10/23 Patient has been working in therapy to improve strength, endurance, and ADLs.  Patient continues to work toward goals.  No new concerns today.  Denies n/v/f/c pain.      Objective   Vital signs: 121/55, 96.8, 86, 17, 98%    Physical Exam  Constitutional:       General: She is not in acute distress.  Eyes:      Extraocular Movements: Extraocular movements intact.   Pulmonary:      Effort: Pulmonary effort is normal.   Musculoskeletal:      Cervical back: Neck supple.   Neurological:      Mental Status: She is alert.   Psychiatric:         Mood and Affect: Mood normal.         Behavior: Behavior is cooperative.         Assessment/Plan   Problem List Items Addressed This Visit       Weakness - Primary     Continue PT OT         Acute urinary retention     Voiding trial unsuccessful Larson catheter replaced          Medications, treatments, and labs reviewed  Continue medications and treatments as listed in [facility]    Scribe Attestation  Jennifer MICHAEL Scribe   attest that this documentation has been prepared under the direction and in the presence of VERONICA Arana.    Provider Attestation - Scribe documentation  All medical record entries made by the Scribe were at my direction and personally dictated by me. I have reviewed the chart and agree that the record accurately reflects my personal performance of the history, physical exam, discussion and plan.    VERONICA Arana

## 2023-11-16 NOTE — LETTER
Patient: Bindu Cali  : 10/14/1932    Encounter Date: 2023    PROGRESS NOTE    Subjective  Chief complaint: Bindu Cali is a 89 y.o. female who is an acute skilled patient being seen and evaluated for weakness, infection, edema    HPI:  Patient working in therapy due to weakness and debility. Working on gait training  ambulating 50' x1, 55' x1 with walker and CGA. Patient had increased edema to BLE and is getting increased Lasix and K+, lab ordered to follow up. Patient with hx of multiple myeloma patient presented with a infection of right total hip continues on IV antibiotic x6 weeks. No adverse effect noted from ATB therapy.  No acute distress.       Objective  Vital signs: 128/67, 98%    Physical Exam  Constitutional:       General: She is not in acute distress.     Appearance: Normal appearance.   Eyes:      Extraocular Movements: Extraocular movements intact.   Cardiovascular:      Rate and Rhythm: Normal rate and regular rhythm.   Pulmonary:      Effort: Pulmonary effort is normal.      Breath sounds: Normal breath sounds.   Abdominal:      General: Bowel sounds are normal.      Palpations: Abdomen is soft.   Musculoskeletal:         General: Normal range of motion.      Cervical back: Normal range of motion and neck supple.      Right lower leg: Edema present.      Left lower leg: Edema present.   Skin:     General: Skin is warm and dry.   Neurological:      General: No focal deficit present.      Mental Status: She is alert. Mental status is at baseline.   Psychiatric:         Mood and Affect: Mood normal.         Behavior: Behavior is cooperative.         Assessment/Plan  Problem List Items Addressed This Visit       Benign hypertension with CKD (chronic kidney disease), stage II - Primary     BP at goal  Continue to monitor BP  Continue antihypertensives  Monitor Labs         Weakness     Continue therapy         Infection of total right knee replacement (CMS/[facility])    History of  removal of joint prosthesis of right hip due to infection     Continue IV ATB  ID following          Medications, treatments, and labs reviewed  Continue medications and treatments as listed in [facility]    Scribe Attestation  By signing my name below, I, Lorenzo Guaman   attest that this documentation has been prepared under the direction and in the presence of Paul Guerrier MD.    Provider Attestation - Scribe documentation  All medical record entries made by the Scribe were at my direction and personally dictated by me. I have reviewed the chart and agree that the record accurately reflects my personal performance of the history, physical exam, discussion and plan.  1. Benign hypertension with CKD (chronic kidney disease), stage II        2. Weakness        3. Infection of total right knee replacement, sequela        4. History of removal of joint prosthesis of right hip due to infection            Electronically Signed By: Paul Guerrier MD   11/16/23  7:40 PM

## 2023-11-16 NOTE — SIGNIFICANT EVENT
11/16/23 1145   Prechemo Checklist   Has the patient been in the hospital, ED, or urgent care since last date of service Yes  (recovering at a SNF)   Chemo/Immuno Consent Signed Yes  (12/8/2022)   Protocol/Indications Verified Yes   Confirmed to previous date/time of medication Yes   Compared to previous dose Yes   All medications are dated accurately Yes   Pregnancy Test Negative Not applicable   Parameters Met Yes   BSA/Weight-Height Verified Yes   Dose Calculations Verified Yes

## 2023-11-16 NOTE — TELEPHONE ENCOUNTER
I received a voice message forwarded from the EL[facility]. The message was left 11/15/2023 at 2:59 PM. Shari called from the [facility] to verify stop date for IV Cefazolin. The 11/14/2023 visit note gave instruction for facility to call you on 11/16/2023 to verify whether infusions will be stopping on 11/17/2023 or not. If stopping, can line be removed? Shari left a call back number of [phone]. The phone number to the facility is [phone]. Please advise.

## 2023-11-17 ENCOUNTER — OFFICE VISIT (OUTPATIENT)
Dept: ORTHOPEDIC SURGERY | Facility: CLINIC | Age: 88
End: 2023-11-17
Payer: MEDICARE

## 2023-11-17 ENCOUNTER — NURSING HOME VISIT (OUTPATIENT)
Dept: POST ACUTE CARE | Facility: EXTERNAL LOCATION | Age: 88
End: 2023-11-17

## 2023-11-17 ENCOUNTER — TELEPHONE (OUTPATIENT)
Dept: ORTHOPEDIC SURGERY | Facility: HOSPITAL | Age: 88
End: 2023-11-17

## 2023-11-17 VITALS — BODY MASS INDEX: 29.84 KG/M2 | WEIGHT: 152 LBS | HEIGHT: 60 IN

## 2023-11-17 DIAGNOSIS — G56.03 BILATERAL CARPAL TUNNEL SYNDROME: ICD-10-CM

## 2023-11-17 DIAGNOSIS — R53.1 WEAKNESS: Primary | ICD-10-CM

## 2023-11-17 DIAGNOSIS — Z98.890 HISTORY OF REMOVAL OF JOINT PROSTHESIS OF RIGHT HIP DUE TO INFECTION: ICD-10-CM

## 2023-11-17 DIAGNOSIS — N31.2 ATONIC BLADDER: Primary | ICD-10-CM

## 2023-11-17 DIAGNOSIS — M25.531 BILATERAL WRIST PAIN: ICD-10-CM

## 2023-11-17 DIAGNOSIS — Z86.19 HISTORY OF REMOVAL OF JOINT PROSTHESIS OF RIGHT HIP DUE TO INFECTION: ICD-10-CM

## 2023-11-17 DIAGNOSIS — M25.532 BILATERAL WRIST PAIN: ICD-10-CM

## 2023-11-17 PROCEDURE — 1159F MED LIST DOCD IN RCRD: CPT

## 2023-11-17 PROCEDURE — 99308 SBSQ NF CARE LOW MDM 20: CPT | Performed by: REGISTERED NURSE

## 2023-11-17 PROCEDURE — 1111F DSCHRG MED/CURRENT MED MERGE: CPT

## 2023-11-17 PROCEDURE — 1036F TOBACCO NON-USER: CPT

## 2023-11-17 PROCEDURE — 20526 THER INJECTION CARP TUNNEL: CPT

## 2023-11-17 PROCEDURE — 1160F RVW MEDS BY RX/DR IN RCRD: CPT

## 2023-11-17 PROCEDURE — 1090000001 HH PPS REVENUE CREDIT

## 2023-11-17 PROCEDURE — 99213 OFFICE O/P EST LOW 20 MIN: CPT

## 2023-11-17 PROCEDURE — 1090000002 HH PPS REVENUE DEBIT

## 2023-11-17 PROCEDURE — 1125F AMNT PAIN NOTED PAIN PRSNT: CPT

## 2023-11-17 RX ORDER — TRIAMCINOLONE ACETONIDE 40 MG/ML
40 INJECTION, SUSPENSION INTRA-ARTICULAR; INTRAMUSCULAR
Status: COMPLETED | OUTPATIENT
Start: 2023-11-17 | End: 2023-11-17

## 2023-11-17 RX ORDER — LIDOCAINE HYDROCHLORIDE 10 MG/ML
1 INJECTION INFILTRATION; PERINEURAL
Status: COMPLETED | OUTPATIENT
Start: 2023-11-17 | End: 2023-11-17

## 2023-11-17 RX ADMIN — TRIAMCINOLONE ACETONIDE 40 MG: 40 INJECTION, SUSPENSION INTRA-ARTICULAR; INTRAMUSCULAR at 16:04

## 2023-11-17 RX ADMIN — LIDOCAINE HYDROCHLORIDE 1 ML: 10 INJECTION INFILTRATION; PERINEURAL at 16:04

## 2023-11-17 NOTE — LETTER
Patient: Bindu Cali  : 10/14/1932    Encounter Date: 2023    PROGRESS NOTE    Subjective  Chief complaint: Bindu Cali is a 89 y.o. female who is an acute skilled patient being seen and evaluated for weakness    HPI:  23 Patient is ambulating 50' FWW and CGA. Patient is also working on transfers and ADLs. No new concerns per nursing staff.       Objective  Vital signs: 145/68, 97.5, 82, 97%    Physical Exam  Constitutional:       General: She is not in acute distress.  Eyes:      Extraocular Movements: Extraocular movements intact.   Pulmonary:      Effort: Pulmonary effort is normal.   Musculoskeletal:      Cervical back: Neck supple.   Neurological:      Mental Status: She is alert.   Psychiatric:         Mood and Affect: Mood normal.         Behavior: Behavior is cooperative.         Assessment/Plan  Problem List Items Addressed This Visit       Weakness - Primary     Continue PT OT         History of removal of joint prosthesis of right hip due to infection     Continue IV ATB  ID following          Medications, treatments, and labs reviewed  Continue medications and treatments as listed in [facility]    Scribe Attestation  I, Lorenzo Hendrickson   attest that this documentation has been prepared under the direction and in the presence of VERONICA Arana.    Provider Attestation - Scribe documentation  All medical record entries made by the Scribe were at my direction and personally dictated by me. I have reviewed the chart and agree that the record accurately reflects my personal performance of the history, physical exam, discussion and plan.    VERONICA Arana        Electronically Signed By: VERONICA Arana   23  1:18 PM

## 2023-11-17 NOTE — PROGRESS NOTES
PROGRESS NOTE    Subjective   Chief complaint: Bindu Cali is a 89 y.o. female who is an acute skilled patient being seen and evaluated for weakness, infection, edema    HPI:  Patient working in therapy due to weakness and debility. Working on gait training  ambulating 50' x1, 55' x1 with walker and CGA. Patient had increased edema to BLE and is getting increased Lasix and K+, lab ordered to follow up. Patient with hx of multiple myeloma patient presented with a infection of right total hip continues on IV antibiotic x6 weeks. No adverse effect noted from ATB therapy.  No acute distress.       Objective   Vital signs: 128/67, 98%    Physical Exam  Constitutional:       General: She is not in acute distress.     Appearance: Normal appearance.   Eyes:      Extraocular Movements: Extraocular movements intact.   Cardiovascular:      Rate and Rhythm: Normal rate and regular rhythm.   Pulmonary:      Effort: Pulmonary effort is normal.      Breath sounds: Normal breath sounds.   Abdominal:      General: Bowel sounds are normal.      Palpations: Abdomen is soft.   Musculoskeletal:         General: Normal range of motion.      Cervical back: Normal range of motion and neck supple.      Right lower leg: Edema present.      Left lower leg: Edema present.   Skin:     General: Skin is warm and dry.   Neurological:      General: No focal deficit present.      Mental Status: She is alert. Mental status is at baseline.   Psychiatric:         Mood and Affect: Mood normal.         Behavior: Behavior is cooperative.         Assessment/Plan   Problem List Items Addressed This Visit       Benign hypertension with CKD (chronic kidney disease), stage II - Primary     BP at goal  Continue to monitor BP  Continue antihypertensives  Monitor Labs         Weakness     Continue therapy         Infection of total right knee replacement (CMS/HCC)    History of removal of joint prosthesis of right hip due to infection     Continue IV  ATB  ID following          Medications, treatments, and labs reviewed  Continue medications and treatments as listed in [facility]    Scribe Attestation  By signing my name below, I, Lorenzo Guaman   attest that this documentation has been prepared under the direction and in the presence of Paul Guerrier MD.    Provider Attestation - Scribe documentation  All medical record entries made by the Scribe were at my direction and personally dictated by me. I have reviewed the chart and agree that the record accurately reflects my personal performance of the history, physical exam, discussion and plan.  1. Benign hypertension with CKD (chronic kidney disease), stage II        2. Weakness        3. Infection of total right knee replacement, sequela        4. History of removal of joint prosthesis of right hip due to infection

## 2023-11-17 NOTE — PROGRESS NOTES
Subjective    Patient ID: Bindu Cali is a 89 y.o. female.    Chief Complaint: Other (F/U BILATERAL CTS)     HPI  This is a pleasant 89-year-old right-hand-dominant female presenting to the office for follow-up of bilateral carpal tunnel syndrome.  She has seen Dr. Alan Phillips in the past, most recently in 2021 receiving bilateral injections.  States that left wrist and hand pain is worse than the right.  She is experiencing numbness and paresthesia of left and right hand digits, specifically thumb pointer and middle finger.  She has been taking Tylenol and using a carpal tunnel brace to the left hand with some relief of symptoms.  She resides at a nursing home, and was brought here today by her son.    Objective   Ortho Exam  Patient is in no acute distress.  Exam of left upper extremity reveals pain-free motion at her elbow and wrist.  No evidence of thenar atrophy or intrinsic atrophy.  Positive Tinel's test, positive carpal tunnel compression test, and a positive Phalen test.      Exam of right upper extremity reveals pain-free motion at her elbow and wrist.  No evidence of thenar atrophy or intrinsic atrophy.  Positive Tinel's test, positive carpal tunnel compression test, and a positive Phalen test.     M Inj/Asp: bilateral radiocarpal on 11/17/2023 4:04 PM  Indications: pain  Details: 25 G needle, volar approach  Medications (Right): 1 mL lidocaine 10 mg/mL (1 %); 40 mg triamcinolone acetonide 40 mg/mL  Medications (Left): 1 mL lidocaine 10 mg/mL (1 %); 40 mg triamcinolone acetonide 40 mg/mL  Procedure, treatment alternatives, risks and benefits explained, specific risks discussed. Consent was given by the patient and guardian.          Assessment/Plan   Encounter Diagnoses: Bilateral carpal tunnel syndrome    Plan: Discussion with patient about diagnosis with review of conservative treatment options.  After the risks and benefits of a bilateral carpal tunnel injection were discussed, patient agreed  to injection and tolerated well.  She can also continue to wear carpal tunnel brace to left or right hand as needed.  She can continue to take Tylenol.  She can follow-up as symptoms dictate.

## 2023-11-18 PROCEDURE — 1090000001 HH PPS REVENUE CREDIT

## 2023-11-18 PROCEDURE — 1090000002 HH PPS REVENUE DEBIT

## 2023-11-19 PROCEDURE — 1090000001 HH PPS REVENUE CREDIT

## 2023-11-19 PROCEDURE — 1090000002 HH PPS REVENUE DEBIT

## 2023-11-20 ENCOUNTER — NURSING HOME VISIT (OUTPATIENT)
Dept: POST ACUTE CARE | Facility: EXTERNAL LOCATION | Age: 88
End: 2023-11-20
Payer: MEDICARE

## 2023-11-20 DIAGNOSIS — L02.415 CELLULITIS AND ABSCESS OF RIGHT LOWER EXTREMITY: ICD-10-CM

## 2023-11-20 DIAGNOSIS — I10 ESSENTIAL HYPERTENSION: ICD-10-CM

## 2023-11-20 DIAGNOSIS — A04.72 CLOSTRIDIUM DIFFICILE COLITIS: ICD-10-CM

## 2023-11-20 DIAGNOSIS — R53.1 WEAKNESS: ICD-10-CM

## 2023-11-20 DIAGNOSIS — R33.8 ACUTE URINARY RETENTION: ICD-10-CM

## 2023-11-20 DIAGNOSIS — L03.115 CELLULITIS AND ABSCESS OF RIGHT LOWER EXTREMITY: ICD-10-CM

## 2023-11-20 PROCEDURE — 1090000002 HH PPS REVENUE DEBIT

## 2023-11-20 PROCEDURE — 99308 SBSQ NF CARE LOW MDM 20: CPT | Performed by: INTERNAL MEDICINE

## 2023-11-20 PROCEDURE — 1090000001 HH PPS REVENUE CREDIT

## 2023-11-20 NOTE — LETTER
Patient: Bindu Cali  : 10/14/1932    Encounter Date: 2023    PROGRESS NOTE    Subjective  Chief complaint: Bindu Cali is a 89 y.o. female who is an acute skilled patient being seen and evaluated for weakness    HPI:  Patient has been working in therapy to improve strength, endurance, and ADLs.  Patient continues to work toward goals. Gait training 333' x 1, 10' x 2 weaving through cones and 80' x 1 with WW and SBA. Ascend/descend two 6-inch steps and three 4-inch steps with R rail and CGA. Patient completing multiple therapeutic exercises to increase strength, mobility and flexibility. Sit to stand from varying surface heights with SBA. Patient continues on ATB therapy for Cdiff and IV ATB for cellulitis. No adverse reactions to ATB, patient remains afebrile. Patient has patent avila for retention. No new concerns today.  Denies n/v/f/c pain.        Objective  Vital signs: 127/66,71,97%    Physical Exam  Constitutional:       General: She is not in acute distress.     Appearance: Normal appearance.   Eyes:      Extraocular Movements: Extraocular movements intact.   Cardiovascular:      Rate and Rhythm: Normal rate and regular rhythm.   Pulmonary:      Effort: Pulmonary effort is normal.      Breath sounds: Normal breath sounds.   Abdominal:      General: Bowel sounds are normal.      Palpations: Abdomen is soft.   Musculoskeletal:         General: Normal range of motion.      Cervical back: Normal range of motion and neck supple.      Right lower leg: Edema present.      Left lower leg: Edema present.   Skin:     General: Skin is warm and dry.   Neurological:      General: No focal deficit present.      Mental Status: She is alert. Mental status is at baseline.   Psychiatric:         Mood and Affect: Mood normal.         Behavior: Behavior is cooperative.         Assessment/Plan  Problem List Items Addressed This Visit       Essential hypertension     Continue antihypertensive  Monitor  BP         Weakness     Continue therapy         Cellulitis and abscess of right lower extremity     IV ATB until 12/1/23  Monitor symptoms         Clostridium difficile colitis     Continue oral Vanco  Monitor stools          Acute urinary retention     Voiding trial           Medications, treatments, and labs reviewed  Continue medications and treatments as listed in EMR    Scribe Attestation  Jennie MICHAEL Scribe   attest that this documentation has been prepared under the direction and in the presence of Paul Guerrier MD.     Provider Attestation - Scribe documentation  All medical record entries made by the Scribe were at my direction and personally dictated by me. I have reviewed the chart and agree that the record accurately reflects my personal performance of the history, physical exam, discussion and plan.   Paul Guerrire MD      Electronically Signed By: Paul Guerrier MD   11/20/23  6:32 PM

## 2023-11-20 NOTE — PROGRESS NOTES
PROGRESS NOTE    Subjective   Chief complaint: Bindu Cali is a 89 y.o. female who is an acute skilled patient being seen and evaluated for weakness    HPI:  Patient has been working in therapy to improve strength, endurance, and ADLs.  Patient continues to work toward goals. Gait training 333' x 1, 10' x 2 weaving through cones and 80' x 1 with WW and SBA. Ascend/descend two 6-inch steps and three 4-inch steps with R rail and CGA. Patient completing multiple therapeutic exercises to increase strength, mobility and flexibility. Sit to stand from varying surface heights with SBA. Patient continues on ATB therapy for Cdiff and IV ATB for cellulitis. No adverse reactions to ATB, patient remains afebrile. Patient has patent avila for retention. No new concerns today.  Denies n/v/f/c pain.        Objective   Vital signs: 127/66,71,97%    Physical Exam  Constitutional:       General: She is not in acute distress.     Appearance: Normal appearance.   Eyes:      Extraocular Movements: Extraocular movements intact.   Cardiovascular:      Rate and Rhythm: Normal rate and regular rhythm.   Pulmonary:      Effort: Pulmonary effort is normal.      Breath sounds: Normal breath sounds.   Abdominal:      General: Bowel sounds are normal.      Palpations: Abdomen is soft.   Musculoskeletal:         General: Normal range of motion.      Cervical back: Normal range of motion and neck supple.      Right lower leg: Edema present.      Left lower leg: Edema present.   Skin:     General: Skin is warm and dry.   Neurological:      General: No focal deficit present.      Mental Status: She is alert. Mental status is at baseline.   Psychiatric:         Mood and Affect: Mood normal.         Behavior: Behavior is cooperative.         Assessment/Plan   Problem List Items Addressed This Visit       Essential hypertension     Continue antihypertensive  Monitor BP         Weakness     Continue therapy         Cellulitis and abscess of  right lower extremity     IV ATB until 12/1/23  Monitor symptoms         Clostridium difficile colitis     Continue oral Vanco  Monitor stools          Acute urinary retention     Voiding trial           Medications, treatments, and labs reviewed  Continue medications and treatments as listed in EMR    Scribe Attestation  Jennie MICHAEL Scribe   attest that this documentation has been prepared under the direction and in the presence of Paul Guerrier MD.     Provider Attestation - Scribe documentation  All medical record entries made by the Scribe were at my direction and personally dictated by me. I have reviewed the chart and agree that the record accurately reflects my personal performance of the history, physical exam, discussion and plan.   Paul Guerrier MD

## 2023-11-21 ENCOUNTER — NURSING HOME VISIT (OUTPATIENT)
Dept: POST ACUTE CARE | Facility: EXTERNAL LOCATION | Age: 88
End: 2023-11-21
Payer: MEDICARE

## 2023-11-21 DIAGNOSIS — A04.72 CLOSTRIDIUM DIFFICILE COLITIS: ICD-10-CM

## 2023-11-21 DIAGNOSIS — R53.1 WEAKNESS: ICD-10-CM

## 2023-11-21 DIAGNOSIS — I10 ESSENTIAL HYPERTENSION: ICD-10-CM

## 2023-11-21 DIAGNOSIS — L03.115 CELLULITIS AND ABSCESS OF RIGHT LOWER EXTREMITY: ICD-10-CM

## 2023-11-21 DIAGNOSIS — L02.415 CELLULITIS AND ABSCESS OF RIGHT LOWER EXTREMITY: ICD-10-CM

## 2023-11-21 PROCEDURE — 1090000002 HH PPS REVENUE DEBIT

## 2023-11-21 PROCEDURE — 1090000001 HH PPS REVENUE CREDIT

## 2023-11-21 PROCEDURE — 99308 SBSQ NF CARE LOW MDM 20: CPT | Performed by: INTERNAL MEDICINE

## 2023-11-21 NOTE — PROGRESS NOTES
PROGRESS NOTE    Subjective   Chief complaint: Bindu Cali is a 89 y.o. female who is an acute skilled patient being seen and evaluated for weakness    HPI:  Patient seen and examined at bedside.  Patient denies n/v/f/c.  Continues working in therapy. Patient completed car transfer with PT in gym with supervision. Patient completed 360 degree turn to challenge balance. Sit to stand from varying surface heights with SBA. Gait training 348' x 1 with WW and SBA. Ascend/descend two 6-inch steps and three 4-inch steps with R rail and CGA. Patient completing multiple therapeutic exercises to increase strength, mobility and flexibility. Patient continues on Vanco for Cdiff & IV ATB therapy for cellulitis. Patient has no adverse reactions noted, remains afebrile.  No new complaints.      Objective   Vital signs: 167/65,73,97%    Physical Exam  Constitutional:       General: She is not in acute distress.     Appearance: Normal appearance.   Eyes:      Extraocular Movements: Extraocular movements intact.   Cardiovascular:      Rate and Rhythm: Normal rate and regular rhythm.   Pulmonary:      Effort: Pulmonary effort is normal.      Breath sounds: Normal breath sounds.   Abdominal:      General: Bowel sounds are normal.      Palpations: Abdomen is soft.   Musculoskeletal:         General: Normal range of motion.      Cervical back: Normal range of motion and neck supple.      Right lower leg: Edema present.      Left lower leg: Edema present.   Skin:     General: Skin is warm and dry.   Neurological:      General: No focal deficit present.      Mental Status: She is alert. Mental status is at baseline.   Psychiatric:         Mood and Affect: Mood normal.         Behavior: Behavior is cooperative.         Assessment/Plan   Problem List Items Addressed This Visit       Essential hypertension     Continue antihypertensive  Monitor BP         Weakness     Continue therapy         Cellulitis and abscess of right lower  extremity     IV ATB until 12/1/23  Monitor symptoms         Clostridium difficile colitis     Continue oral Vanco  Monitor stools           Medications, treatments, and labs reviewed  Continue medications and treatments as listed in EMR    Scribe Attestation  ALEC, Lorenzo Thakur   attest that this documentation has been prepared under the direction and in the presence of Paul Guerrier MD.     Provider Attestation - Scribe documentation  All medical record entries made by the Scribe were at my direction and personally dictated by me. I have reviewed the chart and agree that the record accurately reflects my personal performance of the history, physical exam, discussion and plan.   Paul Guerrier MD

## 2023-11-21 NOTE — LETTER
Patient: Bindu Cali  : 10/14/1932    Encounter Date: 2023    PROGRESS NOTE    Subjective  Chief complaint: Bindu Cali is a 89 y.o. female who is an acute skilled patient being seen and evaluated for weakness    HPI:  Patient seen and examined at bedside.  Patient denies n/v/f/c.  Continues working in therapy. Patient completed car transfer with PT in gym with supervision. Patient completed 360 degree turn to challenge balance. Sit to stand from varying surface heights with SBA. Gait training 348' x 1 with WW and SBA. Ascend/descend two 6-inch steps and three 4-inch steps with R rail and CGA. Patient completing multiple therapeutic exercises to increase strength, mobility and flexibility. Patient continues on Vanco for Cdiff & IV ATB therapy for cellulitis. Patient has no adverse reactions noted, remains afebrile.  No new complaints.      Objective  Vital signs: 167/65,73,97%    Physical Exam  Constitutional:       General: She is not in acute distress.     Appearance: Normal appearance.   Eyes:      Extraocular Movements: Extraocular movements intact.   Cardiovascular:      Rate and Rhythm: Normal rate and regular rhythm.   Pulmonary:      Effort: Pulmonary effort is normal.      Breath sounds: Normal breath sounds.   Abdominal:      General: Bowel sounds are normal.      Palpations: Abdomen is soft.   Musculoskeletal:         General: Normal range of motion.      Cervical back: Normal range of motion and neck supple.      Right lower leg: Edema present.      Left lower leg: Edema present.   Skin:     General: Skin is warm and dry.   Neurological:      General: No focal deficit present.      Mental Status: She is alert. Mental status is at baseline.   Psychiatric:         Mood and Affect: Mood normal.         Behavior: Behavior is cooperative.         Assessment/Plan  Problem List Items Addressed This Visit       Essential hypertension     Continue antihypertensive  Monitor BP          Weakness     Continue therapy         Cellulitis and abscess of right lower extremity     IV ATB until 12/1/23  Monitor symptoms         Clostridium difficile colitis     Continue oral Vanco  Monitor stools           Medications, treatments, and labs reviewed  Continue medications and treatments as listed in EMR    Scribe Attestation  Jennie MICHAEL Scribe   attest that this documentation has been prepared under the direction and in the presence of Paul Guerrier MD.     Provider Attestation - Scribe documentation  All medical record entries made by the Scribe were at my direction and personally dictated by me. I have reviewed the chart and agree that the record accurately reflects my personal performance of the history, physical exam, discussion and plan.   Paul Guerrier MD      Electronically Signed By: Paul Guerrier MD   11/21/23  6:56 PM

## 2023-11-22 ENCOUNTER — NURSING HOME VISIT (OUTPATIENT)
Dept: POST ACUTE CARE | Facility: EXTERNAL LOCATION | Age: 88
End: 2023-11-22
Payer: MEDICARE

## 2023-11-22 DIAGNOSIS — R53.1 WEAKNESS: Primary | ICD-10-CM

## 2023-11-22 DIAGNOSIS — I10 ESSENTIAL HYPERTENSION: ICD-10-CM

## 2023-11-22 PROCEDURE — 99308 SBSQ NF CARE LOW MDM 20: CPT | Performed by: REGISTERED NURSE

## 2023-11-22 PROCEDURE — 1090000001 HH PPS REVENUE CREDIT

## 2023-11-22 PROCEDURE — 1090000002 HH PPS REVENUE DEBIT

## 2023-11-22 NOTE — LETTER
Patient: Bindu Cali  : 10/14/1932    Encounter Date: 2023    PROGRESS NOTE    Subjective  Chief complaint: Bindu Cali is a 89 y.o. female who is an acute skilled patient being seen and evaluated for weakness    HPI:  23 Patient is ambulating 50' FWW and CGA. Patient is also working on transfers and ADLs. No new concerns per nursing staff.     23 Patient seen and examined at bedside.  Patient denies n/v/f/c.  Continues working in therapy.  No new complaints.    Objective  Vital signs: 127/64, 97.5, 69, 98%    Physical Exam  Constitutional:       General: She is not in acute distress.  Eyes:      Extraocular Movements: Extraocular movements intact.   Pulmonary:      Effort: Pulmonary effort is normal.   Musculoskeletal:      Cervical back: Neck supple.   Neurological:      Mental Status: She is alert.   Psychiatric:         Mood and Affect: Mood normal.         Behavior: Behavior is cooperative.         Assessment/Plan  Problem List Items Addressed This Visit       Essential hypertension     Continue antihypertensive  Monitor BP         Weakness - Primary     Continue PT OT          Medications, treatments, and labs reviewed  Continue medications and treatments as listed in [facility]    Scribe Attestation  I, Lorenzo Hendrickson   attest that this documentation has been prepared under the direction and in the presence of VERONICA Arana.    Provider Attestation - Scribe documentation  All medical record entries made by the Scribe were at my direction and personally dictated by me. I have reviewed the chart and agree that the record accurately reflects my personal performance of the history, physical exam, discussion and plan.    VERONICA Arana        Electronically Signed By: VERONICA Arana   23 10:17 AM

## 2023-11-23 ENCOUNTER — NURSING HOME VISIT (OUTPATIENT)
Dept: POST ACUTE CARE | Facility: EXTERNAL LOCATION | Age: 88
End: 2023-11-23
Payer: MEDICARE

## 2023-11-23 DIAGNOSIS — L02.415 CELLULITIS AND ABSCESS OF RIGHT LOWER EXTREMITY: ICD-10-CM

## 2023-11-23 DIAGNOSIS — R53.1 WEAKNESS: Primary | ICD-10-CM

## 2023-11-23 DIAGNOSIS — L03.115 CELLULITIS AND ABSCESS OF RIGHT LOWER EXTREMITY: ICD-10-CM

## 2023-11-23 DIAGNOSIS — I10 ESSENTIAL HYPERTENSION: ICD-10-CM

## 2023-11-23 DIAGNOSIS — A04.72 CLOSTRIDIUM DIFFICILE COLITIS: ICD-10-CM

## 2023-11-23 PROCEDURE — 1090000001 HH PPS REVENUE CREDIT

## 2023-11-23 PROCEDURE — 1090000002 HH PPS REVENUE DEBIT

## 2023-11-23 PROCEDURE — 99308 SBSQ NF CARE LOW MDM 20: CPT | Performed by: INTERNAL MEDICINE

## 2023-11-23 NOTE — LETTER
Patient: Bindu Cali  : 10/14/1932    Encounter Date: 2023    PROGRESS NOTE    Subjective  Chief complaint: Bindu Cali is a 89 y.o. female who is an acute skilled patient being seen and evaluated for weakness    HPI:  HPI  Patient continues to work in therapy, making progress, actively participating.  Patient ambulated 350 feet with wheeled walker, SBA.  Patient ascending and descending stairs, two 6 inch steps and three 4 inch steps with right handrail and CGA.  Patient continues on ATB therapy for C. difficile and IV ATB for cellulitis.  Patient tolerating ATB's with no adverse effects.  Patient remains afebrile.  Patient seen and examined at bedside, in no apparent distress.  Denies chest pain or shortness of breath.  Denies nausea or vomiting.  No new concerns reported today.    Objective  Vital signs: 139/56, 96.9, 76, 17    Physical Exam  Constitutional:       General: She is not in acute distress.     Appearance: Normal appearance.   Eyes:      Extraocular Movements: Extraocular movements intact.   Cardiovascular:      Rate and Rhythm: Normal rate and regular rhythm.   Pulmonary:      Effort: Pulmonary effort is normal.      Breath sounds: Normal breath sounds.   Abdominal:      General: Bowel sounds are normal.      Palpations: Abdomen is soft.   Musculoskeletal:         General: Normal range of motion.      Cervical back: Normal range of motion and neck supple.      Comments: Generalized weakness   Skin:     General: Skin is warm and dry.   Neurological:      General: No focal deficit present.      Mental Status: She is alert. Mental status is at baseline.   Psychiatric:         Mood and Affect: Mood normal.         Behavior: Behavior is cooperative.         Assessment/Plan  Problem List Items Addressed This Visit          Cardiac and Vasculature    Essential hypertension     Continue antihypertensive  Monitor BP            Infectious Diseases    Cellulitis and abscess of right  lower extremity     IV ATB until 12/1/23  Monitor symptoms         Clostridium difficile colitis     Continue oral Vanco  Monitor stools             Symptoms and Signs    Weakness - Primary     Making progress in therapy, actively participating          Medications, treatments, and labs reviewed  Continue medications and treatments as listed in EMR      Scribe Attestation  IOralia Scribabhishek   attest that this documentation has been prepared under the direction and in the presence of Paul Guerrier MD    Provider Attestation - Scribe documentation  All medical record entries made by the Scribe were at my direction and personally dictated by me. I have reviewed the chart and agree that the record accurately reflects my personal performance of the history, physical exam, discussion and plan.   Paul Guerrier MD        Electronically Signed By: Paul Guerrier MD   11/26/23  8:18 AM

## 2023-11-24 ENCOUNTER — OFFICE VISIT (OUTPATIENT)
Dept: UROLOGY | Facility: HOSPITAL | Age: 88
End: 2023-11-24
Payer: MEDICARE

## 2023-11-24 DIAGNOSIS — N31.2 ATONIC BLADDER: ICD-10-CM

## 2023-11-24 PROCEDURE — 1159F MED LIST DOCD IN RCRD: CPT | Performed by: STUDENT IN AN ORGANIZED HEALTH CARE EDUCATION/TRAINING PROGRAM

## 2023-11-24 PROCEDURE — 1090000001 HH PPS REVENUE CREDIT

## 2023-11-24 PROCEDURE — 1036F TOBACCO NON-USER: CPT | Performed by: STUDENT IN AN ORGANIZED HEALTH CARE EDUCATION/TRAINING PROGRAM

## 2023-11-24 PROCEDURE — 1090000002 HH PPS REVENUE DEBIT

## 2023-11-24 PROCEDURE — 1125F AMNT PAIN NOTED PAIN PRSNT: CPT | Performed by: STUDENT IN AN ORGANIZED HEALTH CARE EDUCATION/TRAINING PROGRAM

## 2023-11-24 PROCEDURE — 1111F DSCHRG MED/CURRENT MED MERGE: CPT | Performed by: STUDENT IN AN ORGANIZED HEALTH CARE EDUCATION/TRAINING PROGRAM

## 2023-11-24 PROCEDURE — 99204 OFFICE O/P NEW MOD 45 MIN: CPT | Performed by: STUDENT IN AN ORGANIZED HEALTH CARE EDUCATION/TRAINING PROGRAM

## 2023-11-24 PROCEDURE — 99214 OFFICE O/P EST MOD 30 MIN: CPT | Performed by: STUDENT IN AN ORGANIZED HEALTH CARE EDUCATION/TRAINING PROGRAM

## 2023-11-24 PROCEDURE — 1160F RVW MEDS BY RX/DR IN RCRD: CPT | Performed by: STUDENT IN AN ORGANIZED HEALTH CARE EDUCATION/TRAINING PROGRAM

## 2023-11-24 NOTE — PROGRESS NOTES
PROGRESS NOTE    Subjective   Chief complaint: Bindu Cali is a 89 y.o. female who is an acute skilled patient being seen and evaluated for weakness    HPI:  HPI  Patient continues to work in therapy, making progress, actively participating.  Patient ambulated 350 feet with wheeled walker, SBA.  Patient ascending and descending stairs, two 6 inch steps and three 4 inch steps with right handrail and CGA.  Patient continues on ATB therapy for C. difficile and IV ATB for cellulitis.  Patient tolerating ATB's with no adverse effects.  Patient remains afebrile.  Patient seen and examined at bedside, in no apparent distress.  Denies chest pain or shortness of breath.  Denies nausea or vomiting.  No new concerns reported today.    Objective   Vital signs: 139/56, 96.9, 76, 17    Physical Exam  Constitutional:       General: She is not in acute distress.     Appearance: Normal appearance.   Eyes:      Extraocular Movements: Extraocular movements intact.   Cardiovascular:      Rate and Rhythm: Normal rate and regular rhythm.   Pulmonary:      Effort: Pulmonary effort is normal.      Breath sounds: Normal breath sounds.   Abdominal:      General: Bowel sounds are normal.      Palpations: Abdomen is soft.   Musculoskeletal:         General: Normal range of motion.      Cervical back: Normal range of motion and neck supple.      Comments: Generalized weakness   Skin:     General: Skin is warm and dry.   Neurological:      General: No focal deficit present.      Mental Status: She is alert. Mental status is at baseline.   Psychiatric:         Mood and Affect: Mood normal.         Behavior: Behavior is cooperative.         Assessment/Plan   Problem List Items Addressed This Visit          Cardiac and Vasculature    Essential hypertension     Continue antihypertensive  Monitor BP            Infectious Diseases    Cellulitis and abscess of right lower extremity     IV ATB until 12/1/23  Monitor symptoms         Clostridium  difficile colitis     Continue oral Vanco  Monitor stools             Symptoms and Signs    Weakness - Primary     Making progress in therapy, actively participating          Medications, treatments, and labs reviewed  Continue medications and treatments as listed in EMR      Scribe Attestation  I, Lorenzo Falk   attest that this documentation has been prepared under the direction and in the presence of Paul Guerrier MD    Provider Attestation - Scribe documentation  All medical record entries made by the Scribe were at my direction and personally dictated by me. I have reviewed the chart and agree that the record accurately reflects my personal performance of the history, physical exam, discussion and plan.   Paul Guerrier MD

## 2023-11-24 NOTE — PROGRESS NOTES
UROLOGIC INITIAL EVALUATION       PROBLEM LIST:    History of urinary retention   CKDIII  Hypothyroidism  Multiple Myeloma   S/P ORIF 4/23/23, complicated by post op infection Nov 2023     HISTORY OF PRESENT ILLNESS:   Bindu Cali is a 89 y.o. female who was first seen on 11/24/23 for urinary retention during her hospitalization in Nov for ORIF infection requiring wash out and prolonged antibiotics. She was admitted to [facility] at that time.   She states she had her avila removed at nursing home, and initially failed a TOV two weeks ago, followed by another TOV last week now without issues.   She is here just to make sure everything is okay  She denies recent history of UTI, hematuria     She ambulates with a walker  Does have history of Servin suspension > 30 years ago    Her PVR in clinic is 280 ml, asymptomatic otherwise   Does admit to urgency over the last decade, without any change   Denies incontinence     PAST MEDICAL HISTORY:  Past Medical History:   Diagnosis Date    Chronic kidney disease, stage 3b (CMS/HCC) 02/25/2022    Anemia due to stage 3b chronic kidney disease    Essential (primary) hypertension 12/14/2022    Essential hypertension    Hypertension 11/07/2011    Multiple myeloma (CMS/[facility])     Other specified postprocedural states     H/O colonoscopy    Personal history of other medical treatment     History of mammogram    Personal history of other medical treatment     History of bone density study    Personal history of other medical treatment     H/O bone density study    Procedure and treatment not carried out because of patient's decision for unspecified reasons     Colonoscopy refused    Vitamin D deficiency, unspecified     Vitamin D insufficiency       PAST SURGICAL HISTORY:  Past Surgical History:   Procedure Laterality Date    BREAST BIOPSY  01/22/2015    Biopsy Breast Open    CHOLECYSTECTOMY  01/22/2015    Cholecystectomy    HYSTERECTOMY  01/22/2015    Hysterectomy    OTHER  SURGICAL HISTORY  04/28/2022    Shoulder surgery    ROTATOR CUFF REPAIR  01/22/2015    Rotator Cuff Repair    TONSILLECTOMY  01/22/2015    Tonsillectomy    TOTAL KNEE ARTHROPLASTY  01/22/2015    Knee Replacement        ALLERGIES:   Allergies   Allergen Reactions    Morphine Nausea/vomiting    Pollen Extracts Itching and Other     Runny nose, sneezing, PND affecting throat and voice. Used to take allergy shots, now controlled with Claritin daily.    Aleve [Naproxen Sodium] Palpitations    Codeine Dizziness and Confusion    Erythromycin Nausea/vomiting        MEDICATIONS:   Current Outpatient Medications on File Prior to Visit   Medication Sig Dispense Refill    acetaminophen (Tylenol) 500 mg tablet Take 2 tablets (1,000 mg) by mouth every 8 hours if needed.      amLODIPine (Norvasc) 5 mg tablet Take 1 tablet (5 mg) by mouth once daily.      biotin 5 mg capsule Take 1 capsule (5 mg) by mouth once daily.      cholecalciferol (Vitamin D-3) 10 MCG (400 UNIT) tablet Take 1 tablet (400 Units) by mouth once daily. 30 tablet 0    furosemide (Lasix) 40 mg tablet Take 1 tablet (40 mg) by mouth once daily.      gabapentin (Neurontin) 100 mg capsule Take 3 capsules (300 mg) by mouth once daily at bedtime.      heparin flush 10 unit/mL injection Infuse 5 mL (50 Units) into a venous catheter once daily.      heparin sodium,porcine/PF (HEPARIN, PORCINE, LOCK FLUSH IV) Inject 10 Int'l Units/mL as directed 2 times a day. use to flush iv picc line   Indications: FLUSHING      lactase (Lactaid) 3,000 unit tablet Take 1 tablet (3,000 Units) by mouth if needed.      loratadine (Claritin) 10 mg tablet Take 1 tablet (10 mg) by mouth once daily.      losartan (Cozaar) 100 mg tablet Take 1 tablet (100 mg) by mouth once daily. 90 tablet 1    multivit-min-iron-FA-lutein (Centrum Silver Women) 8 mg iron-400 mcg-300 mcg tablet Take 1 tablet by mouth once daily.      omeprazole (PriLOSEC) 40 mg DR capsule Take 1 capsule (40 mg) by mouth once  daily.      potassium chloride CR 10 mEq ER tablet Take 1 tablet (10 mEq) by mouth once daily. Do not crush, chew, or split.      Synthroid 100 mcg tablet TAKE 1 TABLET EVERY MORNING, TAKE BEFORE MEALS FOR HYPOTHYROIDISM 90 tablet 0    vancomycin (Vancocin) 125 mg capsule Take 1 capsule (125 mg) by mouth 4 times a day for 21 days. End date 2023 84 capsule 0    [] ceFAZolin 1 gram injection Infuse 2,000 mg (2 g) into a venous catheter every 12 hours for 15 days. Continue cefazolin 2 g every 12 hours until . 91124 mg 0    sodium chloride 0.9% (Normal Saline Flush) flush Infuse 10 mL into a venous catheter once daily.       No current facility-administered medications on file prior to visit.          SOCIAL HISTORY:  Patient  reports that she has never smoked. She has never been exposed to tobacco smoke. She has never used smokeless tobacco. She reports current alcohol use of about 1.0 standard drink of alcohol per week. She reports that she does not use drugs.   Social History     Socioeconomic History    Marital status:      Spouse name: Not on file    Number of children: Not on file    Years of education: Not on file    Highest education level: Not on file   Occupational History    Not on file   Tobacco Use    Smoking status: Never     Passive exposure: Never    Smokeless tobacco: Never   Substance and Sexual Activity    Alcohol use: Yes     Alcohol/week: 1.0 standard drink of alcohol     Types: 1 Glasses of wine per week     Comment: MODERATELY    Drug use: Never    Sexual activity: Defer   Other Topics Concern    Not on file   Social History Narrative    Not on file     Social Determinants of Health     Financial Resource Strain: Low Risk  (10/26/2023)    Overall Financial Resource Strain (CARDI)     Difficulty of Paying Living Expenses: Not hard at all   Food Insecurity: No Food Insecurity (10/7/2023)    Hunger Vital Sign     Worried About Running Out of Food in the Last Year: Never true      Ran Out of Food in the Last Year: Never true   Transportation Needs: No Transportation Needs (10/26/2023)    PRAPARE - Transportation     Lack of Transportation (Medical): No     Lack of Transportation (Non-Medical): No   Physical Activity: Inactive (10/7/2023)    Exercise Vital Sign     Days of Exercise per Week: 0 days     Minutes of Exercise per Session: 0 min   Stress: No Stress Concern Present (10/7/2023)    [employer] - Occupational Stress Questionnaire     Feeling of Stress : Not at all   Social Connections: Feeling Socially Integrated (10/16/2023)    OASIS : Social Isolation     Frequency of experiencing loneliness or isolation: Never   Intimate Partner Violence: Not At Risk (10/7/2023)    Humiliation, Afraid, Rape, and Kick questionnaire     Fear of Current or Ex-Partner: No     Emotionally Abused: No     Physically Abused: No     Sexually Abused: No   Housing Stability: Low Risk  (10/26/2023)    Housing Stability Vital Sign     Unable to Pay for Housing in the Last Year: No     Number of Places Lived in the Last Year: 1     Unstable Housing in the Last Year: No       FAMILY HISTORY:  Family History   Problem Relation Name Age of Onset    Cancer Mother      Other (cardiac disorder) Mother      Other (cardiac disorder) Father      Other (cardiac disorder) Brother      Other (CVA) Brother      Other (HEART BASE TUMOR) Brother         REVIEW OF SYSTEMS:  Constitutional: Negative for fever and chills. Denies anorexia, weight loss.  Eyes: Negative for visual disturbance.   Respiratory: Negative for shortness of breath.    Cardiovascular: Negative for chest pain.   Gastrointestinal: Negative for nausea and vomiting.   Genitourinary: See interval history above.  Skin: Negative for rash.   Neurological: Negative for dizziness and numbness.   Psychiatric/Behavioral: Negative for confusion and decreased concentration.     PHYSICAL EXAM:  There were no vitals taken for this  visit.  Constitutional: Patient appears well-developed and well-nourished. No distress.    Head: Normocephalic and atraumatic.    Neck: Normal range of motion.    Cardiovascular: Normal rate.    Pulmonary/Chest: Effort normal. No respiratory distress.   Abdominal: soft NT   : voiding clear urine   Musculoskeletal: Normal range of motion.    Neurological: Alert and oriented to person, place, and time.  Psychiatric: Normal mood and affect. Behavior is normal. Thought content normal.      PATHOLOGY REVIEW:  N/A     RADIOLOGY REVIEW:  4/21/23  No evidence of acute intra-abdominal hemorrhage. Stable hepatic  cysts. Robust abdominal aortic calcification. Extensive  diverticulosis. No diverticulitis. Stable renal cysts. No adrenal  mass. Pancreas and spleen otherwise unchanged. Distended urinary  bladder. Uterus is not seen suggesting prior removal.      LABORATORY REVIEW:       Lab Results   Component Value Date    BUN 18 11/16/2023    CREATININE 1.03 11/16/2023    EGFR 51 (L) 11/16/2023     (L) 11/16/2023    K 4.3 11/16/2023     11/16/2023    CO2 25 11/16/2023    OSMOLALITY 291 10/06/2023    CALCIUM 7.6 (L) 11/16/2023      Lab Results   Component Value Date    WBC 8.9 11/16/2023    RBC 2.54 (L) 11/16/2023    HGB 7.5 (L) 11/16/2023    HCT 23.3 (L) 11/16/2023    MCV 92 11/16/2023    MCH 29.5 11/16/2023    MCHC 32.2 11/16/2023    RDW 17.2 (H) 11/16/2023     11/16/2023    MPV 9.9 11/01/2023           Assessment:      1. Atonic bladder  Referral to Urology          Bindu GIA Cali is a 89 y.o. female with urinary retention s/p ORIF washout here for check up    Plan:   Discussed the PVR, in setting of immediate post op recovery will continue to monitor considering patient is asymptomatic   PVR in 6 weeks at [place]

## 2023-11-25 PROCEDURE — 1090000002 HH PPS REVENUE DEBIT

## 2023-11-25 PROCEDURE — 1090000001 HH PPS REVENUE CREDIT

## 2023-11-26 PROCEDURE — 1090000002 HH PPS REVENUE DEBIT

## 2023-11-26 PROCEDURE — 1090000001 HH PPS REVENUE CREDIT

## 2023-11-27 ENCOUNTER — NURSING HOME VISIT (OUTPATIENT)
Dept: POST ACUTE CARE | Facility: EXTERNAL LOCATION | Age: 88
End: 2023-11-27
Payer: MEDICARE

## 2023-11-27 DIAGNOSIS — I10 ESSENTIAL HYPERTENSION: ICD-10-CM

## 2023-11-27 DIAGNOSIS — L03.115 CELLULITIS AND ABSCESS OF RIGHT LOWER EXTREMITY: ICD-10-CM

## 2023-11-27 DIAGNOSIS — R53.1 WEAKNESS: ICD-10-CM

## 2023-11-27 DIAGNOSIS — L02.415 CELLULITIS AND ABSCESS OF RIGHT LOWER EXTREMITY: ICD-10-CM

## 2023-11-27 PROCEDURE — 1090000001 HH PPS REVENUE CREDIT

## 2023-11-27 PROCEDURE — 99308 SBSQ NF CARE LOW MDM 20: CPT | Performed by: INTERNAL MEDICINE

## 2023-11-27 PROCEDURE — 1090000002 HH PPS REVENUE DEBIT

## 2023-11-27 NOTE — LETTER
Patient: Bindu Cali  : 10/14/1932    Encounter Date: 2023    PROGRESS NOTE    Subjective  Chief complaint: Bindu Cali is a 89 y.o. female who is an acute skilled patient being seen and evaluated for weakness.    HPI:  Patient has been working in therapy to improve strength, endurance, and ADLs.  Patient continues to work toward goals. Gait training 350' x 1 and shorter distances in therapy gym with WW. Ascend/descend two 6-inch steps and three 4-inch steps with R rail and SBA.  Sit to stand w/c to walker with supervision. Patient progressing toward all therapy goals.  Patient is discharging from therapy tomorrow (23). No new concerns today.  Denies n/v/f/c pain.        Objective  Vital signs: 142/55,71,97%    Physical Exam  Constitutional:       General: She is not in acute distress.     Appearance: Normal appearance.   Eyes:      Extraocular Movements: Extraocular movements intact.   Cardiovascular:      Rate and Rhythm: Normal rate and regular rhythm.   Pulmonary:      Effort: Pulmonary effort is normal.      Breath sounds: Normal breath sounds.   Abdominal:      General: Bowel sounds are normal.      Palpations: Abdomen is soft.   Musculoskeletal:         General: Normal range of motion.      Cervical back: Normal range of motion and neck supple.      Comments: Generalized weakness   Skin:     General: Skin is warm and dry.   Neurological:      General: No focal deficit present.      Mental Status: She is alert. Mental status is at baseline.   Psychiatric:         Mood and Affect: Mood normal.         Behavior: Behavior is cooperative.         Assessment/Plan  Problem List Items Addressed This Visit       Essential hypertension     Continue antihypertensive  Monitor BP         Weakness     Continue therapy         Cellulitis and abscess of right lower extremity     IV ATB until 23  Monitor symptoms          Medications, treatments, and labs reviewed  Continue medications and  treatments as listed in EMR    Scribe Attestation  IJennie Scribe   attest that this documentation has been prepared under the direction and in the presence of Paul Guerrier MD.     Provider Attestation - Scribe documentation  All medical record entries made by the Scribe were at my direction and personally dictated by me. I have reviewed the chart and agree that the record accurately reflects my personal performance of the history, physical exam, discussion and plan.   Paul Guerrier MD      Electronically Signed By: Paul Guerrier MD   11/27/23  7:42 PM

## 2023-11-27 NOTE — PROGRESS NOTES
PROGRESS NOTE    Subjective   Chief complaint: Bindu Cali is a 89 y.o. female who is an acute skilled patient being seen and evaluated for weakness    HPI:  11/17/23 Patient is ambulating 50' FWW and CGA. Patient is also working on transfers and ADLs. No new concerns per nursing staff.       Objective   Vital signs: 145/68, 97.5, 82, 97%    Physical Exam  Constitutional:       General: She is not in acute distress.  Eyes:      Extraocular Movements: Extraocular movements intact.   Pulmonary:      Effort: Pulmonary effort is normal.   Musculoskeletal:      Cervical back: Neck supple.   Neurological:      Mental Status: She is alert.   Psychiatric:         Mood and Affect: Mood normal.         Behavior: Behavior is cooperative.         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    Provider Attestation - Scribe documentation  All medical record entries made by the Scribe were at my direction and personally dictated by me. I have reviewed the chart and agree that the record accurately reflects my personal performance of the history, physical exam, discussion and plan.    VERONICA Arana

## 2023-11-27 NOTE — PROGRESS NOTES
PROGRESS NOTE    Subjective   Chief complaint: Bindu Cali is a 89 y.o. female who is an acute skilled patient being seen and evaluated for weakness.    HPI:  Patient has been working in therapy to improve strength, endurance, and ADLs.  Patient continues to work toward goals. Gait training 350' x 1 and shorter distances in therapy gym with WW. Ascend/descend two 6-inch steps and three 4-inch steps with R rail and SBA.  Sit to stand w/c to walker with supervision. Patient progressing toward all therapy goals.  Patient is discharging from therapy tomorrow (11/28/23). No new concerns today.  Denies n/v/f/c pain.        Objective   Vital signs: 142/55,71,97%    Physical Exam  Constitutional:       General: She is not in acute distress.     Appearance: Normal appearance.   Eyes:      Extraocular Movements: Extraocular movements intact.   Cardiovascular:      Rate and Rhythm: Normal rate and regular rhythm.   Pulmonary:      Effort: Pulmonary effort is normal.      Breath sounds: Normal breath sounds.   Abdominal:      General: Bowel sounds are normal.      Palpations: Abdomen is soft.   Musculoskeletal:         General: Normal range of motion.      Cervical back: Normal range of motion and neck supple.      Comments: Generalized weakness   Skin:     General: Skin is warm and dry.   Neurological:      General: No focal deficit present.      Mental Status: She is alert. Mental status is at baseline.   Psychiatric:         Mood and Affect: Mood normal.         Behavior: Behavior is cooperative.         Assessment/Plan   Problem List Items Addressed This Visit       Essential hypertension     Continue antihypertensive  Monitor BP         Weakness     Continue therapy         Cellulitis and abscess of right lower extremity     IV ATB until 12/1/23  Monitor symptoms          Medications, treatments, and labs reviewed  Continue medications and treatments as listed in EMR    Scribe Attestation  I, Jennie Juarez,  Scribe   attest that this documentation has been prepared under the direction and in the presence of Paul Guerrier MD.     Provider Attestation - Scribe documentation  All medical record entries made by the Scribe were at my direction and personally dictated by me. I have reviewed the chart and agree that the record accurately reflects my personal performance of the history, physical exam, discussion and plan.   Paul Guerrier MD

## 2023-11-28 ENCOUNTER — NURSING HOME VISIT (OUTPATIENT)
Dept: POST ACUTE CARE | Facility: EXTERNAL LOCATION | Age: 88
End: 2023-11-28
Payer: MEDICARE

## 2023-11-28 DIAGNOSIS — I10 ESSENTIAL HYPERTENSION: ICD-10-CM

## 2023-11-28 DIAGNOSIS — L02.415 CELLULITIS AND ABSCESS OF RIGHT LOWER EXTREMITY: ICD-10-CM

## 2023-11-28 DIAGNOSIS — L03.115 CELLULITIS AND ABSCESS OF RIGHT LOWER EXTREMITY: ICD-10-CM

## 2023-11-28 PROCEDURE — 99315 NF DSCHRG MGMT 30 MIN/LESS: CPT | Performed by: INTERNAL MEDICINE

## 2023-11-28 PROCEDURE — 1090000001 HH PPS REVENUE CREDIT

## 2023-11-28 PROCEDURE — 1090000002 HH PPS REVENUE DEBIT

## 2023-11-28 NOTE — PROGRESS NOTES
PROGRESS NOTE    Subjective   Chief complaint: Bindu Cali is a 89 y.o. female who is an acute skilled patient being seen and evaluated for weakness.    HPI:  Patient discharging from therapy today as she will be heading home tomorrow (11/29/23). Patient's functional abilities have progressed as a result of skilled interventions.  Patient will discharge on IV ATB, which C will manage. Patient is stable with no new concerns.       Objective   Vital signs: 124/61,71,96%    Physical Exam  Constitutional:       General: She is not in acute distress.     Appearance: Normal appearance.   Eyes:      Extraocular Movements: Extraocular movements intact.   Cardiovascular:      Rate and Rhythm: Normal rate and regular rhythm.   Pulmonary:      Effort: Pulmonary effort is normal.      Breath sounds: Normal breath sounds.   Abdominal:      General: Bowel sounds are normal.      Palpations: Abdomen is soft.   Musculoskeletal:         General: Normal range of motion.      Cervical back: Normal range of motion and neck supple.      Comments: Generalized weakness   Skin:     General: Skin is warm and dry.   Neurological:      General: No focal deficit present.      Mental Status: She is alert. Mental status is at baseline.   Psychiatric:         Mood and Affect: Mood normal.         Behavior: Behavior is cooperative.         Assessment/Plan   Problem List Items Addressed This Visit       Essential hypertension     Continue antihypertensive  Monitor BP         Cellulitis and abscess of right lower extremity     IV ATB until 12/1/23  [facility] to assist with management          Medications, treatments, and labs reviewed  Continue medications and treatments as listed in EMR  Prognosis - fair  Course - therapy  Plan - DC home with [facility]   Follow up with PCP within 2 weeks  Medications called into pharmacy by office  Condition at discharge is stable    Scribe Attestation  I, Lorenzo Thakur   attest that this documentation  has been prepared under the direction and in the presence of Paul Guerrier MD.     Provider Attestation - Scribe documentation  All medical record entries made by the Scribe were at my direction and personally dictated by me. I have reviewed the chart and agree that the record accurately reflects my personal performance of the history, physical exam, discussion and plan.   Paul Guerrier MD

## 2023-11-28 NOTE — LETTER
Patient: Bindu Cali  : 10/14/1932    Encounter Date: 2023    PROGRESS NOTE    Subjective  Chief complaint: Bindu Cali is a 89 y.o. female who is an acute skilled patient being seen and evaluated for weakness.    HPI:  Patient discharging from therapy today as she will be heading home tomorrow (23). Patient's functional abilities have progressed as a result of skilled interventions.  Patient will discharge on IV ATB, which [facility] will manage. Patient is stable with no new concerns.       Objective  Vital signs: 124/61,71,96%    Physical Exam  Constitutional:       General: She is not in acute distress.     Appearance: Normal appearance.   Eyes:      Extraocular Movements: Extraocular movements intact.   Cardiovascular:      Rate and Rhythm: Normal rate and regular rhythm.   Pulmonary:      Effort: Pulmonary effort is normal.      Breath sounds: Normal breath sounds.   Abdominal:      General: Bowel sounds are normal.      Palpations: Abdomen is soft.   Musculoskeletal:         General: Normal range of motion.      Cervical back: Normal range of motion and neck supple.      Comments: Generalized weakness   Skin:     General: Skin is warm and dry.   Neurological:      General: No focal deficit present.      Mental Status: She is alert. Mental status is at baseline.   Psychiatric:         Mood and Affect: Mood normal.         Behavior: Behavior is cooperative.         Assessment/Plan  Problem List Items Addressed This Visit       Essential hypertension     Continue antihypertensive  Monitor BP         Cellulitis and abscess of right lower extremity     IV ATB until 23  [facility] to assist with management          Medications, treatments, and labs reviewed  Continue medications and treatments as listed in EMR  Prognosis - fair  Course - therapy  Plan - DC home with [facility]   Follow up with PCP within 2 weeks  Medications called into pharmacy by office  Condition at discharge is stable    Scribe  Attestation  I, Lorenzo Thakur   attest that this documentation has been prepared under the direction and in the presence of Paul Guerrier MD.     Provider Attestation - Scribe documentation  All medical record entries made by the Scribe were at my direction and personally dictated by me. I have reviewed the chart and agree that the record accurately reflects my personal performance of the history, physical exam, discussion and plan.   Paul Guerrier MD      Electronically Signed By: Paul Guerrier MD   11/28/23  8:38 PM

## 2023-11-29 LAB
ACID FAST STN SPEC: NORMAL
ACID FAST STN SPEC: NORMAL
MYCOBACTERIUM SPEC CULT: NORMAL
MYCOBACTERIUM SPEC CULT: NORMAL

## 2023-11-29 PROCEDURE — 1090000002 HH PPS REVENUE DEBIT

## 2023-11-29 PROCEDURE — 1090000001 HH PPS REVENUE CREDIT

## 2023-11-29 NOTE — PROGRESS NOTES
PROGRESS NOTE    Subjective   Chief complaint: Bindu Cali is a 89 y.o. female who is an acute skilled patient being seen and evaluated for weakness    HPI:  11/17/23 Patient is ambulating 50' FWW and CGA. Patient is also working on transfers and ADLs. No new concerns per nursing staff.     11/22/23 Patient seen and examined at bedside.  Patient denies n/v/f/c.  Continues working in therapy.  No new complaints.    Objective   Vital signs: 127/64, 97.5, 69, 98%    Physical Exam  Constitutional:       General: She is not in acute distress.  Eyes:      Extraocular Movements: Extraocular movements intact.   Pulmonary:      Effort: Pulmonary effort is normal.   Musculoskeletal:      Cervical back: Neck supple.   Neurological:      Mental Status: She is alert.   Psychiatric:         Mood and Affect: Mood normal.         Behavior: Behavior is cooperative.         Assessment/Plan   Problem List Items Addressed This Visit       Essential hypertension     Continue antihypertensive  Monitor BP         Weakness - Primary     Continue PT OT          Medications, treatments, and labs reviewed  Continue medications and treatments as listed in [facility]    Scribe Attestation  IJennifer Scribe   attest that this documentation has been prepared under the direction and in the presence of VERONICA Arana.    Provider Attestation - Scribe documentation  All medical record entries made by the Scribe were at my direction and personally dictated by me. I have reviewed the chart and agree that the record accurately reflects my personal performance of the history, physical exam, discussion and plan.    VERONICA Arana

## 2023-11-30 ENCOUNTER — DOCUMENTATION (OUTPATIENT)
Dept: HEMATOLOGY/ONCOLOGY | Facility: CLINIC | Age: 88
End: 2023-11-30

## 2023-11-30 ENCOUNTER — INFUSION (OUTPATIENT)
Dept: HEMATOLOGY/ONCOLOGY | Facility: CLINIC | Age: 88
End: 2023-11-30
Payer: MEDICARE

## 2023-11-30 VITALS
BODY MASS INDEX: 26.44 KG/M2 | TEMPERATURE: 98.4 F | SYSTOLIC BLOOD PRESSURE: 176 MMHG | HEART RATE: 75 BPM | OXYGEN SATURATION: 99 % | RESPIRATION RATE: 18 BRPM | DIASTOLIC BLOOD PRESSURE: 66 MMHG | WEIGHT: 135.36 LBS

## 2023-11-30 DIAGNOSIS — C90.00 MULTIPLE MYELOMA, REMISSION STATUS UNSPECIFIED (MULTI): ICD-10-CM

## 2023-11-30 DIAGNOSIS — C90.00 MULTIPLE MYELOMA NOT HAVING ACHIEVED REMISSION (MULTI): ICD-10-CM

## 2023-11-30 LAB
ALBUMIN SERPL BCP-MCNC: 3.3 G/DL (ref 3.4–5)
ALP SERPL-CCNC: 60 U/L (ref 33–136)
ALT SERPL W P-5'-P-CCNC: <3 U/L (ref 7–45)
ANION GAP SERPL CALC-SCNC: 13 MMOL/L (ref 10–20)
AST SERPL W P-5'-P-CCNC: 12 U/L (ref 9–39)
B2 MICROGLOB SERPL-MCNC: 7.7 MG/L (ref 0.7–2.2)
BASOPHILS # BLD AUTO: 0.04 X10*3/UL (ref 0–0.1)
BASOPHILS NFR BLD AUTO: 0.5 %
BILIRUB SERPL-MCNC: 0.2 MG/DL (ref 0–1.2)
BUN SERPL-MCNC: 30 MG/DL (ref 6–23)
CALCIUM SERPL-MCNC: 8.1 MG/DL (ref 8.6–10.3)
CHLORIDE SERPL-SCNC: 98 MMOL/L (ref 98–107)
CO2 SERPL-SCNC: 23 MMOL/L (ref 21–32)
CREAT SERPL-MCNC: 1.35 MG/DL (ref 0.5–1.05)
EOSINOPHIL # BLD AUTO: 0.12 X10*3/UL (ref 0–0.4)
EOSINOPHIL NFR BLD AUTO: 1.6 %
ERYTHROCYTE [DISTWIDTH] IN BLOOD BY AUTOMATED COUNT: 17.1 % (ref 11.5–14.5)
GFR SERPL CREATININE-BSD FRML MDRD: 37 ML/MIN/1.73M*2
GLUCOSE SERPL-MCNC: 105 MG/DL (ref 74–99)
HCT VFR BLD AUTO: 23.8 % (ref 36–46)
HGB BLD-MCNC: 7.8 G/DL (ref 12–16)
IGA SERPL-MCNC: 1310 MG/DL (ref 70–400)
IGG SERPL-MCNC: 381 MG/DL (ref 700–1600)
IGM SERPL-MCNC: 9 MG/DL (ref 40–230)
IMM GRANULOCYTES # BLD AUTO: 0.04 X10*3/UL (ref 0–0.5)
IMM GRANULOCYTES NFR BLD AUTO: 0.5 % (ref 0–0.9)
LYMPHOCYTES # BLD AUTO: 0.92 X10*3/UL (ref 0.8–3)
LYMPHOCYTES NFR BLD AUTO: 12 %
MCH RBC QN AUTO: 30.4 PG (ref 26–34)
MCHC RBC AUTO-ENTMCNC: 32.8 G/DL (ref 32–36)
MCV RBC AUTO: 93 FL (ref 80–100)
MONOCYTES # BLD AUTO: 0.81 X10*3/UL (ref 0.05–0.8)
MONOCYTES NFR BLD AUTO: 10.6 %
NEUTROPHILS # BLD AUTO: 5.71 X10*3/UL (ref 1.6–5.5)
NEUTROPHILS NFR BLD AUTO: 74.8 %
NRBC BLD-RTO: 0 /100 WBCS (ref 0–0)
PLATELET # BLD AUTO: 453 X10*3/UL (ref 150–450)
POTASSIUM SERPL-SCNC: 3.7 MMOL/L (ref 3.5–5.3)
PROT SERPL-MCNC: 6.4 G/DL (ref 6.4–8.2)
PROT SERPL-MCNC: 6.6 G/DL (ref 6.4–8.2)
RBC # BLD AUTO: 2.57 X10*6/UL (ref 4–5.2)
SODIUM SERPL-SCNC: 130 MMOL/L (ref 136–145)
WBC # BLD AUTO: 7.6 X10*3/UL (ref 4.4–11.3)

## 2023-11-30 PROCEDURE — 84155 ASSAY OF PROTEIN SERUM: CPT | Mod: PARLAB | Performed by: INTERNAL MEDICINE

## 2023-11-30 PROCEDURE — 84165 PROTEIN E-PHORESIS SERUM: CPT | Performed by: STUDENT IN AN ORGANIZED HEALTH CARE EDUCATION/TRAINING PROGRAM

## 2023-11-30 PROCEDURE — 86320 SERUM IMMUNOELECTROPHORESIS: CPT | Performed by: STUDENT IN AN ORGANIZED HEALTH CARE EDUCATION/TRAINING PROGRAM

## 2023-11-30 PROCEDURE — 2500000004 HC RX 250 GENERAL PHARMACY W/ HCPCS (ALT 636 FOR OP/ED): Mod: JZ | Performed by: INTERNAL MEDICINE

## 2023-11-30 PROCEDURE — 1090000001 HH PPS REVENUE CREDIT

## 2023-11-30 PROCEDURE — 85025 COMPLETE CBC W/AUTO DIFF WBC: CPT | Performed by: INTERNAL MEDICINE

## 2023-11-30 PROCEDURE — 1090000002 HH PPS REVENUE DEBIT

## 2023-11-30 PROCEDURE — 82784 ASSAY IGA/IGD/IGG/IGM EACH: CPT | Mod: PARLAB | Performed by: INTERNAL MEDICINE

## 2023-11-30 PROCEDURE — 96401 CHEMO ANTI-NEOPL SQ/IM: CPT

## 2023-11-30 PROCEDURE — 80053 COMPREHEN METABOLIC PANEL: CPT | Performed by: INTERNAL MEDICINE

## 2023-11-30 PROCEDURE — 83521 IG LIGHT CHAINS FREE EACH: CPT | Mod: PARLAB | Performed by: INTERNAL MEDICINE

## 2023-11-30 PROCEDURE — 82232 ASSAY OF BETA-2 PROTEIN: CPT | Mod: PARLAB | Performed by: INTERNAL MEDICINE

## 2023-11-30 PROCEDURE — 2500000001 HC RX 250 WO HCPCS SELF ADMINISTERED DRUGS (ALT 637 FOR MEDICARE OP): Performed by: INTERNAL MEDICINE

## 2023-11-30 PROCEDURE — 86334 IMMUNOFIX E-PHORESIS SERUM: CPT | Mod: PARLAB | Performed by: INTERNAL MEDICINE

## 2023-11-30 RX ORDER — BORTEZOMIB 3.5 MG/1
1.3 INJECTION, POWDER, LYOPHILIZED, FOR SOLUTION INTRAVENOUS; SUBCUTANEOUS ONCE
Status: COMPLETED | OUTPATIENT
Start: 2023-11-30 | End: 2023-11-30

## 2023-11-30 RX ORDER — EPINEPHRINE 0.3 MG/.3ML
0.3 INJECTION SUBCUTANEOUS EVERY 5 MIN PRN
Status: DISCONTINUED | OUTPATIENT
Start: 2023-11-30 | End: 2023-11-30 | Stop reason: HOSPADM

## 2023-11-30 RX ORDER — DIPHENHYDRAMINE HYDROCHLORIDE 50 MG/ML
50 INJECTION INTRAMUSCULAR; INTRAVENOUS AS NEEDED
Status: DISCONTINUED | OUTPATIENT
Start: 2023-11-30 | End: 2023-11-30 | Stop reason: HOSPADM

## 2023-11-30 RX ORDER — PROCHLORPERAZINE MALEATE 10 MG
10 TABLET ORAL ONCE
Status: COMPLETED | OUTPATIENT
Start: 2023-11-30 | End: 2023-11-30

## 2023-11-30 RX ORDER — FAMOTIDINE 10 MG/ML
20 INJECTION INTRAVENOUS ONCE AS NEEDED
Status: DISCONTINUED | OUTPATIENT
Start: 2023-11-30 | End: 2023-11-30 | Stop reason: HOSPADM

## 2023-11-30 RX ORDER — ALBUTEROL SULFATE 0.83 MG/ML
3 SOLUTION RESPIRATORY (INHALATION) AS NEEDED
Status: DISCONTINUED | OUTPATIENT
Start: 2023-11-30 | End: 2023-11-30 | Stop reason: HOSPADM

## 2023-11-30 RX ADMIN — PROCHLORPERAZINE MALEATE 10 MG: 10 TABLET ORAL at 11:11

## 2023-11-30 RX ADMIN — BORTEZOMIB 2.12 MG: 1 INJECTION, POWDER, LYOPHILIZED, FOR SOLUTION INTRAVENOUS; SUBCUTANEOUS at 11:18

## 2023-11-30 ASSESSMENT — PAIN SCALES - GENERAL: PAINLEVEL: 0-NO PAIN

## 2023-11-30 ASSESSMENT — PATIENT HEALTH QUESTIONNAIRE - PHQ9
SUM OF ALL RESPONSES TO PHQ9 QUESTIONS 1 AND 2: 0
1. LITTLE INTEREST OR PLEASURE IN DOING THINGS: NOT AT ALL
2. FEELING DOWN, DEPRESSED OR HOPELESS: NOT AT ALL

## 2023-12-01 ENCOUNTER — TELEPHONE (OUTPATIENT)
Dept: INFECTIOUS DISEASES | Facility: HOSPITAL | Age: 88
End: 2023-12-01
Payer: MEDICARE

## 2023-12-01 LAB
KAPPA LC SERPL-MCNC: 22.23 MG/DL (ref 0.33–1.94)
KAPPA LC/LAMBDA SER: 20.03 {RATIO} (ref 0.26–1.65)
LAMBDA LC SERPL-MCNC: 1.11 MG/DL (ref 0.57–2.63)

## 2023-12-01 PROCEDURE — 1090000002 HH PPS REVENUE DEBIT

## 2023-12-01 PROCEDURE — 1090000001 HH PPS REVENUE CREDIT

## 2023-12-01 NOTE — TELEPHONE ENCOUNTER
[facility] pharmacist (toan: [phone] ext [phone]) called to see if patient's IV antibiotic is stopping today. Last labs were drawn 11/27/2023. These were signed & scanned into the chart 11/28/2023. CRP was 32.6 (mg/L). Please advise.

## 2023-12-01 NOTE — TELEPHONE ENCOUNTER
Out to [facility]. Spoke with pharmacist, Joel. Relayed Dr. Whitley' order to stop IV antibiotic and remove PICC. Joel repeated order for clarity. The call ended.

## 2023-12-02 PROCEDURE — 1090000002 HH PPS REVENUE DEBIT

## 2023-12-02 PROCEDURE — 1090000001 HH PPS REVENUE CREDIT

## 2023-12-03 PROCEDURE — 1090000002 HH PPS REVENUE DEBIT

## 2023-12-03 PROCEDURE — 1090000001 HH PPS REVENUE CREDIT

## 2023-12-04 PROCEDURE — 1090000002 HH PPS REVENUE DEBIT

## 2023-12-04 PROCEDURE — 1090000001 HH PPS REVENUE CREDIT

## 2023-12-05 LAB
ALBUMIN: 3.1 G/DL (ref 3.4–5)
ALPHA 1 GLOBULIN: 0.4 G/DL (ref 0.2–0.6)
ALPHA 2 GLOBULIN: 0.8 G/DL (ref 0.4–1.1)
BETA GLOBULIN: 1.8 G/DL (ref 0.5–1.2)
GAMMA GLOBULIN: 0.3 G/DL (ref 0.5–1.4)
IMMUNOFIXATION COMMENT: ABNORMAL
M-PROTEIN 1: 0.9 G/DL
PATH REVIEW - SERUM IMMUNOFIXATION: ABNORMAL
PATH REVIEW-SERUM PROTEIN ELECTROPHORESIS: ABNORMAL
PROTEIN ELECTROPHORESIS COMMENT: ABNORMAL

## 2023-12-05 PROCEDURE — 85027 COMPLETE CBC AUTOMATED: CPT

## 2023-12-05 PROCEDURE — 1090000002 HH PPS REVENUE DEBIT

## 2023-12-05 PROCEDURE — 80048 BASIC METABOLIC PNL TOTAL CA: CPT

## 2023-12-05 PROCEDURE — 86140 C-REACTIVE PROTEIN: CPT

## 2023-12-05 PROCEDURE — 1090000001 HH PPS REVENUE CREDIT

## 2023-12-06 ENCOUNTER — TELEPHONE (OUTPATIENT)
Dept: PRIMARY CARE | Facility: CLINIC | Age: 88
End: 2023-12-06
Payer: MEDICARE

## 2023-12-06 PROCEDURE — 1090000002 HH PPS REVENUE DEBIT

## 2023-12-06 PROCEDURE — 1090000001 HH PPS REVENUE CREDIT

## 2023-12-06 NOTE — TELEPHONE ENCOUNTER
Called and spoke to pt in regards to a follow-up. Due to holidays and her just getting home from the [facility] after 6 weeks, the pt elected to follow-up with Missy Coburn PA-C on Jan 11, 2024. Pt is being followed closely by Dr. Whitley and this nurse notified her to call with any questions or concerns prior to appt.     Siena Loaiza LPN

## 2023-12-06 NOTE — TELEPHONE ENCOUNTER
Ena called and stated that ever since getting chemo she is having diarrhea. Whenever she eats anything it goes right through her. She tried Imodium D but it doesn't work.

## 2023-12-07 PROCEDURE — 1090000001 HH PPS REVENUE CREDIT

## 2023-12-07 PROCEDURE — 1090000002 HH PPS REVENUE DEBIT

## 2023-12-08 ENCOUNTER — OFFICE VISIT (OUTPATIENT)
Dept: PRIMARY CARE | Facility: CLINIC | Age: 88
End: 2023-12-08
Payer: MEDICARE

## 2023-12-08 VITALS
DIASTOLIC BLOOD PRESSURE: 68 MMHG | OXYGEN SATURATION: 98 % | HEART RATE: 90 BPM | HEIGHT: 60 IN | SYSTOLIC BLOOD PRESSURE: 138 MMHG | WEIGHT: 137 LBS | BODY MASS INDEX: 26.9 KG/M2

## 2023-12-08 DIAGNOSIS — K21.9 GASTROESOPHAGEAL REFLUX DISEASE WITHOUT ESOPHAGITIS: ICD-10-CM

## 2023-12-08 DIAGNOSIS — M19.90 ARTHRITIS: ICD-10-CM

## 2023-12-08 DIAGNOSIS — I12.9 BENIGN HYPERTENSION WITH CKD (CHRONIC KIDNEY DISEASE), STAGE II: Primary | ICD-10-CM

## 2023-12-08 DIAGNOSIS — M48.061 SPINAL STENOSIS OF LUMBAR REGION, UNSPECIFIED WHETHER NEUROGENIC CLAUDICATION PRESENT: ICD-10-CM

## 2023-12-08 DIAGNOSIS — N17.9 ACUTE KIDNEY INJURY SUPERIMPOSED ON CKD (CMS-HCC): ICD-10-CM

## 2023-12-08 DIAGNOSIS — N18.9 ACUTE KIDNEY INJURY SUPERIMPOSED ON CKD (CMS-HCC): ICD-10-CM

## 2023-12-08 DIAGNOSIS — I27.20 PULMONARY HYPERTENSION, UNSPECIFIED (MULTI): ICD-10-CM

## 2023-12-08 DIAGNOSIS — N18.2 BENIGN HYPERTENSION WITH CKD (CHRONIC KIDNEY DISEASE), STAGE II: Primary | ICD-10-CM

## 2023-12-08 DIAGNOSIS — I10 ESSENTIAL HYPERTENSION: ICD-10-CM

## 2023-12-08 DIAGNOSIS — C90.00 MULTIPLE MYELOMA, REMISSION STATUS UNSPECIFIED (MULTI): ICD-10-CM

## 2023-12-08 PROBLEM — T84.53XA INFECTION OF TOTAL RIGHT KNEE REPLACEMENT (CMS-HCC): Status: RESOLVED | Noted: 2023-11-06 | Resolved: 2023-12-08

## 2023-12-08 PROCEDURE — 1159F MED LIST DOCD IN RCRD: CPT | Performed by: INTERNAL MEDICINE

## 2023-12-08 PROCEDURE — 99214 OFFICE O/P EST MOD 30 MIN: CPT | Performed by: INTERNAL MEDICINE

## 2023-12-08 PROCEDURE — 1090000002 HH PPS REVENUE DEBIT

## 2023-12-08 PROCEDURE — 1090000001 HH PPS REVENUE CREDIT

## 2023-12-08 PROCEDURE — 3075F SYST BP GE 130 - 139MM HG: CPT | Performed by: INTERNAL MEDICINE

## 2023-12-08 PROCEDURE — 1126F AMNT PAIN NOTED NONE PRSNT: CPT | Performed by: INTERNAL MEDICINE

## 2023-12-08 PROCEDURE — 3078F DIAST BP <80 MM HG: CPT | Performed by: INTERNAL MEDICINE

## 2023-12-08 PROCEDURE — 1036F TOBACCO NON-USER: CPT | Performed by: INTERNAL MEDICINE

## 2023-12-08 PROCEDURE — 1160F RVW MEDS BY RX/DR IN RCRD: CPT | Performed by: INTERNAL MEDICINE

## 2023-12-08 RX ORDER — OMEPRAZOLE 40 MG/1
40 CAPSULE, DELAYED RELEASE ORAL DAILY
Qty: 90 CAPSULE | Refills: 1 | Status: SHIPPED | OUTPATIENT
Start: 2023-12-08

## 2023-12-08 RX ORDER — AMLODIPINE BESYLATE 5 MG/1
5 TABLET ORAL DAILY
Qty: 90 TABLET | Refills: 1 | Status: SHIPPED | OUTPATIENT
Start: 2023-12-08

## 2023-12-08 NOTE — PROGRESS NOTES
Chief complaint   Hospital follow up  Refills  Medication questions    HPI  Recent issues with diarrhea, no w resolved.      ROS  Gen-no wt loss,wt gain,fevers,apnea at night,snoring  HEENT- no ear pain, no oral pain, no hearing loss no sinus pain or drainage  Pulm- no sob,weezing,cough,hemoptysis,productive mucous  Cardio-no cp,dash,orthopnea,palpitations  Gi- no diarrhea,constipation,n,v,hematemesis,dysphagia,change in bowel habits  Endo- no polydipsia,polyuria,wt gain,extreme fatigue,polyphagia,blurred vision  Neuro-no ha,loc,paresthesias,transient limb weakness,acute memory loss,visual changes  Heme- no blood loss,transfusions,unusual bleeding from skin and/or mucosa  Urology- no dysuria,hematuria,urinary frequency,bladder pain  Gyn- no pelvic pain,vaginal dc,vaginal bleeding,  Psych- no depression,mood swings,anxiety,insomnia,hallucinations  Skin- no rash,new discolorations  Musculoskeletal- no joint aches / pains,muscle pains,new back pain  Vascular- no pain in calve w walkin,discolored painful toes      Vitals:    12/08/23 1247   BP: 138/68   Pulse: 90   SpO2: 98%       Physical Exam  NAD  HEENT- nl ear tm bilat,nl pharynx, mucosa moist,no icterus  Lungs clear, good AE, no wheezing  Heart-normal S1-S2 no murmur gallop rub, rhythm normal   Neck-no JVD, thyroid normal, no carotid bruits, no LN  Neuro alert and oriented ×3, cranial nerves II through XII normal, motor function symmetrical,sensory exam symmetrical ,gait nl    Assessment/Plan   Diagnoses and all orders for this visit:  Benign hypertension with CKD (chronic kidney disease), stage II  Gastroesophageal reflux disease without esophagitis  Essential hypertension  Pulmonary hypertension, unspecified (CMS/HCC)  Acute kidney injury superimposed on CKD (CMS/HCC)  Multiple myeloma, remission status unspecified (CMS/[facility])  Arthritis  Spinal stenosis of lumbar region, unspecified whether neurogenic claudication present    Return in 1 month

## 2023-12-09 PROCEDURE — 1090000001 HH PPS REVENUE CREDIT

## 2023-12-09 PROCEDURE — 1090000002 HH PPS REVENUE DEBIT

## 2023-12-10 PROCEDURE — 1090000002 HH PPS REVENUE DEBIT

## 2023-12-10 PROCEDURE — 1090000001 HH PPS REVENUE CREDIT

## 2023-12-11 ENCOUNTER — LAB (OUTPATIENT)
Dept: LAB | Facility: LAB | Age: 88
End: 2023-12-11
Payer: MEDICARE

## 2023-12-11 DIAGNOSIS — R19.7 DIARRHEA, UNSPECIFIED TYPE: Primary | ICD-10-CM

## 2023-12-11 DIAGNOSIS — R19.7 DIARRHEA, UNSPECIFIED TYPE: ICD-10-CM

## 2023-12-11 PROCEDURE — 1090000001 HH PPS REVENUE CREDIT

## 2023-12-11 PROCEDURE — 87493 C DIFF AMPLIFIED PROBE: CPT

## 2023-12-11 PROCEDURE — 1090000002 HH PPS REVENUE DEBIT

## 2023-12-12 ENCOUNTER — TELEPHONE (OUTPATIENT)
Dept: PRIMARY CARE | Facility: CLINIC | Age: 88
End: 2023-12-12
Payer: MEDICARE

## 2023-12-12 DIAGNOSIS — A04.72 C. DIFFICILE DIARRHEA: Primary | ICD-10-CM

## 2023-12-12 LAB — C DIF TOX TCDA+TCDB STL QL NAA+PROBE: DETECTED

## 2023-12-12 PROCEDURE — 1090000002 HH PPS REVENUE DEBIT

## 2023-12-12 PROCEDURE — 1090000001 HH PPS REVENUE CREDIT

## 2023-12-12 RX ORDER — VANCOMYCIN HYDROCHLORIDE 125 MG/1
125 CAPSULE ORAL 4 TIMES DAILY
Status: SHIPPED | OUTPATIENT
Start: 2023-12-12

## 2023-12-12 NOTE — TELEPHONE ENCOUNTER
Called patient to discuss positive cdiff result.  She is well , does have multiple loose diarrheal movements per day without blood and no fever or abd pain. Last treated with vancomycin for cdiff 12 days aga.  Advised an additional 10 ds=day course.

## 2023-12-13 ENCOUNTER — TELEPHONE (OUTPATIENT)
Dept: HEMATOLOGY/ONCOLOGY | Facility: CLINIC | Age: 88
End: 2023-12-13
Payer: MEDICARE

## 2023-12-13 PROCEDURE — 1090000002 HH PPS REVENUE DEBIT

## 2023-12-13 PROCEDURE — 1090000001 HH PPS REVENUE CREDIT

## 2023-12-13 NOTE — TELEPHONE ENCOUNTER
Patient phoned that she is starting treatment today for c.diff positive stool; cancelled MD visit and treatment for tomorrow and will reschedule her after she is done with ATB and stool has become formed.  She will keep me updated on how she's doing for rescheduling.

## 2023-12-14 ENCOUNTER — APPOINTMENT (OUTPATIENT)
Dept: HEMATOLOGY/ONCOLOGY | Facility: CLINIC | Age: 88
End: 2023-12-14
Payer: MEDICARE

## 2023-12-14 PROCEDURE — 1090000001 HH PPS REVENUE CREDIT

## 2023-12-14 PROCEDURE — 1090000002 HH PPS REVENUE DEBIT

## 2023-12-15 ENCOUNTER — TELEPHONE (OUTPATIENT)
Dept: PRIMARY CARE | Facility: CLINIC | Age: 88
End: 2023-12-15
Payer: MEDICARE

## 2023-12-19 ENCOUNTER — HOSPITAL ENCOUNTER (OUTPATIENT)
Dept: CARDIOLOGY | Facility: HOSPITAL | Age: 88
Discharge: HOME | End: 2023-12-19
Payer: MEDICARE

## 2023-12-19 LAB
ATRIAL RATE: 65 BPM
P AXIS: 77 DEGREES
P OFFSET: 130 MS
P ONSET: 95 MS
PR INTERVAL: 232 MS
Q ONSET: 211 MS
QRS COUNT: 11 BEATS
QRS DURATION: 94 MS
QT INTERVAL: 370 MS
QTC CALCULATION(BAZETT): 384 MS
QTC FREDERICIA: 379 MS
R AXIS: -37 DEGREES
T AXIS: 9 DEGREES
T OFFSET: 396 MS
VENTRICULAR RATE: 65 BPM

## 2023-12-19 PROCEDURE — 93005 ELECTROCARDIOGRAM TRACING: CPT

## 2023-12-20 DIAGNOSIS — I10 ESSENTIAL HYPERTENSION: ICD-10-CM

## 2023-12-21 ENCOUNTER — DOCUMENTATION (OUTPATIENT)
Dept: PHYSICAL THERAPY | Facility: CLINIC | Age: 88
End: 2023-12-21
Payer: MEDICARE

## 2023-12-21 NOTE — PROGRESS NOTES
Physical Therapy    Discharge Summary    Name: Bindu Cali  MRN: [id number]  : 10/14/1932  Date: 23    Discharge Summary: PT    Discharge Information: Date of discharge 23, Date of last visit 10/2/23, Date of evaluation 23, Number of attended visits 14, Referred by Missy Coburn, and Referred for s/p leg surgery    Therapy Summary: Pt was progressing well in therapy but was then hospitalized due to wound infection.  Chart review revealed patient has been in SNF and receiving PT in facility.     Discharge Status:  unknown     Rehab Discharge Reason: Other see above

## 2023-12-22 NOTE — TELEPHONE ENCOUNTER
Patient phoned back today; diarrhea has been gone for over a week and she finished her ATB over the weekend. Advised that as long as the diarrhea  does not return after stopping ATB she is ok to come in next week for her treatment.

## 2023-12-27 ENCOUNTER — OFFICE VISIT (OUTPATIENT)
Dept: ORTHOPEDIC SURGERY | Facility: CLINIC | Age: 88
End: 2023-12-27
Payer: MEDICARE

## 2023-12-27 DIAGNOSIS — G56.02 CARPAL TUNNEL SYNDROME ON LEFT: Primary | ICD-10-CM

## 2023-12-27 PROCEDURE — 99213 OFFICE O/P EST LOW 20 MIN: CPT | Performed by: ORTHOPAEDIC SURGERY

## 2023-12-27 PROCEDURE — 1160F RVW MEDS BY RX/DR IN RCRD: CPT | Performed by: ORTHOPAEDIC SURGERY

## 2023-12-27 PROCEDURE — 1159F MED LIST DOCD IN RCRD: CPT | Performed by: ORTHOPAEDIC SURGERY

## 2023-12-27 PROCEDURE — 1126F AMNT PAIN NOTED NONE PRSNT: CPT | Performed by: ORTHOPAEDIC SURGERY

## 2023-12-27 PROCEDURE — 1036F TOBACCO NON-USER: CPT | Performed by: ORTHOPAEDIC SURGERY

## 2023-12-27 NOTE — PROGRESS NOTES
Subjective    Patient ID: Bindu Cali is a 89 y.o. female.    Chief Complaint: OTHER (F/U LT HAND)     Last Surgery: No surgery found  Last Surgery Date: No surgery found    HPI  Patient comes in for follow-up of her left carpal tunnel syndrome.  She had undergone a Kenalog injection for approximately 1 month ago by the nurse practitioner.  She states that while it helped with the nighttime pain and she can sleep through the night, she still notices paresthesias in the day.  She will notice herself occasionally dropping objects.    Objective   Ortho Exam  Patient is in no acute distress.  Exam of her left hand and wrist reveals her skin envelope is intact.  There is severe thenar atrophy.  She has a negative carpal tunnel compression test but a positive Phalen test and a positive Tinel's test.  She has adequate range of motion in her fingers.    Assessment/Plan   Encounter Diagnoses:  Carpal tunnel syndrome on left    Patient has known carpal tunnel syndrome with severe thenar atrophy.  I explained to the patient that another injection would be too soon and likely no guarantee to decrease the daytime paresthesias.  I explained surgery can be considered but again given the clinical severity of her disease there is no guarantee she would have returned to normal function.  At this time she will likely undergo another injection in about 3 months.  She will follow-up as her symptoms dictate.

## 2023-12-28 ENCOUNTER — LAB (OUTPATIENT)
Dept: LAB | Facility: CLINIC | Age: 88
End: 2023-12-28
Payer: MEDICARE

## 2023-12-28 ENCOUNTER — INFUSION (OUTPATIENT)
Dept: HEMATOLOGY/ONCOLOGY | Facility: CLINIC | Age: 88
End: 2023-12-28
Payer: MEDICARE

## 2023-12-28 VITALS
RESPIRATION RATE: 20 BRPM | OXYGEN SATURATION: 97 % | DIASTOLIC BLOOD PRESSURE: 64 MMHG | HEART RATE: 70 BPM | SYSTOLIC BLOOD PRESSURE: 134 MMHG | TEMPERATURE: 97.5 F | BODY MASS INDEX: 25.4 KG/M2 | WEIGHT: 130.07 LBS

## 2023-12-28 DIAGNOSIS — C90.00 MULTIPLE MYELOMA, REMISSION STATUS UNSPECIFIED (MULTI): ICD-10-CM

## 2023-12-28 DIAGNOSIS — C90.00 MULTIPLE MYELOMA NOT HAVING ACHIEVED REMISSION (MULTI): ICD-10-CM

## 2023-12-28 LAB
ALBUMIN SERPL BCP-MCNC: 3.3 G/DL (ref 3.4–5)
ALP SERPL-CCNC: 65 U/L (ref 33–136)
ALT SERPL W P-5'-P-CCNC: 9 U/L (ref 7–45)
ANION GAP SERPL CALC-SCNC: 16 MMOL/L (ref 10–20)
AST SERPL W P-5'-P-CCNC: 12 U/L (ref 9–39)
B2 MICROGLOB SERPL-MCNC: 10 MG/L (ref 0.7–2.2)
BASOPHILS # BLD AUTO: 0.05 X10*3/UL (ref 0–0.1)
BASOPHILS NFR BLD AUTO: 0.7 %
BILIRUB SERPL-MCNC: 0.3 MG/DL (ref 0–1.2)
BUN SERPL-MCNC: 45 MG/DL (ref 6–23)
CALCIUM SERPL-MCNC: 10.7 MG/DL (ref 8.6–10.3)
CHLORIDE SERPL-SCNC: 102 MMOL/L (ref 98–107)
CO2 SERPL-SCNC: 25 MMOL/L (ref 21–32)
CREAT SERPL-MCNC: 1.7 MG/DL (ref 0.5–1.05)
EOSINOPHIL # BLD AUTO: 0.33 X10*3/UL (ref 0–0.4)
EOSINOPHIL NFR BLD AUTO: 4.3 %
ERYTHROCYTE [DISTWIDTH] IN BLOOD BY AUTOMATED COUNT: 17.8 % (ref 11.5–14.5)
GFR SERPL CREATININE-BSD FRML MDRD: 28 ML/MIN/1.73M*2
GLUCOSE SERPL-MCNC: 109 MG/DL (ref 74–99)
HCT VFR BLD AUTO: 27.5 % (ref 36–46)
HGB BLD-MCNC: 8.4 G/DL (ref 12–16)
IGA SERPL-MCNC: 1560 MG/DL (ref 70–400)
IGG SERPL-MCNC: 456 MG/DL (ref 700–1600)
IGM SERPL-MCNC: 11 MG/DL (ref 40–230)
IMM GRANULOCYTES # BLD AUTO: 0.02 X10*3/UL (ref 0–0.5)
IMM GRANULOCYTES NFR BLD AUTO: 0.3 % (ref 0–0.9)
LYMPHOCYTES # BLD AUTO: 1.69 X10*3/UL (ref 0.8–3)
LYMPHOCYTES NFR BLD AUTO: 22.1 %
MCH RBC QN AUTO: 29.3 PG (ref 26–34)
MCHC RBC AUTO-ENTMCNC: 30.5 G/DL (ref 32–36)
MCV RBC AUTO: 96 FL (ref 80–100)
MONOCYTES # BLD AUTO: 0.57 X10*3/UL (ref 0.05–0.8)
MONOCYTES NFR BLD AUTO: 7.5 %
NEUTROPHILS # BLD AUTO: 4.98 X10*3/UL (ref 1.6–5.5)
NEUTROPHILS NFR BLD AUTO: 65.1 %
NRBC BLD-RTO: 0 /100 WBCS (ref 0–0)
PLATELET # BLD AUTO: 404 X10*3/UL (ref 150–450)
POTASSIUM SERPL-SCNC: 4 MMOL/L (ref 3.5–5.3)
PROT SERPL-MCNC: 6.6 G/DL (ref 6.4–8.2)
PROT SERPL-MCNC: 6.7 G/DL (ref 6.4–8.2)
RBC # BLD AUTO: 2.87 X10*6/UL (ref 4–5.2)
SODIUM SERPL-SCNC: 139 MMOL/L (ref 136–145)
WBC # BLD AUTO: 7.6 X10*3/UL (ref 4.4–11.3)

## 2023-12-28 PROCEDURE — 2500000004 HC RX 250 GENERAL PHARMACY W/ HCPCS (ALT 636 FOR OP/ED): Performed by: INTERNAL MEDICINE

## 2023-12-28 PROCEDURE — 86320 SERUM IMMUNOELECTROPHORESIS: CPT | Performed by: INTERNAL MEDICINE

## 2023-12-28 PROCEDURE — 82784 ASSAY IGA/IGD/IGG/IGM EACH: CPT | Mod: PARLAB | Performed by: INTERNAL MEDICINE

## 2023-12-28 PROCEDURE — 96401 CHEMO ANTI-NEOPL SQ/IM: CPT

## 2023-12-28 PROCEDURE — 84165 PROTEIN E-PHORESIS SERUM: CPT | Performed by: INTERNAL MEDICINE

## 2023-12-28 PROCEDURE — 80053 COMPREHEN METABOLIC PANEL: CPT | Performed by: INTERNAL MEDICINE

## 2023-12-28 PROCEDURE — 86334 IMMUNOFIX E-PHORESIS SERUM: CPT | Mod: PARLAB | Performed by: INTERNAL MEDICINE

## 2023-12-28 PROCEDURE — 84155 ASSAY OF PROTEIN SERUM: CPT | Mod: PARLAB | Performed by: INTERNAL MEDICINE

## 2023-12-28 PROCEDURE — 2500000001 HC RX 250 WO HCPCS SELF ADMINISTERED DRUGS (ALT 637 FOR MEDICARE OP): Performed by: INTERNAL MEDICINE

## 2023-12-28 PROCEDURE — 85025 COMPLETE CBC W/AUTO DIFF WBC: CPT | Performed by: INTERNAL MEDICINE

## 2023-12-28 PROCEDURE — 82232 ASSAY OF BETA-2 PROTEIN: CPT | Mod: PARLAB | Performed by: INTERNAL MEDICINE

## 2023-12-28 PROCEDURE — 83521 IG LIGHT CHAINS FREE EACH: CPT | Mod: PARLAB | Performed by: INTERNAL MEDICINE

## 2023-12-28 RX ORDER — PROCHLORPERAZINE MALEATE 10 MG
10 TABLET ORAL ONCE
Status: COMPLETED | OUTPATIENT
Start: 2023-12-28 | End: 2023-12-28

## 2023-12-28 RX ORDER — BORTEZOMIB 3.5 MG/1
1.3 INJECTION, POWDER, LYOPHILIZED, FOR SOLUTION INTRAVENOUS; SUBCUTANEOUS ONCE
Status: COMPLETED | OUTPATIENT
Start: 2023-12-28 | End: 2023-12-28

## 2023-12-28 RX ADMIN — PROCHLORPERAZINE MALEATE 10 MG: 10 TABLET ORAL at 12:54

## 2023-12-28 RX ADMIN — BORTEZOMIB 2.12 MG: 1 INJECTION, POWDER, LYOPHILIZED, FOR SOLUTION INTRAVENOUS; SUBCUTANEOUS at 13:13

## 2023-12-28 ASSESSMENT — PAIN SCALES - GENERAL: PAINLEVEL: 0-NO PAIN

## 2023-12-29 LAB
KAPPA LC SERPL-MCNC: 34.56 MG/DL (ref 0.33–1.94)
KAPPA LC/LAMBDA SER: 23.51 {RATIO} (ref 0.26–1.65)
LAMBDA LC SERPL-MCNC: 1.47 MG/DL (ref 0.57–2.63)

## 2024-01-03 ENCOUNTER — APPOINTMENT (OUTPATIENT)
Dept: UROLOGY | Facility: CLINIC | Age: 89
End: 2024-01-03
Payer: MEDICARE

## 2024-01-03 LAB
ALBUMIN: 3.2 G/DL (ref 3.4–5)
ALPHA 1 GLOBULIN: 0.4 G/DL (ref 0.2–0.6)
ALPHA 2 GLOBULIN: 0.8 G/DL (ref 0.4–1.1)
BETA GLOBULIN: 2 G/DL (ref 0.5–1.2)
GAMMA GLOBULIN: 0.4 G/DL (ref 0.5–1.4)
IMMUNOFIXATION COMMENT: ABNORMAL
M-PROTEIN 1: 1.2 G/DL
PATH REVIEW - SERUM IMMUNOFIXATION: ABNORMAL
PATH REVIEW-SERUM PROTEIN ELECTROPHORESIS: ABNORMAL
PROTEIN ELECTROPHORESIS COMMENT: ABNORMAL

## 2024-01-04 PROBLEM — J18.9 PNEUMONIA: Status: RESOLVED | Noted: 2022-10-27 | Resolved: 2024-01-04

## 2024-01-04 PROBLEM — M86.9 OSTEOMYELITIS (MULTI): Status: RESOLVED | Noted: 2023-10-06 | Resolved: 2024-01-04

## 2024-01-04 PROBLEM — M25.532 PAIN IN BOTH WRISTS: Status: RESOLVED | Noted: 2024-01-04 | Resolved: 2024-01-04

## 2024-01-04 PROBLEM — B02.9 HERPES ZOSTER: Status: RESOLVED | Noted: 2024-01-04 | Resolved: 2024-01-04

## 2024-01-04 PROBLEM — B96.89 ACUTE BACTERIAL BRONCHITIS: Status: RESOLVED | Noted: 2024-01-04 | Resolved: 2024-01-04

## 2024-01-04 PROBLEM — N17.9 ACUTE KIDNEY INJURY (CMS-HCC): Status: RESOLVED | Noted: 2022-10-14 | Resolved: 2024-01-04

## 2024-01-04 PROBLEM — Z66 DO NOT RESUSCITATE: Status: RESOLVED | Noted: 2022-10-31 | Resolved: 2024-01-04

## 2024-01-04 PROBLEM — L03.011 PARONYCHIA OF FINGER OF RIGHT HAND: Status: RESOLVED | Noted: 2023-10-06 | Resolved: 2024-01-04

## 2024-01-04 PROBLEM — M75.102 NONTRAUMATIC TEAR OF LEFT ROTATOR CUFF: Status: RESOLVED | Noted: 2024-01-04 | Resolved: 2024-01-04

## 2024-01-04 PROBLEM — R05.3 CHRONIC COUGH: Status: RESOLVED | Noted: 2024-01-04 | Resolved: 2024-01-04

## 2024-01-04 PROBLEM — J30.9 ALLERGIC RHINITIS: Status: RESOLVED | Noted: 2024-01-04 | Resolved: 2024-01-04

## 2024-01-04 PROBLEM — J20.8 ACUTE BACTERIAL BRONCHITIS: Status: RESOLVED | Noted: 2024-01-04 | Resolved: 2024-01-04

## 2024-01-04 PROBLEM — M25.531 PAIN IN BOTH WRISTS: Status: RESOLVED | Noted: 2024-01-04 | Resolved: 2024-01-04

## 2024-01-04 PROBLEM — Z98.890 HISTORY OF COLONOSCOPY: Status: RESOLVED | Noted: 2024-01-04 | Resolved: 2024-01-04

## 2024-01-04 PROBLEM — R14.0 ABDOMINAL BLOATING: Status: RESOLVED | Noted: 2024-01-04 | Resolved: 2024-01-04

## 2024-01-04 PROBLEM — N95.9 MENOPAUSAL PROBLEM: Status: RESOLVED | Noted: 2024-01-04 | Resolved: 2024-01-04

## 2024-01-04 PROBLEM — Z22.321: Status: RESOLVED | Noted: 2024-01-04 | Resolved: 2024-01-04

## 2024-01-04 PROBLEM — E05.80 IATROGENIC THYROTOXICOSIS: Status: RESOLVED | Noted: 2022-10-31 | Resolved: 2024-01-04

## 2024-01-04 PROBLEM — B96.89 ACUTE BACTERIAL SINUSITIS: Status: RESOLVED | Noted: 2024-01-04 | Resolved: 2024-01-04

## 2024-01-04 PROBLEM — T84.60XD: Status: RESOLVED | Noted: 2023-11-03 | Resolved: 2024-01-04

## 2024-01-04 PROBLEM — N13.9 OBSTRUCTIVE AND REFLUX UROPATHY, UNSPECIFIED: Status: RESOLVED | Noted: 2023-11-08 | Resolved: 2024-01-04

## 2024-01-04 PROBLEM — J01.90 ACUTE BACTERIAL SINUSITIS: Status: RESOLVED | Noted: 2024-01-04 | Resolved: 2024-01-04

## 2024-01-04 PROBLEM — I82.409 DEEP VEIN THROMBOSIS (DVT) (MULTI): Status: RESOLVED | Noted: 2024-01-04 | Resolved: 2024-01-04

## 2024-01-04 PROBLEM — J98.8 CONGESTION OF RESPIRATORY TRACT: Status: RESOLVED | Noted: 2024-01-04 | Resolved: 2024-01-04

## 2024-01-04 PROBLEM — S01.81XA FACIAL LACERATION: Status: RESOLVED | Noted: 2024-01-04 | Resolved: 2024-01-04

## 2024-01-04 PROBLEM — Z20.822 CONTACT WITH AND (SUSPECTED) EXPOSURE TO COVID-19: Status: RESOLVED | Noted: 2022-10-31 | Resolved: 2024-01-04

## 2024-01-04 PROBLEM — E87.1 HYPONATREMIA: Status: RESOLVED | Noted: 2022-10-31 | Resolved: 2024-01-04

## 2024-01-04 PROBLEM — S80.00XA CONTUSION OF KNEE: Status: RESOLVED | Noted: 2024-01-04 | Resolved: 2024-01-04

## 2024-01-04 PROBLEM — M77.12 LATERAL EPICONDYLITIS OF LEFT ELBOW: Status: RESOLVED | Noted: 2024-01-04 | Resolved: 2024-01-04

## 2024-01-04 PROBLEM — M77.8 TENDINITIS OF RIGHT ELBOW: Status: RESOLVED | Noted: 2024-01-04 | Resolved: 2024-01-04

## 2024-01-04 PROBLEM — N17.9 ACUTE KIDNEY FAILURE, UNSPECIFIED (CMS-HCC): Status: RESOLVED | Noted: 2023-11-03 | Resolved: 2024-01-04

## 2024-01-04 PROBLEM — A04.72 ENTEROCOLITIS DUE TO CLOSTRIDIUM DIFFICILE, NOT SPECIFIED AS RECURRENT: Status: RESOLVED | Noted: 2023-11-03 | Resolved: 2024-01-04

## 2024-01-05 ENCOUNTER — OFFICE VISIT (OUTPATIENT)
Dept: PRIMARY CARE | Facility: CLINIC | Age: 89
End: 2024-01-05
Payer: MEDICARE

## 2024-01-05 VITALS
SYSTOLIC BLOOD PRESSURE: 130 MMHG | HEIGHT: 60 IN | DIASTOLIC BLOOD PRESSURE: 70 MMHG | BODY MASS INDEX: 26.7 KG/M2 | OXYGEN SATURATION: 98 % | HEART RATE: 77 BPM | WEIGHT: 136 LBS

## 2024-01-05 DIAGNOSIS — D63.1 ANEMIA DUE TO STAGE 3B CHRONIC KIDNEY DISEASE (MULTI): ICD-10-CM

## 2024-01-05 DIAGNOSIS — N18.2 BENIGN HYPERTENSION WITH CKD (CHRONIC KIDNEY DISEASE), STAGE II: Primary | ICD-10-CM

## 2024-01-05 DIAGNOSIS — K21.9 GASTROESOPHAGEAL REFLUX DISEASE WITHOUT ESOPHAGITIS: ICD-10-CM

## 2024-01-05 DIAGNOSIS — N18.32 ANEMIA DUE TO STAGE 3B CHRONIC KIDNEY DISEASE (MULTI): ICD-10-CM

## 2024-01-05 DIAGNOSIS — I12.9 BENIGN HYPERTENSION WITH CKD (CHRONIC KIDNEY DISEASE), STAGE II: Primary | ICD-10-CM

## 2024-01-05 DIAGNOSIS — C90.00 MULTIPLE MYELOMA, REMISSION STATUS UNSPECIFIED (MULTI): ICD-10-CM

## 2024-01-05 DIAGNOSIS — E55.9 VITAMIN D DEFICIENCY: ICD-10-CM

## 2024-01-05 DIAGNOSIS — I27.20 PULMONARY HYPERTENSION, UNSPECIFIED (MULTI): ICD-10-CM

## 2024-01-05 DIAGNOSIS — M48.061 SPINAL STENOSIS OF LUMBAR REGION, UNSPECIFIED WHETHER NEUROGENIC CLAUDICATION PRESENT: ICD-10-CM

## 2024-01-05 DIAGNOSIS — N18.9 ACUTE KIDNEY INJURY SUPERIMPOSED ON CKD (CMS-HCC): ICD-10-CM

## 2024-01-05 DIAGNOSIS — N17.9 ACUTE KIDNEY INJURY SUPERIMPOSED ON CKD (CMS-HCC): ICD-10-CM

## 2024-01-05 PROBLEM — R33.8 ACUTE URINARY RETENTION: Status: RESOLVED | Noted: 2023-11-03 | Resolved: 2024-01-05

## 2024-01-05 PROBLEM — E87.6 HYPOKALEMIA: Status: RESOLVED | Noted: 2023-11-03 | Resolved: 2024-01-05

## 2024-01-05 PROBLEM — M46.1 SACROILIITIS (CMS-HCC): Status: RESOLVED | Noted: 2023-02-01 | Resolved: 2024-01-05

## 2024-01-05 PROBLEM — E83.42 HYPOMAGNESEMIA: Status: RESOLVED | Noted: 2023-11-03 | Resolved: 2024-01-05

## 2024-01-05 PROCEDURE — 1126F AMNT PAIN NOTED NONE PRSNT: CPT | Performed by: INTERNAL MEDICINE

## 2024-01-05 PROCEDURE — 99214 OFFICE O/P EST MOD 30 MIN: CPT | Performed by: INTERNAL MEDICINE

## 2024-01-05 PROCEDURE — 3078F DIAST BP <80 MM HG: CPT | Performed by: INTERNAL MEDICINE

## 2024-01-05 PROCEDURE — 3075F SYST BP GE 130 - 139MM HG: CPT | Performed by: INTERNAL MEDICINE

## 2024-01-05 PROCEDURE — 1036F TOBACCO NON-USER: CPT | Performed by: INTERNAL MEDICINE

## 2024-01-05 PROCEDURE — 1160F RVW MEDS BY RX/DR IN RCRD: CPT | Performed by: INTERNAL MEDICINE

## 2024-01-05 PROCEDURE — 1159F MED LIST DOCD IN RCRD: CPT | Performed by: INTERNAL MEDICINE

## 2024-01-05 RX ORDER — GABAPENTIN 100 MG/1
300 CAPSULE ORAL NIGHTLY
Qty: 270 CAPSULE | Refills: 1 | Status: SHIPPED | OUTPATIENT
Start: 2024-01-05 | End: 2024-06-05 | Stop reason: SDUPTHER

## 2024-01-05 NOTE — PROGRESS NOTES
Chief complaint   Follow up  Med refill    HPI  Legs weak, balance not confident.  Uses walker  Receiving chemo and has small bouts of diarrhea afterwards, but none since. No pain      ROS  Gen-no wt loss,wt gain,fevers,apnea at night,snoring  HEENT- no ear pain, no oral pain, no hearing loss no sinus pain or drainage  Pulm- no sob,weezing,cough,hemoptysis,productive mucous  Cardio-no cp,dash,orthopnea,palpitations  Gi- no diarrhea,constipation,n,v,hematemesis,dysphagia,change in bowel habits  Endo- no polydipsia,polyuria,wt gain,extreme fatigue,polyphagia,blurred vision  Neuro-no ha,loc,paresthesias,transient limb weakness,acute memory loss,visual changes  Heme- no blood loss,transfusions,unusual bleeding from skin and/or mucosa  Urology- no dysuria,hematuria,urinary frequency,bladder pain  Gyn- no pelvic pain,vaginal dc,vaginal bleeding,  Psych- no depression,mood swings,anxiety,insomnia,hallucinations  Skin- no rash,new discolorations      Vitals:    01/05/24 1301   BP: 130/70   Pulse:    SpO2:        Physical Exam  NAD  Lungs clear, good AE, no wheezing  Heart-normal S1-S2 no murmur gallop rub, rhythm normal  Abdomen- soft, nontender, pos bowel sounds, Neck-no JVD, thyroid normal, no carotid bruits, no LN  Neuro alert and oriented ×3, cranial nerves II through XII normal, Extrem- no edema, no tenderness, no cords  Skin- nl turgor, no ulcers,no rash         Assessment/Plan   Diagnoses and all orders for this visit:  Benign hypertension with CKD (chronic kidney disease), stage II  Spinal stenosis of lumbar region, unspecified whether neurogenic claudication present  Pulmonary hypertension, unspecified (CMS/[facility])  Vitamin D deficiency  Gastroesophageal reflux disease without esophagitis  Acute kidney injury superimposed on CKD (CMS/[facility])  Multiple myeloma, remission status unspecified (CMS/[facility])  Anemia due to stage 3b chronic kidney disease (CMS/HCC)    Lab draw in several days

## 2024-01-11 ENCOUNTER — OFFICE VISIT (OUTPATIENT)
Dept: ORTHOPEDIC SURGERY | Facility: CLINIC | Age: 89
End: 2024-01-11
Payer: MEDICARE

## 2024-01-11 ENCOUNTER — LAB (OUTPATIENT)
Dept: LAB | Facility: CLINIC | Age: 89
End: 2024-01-11
Payer: MEDICARE

## 2024-01-11 ENCOUNTER — ANCILLARY PROCEDURE (OUTPATIENT)
Dept: RADIOLOGY | Facility: CLINIC | Age: 89
End: 2024-01-11
Payer: MEDICARE

## 2024-01-11 ENCOUNTER — INFUSION (OUTPATIENT)
Dept: HEMATOLOGY/ONCOLOGY | Facility: CLINIC | Age: 89
End: 2024-01-11
Payer: MEDICARE

## 2024-01-11 VITALS
SYSTOLIC BLOOD PRESSURE: 153 MMHG | HEART RATE: 59 BPM | BODY MASS INDEX: 27.04 KG/M2 | TEMPERATURE: 97.9 F | WEIGHT: 138.45 LBS | DIASTOLIC BLOOD PRESSURE: 56 MMHG | OXYGEN SATURATION: 97 % | RESPIRATION RATE: 18 BRPM

## 2024-01-11 VITALS — HEIGHT: 60 IN | BODY MASS INDEX: 27.09 KG/M2 | WEIGHT: 138 LBS

## 2024-01-11 DIAGNOSIS — M86.9 OSTEOMYELITIS, UNSPECIFIED SITE, UNSPECIFIED TYPE (MULTI): ICD-10-CM

## 2024-01-11 DIAGNOSIS — M97.8XXD PERIPROSTHETIC FRACTURE OF PROXIMAL END OF TIBIA, SUBSEQUENT ENCOUNTER: ICD-10-CM

## 2024-01-11 DIAGNOSIS — Z96.659 PERIPROSTHETIC FRACTURE OF PROXIMAL END OF TIBIA, SUBSEQUENT ENCOUNTER: ICD-10-CM

## 2024-01-11 DIAGNOSIS — M86.169 ACUTE OSTEOMYELITIS OF TIBIA (MULTI): ICD-10-CM

## 2024-01-11 DIAGNOSIS — C90.00 MULTIPLE MYELOMA, REMISSION STATUS UNSPECIFIED (MULTI): ICD-10-CM

## 2024-01-11 DIAGNOSIS — L02.415 CELLULITIS AND ABSCESS OF RIGHT LOWER EXTREMITY: ICD-10-CM

## 2024-01-11 DIAGNOSIS — T81.49XA SURGICAL SITE INFECTION: Primary | ICD-10-CM

## 2024-01-11 DIAGNOSIS — L03.115 CELLULITIS AND ABSCESS OF RIGHT LOWER EXTREMITY: ICD-10-CM

## 2024-01-11 LAB
ANION GAP SERPL CALC-SCNC: 13 MMOL/L (ref 10–20)
BASOPHILS # BLD AUTO: 0.04 X10*3/UL (ref 0–0.1)
BASOPHILS NFR BLD AUTO: 0.5 %
BUN SERPL-MCNC: 30 MG/DL (ref 6–23)
CALCIUM SERPL-MCNC: 9.9 MG/DL (ref 8.6–10.3)
CHLORIDE SERPL-SCNC: 108 MMOL/L (ref 98–107)
CO2 SERPL-SCNC: 22 MMOL/L (ref 21–32)
CREAT SERPL-MCNC: 1.24 MG/DL (ref 0.5–1.05)
EGFRCR SERPLBLD CKD-EPI 2021: 41 ML/MIN/1.73M*2
EOSINOPHIL # BLD AUTO: 0.54 X10*3/UL (ref 0–0.4)
EOSINOPHIL NFR BLD AUTO: 6.3 %
ERYTHROCYTE [DISTWIDTH] IN BLOOD BY AUTOMATED COUNT: 17.7 % (ref 11.5–14.5)
GLUCOSE SERPL-MCNC: 103 MG/DL (ref 74–99)
HCT VFR BLD AUTO: 26.8 % (ref 36–46)
HGB BLD-MCNC: 8.3 G/DL (ref 12–16)
IMM GRANULOCYTES # BLD AUTO: 0.03 X10*3/UL (ref 0–0.5)
IMM GRANULOCYTES NFR BLD AUTO: 0.3 % (ref 0–0.9)
LYMPHOCYTES # BLD AUTO: 1.73 X10*3/UL (ref 0.8–3)
LYMPHOCYTES NFR BLD AUTO: 20 %
MCH RBC QN AUTO: 29.4 PG (ref 26–34)
MCHC RBC AUTO-ENTMCNC: 31 G/DL (ref 32–36)
MCV RBC AUTO: 95 FL (ref 80–100)
MONOCYTES # BLD AUTO: 0.67 X10*3/UL (ref 0.05–0.8)
MONOCYTES NFR BLD AUTO: 7.8 %
NEUTROPHILS # BLD AUTO: 5.62 X10*3/UL (ref 1.6–5.5)
NEUTROPHILS NFR BLD AUTO: 65.1 %
NRBC BLD-RTO: 0 /100 WBCS (ref 0–0)
PLATELET # BLD AUTO: 391 X10*3/UL (ref 150–450)
PMV BLD AUTO: 9.3 FL (ref 7.5–11.5)
POTASSIUM SERPL-SCNC: 4 MMOL/L (ref 3.5–5.3)
RBC # BLD AUTO: 2.82 X10*6/UL (ref 4–5.2)
SODIUM SERPL-SCNC: 139 MMOL/L (ref 136–145)
WBC # BLD AUTO: 8.6 X10*3/UL (ref 4.4–11.3)

## 2024-01-11 PROCEDURE — 1159F MED LIST DOCD IN RCRD: CPT | Performed by: PHYSICIAN ASSISTANT

## 2024-01-11 PROCEDURE — 2500000001 HC RX 250 WO HCPCS SELF ADMINISTERED DRUGS (ALT 637 FOR MEDICARE OP): Performed by: INTERNAL MEDICINE

## 2024-01-11 PROCEDURE — 1160F RVW MEDS BY RX/DR IN RCRD: CPT | Performed by: PHYSICIAN ASSISTANT

## 2024-01-11 PROCEDURE — 73560 X-RAY EXAM OF KNEE 1 OR 2: CPT | Mod: RT

## 2024-01-11 PROCEDURE — 1036F TOBACCO NON-USER: CPT | Performed by: PHYSICIAN ASSISTANT

## 2024-01-11 PROCEDURE — 1126F AMNT PAIN NOTED NONE PRSNT: CPT | Performed by: PHYSICIAN ASSISTANT

## 2024-01-11 PROCEDURE — 96372 THER/PROPH/DIAG INJ SC/IM: CPT | Performed by: INTERNAL MEDICINE

## 2024-01-11 PROCEDURE — 85025 COMPLETE CBC W/AUTO DIFF WBC: CPT | Performed by: INTERNAL MEDICINE

## 2024-01-11 PROCEDURE — 99213 OFFICE O/P EST LOW 20 MIN: CPT | Performed by: PHYSICIAN ASSISTANT

## 2024-01-11 PROCEDURE — 80048 BASIC METABOLIC PNL TOTAL CA: CPT | Performed by: INTERNAL MEDICINE

## 2024-01-11 PROCEDURE — 2500000004 HC RX 250 GENERAL PHARMACY W/ HCPCS (ALT 636 FOR OP/ED): Mod: JW | Performed by: INTERNAL MEDICINE

## 2024-01-11 PROCEDURE — 73560 X-RAY EXAM OF KNEE 1 OR 2: CPT | Mod: RIGHT SIDE | Performed by: STUDENT IN AN ORGANIZED HEALTH CARE EDUCATION/TRAINING PROGRAM

## 2024-01-11 PROCEDURE — 96401 CHEMO ANTI-NEOPL SQ/IM: CPT

## 2024-01-11 RX ORDER — EPINEPHRINE 0.3 MG/.3ML
0.3 INJECTION SUBCUTANEOUS EVERY 5 MIN PRN
Status: DISCONTINUED | OUTPATIENT
Start: 2024-01-11 | End: 2024-01-11 | Stop reason: HOSPADM

## 2024-01-11 RX ORDER — BORTEZOMIB 3.5 MG/1
1.3 INJECTION, POWDER, LYOPHILIZED, FOR SOLUTION INTRAVENOUS; SUBCUTANEOUS ONCE
Status: COMPLETED | OUTPATIENT
Start: 2024-01-11 | End: 2024-01-11

## 2024-01-11 RX ORDER — ALBUTEROL SULFATE 0.83 MG/ML
3 SOLUTION RESPIRATORY (INHALATION) AS NEEDED
Status: DISCONTINUED | OUTPATIENT
Start: 2024-01-11 | End: 2024-01-11 | Stop reason: HOSPADM

## 2024-01-11 RX ORDER — DIPHENHYDRAMINE HYDROCHLORIDE 50 MG/ML
50 INJECTION INTRAMUSCULAR; INTRAVENOUS AS NEEDED
Status: DISCONTINUED | OUTPATIENT
Start: 2024-01-11 | End: 2024-01-11 | Stop reason: HOSPADM

## 2024-01-11 RX ORDER — FAMOTIDINE 10 MG/ML
20 INJECTION INTRAVENOUS ONCE AS NEEDED
Status: DISCONTINUED | OUTPATIENT
Start: 2024-01-11 | End: 2024-01-11 | Stop reason: HOSPADM

## 2024-01-11 RX ORDER — PROCHLORPERAZINE MALEATE 10 MG
10 TABLET ORAL ONCE
Status: COMPLETED | OUTPATIENT
Start: 2024-01-11 | End: 2024-01-11

## 2024-01-11 RX ADMIN — BORTEZOMIB 2.12 MG: 1 INJECTION, POWDER, LYOPHILIZED, FOR SOLUTION INTRAVENOUS; SUBCUTANEOUS at 10:54

## 2024-01-11 RX ADMIN — PROCHLORPERAZINE MALEATE 10 MG: 10 TABLET ORAL at 10:25

## 2024-01-11 ASSESSMENT — PATIENT HEALTH QUESTIONNAIRE - PHQ9
2. FEELING DOWN, DEPRESSED OR HOPELESS: NOT AT ALL
SUM OF ALL RESPONSES TO PHQ9 QUESTIONS 1 AND 2: 0
1. LITTLE INTEREST OR PLEASURE IN DOING THINGS: NOT AT ALL

## 2024-01-11 ASSESSMENT — COLUMBIA-SUICIDE SEVERITY RATING SCALE - C-SSRS
2. HAVE YOU ACTUALLY HAD ANY THOUGHTS OF KILLING YOURSELF?: NO
1. IN THE PAST MONTH, HAVE YOU WISHED YOU WERE DEAD OR WISHED YOU COULD GO TO SLEEP AND NOT WAKE UP?: NO
6. HAVE YOU EVER DONE ANYTHING, STARTED TO DO ANYTHING, OR PREPARED TO DO ANYTHING TO END YOUR LIFE?: NO

## 2024-01-11 ASSESSMENT — PAIN - FUNCTIONAL ASSESSMENT: PAIN_FUNCTIONAL_ASSESSMENT: NO/DENIES PAIN

## 2024-01-11 ASSESSMENT — PAIN SCALES - GENERAL: PAINLEVEL: 0-NO PAIN

## 2024-01-11 NOTE — PROGRESS NOTES
"Patient is a 89 y.o. female who is 3 months s/p I&D RLE abscess, saucerization R tibial plateau, OPAL R proximal tibia.  Date of surgery was 10/8/2023.  She is s/p ORIF R tibial plateau periprosthetic on 4/23/2023. Patient continues WBAT RLE at this time and denies issues with incision which is now completely healed. Patient reports that she is having no pain but has experienced the right leg \"give way\" on her when she is walking. She completed her IV abx per ID, no longer on abx at this time. Patient denies fever or chills, N/T or calf pain.     ROS: All other systems have been reviewed and are negative except as previously noted in history of present illness.     Gen: The patient is alert and oriented ×3, is in no acute distress, and appear their stated age and weight.  Psychiatric: Mood and affect are appropriate.  Eyes: Sclera are white, and pupils are round and symmetric.  ENT: Mucous membranes are moist.   Neck: Supple. Thyroid is midline.  Respiratory: Respirations are nonlabored, chest rise is symmetric.  Cardiac: Rate is regular by palpation of distal pulses.   Abdomen: Nondistended.  Integument: No obvious cutaneous lesions are noted. No signs of lymphangitis. No signs of systemic edema.      Musculoskeletal: RLE  incisions healed  mild swelling to lower leg with skin discoloration  compartments soft  no calf tenderness  sensation intact to light touch  motor intact including TA/GS/EHL  palpable DP/PT pulses 2+   Knee motion: 0-125 degrees  Strength 4/5    X-ray: Images of right knee and tibia reviewed personally by me today and reveal maintenance of alignment of tibial plateau fracture with TKA hardware in position and no interval change.  Proximal tibial plate and screws since removed.    IMP:  Problem List Items Addressed This Visit       Periprosthetic fracture of proximal end of tibia    Relevant Orders    XR knee right 1-2 views    Referral to Physical Therapy    Surgical site infection - Primary    " Acute osteomyelitis of tibia (CMS/[facility])          PLAN:  I have referred her to outpatient PT sessions, and she is agreeable.  I will see patient back in 4 months and I need x-rays right knee and right tibia next visit. All questions were answered today.

## 2024-01-11 NOTE — SIGNIFICANT EVENT
01/11/24 0917   Prechemo Checklist   Has the patient been in the hospital, ED, or urgent care since last date of service No   Chemo/Immuno Consent Signed Yes  (12/8/23)   Protocol/Indications Verified Yes   Confirmed to previous date/time of medication Yes   Compared to previous dose Yes   All medications are dated accurately Yes   Pregnancy Test Negative Not applicable   Parameters Met Yes   Provider Notified Yes   Is Patient Proceeding With Treatment? Yes   BSA/Weight-Height Verified Yes   Dose Calculations Verified Yes

## 2024-01-16 ENCOUNTER — TELEPHONE (OUTPATIENT)
Dept: PRIMARY CARE | Facility: CLINIC | Age: 89
End: 2024-01-16
Payer: MEDICARE

## 2024-01-16 NOTE — TELEPHONE ENCOUNTER
Ena called and says that she thinks she has a sinus infection or something. She has sinus congestion and pain, coughing, ear pain on and off and her throat feels swollen. She wants to know if you thinks she needs an antibiotic or should she come in to be seen or do virtual.

## 2024-01-17 ENCOUNTER — TELEMEDICINE (OUTPATIENT)
Dept: PRIMARY CARE | Facility: CLINIC | Age: 89
End: 2024-01-17
Payer: MEDICARE

## 2024-01-17 DIAGNOSIS — B34.9 VIRAL ILLNESS: Primary | ICD-10-CM

## 2024-01-17 DIAGNOSIS — J40 BRONCHITIS: ICD-10-CM

## 2024-01-17 PROCEDURE — 99213 OFFICE O/P EST LOW 20 MIN: CPT | Performed by: INTERNAL MEDICINE

## 2024-01-17 RX ORDER — AMOXICILLIN AND CLAVULANATE POTASSIUM 875; 125 MG/1; MG/1
875 TABLET, FILM COATED ORAL 2 TIMES DAILY
Qty: 20 TABLET | Refills: 0 | Status: SHIPPED | OUTPATIENT
Start: 2024-01-17 | End: 2024-01-27

## 2024-01-17 NOTE — PROGRESS NOTES
Chief complaint   Sinus congestion and pain, thinks she may have a sinus infection. Coughing and throat feel swollen    HPI  Cough, congested, son was just ill. Eyes are swollen.  Non productive cough.  No sore throat. , can't swallow well.    ROS  No sob, no cp, abd pain, headache or fever.      There were no vitals filed for this visit.    Physical Exam  Tele visit    Assessment/Plan   Diagnoses and all orders for this visit:  Viral illness  Bronchitis    Use Augmentin for 7 days

## 2024-01-24 ENCOUNTER — HOSPITAL ENCOUNTER (OUTPATIENT)
Dept: RADIOLOGY | Facility: CLINIC | Age: 89
Discharge: HOME | End: 2024-01-24
Payer: MEDICARE

## 2024-01-24 ENCOUNTER — OFFICE VISIT (OUTPATIENT)
Dept: PRIMARY CARE | Facility: CLINIC | Age: 89
End: 2024-01-24
Payer: MEDICARE

## 2024-01-24 VITALS
HEIGHT: 60 IN | SYSTOLIC BLOOD PRESSURE: 152 MMHG | OXYGEN SATURATION: 97 % | HEART RATE: 81 BPM | TEMPERATURE: 98.2 F | BODY MASS INDEX: 27.09 KG/M2 | DIASTOLIC BLOOD PRESSURE: 70 MMHG | WEIGHT: 138 LBS

## 2024-01-24 DIAGNOSIS — B96.89 ACUTE BACTERIAL BRONCHITIS: ICD-10-CM

## 2024-01-24 DIAGNOSIS — R06.00 DYSPNEA, UNSPECIFIED TYPE: ICD-10-CM

## 2024-01-24 DIAGNOSIS — J40 BRONCHITIS: Primary | ICD-10-CM

## 2024-01-24 DIAGNOSIS — R05.2 SUBACUTE COUGH: Primary | ICD-10-CM

## 2024-01-24 DIAGNOSIS — L98.491 SKIN ULCER, LIMITED TO BREAKDOWN OF SKIN (MULTI): ICD-10-CM

## 2024-01-24 DIAGNOSIS — N17.9 ACUTE KIDNEY INJURY SUPERIMPOSED ON CKD (CMS-HCC): ICD-10-CM

## 2024-01-24 DIAGNOSIS — R05.3 CHRONIC COUGH: ICD-10-CM

## 2024-01-24 DIAGNOSIS — J20.8 ACUTE BACTERIAL BRONCHITIS: ICD-10-CM

## 2024-01-24 DIAGNOSIS — N18.9 ACUTE KIDNEY INJURY SUPERIMPOSED ON CKD (CMS-HCC): ICD-10-CM

## 2024-01-24 DIAGNOSIS — J40 BRONCHITIS: ICD-10-CM

## 2024-01-24 DIAGNOSIS — C90.01 MULTIPLE MYELOMA IN REMISSION (MULTI): ICD-10-CM

## 2024-01-24 PROCEDURE — 1160F RVW MEDS BY RX/DR IN RCRD: CPT | Performed by: INTERNAL MEDICINE

## 2024-01-24 PROCEDURE — 1126F AMNT PAIN NOTED NONE PRSNT: CPT | Performed by: INTERNAL MEDICINE

## 2024-01-24 PROCEDURE — 1159F MED LIST DOCD IN RCRD: CPT | Performed by: INTERNAL MEDICINE

## 2024-01-24 PROCEDURE — 71111 X-RAY EXAM RIBS/CHEST4/> VWS: CPT | Mod: FR

## 2024-01-24 PROCEDURE — 1036F TOBACCO NON-USER: CPT | Performed by: INTERNAL MEDICINE

## 2024-01-24 PROCEDURE — 3077F SYST BP >= 140 MM HG: CPT | Performed by: INTERNAL MEDICINE

## 2024-01-24 PROCEDURE — 99214 OFFICE O/P EST MOD 30 MIN: CPT | Performed by: INTERNAL MEDICINE

## 2024-01-24 PROCEDURE — 1123F ACP DISCUSS/DSCN MKR DOCD: CPT | Performed by: INTERNAL MEDICINE

## 2024-01-24 PROCEDURE — 3078F DIAST BP <80 MM HG: CPT | Performed by: INTERNAL MEDICINE

## 2024-01-24 PROCEDURE — 71111 X-RAY EXAM RIBS/CHEST4/> VWS: CPT | Mod: FOREIGN READ | Performed by: RADIOLOGY

## 2024-01-24 RX ORDER — ALBUTEROL SULFATE 0.83 MG/ML
3 SOLUTION RESPIRATORY (INHALATION) AS NEEDED
Status: CANCELLED | OUTPATIENT
Start: 2024-03-08

## 2024-01-24 RX ORDER — PROCHLORPERAZINE MALEATE 10 MG
10 TABLET ORAL ONCE
Status: CANCELLED
Start: 2024-02-09

## 2024-01-24 RX ORDER — PROCHLORPERAZINE MALEATE 10 MG
10 TABLET ORAL ONCE
Status: CANCELLED
Start: 2024-02-23

## 2024-01-24 RX ORDER — EPINEPHRINE 0.3 MG/.3ML
0.3 INJECTION SUBCUTANEOUS EVERY 5 MIN PRN
Status: CANCELLED | OUTPATIENT
Start: 2024-01-25

## 2024-01-24 RX ORDER — PROCHLORPERAZINE MALEATE 10 MG
10 TABLET ORAL ONCE
Status: CANCELLED
Start: 2024-03-08

## 2024-01-24 RX ORDER — BORTEZOMIB 3.5 MG/1
1.3 INJECTION, POWDER, LYOPHILIZED, FOR SOLUTION INTRAVENOUS; SUBCUTANEOUS ONCE
Status: CANCELLED | OUTPATIENT
Start: 2024-02-23

## 2024-01-24 RX ORDER — FAMOTIDINE 10 MG/ML
20 INJECTION INTRAVENOUS ONCE AS NEEDED
Status: CANCELLED | OUTPATIENT
Start: 2024-03-08

## 2024-01-24 RX ORDER — DIPHENHYDRAMINE HYDROCHLORIDE 50 MG/ML
50 INJECTION INTRAMUSCULAR; INTRAVENOUS AS NEEDED
Status: CANCELLED | OUTPATIENT
Start: 2024-02-09

## 2024-01-24 RX ORDER — BUDESONIDE AND FORMOTEROL FUMARATE DIHYDRATE 80; 4.5 UG/1; UG/1
2 AEROSOL RESPIRATORY (INHALATION)
Qty: 1 EACH | Refills: 1 | Status: SHIPPED | OUTPATIENT
Start: 2024-01-24 | End: 2024-06-05 | Stop reason: SDUPTHER

## 2024-01-24 RX ORDER — BENZONATATE 100 MG/1
100 CAPSULE ORAL 3 TIMES DAILY PRN
Qty: 42 CAPSULE | Refills: 0 | Status: SHIPPED | OUTPATIENT
Start: 2024-01-24 | End: 2024-02-23

## 2024-01-24 RX ORDER — DIPHENHYDRAMINE HYDROCHLORIDE 50 MG/ML
50 INJECTION INTRAMUSCULAR; INTRAVENOUS AS NEEDED
Status: CANCELLED | OUTPATIENT
Start: 2024-03-08

## 2024-01-24 RX ORDER — DIPHENHYDRAMINE HYDROCHLORIDE 50 MG/ML
50 INJECTION INTRAMUSCULAR; INTRAVENOUS AS NEEDED
Status: CANCELLED | OUTPATIENT
Start: 2024-02-23

## 2024-01-24 RX ORDER — BORTEZOMIB 3.5 MG/1
1.3 INJECTION, POWDER, LYOPHILIZED, FOR SOLUTION INTRAVENOUS; SUBCUTANEOUS ONCE
Status: CANCELLED | OUTPATIENT
Start: 2024-01-25

## 2024-01-24 RX ORDER — DIPHENHYDRAMINE HYDROCHLORIDE 50 MG/ML
50 INJECTION INTRAMUSCULAR; INTRAVENOUS AS NEEDED
Status: CANCELLED | OUTPATIENT
Start: 2024-01-25

## 2024-01-24 RX ORDER — FAMOTIDINE 10 MG/ML
20 INJECTION INTRAVENOUS ONCE AS NEEDED
Status: CANCELLED | OUTPATIENT
Start: 2024-02-23

## 2024-01-24 RX ORDER — FAMOTIDINE 10 MG/ML
20 INJECTION INTRAVENOUS ONCE AS NEEDED
Status: CANCELLED | OUTPATIENT
Start: 2024-01-25

## 2024-01-24 RX ORDER — ALBUTEROL SULFATE 0.83 MG/ML
3 SOLUTION RESPIRATORY (INHALATION) AS NEEDED
Status: CANCELLED | OUTPATIENT
Start: 2024-02-09

## 2024-01-24 RX ORDER — EPINEPHRINE 0.3 MG/.3ML
0.3 INJECTION SUBCUTANEOUS EVERY 5 MIN PRN
Status: CANCELLED | OUTPATIENT
Start: 2024-03-08

## 2024-01-24 RX ORDER — BORTEZOMIB 3.5 MG/1
1.3 INJECTION, POWDER, LYOPHILIZED, FOR SOLUTION INTRAVENOUS; SUBCUTANEOUS ONCE
Status: CANCELLED | OUTPATIENT
Start: 2024-03-08

## 2024-01-24 RX ORDER — FAMOTIDINE 10 MG/ML
20 INJECTION INTRAVENOUS ONCE AS NEEDED
Status: CANCELLED | OUTPATIENT
Start: 2024-02-09

## 2024-01-24 RX ORDER — EPINEPHRINE 0.3 MG/.3ML
0.3 INJECTION SUBCUTANEOUS EVERY 5 MIN PRN
Status: CANCELLED | OUTPATIENT
Start: 2024-02-23

## 2024-01-24 RX ORDER — BORTEZOMIB 3.5 MG/1
1.3 INJECTION, POWDER, LYOPHILIZED, FOR SOLUTION INTRAVENOUS; SUBCUTANEOUS ONCE
Status: CANCELLED | OUTPATIENT
Start: 2024-02-09

## 2024-01-24 RX ORDER — PROCHLORPERAZINE MALEATE 10 MG
10 TABLET ORAL ONCE
Status: CANCELLED
Start: 2024-01-25

## 2024-01-24 RX ORDER — EPINEPHRINE 0.3 MG/.3ML
0.3 INJECTION SUBCUTANEOUS EVERY 5 MIN PRN
Status: CANCELLED | OUTPATIENT
Start: 2024-02-09

## 2024-01-24 RX ORDER — ALBUTEROL SULFATE 0.83 MG/ML
3 SOLUTION RESPIRATORY (INHALATION) AS NEEDED
Status: CANCELLED | OUTPATIENT
Start: 2024-02-23

## 2024-01-24 RX ORDER — ALBUTEROL SULFATE 0.83 MG/ML
3 SOLUTION RESPIRATORY (INHALATION) AS NEEDED
Status: CANCELLED | OUTPATIENT
Start: 2024-01-25

## 2024-01-24 NOTE — PROGRESS NOTES
Chief complaint   Still not feeling good. Has cough, congestion, SOB and sometimes diarrhea.    HPI  Still has congestion, nonproductive cough, nasal congestion. Using amoxicillin.  Son present.    ROS  Gen-no wt loss,wt gain,fevers,apnea at night,snoring    Cardio-no cp,dash,orthopnea,palpitations  Gi- no diarrhea,constipation,n,v,hematemesis,dysphagia,change in bowel habits  Endo- no polydipsia,polyuria,wt gain,extreme fatigue,polyphagia,blurred vision  Neuro-no ha,loc,paresthesias,transient limb weakness,acute memory loss,visual changes  Heme- no blood loss,transfusions,unusual bleeding from skin and/or mucosa  Urology- no dysuria,hematuria,urinary frequency,bladder pain  Gyn- no pelvic pain,vaginal dc,vaginal bleeding,  Psych- no depression,mood swings,anxiety,insomnia,hallucinations  Skin- no rash,new discolorations  Musculoskeletal- no joint aches / pains,muscle pains,new back pain  Vascular- no pain in calve w walkin,discolored painful toes      Vitals:    01/24/24 1504   BP: 152/70   Pulse: 81   Temp: 36.8 °C (98.2 °F)   SpO2: 97%       Physical Exam  Extrem no e, nad  Coughing  Lung- rhonchi nr sied  Hrt- nl s1 s2 no mgr  Neuro - baseline intact gait , mentation and cr nerves    Assessment/Plan   Diagnoses and all orders for this visit:  Bronchitis  Chronic cough  Acute bacterial bronchitis  Multiple myeloma in remission (CMS/HCC)  Acute kidney injury superimposed on CKD (CMS/HCC)    I am concerned about the levela of her cough , with rhonchi- needs cxr. May teat with different abx if there is an infiltrate. Tessalon perles, symbicort

## 2024-01-24 NOTE — PROGRESS NOTES
error   Complex Repair And A-T Advancement Flap Text: The defect edges were debeveled with a #15 scalpel blade.  The primary defect was closed partially with a complex linear closure.  Given the location of the remaining defect, shape of the defect and the proximity to free margins an A-T advancement flap was deemed most appropriate for complete closure of the defect.  Using a sterile surgical marker, an appropriate advancement flap was drawn incorporating the defect and placing the expected incisions within the relaxed skin tension lines where possible.    The area thus outlined was incised deep to adipose tissue with a #15 scalpel blade.  The skin margins were undermined to an appropriate distance in all directions utilizing iris scissors.

## 2024-01-25 ENCOUNTER — APPOINTMENT (OUTPATIENT)
Dept: HEMATOLOGY/ONCOLOGY | Facility: CLINIC | Age: 89
End: 2024-01-25
Payer: MEDICARE

## 2024-01-26 ENCOUNTER — TELEPHONE (OUTPATIENT)
Dept: PRIMARY CARE | Facility: CLINIC | Age: 89
End: 2024-01-26
Payer: MEDICARE

## 2024-01-26 NOTE — TELEPHONE ENCOUNTER
FYI- patient said that you wanted her to let you know how she is feeling. She says she is feeling better but is still coughing quite a bit.

## 2024-02-01 ENCOUNTER — TELEPHONE (OUTPATIENT)
Dept: PRIMARY CARE | Facility: CLINIC | Age: 89
End: 2024-02-01
Payer: MEDICARE

## 2024-02-01 DIAGNOSIS — C90.00 MULTIPLE MYELOMA, REMISSION STATUS UNSPECIFIED (MULTI): Primary | ICD-10-CM

## 2024-02-02 ENCOUNTER — LAB (OUTPATIENT)
Dept: LAB | Facility: LAB | Age: 89
End: 2024-02-02
Payer: MEDICARE

## 2024-02-02 DIAGNOSIS — R06.00 DYSPNEA, UNSPECIFIED TYPE: ICD-10-CM

## 2024-02-02 DIAGNOSIS — C90.00 MULTIPLE MYELOMA, REMISSION STATUS UNSPECIFIED (MULTI): ICD-10-CM

## 2024-02-02 DIAGNOSIS — R05.2 SUBACUTE COUGH: ICD-10-CM

## 2024-02-02 LAB
BNP SERPL-MCNC: 113 PG/ML (ref 0–99)
CALCIUM SERPL-MCNC: 11.6 MG/DL (ref 8.6–10.6)

## 2024-02-02 PROCEDURE — 83880 ASSAY OF NATRIURETIC PEPTIDE: CPT

## 2024-02-02 PROCEDURE — 82310 ASSAY OF CALCIUM: CPT

## 2024-02-05 ENCOUNTER — DOCUMENTATION (OUTPATIENT)
Dept: PRIMARY CARE | Facility: CLINIC | Age: 89
End: 2024-02-05
Payer: MEDICARE

## 2024-02-05 ENCOUNTER — TELEPHONE (OUTPATIENT)
Dept: HEMATOLOGY/ONCOLOGY | Facility: CLINIC | Age: 89
End: 2024-02-05
Payer: MEDICARE

## 2024-02-05 NOTE — PROGRESS NOTES
Patient called me 2/3/24 at 927 am with a history of multiple myeloma and reports a calcium level in the 11 range she states she was called by Dr. Garcia Friday night and directed to call hematology oncology Dr. Gray which she did and hematology said it is okay to wait until Monday patient states she is otherwise asymptomatic I advised her to drink plenty of fluids water 12 ounces 3 times a day 11 range calcium is mild still asymptomatic during plenty of water and follow-up with hematology as ordered aggressive IV fluids are needed the calcium gets in the moderate to severe range with symptoms but she is not having any so she should follow-up with hematology for ongoing treatment of this condition related to her multiple myeloma she understands

## 2024-02-07 ENCOUNTER — EVALUATION (OUTPATIENT)
Dept: PHYSICAL THERAPY | Facility: CLINIC | Age: 89
End: 2024-02-07
Payer: MEDICARE

## 2024-02-07 DIAGNOSIS — Z96.659 PERIPROSTHETIC FRACTURE OF PROXIMAL END OF TIBIA, SUBSEQUENT ENCOUNTER: Primary | ICD-10-CM

## 2024-02-07 DIAGNOSIS — M97.8XXD PERIPROSTHETIC FRACTURE OF PROXIMAL END OF TIBIA, SUBSEQUENT ENCOUNTER: Primary | ICD-10-CM

## 2024-02-07 PROBLEM — M62.81 MUSCLE WEAKNESS: Status: ACTIVE | Noted: 2024-02-07

## 2024-02-07 PROCEDURE — 97161 PT EVAL LOW COMPLEX 20 MIN: CPT | Mod: GP

## 2024-02-07 PROCEDURE — 97110 THERAPEUTIC EXERCISES: CPT | Mod: GP

## 2024-02-07 ASSESSMENT — ENCOUNTER SYMPTOMS
OCCASIONAL FEELINGS OF UNSTEADINESS: 1
DEPRESSION: 0
LOSS OF SENSATION IN FEET: 0

## 2024-02-07 ASSESSMENT — PAIN - FUNCTIONAL ASSESSMENT: PAIN_FUNCTIONAL_ASSESSMENT: 0-10

## 2024-02-07 ASSESSMENT — PAIN SCALES - GENERAL: PAINLEVEL_OUTOF10: 0 - NO PAIN

## 2024-02-07 NOTE — PROGRESS NOTES
Physical Therapy    Physical Therapy Evaluation and Treatment      Patient Name: Bindu Cali  MRN: [id number]  Today's Date: 2/7/2024  Time Calculation  Start Time: 1024  Stop Time: 1103  Time Calculation (min): 39 min    Assessment:    Pt presenting to physical therapy with muscle weakness s/p recent hospitalizations. Pt is a questionable historian but sounds like pt has increasing generalized weakness. Of note, pt is s/p surgery for OPAL in October 2023. She uses a wheeled walker as her primary mode of ambulation but looking to get off of assistive device. She is ambulating appropriately with wheeled walker but has trouble at times navigating her environment. She could benefit from course of PT to address muscle weakness and balance deficits to reduce her risk of falling and improve her participation in every day life.     Plan:  OP PT Plan  PT Plan: Skilled PT  PT Frequency: 2 times per week  Certification Period Start Date: 02/07/24  Certification Period End Date: 05/07/24  Rehab Potential: Fair  Plan of Care Agreement: Patient  Access Code: [email or web address]  URL: [email or web address]  Date: 02/07/2024  Prepared by: Ashlie Rueda    Exercises  - Sit to Stand with Counter Support  - 1 x daily - 7 x weekly - 2 sets - 10 reps - 5 seconds  hold  - Side Stepping with Unilateral Counter Support  - 1 x daily - 7 x weekly - 2 sets - 1 reps - 2 minutes each direction hold  - Standing 4-Way Leg Reach with Counter Support  - 1 x daily - 7 x weekly - 2 sets - 10 reps - 5 seconds  hold  - Standing March with Unilateral Counter Support  - 1 x daily - 7 x weekly - 2 sets - 10 reps - 5 seconds  hold  Planned interventions include: biofeedback, cryotherapy, education/instruction, electrical stimulation, gait training, home program, hot pack, kinesiotaping, manual therapy, neuromuscular re-education, self care/home management, therapeutic activities, and therapeutic exercises.      Current Problem:   1.  Periprosthetic fracture of proximal end of tibia, subsequent encounter  Referral to Physical Therapy    Follow Up In Physical Therapy          Subjective    General:  General  Reason for Referral: LE weakness  Referred By: Jaimee  Past Medical History Relevant to Rehab: Multiple myeloma  Pt reports being hospitalized and then having home health. She was discharged from there and coming now to OP PT. She arrives in a treatment room alone and is a questionable historian. Reports one fall resulting in injury. Her son lives with her. She reports 2-3 steps to get in but unsure if there is a handrailing. She has grab bars in the shower.   Per EMR, she had an orthopedic surgery. I&D RLE abscess, saucerization R tibial plateau. OPAL R proximal tibia 10/8/23, ORIF R tibial periprosthetic 4/23/23.   Complaining of lower extremity weakness. Denying any pain. She is using a wheeled walker and cannot remember when she started using it. She believes she was using it prior to her leg surgery and infection.   Hx of B TKR  Precautions:  Precautions  Precautions Comment: fall risk     Pain:  Pain Assessment  Pain Assessment: 0-10  Pain Score: 0 - No pain    Objective   AROM:  Spine: increased flexion, mod decrease in extension.     PROM/Joint Mobility:  Appropriate hamstring length B   Appropriate B knee flexion/extension for TKA   Decreased hip IR bilaterally (moderate)    Strength:  LE grossly 3/5     Special tests:  Sit to stand 5 trials: 60 seconds     Observation:   Intermittent cues for walker placement prior to sitting   Appropriate insight for most tasks   Sit to stand: using arms appropriately for push off. Decreased glute engagement and significant forward trunk flexion   Supine to sit: CGA   Sit to supine: CGA but significant difficulty putting RLE up onto bed   Dressing: min A For coat    Gait:   Slow jorge, decreased stance time RLE. Pt does not achieve terminal knee extension RLE. Poor trunk dissociation,  forward trunk lean, Vcs for looking upright.     SL stance:  Requires moderate  on WW to allow for R SLS however able to perform SLS for 5 seconds before stepping strategy     Outcome Measures:  Other Measures  Lower Extremity Funtional Score (LEFS): 34/80     Goals:  1. Pt will be independent in HEP to maximize PT POC   2. Pt will be able to improve LEFS by >9 points MCID   3. Pt will continue to be painfree for duration of POC   4. Pt will continue to be free of falls for duration of POC   5. Pt will demonstrate ability to stand on 1 leg for > 30 seconds without UE support   6. Pt will ambulate with SPC min A for household distances

## 2024-02-08 ENCOUNTER — INFUSION (OUTPATIENT)
Dept: HEMATOLOGY/ONCOLOGY | Facility: CLINIC | Age: 89
End: 2024-02-08
Payer: MEDICARE

## 2024-02-08 ENCOUNTER — OFFICE VISIT (OUTPATIENT)
Dept: HEMATOLOGY/ONCOLOGY | Facility: CLINIC | Age: 89
End: 2024-02-08
Payer: MEDICARE

## 2024-02-08 ENCOUNTER — LAB (OUTPATIENT)
Dept: LAB | Facility: CLINIC | Age: 89
End: 2024-02-08
Payer: MEDICARE

## 2024-02-08 VITALS
OXYGEN SATURATION: 98 % | DIASTOLIC BLOOD PRESSURE: 117 MMHG | BODY MASS INDEX: 25.83 KG/M2 | WEIGHT: 132.28 LBS | RESPIRATION RATE: 18 BRPM | SYSTOLIC BLOOD PRESSURE: 198 MMHG | HEART RATE: 74 BPM | TEMPERATURE: 97.5 F

## 2024-02-08 VITALS
HEART RATE: 66 BPM | RESPIRATION RATE: 18 BRPM | DIASTOLIC BLOOD PRESSURE: 70 MMHG | SYSTOLIC BLOOD PRESSURE: 176 MMHG | OXYGEN SATURATION: 94 % | TEMPERATURE: 98.1 F

## 2024-02-08 DIAGNOSIS — C90.01 MULTIPLE MYELOMA IN REMISSION (MULTI): ICD-10-CM

## 2024-02-08 DIAGNOSIS — C90.00 MULTIPLE MYELOMA, REMISSION STATUS UNSPECIFIED (MULTI): ICD-10-CM

## 2024-02-08 DIAGNOSIS — E83.52 HYPERCALCEMIA: ICD-10-CM

## 2024-02-08 DIAGNOSIS — C90.00 MULTIPLE MYELOMA NOT HAVING ACHIEVED REMISSION (MULTI): Primary | ICD-10-CM

## 2024-02-08 DIAGNOSIS — C90.00 MULTIPLE MYELOMA NOT HAVING ACHIEVED REMISSION (MULTI): ICD-10-CM

## 2024-02-08 LAB
ALBUMIN SERPL BCP-MCNC: 3.5 G/DL (ref 3.4–5)
ALP SERPL-CCNC: 66 U/L (ref 33–136)
ALT SERPL W P-5'-P-CCNC: 8 U/L (ref 7–45)
ANION GAP SERPL CALC-SCNC: 14 MMOL/L (ref 10–20)
AST SERPL W P-5'-P-CCNC: 13 U/L (ref 9–39)
B2 MICROGLOB SERPL-MCNC: 8.1 MG/L (ref 0.7–2.2)
BASOPHILS # BLD AUTO: 0.06 X10*3/UL (ref 0–0.1)
BASOPHILS NFR BLD AUTO: 0.7 %
BILIRUB SERPL-MCNC: 0.3 MG/DL (ref 0–1.2)
BUN SERPL-MCNC: 24 MG/DL (ref 6–23)
CALCIUM SERPL-MCNC: 12.8 MG/DL (ref 8.6–10.3)
CHLORIDE SERPL-SCNC: 102 MMOL/L (ref 98–107)
CO2 SERPL-SCNC: 26 MMOL/L (ref 21–32)
CREAT SERPL-MCNC: 1.31 MG/DL (ref 0.5–1.05)
EGFRCR SERPLBLD CKD-EPI 2021: 39 ML/MIN/1.73M*2
EOSINOPHIL # BLD AUTO: 1.19 X10*3/UL (ref 0–0.4)
EOSINOPHIL NFR BLD AUTO: 13.4 %
ERYTHROCYTE [DISTWIDTH] IN BLOOD BY AUTOMATED COUNT: 17.6 % (ref 11.5–14.5)
GLUCOSE SERPL-MCNC: 127 MG/DL (ref 74–99)
HCT VFR BLD AUTO: 27.7 % (ref 36–46)
HGB BLD-MCNC: 9 G/DL (ref 12–16)
IGA SERPL-MCNC: 1390 MG/DL (ref 70–400)
IGG SERPL-MCNC: 439 MG/DL (ref 700–1600)
IGM SERPL-MCNC: 13 MG/DL (ref 40–230)
IMM GRANULOCYTES # BLD AUTO: 0.03 X10*3/UL (ref 0–0.5)
IMM GRANULOCYTES NFR BLD AUTO: 0.3 % (ref 0–0.9)
LYMPHOCYTES # BLD AUTO: 1.73 X10*3/UL (ref 0.8–3)
LYMPHOCYTES NFR BLD AUTO: 19.5 %
MCH RBC QN AUTO: 29.8 PG (ref 26–34)
MCHC RBC AUTO-ENTMCNC: 32.5 G/DL (ref 32–36)
MCV RBC AUTO: 92 FL (ref 80–100)
MONOCYTES # BLD AUTO: 0.71 X10*3/UL (ref 0.05–0.8)
MONOCYTES NFR BLD AUTO: 8 %
NEUTROPHILS # BLD AUTO: 5.16 X10*3/UL (ref 1.6–5.5)
NEUTROPHILS NFR BLD AUTO: 58.1 %
NRBC BLD-RTO: 0 /100 WBCS (ref 0–0)
PLATELET # BLD AUTO: 390 X10*3/UL (ref 150–450)
POTASSIUM SERPL-SCNC: 3.5 MMOL/L (ref 3.5–5.3)
PROT SERPL-MCNC: 6.9 G/DL (ref 6.4–8.2)
PROT SERPL-MCNC: 7.1 G/DL (ref 6.4–8.2)
RBC # BLD AUTO: 3.02 X10*6/UL (ref 4–5.2)
SODIUM SERPL-SCNC: 138 MMOL/L (ref 136–145)
WBC # BLD AUTO: 8.9 X10*3/UL (ref 4.4–11.3)

## 2024-02-08 PROCEDURE — 1126F AMNT PAIN NOTED NONE PRSNT: CPT | Performed by: INTERNAL MEDICINE

## 2024-02-08 PROCEDURE — 99215 OFFICE O/P EST HI 40 MIN: CPT | Performed by: INTERNAL MEDICINE

## 2024-02-08 PROCEDURE — 85025 COMPLETE CBC W/AUTO DIFF WBC: CPT

## 2024-02-08 PROCEDURE — 2500000004 HC RX 250 GENERAL PHARMACY W/ HCPCS (ALT 636 FOR OP/ED): Performed by: INTERNAL MEDICINE

## 2024-02-08 PROCEDURE — 80053 COMPREHEN METABOLIC PANEL: CPT

## 2024-02-08 PROCEDURE — 96372 THER/PROPH/DIAG INJ SC/IM: CPT | Performed by: INTERNAL MEDICINE

## 2024-02-08 PROCEDURE — 84165 PROTEIN E-PHORESIS SERUM: CPT | Performed by: INTERNAL MEDICINE

## 2024-02-08 PROCEDURE — 84155 ASSAY OF PROTEIN SERUM: CPT | Mod: PARLAB | Performed by: INTERNAL MEDICINE

## 2024-02-08 PROCEDURE — 1160F RVW MEDS BY RX/DR IN RCRD: CPT | Performed by: INTERNAL MEDICINE

## 2024-02-08 PROCEDURE — 86320 SERUM IMMUNOELECTROPHORESIS: CPT | Performed by: INTERNAL MEDICINE

## 2024-02-08 PROCEDURE — 96401 CHEMO ANTI-NEOPL SQ/IM: CPT

## 2024-02-08 PROCEDURE — 96360 HYDRATION IV INFUSION INIT: CPT | Mod: INF

## 2024-02-08 PROCEDURE — 82784 ASSAY IGA/IGD/IGG/IGM EACH: CPT | Mod: PARLAB | Performed by: INTERNAL MEDICINE

## 2024-02-08 PROCEDURE — 82232 ASSAY OF BETA-2 PROTEIN: CPT | Mod: PARLAB | Performed by: INTERNAL MEDICINE

## 2024-02-08 PROCEDURE — 2500000004 HC RX 250 GENERAL PHARMACY W/ HCPCS (ALT 636 FOR OP/ED): Mod: JW | Performed by: INTERNAL MEDICINE

## 2024-02-08 PROCEDURE — 83521 IG LIGHT CHAINS FREE EACH: CPT | Mod: PARLAB | Performed by: INTERNAL MEDICINE

## 2024-02-08 PROCEDURE — 86334 IMMUNOFIX E-PHORESIS SERUM: CPT | Mod: PARLAB | Performed by: INTERNAL MEDICINE

## 2024-02-08 PROCEDURE — 1123F ACP DISCUSS/DSCN MKR DOCD: CPT | Performed by: INTERNAL MEDICINE

## 2024-02-08 PROCEDURE — 1036F TOBACCO NON-USER: CPT | Performed by: INTERNAL MEDICINE

## 2024-02-08 PROCEDURE — 1159F MED LIST DOCD IN RCRD: CPT | Performed by: INTERNAL MEDICINE

## 2024-02-08 PROCEDURE — 96372 THER/PROPH/DIAG INJ SC/IM: CPT

## 2024-02-08 PROCEDURE — 3077F SYST BP >= 140 MM HG: CPT | Performed by: INTERNAL MEDICINE

## 2024-02-08 PROCEDURE — 3080F DIAST BP >= 90 MM HG: CPT | Performed by: INTERNAL MEDICINE

## 2024-02-08 RX ORDER — SODIUM CHLORIDE 9 MG/ML
500 INJECTION, SOLUTION INTRAVENOUS AS NEEDED
Status: CANCELLED | OUTPATIENT
Start: 2024-02-08

## 2024-02-08 RX ORDER — EPINEPHRINE 0.3 MG/.3ML
0.3 INJECTION SUBCUTANEOUS EVERY 5 MIN PRN
Status: DISCONTINUED | OUTPATIENT
Start: 2024-02-08 | End: 2024-02-08 | Stop reason: HOSPADM

## 2024-02-08 RX ORDER — SODIUM CHLORIDE 9 MG/ML
500 INJECTION, SOLUTION INTRAVENOUS CONTINUOUS
Status: CANCELLED | OUTPATIENT
Start: 2024-02-08

## 2024-02-08 RX ORDER — FAMOTIDINE 10 MG/ML
20 INJECTION INTRAVENOUS ONCE AS NEEDED
Status: DISCONTINUED | OUTPATIENT
Start: 2024-02-08 | End: 2024-02-08 | Stop reason: HOSPADM

## 2024-02-08 RX ORDER — ALBUTEROL SULFATE 0.83 MG/ML
3 SOLUTION RESPIRATORY (INHALATION) AS NEEDED
Status: CANCELLED | OUTPATIENT
Start: 2024-02-08

## 2024-02-08 RX ORDER — HEPARIN 100 UNIT/ML
500 SYRINGE INTRAVENOUS AS NEEDED
Status: DISCONTINUED | OUTPATIENT
Start: 2024-02-08 | End: 2024-02-08 | Stop reason: HOSPADM

## 2024-02-08 RX ORDER — DIPHENHYDRAMINE HYDROCHLORIDE 50 MG/ML
50 INJECTION INTRAMUSCULAR; INTRAVENOUS AS NEEDED
Status: DISCONTINUED | OUTPATIENT
Start: 2024-02-08 | End: 2024-02-08 | Stop reason: HOSPADM

## 2024-02-08 RX ORDER — ALBUTEROL SULFATE 0.83 MG/ML
3 SOLUTION RESPIRATORY (INHALATION) AS NEEDED
Status: DISCONTINUED | OUTPATIENT
Start: 2024-02-08 | End: 2024-02-08 | Stop reason: HOSPADM

## 2024-02-08 RX ORDER — DIPHENHYDRAMINE HYDROCHLORIDE 50 MG/ML
50 INJECTION INTRAMUSCULAR; INTRAVENOUS AS NEEDED
Status: CANCELLED | OUTPATIENT
Start: 2024-02-08

## 2024-02-08 RX ORDER — EPINEPHRINE 0.3 MG/.3ML
0.3 INJECTION SUBCUTANEOUS EVERY 5 MIN PRN
Status: CANCELLED | OUTPATIENT
Start: 2024-02-08

## 2024-02-08 RX ORDER — FAMOTIDINE 10 MG/ML
20 INJECTION INTRAVENOUS ONCE AS NEEDED
Status: CANCELLED | OUTPATIENT
Start: 2024-02-08

## 2024-02-08 RX ORDER — FAMOTIDINE 10 MG/ML
20 INJECTION INTRAVENOUS ONCE AS NEEDED
Status: CANCELLED | OUTPATIENT
Start: 2024-03-07

## 2024-02-08 RX ORDER — BORTEZOMIB 3.5 MG/1
1.3 INJECTION, POWDER, LYOPHILIZED, FOR SOLUTION INTRAVENOUS; SUBCUTANEOUS ONCE
Status: COMPLETED | OUTPATIENT
Start: 2024-02-08 | End: 2024-02-08

## 2024-02-08 RX ORDER — DIPHENHYDRAMINE HYDROCHLORIDE 50 MG/ML
50 INJECTION INTRAMUSCULAR; INTRAVENOUS AS NEEDED
Status: CANCELLED | OUTPATIENT
Start: 2024-03-07

## 2024-02-08 RX ORDER — PROCHLORPERAZINE MALEATE 10 MG
10 TABLET ORAL ONCE
Status: DISCONTINUED | OUTPATIENT
Start: 2024-02-08 | End: 2024-02-08 | Stop reason: HOSPADM

## 2024-02-08 RX ORDER — SODIUM CHLORIDE 9 MG/ML
500 INJECTION, SOLUTION INTRAVENOUS AS NEEDED
Status: DISCONTINUED | OUTPATIENT
Start: 2024-02-08 | End: 2024-02-08 | Stop reason: HOSPADM

## 2024-02-08 RX ORDER — HEPARIN SODIUM,PORCINE/PF 10 UNIT/ML
50 SYRINGE (ML) INTRAVENOUS AS NEEDED
Status: CANCELLED | OUTPATIENT
Start: 2024-02-08

## 2024-02-08 RX ORDER — ALBUTEROL SULFATE 0.83 MG/ML
3 SOLUTION RESPIRATORY (INHALATION) AS NEEDED
Status: CANCELLED | OUTPATIENT
Start: 2024-03-07

## 2024-02-08 RX ORDER — HEPARIN 100 UNIT/ML
500 SYRINGE INTRAVENOUS AS NEEDED
Status: CANCELLED | OUTPATIENT
Start: 2024-02-08

## 2024-02-08 RX ORDER — HEPARIN SODIUM,PORCINE/PF 10 UNIT/ML
50 SYRINGE (ML) INTRAVENOUS AS NEEDED
Status: DISCONTINUED | OUTPATIENT
Start: 2024-02-08 | End: 2024-02-08 | Stop reason: HOSPADM

## 2024-02-08 RX ORDER — EPINEPHRINE 0.3 MG/.3ML
0.3 INJECTION SUBCUTANEOUS EVERY 5 MIN PRN
Status: CANCELLED | OUTPATIENT
Start: 2024-03-07

## 2024-02-08 RX ADMIN — SODIUM CHLORIDE 500 ML: 9 INJECTION, SOLUTION INTRAVENOUS at 11:55

## 2024-02-08 RX ADMIN — DENOSUMAB 120 MG: 120 INJECTION SUBCUTANEOUS at 12:55

## 2024-02-08 RX ADMIN — BORTEZOMIB 2.12 MG: 1 INJECTION, POWDER, LYOPHILIZED, FOR SOLUTION INTRAVENOUS; SUBCUTANEOUS at 12:17

## 2024-02-08 ASSESSMENT — PAIN SCALES - GENERAL
PAINLEVEL: 0-NO PAIN
PAINLEVEL_OUTOF10: 0-NO PAIN

## 2024-02-08 NOTE — PROGRESS NOTES
LOCATION:  [facility].     HEMATOLOGY & ONCOLOGY PROBLEMS:  1.  IgA kappa multiple myeloma        a.  Initial diagnosis of MGUS in Feb 2022.       b.  Disease progression to multiple myeloma in Oct 2022.       c.  Ist line therapy with single agent Velcade beginning Nov 2022.  2.   Hypercalcemia/lytic lesions.       a.  Maintained on Xgeva  3.  Macrocytic anemia.     CHIEF COMPLAINT:    The patient is in the clinic today for management of multiple myeloma and macrocytic anemia and for continuation of therapy and management of therapy related effects.     HISTORY:   Ms. Cali is a 89 year old pleasant female with multiple myeloma.  She has been in relatively good health for her age and is in fact still working.  Blood work from Feb 2022 showed a hemoglobin of 11.1 with MCV of 101.  WBC, platelets and metabolic  profile was unremarkable except for baseline creatinine of 1.4.  Additional work-up revealed a normal TSH but SPEP showed 0.7 g of IgA kappa monoclonal protein.  Initial evaluation was mainly suggestive of MGUS and she was followed closely.  But routine  follow-up blood work from October 2022 showed 1g of IgA kappa monoclonal protein and markedly elevated kappa free light chain assay.  Creatinine was stable at 1.3 but serum calcium was elevated at 13.1.  PET scan results were unremarkable but a bone marrow  biopsy confirmed plasma cell myeloma.  Cytogenetics and FISH testing results were unremarkable.  After discussion with the patient and family members she is currently being treated with single agent Velcade with good response.  She is also maintained  on intermittent Xgeva for hypercalcemia.  Over time her clinical course has been complicated by fall leading to right leg surgery and then admission for C. difficile colitis etc.     INTERVAL HISTORY:  Lately she is feeling more weak and tired and family members have also noted her to be confused at times.  Her myeloma markers  are gradually inching up and calcium is also fluctuating.       PAST MEDICAL HISTORY:   1.  Multiple myeloma as detailed above  2.  Hypertension  3.  Hypothyroidism  4.  GERD  5.  Chronic kidney disease  6.  DJD   7.  Idiopathic neuropathy  8.  Lactose intolerance  9.  Vitamin D deficiency  10.  History of cholecystectomy/hysterectomy/tonsillectomy/left knee replacement surgeries     SOCIAL HISTORY:   She lives in [place] with her son.  Non smoker.  Rare social alcohol intake.  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  Born and raised in [facility].     FAMILY HISTORY:    Parents  in their 90s from natural causes but a per the patient both probably had some kind of cancers.  5 siblings 1  from lung problems.  7 children, 12 grandkids and  14 great grandkids all alive and well.  No other specific history of bleeding, clotting or malignant disorder in the family.     REVIEW OF SYSTEMS:  Pertinent finding as per the history above.  All other systems have been reviewed and generally negative and noncontributory.     ALLERGY & MEDICATIONS:  Allergies and latest outpatient medications list were reviewed in the EMR.    VITAL SIGNS  BSA: 1.59 meters squared  BP (!) 198/117 (BP Location: Right arm, Patient Position: Sitting, BP Cuff Size: Adult)   Pulse 74   Temp 36.4 °C (97.5 °F) (Temporal)   Resp 18   Wt 60 kg (132 lb 4.4 oz)   SpO2 98%   BMI 25.83 kg/m²     PHYSICAL EXAMINATION:  Detailed examination not done.    LAB RESULTS:  CBC from today shows a hemoglobin of 9 with MCV of 92.  White cell count was 8.9 and platelet 390K.  Metabolic profile and myeloma markers from today are pending.  Last calcium level was 11.6 on 2024.    Last SPEP from 2023 showed 1.2 g of IgG kappa monoclonal protein.  Free kappa light chain level was elevated at 34.  IgA level was 1560.    RADIOLOGY RESULT:  CT chest without contrast 10/30/2023  Impression:  1.  2 mm left subpleural  nodule as described.  2. The lungs otherwise are clear considering limitations.  3. Nonunion of a left acromion fracture.      PET/CT Myeloma Initial [Oct 21 2022 10:35AM]  Impression:  1. Multiple focal hypermetabolic pulmonary nodules throughout the bilateral lungs. Findings likely represent inflammatory process,  versus less likely, malignancy. Recommend short-term follow-up CT to document resolution of nodules or tissue biopsy is clinically  indicated.  2. Several hypermetabolic nonenlarged mediastinal lymph nodes, likely reactive in etiology.  3. Mild increased metabolic activity throughout the axial and appendicular skeleton, which is nonspecific and could be related to patient's anemia versus diffuse myeloma involvement. There is no focal hypermetabolic disease involvement within the skeleton.     PATHOLOGY RESULTS:  Surgical Pathology [Oct 28 2022 1:58PM] ([id number])   Specimens: BONE MARROW CLOT /BONE MARROW CORE /Received in formalin, labeled with the patient's name and hospital number/Received in B-plus fixative, labeled with the patient's name and hospital   Name NAMRATA SUMIT NIELSENMatthew   Accession #: [id number]    Pathologist: ADRIANA NATION MD   Date of Procedure: 10/25/2022   Date Received: 10/25/2022   Date Reported 10/28/2022   Submitting Physician: RENATE MELGAR MD   Location: [facility] Other External #   FINAL DIAGNOSIS   A&B:  BONE MARROW, ASPIRATE WITH CLOT AND CORE BIOPSY WITH TOUCH IMPRINT, RIGHT ILIAC CREST:   -- SLIGHTLY HYPERCELLULAR BONE MARROW WITH 15% KAPPA+ PLASMA CELLS CONSISTENT WITH PLASMA CELL MYELOMA   NOTE: Clinical correlation recommended.   Pending  Genetic/Molecular testing per Hematopathology protocol:   -Chromosome analysis: karyotype   -FISH: Myeloma panel   -Molecular: None   -Microarray: None   The results will be reported separately.   Touch Prep   Differential:  (Normal) %   Promyelocytes (1-5) 1   Myelocytes (5-10) 8   Metamyelocytes (10-25) 13   Bands (10-20) 9    Segmented forms (5-30) 17   Eosinophils (2-4) 11   Basophils (0-1) 1   Lymphocytes (5-25) 2   Monocytes (0-2)  0   Plasma Cells (0-2) 15   Blasts (0-1) 0   Total Erythroid (17-35) 23   Number of cells counted: 100   Cellularity: cellular   M:E Ratio: 2.7:1   The gross and/or microscopic findings were reviewed in conjunction with pathology  resident, Kirstin Gutierrez M.D.   Electronically Signed Out By ADRIANA NATION MD/NAGI   By the signature on this report, the individual or group listed as making the   Final Interpretation/Diagnosis certifies that they have reviewed this  case.   Diagnostic interpretation performed at [facility], operated by [facility] [address] [facility] [address]   Microscopic Description:   CBC: WBC 18.8 x 10E9/L, RBC 2.77 x 10E12/L, Hgb 8.8 g/dl, Hct 26.2%, MCV 95 fL, RDW 13.6%, platelets  416 x 10E9/L.   A 100 cell manual differential count reveals: polys 61%, bands 8%, lymphocytes 13%, monocytes 9%, eosinophils 8%, basophils 1%.   PERIPHERAL SMEAR: Submitted   Red cells: Normocytic with rouleaux formation.   White cells: Eosinophilia    Platelets: Normal, adequate.   ASPIRATE SMEAR: Submitted   Specimen: Aspicular.   Comments: Hypocellular and hemodilute with few hematopoietic precursors and scattered atypical plasma cells, some biunucleated.   TOUCH PREP: Submitted    Specimen: Paucicellular.   Erythropoiesis: Normal maturation.   Granulopoiesis: Normal maturation.   Megakaryocytes: Present. Morphology: Normal.   Comments: Numerous plasma cells (15%). Some very large atypical plasma cells seen.    ASPIRATE CLOT: Submitted   Specimen: Aspicular.   Comments: Rare hematopoietic progenitor cells noted in a hemodiluted sample.   CORE BIOPSY: Submitted   Specimen: Limited. Small subcortical with crush artifact   Cellularity: 40%.    Estimated M:E ratio: Consistent with aspirate smear.   Bony trabeculae: Normal.   Megakaryocytes: Adequate. Morphology: Normal.   Granulomas: Absent.   Lymphoid  aggregates: Absent.   Comments: Few small aggregates of plasm cells noted.    SPECIAL STAINS:   Iron: Inadequate sample to evaluate   IMMUNOHISTOCHEMISTRY: Performed.    - Positive plasma cells approximately (15%)   Cyclin D1 - Negative   IH Kappa - positice in plasma cells   IH Lambda - rare plasma  cells positive.   FLOW CYTOMETRY: Performed, see separate report. A small clonal kappa+, CD19-, CD45- plasma cell population detected.   Immunostains were performed in addition to flow cytometry to fully characterize the phenotype, architecture,  and extent of the atypical population(s). This was medically necessary for the best possible diagnosis.   Clinical History:   C90.00,D64.9   Right side In the front,Iliac Crest   Specimens Submitted As:   A: BONE MARROW CLOT    B: BONE MARROW CORE      ASSESSMENT & PLAN:  1.  IgA kappa multiple myeloma.  Please refer to the details of initial presentation and management as outlined above. In summary, patient with finding of mild macrocytic  anemia with further work-up revealing 0.7g of IgA kappa monoclonal protein on SPEP in Feb 2022.  Clinically she was essentially asymptomatic and overall functional for her age.  She was initially followed with close observation.  Subsequent routine follow-up  blood work from Oct 2022 showed 1gm of IgA kappa monoclonal protein and markedly elevated kappa free light chain assay.  Creatinine was stable at 1.3 but serum calcium was elevated at 13.1.  PET scan results were unremarkable but a bone marrow biopsy  confirmed plasma cell myeloma.  Cytogenetics and FISH testing was unremarkable.   After discussions with the patient and family members she is currently being treated with single agent Velcade with good response.  She is also maintained on intermittent Xgeva for hypercalcemia.     Overall she is tolerating Velcade well and because of initial good response and to cut down on the number of visits we will stretch it out to every 2-week.   But lately her myeloma markers are increasing and hypercalcemia is getting worse.  I will change the Velcade frequency to once a week and we will monitor her closely.  If there is concern regarding disease progression then we will need to change the treatment to Darzalex etc.   Probable side effects of neuropathy, thrombocytopenia, weakness, fatigue, rash etc. were explained to her in detail.  Overall prognosis is guarded.     2.  Hypercalcemia.  Secondary to baseline myeloma.  PET scan as such was negative for bony lytic lesions.  She will continue with the intermittent Xgeva as needed.  I am concerned that her calcium level is elevated today and she will be given IV hydration also along with Xgeva.     3. Follow up: Follow-up with me in about 8 weeks and will come to the clinic for Velcade SQ administration at weekly interval.  Advised to contact us immediately if there are any new questions or problems.     This note has been transcribed using Dragon voice recognition system and there is a possibility of unintentional typing misprints.

## 2024-02-09 LAB
KAPPA LC SERPL-MCNC: 28.85 MG/DL (ref 0.33–1.94)
KAPPA LC/LAMBDA SER: 22.36 {RATIO} (ref 0.26–1.65)
LAMBDA LC SERPL-MCNC: 1.29 MG/DL (ref 0.57–2.63)

## 2024-02-09 RX ORDER — ALBUTEROL SULFATE 0.83 MG/ML
3 SOLUTION RESPIRATORY (INHALATION) AS NEEDED
Status: CANCELLED | OUTPATIENT
Start: 2024-03-07

## 2024-02-09 RX ORDER — EPINEPHRINE 0.3 MG/.3ML
0.3 INJECTION SUBCUTANEOUS EVERY 5 MIN PRN
Status: CANCELLED | OUTPATIENT
Start: 2024-02-22

## 2024-02-09 RX ORDER — BORTEZOMIB 3.5 MG/1
1.6 INJECTION, POWDER, LYOPHILIZED, FOR SOLUTION INTRAVENOUS; SUBCUTANEOUS ONCE
Status: CANCELLED | OUTPATIENT
Start: 2024-02-22

## 2024-02-09 RX ORDER — EPINEPHRINE 0.3 MG/.3ML
0.3 INJECTION SUBCUTANEOUS EVERY 5 MIN PRN
Status: CANCELLED | OUTPATIENT
Start: 2024-02-29

## 2024-02-09 RX ORDER — EPINEPHRINE 0.3 MG/.3ML
0.3 INJECTION SUBCUTANEOUS EVERY 5 MIN PRN
Status: CANCELLED | OUTPATIENT
Start: 2024-03-07

## 2024-02-09 RX ORDER — PROCHLORPERAZINE MALEATE 10 MG
10 TABLET ORAL EVERY 6 HOURS PRN
Status: CANCELLED | OUTPATIENT
Start: 2024-02-29

## 2024-02-09 RX ORDER — ALBUTEROL SULFATE 0.83 MG/ML
3 SOLUTION RESPIRATORY (INHALATION) AS NEEDED
Status: CANCELLED | OUTPATIENT
Start: 2024-02-22

## 2024-02-09 RX ORDER — PROCHLORPERAZINE EDISYLATE 5 MG/ML
10 INJECTION INTRAMUSCULAR; INTRAVENOUS EVERY 6 HOURS PRN
Status: CANCELLED | OUTPATIENT
Start: 2024-02-22

## 2024-02-09 RX ORDER — DIPHENHYDRAMINE HYDROCHLORIDE 50 MG/ML
50 INJECTION INTRAMUSCULAR; INTRAVENOUS AS NEEDED
Status: CANCELLED | OUTPATIENT
Start: 2024-02-29

## 2024-02-09 RX ORDER — DIPHENHYDRAMINE HYDROCHLORIDE 50 MG/ML
50 INJECTION INTRAMUSCULAR; INTRAVENOUS AS NEEDED
Status: CANCELLED | OUTPATIENT
Start: 2024-02-22

## 2024-02-09 RX ORDER — BORTEZOMIB 3.5 MG/1
1.6 INJECTION, POWDER, LYOPHILIZED, FOR SOLUTION INTRAVENOUS; SUBCUTANEOUS ONCE
Status: CANCELLED | OUTPATIENT
Start: 2024-02-29

## 2024-02-09 RX ORDER — BORTEZOMIB 3.5 MG/1
1.6 INJECTION, POWDER, LYOPHILIZED, FOR SOLUTION INTRAVENOUS; SUBCUTANEOUS ONCE
Status: CANCELLED | OUTPATIENT
Start: 2024-03-07

## 2024-02-09 RX ORDER — FAMOTIDINE 10 MG/ML
20 INJECTION INTRAVENOUS ONCE AS NEEDED
Status: CANCELLED | OUTPATIENT
Start: 2024-03-07

## 2024-02-09 RX ORDER — ALBUTEROL SULFATE 0.83 MG/ML
3 SOLUTION RESPIRATORY (INHALATION) AS NEEDED
Status: CANCELLED | OUTPATIENT
Start: 2024-02-29

## 2024-02-09 RX ORDER — PROCHLORPERAZINE EDISYLATE 5 MG/ML
10 INJECTION INTRAMUSCULAR; INTRAVENOUS EVERY 6 HOURS PRN
Status: CANCELLED | OUTPATIENT
Start: 2024-03-07

## 2024-02-09 RX ORDER — PROCHLORPERAZINE EDISYLATE 5 MG/ML
10 INJECTION INTRAMUSCULAR; INTRAVENOUS EVERY 6 HOURS PRN
Status: CANCELLED | OUTPATIENT
Start: 2024-02-29

## 2024-02-09 RX ORDER — FAMOTIDINE 10 MG/ML
20 INJECTION INTRAVENOUS ONCE AS NEEDED
Status: CANCELLED | OUTPATIENT
Start: 2024-02-22

## 2024-02-09 RX ORDER — FAMOTIDINE 10 MG/ML
20 INJECTION INTRAVENOUS ONCE AS NEEDED
Status: CANCELLED | OUTPATIENT
Start: 2024-02-29

## 2024-02-09 RX ORDER — PROCHLORPERAZINE MALEATE 10 MG
10 TABLET ORAL EVERY 6 HOURS PRN
Status: CANCELLED | OUTPATIENT
Start: 2024-03-07

## 2024-02-09 RX ORDER — PROCHLORPERAZINE MALEATE 10 MG
10 TABLET ORAL EVERY 6 HOURS PRN
Status: CANCELLED | OUTPATIENT
Start: 2024-02-22

## 2024-02-09 RX ORDER — DIPHENHYDRAMINE HYDROCHLORIDE 50 MG/ML
50 INJECTION INTRAMUSCULAR; INTRAVENOUS AS NEEDED
Status: CANCELLED | OUTPATIENT
Start: 2024-03-07

## 2024-02-12 ENCOUNTER — LAB (OUTPATIENT)
Dept: LAB | Facility: CLINIC | Age: 89
End: 2024-02-12
Payer: MEDICARE

## 2024-02-12 ENCOUNTER — TELEPHONE (OUTPATIENT)
Dept: HEMATOLOGY/ONCOLOGY | Facility: CLINIC | Age: 89
End: 2024-02-12
Payer: MEDICARE

## 2024-02-12 DIAGNOSIS — M86.9 OSTEOMYELITIS, UNSPECIFIED SITE, UNSPECIFIED TYPE (MULTI): ICD-10-CM

## 2024-02-12 DIAGNOSIS — I27.20 PULMONARY HYPERTENSION, UNSPECIFIED (MULTI): Primary | ICD-10-CM

## 2024-02-12 DIAGNOSIS — L03.115 CELLULITIS AND ABSCESS OF RIGHT LOWER EXTREMITY: ICD-10-CM

## 2024-02-12 DIAGNOSIS — C90.00 MULTIPLE MYELOMA NOT HAVING ACHIEVED REMISSION (MULTI): ICD-10-CM

## 2024-02-12 DIAGNOSIS — L02.415 CELLULITIS AND ABSCESS OF RIGHT LOWER EXTREMITY: ICD-10-CM

## 2024-02-12 DIAGNOSIS — E83.52 HYPERCALCEMIA: ICD-10-CM

## 2024-02-12 LAB
ALBUMIN SERPL BCP-MCNC: 3.4 G/DL (ref 3.4–5)
ALP SERPL-CCNC: 60 U/L (ref 33–136)
ALT SERPL W P-5'-P-CCNC: 10 U/L (ref 7–45)
ANION GAP SERPL CALC-SCNC: 13 MMOL/L (ref 10–20)
AST SERPL W P-5'-P-CCNC: 14 U/L (ref 9–39)
BILIRUB SERPL-MCNC: 0.3 MG/DL (ref 0–1.2)
BUN SERPL-MCNC: 21 MG/DL (ref 6–23)
CALCIUM SERPL-MCNC: 11.4 MG/DL (ref 8.6–10.3)
CHLORIDE SERPL-SCNC: 102 MMOL/L (ref 98–107)
CO2 SERPL-SCNC: 26 MMOL/L (ref 21–32)
CREAT SERPL-MCNC: 1.07 MG/DL (ref 0.5–1.05)
EGFRCR SERPLBLD CKD-EPI 2021: 49 ML/MIN/1.73M*2
ERYTHROCYTE [DISTWIDTH] IN BLOOD BY AUTOMATED COUNT: 17.7 % (ref 11.5–14.5)
GLUCOSE SERPL-MCNC: 122 MG/DL (ref 74–99)
HCT VFR BLD AUTO: 26.8 % (ref 36–46)
HGB BLD-MCNC: 8.5 G/DL (ref 12–16)
HOLD SPECIMEN: NORMAL
MCH RBC QN AUTO: 29.1 PG (ref 26–34)
MCHC RBC AUTO-ENTMCNC: 31.7 G/DL (ref 32–36)
MCV RBC AUTO: 92 FL (ref 80–100)
NRBC BLD-RTO: 0 /100 WBCS (ref 0–0)
PLATELET # BLD AUTO: 337 X10*3/UL (ref 150–450)
POTASSIUM SERPL-SCNC: 3.6 MMOL/L (ref 3.5–5.3)
PROT SERPL-MCNC: 6.8 G/DL (ref 6.4–8.2)
RBC # BLD AUTO: 2.92 X10*6/UL (ref 4–5.2)
SODIUM SERPL-SCNC: 137 MMOL/L (ref 136–145)
WBC # BLD AUTO: 8.7 X10*3/UL (ref 4.4–11.3)

## 2024-02-12 PROCEDURE — 80053 COMPREHEN METABOLIC PANEL: CPT | Performed by: INTERNAL MEDICINE

## 2024-02-12 PROCEDURE — 85027 COMPLETE CBC AUTOMATED: CPT | Performed by: INTERNAL MEDICINE

## 2024-02-12 NOTE — TELEPHONE ENCOUNTER
Calcium down from 12.8 last week to 11.4 this week; Dr. Gray ok with this decrease as it's going in the right direction.  Per him, we will add IVF Thursday when Ena is here for her Velcade. Family is aware if they notice any changes in weakness or confusion they should still get her urgently to ED to be evaluate for increase in the calcium.  They verbalized understanding about getting IVF Thursday when she's is here for Velcade.

## 2024-02-12 NOTE — TELEPHONE ENCOUNTER
Patient called that she's still feeling poorly since we gave her all the injections last week and IVF for hydration related to her hypercalcemia.  Advised that if she can get here for count check we can see where her calcium level is today, but if it's higher than last week we will advise to go to ED.  She is going to let me know if she will get it done here or go to ED.  Family member asked if Dr. Gray could enter orders for when she got to ED, I explained he does not cover in ED so that's not something he is able to do, it is all dependent on MD covering ED when Ena would get there.

## 2024-02-13 ENCOUNTER — TREATMENT (OUTPATIENT)
Dept: PHYSICAL THERAPY | Facility: CLINIC | Age: 89
End: 2024-02-13
Payer: MEDICARE

## 2024-02-13 DIAGNOSIS — M97.8XXD PERIPROSTHETIC FRACTURE OF PROXIMAL END OF TIBIA, SUBSEQUENT ENCOUNTER: ICD-10-CM

## 2024-02-13 DIAGNOSIS — Z96.659 PERIPROSTHETIC FRACTURE OF PROXIMAL END OF TIBIA, SUBSEQUENT ENCOUNTER: ICD-10-CM

## 2024-02-13 LAB
ALBUMIN: 3.2 G/DL (ref 3.4–5)
ALPHA 1 GLOBULIN: 0.5 G/DL (ref 0.2–0.6)
ALPHA 2 GLOBULIN: 0.9 G/DL (ref 0.4–1.1)
BETA GLOBULIN: 2 G/DL (ref 0.5–1.2)
GAMMA GLOBULIN: 0.4 G/DL (ref 0.5–1.4)
IMMUNOFIXATION COMMENT: ABNORMAL
M-PROTEIN 1: 1.2 G/DL
PATH REVIEW - SERUM IMMUNOFIXATION: ABNORMAL
PATH REVIEW-SERUM PROTEIN ELECTROPHORESIS: ABNORMAL
PROTEIN ELECTROPHORESIS COMMENT: ABNORMAL

## 2024-02-13 PROCEDURE — 97110 THERAPEUTIC EXERCISES: CPT | Mod: GP

## 2024-02-13 NOTE — PROGRESS NOTES
PHYSICAL THERAPY   TREATMENT NOTE    Patient Name:  Bindu Cali   Patient MRN: [id number]  Date: 02/13/24    Time Calculation  Start Time: 1135  Stop Time: 1220  Time Calculation (min): 45 min    Insurance:  Insurance Type: Medicare   Visit number: 2   [place]ved [place] of visits [place]  Cert Date: 2/7/24 - 5/7/24    General   Reason for visit: LE Weakness   Referred by: Missy Thomas PA-C    Therapy diagnoses:   1. Periprosthetic fracture of proximal end of tibia, subsequent encounter  Follow Up In Physical Therapy           Assessment:    Patient required frequent rest breaks throughout session secondary to deconditioing and fatigue. Verbal cues during sit to stand for anterior weight shift. Elevated position did help encourage patient to utilize legs to push up vs. Upper extremities to pull herself up. Demonstrates significant difficulty with standing on right LE during hip strengthening activities with multiple verbal cues required for breathing. Close supervision to CGA throughout session due to fall risk. Patient also frequently confused throughout session.     Plan:  Continue to focus on hip / core strength, endurance, balance, gait.     Subjective  Reports that she is feeling pretty good. She is trying to do her exercises at home, but is having a hard time because she gets tired easily .   - Pain (0-10): 0/10      Precautions  PMH: multiple myeloma   Fall Risk: high, due to generalized weakness, history of tibia fracture     Objective    Treatment Performed:   Therapeutic Exercise:  45 minutes  - side step at // x 4 laps   - standing alternating marching 1 x 12  - standing alternating hamstring curls 1 x 12  - standing hip flexion, abduction, extension taps 1 x 12  - sit <> stand from chair + airex 1 x 8 (focus on anterior weight shift, decreased UE assistance)   - seated leg extension 1 lb 2 x 10 ea  - seated marching 1 lb 2 x 10 ea  Manual Therapy:   minutes    Neuromuscular Re-education:    minutes    Gait Training:    minutes    Modalities: minutes

## 2024-02-15 ENCOUNTER — APPOINTMENT (OUTPATIENT)
Dept: PHYSICAL THERAPY | Facility: CLINIC | Age: 89
End: 2024-02-15
Payer: MEDICARE

## 2024-02-15 ENCOUNTER — INFUSION (OUTPATIENT)
Dept: HEMATOLOGY/ONCOLOGY | Facility: CLINIC | Age: 89
End: 2024-02-15
Payer: MEDICARE

## 2024-02-15 ENCOUNTER — LAB (OUTPATIENT)
Dept: LAB | Facility: CLINIC | Age: 89
End: 2024-02-15
Payer: MEDICARE

## 2024-02-15 VITALS
DIASTOLIC BLOOD PRESSURE: 70 MMHG | SYSTOLIC BLOOD PRESSURE: 190 MMHG | RESPIRATION RATE: 18 BRPM | HEART RATE: 66 BPM | OXYGEN SATURATION: 96 % | BODY MASS INDEX: 25.58 KG/M2 | TEMPERATURE: 97.9 F | WEIGHT: 130.95 LBS

## 2024-02-15 DIAGNOSIS — C90.00 MULTIPLE MYELOMA, REMISSION STATUS UNSPECIFIED (MULTI): Primary | ICD-10-CM

## 2024-02-15 DIAGNOSIS — C90.00 MULTIPLE MYELOMA, REMISSION STATUS UNSPECIFIED (MULTI): ICD-10-CM

## 2024-02-15 DIAGNOSIS — E83.52 HYPERCALCEMIA: ICD-10-CM

## 2024-02-15 DIAGNOSIS — C90.00 MULTIPLE MYELOMA NOT HAVING ACHIEVED REMISSION (MULTI): ICD-10-CM

## 2024-02-15 LAB
ALBUMIN SERPL BCP-MCNC: 3.6 G/DL (ref 3.4–5)
ALP SERPL-CCNC: 68 U/L (ref 33–136)
ALT SERPL W P-5'-P-CCNC: 10 U/L (ref 7–45)
ANION GAP SERPL CALC-SCNC: 14 MMOL/L (ref 10–20)
AST SERPL W P-5'-P-CCNC: 15 U/L (ref 9–39)
BASOPHILS # BLD AUTO: 0.04 X10*3/UL (ref 0–0.1)
BASOPHILS NFR BLD AUTO: 0.4 %
BILIRUB SERPL-MCNC: 0.3 MG/DL (ref 0–1.2)
BUN SERPL-MCNC: 20 MG/DL (ref 6–23)
CALCIUM SERPL-MCNC: 11.8 MG/DL (ref 8.6–10.3)
CHLORIDE SERPL-SCNC: 102 MMOL/L (ref 98–107)
CO2 SERPL-SCNC: 25 MMOL/L (ref 21–32)
CREAT SERPL-MCNC: 1.07 MG/DL (ref 0.5–1.05)
EGFRCR SERPLBLD CKD-EPI 2021: 49 ML/MIN/1.73M*2
EOSINOPHIL # BLD AUTO: 1.77 X10*3/UL (ref 0–0.4)
EOSINOPHIL NFR BLD AUTO: 19.9 %
ERYTHROCYTE [DISTWIDTH] IN BLOOD BY AUTOMATED COUNT: 17.2 % (ref 11.5–14.5)
GLUCOSE SERPL-MCNC: 97 MG/DL (ref 74–99)
HCT VFR BLD AUTO: 29 % (ref 36–46)
HGB BLD-MCNC: 9.2 G/DL (ref 12–16)
IMM GRANULOCYTES # BLD AUTO: 0.06 X10*3/UL (ref 0–0.5)
IMM GRANULOCYTES NFR BLD AUTO: 0.7 % (ref 0–0.9)
LYMPHOCYTES # BLD AUTO: 1.58 X10*3/UL (ref 0.8–3)
LYMPHOCYTES NFR BLD AUTO: 17.7 %
MCH RBC QN AUTO: 29 PG (ref 26–34)
MCHC RBC AUTO-ENTMCNC: 31.7 G/DL (ref 32–36)
MCV RBC AUTO: 92 FL (ref 80–100)
MONOCYTES # BLD AUTO: 0.83 X10*3/UL (ref 0.05–0.8)
MONOCYTES NFR BLD AUTO: 9.3 %
NEUTROPHILS # BLD AUTO: 4.63 X10*3/UL (ref 1.6–5.5)
NEUTROPHILS NFR BLD AUTO: 52 %
NRBC BLD-RTO: 0 /100 WBCS (ref 0–0)
PLATELET # BLD AUTO: 393 X10*3/UL (ref 150–450)
POTASSIUM SERPL-SCNC: 4 MMOL/L (ref 3.5–5.3)
PROT SERPL-MCNC: 7.3 G/DL (ref 6.4–8.2)
RBC # BLD AUTO: 3.17 X10*6/UL (ref 4–5.2)
SODIUM SERPL-SCNC: 137 MMOL/L (ref 136–145)
WBC # BLD AUTO: 8.9 X10*3/UL (ref 4.4–11.3)

## 2024-02-15 PROCEDURE — 80053 COMPREHEN METABOLIC PANEL: CPT | Performed by: INTERNAL MEDICINE

## 2024-02-15 PROCEDURE — 2500000004 HC RX 250 GENERAL PHARMACY W/ HCPCS (ALT 636 FOR OP/ED): Mod: JW | Performed by: INTERNAL MEDICINE

## 2024-02-15 PROCEDURE — 85025 COMPLETE CBC W/AUTO DIFF WBC: CPT | Performed by: INTERNAL MEDICINE

## 2024-02-15 PROCEDURE — 96372 THER/PROPH/DIAG INJ SC/IM: CPT | Performed by: INTERNAL MEDICINE

## 2024-02-15 PROCEDURE — 96401 CHEMO ANTI-NEOPL SQ/IM: CPT

## 2024-02-15 PROCEDURE — 2500000004 HC RX 250 GENERAL PHARMACY W/ HCPCS (ALT 636 FOR OP/ED): Performed by: INTERNAL MEDICINE

## 2024-02-15 RX ORDER — FAMOTIDINE 10 MG/ML
20 INJECTION INTRAVENOUS ONCE AS NEEDED
Status: DISCONTINUED | OUTPATIENT
Start: 2024-02-15 | End: 2024-02-15 | Stop reason: HOSPADM

## 2024-02-15 RX ORDER — HEPARIN SODIUM,PORCINE/PF 10 UNIT/ML
50 SYRINGE (ML) INTRAVENOUS AS NEEDED
Status: CANCELLED | OUTPATIENT
Start: 2024-02-15

## 2024-02-15 RX ORDER — FAMOTIDINE 10 MG/ML
20 INJECTION INTRAVENOUS ONCE AS NEEDED
Status: CANCELLED | OUTPATIENT
Start: 2024-02-15

## 2024-02-15 RX ORDER — DIPHENHYDRAMINE HYDROCHLORIDE 50 MG/ML
50 INJECTION INTRAMUSCULAR; INTRAVENOUS AS NEEDED
Status: DISCONTINUED | OUTPATIENT
Start: 2024-02-15 | End: 2024-02-15 | Stop reason: HOSPADM

## 2024-02-15 RX ORDER — ALBUTEROL SULFATE 0.83 MG/ML
3 SOLUTION RESPIRATORY (INHALATION) AS NEEDED
Status: CANCELLED | OUTPATIENT
Start: 2024-02-15

## 2024-02-15 RX ORDER — HEPARIN 100 UNIT/ML
500 SYRINGE INTRAVENOUS AS NEEDED
Status: CANCELLED | OUTPATIENT
Start: 2024-02-15

## 2024-02-15 RX ORDER — EPINEPHRINE 0.3 MG/.3ML
0.3 INJECTION SUBCUTANEOUS EVERY 5 MIN PRN
Status: CANCELLED | OUTPATIENT
Start: 2024-02-15

## 2024-02-15 RX ORDER — HEPARIN 100 UNIT/ML
500 SYRINGE INTRAVENOUS AS NEEDED
Status: DISCONTINUED | OUTPATIENT
Start: 2024-02-15 | End: 2024-02-15 | Stop reason: HOSPADM

## 2024-02-15 RX ORDER — EPINEPHRINE 0.3 MG/.3ML
0.3 INJECTION SUBCUTANEOUS EVERY 5 MIN PRN
Status: DISCONTINUED | OUTPATIENT
Start: 2024-02-15 | End: 2024-02-15 | Stop reason: HOSPADM

## 2024-02-15 RX ORDER — DIPHENHYDRAMINE HYDROCHLORIDE 50 MG/ML
50 INJECTION INTRAMUSCULAR; INTRAVENOUS AS NEEDED
Status: CANCELLED | OUTPATIENT
Start: 2024-02-15

## 2024-02-15 RX ORDER — BORTEZOMIB 3.5 MG/1
1.3 INJECTION, POWDER, LYOPHILIZED, FOR SOLUTION INTRAVENOUS; SUBCUTANEOUS ONCE
Status: COMPLETED | OUTPATIENT
Start: 2024-02-15 | End: 2024-02-15

## 2024-02-15 RX ORDER — HEPARIN SODIUM,PORCINE/PF 10 UNIT/ML
50 SYRINGE (ML) INTRAVENOUS AS NEEDED
Status: DISCONTINUED | OUTPATIENT
Start: 2024-02-15 | End: 2024-02-15 | Stop reason: HOSPADM

## 2024-02-15 RX ORDER — BORTEZOMIB 3.5 MG/1
1.6 INJECTION, POWDER, LYOPHILIZED, FOR SOLUTION INTRAVENOUS; SUBCUTANEOUS ONCE
Status: DISCONTINUED | OUTPATIENT
Start: 2024-02-15 | End: 2024-02-15

## 2024-02-15 RX ORDER — SODIUM CHLORIDE 9 MG/ML
500 INJECTION, SOLUTION INTRAVENOUS AS NEEDED
Status: DISCONTINUED | OUTPATIENT
Start: 2024-02-15 | End: 2024-02-15 | Stop reason: HOSPADM

## 2024-02-15 RX ORDER — SODIUM CHLORIDE 9 MG/ML
500 INJECTION, SOLUTION INTRAVENOUS AS NEEDED
Status: CANCELLED | OUTPATIENT
Start: 2024-02-15

## 2024-02-15 RX ORDER — ALBUTEROL SULFATE 0.83 MG/ML
3 SOLUTION RESPIRATORY (INHALATION) AS NEEDED
Status: DISCONTINUED | OUTPATIENT
Start: 2024-02-15 | End: 2024-02-15 | Stop reason: HOSPADM

## 2024-02-15 RX ORDER — PROCHLORPERAZINE EDISYLATE 5 MG/ML
10 INJECTION INTRAMUSCULAR; INTRAVENOUS EVERY 6 HOURS PRN
Status: DISCONTINUED | OUTPATIENT
Start: 2024-02-15 | End: 2024-02-15 | Stop reason: HOSPADM

## 2024-02-15 RX ORDER — PROCHLORPERAZINE MALEATE 10 MG
10 TABLET ORAL EVERY 6 HOURS PRN
Status: DISCONTINUED | OUTPATIENT
Start: 2024-02-15 | End: 2024-02-15 | Stop reason: HOSPADM

## 2024-02-15 RX ADMIN — SODIUM CHLORIDE 500 ML/HR: 9 INJECTION, SOLUTION INTRAVENOUS at 10:53

## 2024-02-15 RX ADMIN — BORTEZOMIB 2.08 MG: 1 INJECTION, POWDER, LYOPHILIZED, FOR SOLUTION INTRAVENOUS; SUBCUTANEOUS at 11:33

## 2024-02-15 ASSESSMENT — PAIN SCALES - GENERAL: PAINLEVEL: 0-NO PAIN

## 2024-02-15 NOTE — SIGNIFICANT EVENT
02/15/24 1040   Prechemo Checklist   Has the patient been in the hospital, ED, or urgent care since last date of service No   Chemo/Immuno Consent Signed Yes   Protocol/Indications Verified Yes   Confirmed to previous date/time of medication Yes   Compared to previous dose Yes   All medications are dated accurately Yes   Pregnancy Test Negative Not applicable   Parameters Met Yes   Provider Notified Yes   Is Patient Proceeding With Treatment? Yes   BSA/Weight-Height Verified Yes   Dose Calculations Verified Yes

## 2024-02-15 NOTE — SIGNIFICANT EVENT
02/15/24 1202   Prechemo Checklist   Has the patient been in the hospital, ED, or urgent care since last date of service No   Chemo/Immuno Consent Signed Yes   Protocol/Indications Verified Yes   Confirmed to previous date/time of medication Yes   Compared to previous dose Yes   All medications are dated accurately Yes   Pregnancy Test Negative Not applicable   Parameters Met Yes   Provider Notified Yes   Is Patient Proceeding With Treatment? Yes   BSA/Weight-Height Verified Yes   Dose Calculations Verified Yes

## 2024-02-21 ENCOUNTER — TREATMENT (OUTPATIENT)
Dept: PHYSICAL THERAPY | Facility: CLINIC | Age: 89
End: 2024-02-21
Payer: MEDICARE

## 2024-02-21 DIAGNOSIS — M97.8XXD PERIPROSTHETIC FRACTURE OF PROXIMAL END OF TIBIA, SUBSEQUENT ENCOUNTER: ICD-10-CM

## 2024-02-21 DIAGNOSIS — Z96.659 PERIPROSTHETIC FRACTURE OF PROXIMAL END OF TIBIA, SUBSEQUENT ENCOUNTER: ICD-10-CM

## 2024-02-21 PROCEDURE — 97110 THERAPEUTIC EXERCISES: CPT | Mod: GP,CQ

## 2024-02-21 RX ORDER — BORTEZOMIB 3.5 MG/1
1.3 INJECTION, POWDER, LYOPHILIZED, FOR SOLUTION INTRAVENOUS; SUBCUTANEOUS ONCE
Status: CANCELLED | OUTPATIENT
Start: 2024-03-07

## 2024-02-21 RX ORDER — BORTEZOMIB 3.5 MG/1
1.3 INJECTION, POWDER, LYOPHILIZED, FOR SOLUTION INTRAVENOUS; SUBCUTANEOUS ONCE
Status: CANCELLED | OUTPATIENT
Start: 2024-02-29

## 2024-02-21 NOTE — PROGRESS NOTES
PHYSICAL THERAPY   TREATMENT NOTE    Patient Name:  Bindu Cali   Patient MRN: [id number]  Date: 02/21/24    Time Calculation  Start Time: 1005  Stop Time: 1045  Time Calculation (min): 40 min    Insurance:  Insurance Type: Medicare   Visit number: 3  [place]ved # of visits [place]  Cert Date: 2/7/24 - 5/7/24    General   Reason for visit: LE Weakness   Referred by: Missy Thomas PA-C    Therapy diagnoses:   1. Periprosthetic fracture of proximal end of tibia, subsequent encounter  Follow Up In Physical Therapy           Assessment:    Patient displayed improved tolerance to exercise and increased overall endurance this date. Seated rest breaks given as desired. Demonstrated proper sit to stand with UE support; is challenged with trying less support because she is worried about her safety. Able to increase repetitions without significant fatigue in LE musculature. Able to add seated hip adduction and abduction with light resistance without any c/o pain. Updated HEP.    Plan:  Continue to focus on hip / core strength, endurance, balance, gait.     Subjective  Reports that she is feeling good with no pain in the R tibia; says she is feeling stronger today.  - Pain (0-10): 0/10      Precautions  PMH: multiple myeloma   Fall Risk: high, due to generalized weakness, history of tibia fracture     Objective    Treatment Performed:   Therapeutic Exercise:  40 minutes  - side step at // x 4 laps   - standing alternating marching x20  - standing alternating hamstring curls x20  - standing hip flexion, abduction, extension taps x10  - sit <> stand from chair ( UE assistance ) 2 x 5  - seated leg extension 1 lb 2 x 10 ea  - seated marching 1 lb 2 x 10 ea  - seated hip adduction 3 sec 2 x 10  - seated hip abd red 3 sec hold 2 x 10    Manual Therapy:   minutes    Neuromuscular Re-education:   minutes    Gait Training:    minutes    Modalities: minutes

## 2024-02-22 ENCOUNTER — LAB (OUTPATIENT)
Dept: LAB | Facility: CLINIC | Age: 89
End: 2024-02-22
Payer: MEDICARE

## 2024-02-22 ENCOUNTER — INFUSION (OUTPATIENT)
Dept: HEMATOLOGY/ONCOLOGY | Facility: CLINIC | Age: 89
End: 2024-02-22
Payer: MEDICARE

## 2024-02-22 VITALS
HEART RATE: 64 BPM | TEMPERATURE: 97.3 F | WEIGHT: 132.5 LBS | BODY MASS INDEX: 25.88 KG/M2 | OXYGEN SATURATION: 95 % | DIASTOLIC BLOOD PRESSURE: 62 MMHG | RESPIRATION RATE: 18 BRPM | SYSTOLIC BLOOD PRESSURE: 177 MMHG

## 2024-02-22 DIAGNOSIS — C90.00 MULTIPLE MYELOMA NOT HAVING ACHIEVED REMISSION (MULTI): ICD-10-CM

## 2024-02-22 DIAGNOSIS — C90.00 MULTIPLE MYELOMA, REMISSION STATUS UNSPECIFIED (MULTI): ICD-10-CM

## 2024-02-22 DIAGNOSIS — E83.52 HYPERCALCEMIA: ICD-10-CM

## 2024-02-22 LAB
ALBUMIN SERPL BCP-MCNC: 3.6 G/DL (ref 3.4–5)
ALP SERPL-CCNC: 61 U/L (ref 33–136)
ALT SERPL W P-5'-P-CCNC: 9 U/L (ref 7–45)
ANION GAP SERPL CALC-SCNC: 11 MMOL/L (ref 10–20)
AST SERPL W P-5'-P-CCNC: 15 U/L (ref 9–39)
BASOPHILS # BLD AUTO: 0.04 X10*3/UL (ref 0–0.1)
BASOPHILS NFR BLD AUTO: 0.5 %
BILIRUB SERPL-MCNC: 0.3 MG/DL (ref 0–1.2)
BUN SERPL-MCNC: 19 MG/DL (ref 6–23)
CALCIUM SERPL-MCNC: 10.2 MG/DL (ref 8.6–10.3)
CHLORIDE SERPL-SCNC: 103 MMOL/L (ref 98–107)
CO2 SERPL-SCNC: 26 MMOL/L (ref 21–32)
CREAT SERPL-MCNC: 1.07 MG/DL (ref 0.5–1.05)
EGFRCR SERPLBLD CKD-EPI 2021: 49 ML/MIN/1.73M*2
EOSINOPHIL # BLD AUTO: 1.59 X10*3/UL (ref 0–0.4)
EOSINOPHIL NFR BLD AUTO: 18.1 %
ERYTHROCYTE [DISTWIDTH] IN BLOOD BY AUTOMATED COUNT: 17.5 % (ref 11.5–14.5)
GLUCOSE SERPL-MCNC: 95 MG/DL (ref 74–99)
HCT VFR BLD AUTO: 28.6 % (ref 36–46)
HGB BLD-MCNC: 8.8 G/DL (ref 12–16)
IMM GRANULOCYTES # BLD AUTO: 0.04 X10*3/UL (ref 0–0.5)
IMM GRANULOCYTES NFR BLD AUTO: 0.5 % (ref 0–0.9)
LYMPHOCYTES # BLD AUTO: 2.07 X10*3/UL (ref 0.8–3)
LYMPHOCYTES NFR BLD AUTO: 23.6 %
MCH RBC QN AUTO: 28.1 PG (ref 26–34)
MCHC RBC AUTO-ENTMCNC: 30.8 G/DL (ref 32–36)
MCV RBC AUTO: 91 FL (ref 80–100)
MONOCYTES # BLD AUTO: 0.7 X10*3/UL (ref 0.05–0.8)
MONOCYTES NFR BLD AUTO: 8 %
NEUTROPHILS # BLD AUTO: 4.34 X10*3/UL (ref 1.6–5.5)
NEUTROPHILS NFR BLD AUTO: 49.3 %
NRBC BLD-RTO: 0 /100 WBCS (ref 0–0)
PLATELET # BLD AUTO: 438 X10*3/UL (ref 150–450)
POTASSIUM SERPL-SCNC: 4.1 MMOL/L (ref 3.5–5.3)
PROT SERPL-MCNC: 7.1 G/DL (ref 6.4–8.2)
RBC # BLD AUTO: 3.13 X10*6/UL (ref 4–5.2)
SODIUM SERPL-SCNC: 136 MMOL/L (ref 136–145)
WBC # BLD AUTO: 8.8 X10*3/UL (ref 4.4–11.3)

## 2024-02-22 PROCEDURE — 2500000004 HC RX 250 GENERAL PHARMACY W/ HCPCS (ALT 636 FOR OP/ED): Mod: JW | Performed by: INTERNAL MEDICINE

## 2024-02-22 PROCEDURE — 96401 CHEMO ANTI-NEOPL SQ/IM: CPT

## 2024-02-22 PROCEDURE — 96360 HYDRATION IV INFUSION INIT: CPT | Mod: INF

## 2024-02-22 PROCEDURE — 80053 COMPREHEN METABOLIC PANEL: CPT | Performed by: INTERNAL MEDICINE

## 2024-02-22 PROCEDURE — 85025 COMPLETE CBC W/AUTO DIFF WBC: CPT | Performed by: INTERNAL MEDICINE

## 2024-02-22 PROCEDURE — 2500000001 HC RX 250 WO HCPCS SELF ADMINISTERED DRUGS (ALT 637 FOR MEDICARE OP): Performed by: INTERNAL MEDICINE

## 2024-02-22 PROCEDURE — 2500000004 HC RX 250 GENERAL PHARMACY W/ HCPCS (ALT 636 FOR OP/ED): Performed by: INTERNAL MEDICINE

## 2024-02-22 PROCEDURE — 96372 THER/PROPH/DIAG INJ SC/IM: CPT | Performed by: INTERNAL MEDICINE

## 2024-02-22 RX ORDER — ALBUTEROL SULFATE 0.83 MG/ML
3 SOLUTION RESPIRATORY (INHALATION) AS NEEDED
Status: CANCELLED | OUTPATIENT
Start: 2024-02-22

## 2024-02-22 RX ORDER — FAMOTIDINE 10 MG/ML
20 INJECTION INTRAVENOUS ONCE AS NEEDED
Status: DISCONTINUED | OUTPATIENT
Start: 2024-02-22 | End: 2024-02-22 | Stop reason: HOSPADM

## 2024-02-22 RX ORDER — EPINEPHRINE 0.3 MG/.3ML
0.3 INJECTION SUBCUTANEOUS EVERY 5 MIN PRN
Status: DISCONTINUED | OUTPATIENT
Start: 2024-02-22 | End: 2024-02-22 | Stop reason: HOSPADM

## 2024-02-22 RX ORDER — PROCHLORPERAZINE MALEATE 10 MG
10 TABLET ORAL EVERY 6 HOURS PRN
Status: DISCONTINUED | OUTPATIENT
Start: 2024-02-22 | End: 2024-02-22 | Stop reason: HOSPADM

## 2024-02-22 RX ORDER — HEPARIN 100 UNIT/ML
500 SYRINGE INTRAVENOUS AS NEEDED
Status: CANCELLED | OUTPATIENT
Start: 2024-02-22

## 2024-02-22 RX ORDER — BORTEZOMIB 3.5 MG/1
1.3 INJECTION, POWDER, LYOPHILIZED, FOR SOLUTION INTRAVENOUS; SUBCUTANEOUS ONCE
Status: COMPLETED | OUTPATIENT
Start: 2024-02-22 | End: 2024-02-22

## 2024-02-22 RX ORDER — SODIUM CHLORIDE 9 MG/ML
500 INJECTION, SOLUTION INTRAVENOUS AS NEEDED
Status: CANCELLED | OUTPATIENT
Start: 2024-02-22

## 2024-02-22 RX ORDER — DIPHENHYDRAMINE HYDROCHLORIDE 50 MG/ML
50 INJECTION INTRAMUSCULAR; INTRAVENOUS AS NEEDED
Status: DISCONTINUED | OUTPATIENT
Start: 2024-02-22 | End: 2024-02-22 | Stop reason: HOSPADM

## 2024-02-22 RX ORDER — ALBUTEROL SULFATE 0.83 MG/ML
3 SOLUTION RESPIRATORY (INHALATION) AS NEEDED
Status: DISCONTINUED | OUTPATIENT
Start: 2024-02-22 | End: 2024-02-22 | Stop reason: HOSPADM

## 2024-02-22 RX ORDER — EPINEPHRINE 0.3 MG/.3ML
0.3 INJECTION SUBCUTANEOUS EVERY 5 MIN PRN
Status: CANCELLED | OUTPATIENT
Start: 2024-02-22

## 2024-02-22 RX ORDER — SODIUM CHLORIDE 9 MG/ML
500 INJECTION, SOLUTION INTRAVENOUS AS NEEDED
Status: DISCONTINUED | OUTPATIENT
Start: 2024-02-22 | End: 2024-02-22 | Stop reason: HOSPADM

## 2024-02-22 RX ORDER — FAMOTIDINE 10 MG/ML
20 INJECTION INTRAVENOUS ONCE AS NEEDED
Status: CANCELLED | OUTPATIENT
Start: 2024-02-22

## 2024-02-22 RX ORDER — HEPARIN SODIUM,PORCINE/PF 10 UNIT/ML
50 SYRINGE (ML) INTRAVENOUS AS NEEDED
Status: CANCELLED | OUTPATIENT
Start: 2024-02-22

## 2024-02-22 RX ORDER — DIPHENHYDRAMINE HYDROCHLORIDE 50 MG/ML
50 INJECTION INTRAMUSCULAR; INTRAVENOUS AS NEEDED
Status: CANCELLED | OUTPATIENT
Start: 2024-02-22

## 2024-02-22 RX ADMIN — BORTEZOMIB 2.08 MG: 1 INJECTION, POWDER, LYOPHILIZED, FOR SOLUTION INTRAVENOUS; SUBCUTANEOUS at 11:49

## 2024-02-22 RX ADMIN — SODIUM CHLORIDE 500 ML: 9 INJECTION, SOLUTION INTRAVENOUS at 11:27

## 2024-02-22 RX ADMIN — PROCHLORPERAZINE MALEATE 10 MG: 10 TABLET ORAL at 11:17

## 2024-02-22 ASSESSMENT — PAIN SCALES - GENERAL: PAINLEVEL: 0-NO PAIN

## 2024-02-22 NOTE — SIGNIFICANT EVENT
02/22/24 1052   Prechemo Checklist   Has the patient been in the hospital, ED, or urgent care since last date of service No   Chemo/Immuno Consent Signed Yes   Protocol/Indications Verified Yes   Confirmed to previous date/time of medication Yes   Compared to previous dose Yes   All medications are dated accurately Yes   Pregnancy Test Negative Not applicable   Parameters Met Yes   Provider Notified (!) No   Is Patient Proceeding With Treatment? Yes   BSA/Weight-Height Verified Yes   Dose Calculations Verified Yes

## 2024-02-28 ENCOUNTER — TREATMENT (OUTPATIENT)
Dept: PHYSICAL THERAPY | Facility: CLINIC | Age: 89
End: 2024-02-28
Payer: MEDICARE

## 2024-02-28 DIAGNOSIS — Z96.659 PERIPROSTHETIC FRACTURE OF PROXIMAL END OF TIBIA, SUBSEQUENT ENCOUNTER: ICD-10-CM

## 2024-02-28 DIAGNOSIS — M97.8XXD PERIPROSTHETIC FRACTURE OF PROXIMAL END OF TIBIA, SUBSEQUENT ENCOUNTER: ICD-10-CM

## 2024-02-28 PROCEDURE — 97110 THERAPEUTIC EXERCISES: CPT | Mod: GP,CQ

## 2024-02-28 NOTE — PROGRESS NOTES
"PHYSICAL THERAPY   TREATMENT NOTE    Patient Name:  Bindu Cali   Patient MRN: [id number]  Date: 02/28/24    Time Calculation  Start Time: 1000  Stop Time: 1045  Time Calculation (min): 45 min    Insurance:  Insurance Type: Medicare   Visit number: 3  [place]ved # of visits [place]  Cert Date: 2/7/24 - 5/7/24    General   Reason for visit: LE Weakness   Referred by: Missy Thomas PA-C    Therapy diagnoses:   1. Periprosthetic fracture of proximal end of tibia, subsequent encounter  Follow Up In Physical Therapy           Assessment:    Patient progressing well with endurance and LE strength. Less instances of seated rest breaks this date. Able to add ankle weight to standing exercises with good tolerance. Introduced 4 inch step ups using B UE support for safety. Challenged her balance strategies with additions of NBOS and modified tandem stance on the Air Ex, CGA.    Plan:  Continue to focus on hip / core strength, endurance, balance, gait.     Subjective  Reports that she is feeling good with no pain in the R tibia; says she is feeling stronger today.  - Pain (0-10): 0/10      Precautions  PMH: multiple myeloma   Fall Risk: high, due to generalized weakness, history of tibia fracture     Objective    Treatment Performed:   Therapeutic Exercise:  45 Minutes  - NuStep L2 x 10 minutes  - side step at // x 4 laps 1#  - standing alternating marching x 20 1#  - standing alternating hamstring curls x20 1#  - standing hip flexion, abduction, extension taps x 10 1#  - sit <> stand from chair ( UE assistance ) 2 x 5  - seated leg extension 1 lb 2 x 10 ea  - seated marching 1 lb 2 x 10 ea  - seated hip adduction 3 sec 2 x 10  - seated hip abd red 3 sec hold 2 x 10  - step up 4 inch x 10 R/L  - Air Ex NBOS x 30\" CGA  - Air Ex modified tandem stance x 30\" R/L CGA    Manual Therapy:   minutes    Neuromuscular Re-education:   minutes    Gait Training:    minutes    Modalities: minutes     "
9

## 2024-02-29 ENCOUNTER — LAB (OUTPATIENT)
Dept: LAB | Facility: CLINIC | Age: 89
End: 2024-02-29
Payer: MEDICARE

## 2024-02-29 ENCOUNTER — INFUSION (OUTPATIENT)
Dept: HEMATOLOGY/ONCOLOGY | Facility: CLINIC | Age: 89
End: 2024-02-29
Payer: MEDICARE

## 2024-02-29 VITALS
OXYGEN SATURATION: 95 % | RESPIRATION RATE: 18 BRPM | WEIGHT: 135.14 LBS | HEART RATE: 69 BPM | BODY MASS INDEX: 26.39 KG/M2 | TEMPERATURE: 98.4 F | DIASTOLIC BLOOD PRESSURE: 70 MMHG | SYSTOLIC BLOOD PRESSURE: 148 MMHG

## 2024-02-29 DIAGNOSIS — E83.52 HYPERCALCEMIA: ICD-10-CM

## 2024-02-29 DIAGNOSIS — C90.00 MULTIPLE MYELOMA NOT HAVING ACHIEVED REMISSION (MULTI): ICD-10-CM

## 2024-02-29 DIAGNOSIS — C90.00 MULTIPLE MYELOMA, REMISSION STATUS UNSPECIFIED (MULTI): ICD-10-CM

## 2024-02-29 LAB
ALBUMIN SERPL BCP-MCNC: 3.7 G/DL (ref 3.4–5)
ALP SERPL-CCNC: 58 U/L (ref 33–136)
ALT SERPL W P-5'-P-CCNC: 8 U/L (ref 7–45)
ANION GAP SERPL CALC-SCNC: 13 MMOL/L (ref 10–20)
AST SERPL W P-5'-P-CCNC: 14 U/L (ref 9–39)
BASOPHILS # BLD AUTO: 0.07 X10*3/UL (ref 0–0.1)
BASOPHILS NFR BLD AUTO: 0.6 %
BILIRUB SERPL-MCNC: 0.4 MG/DL (ref 0–1.2)
BUN SERPL-MCNC: 26 MG/DL (ref 6–23)
CALCIUM SERPL-MCNC: 10.6 MG/DL (ref 8.6–10.3)
CHLORIDE SERPL-SCNC: 105 MMOL/L (ref 98–107)
CO2 SERPL-SCNC: 24 MMOL/L (ref 21–32)
CREAT SERPL-MCNC: 1.17 MG/DL (ref 0.5–1.05)
EGFRCR SERPLBLD CKD-EPI 2021: 44 ML/MIN/1.73M*2
EOSINOPHIL # BLD AUTO: 1.48 X10*3/UL (ref 0–0.4)
EOSINOPHIL NFR BLD AUTO: 13.1 %
ERYTHROCYTE [DISTWIDTH] IN BLOOD BY AUTOMATED COUNT: 17.2 % (ref 11.5–14.5)
GLUCOSE SERPL-MCNC: 88 MG/DL (ref 74–99)
HCT VFR BLD AUTO: 30.1 % (ref 36–46)
HGB BLD-MCNC: 9.2 G/DL (ref 12–16)
IMM GRANULOCYTES # BLD AUTO: 0.03 X10*3/UL (ref 0–0.5)
IMM GRANULOCYTES NFR BLD AUTO: 0.3 % (ref 0–0.9)
LYMPHOCYTES # BLD AUTO: 2.03 X10*3/UL (ref 0.8–3)
LYMPHOCYTES NFR BLD AUTO: 17.9 %
MCH RBC QN AUTO: 28.2 PG (ref 26–34)
MCHC RBC AUTO-ENTMCNC: 30.6 G/DL (ref 32–36)
MCV RBC AUTO: 92 FL (ref 80–100)
MONOCYTES # BLD AUTO: 0.98 X10*3/UL (ref 0.05–0.8)
MONOCYTES NFR BLD AUTO: 8.6 %
NEUTROPHILS # BLD AUTO: 6.74 X10*3/UL (ref 1.6–5.5)
NEUTROPHILS NFR BLD AUTO: 59.5 %
NRBC BLD-RTO: 0 /100 WBCS (ref 0–0)
PLATELET # BLD AUTO: 420 X10*3/UL (ref 150–450)
POTASSIUM SERPL-SCNC: 4.2 MMOL/L (ref 3.5–5.3)
PROT SERPL-MCNC: 7 G/DL (ref 6.4–8.2)
RBC # BLD AUTO: 3.26 X10*6/UL (ref 4–5.2)
SODIUM SERPL-SCNC: 138 MMOL/L (ref 136–145)
WBC # BLD AUTO: 11.3 X10*3/UL (ref 4.4–11.3)

## 2024-02-29 PROCEDURE — 96360 HYDRATION IV INFUSION INIT: CPT | Mod: INF

## 2024-02-29 PROCEDURE — 84075 ASSAY ALKALINE PHOSPHATASE: CPT | Performed by: INTERNAL MEDICINE

## 2024-02-29 PROCEDURE — 85025 COMPLETE CBC W/AUTO DIFF WBC: CPT | Performed by: INTERNAL MEDICINE

## 2024-02-29 PROCEDURE — 96401 CHEMO ANTI-NEOPL SQ/IM: CPT

## 2024-02-29 PROCEDURE — 2500000004 HC RX 250 GENERAL PHARMACY W/ HCPCS (ALT 636 FOR OP/ED): Performed by: INTERNAL MEDICINE

## 2024-02-29 PROCEDURE — 96372 THER/PROPH/DIAG INJ SC/IM: CPT | Performed by: INTERNAL MEDICINE

## 2024-02-29 PROCEDURE — 2500000004 HC RX 250 GENERAL PHARMACY W/ HCPCS (ALT 636 FOR OP/ED): Mod: JW | Performed by: INTERNAL MEDICINE

## 2024-02-29 RX ORDER — FAMOTIDINE 10 MG/ML
20 INJECTION INTRAVENOUS ONCE AS NEEDED
Status: CANCELLED | OUTPATIENT
Start: 2024-02-29

## 2024-02-29 RX ORDER — FAMOTIDINE 10 MG/ML
20 INJECTION INTRAVENOUS ONCE AS NEEDED
Status: DISCONTINUED | OUTPATIENT
Start: 2024-02-29 | End: 2024-02-29 | Stop reason: HOSPADM

## 2024-02-29 RX ORDER — DIPHENHYDRAMINE HYDROCHLORIDE 50 MG/ML
50 INJECTION INTRAMUSCULAR; INTRAVENOUS AS NEEDED
Status: DISCONTINUED | OUTPATIENT
Start: 2024-02-29 | End: 2024-02-29 | Stop reason: HOSPADM

## 2024-02-29 RX ORDER — EPINEPHRINE 0.3 MG/.3ML
0.3 INJECTION SUBCUTANEOUS EVERY 5 MIN PRN
Status: DISCONTINUED | OUTPATIENT
Start: 2024-02-29 | End: 2024-02-29 | Stop reason: HOSPADM

## 2024-02-29 RX ORDER — ALBUTEROL SULFATE 0.83 MG/ML
3 SOLUTION RESPIRATORY (INHALATION) AS NEEDED
Status: DISCONTINUED | OUTPATIENT
Start: 2024-02-29 | End: 2024-02-29 | Stop reason: HOSPADM

## 2024-02-29 RX ORDER — BORTEZOMIB 3.5 MG/1
1.3 INJECTION, POWDER, LYOPHILIZED, FOR SOLUTION INTRAVENOUS; SUBCUTANEOUS ONCE
Status: COMPLETED | OUTPATIENT
Start: 2024-02-29 | End: 2024-02-29

## 2024-02-29 RX ORDER — DIPHENHYDRAMINE HYDROCHLORIDE 50 MG/ML
50 INJECTION INTRAMUSCULAR; INTRAVENOUS AS NEEDED
Status: CANCELLED | OUTPATIENT
Start: 2024-02-29

## 2024-02-29 RX ORDER — EPINEPHRINE 0.3 MG/.3ML
0.3 INJECTION SUBCUTANEOUS EVERY 5 MIN PRN
Status: CANCELLED | OUTPATIENT
Start: 2024-02-29

## 2024-02-29 RX ORDER — ALBUTEROL SULFATE 0.83 MG/ML
3 SOLUTION RESPIRATORY (INHALATION) AS NEEDED
Status: CANCELLED | OUTPATIENT
Start: 2024-02-29

## 2024-02-29 RX ADMIN — BORTEZOMIB 2.08 MG: 1 INJECTION, POWDER, LYOPHILIZED, FOR SOLUTION INTRAVENOUS; SUBCUTANEOUS at 11:26

## 2024-02-29 RX ADMIN — SODIUM CHLORIDE 500 ML: 9 INJECTION, SOLUTION INTRAVENOUS at 10:13

## 2024-02-29 ASSESSMENT — PATIENT HEALTH QUESTIONNAIRE - PHQ9
SUM OF ALL RESPONSES TO PHQ9 QUESTIONS 1 AND 2: 0
2. FEELING DOWN, DEPRESSED OR HOPELESS: NOT AT ALL
1. LITTLE INTEREST OR PLEASURE IN DOING THINGS: NOT AT ALL

## 2024-02-29 ASSESSMENT — PAIN SCALES - GENERAL: PAINLEVEL: 0-NO PAIN

## 2024-02-29 NOTE — SIGNIFICANT EVENT
02/29/24 1025   Prechemo Checklist   Has the patient been in the hospital, ED, or urgent care since last date of service No   Chemo/Immuno Consent Signed Yes   Protocol/Indications Verified Yes   Confirmed to previous date/time of medication Yes   Compared to previous dose Yes   All medications are dated accurately Yes   Pregnancy Test Negative Not applicable   Parameters Met Yes   Provider Notified Yes   Is Patient Proceeding With Treatment? Yes   BSA/Weight-Height Verified Yes   Dose Calculations Verified Yes

## 2024-03-01 ENCOUNTER — APPOINTMENT (OUTPATIENT)
Dept: PHYSICAL THERAPY | Facility: CLINIC | Age: 89
End: 2024-03-01
Payer: MEDICARE

## 2024-03-05 DIAGNOSIS — I10 ESSENTIAL HYPERTENSION: ICD-10-CM

## 2024-03-05 RX ORDER — LOSARTAN POTASSIUM 100 MG/1
100 TABLET ORAL DAILY
Qty: 90 TABLET | Refills: 0 | Status: SHIPPED | OUTPATIENT
Start: 2024-03-05

## 2024-03-06 ENCOUNTER — TREATMENT (OUTPATIENT)
Dept: PHYSICAL THERAPY | Facility: CLINIC | Age: 89
End: 2024-03-06
Payer: MEDICARE

## 2024-03-06 DIAGNOSIS — Z96.659 PERIPROSTHETIC FRACTURE OF PROXIMAL END OF TIBIA, SUBSEQUENT ENCOUNTER: ICD-10-CM

## 2024-03-06 DIAGNOSIS — M97.8XXD PERIPROSTHETIC FRACTURE OF PROXIMAL END OF TIBIA, SUBSEQUENT ENCOUNTER: ICD-10-CM

## 2024-03-06 PROCEDURE — 97110 THERAPEUTIC EXERCISES: CPT | Mod: GP,CQ

## 2024-03-06 NOTE — PROGRESS NOTES
"PHYSICAL THERAPY   TREATMENT NOTE    Patient Name:  Bindu Cali   Patient MRN: [id number]  Date: 03/06/24    Time Calculation  Start Time: 1000  Stop Time: 1045  Time Calculation (min): 45 min    Insurance:  Insurance Type: Medicare   Visit number: 5  Approved # of visits [place]  Cert Date: 2/7/24 - 5/7/24    General   Reason for visit: LE Weakness   Referred by: Missy Thomas PA-C    Therapy diagnoses:   1. Periprosthetic fracture of proximal end of tibia, subsequent encounter  Follow Up In Physical Therapy           Assessment:    Patient presented to session with c/o continued LE strength deficits, especially the R LE. Focused on increasing LE balance in standing and strength in supine this date. Challenged with core strength during bridges. Required AA for SAQ on the R LE as quad weakness persists.    Plan:  Continue to focus on hip / core strength, endurance, balance, gait.     Subjective  Reports that she has been walking a little bit in the kitchen without the walker and feels her LE strength comes and goes.  - Pain (0-10): 0/10      Precautions  PMH: multiple myeloma   Fall Risk: high, due to generalized weakness, history of tibia fracture     Objective    Treatment Performed:   Therapeutic Exercise:  45 Minutes  - NuStep L3 x 10 minutes  - slant board stretch x 30\"  - step up 4 inch fwd/lat x 10 R/L  - Air Ex NBOS x 30\" CGA  - Air Ex modified tandem stance x 30\" R/L CGA  - supine hip abd red 2 x 10  - supine marching red 2 x 10  - supine SLR 1# 2 x 5  - SAQ 5\" 2 x 5  - bridge 5\" x 5    Not performed this date:  - side step at // x 4 laps 1#  - standing alternating marching x 20 1#  - standing alternating hamstring curls x20 1#  - standing hip flexion, abduction, extension taps x 10 1#  - sit <> stand from chair ( UE assistance ) 2 x 5  - seated leg extension 1 lb 2 x 10 ea  - seated marching 1 lb 2 x 10 ea  - seated hip adduction 3 sec 2 x 10  - seated hip abd red 3 sec hold 2 x 10    Manual " Therapy:   minutes    Neuromuscular Re-education:   minutes    Gait Training:    minutes    Modalities: minutes

## 2024-03-07 ENCOUNTER — LAB (OUTPATIENT)
Dept: LAB | Facility: CLINIC | Age: 89
End: 2024-03-07
Payer: MEDICARE

## 2024-03-07 ENCOUNTER — INFUSION (OUTPATIENT)
Dept: HEMATOLOGY/ONCOLOGY | Facility: CLINIC | Age: 89
End: 2024-03-07
Payer: MEDICARE

## 2024-03-07 VITALS
OXYGEN SATURATION: 98 % | HEART RATE: 68 BPM | WEIGHT: 134.26 LBS | BODY MASS INDEX: 26.22 KG/M2 | SYSTOLIC BLOOD PRESSURE: 194 MMHG | DIASTOLIC BLOOD PRESSURE: 78 MMHG | TEMPERATURE: 98.2 F | RESPIRATION RATE: 18 BRPM

## 2024-03-07 DIAGNOSIS — C90.00 MULTIPLE MYELOMA, REMISSION STATUS UNSPECIFIED (MULTI): ICD-10-CM

## 2024-03-07 DIAGNOSIS — C90.00 MULTIPLE MYELOMA NOT HAVING ACHIEVED REMISSION (MULTI): ICD-10-CM

## 2024-03-07 DIAGNOSIS — E83.52 HYPERCALCEMIA: ICD-10-CM

## 2024-03-07 LAB
ALBUMIN SERPL BCP-MCNC: 3.7 G/DL (ref 3.4–5)
ALP SERPL-CCNC: 57 U/L (ref 33–136)
ALT SERPL W P-5'-P-CCNC: 8 U/L (ref 7–45)
ANION GAP SERPL CALC-SCNC: 9 MMOL/L (ref 10–20)
AST SERPL W P-5'-P-CCNC: 15 U/L (ref 9–39)
BASOPHILS # BLD AUTO: 0.05 X10*3/UL (ref 0–0.1)
BASOPHILS NFR BLD AUTO: 0.5 %
BILIRUB SERPL-MCNC: 0.3 MG/DL (ref 0–1.2)
BUN SERPL-MCNC: 29 MG/DL (ref 6–23)
CALCIUM SERPL-MCNC: 11.4 MG/DL (ref 8.6–10.3)
CHLORIDE SERPL-SCNC: 102 MMOL/L (ref 98–107)
CO2 SERPL-SCNC: 29 MMOL/L (ref 21–32)
CREAT SERPL-MCNC: 1.14 MG/DL (ref 0.5–1.05)
EGFRCR SERPLBLD CKD-EPI 2021: 46 ML/MIN/1.73M*2
EOSINOPHIL # BLD AUTO: 1.18 X10*3/UL (ref 0–0.4)
EOSINOPHIL NFR BLD AUTO: 11.8 %
ERYTHROCYTE [DISTWIDTH] IN BLOOD BY AUTOMATED COUNT: 16.7 % (ref 11.5–14.5)
GLUCOSE SERPL-MCNC: 108 MG/DL (ref 74–99)
HCT VFR BLD AUTO: 29.8 % (ref 36–46)
HGB BLD-MCNC: 9.3 G/DL (ref 12–16)
IMM GRANULOCYTES # BLD AUTO: 0.03 X10*3/UL (ref 0–0.5)
IMM GRANULOCYTES NFR BLD AUTO: 0.3 % (ref 0–0.9)
LYMPHOCYTES # BLD AUTO: 1.78 X10*3/UL (ref 0.8–3)
LYMPHOCYTES NFR BLD AUTO: 17.8 %
MCH RBC QN AUTO: 28.4 PG (ref 26–34)
MCHC RBC AUTO-ENTMCNC: 31.2 G/DL (ref 32–36)
MCV RBC AUTO: 91 FL (ref 80–100)
MONOCYTES # BLD AUTO: 0.83 X10*3/UL (ref 0.05–0.8)
MONOCYTES NFR BLD AUTO: 8.3 %
NEUTROPHILS # BLD AUTO: 6.12 X10*3/UL (ref 1.6–5.5)
NEUTROPHILS NFR BLD AUTO: 61.3 %
NRBC BLD-RTO: 0 /100 WBCS (ref 0–0)
PLATELET # BLD AUTO: 364 X10*3/UL (ref 150–450)
POTASSIUM SERPL-SCNC: 4.1 MMOL/L (ref 3.5–5.3)
PROT SERPL-MCNC: 6.9 G/DL (ref 6.4–8.2)
RBC # BLD AUTO: 3.28 X10*6/UL (ref 4–5.2)
SODIUM SERPL-SCNC: 136 MMOL/L (ref 136–145)
WBC # BLD AUTO: 10 X10*3/UL (ref 4.4–11.3)

## 2024-03-07 PROCEDURE — 2500000004 HC RX 250 GENERAL PHARMACY W/ HCPCS (ALT 636 FOR OP/ED): Mod: JZ | Performed by: INTERNAL MEDICINE

## 2024-03-07 PROCEDURE — 96372 THER/PROPH/DIAG INJ SC/IM: CPT

## 2024-03-07 PROCEDURE — 96401 CHEMO ANTI-NEOPL SQ/IM: CPT

## 2024-03-07 PROCEDURE — 85025 COMPLETE CBC W/AUTO DIFF WBC: CPT | Performed by: INTERNAL MEDICINE

## 2024-03-07 PROCEDURE — 2500000004 HC RX 250 GENERAL PHARMACY W/ HCPCS (ALT 636 FOR OP/ED): Performed by: INTERNAL MEDICINE

## 2024-03-07 PROCEDURE — 84075 ASSAY ALKALINE PHOSPHATASE: CPT | Performed by: INTERNAL MEDICINE

## 2024-03-07 PROCEDURE — 96372 THER/PROPH/DIAG INJ SC/IM: CPT | Performed by: INTERNAL MEDICINE

## 2024-03-07 RX ORDER — SODIUM CHLORIDE 9 MG/ML
500 INJECTION, SOLUTION INTRAVENOUS AS NEEDED
Status: CANCELLED | OUTPATIENT
Start: 2024-03-07

## 2024-03-07 RX ORDER — BORTEZOMIB 3.5 MG/1
1.3 INJECTION, POWDER, LYOPHILIZED, FOR SOLUTION INTRAVENOUS; SUBCUTANEOUS ONCE
Status: COMPLETED | OUTPATIENT
Start: 2024-03-07 | End: 2024-03-07

## 2024-03-07 RX ORDER — HEPARIN SODIUM,PORCINE/PF 10 UNIT/ML
50 SYRINGE (ML) INTRAVENOUS AS NEEDED
Status: CANCELLED | OUTPATIENT
Start: 2024-03-07

## 2024-03-07 RX ORDER — DIPHENHYDRAMINE HYDROCHLORIDE 50 MG/ML
50 INJECTION INTRAMUSCULAR; INTRAVENOUS AS NEEDED
Status: CANCELLED | OUTPATIENT
Start: 2024-03-11

## 2024-03-07 RX ORDER — FAMOTIDINE 10 MG/ML
20 INJECTION INTRAVENOUS ONCE AS NEEDED
Status: DISCONTINUED | OUTPATIENT
Start: 2024-03-07 | End: 2024-03-07 | Stop reason: HOSPADM

## 2024-03-07 RX ORDER — HEPARIN 100 UNIT/ML
500 SYRINGE INTRAVENOUS AS NEEDED
Status: CANCELLED | OUTPATIENT
Start: 2024-03-07

## 2024-03-07 RX ORDER — EPINEPHRINE 0.3 MG/.3ML
0.3 INJECTION SUBCUTANEOUS EVERY 5 MIN PRN
Status: DISCONTINUED | OUTPATIENT
Start: 2024-03-07 | End: 2024-03-07 | Stop reason: HOSPADM

## 2024-03-07 RX ORDER — ALBUTEROL SULFATE 0.83 MG/ML
3 SOLUTION RESPIRATORY (INHALATION) AS NEEDED
Status: DISCONTINUED | OUTPATIENT
Start: 2024-03-07 | End: 2024-03-07 | Stop reason: HOSPADM

## 2024-03-07 RX ORDER — DIPHENHYDRAMINE HYDROCHLORIDE 50 MG/ML
50 INJECTION INTRAMUSCULAR; INTRAVENOUS AS NEEDED
Status: DISCONTINUED | OUTPATIENT
Start: 2024-03-07 | End: 2024-03-07 | Stop reason: HOSPADM

## 2024-03-07 RX ORDER — HEPARIN 100 UNIT/ML
500 SYRINGE INTRAVENOUS AS NEEDED
Status: DISCONTINUED | OUTPATIENT
Start: 2024-03-07 | End: 2024-03-07 | Stop reason: HOSPADM

## 2024-03-07 RX ORDER — ALBUTEROL SULFATE 0.83 MG/ML
3 SOLUTION RESPIRATORY (INHALATION) AS NEEDED
Status: CANCELLED | OUTPATIENT
Start: 2024-03-11

## 2024-03-07 RX ORDER — EPINEPHRINE 0.3 MG/.3ML
0.3 INJECTION SUBCUTANEOUS EVERY 5 MIN PRN
Status: CANCELLED | OUTPATIENT
Start: 2024-03-11

## 2024-03-07 RX ORDER — FAMOTIDINE 10 MG/ML
20 INJECTION INTRAVENOUS ONCE AS NEEDED
Status: CANCELLED | OUTPATIENT
Start: 2024-03-11

## 2024-03-07 RX ORDER — HEPARIN SODIUM,PORCINE/PF 10 UNIT/ML
50 SYRINGE (ML) INTRAVENOUS AS NEEDED
Status: DISCONTINUED | OUTPATIENT
Start: 2024-03-07 | End: 2024-03-07 | Stop reason: HOSPADM

## 2024-03-07 RX ADMIN — BORTEZOMIB 2.08 MG: 1 INJECTION, POWDER, LYOPHILIZED, FOR SOLUTION INTRAVENOUS; SUBCUTANEOUS at 10:56

## 2024-03-07 RX ADMIN — SODIUM CHLORIDE 500 ML: 9 INJECTION, SOLUTION INTRAVENOUS at 10:15

## 2024-03-07 RX ADMIN — DENOSUMAB 120 MG: 120 INJECTION SUBCUTANEOUS at 10:41

## 2024-03-07 ASSESSMENT — PAIN SCALES - GENERAL: PAINLEVEL: 0-NO PAIN

## 2024-03-07 NOTE — SIGNIFICANT EVENT
03/07/24 1022   Prechemo Checklist   Has the patient been in the hospital, ED, or urgent care since last date of service No   Chemo/Immuno Consent Signed Yes   Compared to previous dose Yes   All medications are dated accurately Yes   Pregnancy Test Negative Not applicable   Parameters Met Yes   Provider Notified Yes   Is Patient Proceeding With Treatment? Yes   BSA/Weight-Height Verified Yes   Dose Calculations Verified Yes

## 2024-03-08 ENCOUNTER — TREATMENT (OUTPATIENT)
Dept: PHYSICAL THERAPY | Facility: CLINIC | Age: 89
End: 2024-03-08
Payer: MEDICARE

## 2024-03-08 DIAGNOSIS — Z96.659 PERIPROSTHETIC FRACTURE OF PROXIMAL END OF TIBIA, SUBSEQUENT ENCOUNTER: ICD-10-CM

## 2024-03-08 DIAGNOSIS — M97.8XXD PERIPROSTHETIC FRACTURE OF PROXIMAL END OF TIBIA, SUBSEQUENT ENCOUNTER: ICD-10-CM

## 2024-03-08 PROCEDURE — 97110 THERAPEUTIC EXERCISES: CPT | Mod: GP

## 2024-03-08 NOTE — PROGRESS NOTES
PHYSICAL THERAPY   TREATMENT NOTE    Patient Name:  Bindu Cali   Patient MRN: [id number]  Date: 03/08/24    Time Calculation  Start Time: 1338  Stop Time: 1420  Time Calculation (min): 42 min    Insurance:  Insurance Type: Medicare   Visit number: 6  [place] of visits [place]  Cert Date: 2/7/24 - 5/7/24    General   Reason for visit: LE Weakness   Referred by: Missy Thomas PA-C    Therapy diagnoses:   1. Periprosthetic fracture of proximal end of tibia, subsequent encounter  Follow Up In Physical Therapy           Assessment:    Decreased confidence in RLE strength and endurance. Not ready to transition off the walker to no assistive device.     Plan:  Continue to focus on hip / core strength, endurance, balance, gait. Work towards SL stance RLE.   Bring cane into next appointment to assess readiness for walker weaning.     Subjective  She feels like her strength is getting better, still overall weak. Would like to get off of the walker.   - Pain (0-10): 0/10      Precautions  PMH: multiple myeloma   Fall Risk: high, due to generalized weakness, history of tibia fracture     Objective    Treatment Performed:   Therapeutic Exercise:  42 Minutes  - Sit to stand green theraband   - Seated hip abduction green theraband   - Standing hip abduction R/L  - Standing hip extension R/L  - Seated LAQ with ball squeeze   - Standing weight shift anterior and lateral R/L  - Nustep x 10 minutes     Not performed this date:  - side step at // x 4 laps 1#  - standing alternating marching x 20 1#  - standing alternating hamstring curls x20 1#  - standing hip flexion, abduction, extension taps x 10 1#  - sit <> stand from chair ( UE assistance ) 2 x 5  - seated leg extension 1 lb 2 x 10 ea  - seated marching 1 lb 2 x 10 ea  - seated hip adduction 3 sec 2 x 10  - seated hip abd red 3 sec hold 2 x 10    Manual Therapy:   minutes    Neuromuscular Re-education:   minutes    Gait Training:    minutes    Modalities:  minutes

## 2024-03-13 ENCOUNTER — TREATMENT (OUTPATIENT)
Dept: PHYSICAL THERAPY | Facility: CLINIC | Age: 89
End: 2024-03-13
Payer: MEDICARE

## 2024-03-13 DIAGNOSIS — M97.8XXD PERIPROSTHETIC FRACTURE OF PROXIMAL END OF TIBIA, SUBSEQUENT ENCOUNTER: ICD-10-CM

## 2024-03-13 DIAGNOSIS — Z96.659 PERIPROSTHETIC FRACTURE OF PROXIMAL END OF TIBIA, SUBSEQUENT ENCOUNTER: ICD-10-CM

## 2024-03-13 PROCEDURE — 97110 THERAPEUTIC EXERCISES: CPT | Mod: GP

## 2024-03-13 NOTE — PROGRESS NOTES
PHYSICAL THERAPY   TREATMENT NOTE    Patient Name:  Bindu Cali   Patient MRN: [id number]  Date: 03/13/24    Time Calculation  Start Time: 1015  Stop Time: 1059  Time Calculation (min): 44 min    Insurance:  Insurance Type: Medicare   Visit number: 7  [place]ved [place] of visits [place]  Cert Date: 2/7/24 - 5/7/24    General   Reason for visit: LE Weakness   Referred by: Missy Thomas PA-C    Therapy diagnoses:   1. Periprosthetic fracture of proximal end of tibia, subsequent encounter  Follow Up In Physical Therapy           Assessment:    Able to walk with cane with improved speed than with wheeled walker. Will require contralateral UE assistance due to decreased confidence. Without assist, pt with extreme forward trunk lean and abnormal step and stride length. Recommend patient use wheeled walker for challenging environments but can use the cane for predictable scenarios.     Plan:  Continue to focus on hip / core strength, endurance, balance, gait. Work towards SL stance RLE.     Subjective  She feels like her strength is getting better, still overall weak. She has been trying some furniture walking around her house. She brings her cane in for instruction.   - Pain (0-10): 0/10      Precautions  PMH: multiple myeloma   Fall Risk: high, due to generalized weakness, history of tibia fracture     Objective    Treatment Performed:   Therapeutic Exercise:  44 Minutes  - Standing hip abduction R/L 2 x 10   - Standing hip extension R/L 2 x 10  - Nustep x 10 minutes   - Gait training with cane   - Airex DLS 10' x 8  - Lateral cone step over 3 x 15       Not performed this date:  - side step at // x 4 laps 1#  - standing alternating marching x 20 1#  - standing alternating hamstring curls x20 1#  - standing hip flexion, abduction, extension taps x 10 1#  - sit <> stand from chair ( UE assistance ) 2 x 5  - seated leg extension 1 lb 2 x 10 ea  - seated marching 1 lb 2 x 10 ea  - seated hip adduction 3 sec 2 x 10  -  seated hip abd red 3 sec hold 2 x 10    Manual Therapy:   minutes    Neuromuscular Re-education:   minutes    Gait Training:    minutes    Modalities: minutes

## 2024-03-14 ENCOUNTER — INFUSION (OUTPATIENT)
Dept: HEMATOLOGY/ONCOLOGY | Facility: CLINIC | Age: 89
End: 2024-03-14
Payer: MEDICARE

## 2024-03-14 ENCOUNTER — LAB (OUTPATIENT)
Dept: LAB | Facility: CLINIC | Age: 89
End: 2024-03-14
Payer: MEDICARE

## 2024-03-14 VITALS
BODY MASS INDEX: 26.39 KG/M2 | TEMPERATURE: 97.5 F | RESPIRATION RATE: 18 BRPM | HEART RATE: 67 BPM | OXYGEN SATURATION: 94 % | WEIGHT: 135.14 LBS

## 2024-03-14 DIAGNOSIS — E83.52 HYPERCALCEMIA: ICD-10-CM

## 2024-03-14 DIAGNOSIS — C90.00 MULTIPLE MYELOMA, REMISSION STATUS UNSPECIFIED (MULTI): Primary | ICD-10-CM

## 2024-03-14 DIAGNOSIS — C90.00 MULTIPLE MYELOMA NOT HAVING ACHIEVED REMISSION (MULTI): ICD-10-CM

## 2024-03-14 LAB
ALBUMIN SERPL BCP-MCNC: 3.6 G/DL (ref 3.4–5)
ALP SERPL-CCNC: 58 U/L (ref 33–136)
ALT SERPL W P-5'-P-CCNC: 9 U/L (ref 7–45)
ANION GAP SERPL CALC-SCNC: 13 MMOL/L (ref 10–20)
AST SERPL W P-5'-P-CCNC: 15 U/L (ref 9–39)
BASOPHILS # BLD AUTO: 0.04 X10*3/UL (ref 0–0.1)
BASOPHILS NFR BLD AUTO: 0.6 %
BILIRUB SERPL-MCNC: 0.4 MG/DL (ref 0–1.2)
BUN SERPL-MCNC: 28 MG/DL (ref 6–23)
CALCIUM SERPL-MCNC: 10.2 MG/DL (ref 8.6–10.3)
CHLORIDE SERPL-SCNC: 102 MMOL/L (ref 98–107)
CO2 SERPL-SCNC: 26 MMOL/L (ref 21–32)
CREAT SERPL-MCNC: 1.33 MG/DL (ref 0.5–1.05)
EGFRCR SERPLBLD CKD-EPI 2021: 38 ML/MIN/1.73M*2
EOSINOPHIL # BLD AUTO: 0.82 X10*3/UL (ref 0–0.4)
EOSINOPHIL NFR BLD AUTO: 11.6 %
ERYTHROCYTE [DISTWIDTH] IN BLOOD BY AUTOMATED COUNT: 16.9 % (ref 11.5–14.5)
GLUCOSE SERPL-MCNC: 88 MG/DL (ref 74–99)
HCT VFR BLD AUTO: 28.5 % (ref 36–46)
HGB BLD-MCNC: 8.9 G/DL (ref 12–16)
IMM GRANULOCYTES # BLD AUTO: 0.03 X10*3/UL (ref 0–0.5)
IMM GRANULOCYTES NFR BLD AUTO: 0.4 % (ref 0–0.9)
LYMPHOCYTES # BLD AUTO: 1.84 X10*3/UL (ref 0.8–3)
LYMPHOCYTES NFR BLD AUTO: 26 %
MCH RBC QN AUTO: 28.9 PG (ref 26–34)
MCHC RBC AUTO-ENTMCNC: 31.2 G/DL (ref 32–36)
MCV RBC AUTO: 93 FL (ref 80–100)
MONOCYTES # BLD AUTO: 0.82 X10*3/UL (ref 0.05–0.8)
MONOCYTES NFR BLD AUTO: 11.6 %
NEUTROPHILS # BLD AUTO: 3.53 X10*3/UL (ref 1.6–5.5)
NEUTROPHILS NFR BLD AUTO: 49.8 %
NRBC BLD-RTO: 0 /100 WBCS (ref 0–0)
PLATELET # BLD AUTO: 398 X10*3/UL (ref 150–450)
POTASSIUM SERPL-SCNC: 4.3 MMOL/L (ref 3.5–5.3)
PROT SERPL-MCNC: 6.9 G/DL (ref 6.4–8.2)
RBC # BLD AUTO: 3.08 X10*6/UL (ref 4–5.2)
SODIUM SERPL-SCNC: 137 MMOL/L (ref 136–145)
WBC # BLD AUTO: 7.1 X10*3/UL (ref 4.4–11.3)

## 2024-03-14 PROCEDURE — 2500000001 HC RX 250 WO HCPCS SELF ADMINISTERED DRUGS (ALT 637 FOR MEDICARE OP): Performed by: INTERNAL MEDICINE

## 2024-03-14 PROCEDURE — 96360 HYDRATION IV INFUSION INIT: CPT | Mod: INF

## 2024-03-14 PROCEDURE — 80053 COMPREHEN METABOLIC PANEL: CPT | Performed by: INTERNAL MEDICINE

## 2024-03-14 PROCEDURE — 96401 CHEMO ANTI-NEOPL SQ/IM: CPT

## 2024-03-14 PROCEDURE — 96372 THER/PROPH/DIAG INJ SC/IM: CPT | Performed by: INTERNAL MEDICINE

## 2024-03-14 PROCEDURE — 85025 COMPLETE CBC W/AUTO DIFF WBC: CPT | Performed by: INTERNAL MEDICINE

## 2024-03-14 PROCEDURE — 2500000004 HC RX 250 GENERAL PHARMACY W/ HCPCS (ALT 636 FOR OP/ED): Performed by: INTERNAL MEDICINE

## 2024-03-14 PROCEDURE — 2500000004 HC RX 250 GENERAL PHARMACY W/ HCPCS (ALT 636 FOR OP/ED): Mod: JZ | Performed by: INTERNAL MEDICINE

## 2024-03-14 RX ORDER — BORTEZOMIB 3.5 MG/1
1.3 INJECTION, POWDER, LYOPHILIZED, FOR SOLUTION INTRAVENOUS; SUBCUTANEOUS ONCE
Status: CANCELLED | OUTPATIENT
Start: 2024-04-04

## 2024-03-14 RX ORDER — FAMOTIDINE 10 MG/ML
20 INJECTION INTRAVENOUS ONCE AS NEEDED
Status: CANCELLED | OUTPATIENT
Start: 2024-03-21

## 2024-03-14 RX ORDER — ALBUTEROL SULFATE 0.83 MG/ML
3 SOLUTION RESPIRATORY (INHALATION) AS NEEDED
Status: CANCELLED | OUTPATIENT
Start: 2024-04-04

## 2024-03-14 RX ORDER — EPINEPHRINE 0.3 MG/.3ML
0.3 INJECTION SUBCUTANEOUS EVERY 5 MIN PRN
Status: DISCONTINUED | OUTPATIENT
Start: 2024-03-14 | End: 2024-03-14 | Stop reason: HOSPADM

## 2024-03-14 RX ORDER — PROCHLORPERAZINE MALEATE 10 MG
10 TABLET ORAL EVERY 6 HOURS PRN
Status: DISCONTINUED | OUTPATIENT
Start: 2024-03-14 | End: 2024-03-14 | Stop reason: HOSPADM

## 2024-03-14 RX ORDER — FAMOTIDINE 10 MG/ML
20 INJECTION INTRAVENOUS ONCE AS NEEDED
Status: CANCELLED | OUTPATIENT
Start: 2024-04-04

## 2024-03-14 RX ORDER — DIPHENHYDRAMINE HYDROCHLORIDE 50 MG/ML
50 INJECTION INTRAMUSCULAR; INTRAVENOUS AS NEEDED
Status: CANCELLED | OUTPATIENT
Start: 2024-03-21

## 2024-03-14 RX ORDER — ALBUTEROL SULFATE 0.83 MG/ML
3 SOLUTION RESPIRATORY (INHALATION) AS NEEDED
Status: CANCELLED | OUTPATIENT
Start: 2024-03-21

## 2024-03-14 RX ORDER — EPINEPHRINE 0.3 MG/.3ML
0.3 INJECTION SUBCUTANEOUS EVERY 5 MIN PRN
OUTPATIENT
Start: 2024-03-14

## 2024-03-14 RX ORDER — PROCHLORPERAZINE EDISYLATE 5 MG/ML
10 INJECTION INTRAMUSCULAR; INTRAVENOUS EVERY 6 HOURS PRN
Status: CANCELLED | OUTPATIENT
Start: 2024-03-28

## 2024-03-14 RX ORDER — SODIUM CHLORIDE 9 MG/ML
500 INJECTION, SOLUTION INTRAVENOUS AS NEEDED
Status: CANCELLED | OUTPATIENT
Start: 2024-03-14

## 2024-03-14 RX ORDER — HEPARIN 100 UNIT/ML
500 SYRINGE INTRAVENOUS AS NEEDED
Status: CANCELLED | OUTPATIENT
Start: 2024-03-14

## 2024-03-14 RX ORDER — BORTEZOMIB 3.5 MG/1
1.3 INJECTION, POWDER, LYOPHILIZED, FOR SOLUTION INTRAVENOUS; SUBCUTANEOUS ONCE
Status: CANCELLED | OUTPATIENT
Start: 2024-03-21

## 2024-03-14 RX ORDER — DIPHENHYDRAMINE HYDROCHLORIDE 50 MG/ML
50 INJECTION INTRAMUSCULAR; INTRAVENOUS AS NEEDED
Status: CANCELLED | OUTPATIENT
Start: 2024-04-04

## 2024-03-14 RX ORDER — EPINEPHRINE 0.3 MG/.3ML
0.3 INJECTION SUBCUTANEOUS EVERY 5 MIN PRN
Status: CANCELLED | OUTPATIENT
Start: 2024-03-21

## 2024-03-14 RX ORDER — BORTEZOMIB 3.5 MG/1
1.3 INJECTION, POWDER, LYOPHILIZED, FOR SOLUTION INTRAVENOUS; SUBCUTANEOUS ONCE
Status: COMPLETED | OUTPATIENT
Start: 2024-03-14 | End: 2024-03-14

## 2024-03-14 RX ORDER — EPINEPHRINE 0.3 MG/.3ML
0.3 INJECTION SUBCUTANEOUS EVERY 5 MIN PRN
Status: CANCELLED | OUTPATIENT
Start: 2024-03-28

## 2024-03-14 RX ORDER — PROCHLORPERAZINE MALEATE 10 MG
10 TABLET ORAL EVERY 6 HOURS PRN
Status: CANCELLED | OUTPATIENT
Start: 2024-03-21

## 2024-03-14 RX ORDER — FAMOTIDINE 10 MG/ML
20 INJECTION INTRAVENOUS ONCE AS NEEDED
Status: DISCONTINUED | OUTPATIENT
Start: 2024-03-14 | End: 2024-03-14 | Stop reason: HOSPADM

## 2024-03-14 RX ORDER — EPINEPHRINE 0.3 MG/.3ML
0.3 INJECTION SUBCUTANEOUS EVERY 5 MIN PRN
Status: CANCELLED | OUTPATIENT
Start: 2024-04-04

## 2024-03-14 RX ORDER — PROCHLORPERAZINE MALEATE 10 MG
10 TABLET ORAL EVERY 6 HOURS PRN
Status: CANCELLED | OUTPATIENT
Start: 2024-04-04

## 2024-03-14 RX ORDER — BORTEZOMIB 3.5 MG/1
1.3 INJECTION, POWDER, LYOPHILIZED, FOR SOLUTION INTRAVENOUS; SUBCUTANEOUS ONCE
Status: CANCELLED | OUTPATIENT
Start: 2024-03-28

## 2024-03-14 RX ORDER — DIPHENHYDRAMINE HYDROCHLORIDE 50 MG/ML
50 INJECTION INTRAMUSCULAR; INTRAVENOUS AS NEEDED
OUTPATIENT
Start: 2024-03-14

## 2024-03-14 RX ORDER — ALBUTEROL SULFATE 0.83 MG/ML
3 SOLUTION RESPIRATORY (INHALATION) AS NEEDED
OUTPATIENT
Start: 2024-03-14

## 2024-03-14 RX ORDER — FAMOTIDINE 10 MG/ML
20 INJECTION INTRAVENOUS ONCE AS NEEDED
Status: CANCELLED | OUTPATIENT
Start: 2024-03-28

## 2024-03-14 RX ORDER — SODIUM CHLORIDE 9 MG/ML
500 INJECTION, SOLUTION INTRAVENOUS AS NEEDED
Status: DISCONTINUED | OUTPATIENT
Start: 2024-03-14 | End: 2024-03-14 | Stop reason: HOSPADM

## 2024-03-14 RX ORDER — HEPARIN SODIUM,PORCINE/PF 10 UNIT/ML
50 SYRINGE (ML) INTRAVENOUS AS NEEDED
Status: CANCELLED | OUTPATIENT
Start: 2024-03-14

## 2024-03-14 RX ORDER — PROCHLORPERAZINE EDISYLATE 5 MG/ML
10 INJECTION INTRAMUSCULAR; INTRAVENOUS EVERY 6 HOURS PRN
Status: CANCELLED | OUTPATIENT
Start: 2024-03-21

## 2024-03-14 RX ORDER — PROCHLORPERAZINE MALEATE 10 MG
10 TABLET ORAL EVERY 6 HOURS PRN
Status: CANCELLED | OUTPATIENT
Start: 2024-03-28

## 2024-03-14 RX ORDER — FAMOTIDINE 10 MG/ML
20 INJECTION INTRAVENOUS ONCE AS NEEDED
OUTPATIENT
Start: 2024-03-14

## 2024-03-14 RX ORDER — DIPHENHYDRAMINE HYDROCHLORIDE 50 MG/ML
50 INJECTION INTRAMUSCULAR; INTRAVENOUS AS NEEDED
Status: CANCELLED | OUTPATIENT
Start: 2024-03-28

## 2024-03-14 RX ORDER — PROCHLORPERAZINE EDISYLATE 5 MG/ML
10 INJECTION INTRAMUSCULAR; INTRAVENOUS EVERY 6 HOURS PRN
Status: CANCELLED | OUTPATIENT
Start: 2024-03-14

## 2024-03-14 RX ORDER — ALBUTEROL SULFATE 0.83 MG/ML
3 SOLUTION RESPIRATORY (INHALATION) AS NEEDED
Status: DISCONTINUED | OUTPATIENT
Start: 2024-03-14 | End: 2024-03-14 | Stop reason: HOSPADM

## 2024-03-14 RX ORDER — ALBUTEROL SULFATE 0.83 MG/ML
3 SOLUTION RESPIRATORY (INHALATION) AS NEEDED
Status: CANCELLED | OUTPATIENT
Start: 2024-03-28

## 2024-03-14 RX ORDER — DIPHENHYDRAMINE HYDROCHLORIDE 50 MG/ML
50 INJECTION INTRAMUSCULAR; INTRAVENOUS AS NEEDED
Status: DISCONTINUED | OUTPATIENT
Start: 2024-03-14 | End: 2024-03-14 | Stop reason: HOSPADM

## 2024-03-14 RX ORDER — PROCHLORPERAZINE EDISYLATE 5 MG/ML
10 INJECTION INTRAMUSCULAR; INTRAVENOUS EVERY 6 HOURS PRN
Status: CANCELLED | OUTPATIENT
Start: 2024-04-04

## 2024-03-14 RX ADMIN — SODIUM CHLORIDE 500 ML: 9 INJECTION, SOLUTION INTRAVENOUS at 11:25

## 2024-03-14 RX ADMIN — BORTEZOMIB 2.08 MG: 1 INJECTION, POWDER, LYOPHILIZED, FOR SOLUTION INTRAVENOUS; SUBCUTANEOUS at 11:41

## 2024-03-14 RX ADMIN — PROCHLORPERAZINE MALEATE 10 MG: 10 TABLET ORAL at 11:26

## 2024-03-14 ASSESSMENT — PAIN SCALES - GENERAL: PAINLEVEL: 0-NO PAIN

## 2024-03-14 NOTE — SIGNIFICANT EVENT
03/14/24 1056   Prechemo Checklist   Has the patient been in the hospital, ED, or urgent care since last date of service No   Chemo/Immuno Consent Signed Yes   Protocol/Indications Verified Yes   Confirmed to previous date/time of medication Yes   Compared to previous dose Yes   All medications are dated accurately Yes   Pregnancy Test Negative Not applicable   Parameters Met Yes   Is Patient Proceeding With Treatment? Yes   BSA/Weight-Height Verified Yes   Dose Calculations Verified Yes

## 2024-03-20 ENCOUNTER — TREATMENT (OUTPATIENT)
Dept: PHYSICAL THERAPY | Facility: CLINIC | Age: 89
End: 2024-03-20
Payer: MEDICARE

## 2024-03-20 DIAGNOSIS — Z96.659 PERIPROSTHETIC FRACTURE OF PROXIMAL END OF TIBIA, SUBSEQUENT ENCOUNTER: ICD-10-CM

## 2024-03-20 DIAGNOSIS — M97.8XXD PERIPROSTHETIC FRACTURE OF PROXIMAL END OF TIBIA, SUBSEQUENT ENCOUNTER: ICD-10-CM

## 2024-03-20 DIAGNOSIS — M62.81 MUSCLE WEAKNESS: Primary | ICD-10-CM

## 2024-03-20 PROCEDURE — 97110 THERAPEUTIC EXERCISES: CPT | Mod: GP | Performed by: PHYSICAL THERAPIST

## 2024-03-20 NOTE — PROGRESS NOTES
"PHYSICAL THERAPY   TREATMENT NOTE    Patient Name:  Bindu Cali   Patient MRN: [id number]  Date: 03/20/24    Time Calculation  Start Time: 0522  Stop Time: 0610  Time Calculation (min): 48 min    Insurance:  Insurance Type: Medicare   Visit number: 8  [place]ved # of visits [place]  Cert Date: 2/7/24 - 5/7/24    General   Reason for visit: LE Weakness   Referred by: Missy Thomas PA-C    Therapy diagnoses:   1. Muscle weakness        2. Periprosthetic fracture of proximal end of tibia, subsequent encounter  Follow Up In Physical Therapy           Assessment:    Pt requires sitting breaks between every few exercises and does have a complaint of her sciatica acting up after some standing exercises. Requires UE support for most exercises.    Plan:  Continue to focus on hip / core strength, endurance, balance, gait. Work towards SL stance RLE and increasing standing endurance.       Subjective  Is pretty tired today because she made an apple pie. HEP is going well, can't really remember all of them. Has been using the cane around the house and feeling pretty secure. Still has a hard time walking without the walker and feels she is not fully able to do what she would like to do around the house.  - Pain (0-10): 0/10      Precautions  PMH: multiple myeloma   Fall Risk: high, due to generalized weakness, history of tibia fracture     Objective  Arrives today with walker  Treatment Performed:   Therapeutic Exercise:  48 Minutes  - R Stepper L3 x 10'  - at barre:     - standing sidesteps yellow x 10     - standing hip abduction / extension x 10 R/L x 2 yellow band     - 4\" step up and over x 10 R/L     - B heel raises x 20     - Semi tandem balance on airex x 60\" R/L     - narrow stance on airex with arm spelling out alphabet x 1     - tandem walk at barre x 3     - standing hamstring stretch x 30\" R/L x 2     - standing hip flexor stretch x 30\" R/L x 2  - supine cross body piriformis stretch x 30\" R/L  "

## 2024-03-21 ENCOUNTER — INFUSION (OUTPATIENT)
Dept: HEMATOLOGY/ONCOLOGY | Facility: CLINIC | Age: 89
End: 2024-03-21
Payer: MEDICARE

## 2024-03-21 ENCOUNTER — LAB (OUTPATIENT)
Dept: LAB | Facility: CLINIC | Age: 89
End: 2024-03-21
Payer: MEDICARE

## 2024-03-21 VITALS
TEMPERATURE: 97.7 F | HEART RATE: 71 BPM | DIASTOLIC BLOOD PRESSURE: 77 MMHG | OXYGEN SATURATION: 98 % | BODY MASS INDEX: 26.26 KG/M2 | WEIGHT: 134.48 LBS | RESPIRATION RATE: 20 BRPM | SYSTOLIC BLOOD PRESSURE: 189 MMHG

## 2024-03-21 DIAGNOSIS — C90.01 MULTIPLE MYELOMA IN REMISSION (MULTI): ICD-10-CM

## 2024-03-21 DIAGNOSIS — C90.00 MULTIPLE MYELOMA, REMISSION STATUS UNSPECIFIED (MULTI): ICD-10-CM

## 2024-03-21 LAB
ALBUMIN SERPL BCP-MCNC: 3.7 G/DL (ref 3.4–5)
ALP SERPL-CCNC: 60 U/L (ref 33–136)
ALT SERPL W P-5'-P-CCNC: 8 U/L (ref 7–45)
ANION GAP SERPL CALC-SCNC: 12 MMOL/L (ref 10–20)
AST SERPL W P-5'-P-CCNC: 16 U/L (ref 9–39)
BASOPHILS # BLD AUTO: 0.05 X10*3/UL (ref 0–0.1)
BASOPHILS NFR BLD AUTO: 0.6 %
BILIRUB SERPL-MCNC: 0.3 MG/DL (ref 0–1.2)
BUN SERPL-MCNC: 32 MG/DL (ref 6–23)
CALCIUM SERPL-MCNC: 10.6 MG/DL (ref 8.6–10.3)
CHLORIDE SERPL-SCNC: 103 MMOL/L (ref 98–107)
CO2 SERPL-SCNC: 25 MMOL/L (ref 21–32)
CREAT SERPL-MCNC: 1.33 MG/DL (ref 0.5–1.05)
EGFRCR SERPLBLD CKD-EPI 2021: 38 ML/MIN/1.73M*2
EOSINOPHIL # BLD AUTO: 0.46 X10*3/UL (ref 0–0.4)
EOSINOPHIL NFR BLD AUTO: 5.6 %
ERYTHROCYTE [DISTWIDTH] IN BLOOD BY AUTOMATED COUNT: 16.6 % (ref 11.5–14.5)
GLUCOSE SERPL-MCNC: 99 MG/DL (ref 74–99)
HCT VFR BLD AUTO: 29 % (ref 36–46)
HGB BLD-MCNC: 8.9 G/DL (ref 12–16)
IMM GRANULOCYTES # BLD AUTO: 0.01 X10*3/UL (ref 0–0.5)
IMM GRANULOCYTES NFR BLD AUTO: 0.1 % (ref 0–0.9)
LYMPHOCYTES # BLD AUTO: 1.93 X10*3/UL (ref 0.8–3)
LYMPHOCYTES NFR BLD AUTO: 23.7 %
MCH RBC QN AUTO: 28.1 PG (ref 26–34)
MCHC RBC AUTO-ENTMCNC: 30.7 G/DL (ref 32–36)
MCV RBC AUTO: 92 FL (ref 80–100)
MONOCYTES # BLD AUTO: 0.74 X10*3/UL (ref 0.05–0.8)
MONOCYTES NFR BLD AUTO: 9.1 %
NEUTROPHILS # BLD AUTO: 4.96 X10*3/UL (ref 1.6–5.5)
NEUTROPHILS NFR BLD AUTO: 60.9 %
NRBC BLD-RTO: 0 /100 WBCS (ref 0–0)
PLATELET # BLD AUTO: 362 X10*3/UL (ref 150–450)
POTASSIUM SERPL-SCNC: 4.3 MMOL/L (ref 3.5–5.3)
PROT SERPL-MCNC: 6.9 G/DL (ref 6.4–8.2)
RBC # BLD AUTO: 3.17 X10*6/UL (ref 4–5.2)
SODIUM SERPL-SCNC: 136 MMOL/L (ref 136–145)
WBC # BLD AUTO: 8.2 X10*3/UL (ref 4.4–11.3)

## 2024-03-21 PROCEDURE — 2500000004 HC RX 250 GENERAL PHARMACY W/ HCPCS (ALT 636 FOR OP/ED): Mod: JW | Performed by: INTERNAL MEDICINE

## 2024-03-21 PROCEDURE — 96401 CHEMO ANTI-NEOPL SQ/IM: CPT

## 2024-03-21 PROCEDURE — 96372 THER/PROPH/DIAG INJ SC/IM: CPT | Performed by: INTERNAL MEDICINE

## 2024-03-21 PROCEDURE — 85025 COMPLETE CBC W/AUTO DIFF WBC: CPT | Performed by: INTERNAL MEDICINE

## 2024-03-21 PROCEDURE — 80053 COMPREHEN METABOLIC PANEL: CPT | Performed by: INTERNAL MEDICINE

## 2024-03-21 RX ORDER — BORTEZOMIB 3.5 MG/1
1.3 INJECTION, POWDER, LYOPHILIZED, FOR SOLUTION INTRAVENOUS; SUBCUTANEOUS ONCE
Status: COMPLETED | OUTPATIENT
Start: 2024-03-21 | End: 2024-03-21

## 2024-03-21 RX ADMIN — BORTEZOMIB 2.08 MG: 1 INJECTION, POWDER, LYOPHILIZED, FOR SOLUTION INTRAVENOUS; SUBCUTANEOUS at 11:55

## 2024-03-21 ASSESSMENT — PAIN SCALES - GENERAL: PAINLEVEL: 0-NO PAIN

## 2024-03-28 ENCOUNTER — LAB (OUTPATIENT)
Dept: LAB | Facility: CLINIC | Age: 89
End: 2024-03-28
Payer: MEDICARE

## 2024-03-28 ENCOUNTER — INFUSION (OUTPATIENT)
Dept: HEMATOLOGY/ONCOLOGY | Facility: CLINIC | Age: 89
End: 2024-03-28
Payer: MEDICARE

## 2024-03-28 VITALS
OXYGEN SATURATION: 95 % | TEMPERATURE: 98.1 F | WEIGHT: 135.14 LBS | SYSTOLIC BLOOD PRESSURE: 187 MMHG | HEART RATE: 70 BPM | BODY MASS INDEX: 26.39 KG/M2 | DIASTOLIC BLOOD PRESSURE: 69 MMHG | RESPIRATION RATE: 18 BRPM

## 2024-03-28 DIAGNOSIS — C90.00 MULTIPLE MYELOMA, REMISSION STATUS UNSPECIFIED (MULTI): ICD-10-CM

## 2024-03-28 DIAGNOSIS — E83.52 HYPERCALCEMIA: ICD-10-CM

## 2024-03-28 DIAGNOSIS — C90.00 MULTIPLE MYELOMA NOT HAVING ACHIEVED REMISSION (MULTI): Primary | ICD-10-CM

## 2024-03-28 LAB
ALBUMIN SERPL BCP-MCNC: 3.6 G/DL (ref 3.4–5)
ALP SERPL-CCNC: 52 U/L (ref 33–136)
ALT SERPL W P-5'-P-CCNC: 9 U/L (ref 7–45)
ANION GAP SERPL CALC-SCNC: 12 MMOL/L (ref 10–20)
AST SERPL W P-5'-P-CCNC: 16 U/L (ref 9–39)
BASOPHILS # BLD AUTO: 0.04 X10*3/UL (ref 0–0.1)
BASOPHILS NFR BLD AUTO: 0.6 %
BILIRUB SERPL-MCNC: 0.3 MG/DL (ref 0–1.2)
BUN SERPL-MCNC: 35 MG/DL (ref 6–23)
CALCIUM SERPL-MCNC: 10.2 MG/DL (ref 8.6–10.3)
CHLORIDE SERPL-SCNC: 104 MMOL/L (ref 98–107)
CO2 SERPL-SCNC: 25 MMOL/L (ref 21–32)
CREAT SERPL-MCNC: 1.39 MG/DL (ref 0.5–1.05)
EGFRCR SERPLBLD CKD-EPI 2021: 36 ML/MIN/1.73M*2
EOSINOPHIL # BLD AUTO: 0.55 X10*3/UL (ref 0–0.4)
EOSINOPHIL NFR BLD AUTO: 8.1 %
ERYTHROCYTE [DISTWIDTH] IN BLOOD BY AUTOMATED COUNT: 16.5 % (ref 11.5–14.5)
GLUCOSE SERPL-MCNC: 98 MG/DL (ref 74–99)
HCT VFR BLD AUTO: 29 % (ref 36–46)
HGB BLD-MCNC: 9 G/DL (ref 12–16)
IMM GRANULOCYTES # BLD AUTO: 0.03 X10*3/UL (ref 0–0.5)
IMM GRANULOCYTES NFR BLD AUTO: 0.4 % (ref 0–0.9)
LYMPHOCYTES # BLD AUTO: 1.62 X10*3/UL (ref 0.8–3)
LYMPHOCYTES NFR BLD AUTO: 23.9 %
MCH RBC QN AUTO: 28.2 PG (ref 26–34)
MCHC RBC AUTO-ENTMCNC: 31 G/DL (ref 32–36)
MCV RBC AUTO: 91 FL (ref 80–100)
MONOCYTES # BLD AUTO: 0.6 X10*3/UL (ref 0.05–0.8)
MONOCYTES NFR BLD AUTO: 8.8 %
NEUTROPHILS # BLD AUTO: 3.94 X10*3/UL (ref 1.6–5.5)
NEUTROPHILS NFR BLD AUTO: 58.2 %
NRBC BLD-RTO: 0 /100 WBCS (ref 0–0)
PLATELET # BLD AUTO: 323 X10*3/UL (ref 150–450)
POTASSIUM SERPL-SCNC: 4.2 MMOL/L (ref 3.5–5.3)
PROT SERPL-MCNC: 6.8 G/DL (ref 6.4–8.2)
RBC # BLD AUTO: 3.19 X10*6/UL (ref 4–5.2)
SODIUM SERPL-SCNC: 137 MMOL/L (ref 136–145)
WBC # BLD AUTO: 6.8 X10*3/UL (ref 4.4–11.3)

## 2024-03-28 PROCEDURE — 2500000004 HC RX 250 GENERAL PHARMACY W/ HCPCS (ALT 636 FOR OP/ED): Performed by: INTERNAL MEDICINE

## 2024-03-28 PROCEDURE — 2500000004 HC RX 250 GENERAL PHARMACY W/ HCPCS (ALT 636 FOR OP/ED): Mod: JW | Performed by: INTERNAL MEDICINE

## 2024-03-28 PROCEDURE — 96360 HYDRATION IV INFUSION INIT: CPT | Mod: INF

## 2024-03-28 PROCEDURE — 80053 COMPREHEN METABOLIC PANEL: CPT | Performed by: INTERNAL MEDICINE

## 2024-03-28 PROCEDURE — 96401 CHEMO ANTI-NEOPL SQ/IM: CPT

## 2024-03-28 PROCEDURE — 85025 COMPLETE CBC W/AUTO DIFF WBC: CPT | Performed by: INTERNAL MEDICINE

## 2024-03-28 PROCEDURE — 96372 THER/PROPH/DIAG INJ SC/IM: CPT | Performed by: INTERNAL MEDICINE

## 2024-03-28 RX ORDER — EPINEPHRINE 0.3 MG/.3ML
0.3 INJECTION SUBCUTANEOUS EVERY 5 MIN PRN
OUTPATIENT
Start: 2024-03-28

## 2024-03-28 RX ORDER — ALBUTEROL SULFATE 0.83 MG/ML
3 SOLUTION RESPIRATORY (INHALATION) AS NEEDED
OUTPATIENT
Start: 2024-03-28

## 2024-03-28 RX ORDER — EPINEPHRINE 0.3 MG/.3ML
0.3 INJECTION SUBCUTANEOUS EVERY 5 MIN PRN
Status: DISCONTINUED | OUTPATIENT
Start: 2024-03-28 | End: 2024-03-28 | Stop reason: HOSPADM

## 2024-03-28 RX ORDER — FAMOTIDINE 10 MG/ML
20 INJECTION INTRAVENOUS ONCE AS NEEDED
Status: DISCONTINUED | OUTPATIENT
Start: 2024-03-28 | End: 2024-03-28 | Stop reason: HOSPADM

## 2024-03-28 RX ORDER — HEPARIN 100 UNIT/ML
500 SYRINGE INTRAVENOUS AS NEEDED
Status: CANCELLED | OUTPATIENT
Start: 2024-03-28

## 2024-03-28 RX ORDER — DIPHENHYDRAMINE HYDROCHLORIDE 50 MG/ML
50 INJECTION INTRAMUSCULAR; INTRAVENOUS AS NEEDED
Status: DISCONTINUED | OUTPATIENT
Start: 2024-03-28 | End: 2024-03-28 | Stop reason: HOSPADM

## 2024-03-28 RX ORDER — FAMOTIDINE 10 MG/ML
20 INJECTION INTRAVENOUS ONCE AS NEEDED
OUTPATIENT
Start: 2024-03-28

## 2024-03-28 RX ORDER — ALBUTEROL SULFATE 0.83 MG/ML
3 SOLUTION RESPIRATORY (INHALATION) AS NEEDED
Status: DISCONTINUED | OUTPATIENT
Start: 2024-03-28 | End: 2024-03-28 | Stop reason: HOSPADM

## 2024-03-28 RX ORDER — BORTEZOMIB 3.5 MG/1
1.3 INJECTION, POWDER, LYOPHILIZED, FOR SOLUTION INTRAVENOUS; SUBCUTANEOUS ONCE
Status: COMPLETED | OUTPATIENT
Start: 2024-03-28 | End: 2024-03-28

## 2024-03-28 RX ORDER — DIPHENHYDRAMINE HYDROCHLORIDE 50 MG/ML
50 INJECTION INTRAMUSCULAR; INTRAVENOUS AS NEEDED
OUTPATIENT
Start: 2024-03-28

## 2024-03-28 RX ORDER — HEPARIN SODIUM,PORCINE/PF 10 UNIT/ML
50 SYRINGE (ML) INTRAVENOUS AS NEEDED
Status: CANCELLED | OUTPATIENT
Start: 2024-03-28

## 2024-03-28 RX ADMIN — BORTEZOMIB 2.08 MG: 1 INJECTION, POWDER, LYOPHILIZED, FOR SOLUTION INTRAVENOUS; SUBCUTANEOUS at 12:14

## 2024-03-28 RX ADMIN — SODIUM CHLORIDE 500 ML: 9 INJECTION, SOLUTION INTRAVENOUS at 11:20

## 2024-03-28 ASSESSMENT — PAIN SCALES - GENERAL: PAINLEVEL: 0-NO PAIN

## 2024-03-28 NOTE — SIGNIFICANT EVENT
03/28/24 1128   Prechemo Checklist   Has the patient been in the hospital, ED, or urgent care since last date of service No   Chemo/Immuno Consent Signed Yes   Protocol/Indications Verified Yes   Confirmed to previous date/time of medication Yes   Compared to previous dose Yes   All medications are dated accurately Yes   Pregnancy Test Negative Not applicable   Parameters Met Yes   Provider Notified Yes   Is Patient Proceeding With Treatment? Yes   BSA/Weight-Height Verified Yes   Dose Calculations Verified (current, total, cumulative) Yes

## 2024-03-29 ENCOUNTER — TREATMENT (OUTPATIENT)
Dept: PHYSICAL THERAPY | Facility: CLINIC | Age: 89
End: 2024-03-29
Payer: MEDICARE

## 2024-03-29 DIAGNOSIS — M97.8XXD PERIPROSTHETIC FRACTURE OF PROXIMAL END OF TIBIA, SUBSEQUENT ENCOUNTER: ICD-10-CM

## 2024-03-29 DIAGNOSIS — M62.81 MUSCLE WEAKNESS: Primary | ICD-10-CM

## 2024-03-29 DIAGNOSIS — Z96.659 PERIPROSTHETIC FRACTURE OF PROXIMAL END OF TIBIA, SUBSEQUENT ENCOUNTER: ICD-10-CM

## 2024-03-29 PROCEDURE — 97110 THERAPEUTIC EXERCISES: CPT | Mod: GP

## 2024-03-29 NOTE — PROGRESS NOTES
PHYSICAL THERAPY   TREATMENT NOTE    Patient Name:  Bindu Cali   Patient MRN: [id number]  Date: 03/29/24    Time Calculation  Start Time: 1334  Stop Time: 1418  Time Calculation (min): 44 min    Insurance:  Insurance Type: Medicare   Visit number: 9  [place]ved [place] of visits [place]  Cert Date: 2/7/24 - 5/7/24    General   Reason for visit: LE Weakness   Referred by: Missy Thomas PA-C    Therapy diagnoses:   1. Periprosthetic fracture of proximal end of tibia, subsequent encounter  Follow Up In Physical Therapy           Assessment:    Pt has improved her overall clinical picture. She is now able to use her cane primarily vs. The walker. At this point, given pt large HEP and family support, anticipate pt is safe to discharge to a supervised POC. She is welcome to open a new medicare POC later in the year if needs arise.     Plan:  All patient's questions were answered and will discharge and follow up if needed.   Access Code: [email or web address]  URL: [email or web address]  Date: 03/29/2024  Prepared by: Ashlie Rueda    Exercises  - Side Stepping with Resistance at Ankles and Counter Support  - 1 x daily - 7 x weekly - 2 sets - 10 reps - 5 seconds  hold  - Seated Hip Abduction with Resistance  - 1 x daily - 7 x weekly - 2 sets - 10 reps - 5 seconds  hold  - Seated Long Arc Quad  - 1 x daily - 7 x weekly - 2 sets - 10 reps - 5 seconds  hold  - Seated Hip Adduction Isometrics with Ball  - 1 x daily - 7 x weekly - 2 sets - 10 reps - 5 seconds  hold  - Sit to Stand with Counter Support  - 1 x daily - 7 x weekly - 2 sets - 5 reps - 1 second hold  - Supine Bridge  - 1 x daily - 7 x weekly - 2 sets - 10 reps - 5 seconds  hold      Subjective  Pt returns to the clinic, she is semi compliant with home exercise program for what she can remember. She is relying on sheets for instruction. Overall, she feels like she has improved. She is inquiring about more medicare visits.   - Pain (0-10): 0/10       Precautions  PMH: multiple myeloma   Fall Risk: high, due to generalized weakness, history of tibia fracture     Objective  Arrives today with walker  Treatment Performed:   Therapeutic Exercise:  44 Minutes  - Nustep x 5 minutes   - BOSU lunge forward 2 x 10  - BOSU lunge lateral 2 x 10  - Squat x 15  - incline stretch 30 seconds x 3   - Review HEP

## 2024-04-04 ENCOUNTER — INFUSION (OUTPATIENT)
Dept: HEMATOLOGY/ONCOLOGY | Facility: CLINIC | Age: 89
End: 2024-04-04
Payer: MEDICARE

## 2024-04-04 ENCOUNTER — LAB (OUTPATIENT)
Dept: LAB | Facility: CLINIC | Age: 89
End: 2024-04-04
Payer: MEDICARE

## 2024-04-04 VITALS
BODY MASS INDEX: 26.74 KG/M2 | SYSTOLIC BLOOD PRESSURE: 202 MMHG | RESPIRATION RATE: 16 BRPM | TEMPERATURE: 97.9 F | WEIGHT: 136.91 LBS | OXYGEN SATURATION: 93 % | HEART RATE: 75 BPM | DIASTOLIC BLOOD PRESSURE: 80 MMHG

## 2024-04-04 DIAGNOSIS — C90.00 MULTIPLE MYELOMA NOT HAVING ACHIEVED REMISSION (MULTI): ICD-10-CM

## 2024-04-04 DIAGNOSIS — E83.52 HYPERCALCEMIA: ICD-10-CM

## 2024-04-04 DIAGNOSIS — C90.00 MULTIPLE MYELOMA, REMISSION STATUS UNSPECIFIED (MULTI): ICD-10-CM

## 2024-04-04 LAB
ALBUMIN SERPL BCP-MCNC: 3.7 G/DL (ref 3.4–5)
ALP SERPL-CCNC: 54 U/L (ref 33–136)
ALT SERPL W P-5'-P-CCNC: 8 U/L (ref 7–45)
ANION GAP SERPL CALC-SCNC: 13 MMOL/L (ref 10–20)
AST SERPL W P-5'-P-CCNC: 17 U/L (ref 9–39)
BASOPHILS # BLD AUTO: 0.03 X10*3/UL (ref 0–0.1)
BASOPHILS NFR BLD AUTO: 0.6 %
BILIRUB SERPL-MCNC: 0.4 MG/DL (ref 0–1.2)
BUN SERPL-MCNC: 28 MG/DL (ref 6–23)
CALCIUM SERPL-MCNC: 10.1 MG/DL (ref 8.6–10.3)
CHLORIDE SERPL-SCNC: 106 MMOL/L (ref 98–107)
CO2 SERPL-SCNC: 23 MMOL/L (ref 21–32)
CREAT SERPL-MCNC: 1.32 MG/DL (ref 0.5–1.05)
EGFRCR SERPLBLD CKD-EPI 2021: 38 ML/MIN/1.73M*2
EOSINOPHIL # BLD AUTO: 0.41 X10*3/UL (ref 0–0.4)
EOSINOPHIL NFR BLD AUTO: 8.3 %
ERYTHROCYTE [DISTWIDTH] IN BLOOD BY AUTOMATED COUNT: 16.9 % (ref 11.5–14.5)
GLUCOSE SERPL-MCNC: 88 MG/DL (ref 74–99)
HCT VFR BLD AUTO: 29.7 % (ref 36–46)
HGB BLD-MCNC: 9.1 G/DL (ref 12–16)
IMM GRANULOCYTES # BLD AUTO: 0.01 X10*3/UL (ref 0–0.5)
IMM GRANULOCYTES NFR BLD AUTO: 0.2 % (ref 0–0.9)
LYMPHOCYTES # BLD AUTO: 1.63 X10*3/UL (ref 0.8–3)
LYMPHOCYTES NFR BLD AUTO: 32.9 %
MCH RBC QN AUTO: 27.9 PG (ref 26–34)
MCHC RBC AUTO-ENTMCNC: 30.6 G/DL (ref 32–36)
MCV RBC AUTO: 91 FL (ref 80–100)
MONOCYTES # BLD AUTO: 0.55 X10*3/UL (ref 0.05–0.8)
MONOCYTES NFR BLD AUTO: 11.1 %
NEUTROPHILS # BLD AUTO: 2.32 X10*3/UL (ref 1.6–5.5)
NEUTROPHILS NFR BLD AUTO: 46.9 %
NRBC BLD-RTO: 0 /100 WBCS (ref 0–0)
PLATELET # BLD AUTO: 309 X10*3/UL (ref 150–450)
POTASSIUM SERPL-SCNC: 4.4 MMOL/L (ref 3.5–5.3)
PROT SERPL-MCNC: 6.6 G/DL (ref 6.4–8.2)
PROT SERPL-MCNC: 6.8 G/DL (ref 6.4–8.2)
RBC # BLD AUTO: 3.26 X10*6/UL (ref 4–5.2)
SODIUM SERPL-SCNC: 138 MMOL/L (ref 136–145)
WBC # BLD AUTO: 5 X10*3/UL (ref 4.4–11.3)

## 2024-04-04 PROCEDURE — 2500000004 HC RX 250 GENERAL PHARMACY W/ HCPCS (ALT 636 FOR OP/ED): Performed by: INTERNAL MEDICINE

## 2024-04-04 PROCEDURE — 96372 THER/PROPH/DIAG INJ SC/IM: CPT

## 2024-04-04 PROCEDURE — 96372 THER/PROPH/DIAG INJ SC/IM: CPT | Performed by: INTERNAL MEDICINE

## 2024-04-04 PROCEDURE — 84075 ASSAY ALKALINE PHOSPHATASE: CPT | Performed by: INTERNAL MEDICINE

## 2024-04-04 PROCEDURE — 84165 PROTEIN E-PHORESIS SERUM: CPT | Performed by: INTERNAL MEDICINE

## 2024-04-04 PROCEDURE — 82784 ASSAY IGA/IGD/IGG/IGM EACH: CPT | Mod: PARLAB | Performed by: INTERNAL MEDICINE

## 2024-04-04 PROCEDURE — 84155 ASSAY OF PROTEIN SERUM: CPT | Mod: PARLAB | Performed by: INTERNAL MEDICINE

## 2024-04-04 PROCEDURE — 85025 COMPLETE CBC W/AUTO DIFF WBC: CPT | Performed by: INTERNAL MEDICINE

## 2024-04-04 PROCEDURE — 86320 SERUM IMMUNOELECTROPHORESIS: CPT | Performed by: INTERNAL MEDICINE

## 2024-04-04 PROCEDURE — 96401 CHEMO ANTI-NEOPL SQ/IM: CPT

## 2024-04-04 PROCEDURE — 83521 IG LIGHT CHAINS FREE EACH: CPT | Mod: PARLAB | Performed by: INTERNAL MEDICINE

## 2024-04-04 PROCEDURE — 82232 ASSAY OF BETA-2 PROTEIN: CPT | Mod: PARLAB | Performed by: INTERNAL MEDICINE

## 2024-04-04 PROCEDURE — 84165 PROTEIN E-PHORESIS SERUM: CPT | Mod: PARLAB | Performed by: INTERNAL MEDICINE

## 2024-04-04 RX ORDER — EPINEPHRINE 0.3 MG/.3ML
0.3 INJECTION SUBCUTANEOUS EVERY 5 MIN PRN
Status: CANCELLED | OUTPATIENT
Start: 2024-04-11

## 2024-04-04 RX ORDER — FAMOTIDINE 10 MG/ML
20 INJECTION INTRAVENOUS ONCE AS NEEDED
Status: CANCELLED | OUTPATIENT
Start: 2024-04-11

## 2024-04-04 RX ORDER — ALBUTEROL SULFATE 0.83 MG/ML
3 SOLUTION RESPIRATORY (INHALATION) AS NEEDED
Status: CANCELLED | OUTPATIENT
Start: 2024-04-11

## 2024-04-04 RX ORDER — DIPHENHYDRAMINE HYDROCHLORIDE 50 MG/ML
50 INJECTION INTRAMUSCULAR; INTRAVENOUS AS NEEDED
Status: CANCELLED | OUTPATIENT
Start: 2024-04-11

## 2024-04-04 RX ORDER — BORTEZOMIB 3.5 MG/1
1.3 INJECTION, POWDER, LYOPHILIZED, FOR SOLUTION INTRAVENOUS; SUBCUTANEOUS ONCE
Status: COMPLETED | OUTPATIENT
Start: 2024-04-04 | End: 2024-04-04

## 2024-04-04 RX ADMIN — DENOSUMAB 120 MG: 120 INJECTION SUBCUTANEOUS at 11:38

## 2024-04-04 RX ADMIN — BORTEZOMIB 2.08 MG: 1 INJECTION, POWDER, LYOPHILIZED, FOR SOLUTION INTRAVENOUS; SUBCUTANEOUS at 11:41

## 2024-04-04 ASSESSMENT — PAIN SCALES - GENERAL: PAINLEVEL: 0-NO PAIN

## 2024-04-04 NOTE — SIGNIFICANT EVENT
04/04/24 1057   Prechemo Checklist   Has the patient been in the hospital, ED, or urgent care since last date of service No   Chemo/Immuno Consent Signed Yes  (12/8/2022)   Protocol/Indications Verified Yes   Confirmed to previous date/time of medication Yes   Compared to previous dose Yes   All medications are dated accurately Yes   Pregnancy Test Negative Not applicable   Parameters Met Yes   Provider Notified Yes   Is Patient Proceeding With Treatment? Yes   BSA/Weight-Height Verified Yes   Dose Calculations Verified (current, total, cumulative) Yes

## 2024-04-04 NOTE — PROGRESS NOTES
1100 patient resting comfortably in chair, son at chairside.  Per Dr. Gray IV fluids planned for today are being held due to elevated blood pressures (see flowsheet). Patient and son updated on hold status of fluids and concur with MD decision.  Patient advised per Dr. Gray to follow up with PCP.

## 2024-04-05 LAB
B2 MICROGLOB SERPL-MCNC: 7.4 MG/L (ref 0.7–2.2)
IGA SERPL-MCNC: 905 MG/DL (ref 70–400)
IGG SERPL-MCNC: 429 MG/DL (ref 700–1600)
IGM SERPL-MCNC: 12 MG/DL (ref 40–230)
KAPPA LC SERPL-MCNC: 16.76 MG/DL (ref 0.33–1.94)
KAPPA LC/LAMBDA SER: 13.3 {RATIO} (ref 0.26–1.65)
LAMBDA LC SERPL-MCNC: 1.26 MG/DL (ref 0.57–2.63)

## 2024-04-09 LAB
ALBUMIN: 3.5 G/DL (ref 3.4–5)
ALPHA 1 GLOBULIN: 0.3 G/DL (ref 0.2–0.6)
ALPHA 2 GLOBULIN: 0.8 G/DL (ref 0.4–1.1)
BETA GLOBULIN: 1.5 G/DL (ref 0.5–1.2)
GAMMA GLOBULIN: 0.4 G/DL (ref 0.5–1.4)
IMMUNOFIXATION COMMENT: ABNORMAL
M-PROTEIN 1: 0.8 G/DL
PATH REVIEW - SERUM IMMUNOFIXATION: ABNORMAL
PATH REVIEW-SERUM PROTEIN ELECTROPHORESIS: ABNORMAL
PROTEIN ELECTROPHORESIS COMMENT: ABNORMAL

## 2024-04-11 ENCOUNTER — OFFICE VISIT (OUTPATIENT)
Dept: HEMATOLOGY/ONCOLOGY | Facility: CLINIC | Age: 89
End: 2024-04-11
Payer: MEDICARE

## 2024-04-11 ENCOUNTER — LAB (OUTPATIENT)
Dept: LAB | Facility: CLINIC | Age: 89
End: 2024-04-11
Payer: MEDICARE

## 2024-04-11 ENCOUNTER — INFUSION (OUTPATIENT)
Dept: HEMATOLOGY/ONCOLOGY | Facility: CLINIC | Age: 89
End: 2024-04-11
Payer: MEDICARE

## 2024-04-11 VITALS
HEIGHT: 60 IN | RESPIRATION RATE: 20 BRPM | OXYGEN SATURATION: 98 % | TEMPERATURE: 97.3 F | DIASTOLIC BLOOD PRESSURE: 68 MMHG | SYSTOLIC BLOOD PRESSURE: 184 MMHG | HEART RATE: 66 BPM | BODY MASS INDEX: 26.4 KG/M2 | WEIGHT: 134.48 LBS

## 2024-04-11 DIAGNOSIS — C90.00 MULTIPLE MYELOMA NOT HAVING ACHIEVED REMISSION (MULTI): ICD-10-CM

## 2024-04-11 DIAGNOSIS — M86.9 OSTEOMYELITIS, UNSPECIFIED SITE, UNSPECIFIED TYPE (MULTI): ICD-10-CM

## 2024-04-11 DIAGNOSIS — C90.00 MULTIPLE MYELOMA, REMISSION STATUS UNSPECIFIED (MULTI): ICD-10-CM

## 2024-04-11 DIAGNOSIS — E83.52 HYPERCALCEMIA: ICD-10-CM

## 2024-04-11 DIAGNOSIS — C90.00 MULTIPLE MYELOMA, REMISSION STATUS UNSPECIFIED (MULTI): Primary | ICD-10-CM

## 2024-04-11 DIAGNOSIS — L02.415 CELLULITIS AND ABSCESS OF RIGHT LOWER EXTREMITY: ICD-10-CM

## 2024-04-11 DIAGNOSIS — L03.115 CELLULITIS AND ABSCESS OF RIGHT LOWER EXTREMITY: ICD-10-CM

## 2024-04-11 LAB
ALBUMIN SERPL BCP-MCNC: 3.8 G/DL (ref 3.4–5)
ALP SERPL-CCNC: 53 U/L (ref 33–136)
ALT SERPL W P-5'-P-CCNC: 10 U/L (ref 7–45)
ANION GAP SERPL CALC-SCNC: 15 MMOL/L (ref 10–20)
AST SERPL W P-5'-P-CCNC: 18 U/L (ref 9–39)
BILIRUB SERPL-MCNC: 0.4 MG/DL (ref 0–1.2)
BUN SERPL-MCNC: 29 MG/DL (ref 6–23)
CALCIUM SERPL-MCNC: 9.9 MG/DL (ref 8.6–10.3)
CHLORIDE SERPL-SCNC: 101 MMOL/L (ref 98–107)
CO2 SERPL-SCNC: 25 MMOL/L (ref 21–32)
CREAT SERPL-MCNC: 1.39 MG/DL (ref 0.5–1.05)
EGFRCR SERPLBLD CKD-EPI 2021: 36 ML/MIN/1.73M*2
ERYTHROCYTE [DISTWIDTH] IN BLOOD BY AUTOMATED COUNT: 16.9 % (ref 11.5–14.5)
GLUCOSE SERPL-MCNC: 90 MG/DL (ref 74–99)
HCT VFR BLD AUTO: 29.4 % (ref 36–46)
HGB BLD-MCNC: 9.2 G/DL (ref 12–16)
MCH RBC QN AUTO: 28.7 PG (ref 26–34)
MCHC RBC AUTO-ENTMCNC: 31.3 G/DL (ref 32–36)
MCV RBC AUTO: 92 FL (ref 80–100)
NRBC BLD-RTO: 0 /100 WBCS (ref 0–0)
PLATELET # BLD AUTO: 307 X10*3/UL (ref 150–450)
POTASSIUM SERPL-SCNC: 4.3 MMOL/L (ref 3.5–5.3)
PROT SERPL-MCNC: 6.9 G/DL (ref 6.4–8.2)
RBC # BLD AUTO: 3.21 X10*6/UL (ref 4–5.2)
SODIUM SERPL-SCNC: 137 MMOL/L (ref 136–145)
WBC # BLD AUTO: 5.7 X10*3/UL (ref 4.4–11.3)

## 2024-04-11 PROCEDURE — 99215 OFFICE O/P EST HI 40 MIN: CPT | Performed by: INTERNAL MEDICINE

## 2024-04-11 PROCEDURE — 1123F ACP DISCUSS/DSCN MKR DOCD: CPT | Performed by: INTERNAL MEDICINE

## 2024-04-11 PROCEDURE — 1160F RVW MEDS BY RX/DR IN RCRD: CPT | Performed by: INTERNAL MEDICINE

## 2024-04-11 PROCEDURE — 96401 CHEMO ANTI-NEOPL SQ/IM: CPT

## 2024-04-11 PROCEDURE — 3078F DIAST BP <80 MM HG: CPT | Performed by: INTERNAL MEDICINE

## 2024-04-11 PROCEDURE — 1159F MED LIST DOCD IN RCRD: CPT | Performed by: INTERNAL MEDICINE

## 2024-04-11 PROCEDURE — 84075 ASSAY ALKALINE PHOSPHATASE: CPT | Performed by: INTERNAL MEDICINE

## 2024-04-11 PROCEDURE — 3077F SYST BP >= 140 MM HG: CPT | Performed by: INTERNAL MEDICINE

## 2024-04-11 PROCEDURE — 96372 THER/PROPH/DIAG INJ SC/IM: CPT | Performed by: INTERNAL MEDICINE

## 2024-04-11 PROCEDURE — 1126F AMNT PAIN NOTED NONE PRSNT: CPT | Performed by: INTERNAL MEDICINE

## 2024-04-11 PROCEDURE — 85027 COMPLETE CBC AUTOMATED: CPT | Performed by: INTERNAL MEDICINE

## 2024-04-11 PROCEDURE — 2500000004 HC RX 250 GENERAL PHARMACY W/ HCPCS (ALT 636 FOR OP/ED): Mod: JW | Performed by: INTERNAL MEDICINE

## 2024-04-11 RX ORDER — DIPHENHYDRAMINE HYDROCHLORIDE 50 MG/ML
50 INJECTION INTRAMUSCULAR; INTRAVENOUS AS NEEDED
Status: CANCELLED | OUTPATIENT
Start: 2024-04-18

## 2024-04-11 RX ORDER — DIPHENHYDRAMINE HYDROCHLORIDE 50 MG/ML
50 INJECTION INTRAMUSCULAR; INTRAVENOUS AS NEEDED
Status: CANCELLED | OUTPATIENT
Start: 2024-05-02

## 2024-04-11 RX ORDER — PROCHLORPERAZINE EDISYLATE 5 MG/ML
10 INJECTION INTRAMUSCULAR; INTRAVENOUS EVERY 6 HOURS PRN
Status: CANCELLED | OUTPATIENT
Start: 2024-05-02

## 2024-04-11 RX ORDER — PROCHLORPERAZINE EDISYLATE 5 MG/ML
10 INJECTION INTRAMUSCULAR; INTRAVENOUS EVERY 6 HOURS PRN
Status: CANCELLED | OUTPATIENT
Start: 2024-04-25

## 2024-04-11 RX ORDER — FAMOTIDINE 10 MG/ML
20 INJECTION INTRAVENOUS ONCE AS NEEDED
Status: CANCELLED | OUTPATIENT
Start: 2024-05-02

## 2024-04-11 RX ORDER — ALBUTEROL SULFATE 0.83 MG/ML
3 SOLUTION RESPIRATORY (INHALATION) AS NEEDED
Status: CANCELLED | OUTPATIENT
Start: 2024-04-18

## 2024-04-11 RX ORDER — PROCHLORPERAZINE EDISYLATE 5 MG/ML
10 INJECTION INTRAMUSCULAR; INTRAVENOUS EVERY 6 HOURS PRN
Status: DISCONTINUED | OUTPATIENT
Start: 2024-04-11 | End: 2024-04-11 | Stop reason: HOSPADM

## 2024-04-11 RX ORDER — EPINEPHRINE 0.3 MG/.3ML
0.3 INJECTION SUBCUTANEOUS EVERY 5 MIN PRN
Status: CANCELLED | OUTPATIENT
Start: 2024-04-18

## 2024-04-11 RX ORDER — PROCHLORPERAZINE MALEATE 10 MG
10 TABLET ORAL EVERY 6 HOURS PRN
Status: CANCELLED | OUTPATIENT
Start: 2024-05-02

## 2024-04-11 RX ORDER — BORTEZOMIB 3.5 MG/1
1.3 INJECTION, POWDER, LYOPHILIZED, FOR SOLUTION INTRAVENOUS; SUBCUTANEOUS ONCE
Status: CANCELLED | OUTPATIENT
Start: 2024-04-25

## 2024-04-11 RX ORDER — EPINEPHRINE 0.3 MG/.3ML
0.3 INJECTION SUBCUTANEOUS EVERY 5 MIN PRN
Status: CANCELLED | OUTPATIENT
Start: 2024-05-02

## 2024-04-11 RX ORDER — DIPHENHYDRAMINE HYDROCHLORIDE 50 MG/ML
50 INJECTION INTRAMUSCULAR; INTRAVENOUS AS NEEDED
Status: CANCELLED | OUTPATIENT
Start: 2024-04-25

## 2024-04-11 RX ORDER — ALBUTEROL SULFATE 0.83 MG/ML
3 SOLUTION RESPIRATORY (INHALATION) AS NEEDED
Status: DISCONTINUED | OUTPATIENT
Start: 2024-04-11 | End: 2024-04-11 | Stop reason: HOSPADM

## 2024-04-11 RX ORDER — BORTEZOMIB 3.5 MG/1
1.3 INJECTION, POWDER, LYOPHILIZED, FOR SOLUTION INTRAVENOUS; SUBCUTANEOUS ONCE
Status: CANCELLED | OUTPATIENT
Start: 2024-04-18

## 2024-04-11 RX ORDER — PROCHLORPERAZINE MALEATE 10 MG
10 TABLET ORAL EVERY 6 HOURS PRN
Status: DISCONTINUED | OUTPATIENT
Start: 2024-04-11 | End: 2024-04-11 | Stop reason: HOSPADM

## 2024-04-11 RX ORDER — FAMOTIDINE 10 MG/ML
20 INJECTION INTRAVENOUS ONCE AS NEEDED
Status: CANCELLED | OUTPATIENT
Start: 2024-04-18

## 2024-04-11 RX ORDER — BORTEZOMIB 3.5 MG/1
1.3 INJECTION, POWDER, LYOPHILIZED, FOR SOLUTION INTRAVENOUS; SUBCUTANEOUS ONCE
Status: CANCELLED | OUTPATIENT
Start: 2024-05-02

## 2024-04-11 RX ORDER — DIPHENHYDRAMINE HYDROCHLORIDE 50 MG/ML
50 INJECTION INTRAMUSCULAR; INTRAVENOUS AS NEEDED
Status: DISCONTINUED | OUTPATIENT
Start: 2024-04-11 | End: 2024-04-11 | Stop reason: HOSPADM

## 2024-04-11 RX ORDER — ALBUTEROL SULFATE 0.83 MG/ML
3 SOLUTION RESPIRATORY (INHALATION) AS NEEDED
Status: CANCELLED | OUTPATIENT
Start: 2024-05-02

## 2024-04-11 RX ORDER — FAMOTIDINE 10 MG/ML
20 INJECTION INTRAVENOUS ONCE AS NEEDED
Status: DISCONTINUED | OUTPATIENT
Start: 2024-04-11 | End: 2024-04-11 | Stop reason: HOSPADM

## 2024-04-11 RX ORDER — EPINEPHRINE 0.3 MG/.3ML
0.3 INJECTION SUBCUTANEOUS EVERY 5 MIN PRN
Status: DISCONTINUED | OUTPATIENT
Start: 2024-04-11 | End: 2024-04-11 | Stop reason: HOSPADM

## 2024-04-11 RX ORDER — FAMOTIDINE 10 MG/ML
20 INJECTION INTRAVENOUS ONCE AS NEEDED
Status: CANCELLED | OUTPATIENT
Start: 2024-04-25

## 2024-04-11 RX ORDER — BORTEZOMIB 3.5 MG/1
1.3 INJECTION, POWDER, LYOPHILIZED, FOR SOLUTION INTRAVENOUS; SUBCUTANEOUS ONCE
Status: COMPLETED | OUTPATIENT
Start: 2024-04-11 | End: 2024-04-11

## 2024-04-11 RX ORDER — PROCHLORPERAZINE MALEATE 10 MG
10 TABLET ORAL EVERY 6 HOURS PRN
Status: CANCELLED | OUTPATIENT
Start: 2024-04-25

## 2024-04-11 RX ORDER — PROCHLORPERAZINE EDISYLATE 5 MG/ML
10 INJECTION INTRAMUSCULAR; INTRAVENOUS EVERY 6 HOURS PRN
Status: CANCELLED | OUTPATIENT
Start: 2024-04-18

## 2024-04-11 RX ORDER — PROCHLORPERAZINE MALEATE 10 MG
10 TABLET ORAL EVERY 6 HOURS PRN
Status: CANCELLED | OUTPATIENT
Start: 2024-04-18

## 2024-04-11 RX ORDER — ALBUTEROL SULFATE 0.83 MG/ML
3 SOLUTION RESPIRATORY (INHALATION) AS NEEDED
Status: CANCELLED | OUTPATIENT
Start: 2024-04-25

## 2024-04-11 RX ORDER — EPINEPHRINE 0.3 MG/.3ML
0.3 INJECTION SUBCUTANEOUS EVERY 5 MIN PRN
Status: CANCELLED | OUTPATIENT
Start: 2024-04-25

## 2024-04-11 RX ADMIN — BORTEZOMIB 2.08 MG: 1 INJECTION, POWDER, LYOPHILIZED, FOR SOLUTION INTRAVENOUS; SUBCUTANEOUS at 12:18

## 2024-04-11 ASSESSMENT — PAIN SCALES - GENERAL: PAINLEVEL: 0-NO PAIN

## 2024-04-11 NOTE — PROGRESS NOTES
LOCATION:  [facility].     HEMATOLOGY & ONCOLOGY PROBLEMS:  1.  IgA kappa multiple myeloma        a.  Initial diagnosis of MGUS in Feb 2022.       b.  Disease progression to multiple myeloma in Oct 2022.       c.  Ist line therapy with single agent Velcade beginning Nov 2022.  2.   Hypercalcemia/lytic lesions.       a.  Maintained on Xgeva  3.  Macrocytic anemia.     CHIEF COMPLAINT:    The patient is in the clinic today for management of multiple myeloma and macrocytic anemia and for continuation of therapy and management of therapy related effects.     HISTORY:   Ms. Cali is a 89 year old pleasant female with multiple myeloma.  She has been in relatively good health for her age and is in fact still working.  Blood work from Feb 2022 showed a hemoglobin of 11.1 with MCV of 101.  WBC, platelets and metabolic  profile was unremarkable except for baseline creatinine of 1.4.  Additional work-up revealed a normal TSH but SPEP showed 0.7 g of IgA kappa monoclonal protein.  Initial evaluation was mainly suggestive of MGUS and she was followed closely.  But routine  follow-up blood work from October 2022 showed 1g of IgA kappa monoclonal protein and markedly elevated kappa free light chain assay.  Creatinine was stable at 1.3 but serum calcium was elevated at 13.1.  PET scan results were unremarkable but a bone marrow  biopsy confirmed plasma cell myeloma.  Cytogenetics and FISH testing results were unremarkable.  After discussion with the patient and family members she is currently being treated with single agent Velcade with good response.  She is also maintained  on intermittent Xgeva for hypercalcemia.  Over time her clinical course has been complicated by fall leading to right leg surgery and then admission for C. difficile colitis etc.     INTERVAL HISTORY:  She is finally starting to feel better.  Myeloma markers have started to decline and calcium is also improving.  She is  currently getting Velcade at weekly interval.  Her blood pressure has been noted to be elevated in the clinic but her home logs indicate much better blood pressure control.    PAST MEDICAL HISTORY:   1.  Multiple myeloma as detailed above  2.  Hypertension  3.  Hypothyroidism  4.  GERD  5.  Chronic kidney disease  6.  DJD   7.  Idiopathic neuropathy  8.  Lactose intolerance  9.  Vitamin D deficiency  10.  History of cholecystectomy/hysterectomy/tonsillectomy/left knee replacement surgeries     SOCIAL HISTORY:   She lives in [place] with her son.  Non smoker.  Rare social alcohol intake.  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  Born and raised in [facility].     FAMILY HISTORY:    Parents  in their 90s from natural causes but a per the patient both probably had some kind of cancers.  5 siblings 1  from lung problems.  7 children, 12 grandkids and  14 great grandkids all alive and well.  No other specific history of bleeding, clotting or malignant disorder in the family.     REVIEW OF SYSTEMS:  Pertinent finding as per the history above.  All other systems have been reviewed and generally negative and noncontributory.     ALLERGY & MEDICATIONS:  Allergies and latest outpatient medications list were reviewed in the EMR.    VITAL SIGNS  BSA: There is no height or weight on file to calculate BSA.  There were no vitals taken for this visit.    PHYSICAL EXAMINATION:  Detailed examination not done.    LAB RESULTS:  CBC from today shows a hemoglobin of 9 with MCV of 92.  White cell count was 8.9 and platelet 390K.  Metabolic profile is essentially stable with creatinine of 1.3 and improved calcium of 9.9.  Latest myeloma markers from 2024 showed a kappa light chain level of 16.7 with IgA level of 905 decreased from previous high of 1560 in 2024.    RADIOLOGY RESULT:  CT chest without contrast 10/30/2023  Impression:  1.  2 mm left subpleural nodule as described.  2.  The lungs otherwise are clear considering limitations.  3. Nonunion of a left acromion fracture.      PET/CT Myeloma Initial [Oct 21 2022 10:35AM]  Impression:  1. Multiple focal hypermetabolic pulmonary nodules throughout the bilateral lungs. Findings likely represent inflammatory process,  versus less likely, malignancy. Recommend short-term follow-up CT to document resolution of nodules or tissue biopsy is clinically  indicated.  2. Several hypermetabolic nonenlarged mediastinal lymph nodes, likely reactive in etiology.  3. Mild increased metabolic activity throughout the axial and appendicular skeleton, which is nonspecific and could be related to patient's anemia versus diffuse myeloma involvement. There is no focal hypermetabolic disease involvement within the skeleton.     PATHOLOGY RESULTS:  Surgical Pathology [Oct 28 2022 1:58PM] ([id number])   Specimens: BONE MARROW CLOT /BONE MARROW CORE /Received in formalin, labeled with the patient's name and hospital number/Received in B-plus fixative, labeled with the patient's name and hospital   Name SUMIT OTTMatthew   Accession #: [id number]    Pathologist: ADRIANA NATION MD   Date of Procedure: 10/25/2022   Date Received: 10/25/2022   Date Reported 10/28/2022   Submitting Physician: RENATE MELGAR MD   Location: [facility] Other External #   FINAL DIAGNOSIS   A&B:  BONE MARROW, ASPIRATE WITH CLOT AND CORE BIOPSY WITH TOUCH IMPRINT, RIGHT ILIAC CREST:   -- SLIGHTLY HYPERCELLULAR BONE MARROW WITH 15% KAPPA+ PLASMA CELLS CONSISTENT WITH PLASMA CELL MYELOMA   NOTE: Clinical correlation recommended.   Pending  Genetic/Molecular testing per Hematopathology protocol:   -Chromosome analysis: karyotype   -FISH: Myeloma panel   -Molecular: None   -Microarray: None   The results will be reported separately.   Touch Prep   Differential:  (Normal) %   Promyelocytes (1-5) 1   Myelocytes (5-10) 8   Metamyelocytes (10-25) 13   Bands (10-20) 9   Segmented forms (5-30)  17   Eosinophils (2-4) 11   Basophils (0-1) 1   Lymphocytes (5-25) 2   Monocytes (0-2)  0   Plasma Cells (0-2) 15   Blasts (0-1) 0   Total Erythroid (17-35) 23   Number of cells counted: 100   Cellularity: cellular   M:E Ratio: 2.7:1   The gross and/or microscopic findings were reviewed in conjunction with pathology  resident, Kirstin Gutierrez M.D.   Electronically Signed Out By ADRIANA NATION MD/NAGI   By the signature on this report, the individual or group listed as making the   Final Interpretation/Diagnosis certifies that they have reviewed this  case.   Diagnostic interpretation performed at [facility] [place] [address] [address] [facility] [address]   Microscopic Description:   CBC: WBC 18.8 x 10E9/L, RBC 2.77 x 10E12/L, Hgb 8.8 g/dl, Hct 26.2%, MCV 95 fL, RDW 13.6%, platelets  416 x 10E9/L.   A 100 cell manual differential count reveals: polys 61%, bands 8%, lymphocytes 13%, monocytes 9%, eosinophils 8%, basophils 1%.   PERIPHERAL SMEAR: Submitted   Red cells: Normocytic with rouleaux formation.   White cells: Eosinophilia    Platelets: Normal, adequate.   ASPIRATE SMEAR: Submitted   Specimen: Aspicular.   Comments: Hypocellular and hemodilute with few hematopoietic precursors and scattered atypical plasma cells, some biunucleated.   TOUCH PREP: Submitted    Specimen: Paucicellular.   Erythropoiesis: Normal maturation.   Granulopoiesis: Normal maturation.   Megakaryocytes: Present. Morphology: Normal.   Comments: Numerous plasma cells (15%). Some very large atypical plasma cells seen.    ASPIRATE CLOT: Submitted   Specimen: Aspicular.   Comments: Rare hematopoietic progenitor cells noted in a hemodiluted sample.   CORE BIOPSY: Submitted   Specimen: Limited. Small subcortical with crush artifact   Cellularity: 40%.    Estimated M:E ratio: Consistent with aspirate smear.   Bony trabeculae: Normal.   Megakaryocytes: Adequate. Morphology: Normal.   Granulomas: Absent.   Lymphoid aggregates: Absent.    Comments: Few small aggregates of plasm cells noted.    SPECIAL STAINS:   Iron: Inadequate sample to evaluate   IMMUNOHISTOCHEMISTRY: Performed.    - Positive plasma cells approximately (15%)   Cyclin D1 - Negative   IH Kappa - positice in plasma cells   IH Lambda - rare plasma  cells positive.   FLOW CYTOMETRY: Performed, see separate report. A small clonal kappa+, CD19-, CD45- plasma cell population detected.   Immunostains were performed in addition to flow cytometry to fully characterize the phenotype, architecture,  and extent of the atypical population(s). This was medically necessary for the best possible diagnosis.   Clinical History:   C90.00,D64.9   Right side In the front,Iliac Crest   Specimens Submitted As:   A: BONE MARROW CLOT    B: BONE MARROW CORE      ASSESSMENT & PLAN:  1.  IgA kappa multiple myeloma.  Please refer to the details of initial presentation and management as outlined above. In summary, patient with finding of mild macrocytic  anemia with further work-up revealing 0.7g of IgA kappa monoclonal protein on SPEP in Feb 2022.  Clinically she was essentially asymptomatic and overall functional for her age.  She was initially followed with close observation.  Subsequent routine follow-up  blood work from Oct 2022 showed 1gm of IgA kappa monoclonal protein and markedly elevated kappa free light chain assay.  Creatinine was stable at 1.3 but serum calcium was elevated at 13.1.  PET scan results were unremarkable but a bone marrow biopsy  confirmed plasma cell myeloma.  Cytogenetics and FISH testing was unremarkable.   After discussions with the patient and family members she is currently being treated with single agent Velcade with good response.  She is also maintained on intermittent Xgeva for hypercalcemia.     Overall she is tolerating Velcade well and there seems to be slightly better response at the current once a week dosing.  For now we will continue with the Velcade but in the  future if there is concern regarding disease progression then we will need to change the treatment to Darzalex etc.   Probable side effects of neuropathy, thrombocytopenia, weakness, fatigue, rash etc. were explained to her in detail.  Overall prognosis is guarded.     2.  Hypercalcemia.  Secondary to baseline myeloma.  PET scan as such was negative for bony lytic lesions.  She will continue with the intermittent Xgeva as needed.       3. Follow up: Follow-up with me in about 4 weeks and will come to the clinic for Velcade SQ administration at weekly interval.  Advised to contact us immediately if there are any new questions or problems.     This note has been transcribed using Dragon voice recognition system and there is a possibility of unintentional typing misprints.

## 2024-04-11 NOTE — SIGNIFICANT EVENT
04/11/24 1146   Prechemo Checklist   Has the patient been in the hospital, ED, or urgent care since last date of service No   Chemo/Immuno Consent Signed Yes   Protocol/Indications Verified Yes   Confirmed to previous date/time of medication Yes   Compared to previous dose Yes   All medications are dated accurately Yes   Pregnancy Test Negative Not applicable   Parameters Met Yes   Provider Notified Yes   Is Patient Proceeding With Treatment? Yes   BSA/Weight-Height Verified Yes   Dose Calculations Verified (current, total, cumulative) Yes

## 2024-04-18 ENCOUNTER — LAB (OUTPATIENT)
Dept: LAB | Facility: CLINIC | Age: 89
End: 2024-04-18
Payer: MEDICARE

## 2024-04-18 ENCOUNTER — INFUSION (OUTPATIENT)
Dept: HEMATOLOGY/ONCOLOGY | Facility: CLINIC | Age: 89
End: 2024-04-18
Payer: MEDICARE

## 2024-04-18 VITALS
HEART RATE: 62 BPM | WEIGHT: 134.26 LBS | RESPIRATION RATE: 20 BRPM | TEMPERATURE: 98.6 F | SYSTOLIC BLOOD PRESSURE: 189 MMHG | BODY MASS INDEX: 26.36 KG/M2 | OXYGEN SATURATION: 100 % | HEIGHT: 60 IN | DIASTOLIC BLOOD PRESSURE: 71 MMHG

## 2024-04-18 DIAGNOSIS — C90.00 MULTIPLE MYELOMA, REMISSION STATUS UNSPECIFIED (MULTI): ICD-10-CM

## 2024-04-18 LAB
ALBUMIN SERPL BCP-MCNC: 3.9 G/DL (ref 3.4–5)
ALP SERPL-CCNC: 53 U/L (ref 33–136)
ALT SERPL W P-5'-P-CCNC: 9 U/L (ref 7–45)
ANION GAP SERPL CALC-SCNC: 12 MMOL/L (ref 10–20)
AST SERPL W P-5'-P-CCNC: 18 U/L (ref 9–39)
BASOPHILS # BLD AUTO: 0.03 X10*3/UL (ref 0–0.1)
BASOPHILS NFR BLD AUTO: 0.5 %
BILIRUB SERPL-MCNC: 0.4 MG/DL (ref 0–1.2)
BUN SERPL-MCNC: 27 MG/DL (ref 6–23)
CALCIUM SERPL-MCNC: 9.5 MG/DL (ref 8.6–10.3)
CHLORIDE SERPL-SCNC: 104 MMOL/L (ref 98–107)
CO2 SERPL-SCNC: 25 MMOL/L (ref 21–32)
CREAT SERPL-MCNC: 1.38 MG/DL (ref 0.5–1.05)
EGFRCR SERPLBLD CKD-EPI 2021: 36 ML/MIN/1.73M*2
EOSINOPHIL # BLD AUTO: 0.35 X10*3/UL (ref 0–0.4)
EOSINOPHIL NFR BLD AUTO: 5.8 %
ERYTHROCYTE [DISTWIDTH] IN BLOOD BY AUTOMATED COUNT: 16.7 % (ref 11.5–14.5)
GLUCOSE SERPL-MCNC: 95 MG/DL (ref 74–99)
HCT VFR BLD AUTO: 29.8 % (ref 36–46)
HGB BLD-MCNC: 9.3 G/DL (ref 12–16)
IMM GRANULOCYTES # BLD AUTO: 0.02 X10*3/UL (ref 0–0.5)
IMM GRANULOCYTES NFR BLD AUTO: 0.3 % (ref 0–0.9)
LYMPHOCYTES # BLD AUTO: 2.24 X10*3/UL (ref 0.8–3)
LYMPHOCYTES NFR BLD AUTO: 37 %
MCH RBC QN AUTO: 28.5 PG (ref 26–34)
MCHC RBC AUTO-ENTMCNC: 31.2 G/DL (ref 32–36)
MCV RBC AUTO: 91 FL (ref 80–100)
MONOCYTES # BLD AUTO: 0.79 X10*3/UL (ref 0.05–0.8)
MONOCYTES NFR BLD AUTO: 13 %
NEUTROPHILS # BLD AUTO: 2.63 X10*3/UL (ref 1.6–5.5)
NEUTROPHILS NFR BLD AUTO: 43.4 %
NRBC BLD-RTO: 0 /100 WBCS (ref 0–0)
PLATELET # BLD AUTO: 314 X10*3/UL (ref 150–450)
POTASSIUM SERPL-SCNC: 4 MMOL/L (ref 3.5–5.3)
PROT SERPL-MCNC: 6.8 G/DL (ref 6.4–8.2)
RBC # BLD AUTO: 3.26 X10*6/UL (ref 4–5.2)
SODIUM SERPL-SCNC: 137 MMOL/L (ref 136–145)
WBC # BLD AUTO: 6.1 X10*3/UL (ref 4.4–11.3)

## 2024-04-18 PROCEDURE — 96401 CHEMO ANTI-NEOPL SQ/IM: CPT

## 2024-04-18 PROCEDURE — 85025 COMPLETE CBC W/AUTO DIFF WBC: CPT | Performed by: INTERNAL MEDICINE

## 2024-04-18 PROCEDURE — 84075 ASSAY ALKALINE PHOSPHATASE: CPT | Performed by: INTERNAL MEDICINE

## 2024-04-18 PROCEDURE — 2500000004 HC RX 250 GENERAL PHARMACY W/ HCPCS (ALT 636 FOR OP/ED): Mod: JW | Performed by: INTERNAL MEDICINE

## 2024-04-18 RX ORDER — HEPARIN SODIUM,PORCINE/PF 10 UNIT/ML
50 SYRINGE (ML) INTRAVENOUS AS NEEDED
Status: CANCELLED | OUTPATIENT
Start: 2024-04-18

## 2024-04-18 RX ORDER — BORTEZOMIB 3.5 MG/1
1.3 INJECTION, POWDER, LYOPHILIZED, FOR SOLUTION INTRAVENOUS; SUBCUTANEOUS ONCE
Status: COMPLETED | OUTPATIENT
Start: 2024-04-18 | End: 2024-04-18

## 2024-04-18 RX ORDER — HEPARIN 100 UNIT/ML
500 SYRINGE INTRAVENOUS AS NEEDED
Status: DISCONTINUED | OUTPATIENT
Start: 2024-04-18 | End: 2024-04-18 | Stop reason: HOSPADM

## 2024-04-18 RX ORDER — HEPARIN 100 UNIT/ML
500 SYRINGE INTRAVENOUS AS NEEDED
Status: CANCELLED | OUTPATIENT
Start: 2024-04-18

## 2024-04-18 RX ORDER — DIPHENHYDRAMINE HYDROCHLORIDE 50 MG/ML
50 INJECTION INTRAMUSCULAR; INTRAVENOUS AS NEEDED
Status: DISCONTINUED | OUTPATIENT
Start: 2024-04-18 | End: 2024-04-18 | Stop reason: HOSPADM

## 2024-04-18 RX ORDER — EPINEPHRINE 0.3 MG/.3ML
0.3 INJECTION SUBCUTANEOUS EVERY 5 MIN PRN
Status: DISCONTINUED | OUTPATIENT
Start: 2024-04-18 | End: 2024-04-18 | Stop reason: HOSPADM

## 2024-04-18 RX ORDER — FAMOTIDINE 10 MG/ML
20 INJECTION INTRAVENOUS ONCE AS NEEDED
Status: DISCONTINUED | OUTPATIENT
Start: 2024-04-18 | End: 2024-04-18 | Stop reason: HOSPADM

## 2024-04-18 RX ORDER — HEPARIN SODIUM,PORCINE/PF 10 UNIT/ML
50 SYRINGE (ML) INTRAVENOUS AS NEEDED
Status: DISCONTINUED | OUTPATIENT
Start: 2024-04-18 | End: 2024-04-18 | Stop reason: HOSPADM

## 2024-04-18 RX ORDER — ALBUTEROL SULFATE 0.83 MG/ML
3 SOLUTION RESPIRATORY (INHALATION) AS NEEDED
Status: DISCONTINUED | OUTPATIENT
Start: 2024-04-18 | End: 2024-04-18 | Stop reason: HOSPADM

## 2024-04-18 RX ADMIN — BORTEZOMIB 2.08 MG: 1 INJECTION, POWDER, LYOPHILIZED, FOR SOLUTION INTRAVENOUS; SUBCUTANEOUS at 11:13

## 2024-04-18 ASSESSMENT — PAIN SCALES - GENERAL: PAINLEVEL: 0-NO PAIN

## 2024-04-18 NOTE — SIGNIFICANT EVENT
04/18/24 1029   Prechemo Checklist   Has the patient been in the hospital, ED, or urgent care since last date of service No   Chemo/Immuno Consent Signed Yes   Protocol/Indications Verified Yes   Confirmed to previous date/time of medication Yes   Compared to previous dose Yes   All medications are dated accurately Yes   Pregnancy Test Negative Not applicable   Parameters Met Yes   Provider Notified Yes   Is Patient Proceeding With Treatment? Yes   BSA/Weight-Height Verified Yes   Dose Calculations Verified (current, total, cumulative) Yes

## 2024-04-25 ENCOUNTER — INFUSION (OUTPATIENT)
Dept: HEMATOLOGY/ONCOLOGY | Facility: CLINIC | Age: 89
End: 2024-04-25
Payer: MEDICARE

## 2024-04-25 ENCOUNTER — LAB (OUTPATIENT)
Dept: LAB | Facility: CLINIC | Age: 89
End: 2024-04-25
Payer: MEDICARE

## 2024-04-25 VITALS
SYSTOLIC BLOOD PRESSURE: 182 MMHG | BODY MASS INDEX: 25.43 KG/M2 | TEMPERATURE: 97.2 F | HEART RATE: 68 BPM | OXYGEN SATURATION: 96 % | HEIGHT: 61 IN | DIASTOLIC BLOOD PRESSURE: 78 MMHG | WEIGHT: 134.7 LBS | RESPIRATION RATE: 18 BRPM

## 2024-04-25 DIAGNOSIS — C90.00 MULTIPLE MYELOMA, REMISSION STATUS UNSPECIFIED (MULTI): ICD-10-CM

## 2024-04-25 LAB
ALBUMIN SERPL BCP-MCNC: 3.8 G/DL (ref 3.4–5)
ALP SERPL-CCNC: 50 U/L (ref 33–136)
ALT SERPL W P-5'-P-CCNC: 9 U/L (ref 7–45)
ANION GAP SERPL CALC-SCNC: 10 MMOL/L (ref 10–20)
AST SERPL W P-5'-P-CCNC: 18 U/L (ref 9–39)
BASOPHILS # BLD AUTO: 0.02 X10*3/UL (ref 0–0.1)
BASOPHILS NFR BLD AUTO: 0.4 %
BILIRUB SERPL-MCNC: 0.3 MG/DL (ref 0–1.2)
BUN SERPL-MCNC: 28 MG/DL (ref 6–23)
CALCIUM SERPL-MCNC: 9.6 MG/DL (ref 8.6–10.3)
CHLORIDE SERPL-SCNC: 107 MMOL/L (ref 98–107)
CO2 SERPL-SCNC: 25 MMOL/L (ref 21–32)
CREAT SERPL-MCNC: 1.29 MG/DL (ref 0.5–1.05)
EGFRCR SERPLBLD CKD-EPI 2021: 39 ML/MIN/1.73M*2
EOSINOPHIL # BLD AUTO: 0.27 X10*3/UL (ref 0–0.4)
EOSINOPHIL NFR BLD AUTO: 4.8 %
ERYTHROCYTE [DISTWIDTH] IN BLOOD BY AUTOMATED COUNT: 16.5 % (ref 11.5–14.5)
GLUCOSE SERPL-MCNC: 86 MG/DL (ref 74–99)
HCT VFR BLD AUTO: 31.3 % (ref 36–46)
HGB BLD-MCNC: 10.2 G/DL (ref 12–16)
IMM GRANULOCYTES # BLD AUTO: 0.02 X10*3/UL (ref 0–0.5)
IMM GRANULOCYTES NFR BLD AUTO: 0.4 % (ref 0–0.9)
LYMPHOCYTES # BLD AUTO: 1.83 X10*3/UL (ref 0.8–3)
LYMPHOCYTES NFR BLD AUTO: 32.9 %
MCH RBC QN AUTO: 29.3 PG (ref 26–34)
MCHC RBC AUTO-ENTMCNC: 32.6 G/DL (ref 32–36)
MCV RBC AUTO: 90 FL (ref 80–100)
MONOCYTES # BLD AUTO: 0.73 X10*3/UL (ref 0.05–0.8)
MONOCYTES NFR BLD AUTO: 13.1 %
NEUTROPHILS # BLD AUTO: 2.7 X10*3/UL (ref 1.6–5.5)
NEUTROPHILS NFR BLD AUTO: 48.4 %
NRBC BLD-RTO: 0 /100 WBCS (ref 0–0)
PLATELET # BLD AUTO: 275 X10*3/UL (ref 150–450)
POTASSIUM SERPL-SCNC: 4 MMOL/L (ref 3.5–5.3)
PROT SERPL-MCNC: 6.7 G/DL (ref 6.4–8.2)
RBC # BLD AUTO: 3.48 X10*6/UL (ref 4–5.2)
SODIUM SERPL-SCNC: 138 MMOL/L (ref 136–145)
WBC # BLD AUTO: 5.6 X10*3/UL (ref 4.4–11.3)

## 2024-04-25 PROCEDURE — 96401 CHEMO ANTI-NEOPL SQ/IM: CPT

## 2024-04-25 PROCEDURE — 84075 ASSAY ALKALINE PHOSPHATASE: CPT | Performed by: INTERNAL MEDICINE

## 2024-04-25 PROCEDURE — 2500000004 HC RX 250 GENERAL PHARMACY W/ HCPCS (ALT 636 FOR OP/ED): Mod: JW | Performed by: INTERNAL MEDICINE

## 2024-04-25 PROCEDURE — 85025 COMPLETE CBC W/AUTO DIFF WBC: CPT | Performed by: INTERNAL MEDICINE

## 2024-04-25 RX ORDER — BORTEZOMIB 3.5 MG/1
1.3 INJECTION, POWDER, LYOPHILIZED, FOR SOLUTION INTRAVENOUS; SUBCUTANEOUS ONCE
Status: COMPLETED | OUTPATIENT
Start: 2024-04-25 | End: 2024-04-25

## 2024-04-25 RX ORDER — EPINEPHRINE 0.3 MG/.3ML
0.3 INJECTION SUBCUTANEOUS EVERY 5 MIN PRN
Status: DISCONTINUED | OUTPATIENT
Start: 2024-04-25 | End: 2024-04-25 | Stop reason: HOSPADM

## 2024-04-25 RX ORDER — ALBUTEROL SULFATE 0.83 MG/ML
3 SOLUTION RESPIRATORY (INHALATION) AS NEEDED
Status: DISCONTINUED | OUTPATIENT
Start: 2024-04-25 | End: 2024-04-25 | Stop reason: HOSPADM

## 2024-04-25 RX ORDER — DIPHENHYDRAMINE HYDROCHLORIDE 50 MG/ML
50 INJECTION INTRAMUSCULAR; INTRAVENOUS AS NEEDED
Status: DISCONTINUED | OUTPATIENT
Start: 2024-04-25 | End: 2024-04-25 | Stop reason: HOSPADM

## 2024-04-25 RX ORDER — FAMOTIDINE 10 MG/ML
20 INJECTION INTRAVENOUS ONCE AS NEEDED
Status: DISCONTINUED | OUTPATIENT
Start: 2024-04-25 | End: 2024-04-25 | Stop reason: HOSPADM

## 2024-04-25 RX ADMIN — BORTEZOMIB 2.08 MG: 1 INJECTION, POWDER, LYOPHILIZED, FOR SOLUTION INTRAVENOUS; SUBCUTANEOUS at 10:56

## 2024-04-25 ASSESSMENT — PAIN SCALES - GENERAL: PAINLEVEL: 0-NO PAIN

## 2024-04-25 NOTE — SIGNIFICANT EVENT
04/25/24 1005   Prechemo Checklist   Has the patient been in the hospital, ED, or urgent care since last date of service No   Chemo/Immuno Consent Completed and Signed Yes   Protocol/Indications Verified Yes   Confirmed to previous date/time of medication Yes   Compared to previous dose Yes   All medications are dated accurately Yes   Pregnancy Test Negative Not applicable   Parameters Met Yes   Provider Notified Yes   Is Patient Proceeding With Treatment? Yes   BSA/Weight-Height Verified Yes   Dose Calculations Verified (current, total, cumulative) Yes

## 2024-05-02 ENCOUNTER — INFUSION (OUTPATIENT)
Dept: HEMATOLOGY/ONCOLOGY | Facility: CLINIC | Age: 89
End: 2024-05-02
Payer: MEDICARE

## 2024-05-02 ENCOUNTER — LAB (OUTPATIENT)
Dept: LAB | Facility: CLINIC | Age: 89
End: 2024-05-02
Payer: MEDICARE

## 2024-05-02 VITALS
OXYGEN SATURATION: 97 % | BODY MASS INDEX: 25.52 KG/M2 | DIASTOLIC BLOOD PRESSURE: 88 MMHG | TEMPERATURE: 97.3 F | RESPIRATION RATE: 20 BRPM | WEIGHT: 135.14 LBS | SYSTOLIC BLOOD PRESSURE: 182 MMHG | HEIGHT: 61 IN | HEART RATE: 66 BPM

## 2024-05-02 DIAGNOSIS — E83.52 HYPERCALCEMIA: ICD-10-CM

## 2024-05-02 DIAGNOSIS — C90.00 MULTIPLE MYELOMA NOT HAVING ACHIEVED REMISSION (MULTI): ICD-10-CM

## 2024-05-02 DIAGNOSIS — C90.00 MULTIPLE MYELOMA, REMISSION STATUS UNSPECIFIED (MULTI): ICD-10-CM

## 2024-05-02 LAB
ALBUMIN SERPL BCP-MCNC: 3.8 G/DL (ref 3.4–5)
ALP SERPL-CCNC: 51 U/L (ref 33–136)
ALT SERPL W P-5'-P-CCNC: 9 U/L (ref 7–45)
ANION GAP SERPL CALC-SCNC: 11 MMOL/L (ref 10–20)
AST SERPL W P-5'-P-CCNC: 18 U/L (ref 9–39)
BASOPHILS # BLD AUTO: 0.03 X10*3/UL (ref 0–0.1)
BASOPHILS NFR BLD AUTO: 0.5 %
BILIRUB SERPL-MCNC: 0.4 MG/DL (ref 0–1.2)
BUN SERPL-MCNC: 30 MG/DL (ref 6–23)
CALCIUM SERPL-MCNC: 9.9 MG/DL (ref 8.6–10.3)
CHLORIDE SERPL-SCNC: 105 MMOL/L (ref 98–107)
CO2 SERPL-SCNC: 27 MMOL/L (ref 21–32)
CREAT SERPL-MCNC: 1.26 MG/DL (ref 0.5–1.05)
EGFRCR SERPLBLD CKD-EPI 2021: 40 ML/MIN/1.73M*2
EOSINOPHIL # BLD AUTO: 0.36 X10*3/UL (ref 0–0.4)
EOSINOPHIL NFR BLD AUTO: 6.5 %
ERYTHROCYTE [DISTWIDTH] IN BLOOD BY AUTOMATED COUNT: 16.6 % (ref 11.5–14.5)
GLUCOSE SERPL-MCNC: 85 MG/DL (ref 74–99)
HCT VFR BLD AUTO: 30.8 % (ref 36–46)
HGB BLD-MCNC: 9.8 G/DL (ref 12–16)
IMM GRANULOCYTES # BLD AUTO: 0.02 X10*3/UL (ref 0–0.5)
IMM GRANULOCYTES NFR BLD AUTO: 0.4 % (ref 0–0.9)
LYMPHOCYTES # BLD AUTO: 1.48 X10*3/UL (ref 0.8–3)
LYMPHOCYTES NFR BLD AUTO: 26.5 %
MCH RBC QN AUTO: 28.6 PG (ref 26–34)
MCHC RBC AUTO-ENTMCNC: 31.8 G/DL (ref 32–36)
MCV RBC AUTO: 90 FL (ref 80–100)
MONOCYTES # BLD AUTO: 0.85 X10*3/UL (ref 0.05–0.8)
MONOCYTES NFR BLD AUTO: 15.2 %
NEUTROPHILS # BLD AUTO: 2.84 X10*3/UL (ref 1.6–5.5)
NEUTROPHILS NFR BLD AUTO: 50.9 %
NRBC BLD-RTO: 0 /100 WBCS (ref 0–0)
PLATELET # BLD AUTO: 277 X10*3/UL (ref 150–450)
POTASSIUM SERPL-SCNC: 4.1 MMOL/L (ref 3.5–5.3)
PROT SERPL-MCNC: 6.9 G/DL (ref 6.4–8.2)
RBC # BLD AUTO: 3.43 X10*6/UL (ref 4–5.2)
SODIUM SERPL-SCNC: 139 MMOL/L (ref 136–145)
WBC # BLD AUTO: 5.6 X10*3/UL (ref 4.4–11.3)

## 2024-05-02 PROCEDURE — 84075 ASSAY ALKALINE PHOSPHATASE: CPT | Performed by: INTERNAL MEDICINE

## 2024-05-02 PROCEDURE — 96401 CHEMO ANTI-NEOPL SQ/IM: CPT

## 2024-05-02 PROCEDURE — 96372 THER/PROPH/DIAG INJ SC/IM: CPT

## 2024-05-02 PROCEDURE — 2500000004 HC RX 250 GENERAL PHARMACY W/ HCPCS (ALT 636 FOR OP/ED): Mod: JZ | Performed by: INTERNAL MEDICINE

## 2024-05-02 PROCEDURE — 85025 COMPLETE CBC W/AUTO DIFF WBC: CPT | Performed by: INTERNAL MEDICINE

## 2024-05-02 RX ORDER — HEPARIN SODIUM,PORCINE/PF 10 UNIT/ML
50 SYRINGE (ML) INTRAVENOUS AS NEEDED
OUTPATIENT
Start: 2024-05-02

## 2024-05-02 RX ORDER — FAMOTIDINE 10 MG/ML
20 INJECTION INTRAVENOUS ONCE AS NEEDED
Status: DISCONTINUED | OUTPATIENT
Start: 2024-05-02 | End: 2024-05-02 | Stop reason: HOSPADM

## 2024-05-02 RX ORDER — ALBUTEROL SULFATE 0.83 MG/ML
3 SOLUTION RESPIRATORY (INHALATION) AS NEEDED
Status: CANCELLED | OUTPATIENT
Start: 2024-05-09

## 2024-05-02 RX ORDER — HEPARIN 100 UNIT/ML
500 SYRINGE INTRAVENOUS AS NEEDED
OUTPATIENT
Start: 2024-05-02

## 2024-05-02 RX ORDER — EPINEPHRINE 0.3 MG/.3ML
0.3 INJECTION SUBCUTANEOUS EVERY 5 MIN PRN
Status: CANCELLED | OUTPATIENT
Start: 2024-05-09

## 2024-05-02 RX ORDER — FAMOTIDINE 10 MG/ML
20 INJECTION INTRAVENOUS ONCE AS NEEDED
Status: CANCELLED | OUTPATIENT
Start: 2024-05-09

## 2024-05-02 RX ORDER — DIPHENHYDRAMINE HYDROCHLORIDE 50 MG/ML
50 INJECTION INTRAMUSCULAR; INTRAVENOUS AS NEEDED
Status: CANCELLED | OUTPATIENT
Start: 2024-05-09

## 2024-05-02 RX ORDER — EPINEPHRINE 0.3 MG/.3ML
0.3 INJECTION SUBCUTANEOUS EVERY 5 MIN PRN
Status: DISCONTINUED | OUTPATIENT
Start: 2024-05-02 | End: 2024-05-02 | Stop reason: HOSPADM

## 2024-05-02 RX ORDER — ALBUTEROL SULFATE 0.83 MG/ML
3 SOLUTION RESPIRATORY (INHALATION) AS NEEDED
Status: DISCONTINUED | OUTPATIENT
Start: 2024-05-02 | End: 2024-05-02 | Stop reason: HOSPADM

## 2024-05-02 RX ORDER — BORTEZOMIB 3.5 MG/1
1.3 INJECTION, POWDER, LYOPHILIZED, FOR SOLUTION INTRAVENOUS; SUBCUTANEOUS ONCE
Status: COMPLETED | OUTPATIENT
Start: 2024-05-02 | End: 2024-05-02

## 2024-05-02 RX ORDER — DIPHENHYDRAMINE HYDROCHLORIDE 50 MG/ML
50 INJECTION INTRAMUSCULAR; INTRAVENOUS AS NEEDED
Status: DISCONTINUED | OUTPATIENT
Start: 2024-05-02 | End: 2024-05-02 | Stop reason: HOSPADM

## 2024-05-02 RX ADMIN — DENOSUMAB 120 MG: 120 INJECTION SUBCUTANEOUS at 11:22

## 2024-05-02 RX ADMIN — BORTEZOMIB 2.08 MG: 1 INJECTION, POWDER, LYOPHILIZED, FOR SOLUTION INTRAVENOUS; SUBCUTANEOUS at 11:21

## 2024-05-02 ASSESSMENT — PAIN SCALES - GENERAL: PAINLEVEL: 0-NO PAIN

## 2024-05-02 NOTE — SIGNIFICANT EVENT
05/02/24 1142   Prechemo Checklist   Has the patient been in the hospital, ED, or urgent care since last date of service No   Chemo/Immuno Consent Completed and Signed Yes   Protocol/Indications Verified Yes   Confirmed to previous date/time of medication Yes   Compared to previous dose Yes   All medications are dated accurately Yes   Pregnancy Test Negative Not applicable   Parameters Met Yes   Provider Notified Yes   Is Patient Proceeding With Treatment? Yes   BSA/Weight-Height Verified Yes   Dose Calculations Verified (current, total, cumulative) Yes

## 2024-05-08 RX ORDER — DIPHENHYDRAMINE HYDROCHLORIDE 50 MG/ML
50 INJECTION INTRAMUSCULAR; INTRAVENOUS AS NEEDED
Status: CANCELLED | OUTPATIENT
Start: 2024-05-30

## 2024-05-08 RX ORDER — PROCHLORPERAZINE EDISYLATE 5 MG/ML
10 INJECTION INTRAMUSCULAR; INTRAVENOUS EVERY 6 HOURS PRN
Status: CANCELLED | OUTPATIENT
Start: 2024-05-23

## 2024-05-08 RX ORDER — EPINEPHRINE 0.3 MG/.3ML
0.3 INJECTION SUBCUTANEOUS EVERY 5 MIN PRN
Status: CANCELLED | OUTPATIENT
Start: 2024-05-16

## 2024-05-08 RX ORDER — FAMOTIDINE 10 MG/ML
20 INJECTION INTRAVENOUS ONCE AS NEEDED
Status: CANCELLED | OUTPATIENT
Start: 2024-05-16

## 2024-05-08 RX ORDER — FAMOTIDINE 10 MG/ML
20 INJECTION INTRAVENOUS ONCE AS NEEDED
Status: CANCELLED | OUTPATIENT
Start: 2024-05-30

## 2024-05-08 RX ORDER — PROCHLORPERAZINE MALEATE 10 MG
10 TABLET ORAL EVERY 6 HOURS PRN
Status: CANCELLED | OUTPATIENT
Start: 2024-05-23

## 2024-05-08 RX ORDER — BORTEZOMIB 3.5 MG/1
1.3 INJECTION, POWDER, LYOPHILIZED, FOR SOLUTION INTRAVENOUS; SUBCUTANEOUS ONCE
Status: CANCELLED | OUTPATIENT
Start: 2024-05-23

## 2024-05-08 RX ORDER — ALBUTEROL SULFATE 0.83 MG/ML
3 SOLUTION RESPIRATORY (INHALATION) AS NEEDED
Status: CANCELLED | OUTPATIENT
Start: 2024-05-16

## 2024-05-08 RX ORDER — ALBUTEROL SULFATE 0.83 MG/ML
3 SOLUTION RESPIRATORY (INHALATION) AS NEEDED
Status: CANCELLED | OUTPATIENT
Start: 2024-05-23

## 2024-05-08 RX ORDER — PROCHLORPERAZINE MALEATE 10 MG
10 TABLET ORAL EVERY 6 HOURS PRN
Status: CANCELLED | OUTPATIENT
Start: 2024-05-16

## 2024-05-08 RX ORDER — BORTEZOMIB 3.5 MG/1
1.3 INJECTION, POWDER, LYOPHILIZED, FOR SOLUTION INTRAVENOUS; SUBCUTANEOUS ONCE
Status: CANCELLED | OUTPATIENT
Start: 2024-05-16

## 2024-05-08 RX ORDER — PROCHLORPERAZINE EDISYLATE 5 MG/ML
10 INJECTION INTRAMUSCULAR; INTRAVENOUS EVERY 6 HOURS PRN
Status: CANCELLED | OUTPATIENT
Start: 2024-05-30

## 2024-05-08 RX ORDER — EPINEPHRINE 0.3 MG/.3ML
0.3 INJECTION SUBCUTANEOUS EVERY 5 MIN PRN
Status: CANCELLED | OUTPATIENT
Start: 2024-05-30

## 2024-05-08 RX ORDER — DIPHENHYDRAMINE HYDROCHLORIDE 50 MG/ML
50 INJECTION INTRAMUSCULAR; INTRAVENOUS AS NEEDED
Status: CANCELLED | OUTPATIENT
Start: 2024-05-16

## 2024-05-08 RX ORDER — ALBUTEROL SULFATE 0.83 MG/ML
3 SOLUTION RESPIRATORY (INHALATION) AS NEEDED
Status: CANCELLED | OUTPATIENT
Start: 2024-05-30

## 2024-05-08 RX ORDER — PROCHLORPERAZINE MALEATE 10 MG
10 TABLET ORAL EVERY 6 HOURS PRN
Status: CANCELLED | OUTPATIENT
Start: 2024-05-09

## 2024-05-08 RX ORDER — DIPHENHYDRAMINE HYDROCHLORIDE 50 MG/ML
50 INJECTION INTRAMUSCULAR; INTRAVENOUS AS NEEDED
Status: CANCELLED | OUTPATIENT
Start: 2024-05-23

## 2024-05-08 RX ORDER — EPINEPHRINE 0.3 MG/.3ML
0.3 INJECTION SUBCUTANEOUS EVERY 5 MIN PRN
Status: CANCELLED | OUTPATIENT
Start: 2024-05-23

## 2024-05-08 RX ORDER — FAMOTIDINE 10 MG/ML
20 INJECTION INTRAVENOUS ONCE AS NEEDED
Status: CANCELLED | OUTPATIENT
Start: 2024-05-23

## 2024-05-08 RX ORDER — PROCHLORPERAZINE MALEATE 10 MG
10 TABLET ORAL EVERY 6 HOURS PRN
Status: CANCELLED | OUTPATIENT
Start: 2024-05-30

## 2024-05-08 RX ORDER — PROCHLORPERAZINE EDISYLATE 5 MG/ML
10 INJECTION INTRAMUSCULAR; INTRAVENOUS EVERY 6 HOURS PRN
Status: CANCELLED | OUTPATIENT
Start: 2024-05-16

## 2024-05-08 RX ORDER — PROCHLORPERAZINE EDISYLATE 5 MG/ML
10 INJECTION INTRAMUSCULAR; INTRAVENOUS EVERY 6 HOURS PRN
Status: CANCELLED | OUTPATIENT
Start: 2024-05-09

## 2024-05-08 RX ORDER — BORTEZOMIB 3.5 MG/1
1.3 INJECTION, POWDER, LYOPHILIZED, FOR SOLUTION INTRAVENOUS; SUBCUTANEOUS ONCE
Status: CANCELLED | OUTPATIENT
Start: 2024-05-30

## 2024-05-09 ENCOUNTER — INFUSION (OUTPATIENT)
Dept: HEMATOLOGY/ONCOLOGY | Facility: CLINIC | Age: 89
End: 2024-05-09
Payer: MEDICARE

## 2024-05-09 ENCOUNTER — LAB (OUTPATIENT)
Dept: LAB | Facility: CLINIC | Age: 89
End: 2024-05-09
Payer: MEDICARE

## 2024-05-09 ENCOUNTER — OFFICE VISIT (OUTPATIENT)
Dept: HEMATOLOGY/ONCOLOGY | Facility: CLINIC | Age: 89
End: 2024-05-09
Payer: MEDICARE

## 2024-05-09 VITALS
OXYGEN SATURATION: 99 % | RESPIRATION RATE: 20 BRPM | SYSTOLIC BLOOD PRESSURE: 176 MMHG | HEART RATE: 68 BPM | DIASTOLIC BLOOD PRESSURE: 74 MMHG | BODY MASS INDEX: 27.11 KG/M2 | TEMPERATURE: 98.1 F | WEIGHT: 134.48 LBS | HEIGHT: 59 IN

## 2024-05-09 DIAGNOSIS — C90.00 MULTIPLE MYELOMA, REMISSION STATUS UNSPECIFIED (MULTI): ICD-10-CM

## 2024-05-09 DIAGNOSIS — C90.00 MULTIPLE MYELOMA NOT HAVING ACHIEVED REMISSION (MULTI): ICD-10-CM

## 2024-05-09 DIAGNOSIS — E83.52 HYPERCALCEMIA: ICD-10-CM

## 2024-05-09 DIAGNOSIS — C90.00 MULTIPLE MYELOMA, REMISSION STATUS UNSPECIFIED (MULTI): Primary | ICD-10-CM

## 2024-05-09 PROBLEM — N39.0 ACUTE URINARY TRACT INFECTION: Status: RESOLVED | Noted: 2024-05-09 | Resolved: 2024-05-09

## 2024-05-09 PROBLEM — J40 BRONCHITIS: Status: RESOLVED | Noted: 2024-01-24 | Resolved: 2024-05-09

## 2024-05-09 PROBLEM — T81.49XA POSTOPERATIVE WOUND INFECTION: Status: ACTIVE | Noted: 2023-04-25

## 2024-05-09 PROBLEM — N18.2 STAGE 2 CHRONIC KIDNEY DISEASE: Status: ACTIVE | Noted: 2022-10-31

## 2024-05-09 PROBLEM — R05.2 SUBACUTE COUGH: Status: RESOLVED | Noted: 2024-02-02 | Resolved: 2024-05-09

## 2024-05-09 PROBLEM — L98.499 SKIN ULCER (MULTI): Status: ACTIVE | Noted: 2024-01-24

## 2024-05-09 LAB
ALBUMIN SERPL BCP-MCNC: 3.9 G/DL (ref 3.4–5)
ALP SERPL-CCNC: 52 U/L (ref 33–136)
ALT SERPL W P-5'-P-CCNC: 10 U/L (ref 7–45)
ANION GAP SERPL CALC-SCNC: 12 MMOL/L (ref 10–20)
AST SERPL W P-5'-P-CCNC: 19 U/L (ref 9–39)
B2 MICROGLOB SERPL-MCNC: 7 MG/L (ref 0.7–2.2)
BASOPHILS # BLD AUTO: 0.02 X10*3/UL (ref 0–0.1)
BASOPHILS NFR BLD AUTO: 0.2 %
BILIRUB SERPL-MCNC: 0.5 MG/DL (ref 0–1.2)
BUN SERPL-MCNC: 29 MG/DL (ref 6–23)
CALCIUM SERPL-MCNC: 9.8 MG/DL (ref 8.6–10.3)
CHLORIDE SERPL-SCNC: 104 MMOL/L (ref 98–107)
CO2 SERPL-SCNC: 27 MMOL/L (ref 21–32)
CREAT SERPL-MCNC: 1.26 MG/DL (ref 0.5–1.05)
EGFRCR SERPLBLD CKD-EPI 2021: 40 ML/MIN/1.73M*2
EOSINOPHIL # BLD AUTO: 0.34 X10*3/UL (ref 0–0.4)
EOSINOPHIL NFR BLD AUTO: 4.2 %
ERYTHROCYTE [DISTWIDTH] IN BLOOD BY AUTOMATED COUNT: 16.9 % (ref 11.5–14.5)
GLUCOSE SERPL-MCNC: 86 MG/DL (ref 74–99)
HCT VFR BLD AUTO: 31.4 % (ref 36–46)
HGB BLD-MCNC: 9.7 G/DL (ref 12–16)
IGA SERPL-MCNC: 724 MG/DL (ref 70–400)
IGG SERPL-MCNC: 468 MG/DL (ref 700–1600)
IGM SERPL-MCNC: 14 MG/DL (ref 40–230)
IMM GRANULOCYTES # BLD AUTO: 0.02 X10*3/UL (ref 0–0.5)
IMM GRANULOCYTES NFR BLD AUTO: 0.2 % (ref 0–0.9)
LYMPHOCYTES # BLD AUTO: 2.02 X10*3/UL (ref 0.8–3)
LYMPHOCYTES NFR BLD AUTO: 25 %
MCH RBC QN AUTO: 27.9 PG (ref 26–34)
MCHC RBC AUTO-ENTMCNC: 30.9 G/DL (ref 32–36)
MCV RBC AUTO: 90 FL (ref 80–100)
MONOCYTES # BLD AUTO: 1.04 X10*3/UL (ref 0.05–0.8)
MONOCYTES NFR BLD AUTO: 12.9 %
NEUTROPHILS # BLD AUTO: 4.63 X10*3/UL (ref 1.6–5.5)
NEUTROPHILS NFR BLD AUTO: 57.5 %
NRBC BLD-RTO: 0 /100 WBCS (ref 0–0)
PLATELET # BLD AUTO: 325 X10*3/UL (ref 150–450)
POTASSIUM SERPL-SCNC: 4.1 MMOL/L (ref 3.5–5.3)
PROT SERPL-MCNC: 6.9 G/DL (ref 6.4–8.2)
PROT SERPL-MCNC: 7.1 G/DL (ref 6.4–8.2)
RBC # BLD AUTO: 3.48 X10*6/UL (ref 4–5.2)
SODIUM SERPL-SCNC: 139 MMOL/L (ref 136–145)
WBC # BLD AUTO: 8.1 X10*3/UL (ref 4.4–11.3)

## 2024-05-09 PROCEDURE — 84155 ASSAY OF PROTEIN SERUM: CPT | Mod: PARLAB | Performed by: INTERNAL MEDICINE

## 2024-05-09 PROCEDURE — 80053 COMPREHEN METABOLIC PANEL: CPT | Performed by: INTERNAL MEDICINE

## 2024-05-09 PROCEDURE — 82232 ASSAY OF BETA-2 PROTEIN: CPT | Mod: PARLAB | Performed by: INTERNAL MEDICINE

## 2024-05-09 PROCEDURE — 84165 PROTEIN E-PHORESIS SERUM: CPT | Performed by: INTERNAL MEDICINE

## 2024-05-09 PROCEDURE — 1160F RVW MEDS BY RX/DR IN RCRD: CPT | Performed by: INTERNAL MEDICINE

## 2024-05-09 PROCEDURE — 1159F MED LIST DOCD IN RCRD: CPT | Performed by: INTERNAL MEDICINE

## 2024-05-09 PROCEDURE — 86320 SERUM IMMUNOELECTROPHORESIS: CPT | Performed by: INTERNAL MEDICINE

## 2024-05-09 PROCEDURE — 85025 COMPLETE CBC W/AUTO DIFF WBC: CPT | Performed by: INTERNAL MEDICINE

## 2024-05-09 PROCEDURE — 1126F AMNT PAIN NOTED NONE PRSNT: CPT | Performed by: INTERNAL MEDICINE

## 2024-05-09 PROCEDURE — 3077F SYST BP >= 140 MM HG: CPT | Performed by: INTERNAL MEDICINE

## 2024-05-09 PROCEDURE — 2500000004 HC RX 250 GENERAL PHARMACY W/ HCPCS (ALT 636 FOR OP/ED): Mod: JW | Performed by: INTERNAL MEDICINE

## 2024-05-09 PROCEDURE — 82784 ASSAY IGA/IGD/IGG/IGM EACH: CPT | Mod: PARLAB | Performed by: INTERNAL MEDICINE

## 2024-05-09 PROCEDURE — 3078F DIAST BP <80 MM HG: CPT | Performed by: INTERNAL MEDICINE

## 2024-05-09 PROCEDURE — 99215 OFFICE O/P EST HI 40 MIN: CPT | Performed by: INTERNAL MEDICINE

## 2024-05-09 PROCEDURE — 83521 IG LIGHT CHAINS FREE EACH: CPT | Mod: PARLAB | Performed by: INTERNAL MEDICINE

## 2024-05-09 PROCEDURE — 1123F ACP DISCUSS/DSCN MKR DOCD: CPT | Performed by: INTERNAL MEDICINE

## 2024-05-09 PROCEDURE — 86334 IMMUNOFIX E-PHORESIS SERUM: CPT | Mod: PARLAB | Performed by: INTERNAL MEDICINE

## 2024-05-09 PROCEDURE — 96401 CHEMO ANTI-NEOPL SQ/IM: CPT

## 2024-05-09 RX ORDER — BORTEZOMIB 3.5 MG/1
1.3 INJECTION, POWDER, LYOPHILIZED, FOR SOLUTION INTRAVENOUS; SUBCUTANEOUS ONCE
Status: COMPLETED | OUTPATIENT
Start: 2024-05-09 | End: 2024-05-09

## 2024-05-09 RX ORDER — FAMOTIDINE 10 MG/ML
20 INJECTION INTRAVENOUS ONCE AS NEEDED
Status: DISCONTINUED | OUTPATIENT
Start: 2024-05-09 | End: 2024-05-09 | Stop reason: HOSPADM

## 2024-05-09 RX ORDER — DIPHENHYDRAMINE HYDROCHLORIDE 50 MG/ML
50 INJECTION INTRAMUSCULAR; INTRAVENOUS AS NEEDED
Status: DISCONTINUED | OUTPATIENT
Start: 2024-05-09 | End: 2024-05-09 | Stop reason: HOSPADM

## 2024-05-09 RX ORDER — EPINEPHRINE 0.3 MG/.3ML
0.3 INJECTION SUBCUTANEOUS EVERY 5 MIN PRN
Status: DISCONTINUED | OUTPATIENT
Start: 2024-05-09 | End: 2024-05-09 | Stop reason: HOSPADM

## 2024-05-09 RX ORDER — ALBUTEROL SULFATE 0.83 MG/ML
3 SOLUTION RESPIRATORY (INHALATION) AS NEEDED
Status: DISCONTINUED | OUTPATIENT
Start: 2024-05-09 | End: 2024-05-09 | Stop reason: HOSPADM

## 2024-05-09 RX ADMIN — BORTEZOMIB 2.08 MG: 1 INJECTION, POWDER, LYOPHILIZED, FOR SOLUTION INTRAVENOUS; SUBCUTANEOUS at 11:36

## 2024-05-09 ASSESSMENT — PAIN SCALES - GENERAL: PAINLEVEL: 0-NO PAIN

## 2024-05-09 NOTE — SIGNIFICANT EVENT
05/09/24 1105   Prechemo Checklist   Has the patient been in the hospital, ED, or urgent care since last date of service No   Chemo/Immuno Consent Completed and Signed Yes   Protocol/Indications Verified Yes   Confirmed to previous date/time of medication Yes   Compared to previous dose Yes   All medications are dated accurately Yes   Pregnancy Test Negative Not applicable   Parameters Met Yes   Provider Notified Yes   Is Patient Proceeding With Treatment? Yes   BSA/Weight-Height Verified Yes

## 2024-05-09 NOTE — PROGRESS NOTES
LOCATION:  [facility] at [facility].     HEMATOLOGY & ONCOLOGY PROBLEMS:  1.  IgA kappa multiple myeloma        a.  Initial diagnosis of MGUS in Feb 2022.       b.  Disease progression to multiple myeloma in Oct 2022.       c.  Ist line therapy with single agent Velcade beginning Nov 2022.  2.   Hypercalcemia/lytic lesions.       a.  Maintained on Xgeva  3.  Macrocytic anemia.     CHIEF COMPLAINT:    The patient is in the clinic today for management of multiple myeloma and macrocytic anemia and for continuation of therapy and management of therapy related effects.     HISTORY:   Ms. Cali is a 89 year old pleasant female with multiple myeloma.  She has been in relatively good health for her age and is in fact still working.  Blood work from Feb 2022 showed a hemoglobin of 11.1 with MCV of 101.  WBC, platelets and metabolic  profile was unremarkable except for baseline creatinine of 1.4.  Additional work-up revealed a normal TSH but SPEP showed 0.7 g of IgA kappa monoclonal protein.  Initial evaluation was mainly suggestive of MGUS and she was followed closely.  But routine  follow-up blood work from October 2022 showed 1g of IgA kappa monoclonal protein and markedly elevated kappa free light chain assay.  Creatinine was stable at 1.3 but serum calcium was elevated at 13.1.  PET scan results were unremarkable but a bone marrow  biopsy confirmed plasma cell myeloma.  Cytogenetics and FISH testing results were unremarkable.  After discussion with the patient and family members she is currently being treated with single agent Velcade with good response.  She is also maintained  on intermittent Xgeva for hypercalcemia.  Over time her clinical course has been complicated by fall leading to right leg surgery and then admission for C. difficile colitis etc.     INTERVAL HISTORY:  She is finally starting to feel better.  Myeloma markers have started to decline and calcium is also improving.  She is  "currently getting Velcade at weekly interval.  No more issues with mental status changes and in fact she is back to working part-time.    PAST MEDICAL HISTORY:   1.  Multiple myeloma as detailed above  2.  Hypertension  3.  Hypothyroidism  4.  GERD  5.  Chronic kidney disease  6.  DJD   7.  Idiopathic neuropathy  8.  Lactose intolerance  9.  Vitamin D deficiency  10.  History of cholecystectomy/hysterectomy/tonsillectomy/left knee replacement surgeries     SOCIAL HISTORY:   She lives in [place] with her son.  Non smoker.  Rare social alcohol intake.  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  Born and raised in [facility].     FAMILY HISTORY:    Parents  in their 90s from natural causes but a per the patient both probably had some kind of cancers.  5 siblings 1  from lung problems.  7 children, 12 grandkids and  14 great grandkids all alive and well. No other specific history of bleeding, clotting or malignant disorder in the family.     REVIEW OF SYSTEMS:  Pertinent finding as per the history above.  All other systems have been reviewed and generally negative and noncontributory.     ALLERGY & MEDICATIONS:  Allergies and latest outpatient medications list were reviewed in the EMR.    VITAL SIGNS  BSA: 1.59 meters squared  /74 Comment: not unusual for MD VISIT, SEES PCP TOMORROW  Pulse 68   Temp 36.7 °C (98.1 °F) (Temporal)   Resp 20   Ht (S) 1.498 m (4' 10.98\")   Wt 61 kg (134 lb 7.7 oz)   SpO2 99%   BMI 27.18 kg/m²     PHYSICAL EXAMINATION:  Detailed examination not done.    LAB RESULTS:  CBC from today shows a hemoglobin of 9.7 with MCV of 90.  White cell count is 8.1 and platelet are 325K.  Metabolic profile is essentially stable with creatinine of 1.26 and improved calcium of 9.8.  Latest myeloma markers from 2024 showed a kappa light chain level of 16.7 with IgA level of 905 decreased from previous high of 1560 in 2024.    RADIOLOGY " RESULT:  CT chest without contrast 10/30/2023  Impression:  1.  2 mm left subpleural nodule as described.  2. The lungs otherwise are clear considering limitations.  3. Nonunion of a left acromion fracture.      PET/CT Myeloma Initial [Oct 21 2022 10:35AM]  Impression:  1. Multiple focal hypermetabolic pulmonary nodules throughout the bilateral lungs. Findings likely represent inflammatory process,  versus less likely, malignancy. Recommend short-term follow-up CT to document resolution of nodules or tissue biopsy is clinically  indicated.  2. Several hypermetabolic nonenlarged mediastinal lymph nodes, likely reactive in etiology.  3. Mild increased metabolic activity throughout the axial and appendicular skeleton, which is nonspecific and could be related to patient's anemia versus diffuse myeloma involvement. There is no focal hypermetabolic disease involvement within the skeleton.     PATHOLOGY RESULTS:  Surgical Pathology [Oct 28 2022 1:58PM] ([id number])   Specimens: BONE MARROW CLOT /BONE MARROW CORE /Received in formalin, labeled with the patient's name and hospital number/Received in B-plus fixative, labeled with the patient's name and hospital   Name SUMIT OTT   Accession #: [id number]    Pathologist: ADRIANA NATION MD   Date of Procedure: 10/25/2022   Date Received: 10/25/2022   Date Reported 10/28/2022   Submitting Physician: RENATE MELGAR MD   Location: [facility] Other External #   FINAL DIAGNOSIS   A&B:  BONE MARROW, ASPIRATE WITH CLOT AND CORE BIOPSY WITH TOUCH IMPRINT, RIGHT ILIAC CREST:   -- SLIGHTLY HYPERCELLULAR BONE MARROW WITH 15% KAPPA+ PLASMA CELLS CONSISTENT WITH PLASMA CELL MYELOMA   NOTE: Clinical correlation recommended.   Pending  Genetic/Molecular testing per Hematopathology protocol:   -Chromosome analysis: karyotype   -FISH: Myeloma panel   -Molecular: None   -Microarray: None   The results will be reported separately.   Touch Prep   Differential:  (Normal) %    Promyelocytes (1-5) 1   Myelocytes (5-10) 8   Metamyelocytes (10-25) 13   Bands (10-20) 9   Segmented forms (5-30) 17   Eosinophils (2-4) 11   Basophils (0-1) 1   Lymphocytes (5-25) 2   Monocytes (0-2)  0   Plasma Cells (0-2) 15   Blasts (0-1) 0   Total Erythroid (17-35) 23   Number of cells counted: 100   Cellularity: cellular   M:E Ratio: 2.7:1   The gross and/or microscopic findings were reviewed in conjunction with pathology  resident, Kirstin Gutierrez M.D.   Electronically Signed Out By ADRIANA NATION MD/NAGI   By the signature on this report, the individual or group listed as making the   Final Interpretation/Diagnosis certifies that they have reviewed this  case.   Diagnostic interpretation performed at [facility] [address] [facility] [address]   Microscopic Description:   CBC: WBC 18.8 x 10E9/L, RBC 2.77 x 10E12/L, Hgb 8.8 g/dl, Hct 26.2%, MCV 95 fL, RDW 13.6%, platelets  416 x 10E9/L.   A 100 cell manual differential count reveals: polys 61%, bands 8%, lymphocytes 13%, monocytes 9%, eosinophils 8%, basophils 1%.   PERIPHERAL SMEAR: Submitted   Red cells: Normocytic with rouleaux formation.   White cells: Eosinophilia    Platelets: Normal, adequate.   ASPIRATE SMEAR: Submitted   Specimen: Aspicular.   Comments: Hypocellular and hemodilute with few hematopoietic precursors and scattered atypical plasma cells, some biunucleated.   TOUCH PREP: Submitted    Specimen: Paucicellular.   Erythropoiesis: Normal maturation.   Granulopoiesis: Normal maturation.   Megakaryocytes: Present. Morphology: Normal.   Comments: Numerous plasma cells (15%). Some very large atypical plasma cells seen.    ASPIRATE CLOT: Submitted   Specimen: Aspicular.   Comments: Rare hematopoietic progenitor cells noted in a hemodiluted sample.   CORE BIOPSY: Submitted   Specimen: Limited. Small subcortical with crush artifact   Cellularity: 40%.    Estimated M:E ratio: Consistent with aspirate smear.   Bony trabeculae:  Normal.   Megakaryocytes: Adequate. Morphology: Normal.   Granulomas: Absent.   Lymphoid aggregates: Absent.   Comments: Few small aggregates of plasm cells noted.    SPECIAL STAINS:   Iron: Inadequate sample to evaluate   IMMUNOHISTOCHEMISTRY: Performed.    - Positive plasma cells approximately (15%)   Cyclin D1 - Negative   IH Kappa - positice in plasma cells   IH Lambda - rare plasma  cells positive.   FLOW CYTOMETRY: Performed, see separate report. A small clonal kappa+, CD19-, CD45- plasma cell population detected.   Immunostains were performed in addition to flow cytometry to fully characterize the phenotype, architecture,  and extent of the atypical population(s). This was medically necessary for the best possible diagnosis.   Clinical History:   C90.00,D64.9   Right side In the front,Iliac Crest   Specimens Submitted As:   A: BONE MARROW CLOT    B: BONE MARROW CORE      ASSESSMENT & PLAN:  1.  IgA kappa multiple myeloma.  Please refer to the details of initial presentation and management as outlined above. In summary, patient with finding of mild macrocytic  anemia with further work-up revealing 0.7g of IgA kappa monoclonal protein on SPEP in Feb 2022.  Clinically she was essentially asymptomatic and overall functional for her age.  She was initially followed with close observation.  Subsequent routine follow-up  blood work from Oct 2022 showed 1gm of IgA kappa monoclonal protein and markedly elevated kappa free light chain assay.  Creatinine was stable at 1.3 but serum calcium was elevated at 13.1.  PET scan results were unremarkable but a bone marrow biopsy  confirmed plasma cell myeloma.  Cytogenetics and FISH testing was unremarkable. After discussions with the patient and family members she is currently being treated with single agent Velcade with good response.  She is also maintained on intermittent Xgeva for hypercalcemia.     Overall she is tolerating Velcade well and with better response at  the current once a week dosing.  For now we will continue with the Velcade but in the future if there is concern regarding disease progression then we will need to change the treatment to Darzalex etc.   Probable side effects of neuropathy, thrombocytopenia, weakness, fatigue, rash etc. were explained to her in detail.  Overall prognosis is guarded.     2.  Hypercalcemia.  Secondary to baseline myeloma.  PET scan as such was negative for bony lytic lesions.  She will continue with the intermittent Xgeva as needed.       3. Follow up: Follow-up with me in about 4 weeks and will come to the clinic for Velcade SQ administration at weekly interval.  Advised to contact us immediately if there are any new questions or problems.     This note has been transcribed using Dragon voice recognition system and there is a possibility of unintentional typing misprints.

## 2024-05-10 ENCOUNTER — OFFICE VISIT (OUTPATIENT)
Dept: PRIMARY CARE | Facility: CLINIC | Age: 89
End: 2024-05-10
Payer: MEDICARE

## 2024-05-10 VITALS
OXYGEN SATURATION: 96 % | DIASTOLIC BLOOD PRESSURE: 60 MMHG | HEART RATE: 70 BPM | SYSTOLIC BLOOD PRESSURE: 155 MMHG | HEIGHT: 59 IN | BODY MASS INDEX: 27.01 KG/M2 | WEIGHT: 134 LBS

## 2024-05-10 DIAGNOSIS — D47.2 MONOCLONAL GAMMOPATHY OF UNKNOWN SIGNIFICANCE (MGUS): ICD-10-CM

## 2024-05-10 DIAGNOSIS — I12.9 BENIGN HYPERTENSION WITH CKD (CHRONIC KIDNEY DISEASE), STAGE II: Primary | ICD-10-CM

## 2024-05-10 DIAGNOSIS — N18.9 ACUTE KIDNEY INJURY SUPERIMPOSED ON CKD (CMS-HCC): ICD-10-CM

## 2024-05-10 DIAGNOSIS — N18.2 BENIGN HYPERTENSION WITH CKD (CHRONIC KIDNEY DISEASE), STAGE II: Primary | ICD-10-CM

## 2024-05-10 DIAGNOSIS — M16.7 OTHER SECONDARY OSTEOARTHRITIS OF RIGHT HIP: ICD-10-CM

## 2024-05-10 DIAGNOSIS — D63.1 ANEMIA DUE TO STAGE 3B CHRONIC KIDNEY DISEASE (MULTI): ICD-10-CM

## 2024-05-10 DIAGNOSIS — N17.9 ACUTE KIDNEY INJURY SUPERIMPOSED ON CKD (CMS-HCC): ICD-10-CM

## 2024-05-10 DIAGNOSIS — N18.32 ANEMIA DUE TO STAGE 3B CHRONIC KIDNEY DISEASE (MULTI): ICD-10-CM

## 2024-05-10 LAB
KAPPA LC SERPL-MCNC: 11.85 MG/DL (ref 0.33–1.94)
KAPPA LC/LAMBDA SER: 9.19 {RATIO} (ref 0.26–1.65)
LAMBDA LC SERPL-MCNC: 1.29 MG/DL (ref 0.57–2.63)

## 2024-05-10 PROCEDURE — 1158F ADVNC CARE PLAN TLK DOCD: CPT | Performed by: INTERNAL MEDICINE

## 2024-05-10 PROCEDURE — 1123F ACP DISCUSS/DSCN MKR DOCD: CPT | Performed by: INTERNAL MEDICINE

## 2024-05-10 PROCEDURE — 3078F DIAST BP <80 MM HG: CPT | Performed by: INTERNAL MEDICINE

## 2024-05-10 PROCEDURE — 1160F RVW MEDS BY RX/DR IN RCRD: CPT | Performed by: INTERNAL MEDICINE

## 2024-05-10 PROCEDURE — G0439 PPPS, SUBSEQ VISIT: HCPCS | Performed by: INTERNAL MEDICINE

## 2024-05-10 PROCEDURE — 3077F SYST BP >= 140 MM HG: CPT | Performed by: INTERNAL MEDICINE

## 2024-05-10 PROCEDURE — 99214 OFFICE O/P EST MOD 30 MIN: CPT | Performed by: INTERNAL MEDICINE

## 2024-05-10 PROCEDURE — 1036F TOBACCO NON-USER: CPT | Performed by: INTERNAL MEDICINE

## 2024-05-10 PROCEDURE — 1159F MED LIST DOCD IN RCRD: CPT | Performed by: INTERNAL MEDICINE

## 2024-05-10 PROCEDURE — 1170F FXNL STATUS ASSESSED: CPT | Performed by: INTERNAL MEDICINE

## 2024-05-10 ASSESSMENT — PATIENT HEALTH QUESTIONNAIRE - PHQ9
1. LITTLE INTEREST OR PLEASURE IN DOING THINGS: NOT AT ALL
SUM OF ALL RESPONSES TO PHQ9 QUESTIONS 1 AND 2: 0
2. FEELING DOWN, DEPRESSED OR HOPELESS: NOT AT ALL

## 2024-05-10 ASSESSMENT — ACTIVITIES OF DAILY LIVING (ADL)
MANAGING_FINANCES: INDEPENDENT
TAKING_MEDICATION: INDEPENDENT
GROCERY_SHOPPING: NEEDS ASSISTANCE
DOING_HOUSEWORK: INDEPENDENT
DRESSING: INDEPENDENT
BATHING: INDEPENDENT

## 2024-05-10 ASSESSMENT — ENCOUNTER SYMPTOMS
LOSS OF SENSATION IN FEET: 0
OCCASIONAL FEELINGS OF UNSTEADINESS: 1
DEPRESSION: 0

## 2024-05-10 NOTE — PROGRESS NOTES
Chief Complaint: Medicare Wellness Exam/Comprehensive Problem Focused Follow Up and Physical Exam    HPI:  Receiving chemo. Doing well, notices loss of height.    Active Problem List      Comprehensive Medical/Surgical/Social/Family History  Past Medical History:   Diagnosis Date    Abdominal bloating 01/04/2024    Acute bacterial bronchitis 01/04/2024    Acute bacterial sinusitis 01/04/2024    Acute kidney failure, unspecified (CMS-[facility]) 11/03/2023    Acute kidney injury (CMS-HCC) 10/14/2022    Acute urinary tract infection 05/09/2024    Allergic rhinitis 01/04/2024    Bronchitis 01/24/2024    Carrier of methicillin susceptible Staphylococcus aureus (MSSA) 01/04/2024    Chronic cough 01/04/2024    Chronic kidney disease, stage 3b (Multi) 02/25/2022    Anemia due to stage 3b chronic kidney disease    Congestion of respiratory tract 01/04/2024    Contact with and (suspected) exposure to covid-19 10/31/2022    Contusion of knee 01/04/2024    Deep vein thrombosis (DVT) (Multi) 01/04/2024    Do not resuscitate 10/31/2022    Enterocolitis due to Clostridium difficile, not specified as recurrent 11/03/2023    Essential (primary) hypertension 12/14/2022    Essential hypertension    Facial laceration 01/04/2024    Herpes zoster 01/04/2024    History of colonoscopy 01/04/2024    Comment on above: 2-;    Hypertension 11/07/2011    Hyponatremia 10/31/2022    Iatrogenic thyrotoxicosis 10/31/2022    Infection and inflammatory reaction due to internal fixation device of unspecified site, subsequent encounter 11/03/2023    Lateral epicondylitis of left elbow 01/04/2024    Menopausal problem 01/04/2024    Multiple myeloma (Multi)     Neck pain 07/28/2011    Nontraumatic tear of left rotator cuff 01/04/2024    Obstructive and reflux uropathy, unspecified 11/08/2023    Osteoarthritis of right shoulder 02/08/2012    Osteomyelitis (Multi) 10/06/2023    Other specified postprocedural states     H/O colonoscopy    Pain in both  wrists 01/04/2024    Paronychia of finger of right hand 10/06/2023    Personal history of other medical treatment     History of mammogram    Personal history of other medical treatment     History of bone density study    Personal history of other medical treatment     H/O bone density study    Pneumonia 10/27/2022    Comment on above: PNEUMONIA    Procedure and treatment not carried out because of patient's decision for unspecified reasons     Colonoscopy refused    Subacute cough 02/02/2024    Tendinitis of right elbow 01/04/2024    Vitamin D deficiency, unspecified     Vitamin D insufficiency     Past Surgical History:   Procedure Laterality Date    BREAST BIOPSY  01/22/2015    Biopsy Breast Open    CHOLECYSTECTOMY  01/22/2015    Cholecystectomy    HYSTERECTOMY  01/22/2015    Hysterectomy    OTHER SURGICAL HISTORY  04/28/2022    Shoulder surgery    ROTATOR CUFF REPAIR  01/22/2015    Rotator Cuff Repair    TONSILLECTOMY  01/22/2015    Tonsillectomy    TOTAL KNEE ARTHROPLASTY  01/22/2015    Knee Replacement     Social History     Social History Narrative    Not on file         Allergies and Medications  Morphine, Pollen extracts, Aleve [naproxen sodium], Codeine, and Erythromycin  Current Outpatient Medications on File Prior to Visit   Medication Sig Dispense Refill    acetaminophen (Tylenol) 500 mg tablet Take 2 tablets (1,000 mg) by mouth every 8 hours if needed.      amLODIPine (Norvasc) 5 mg tablet Take 1 tablet (5 mg) by mouth once daily. 90 tablet 1    biotin 5 mg capsule Take 1 capsule (5 mg) by mouth once daily.      budesonide-formoteroL (Symbicort) 80-4.5 mcg/actuation inhaler Inhale 2 puffs 2 times a day. Rinse mouth with water after use to reduce aftertaste and incidence of candidiasis. Do not swallow. 1 each 1    cholecalciferol (Vitamin D-3) 10 MCG (400 UNIT) tablet Take 1 tablet (400 Units) by mouth once daily. 30 tablet 0    furosemide (Lasix) 40 mg tablet Take 1 tablet (40 mg) by mouth once daily.       gabapentin (Neurontin) 100 mg capsule Take 3 capsules (300 mg) by mouth once daily at bedtime. 270 capsule 1    lactase (Lactaid) 3,000 unit tablet Take 1 tablet (3,000 Units) by mouth if needed.      loratadine (Claritin) 10 mg tablet Take 1 tablet (10 mg) by mouth once daily.      losartan (Cozaar) 100 mg tablet TAKE 1 TABLET BY MOUTH ONCE  DAILY 90 tablet 0    multivit-min-iron-FA-lutein (Centrum Silver Women) 8 mg iron-400 mcg-300 mcg tablet Take 1 tablet by mouth once daily.      omeprazole (PriLOSEC) 40 mg DR capsule Take 1 capsule (40 mg) by mouth once daily. 90 capsule 1    potassium chloride CR 10 mEq ER tablet Take 1 tablet (10 mEq) by mouth once daily. Do not crush, chew, or split.      Synthroid 100 mcg tablet TAKE 1 TABLET EVERY MORNING, TAKE BEFORE MEALS FOR HYPOTHYROIDISM 90 tablet 0     Current Facility-Administered Medications on File Prior to Visit   Medication Dose Route Frequency Provider Last Rate Last Admin    vancomycin (Vancocin) capsule 125 mg  125 mg oral 4x daily Radha Garcia MD        [DISCONTINUED] albuterol 2.5 mg /3 mL (0.083 %) nebulizer solution 3 mL  3 mL nebulization PRN Leopoldo Gray MD        [DISCONTINUED] dextrose 5 % in water (D5W) bolus  500 mL intravenous PRN Leopoldo Gray MD        [DISCONTINUED] diphenhydrAMINE (BENADryl) injection 50 mg  50 mg intravenous PRN Leopoldo Gray MD        [DISCONTINUED] EPINEPHrine (Epipen) injection syringe 0.3 mg  0.3 mg intramuscular q5 min PRN Leopoldo Gray MD        [DISCONTINUED] famotidine PF (Pepcid) injection 20 mg  20 mg intravenous Once PRN Leopoldo Gray MD        [DISCONTINUED] methylPREDNISolone sod succinate (SOLU-Medrol) 40 mg/mL injection 40 mg  40 mg intravenous PRN Leopoldo Gray MD        [DISCONTINUED] sodium chloride 0.9 % bolus 500 mL  500 mL intravenous PRN Leopoldo Gray MD           Medications and Supplements  prescribed by me and other practitioners or clinical pharmacist (such as  "prescriptions, OTC's, herbal therapies and supplements) were reviewed and documented in the medical record.    Tobacco/Alcohol/Opioid use, as well as Illicit Drug Use was screened for/reviewed and documented in Social History section and medication list as appropriate  Activities of Daily Living  In your present state of health, do you have any difficulty performing the following activities?:   Preparing food and eating?: No  Bathing yourself: No  Getting dressed: No  Using the toilet:No  Moving around from place to place: No  In the past year have you fallen or had a near fall?:No    Depression Screen  (Note: if answer to either of the following is \"Yes\", then a more complete depression screening is indicated)   Q1: Over the past two weeks, have you felt down, depressed or hopeless? No  Q2: Over the past two weeks, have you felt little interest or pleasure in doing things? No    Current exercise habits:  therapy exercises    Dietary issues discussed: Yes  Hearing difficulties: Yes  Safe in current home environment: yes  Visual Acuity assessed: no  Cognitive Impairment assessed: no       Advance directives  Advanced Care Planning (including a Living Will, Healthcare POA, as well as specific end of life choices and/or directives), was discussed for approximately 16 minutes with the patient and/or surrogate, voluntarily, and documented in the medical record.     Cardiac Risk Assessment  Cardiovascular risk was discussed and, if needed, lifestyle modifications recommended, including nutritional choices, exercise, and elimination of habits contributing to risk. We agreed on a plan to reduce the current cardiovascular risk based on above discussion as needed.  Aspirin use/disuse was discussed after reviewing the updated guidelines below:    Consider low dose Aspirin ( mg) use if the benefit for cardiovascular disease prevention outweighs risk for bleeding complications.   In general, low dose ASA should be " considered:  In patients WITHOUT prior MI/stroke/PAD (primary prevention):   a. Age <60: Use if 10-year cardiovascular disease risk >20%, with discussion of risks and benefits with patient  b. Age 60-<70: Use if 10-year cardiovascular disease risk >20% and low bleeding (e.g., gastrointenstinal) risk, with discussion of risks and benefits with patient  c. Age >=70: Do not use    In patients WITH prior MI/stroke/PAD (secondary prevention):   Generally use unless extremely high bleeding (e.g., gastrointenstinal) risk, with discussion of risks and benefits with patient    ROS otherwise negative aside from what was mentioned above in HPI.    Vitals  Vitals:    05/10/24 1241   BP: 155/60   Pulse:    SpO2:      Body mass index is 27.06 kg/m².  Physical Exam  Gen: Alert, NAD  HEENT:  PERRLA, EOMI, conjunctiva and sclera normal in appearance. External auditory canals/TMs normal; Oral cavity and posterior pharynx without lesions/exudate  Neck:  Supple with FROM; No masses/nodes palpable; Thyroid nontender and without nodules; No JAXSON  Respiratory:  Lungs CTAB  Cardiovascular:  Heart RRR. No M/R/G. Peripheral pulses equal bilaterally  Abdomen:  Soft, nontender, BS present throughout; No R/G/R; No HSM or masses palpated  Extremities:  FROM all extremities; Muscle strength grossly normal with good tone  Neuro:  CN II-XII intact; Reflexes 2+/2+; Gross motor and sensory intact  Skin:  No suspicious lesions present      Assessment and Plan:  Problem List Items Addressed This Visit       Anemia due to stage 3b chronic kidney disease (Multi)    Benign hypertension with CKD (chronic kidney disease), stage II - Primary    Monoclonal gammopathy of unknown significance (MGUS)    Osteoarthritis of hip    Acute kidney injury superimposed on CKD (CMS-HCC)     Return in 3 months      During the course of the visit the patient was educated and counseled about age appropriate screening and preventive services. Completed preventive screenings  were documented in the chart and orders were placed for outstanding screenings/procedures as documented in the Assessment and Plan.      Patient Instructions (the written plan) was given to the patient at check out.      Radha Garcia MD

## 2024-05-13 LAB
ALBUMIN: 3.8 G/DL (ref 3.4–5)
ALPHA 1 GLOBULIN: 0.3 G/DL (ref 0.2–0.6)
ALPHA 2 GLOBULIN: 0.8 G/DL (ref 0.4–1.1)
BETA GLOBULIN: 1.6 G/DL (ref 0.5–1.2)
GAMMA GLOBULIN: 0.4 G/DL (ref 0.5–1.4)
IMMUNOFIXATION COMMENT: ABNORMAL
M-PROTEIN 1: 0.6 G/DL
PATH REVIEW - SERUM IMMUNOFIXATION: ABNORMAL
PATH REVIEW-SERUM PROTEIN ELECTROPHORESIS: ABNORMAL
PROTEIN ELECTROPHORESIS COMMENT: ABNORMAL

## 2024-05-16 ENCOUNTER — LAB (OUTPATIENT)
Dept: LAB | Facility: CLINIC | Age: 89
End: 2024-05-16
Payer: MEDICARE

## 2024-05-16 ENCOUNTER — HOSPITAL ENCOUNTER (OUTPATIENT)
Dept: RADIOLOGY | Facility: CLINIC | Age: 89
Discharge: HOME | End: 2024-05-16
Payer: MEDICARE

## 2024-05-16 ENCOUNTER — OFFICE VISIT (OUTPATIENT)
Dept: ORTHOPEDIC SURGERY | Facility: CLINIC | Age: 89
End: 2024-05-16
Payer: MEDICARE

## 2024-05-16 ENCOUNTER — INFUSION (OUTPATIENT)
Dept: HEMATOLOGY/ONCOLOGY | Facility: CLINIC | Age: 89
End: 2024-05-16
Payer: MEDICARE

## 2024-05-16 VITALS — HEIGHT: 59 IN | BODY MASS INDEX: 27.01 KG/M2 | WEIGHT: 134 LBS

## 2024-05-16 VITALS
HEIGHT: 59 IN | SYSTOLIC BLOOD PRESSURE: 180 MMHG | DIASTOLIC BLOOD PRESSURE: 82 MMHG | BODY MASS INDEX: 27.51 KG/M2 | WEIGHT: 136.47 LBS | OXYGEN SATURATION: 96 % | RESPIRATION RATE: 18 BRPM | TEMPERATURE: 97.7 F

## 2024-05-16 DIAGNOSIS — M97.8XXD PERIPROSTHETIC FRACTURE OF PROXIMAL END OF TIBIA, SUBSEQUENT ENCOUNTER: ICD-10-CM

## 2024-05-16 DIAGNOSIS — L24.A9 WOUND DRAINAGE: ICD-10-CM

## 2024-05-16 DIAGNOSIS — C90.00 MULTIPLE MYELOMA, REMISSION STATUS UNSPECIFIED (MULTI): ICD-10-CM

## 2024-05-16 DIAGNOSIS — Z96.659 PERIPROSTHETIC FRACTURE OF PROXIMAL END OF TIBIA, SUBSEQUENT ENCOUNTER: ICD-10-CM

## 2024-05-16 LAB
ALBUMIN SERPL BCP-MCNC: 3.9 G/DL (ref 3.4–5)
ALP SERPL-CCNC: 52 U/L (ref 33–136)
ALT SERPL W P-5'-P-CCNC: 10 U/L (ref 7–45)
ANION GAP SERPL CALC-SCNC: 12 MMOL/L (ref 10–20)
AST SERPL W P-5'-P-CCNC: 20 U/L (ref 9–39)
BASOPHILS # BLD AUTO: 0.02 X10*3/UL (ref 0–0.1)
BASOPHILS NFR BLD AUTO: 0.4 %
BILIRUB SERPL-MCNC: 0.4 MG/DL (ref 0–1.2)
BUN SERPL-MCNC: 31 MG/DL (ref 6–23)
CALCIUM SERPL-MCNC: 9.6 MG/DL (ref 8.6–10.3)
CHLORIDE SERPL-SCNC: 106 MMOL/L (ref 98–107)
CO2 SERPL-SCNC: 25 MMOL/L (ref 21–32)
CREAT SERPL-MCNC: 1.25 MG/DL (ref 0.5–1.05)
EGFRCR SERPLBLD CKD-EPI 2021: 41 ML/MIN/1.73M*2
EOSINOPHIL # BLD AUTO: 0.23 X10*3/UL (ref 0–0.4)
EOSINOPHIL NFR BLD AUTO: 4.2 %
ERYTHROCYTE [DISTWIDTH] IN BLOOD BY AUTOMATED COUNT: 16.7 % (ref 11.5–14.5)
GLUCOSE SERPL-MCNC: 85 MG/DL (ref 74–99)
HCT VFR BLD AUTO: 30 % (ref 36–46)
HGB BLD-MCNC: 9.4 G/DL (ref 12–16)
IMM GRANULOCYTES # BLD AUTO: 0.01 X10*3/UL (ref 0–0.5)
IMM GRANULOCYTES NFR BLD AUTO: 0.2 % (ref 0–0.9)
LYMPHOCYTES # BLD AUTO: 1.86 X10*3/UL (ref 0.8–3)
LYMPHOCYTES NFR BLD AUTO: 33.9 %
MCH RBC QN AUTO: 28.2 PG (ref 26–34)
MCHC RBC AUTO-ENTMCNC: 31.3 G/DL (ref 32–36)
MCV RBC AUTO: 90 FL (ref 80–100)
MONOCYTES # BLD AUTO: 0.79 X10*3/UL (ref 0.05–0.8)
MONOCYTES NFR BLD AUTO: 14.4 %
NEUTROPHILS # BLD AUTO: 2.58 X10*3/UL (ref 1.6–5.5)
NEUTROPHILS NFR BLD AUTO: 46.9 %
NRBC BLD-RTO: 0 /100 WBCS (ref 0–0)
PLATELET # BLD AUTO: 311 X10*3/UL (ref 150–450)
POTASSIUM SERPL-SCNC: 4.4 MMOL/L (ref 3.5–5.3)
PROT SERPL-MCNC: 6.7 G/DL (ref 6.4–8.2)
RBC # BLD AUTO: 3.33 X10*6/UL (ref 4–5.2)
SODIUM SERPL-SCNC: 139 MMOL/L (ref 136–145)
WBC # BLD AUTO: 5.5 X10*3/UL (ref 4.4–11.3)

## 2024-05-16 PROCEDURE — 99213 OFFICE O/P EST LOW 20 MIN: CPT | Performed by: PHYSICIAN ASSISTANT

## 2024-05-16 PROCEDURE — 73560 X-RAY EXAM OF KNEE 1 OR 2: CPT | Mod: RT

## 2024-05-16 PROCEDURE — 73560 X-RAY EXAM OF KNEE 1 OR 2: CPT | Mod: RIGHT SIDE | Performed by: RADIOLOGY

## 2024-05-16 PROCEDURE — 73590 X-RAY EXAM OF LOWER LEG: CPT | Mod: RIGHT SIDE | Performed by: STUDENT IN AN ORGANIZED HEALTH CARE EDUCATION/TRAINING PROGRAM

## 2024-05-16 PROCEDURE — 96401 CHEMO ANTI-NEOPL SQ/IM: CPT

## 2024-05-16 PROCEDURE — 1123F ACP DISCUSS/DSCN MKR DOCD: CPT | Performed by: PHYSICIAN ASSISTANT

## 2024-05-16 PROCEDURE — 1159F MED LIST DOCD IN RCRD: CPT | Performed by: PHYSICIAN ASSISTANT

## 2024-05-16 PROCEDURE — 85025 COMPLETE CBC W/AUTO DIFF WBC: CPT | Performed by: INTERNAL MEDICINE

## 2024-05-16 PROCEDURE — 73590 X-RAY EXAM OF LOWER LEG: CPT | Mod: RT

## 2024-05-16 PROCEDURE — 84075 ASSAY ALKALINE PHOSPHATASE: CPT | Performed by: INTERNAL MEDICINE

## 2024-05-16 PROCEDURE — 2500000004 HC RX 250 GENERAL PHARMACY W/ HCPCS (ALT 636 FOR OP/ED): Mod: JW | Performed by: INTERNAL MEDICINE

## 2024-05-16 RX ORDER — BORTEZOMIB 3.5 MG/1
1.3 INJECTION, POWDER, LYOPHILIZED, FOR SOLUTION INTRAVENOUS; SUBCUTANEOUS ONCE
Status: COMPLETED | OUTPATIENT
Start: 2024-05-16 | End: 2024-05-16

## 2024-05-16 RX ADMIN — BORTEZOMIB 2.08 MG: 1 INJECTION, POWDER, LYOPHILIZED, FOR SOLUTION INTRAVENOUS; SUBCUTANEOUS at 11:30

## 2024-05-16 ASSESSMENT — PAIN SCALES - GENERAL: PAINLEVEL: 0-NO PAIN

## 2024-05-16 ASSESSMENT — PAIN - FUNCTIONAL ASSESSMENT: PAIN_FUNCTIONAL_ASSESSMENT: NO/DENIES PAIN

## 2024-05-16 NOTE — SIGNIFICANT EVENT
05/16/24 1057   Prechemo Checklist   Has the patient been in the hospital, ED, or urgent care since last date of service Yes   Chemo/Immuno Consent Completed and Signed Yes   Protocol/Indications Verified Yes   Confirmed to previous date/time of medication Yes   Compared to previous dose Yes   All medications are dated accurately Yes   Pregnancy Test Negative Not applicable   Parameters Met Yes   Provider Notified Yes   Is Patient Proceeding With Treatment? Yes   BSA/Weight-Height Verified Yes   Dose Calculations Verified (current, total, cumulative) Yes

## 2024-05-23 ENCOUNTER — INFUSION (OUTPATIENT)
Dept: HEMATOLOGY/ONCOLOGY | Facility: CLINIC | Age: 89
End: 2024-05-23
Payer: MEDICARE

## 2024-05-23 ENCOUNTER — LAB (OUTPATIENT)
Dept: LAB | Facility: CLINIC | Age: 89
End: 2024-05-23
Payer: MEDICARE

## 2024-05-23 VITALS
DIASTOLIC BLOOD PRESSURE: 60 MMHG | BODY MASS INDEX: 27.24 KG/M2 | HEIGHT: 59 IN | OXYGEN SATURATION: 96 % | RESPIRATION RATE: 18 BRPM | WEIGHT: 135.14 LBS | SYSTOLIC BLOOD PRESSURE: 147 MMHG | HEART RATE: 63 BPM | TEMPERATURE: 97.7 F

## 2024-05-23 DIAGNOSIS — C90.00 MULTIPLE MYELOMA, REMISSION STATUS UNSPECIFIED (MULTI): ICD-10-CM

## 2024-05-23 LAB
ALBUMIN SERPL BCP-MCNC: 3.8 G/DL (ref 3.4–5)
ALP SERPL-CCNC: 51 U/L (ref 33–136)
ALT SERPL W P-5'-P-CCNC: 10 U/L (ref 7–45)
ANION GAP SERPL CALC-SCNC: 14 MMOL/L (ref 10–20)
AST SERPL W P-5'-P-CCNC: 19 U/L (ref 9–39)
BASOPHILS # BLD AUTO: 0.02 X10*3/UL (ref 0–0.1)
BASOPHILS NFR BLD AUTO: 0.4 %
BILIRUB SERPL-MCNC: 0.4 MG/DL (ref 0–1.2)
BUN SERPL-MCNC: 34 MG/DL (ref 6–23)
CALCIUM SERPL-MCNC: 9.9 MG/DL (ref 8.6–10.3)
CHLORIDE SERPL-SCNC: 105 MMOL/L (ref 98–107)
CO2 SERPL-SCNC: 24 MMOL/L (ref 21–32)
CREAT SERPL-MCNC: 1.49 MG/DL (ref 0.5–1.05)
EGFRCR SERPLBLD CKD-EPI 2021: 33 ML/MIN/1.73M*2
EOSINOPHIL # BLD AUTO: 0.29 X10*3/UL (ref 0–0.4)
EOSINOPHIL NFR BLD AUTO: 5.3 %
ERYTHROCYTE [DISTWIDTH] IN BLOOD BY AUTOMATED COUNT: 16.6 % (ref 11.5–14.5)
GLUCOSE SERPL-MCNC: 120 MG/DL (ref 74–99)
HCT VFR BLD AUTO: 29.9 % (ref 36–46)
HGB BLD-MCNC: 9.4 G/DL (ref 12–16)
IMM GRANULOCYTES # BLD AUTO: 0.02 X10*3/UL (ref 0–0.5)
IMM GRANULOCYTES NFR BLD AUTO: 0.4 % (ref 0–0.9)
LYMPHOCYTES # BLD AUTO: 1.41 X10*3/UL (ref 0.8–3)
LYMPHOCYTES NFR BLD AUTO: 25.8 %
MCH RBC QN AUTO: 28.2 PG (ref 26–34)
MCHC RBC AUTO-ENTMCNC: 31.4 G/DL (ref 32–36)
MCV RBC AUTO: 90 FL (ref 80–100)
MONOCYTES # BLD AUTO: 0.63 X10*3/UL (ref 0.05–0.8)
MONOCYTES NFR BLD AUTO: 11.5 %
NEUTROPHILS # BLD AUTO: 3.1 X10*3/UL (ref 1.6–5.5)
NEUTROPHILS NFR BLD AUTO: 56.6 %
NRBC BLD-RTO: 0 /100 WBCS (ref 0–0)
PLATELET # BLD AUTO: 280 X10*3/UL (ref 150–450)
POTASSIUM SERPL-SCNC: 4 MMOL/L (ref 3.5–5.3)
PROT SERPL-MCNC: 6.6 G/DL (ref 6.4–8.2)
RBC # BLD AUTO: 3.33 X10*6/UL (ref 4–5.2)
SODIUM SERPL-SCNC: 139 MMOL/L (ref 136–145)
WBC # BLD AUTO: 5.5 X10*3/UL (ref 4.4–11.3)

## 2024-05-23 PROCEDURE — 85025 COMPLETE CBC W/AUTO DIFF WBC: CPT | Performed by: INTERNAL MEDICINE

## 2024-05-23 PROCEDURE — 2500000004 HC RX 250 GENERAL PHARMACY W/ HCPCS (ALT 636 FOR OP/ED): Performed by: INTERNAL MEDICINE

## 2024-05-23 PROCEDURE — 80053 COMPREHEN METABOLIC PANEL: CPT | Performed by: INTERNAL MEDICINE

## 2024-05-23 PROCEDURE — 96401 CHEMO ANTI-NEOPL SQ/IM: CPT

## 2024-05-23 RX ORDER — EPINEPHRINE 0.3 MG/.3ML
0.3 INJECTION SUBCUTANEOUS EVERY 5 MIN PRN
Status: DISCONTINUED | OUTPATIENT
Start: 2024-05-23 | End: 2024-05-23 | Stop reason: HOSPADM

## 2024-05-23 RX ORDER — FAMOTIDINE 10 MG/ML
20 INJECTION INTRAVENOUS ONCE AS NEEDED
Status: DISCONTINUED | OUTPATIENT
Start: 2024-05-23 | End: 2024-05-23 | Stop reason: HOSPADM

## 2024-05-23 RX ORDER — DIPHENHYDRAMINE HYDROCHLORIDE 50 MG/ML
50 INJECTION INTRAMUSCULAR; INTRAVENOUS AS NEEDED
Status: DISCONTINUED | OUTPATIENT
Start: 2024-05-23 | End: 2024-05-23 | Stop reason: HOSPADM

## 2024-05-23 RX ORDER — BORTEZOMIB 3.5 MG/1
1.3 INJECTION, POWDER, LYOPHILIZED, FOR SOLUTION INTRAVENOUS; SUBCUTANEOUS ONCE
Status: COMPLETED | OUTPATIENT
Start: 2024-05-23 | End: 2024-05-23

## 2024-05-23 RX ORDER — ALBUTEROL SULFATE 0.83 MG/ML
3 SOLUTION RESPIRATORY (INHALATION) AS NEEDED
Status: DISCONTINUED | OUTPATIENT
Start: 2024-05-23 | End: 2024-05-23 | Stop reason: HOSPADM

## 2024-05-23 RX ADMIN — BORTEZOMIB 2.08 MG: 1 INJECTION, POWDER, LYOPHILIZED, FOR SOLUTION INTRAVENOUS; SUBCUTANEOUS at 10:53

## 2024-05-23 ASSESSMENT — PAIN SCALES - GENERAL: PAINLEVEL: 0-NO PAIN

## 2024-05-23 NOTE — SIGNIFICANT EVENT
05/23/24 1004   Prechemo Checklist   Has the patient been in the hospital, ED, or urgent care since last date of service No   Chemo/Immuno Consent Completed and Signed Yes   Protocol/Indications Verified Yes   Confirmed to previous date/time of medication Yes   Compared to previous dose Yes   All medications are dated accurately Yes   Pregnancy Test Negative Not applicable   Parameters Met Yes   Provider Notified Yes   Is Patient Proceeding With Treatment? Yes   BSA/Weight-Height Verified Yes   Dose Calculations Verified (current, total, cumulative) Yes

## 2024-05-29 DIAGNOSIS — C90.00 MULTIPLE MYELOMA NOT HAVING ACHIEVED REMISSION (MULTI): ICD-10-CM

## 2024-05-30 ENCOUNTER — INFUSION (OUTPATIENT)
Dept: HEMATOLOGY/ONCOLOGY | Facility: CLINIC | Age: 89
End: 2024-05-30
Payer: MEDICARE

## 2024-05-30 ENCOUNTER — LAB (OUTPATIENT)
Dept: LAB | Facility: CLINIC | Age: 89
End: 2024-05-30
Payer: MEDICARE

## 2024-05-30 VITALS
HEART RATE: 66 BPM | SYSTOLIC BLOOD PRESSURE: 180 MMHG | RESPIRATION RATE: 18 BRPM | TEMPERATURE: 96.8 F | DIASTOLIC BLOOD PRESSURE: 84 MMHG | HEIGHT: 59 IN | BODY MASS INDEX: 27.38 KG/M2 | OXYGEN SATURATION: 99 % | WEIGHT: 135.8 LBS

## 2024-05-30 DIAGNOSIS — C90.00 MULTIPLE MYELOMA, REMISSION STATUS UNSPECIFIED (MULTI): ICD-10-CM

## 2024-05-30 DIAGNOSIS — C90.00 MULTIPLE MYELOMA NOT HAVING ACHIEVED REMISSION (MULTI): ICD-10-CM

## 2024-05-30 DIAGNOSIS — E83.52 HYPERCALCEMIA: ICD-10-CM

## 2024-05-30 LAB
ALBUMIN SERPL BCP-MCNC: 4 G/DL (ref 3.4–5)
ALP SERPL-CCNC: 51 U/L (ref 33–136)
ALT SERPL W P-5'-P-CCNC: 9 U/L (ref 7–45)
ANION GAP SERPL CALC-SCNC: 13 MMOL/L (ref 10–20)
AST SERPL W P-5'-P-CCNC: 19 U/L (ref 9–39)
BASOPHILS # BLD AUTO: 0.02 X10*3/UL (ref 0–0.1)
BASOPHILS NFR BLD AUTO: 0.3 %
BILIRUB SERPL-MCNC: 0.4 MG/DL (ref 0–1.2)
BUN SERPL-MCNC: 32 MG/DL (ref 6–23)
CALCIUM SERPL-MCNC: 9.8 MG/DL (ref 8.6–10.3)
CHLORIDE SERPL-SCNC: 105 MMOL/L (ref 98–107)
CO2 SERPL-SCNC: 25 MMOL/L (ref 21–32)
CREAT SERPL-MCNC: 1.32 MG/DL (ref 0.5–1.05)
EGFRCR SERPLBLD CKD-EPI 2021: 38 ML/MIN/1.73M*2
EOSINOPHIL # BLD AUTO: 0.42 X10*3/UL (ref 0–0.4)
EOSINOPHIL NFR BLD AUTO: 7 %
ERYTHROCYTE [DISTWIDTH] IN BLOOD BY AUTOMATED COUNT: 16.6 % (ref 11.5–14.5)
GLUCOSE SERPL-MCNC: 87 MG/DL (ref 74–99)
HCT VFR BLD AUTO: 31.5 % (ref 36–46)
HGB BLD-MCNC: 10.2 G/DL (ref 12–16)
IMM GRANULOCYTES # BLD AUTO: 0.02 X10*3/UL (ref 0–0.5)
IMM GRANULOCYTES NFR BLD AUTO: 0.3 % (ref 0–0.9)
LYMPHOCYTES # BLD AUTO: 1.87 X10*3/UL (ref 0.8–3)
LYMPHOCYTES NFR BLD AUTO: 31.3 %
MCH RBC QN AUTO: 29.3 PG (ref 26–34)
MCHC RBC AUTO-ENTMCNC: 32.4 G/DL (ref 32–36)
MCV RBC AUTO: 91 FL (ref 80–100)
MONOCYTES # BLD AUTO: 0.88 X10*3/UL (ref 0.05–0.8)
MONOCYTES NFR BLD AUTO: 14.7 %
NEUTROPHILS # BLD AUTO: 2.77 X10*3/UL (ref 1.6–5.5)
NEUTROPHILS NFR BLD AUTO: 46.4 %
NRBC BLD-RTO: 0 /100 WBCS (ref 0–0)
PLATELET # BLD AUTO: 313 X10*3/UL (ref 150–450)
POTASSIUM SERPL-SCNC: 4.4 MMOL/L (ref 3.5–5.3)
PROT SERPL-MCNC: 6.9 G/DL (ref 6.4–8.2)
RBC # BLD AUTO: 3.48 X10*6/UL (ref 4–5.2)
SODIUM SERPL-SCNC: 139 MMOL/L (ref 136–145)
WBC # BLD AUTO: 6 X10*3/UL (ref 4.4–11.3)

## 2024-05-30 PROCEDURE — 96372 THER/PROPH/DIAG INJ SC/IM: CPT

## 2024-05-30 PROCEDURE — 96401 CHEMO ANTI-NEOPL SQ/IM: CPT

## 2024-05-30 PROCEDURE — 80053 COMPREHEN METABOLIC PANEL: CPT | Performed by: INTERNAL MEDICINE

## 2024-05-30 PROCEDURE — 85025 COMPLETE CBC W/AUTO DIFF WBC: CPT | Performed by: INTERNAL MEDICINE

## 2024-05-30 PROCEDURE — 2500000004 HC RX 250 GENERAL PHARMACY W/ HCPCS (ALT 636 FOR OP/ED): Mod: JW | Performed by: INTERNAL MEDICINE

## 2024-05-30 RX ORDER — ALBUTEROL SULFATE 0.83 MG/ML
3 SOLUTION RESPIRATORY (INHALATION) AS NEEDED
OUTPATIENT
Start: 2024-06-06

## 2024-05-30 RX ORDER — EPINEPHRINE 0.3 MG/.3ML
0.3 INJECTION SUBCUTANEOUS EVERY 5 MIN PRN
OUTPATIENT
Start: 2024-06-06

## 2024-05-30 RX ORDER — DIPHENHYDRAMINE HYDROCHLORIDE 50 MG/ML
50 INJECTION INTRAMUSCULAR; INTRAVENOUS AS NEEDED
OUTPATIENT
Start: 2024-06-06

## 2024-05-30 RX ORDER — BORTEZOMIB 3.5 MG/1
1.3 INJECTION, POWDER, LYOPHILIZED, FOR SOLUTION INTRAVENOUS; SUBCUTANEOUS ONCE
Status: COMPLETED | OUTPATIENT
Start: 2024-05-30 | End: 2024-05-30

## 2024-05-30 RX ORDER — FAMOTIDINE 10 MG/ML
20 INJECTION INTRAVENOUS ONCE AS NEEDED
OUTPATIENT
Start: 2024-06-06

## 2024-05-30 RX ADMIN — DENOSUMAB 120 MG: 120 INJECTION SUBCUTANEOUS at 11:26

## 2024-05-30 RX ADMIN — BORTEZOMIB 2.08 MG: 1 INJECTION, POWDER, LYOPHILIZED, FOR SOLUTION INTRAVENOUS; SUBCUTANEOUS at 11:28

## 2024-05-30 ASSESSMENT — PAIN SCALES - GENERAL: PAINLEVEL: 0-NO PAIN

## 2024-05-30 NOTE — SIGNIFICANT EVENT
05/30/24 1046   Prechemo Checklist   Has the patient been in the hospital, ED, or urgent care since last date of service No   Chemo/Immuno Consent Completed and Signed Yes   Protocol/Indications Verified Yes   Confirmed to previous date/time of medication Yes   Compared to previous dose Yes   All medications are dated accurately Yes   Pregnancy Test Negative Not applicable   Parameters Met Yes   Provider Notified Yes   BSA/Weight-Height Verified Yes   Dose Calculations Verified (current, total, cumulative) Yes

## 2024-06-01 NOTE — PROGRESS NOTES
Patient is a 89 y.o. female who is s/p I&D RLE abscess, saucerization R tibial plateau, OPAL R proximal tibia.  Date of surgery was 10/8/2023.  She is 1 year s/p ORIF R tibial plateau periprosthetic on 4/23/2023. Patient continues WBAT RLE at this time and denies issues with incision which is now completely healed. She completed her IV abx per ID, no longer on abx at this time. She has continued to participate in PT sessions 1 day per week, hoping to discontinue those at this time.  She feels she can continue to work on HEP at home on her own.  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 Patient denies fever or chills, N/T or calf pain.     ROS: All other systems have been reviewed and are negative except as previously noted in history of present illness.     Gen: The patient is alert and oriented ×3, is in no acute distress, and appear their stated age and weight.  Psychiatric: Mood and affect are appropriate.  Eyes: Sclera are white, and pupils are round and symmetric.  ENT: Mucous membranes are moist.   Neck: Supple. Thyroid is midline.  Respiratory: Respirations are nonlabored, chest rise is symmetric.  Cardiac: Rate is regular by palpation of distal pulses.   Abdomen: Nondistended.  Integument: No obvious cutaneous lesions are noted. No signs of lymphangitis. No signs of systemic edema.      Musculoskeletal: RLE  incisions healed  Minimal swelling  compartments soft  no calf tenderness  sensation intact to light touch  motor intact including TA/GS/EHL  palpable DP/PT pulses 2+   Knee motion: 0-125 degrees  Strength 5/5  Smooth gait    X-ray: Images of right knee and tibia reviewed personally by me today and reveal maintenance of alignment of tibial plateau fracture with TKA hardware in position and no interval change.  Proximal tibial plate and screws since removed.  Tibia has healed.     IMP:  Problem List Items Addressed This Visit       Periprosthetic fracture of proximal end of  tibia    Relevant Orders    XR knee right 1-2 views (Completed)    XR tibia fibula right 2 views (Completed)          PLAN:  I instructed her to perform HEP daily and she is agreeable. She continues to work 2 days per week at her son's [employer] shop.  I will see patient back in 1 year and I need x-rays right knee and right tibia next visit. All questions were answered today.

## 2024-06-05 ENCOUNTER — OFFICE VISIT (OUTPATIENT)
Dept: PRIMARY CARE | Facility: CLINIC | Age: 89
End: 2024-06-05
Payer: MEDICARE

## 2024-06-05 VITALS — DIASTOLIC BLOOD PRESSURE: 70 MMHG | SYSTOLIC BLOOD PRESSURE: 158 MMHG

## 2024-06-05 DIAGNOSIS — E03.9 HYPOTHYROIDISM, UNSPECIFIED TYPE: ICD-10-CM

## 2024-06-05 DIAGNOSIS — J40 BRONCHITIS: ICD-10-CM

## 2024-06-05 DIAGNOSIS — I12.9 BENIGN HYPERTENSION WITH CKD (CHRONIC KIDNEY DISEASE), STAGE II: Primary | ICD-10-CM

## 2024-06-05 DIAGNOSIS — I10 ESSENTIAL HYPERTENSION: ICD-10-CM

## 2024-06-05 DIAGNOSIS — M48.061 SPINAL STENOSIS OF LUMBAR REGION, UNSPECIFIED WHETHER NEUROGENIC CLAUDICATION PRESENT: ICD-10-CM

## 2024-06-05 DIAGNOSIS — N18.2 BENIGN HYPERTENSION WITH CKD (CHRONIC KIDNEY DISEASE), STAGE II: Primary | ICD-10-CM

## 2024-06-05 DIAGNOSIS — N18.2 STAGE 2 CHRONIC KIDNEY DISEASE: ICD-10-CM

## 2024-06-05 PROCEDURE — 3077F SYST BP >= 140 MM HG: CPT | Performed by: INTERNAL MEDICINE

## 2024-06-05 PROCEDURE — 1123F ACP DISCUSS/DSCN MKR DOCD: CPT | Performed by: INTERNAL MEDICINE

## 2024-06-05 PROCEDURE — 1159F MED LIST DOCD IN RCRD: CPT | Performed by: INTERNAL MEDICINE

## 2024-06-05 PROCEDURE — 1160F RVW MEDS BY RX/DR IN RCRD: CPT | Performed by: INTERNAL MEDICINE

## 2024-06-05 PROCEDURE — 99214 OFFICE O/P EST MOD 30 MIN: CPT | Performed by: INTERNAL MEDICINE

## 2024-06-05 PROCEDURE — 3078F DIAST BP <80 MM HG: CPT | Performed by: INTERNAL MEDICINE

## 2024-06-05 RX ORDER — BUDESONIDE AND FORMOTEROL FUMARATE DIHYDRATE 80; 4.5 UG/1; UG/1
2 AEROSOL RESPIRATORY (INHALATION)
Qty: 2 G | Refills: 1 | Status: SHIPPED | OUTPATIENT
Start: 2024-06-05 | End: 2025-06-05

## 2024-06-05 RX ORDER — CEFDINIR 300 MG/1
300 CAPSULE ORAL 2 TIMES DAILY
Qty: 20 CAPSULE | Refills: 0 | Status: SHIPPED | OUTPATIENT
Start: 2024-06-05 | End: 2024-06-15

## 2024-06-05 RX ORDER — GABAPENTIN 100 MG/1
300 CAPSULE ORAL NIGHTLY
Qty: 270 CAPSULE | Refills: 1 | Status: SHIPPED | OUTPATIENT
Start: 2024-06-05

## 2024-06-05 NOTE — PROGRESS NOTES
Chief complaint   Cough/ dtr present.    HPI  Productive green cough. Mild sob.   Questioning wether to go through chemo therapy this week.  Gen-no wt loss,wt gain,fevers,apnea at night,snoring  ROS    HEENT- no ear pain, no oral pain, no hearing loss no sinus pain or drainage  Pulm- no sob,weezing,hemoptysis,productive mucous  Cardio-no cp,dash,orthopnea,palpitations  Gi- no diarrhea,constipation,n,v,hematemesis,dysphagia,change in bowel habits  Endo- no polydipsia,polyuria,wt gain,extreme fatigue,polyphagia,blurred vision  Neuro-no ha,loc,paresthesias,transient limb weakness,acute memory loss,visual changes  Heme- no blood loss,transfusions,unusual bleeding from skin and/or mucosa  Urology- no dysuria,hematuria,urinary frequency,bladder pain  Gyn- no pelvic pain,vaginal dc,vaginal bleeding,  Psych- no depression,mood swings,anxiety,insomnia,hallucinations  Skin- no rash,new discolorations  Musculoskeletal- no joint aches / pains,muscle pains,new back pain  Vascular- no pain in calve w walkin,discolored painful toes          Vitals:    06/05/24 1450   BP: 158/70       Physical Exam  NAD  HEENT- nl ear tm bilat,nl pharynx, mucosa moist,no icterus  Lungs clear, good AE, no wheezing  Heart-normal S1-S2 no murmur gallop rub, rhythm normal  Neuro alert and oriented ×3, cranial nerves II through XII normal,   Extrem- no edema, no tenderness, no cords  Skin- nl turgor, no ulcers,no rash         Assessment/Plan   Diagnoses and all orders for this visit:  Benign hypertension with CKD (chronic kidney disease), stage II  Spinal stenosis of lumbar region, unspecified whether neurogenic claudication present  Essential hypertension  Hypothyroidism, unspecified type  Stage 2 chronic kidney disease  Bronchitis-to be treated with omnicef 300 bid, suggest holding off on chemo until mucous clears

## 2024-06-06 ENCOUNTER — TELEPHONE (OUTPATIENT)
Dept: HEMATOLOGY/ONCOLOGY | Facility: CLINIC | Age: 89
End: 2024-06-06
Payer: MEDICARE

## 2024-06-06 ENCOUNTER — APPOINTMENT (OUTPATIENT)
Dept: HEMATOLOGY/ONCOLOGY | Facility: CLINIC | Age: 89
End: 2024-06-06
Payer: MEDICARE

## 2024-06-06 NOTE — TELEPHONE ENCOUNTER
Patient phoned that she has bronchitis and needs to reschedule her treatment from today to next week.  Let her know Im just waiting on a date/ time from the charge RN and I will call back.

## 2024-06-07 ENCOUNTER — TELEPHONE (OUTPATIENT)
Dept: PRIMARY CARE | Facility: CLINIC | Age: 89
End: 2024-06-07
Payer: MEDICARE

## 2024-06-07 NOTE — TELEPHONE ENCOUNTER
Saw you 6/5/24 for cough. Told her to call w/ update.  Not worse, started some wheezing. Can't sleep due to coughing when laying down. Call if needed.

## 2024-06-12 RX ORDER — BORTEZOMIB 3.5 MG/1
1.3 INJECTION, POWDER, LYOPHILIZED, FOR SOLUTION INTRAVENOUS; SUBCUTANEOUS ONCE
OUTPATIENT
Start: 2024-07-04

## 2024-06-12 RX ORDER — ALBUTEROL SULFATE 0.83 MG/ML
3 SOLUTION RESPIRATORY (INHALATION) AS NEEDED
OUTPATIENT
Start: 2024-06-27

## 2024-06-12 RX ORDER — EPINEPHRINE 0.3 MG/.3ML
0.3 INJECTION SUBCUTANEOUS EVERY 5 MIN PRN
OUTPATIENT
Start: 2024-07-04

## 2024-06-12 RX ORDER — ALBUTEROL SULFATE 0.83 MG/ML
3 SOLUTION RESPIRATORY (INHALATION) AS NEEDED
OUTPATIENT
Start: 2024-07-04

## 2024-06-12 RX ORDER — DIPHENHYDRAMINE HYDROCHLORIDE 50 MG/ML
50 INJECTION INTRAMUSCULAR; INTRAVENOUS AS NEEDED
OUTPATIENT
Start: 2024-07-04

## 2024-06-12 RX ORDER — ALBUTEROL SULFATE 0.83 MG/ML
3 SOLUTION RESPIRATORY (INHALATION) AS NEEDED
Status: CANCELLED | OUTPATIENT
Start: 2024-06-13

## 2024-06-12 RX ORDER — PROCHLORPERAZINE MALEATE 10 MG
10 TABLET ORAL EVERY 6 HOURS PRN
OUTPATIENT
Start: 2024-06-20

## 2024-06-12 RX ORDER — PROCHLORPERAZINE EDISYLATE 5 MG/ML
10 INJECTION INTRAMUSCULAR; INTRAVENOUS EVERY 6 HOURS PRN
OUTPATIENT
Start: 2024-06-27

## 2024-06-12 RX ORDER — PROCHLORPERAZINE MALEATE 10 MG
10 TABLET ORAL EVERY 6 HOURS PRN
OUTPATIENT
Start: 2024-06-27

## 2024-06-12 RX ORDER — FAMOTIDINE 10 MG/ML
20 INJECTION INTRAVENOUS ONCE AS NEEDED
OUTPATIENT
Start: 2024-06-27

## 2024-06-12 RX ORDER — PROCHLORPERAZINE EDISYLATE 5 MG/ML
10 INJECTION INTRAMUSCULAR; INTRAVENOUS EVERY 6 HOURS PRN
Status: CANCELLED | OUTPATIENT
Start: 2024-06-13

## 2024-06-12 RX ORDER — EPINEPHRINE 0.3 MG/.3ML
0.3 INJECTION SUBCUTANEOUS EVERY 5 MIN PRN
OUTPATIENT
Start: 2024-06-20

## 2024-06-12 RX ORDER — FAMOTIDINE 10 MG/ML
20 INJECTION INTRAVENOUS ONCE AS NEEDED
OUTPATIENT
Start: 2024-07-04

## 2024-06-12 RX ORDER — ALBUTEROL SULFATE 0.83 MG/ML
3 SOLUTION RESPIRATORY (INHALATION) AS NEEDED
OUTPATIENT
Start: 2024-06-20

## 2024-06-12 RX ORDER — PROCHLORPERAZINE EDISYLATE 5 MG/ML
10 INJECTION INTRAMUSCULAR; INTRAVENOUS EVERY 6 HOURS PRN
OUTPATIENT
Start: 2024-06-20

## 2024-06-12 RX ORDER — BORTEZOMIB 3.5 MG/1
1.3 INJECTION, POWDER, LYOPHILIZED, FOR SOLUTION INTRAVENOUS; SUBCUTANEOUS ONCE
OUTPATIENT
Start: 2024-06-27

## 2024-06-12 RX ORDER — EPINEPHRINE 0.3 MG/.3ML
0.3 INJECTION SUBCUTANEOUS EVERY 5 MIN PRN
OUTPATIENT
Start: 2024-06-27

## 2024-06-12 RX ORDER — FAMOTIDINE 10 MG/ML
20 INJECTION INTRAVENOUS ONCE AS NEEDED
Status: CANCELLED | OUTPATIENT
Start: 2024-06-13

## 2024-06-12 RX ORDER — PROCHLORPERAZINE EDISYLATE 5 MG/ML
10 INJECTION INTRAMUSCULAR; INTRAVENOUS EVERY 6 HOURS PRN
OUTPATIENT
Start: 2024-07-04

## 2024-06-12 RX ORDER — EPINEPHRINE 0.3 MG/.3ML
0.3 INJECTION SUBCUTANEOUS EVERY 5 MIN PRN
Status: CANCELLED | OUTPATIENT
Start: 2024-06-13

## 2024-06-12 RX ORDER — PROCHLORPERAZINE MALEATE 10 MG
10 TABLET ORAL EVERY 6 HOURS PRN
Status: CANCELLED | OUTPATIENT
Start: 2024-06-13

## 2024-06-12 RX ORDER — PROCHLORPERAZINE MALEATE 10 MG
10 TABLET ORAL EVERY 6 HOURS PRN
OUTPATIENT
Start: 2024-07-04

## 2024-06-12 RX ORDER — DIPHENHYDRAMINE HYDROCHLORIDE 50 MG/ML
50 INJECTION INTRAMUSCULAR; INTRAVENOUS AS NEEDED
OUTPATIENT
Start: 2024-06-27

## 2024-06-12 RX ORDER — FAMOTIDINE 10 MG/ML
20 INJECTION INTRAVENOUS ONCE AS NEEDED
OUTPATIENT
Start: 2024-06-20

## 2024-06-12 RX ORDER — DIPHENHYDRAMINE HYDROCHLORIDE 50 MG/ML
50 INJECTION INTRAMUSCULAR; INTRAVENOUS AS NEEDED
OUTPATIENT
Start: 2024-06-20

## 2024-06-12 RX ORDER — DIPHENHYDRAMINE HYDROCHLORIDE 50 MG/ML
50 INJECTION INTRAMUSCULAR; INTRAVENOUS AS NEEDED
Status: CANCELLED | OUTPATIENT
Start: 2024-06-13

## 2024-06-12 RX ORDER — BORTEZOMIB 3.5 MG/1
1.3 INJECTION, POWDER, LYOPHILIZED, FOR SOLUTION INTRAVENOUS; SUBCUTANEOUS ONCE
OUTPATIENT
Start: 2024-06-20

## 2024-06-13 ENCOUNTER — LAB (OUTPATIENT)
Dept: LAB | Facility: CLINIC | Age: 89
End: 2024-06-13
Payer: MEDICARE

## 2024-06-13 ENCOUNTER — INFUSION (OUTPATIENT)
Dept: HEMATOLOGY/ONCOLOGY | Facility: CLINIC | Age: 89
End: 2024-06-13
Payer: MEDICARE

## 2024-06-13 VITALS
HEIGHT: 59 IN | WEIGHT: 132.94 LBS | HEART RATE: 66 BPM | OXYGEN SATURATION: 98 % | DIASTOLIC BLOOD PRESSURE: 76 MMHG | TEMPERATURE: 97.7 F | BODY MASS INDEX: 26.8 KG/M2 | SYSTOLIC BLOOD PRESSURE: 172 MMHG | RESPIRATION RATE: 18 BRPM

## 2024-06-13 DIAGNOSIS — C90.00 MULTIPLE MYELOMA, REMISSION STATUS UNSPECIFIED (MULTI): Primary | ICD-10-CM

## 2024-06-13 DIAGNOSIS — C90.00 MULTIPLE MYELOMA, REMISSION STATUS UNSPECIFIED (MULTI): ICD-10-CM

## 2024-06-13 LAB
ALBUMIN SERPL BCP-MCNC: 3.9 G/DL (ref 3.4–5)
ALP SERPL-CCNC: 52 U/L (ref 33–136)
ALT SERPL W P-5'-P-CCNC: 10 U/L (ref 7–45)
ANION GAP SERPL CALC-SCNC: 11 MMOL/L (ref 10–20)
AST SERPL W P-5'-P-CCNC: 19 U/L (ref 9–39)
BASOPHILS # BLD AUTO: 0.03 X10*3/UL (ref 0–0.1)
BASOPHILS NFR BLD AUTO: 0.3 %
BILIRUB SERPL-MCNC: 0.4 MG/DL (ref 0–1.2)
BUN SERPL-MCNC: 30 MG/DL (ref 6–23)
CALCIUM SERPL-MCNC: 10.2 MG/DL (ref 8.6–10.3)
CHLORIDE SERPL-SCNC: 107 MMOL/L (ref 98–107)
CO2 SERPL-SCNC: 24 MMOL/L (ref 21–32)
CREAT SERPL-MCNC: 1.52 MG/DL (ref 0.5–1.05)
EGFRCR SERPLBLD CKD-EPI 2021: 32 ML/MIN/1.73M*2
EOSINOPHIL # BLD AUTO: 0.5 X10*3/UL (ref 0–0.4)
EOSINOPHIL NFR BLD AUTO: 5.6 %
ERYTHROCYTE [DISTWIDTH] IN BLOOD BY AUTOMATED COUNT: 16.8 % (ref 11.5–14.5)
GLUCOSE SERPL-MCNC: 113 MG/DL (ref 74–99)
HCT VFR BLD AUTO: 31.9 % (ref 36–46)
HGB BLD-MCNC: 10.3 G/DL (ref 12–16)
IMM GRANULOCYTES # BLD AUTO: 0.03 X10*3/UL (ref 0–0.5)
IMM GRANULOCYTES NFR BLD AUTO: 0.3 % (ref 0–0.9)
LYMPHOCYTES # BLD AUTO: 1.59 X10*3/UL (ref 0.8–3)
LYMPHOCYTES NFR BLD AUTO: 17.9 %
MCH RBC QN AUTO: 29.8 PG (ref 26–34)
MCHC RBC AUTO-ENTMCNC: 32.3 G/DL (ref 32–36)
MCV RBC AUTO: 92 FL (ref 80–100)
MONOCYTES # BLD AUTO: 0.68 X10*3/UL (ref 0.05–0.8)
MONOCYTES NFR BLD AUTO: 7.7 %
NEUTROPHILS # BLD AUTO: 6.04 X10*3/UL (ref 1.6–5.5)
NEUTROPHILS NFR BLD AUTO: 68.2 %
NRBC BLD-RTO: 0 /100 WBCS (ref 0–0)
PLATELET # BLD AUTO: 403 X10*3/UL (ref 150–450)
POTASSIUM SERPL-SCNC: 4.1 MMOL/L (ref 3.5–5.3)
PROT SERPL-MCNC: 6.8 G/DL (ref 6.4–8.2)
RBC # BLD AUTO: 3.46 X10*6/UL (ref 4–5.2)
SODIUM SERPL-SCNC: 138 MMOL/L (ref 136–145)
WBC # BLD AUTO: 8.9 X10*3/UL (ref 4.4–11.3)

## 2024-06-13 PROCEDURE — 96401 CHEMO ANTI-NEOPL SQ/IM: CPT

## 2024-06-13 PROCEDURE — 2500000004 HC RX 250 GENERAL PHARMACY W/ HCPCS (ALT 636 FOR OP/ED): Performed by: INTERNAL MEDICINE

## 2024-06-13 PROCEDURE — 80053 COMPREHEN METABOLIC PANEL: CPT | Performed by: INTERNAL MEDICINE

## 2024-06-13 PROCEDURE — 85025 COMPLETE CBC W/AUTO DIFF WBC: CPT | Performed by: INTERNAL MEDICINE

## 2024-06-13 RX ORDER — BORTEZOMIB 3.5 MG/1
1.3 INJECTION, POWDER, LYOPHILIZED, FOR SOLUTION INTRAVENOUS; SUBCUTANEOUS ONCE
Status: COMPLETED | OUTPATIENT
Start: 2024-06-13 | End: 2024-06-13

## 2024-06-13 NOTE — SIGNIFICANT EVENT
06/13/24 1030   Prechemo Checklist   Has the patient been in the hospital, ED, or urgent care since last date of service No   Chemo/Immuno Consent Completed and Signed Yes  (12/8/2023)   Protocol/Indications Verified Yes   Confirmed to previous date/time of medication Yes   Compared to previous dose Yes   All medications are dated accurately Yes   Pregnancy Test Negative Not applicable   Parameters Met Yes   Provider Notified Yes   Is Patient Proceeding With Treatment? Yes   BSA/Weight-Height Verified Yes   Dose Calculations Verified (current, total, cumulative) Yes

## 2024-06-19 DIAGNOSIS — M48.061 SPINAL STENOSIS OF LUMBAR REGION, UNSPECIFIED WHETHER NEUROGENIC CLAUDICATION PRESENT: ICD-10-CM

## 2024-06-19 RX ORDER — GABAPENTIN 100 MG/1
300 CAPSULE ORAL NIGHTLY
Qty: 270 CAPSULE | Refills: 0 | Status: SHIPPED | OUTPATIENT
Start: 2024-06-19

## 2024-06-20 ENCOUNTER — INFUSION (OUTPATIENT)
Dept: HEMATOLOGY/ONCOLOGY | Facility: CLINIC | Age: 89
End: 2024-06-20
Payer: MEDICARE

## 2024-06-20 ENCOUNTER — LAB (OUTPATIENT)
Dept: LAB | Facility: CLINIC | Age: 89
End: 2024-06-20
Payer: MEDICARE

## 2024-06-20 VITALS — WEIGHT: 131.61 LBS | BODY MASS INDEX: 26.6 KG/M2

## 2024-06-20 VITALS
HEIGHT: 59 IN | BODY MASS INDEX: 26.53 KG/M2 | SYSTOLIC BLOOD PRESSURE: 138 MMHG | RESPIRATION RATE: 18 BRPM | OXYGEN SATURATION: 98 % | HEART RATE: 68 BPM | DIASTOLIC BLOOD PRESSURE: 58 MMHG | WEIGHT: 131.61 LBS | TEMPERATURE: 97.3 F

## 2024-06-20 DIAGNOSIS — R19.7 DIARRHEA OF PRESUMED INFECTIOUS ORIGIN: Primary | ICD-10-CM

## 2024-06-20 DIAGNOSIS — C90.00 MULTIPLE MYELOMA NOT HAVING ACHIEVED REMISSION (MULTI): ICD-10-CM

## 2024-06-20 DIAGNOSIS — C90.00 MULTIPLE MYELOMA, REMISSION STATUS UNSPECIFIED (MULTI): ICD-10-CM

## 2024-06-20 DIAGNOSIS — E83.52 HYPERCALCEMIA: ICD-10-CM

## 2024-06-20 LAB
ACANTHOCYTES BLD QL SMEAR: ABNORMAL
ALBUMIN SERPL BCP-MCNC: 3.6 G/DL (ref 3.4–5)
ALP SERPL-CCNC: 56 U/L (ref 33–136)
ALT SERPL W P-5'-P-CCNC: 13 U/L (ref 7–45)
ANION GAP SERPL CALC-SCNC: 15 MMOL/L (ref 10–20)
AST SERPL W P-5'-P-CCNC: 21 U/L (ref 9–39)
BASOPHILS # BLD MANUAL: 0 X10*3/UL (ref 0–0.1)
BASOPHILS NFR BLD MANUAL: 0 %
BILIRUB SERPL-MCNC: 0.4 MG/DL (ref 0–1.2)
BUN SERPL-MCNC: 49 MG/DL (ref 6–23)
CALCIUM SERPL-MCNC: 9.4 MG/DL (ref 8.6–10.3)
CHLORIDE SERPL-SCNC: 102 MMOL/L (ref 98–107)
CO2 SERPL-SCNC: 20 MMOL/L (ref 21–32)
CREAT SERPL-MCNC: 2.22 MG/DL (ref 0.5–1.05)
EGFRCR SERPLBLD CKD-EPI 2021: 20 ML/MIN/1.73M*2
EOSINOPHIL # BLD MANUAL: 0.3 X10*3/UL (ref 0–0.4)
EOSINOPHIL NFR BLD MANUAL: 2 %
ERYTHROCYTE [DISTWIDTH] IN BLOOD BY AUTOMATED COUNT: 17.2 % (ref 11.5–14.5)
GLUCOSE SERPL-MCNC: 102 MG/DL (ref 74–99)
HCT VFR BLD AUTO: 27.8 % (ref 36–46)
HGB BLD-MCNC: 9.2 G/DL (ref 12–16)
IMM GRANULOCYTES # BLD AUTO: 0.06 X10*3/UL (ref 0–0.5)
IMM GRANULOCYTES NFR BLD AUTO: 0.4 % (ref 0–0.9)
LYMPHOCYTES # BLD MANUAL: 1.22 X10*3/UL (ref 0.8–3)
LYMPHOCYTES NFR BLD MANUAL: 8 %
MCH RBC QN AUTO: 30 PG (ref 26–34)
MCHC RBC AUTO-ENTMCNC: 33.1 G/DL (ref 32–36)
MCV RBC AUTO: 91 FL (ref 80–100)
MONOCYTES # BLD MANUAL: 1.22 X10*3/UL (ref 0.05–0.8)
MONOCYTES NFR BLD MANUAL: 8 %
NEUTROPHILS # BLD MANUAL: 12.47 X10*3/UL (ref 1.6–5.5)
NEUTS BAND # BLD MANUAL: 2.89 X10*3/UL (ref 0–0.5)
NEUTS BAND NFR BLD MANUAL: 19 %
NEUTS SEG # BLD MANUAL: 9.58 X10*3/UL (ref 1.6–5)
NEUTS SEG NFR BLD MANUAL: 63 %
NRBC BLD-RTO: 0 /100 WBCS (ref 0–0)
OVALOCYTES BLD QL SMEAR: ABNORMAL
PLATELET # BLD AUTO: 263 X10*3/UL (ref 150–450)
POTASSIUM SERPL-SCNC: 3.4 MMOL/L (ref 3.5–5.3)
PROT SERPL-MCNC: 6.6 G/DL (ref 6.4–8.2)
RBC # BLD AUTO: 3.07 X10*6/UL (ref 4–5.2)
RBC MORPH BLD: ABNORMAL
SCHISTOCYTES BLD QL SMEAR: ABNORMAL
SODIUM SERPL-SCNC: 134 MMOL/L (ref 136–145)
TOTAL CELLS COUNTED BLD: 100
WBC # BLD AUTO: 15.2 X10*3/UL (ref 4.4–11.3)

## 2024-06-20 PROCEDURE — 85027 COMPLETE CBC AUTOMATED: CPT | Performed by: INTERNAL MEDICINE

## 2024-06-20 PROCEDURE — 85007 BL SMEAR W/DIFF WBC COUNT: CPT | Performed by: INTERNAL MEDICINE

## 2024-06-20 PROCEDURE — 96360 HYDRATION IV INFUSION INIT: CPT | Mod: INF

## 2024-06-20 PROCEDURE — 2500000004 HC RX 250 GENERAL PHARMACY W/ HCPCS (ALT 636 FOR OP/ED)

## 2024-06-20 PROCEDURE — 80053 COMPREHEN METABOLIC PANEL: CPT | Performed by: INTERNAL MEDICINE

## 2024-06-20 PROCEDURE — 2500000002 HC RX 250 W HCPCS SELF ADMINISTERED DRUGS (ALT 637 FOR MEDICARE OP, ALT 636 FOR OP/ED)

## 2024-06-20 RX ORDER — POTASSIUM CHLORIDE 1.5 G/1.58G
40 POWDER, FOR SOLUTION ORAL ONCE
Status: DISCONTINUED | OUTPATIENT
Start: 2024-06-20 | End: 2024-06-20

## 2024-06-20 RX ORDER — POTASSIUM CHLORIDE 750 MG/1
40 TABLET, FILM COATED, EXTENDED RELEASE ORAL ONCE
Status: COMPLETED | OUTPATIENT
Start: 2024-06-20 | End: 2024-06-20

## 2024-06-20 ASSESSMENT — PAIN SCALES - GENERAL: PAINLEVEL: 0-NO PAIN

## 2024-06-20 NOTE — PROGRESS NOTES
Patient having elevated wbc, diarrhea x 3 days. Recently completed 10 days of antibiotics for bronchitis (6/5-6/15). Velcade being held per Melvina NP discretion in lieu of Dr Gray being off.  1 liter NS and 40MEQ PO potassium being administered for lab values. Patient to return to treatment next week as scheduled. Patient and son (at chair side) agreeable to plan

## 2024-06-27 ENCOUNTER — INFUSION (OUTPATIENT)
Dept: HEMATOLOGY/ONCOLOGY | Facility: CLINIC | Age: 89
End: 2024-06-27
Payer: MEDICARE

## 2024-06-27 ENCOUNTER — LAB (OUTPATIENT)
Dept: LAB | Facility: CLINIC | Age: 89
End: 2024-06-27
Payer: MEDICARE

## 2024-06-27 VITALS
HEART RATE: 61 BPM | HEIGHT: 59 IN | WEIGHT: 134.7 LBS | TEMPERATURE: 97.5 F | SYSTOLIC BLOOD PRESSURE: 150 MMHG | DIASTOLIC BLOOD PRESSURE: 74 MMHG | OXYGEN SATURATION: 96 % | RESPIRATION RATE: 18 BRPM | BODY MASS INDEX: 27.16 KG/M2

## 2024-06-27 DIAGNOSIS — C90.00 MULTIPLE MYELOMA NOT HAVING ACHIEVED REMISSION (MULTI): ICD-10-CM

## 2024-06-27 DIAGNOSIS — C90.00 MULTIPLE MYELOMA, REMISSION STATUS UNSPECIFIED (MULTI): ICD-10-CM

## 2024-06-27 DIAGNOSIS — E83.52 HYPERCALCEMIA: ICD-10-CM

## 2024-06-27 LAB
ALBUMIN SERPL BCP-MCNC: 3.7 G/DL (ref 3.4–5)
ALP SERPL-CCNC: 59 U/L (ref 33–136)
ALT SERPL W P-5'-P-CCNC: 9 U/L (ref 7–45)
ANION GAP SERPL CALC-SCNC: 13 MMOL/L (ref 10–20)
AST SERPL W P-5'-P-CCNC: 15 U/L (ref 9–39)
B2 MICROGLOB SERPL-MCNC: 8 MG/L (ref 0.7–2.2)
BASOPHILS # BLD AUTO: 0.05 X10*3/UL (ref 0–0.1)
BASOPHILS NFR BLD AUTO: 0.5 %
BILIRUB SERPL-MCNC: 0.3 MG/DL (ref 0–1.2)
BUN SERPL-MCNC: 29 MG/DL (ref 6–23)
CALCIUM SERPL-MCNC: 10.6 MG/DL (ref 8.6–10.3)
CHLORIDE SERPL-SCNC: 102 MMOL/L (ref 98–107)
CO2 SERPL-SCNC: 26 MMOL/L (ref 21–32)
CREAT SERPL-MCNC: 1.4 MG/DL (ref 0.5–1.05)
EGFRCR SERPLBLD CKD-EPI 2021: 36 ML/MIN/1.73M*2
EOSINOPHIL # BLD AUTO: 0.49 X10*3/UL (ref 0–0.4)
EOSINOPHIL NFR BLD AUTO: 5.1 %
ERYTHROCYTE [DISTWIDTH] IN BLOOD BY AUTOMATED COUNT: 16.7 % (ref 11.5–14.5)
GLUCOSE SERPL-MCNC: 115 MG/DL (ref 74–99)
HCT VFR BLD AUTO: 29.5 % (ref 36–46)
HGB BLD-MCNC: 9.9 G/DL (ref 12–16)
IGA SERPL-MCNC: 808 MG/DL (ref 70–400)
IGG SERPL-MCNC: 489 MG/DL (ref 700–1600)
IGM SERPL-MCNC: 14 MG/DL (ref 40–230)
IMM GRANULOCYTES # BLD AUTO: 0.09 X10*3/UL (ref 0–0.5)
IMM GRANULOCYTES NFR BLD AUTO: 0.9 % (ref 0–0.9)
LYMPHOCYTES # BLD AUTO: 1.69 X10*3/UL (ref 0.8–3)
LYMPHOCYTES NFR BLD AUTO: 17.5 %
MCH RBC QN AUTO: 30.6 PG (ref 26–34)
MCHC RBC AUTO-ENTMCNC: 33.6 G/DL (ref 32–36)
MCV RBC AUTO: 91 FL (ref 80–100)
MONOCYTES # BLD AUTO: 0.96 X10*3/UL (ref 0.05–0.8)
MONOCYTES NFR BLD AUTO: 9.9 %
NEUTROPHILS # BLD AUTO: 6.38 X10*3/UL (ref 1.6–5.5)
NEUTROPHILS NFR BLD AUTO: 66.1 %
NRBC BLD-RTO: 0 /100 WBCS (ref 0–0)
PLATELET # BLD AUTO: 450 X10*3/UL (ref 150–450)
POTASSIUM SERPL-SCNC: 4.3 MMOL/L (ref 3.5–5.3)
PROT SERPL-MCNC: 6.8 G/DL (ref 6.4–8.2)
PROT SERPL-MCNC: 7 G/DL (ref 6.4–8.2)
RBC # BLD AUTO: 3.24 X10*6/UL (ref 4–5.2)
SODIUM SERPL-SCNC: 137 MMOL/L (ref 136–145)
WBC # BLD AUTO: 9.7 X10*3/UL (ref 4.4–11.3)

## 2024-06-27 PROCEDURE — 86334 IMMUNOFIX E-PHORESIS SERUM: CPT | Mod: PARLAB | Performed by: INTERNAL MEDICINE

## 2024-06-27 PROCEDURE — 2500000004 HC RX 250 GENERAL PHARMACY W/ HCPCS (ALT 636 FOR OP/ED): Mod: JZ | Performed by: INTERNAL MEDICINE

## 2024-06-27 PROCEDURE — 36415 COLL VENOUS BLD VENIPUNCTURE: CPT

## 2024-06-27 PROCEDURE — 83521 IG LIGHT CHAINS FREE EACH: CPT | Mod: PARLAB | Performed by: INTERNAL MEDICINE

## 2024-06-27 PROCEDURE — 82232 ASSAY OF BETA-2 PROTEIN: CPT | Mod: PARLAB | Performed by: INTERNAL MEDICINE

## 2024-06-27 PROCEDURE — 96372 THER/PROPH/DIAG INJ SC/IM: CPT

## 2024-06-27 PROCEDURE — 96401 CHEMO ANTI-NEOPL SQ/IM: CPT

## 2024-06-27 PROCEDURE — 82784 ASSAY IGA/IGD/IGG/IGM EACH: CPT | Mod: PARLAB | Performed by: INTERNAL MEDICINE

## 2024-06-27 PROCEDURE — 96360 HYDRATION IV INFUSION INIT: CPT | Mod: INF

## 2024-06-27 PROCEDURE — 84155 ASSAY OF PROTEIN SERUM: CPT | Mod: PARLAB | Performed by: INTERNAL MEDICINE

## 2024-06-27 PROCEDURE — 84075 ASSAY ALKALINE PHOSPHATASE: CPT | Performed by: INTERNAL MEDICINE

## 2024-06-27 PROCEDURE — 85025 COMPLETE CBC W/AUTO DIFF WBC: CPT | Performed by: INTERNAL MEDICINE

## 2024-06-27 RX ORDER — EPINEPHRINE 0.3 MG/.3ML
0.3 INJECTION SUBCUTANEOUS EVERY 5 MIN PRN
Status: DISCONTINUED | OUTPATIENT
Start: 2024-06-27 | End: 2024-06-27 | Stop reason: HOSPADM

## 2024-06-27 RX ORDER — ALBUTEROL SULFATE 0.83 MG/ML
3 SOLUTION RESPIRATORY (INHALATION) AS NEEDED
Status: DISCONTINUED | OUTPATIENT
Start: 2024-06-27 | End: 2024-06-27 | Stop reason: HOSPADM

## 2024-06-27 RX ORDER — BORTEZOMIB 3.5 MG/1
1.3 INJECTION, POWDER, LYOPHILIZED, FOR SOLUTION INTRAVENOUS; SUBCUTANEOUS ONCE
Status: COMPLETED | OUTPATIENT
Start: 2024-06-27 | End: 2024-06-27

## 2024-06-27 RX ORDER — ALBUTEROL SULFATE 0.83 MG/ML
3 SOLUTION RESPIRATORY (INHALATION) AS NEEDED
OUTPATIENT
Start: 2024-07-18

## 2024-06-27 RX ORDER — FAMOTIDINE 10 MG/ML
20 INJECTION INTRAVENOUS ONCE AS NEEDED
Status: DISCONTINUED | OUTPATIENT
Start: 2024-06-27 | End: 2024-06-27 | Stop reason: HOSPADM

## 2024-06-27 RX ORDER — DIPHENHYDRAMINE HYDROCHLORIDE 50 MG/ML
50 INJECTION INTRAMUSCULAR; INTRAVENOUS AS NEEDED
Status: DISCONTINUED | OUTPATIENT
Start: 2024-06-27 | End: 2024-06-27 | Stop reason: HOSPADM

## 2024-06-27 RX ORDER — EPINEPHRINE 0.3 MG/.3ML
0.3 INJECTION SUBCUTANEOUS EVERY 5 MIN PRN
OUTPATIENT
Start: 2024-07-18

## 2024-06-27 RX ORDER — DIPHENHYDRAMINE HYDROCHLORIDE 50 MG/ML
50 INJECTION INTRAMUSCULAR; INTRAVENOUS AS NEEDED
OUTPATIENT
Start: 2024-07-18

## 2024-06-27 RX ORDER — FAMOTIDINE 10 MG/ML
20 INJECTION INTRAVENOUS ONCE AS NEEDED
OUTPATIENT
Start: 2024-07-18

## 2024-06-27 ASSESSMENT — PAIN SCALES - GENERAL: PAINLEVEL: 0-NO PAIN

## 2024-06-27 NOTE — SIGNIFICANT EVENT
06/27/24 1026   Prechemo Checklist   Has the patient been in the hospital, ED, or urgent care since last date of service Yes   Chemo/Immuno Consent Completed and Signed Yes   Protocol/Indications Verified Yes   Confirmed to previous date/time of medication Yes   Compared to previous dose Yes   All medications are dated accurately Yes   Pregnancy Test Negative Not applicable   Parameters Met Yes   Provider Notified Yes   Is Patient Proceeding With Treatment? Yes   BSA/Weight-Height Verified Yes

## 2024-06-28 LAB
KAPPA LC SERPL-MCNC: 12.14 MG/DL (ref 0.33–1.94)
KAPPA LC/LAMBDA SER: 8.04 {RATIO} (ref 0.26–1.65)
LAMBDA LC SERPL-MCNC: 1.51 MG/DL (ref 0.57–2.63)

## 2024-07-01 LAB
ALBUMIN: 3.5 G/DL (ref 3.4–5)
ALPHA 1 GLOBULIN: 0.5 G/DL (ref 0.2–0.6)
ALPHA 2 GLOBULIN: 0.9 G/DL (ref 0.4–1.1)
BETA GLOBULIN: 1.5 G/DL (ref 0.5–1.2)
GAMMA GLOBULIN: 0.4 G/DL (ref 0.5–1.4)
IMMUNOFIXATION COMMENT: ABNORMAL
M-PROTEIN 1: 0.6 G/DL
PATH REVIEW - SERUM IMMUNOFIXATION: ABNORMAL
PATH REVIEW-SERUM PROTEIN ELECTROPHORESIS: ABNORMAL
PROTEIN ELECTROPHORESIS COMMENT: ABNORMAL

## 2024-07-05 ENCOUNTER — OFFICE VISIT (OUTPATIENT)
Dept: HEMATOLOGY/ONCOLOGY | Facility: CLINIC | Age: 89
End: 2024-07-05
Payer: MEDICARE

## 2024-07-05 ENCOUNTER — INFUSION (OUTPATIENT)
Dept: HEMATOLOGY/ONCOLOGY | Facility: CLINIC | Age: 89
End: 2024-07-05
Payer: MEDICARE

## 2024-07-05 ENCOUNTER — LAB (OUTPATIENT)
Dept: LAB | Facility: CLINIC | Age: 89
DRG: 372 | End: 2024-07-05
Payer: MEDICARE

## 2024-07-05 VITALS
BODY MASS INDEX: 27.82 KG/M2 | DIASTOLIC BLOOD PRESSURE: 70 MMHG | OXYGEN SATURATION: 94 % | HEART RATE: 63 BPM | RESPIRATION RATE: 18 BRPM | HEIGHT: 59 IN | TEMPERATURE: 97.7 F | WEIGHT: 138.01 LBS | SYSTOLIC BLOOD PRESSURE: 190 MMHG

## 2024-07-05 VITALS
OXYGEN SATURATION: 95 % | TEMPERATURE: 98.4 F | SYSTOLIC BLOOD PRESSURE: 196 MMHG | RESPIRATION RATE: 18 BRPM | DIASTOLIC BLOOD PRESSURE: 80 MMHG | HEART RATE: 65 BPM

## 2024-07-05 DIAGNOSIS — C90.00 MULTIPLE MYELOMA NOT HAVING ACHIEVED REMISSION (MULTI): ICD-10-CM

## 2024-07-05 DIAGNOSIS — C90.00 MULTIPLE MYELOMA, REMISSION STATUS UNSPECIFIED (MULTI): ICD-10-CM

## 2024-07-05 DIAGNOSIS — E83.52 HYPERCALCEMIA: ICD-10-CM

## 2024-07-05 DIAGNOSIS — C90.00 MULTIPLE MYELOMA, REMISSION STATUS UNSPECIFIED (MULTI): Primary | ICD-10-CM

## 2024-07-05 LAB
ALBUMIN SERPL BCP-MCNC: 3.4 G/DL (ref 3.4–5)
ALP SERPL-CCNC: 67 U/L (ref 33–136)
ALT SERPL W P-5'-P-CCNC: 10 U/L (ref 7–45)
ANION GAP SERPL CALC-SCNC: 11 MMOL/L (ref 10–20)
AST SERPL W P-5'-P-CCNC: 14 U/L (ref 9–39)
BASOPHILS # BLD AUTO: 0.02 X10*3/UL (ref 0–0.1)
BASOPHILS NFR BLD AUTO: 0.3 %
BILIRUB SERPL-MCNC: 0.4 MG/DL (ref 0–1.2)
BUN SERPL-MCNC: 36 MG/DL (ref 6–23)
CALCIUM SERPL-MCNC: 11.8 MG/DL (ref 8.6–10.3)
CHLORIDE SERPL-SCNC: 99 MMOL/L (ref 98–107)
CO2 SERPL-SCNC: 25 MMOL/L (ref 21–32)
CREAT SERPL-MCNC: 1.53 MG/DL (ref 0.5–1.05)
EGFRCR SERPLBLD CKD-EPI 2021: 32 ML/MIN/1.73M*2
EOSINOPHIL # BLD AUTO: 0.29 X10*3/UL (ref 0–0.4)
EOSINOPHIL NFR BLD AUTO: 4.2 %
ERYTHROCYTE [DISTWIDTH] IN BLOOD BY AUTOMATED COUNT: 16.4 % (ref 11.5–14.5)
GLUCOSE SERPL-MCNC: 111 MG/DL (ref 74–99)
HCT VFR BLD AUTO: 26.4 % (ref 36–46)
HGB BLD-MCNC: 8.8 G/DL (ref 12–16)
IMM GRANULOCYTES # BLD AUTO: 0.03 X10*3/UL (ref 0–0.5)
IMM GRANULOCYTES NFR BLD AUTO: 0.4 % (ref 0–0.9)
LYMPHOCYTES # BLD AUTO: 1.25 X10*3/UL (ref 0.8–3)
LYMPHOCYTES NFR BLD AUTO: 17.9 %
MCH RBC QN AUTO: 30.1 PG (ref 26–34)
MCHC RBC AUTO-ENTMCNC: 33.3 G/DL (ref 32–36)
MCV RBC AUTO: 90 FL (ref 80–100)
MONOCYTES # BLD AUTO: 1.23 X10*3/UL (ref 0.05–0.8)
MONOCYTES NFR BLD AUTO: 17.6 %
NEUTROPHILS # BLD AUTO: 4.15 X10*3/UL (ref 1.6–5.5)
NEUTROPHILS NFR BLD AUTO: 59.6 %
NRBC BLD-RTO: 0 /100 WBCS (ref 0–0)
PLATELET # BLD AUTO: 365 X10*3/UL (ref 150–450)
POTASSIUM SERPL-SCNC: 4.1 MMOL/L (ref 3.5–5.3)
PROT SERPL-MCNC: 6.5 G/DL (ref 6.4–8.2)
RBC # BLD AUTO: 2.92 X10*6/UL (ref 4–5.2)
SODIUM SERPL-SCNC: 131 MMOL/L (ref 136–145)
WBC # BLD AUTO: 7 X10*3/UL (ref 4.4–11.3)

## 2024-07-05 PROCEDURE — 99215 OFFICE O/P EST HI 40 MIN: CPT | Performed by: INTERNAL MEDICINE

## 2024-07-05 PROCEDURE — 1159F MED LIST DOCD IN RCRD: CPT | Performed by: INTERNAL MEDICINE

## 2024-07-05 PROCEDURE — 2500000004 HC RX 250 GENERAL PHARMACY W/ HCPCS (ALT 636 FOR OP/ED): Performed by: INTERNAL MEDICINE

## 2024-07-05 PROCEDURE — 3077F SYST BP >= 140 MM HG: CPT | Performed by: INTERNAL MEDICINE

## 2024-07-05 PROCEDURE — 1126F AMNT PAIN NOTED NONE PRSNT: CPT | Performed by: INTERNAL MEDICINE

## 2024-07-05 PROCEDURE — 85025 COMPLETE CBC W/AUTO DIFF WBC: CPT | Performed by: INTERNAL MEDICINE

## 2024-07-05 PROCEDURE — 1123F ACP DISCUSS/DSCN MKR DOCD: CPT | Performed by: INTERNAL MEDICINE

## 2024-07-05 PROCEDURE — 80053 COMPREHEN METABOLIC PANEL: CPT | Performed by: INTERNAL MEDICINE

## 2024-07-05 PROCEDURE — 96360 HYDRATION IV INFUSION INIT: CPT | Mod: INF

## 2024-07-05 PROCEDURE — 96401 CHEMO ANTI-NEOPL SQ/IM: CPT

## 2024-07-05 PROCEDURE — 36415 COLL VENOUS BLD VENIPUNCTURE: CPT

## 2024-07-05 PROCEDURE — 3078F DIAST BP <80 MM HG: CPT | Performed by: INTERNAL MEDICINE

## 2024-07-05 RX ORDER — DIPHENHYDRAMINE HYDROCHLORIDE 50 MG/ML
50 INJECTION INTRAMUSCULAR; INTRAVENOUS AS NEEDED
Status: DISCONTINUED | OUTPATIENT
Start: 2024-07-05 | End: 2024-07-05

## 2024-07-05 RX ORDER — DIPHENHYDRAMINE HCL 50 MG
50 CAPSULE ORAL ONCE
OUTPATIENT
Start: 2024-07-12

## 2024-07-05 RX ORDER — EPINEPHRINE 0.3 MG/.3ML
0.3 INJECTION SUBCUTANEOUS EVERY 5 MIN PRN
Status: DISCONTINUED | OUTPATIENT
Start: 2024-07-05 | End: 2024-07-05

## 2024-07-05 RX ORDER — EPINEPHRINE 0.3 MG/.3ML
0.3 INJECTION SUBCUTANEOUS EVERY 5 MIN PRN
OUTPATIENT
Start: 2024-07-26

## 2024-07-05 RX ORDER — MONTELUKAST SODIUM 10 MG/1
10 TABLET ORAL AS NEEDED
COMMUNITY
End: 2024-07-05 | Stop reason: SDUPTHER

## 2024-07-05 RX ORDER — MONTELUKAST SODIUM 10 MG/1
10 TABLET ORAL AS NEEDED
Qty: 8 TABLET | Refills: 1 | Status: ON HOLD | OUTPATIENT
Start: 2024-07-05

## 2024-07-05 RX ORDER — FAMOTIDINE 10 MG/ML
20 INJECTION INTRAVENOUS AS NEEDED
OUTPATIENT
Start: 2024-07-12

## 2024-07-05 RX ORDER — EPINEPHRINE 1 MG/ML
0.3 INJECTION INTRAMUSCULAR; INTRAVENOUS; SUBCUTANEOUS EVERY 5 MIN PRN
OUTPATIENT
Start: 2024-07-12

## 2024-07-05 RX ORDER — DIPHENHYDRAMINE HYDROCHLORIDE 50 MG/ML
50 INJECTION INTRAMUSCULAR; INTRAVENOUS AS NEEDED
OUTPATIENT
Start: 2024-07-19

## 2024-07-05 RX ORDER — ALBUTEROL SULFATE 0.83 MG/ML
3 SOLUTION RESPIRATORY (INHALATION) AS NEEDED
Status: CANCELLED | OUTPATIENT
Start: 2024-07-05

## 2024-07-05 RX ORDER — DIPHENHYDRAMINE HYDROCHLORIDE 50 MG/ML
50 INJECTION INTRAMUSCULAR; INTRAVENOUS AS NEEDED
OUTPATIENT
Start: 2024-07-26

## 2024-07-05 RX ORDER — ALBUTEROL SULFATE 0.83 MG/ML
3 SOLUTION RESPIRATORY (INHALATION) AS NEEDED
OUTPATIENT
Start: 2024-07-05

## 2024-07-05 RX ORDER — DIPHENHYDRAMINE HYDROCHLORIDE 50 MG/ML
50 INJECTION INTRAMUSCULAR; INTRAVENOUS AS NEEDED
OUTPATIENT
Start: 2024-08-02

## 2024-07-05 RX ORDER — DIPHENHYDRAMINE HCL 50 MG
50 CAPSULE ORAL ONCE
OUTPATIENT
Start: 2024-07-19

## 2024-07-05 RX ORDER — EPINEPHRINE 0.3 MG/.3ML
0.3 INJECTION SUBCUTANEOUS EVERY 5 MIN PRN
Status: CANCELLED | OUTPATIENT
Start: 2024-07-05

## 2024-07-05 RX ORDER — SODIUM CHLORIDE 9 MG/ML
500 INJECTION, SOLUTION INTRAVENOUS CONTINUOUS
Status: CANCELLED | OUTPATIENT
Start: 2024-07-05

## 2024-07-05 RX ORDER — DIPHENHYDRAMINE HCL 50 MG
50 CAPSULE ORAL ONCE
OUTPATIENT
Start: 2024-07-26

## 2024-07-05 RX ORDER — DEXAMETHASONE 4 MG/1
20 TABLET ORAL AS NEEDED
Qty: 40 TABLET | Refills: 1 | Status: ON HOLD | OUTPATIENT
Start: 2024-07-05

## 2024-07-05 RX ORDER — DIPHENHYDRAMINE HYDROCHLORIDE 50 MG/ML
50 INJECTION INTRAMUSCULAR; INTRAVENOUS AS NEEDED
Status: CANCELLED | OUTPATIENT
Start: 2024-07-05

## 2024-07-05 RX ORDER — DIPHENHYDRAMINE HYDROCHLORIDE 50 MG/ML
50 INJECTION INTRAMUSCULAR; INTRAVENOUS AS NEEDED
OUTPATIENT
Start: 2024-07-12

## 2024-07-05 RX ORDER — FAMOTIDINE 10 MG/ML
20 INJECTION INTRAVENOUS ONCE AS NEEDED
Status: CANCELLED | OUTPATIENT
Start: 2024-07-05

## 2024-07-05 RX ORDER — ACYCLOVIR 400 MG/1
400 TABLET ORAL EVERY 12 HOURS
OUTPATIENT
Start: 2024-07-12

## 2024-07-05 RX ORDER — ALBUTEROL SULFATE 0.83 MG/ML
3 SOLUTION RESPIRATORY (INHALATION) AS NEEDED
OUTPATIENT
Start: 2024-07-18

## 2024-07-05 RX ORDER — DIPHENHYDRAMINE HCL 50 MG
50 CAPSULE ORAL ONCE
OUTPATIENT
Start: 2024-08-02

## 2024-07-05 RX ORDER — FAMOTIDINE 10 MG/ML
20 INJECTION INTRAVENOUS ONCE AS NEEDED
OUTPATIENT
Start: 2024-07-19

## 2024-07-05 RX ORDER — ACETAMINOPHEN 325 MG/1
650 TABLET ORAL ONCE
OUTPATIENT
Start: 2024-07-26

## 2024-07-05 RX ORDER — FAMOTIDINE 10 MG/ML
20 INJECTION INTRAVENOUS ONCE AS NEEDED
OUTPATIENT
Start: 2024-07-18

## 2024-07-05 RX ORDER — HEPARIN SODIUM,PORCINE/PF 10 UNIT/ML
50 SYRINGE (ML) INTRAVENOUS AS NEEDED
OUTPATIENT
Start: 2024-07-05

## 2024-07-05 RX ORDER — ALBUTEROL SULFATE 0.83 MG/ML
3 SOLUTION RESPIRATORY (INHALATION) AS NEEDED
OUTPATIENT
Start: 2024-07-12

## 2024-07-05 RX ORDER — ACETAMINOPHEN 325 MG/1
650 TABLET ORAL ONCE
OUTPATIENT
Start: 2024-08-02

## 2024-07-05 RX ORDER — SODIUM CHLORIDE 9 MG/ML
500 INJECTION, SOLUTION INTRAVENOUS CONTINUOUS
Status: DISCONTINUED | OUTPATIENT
Start: 2024-07-05 | End: 2024-07-05 | Stop reason: HOSPADM

## 2024-07-05 RX ORDER — ACETAMINOPHEN 325 MG/1
650 TABLET ORAL ONCE
OUTPATIENT
Start: 2024-07-19

## 2024-07-05 RX ORDER — HEPARIN 100 UNIT/ML
500 SYRINGE INTRAVENOUS AS NEEDED
OUTPATIENT
Start: 2024-07-05

## 2024-07-05 RX ORDER — DEXTROMETHORPHAN HYDROBROMIDE, GUAIFENESIN 5; 100 MG/5ML; MG/5ML
650 LIQUID ORAL AS NEEDED
Status: ON HOLD | COMMUNITY
Start: 2024-07-10 | End: 2024-09-04

## 2024-07-05 RX ORDER — FAMOTIDINE 10 MG/ML
20 INJECTION INTRAVENOUS ONCE AS NEEDED
OUTPATIENT
Start: 2024-07-05

## 2024-07-05 RX ORDER — MONTELUKAST SODIUM 10 MG/1
10 TABLET ORAL ONCE
OUTPATIENT
Start: 2024-07-19

## 2024-07-05 RX ORDER — ALBUTEROL SULFATE 0.83 MG/ML
3 SOLUTION RESPIRATORY (INHALATION) AS NEEDED
OUTPATIENT
Start: 2024-08-02

## 2024-07-05 RX ORDER — MONTELUKAST SODIUM 10 MG/1
10 TABLET ORAL ONCE
OUTPATIENT
Start: 2024-07-26

## 2024-07-05 RX ORDER — FAMOTIDINE 10 MG/ML
20 INJECTION INTRAVENOUS ONCE AS NEEDED
OUTPATIENT
Start: 2024-07-26

## 2024-07-05 RX ORDER — EPINEPHRINE 0.3 MG/.3ML
0.3 INJECTION SUBCUTANEOUS EVERY 5 MIN PRN
OUTPATIENT
Start: 2024-07-18

## 2024-07-05 RX ORDER — ALBUTEROL SULFATE 0.83 MG/ML
3 SOLUTION RESPIRATORY (INHALATION) AS NEEDED
OUTPATIENT
Start: 2024-07-26

## 2024-07-05 RX ORDER — DEXAMETHASONE 4 MG/1
20 TABLET ORAL AS NEEDED
COMMUNITY
End: 2024-07-05 | Stop reason: SDUPTHER

## 2024-07-05 RX ORDER — ALBUTEROL SULFATE 0.83 MG/ML
3 SOLUTION RESPIRATORY (INHALATION) AS NEEDED
Status: DISCONTINUED | OUTPATIENT
Start: 2024-07-05 | End: 2024-07-05

## 2024-07-05 RX ORDER — EPINEPHRINE 0.3 MG/.3ML
0.3 INJECTION SUBCUTANEOUS EVERY 5 MIN PRN
OUTPATIENT
Start: 2024-07-19

## 2024-07-05 RX ORDER — MONTELUKAST SODIUM 10 MG/1
10 TABLET ORAL ONCE
OUTPATIENT
Start: 2024-07-12

## 2024-07-05 RX ORDER — MONTELUKAST SODIUM 10 MG/1
10 TABLET ORAL ONCE
OUTPATIENT
Start: 2024-08-02

## 2024-07-05 RX ORDER — EPINEPHRINE 0.3 MG/.3ML
0.3 INJECTION SUBCUTANEOUS EVERY 5 MIN PRN
OUTPATIENT
Start: 2024-07-05

## 2024-07-05 RX ORDER — FAMOTIDINE 10 MG/ML
20 INJECTION INTRAVENOUS ONCE AS NEEDED
OUTPATIENT
Start: 2024-08-02

## 2024-07-05 RX ORDER — EPINEPHRINE 0.3 MG/.3ML
0.3 INJECTION SUBCUTANEOUS EVERY 5 MIN PRN
OUTPATIENT
Start: 2024-08-02

## 2024-07-05 RX ORDER — DIPHENHYDRAMINE HYDROCHLORIDE 50 MG/ML
50 INJECTION INTRAMUSCULAR; INTRAVENOUS AS NEEDED
OUTPATIENT
Start: 2024-07-05

## 2024-07-05 RX ORDER — BORTEZOMIB 3.5 MG/1
1.3 INJECTION, POWDER, LYOPHILIZED, FOR SOLUTION INTRAVENOUS; SUBCUTANEOUS ONCE
Status: COMPLETED | OUTPATIENT
Start: 2024-07-05 | End: 2024-07-05

## 2024-07-05 RX ORDER — FAMOTIDINE 10 MG/ML
20 INJECTION INTRAVENOUS ONCE AS NEEDED
Status: DISCONTINUED | OUTPATIENT
Start: 2024-07-05 | End: 2024-07-05

## 2024-07-05 RX ORDER — ALBUTEROL SULFATE 0.83 MG/ML
3 SOLUTION RESPIRATORY (INHALATION) AS NEEDED
OUTPATIENT
Start: 2024-07-19

## 2024-07-05 RX ORDER — DIPHENHYDRAMINE HYDROCHLORIDE 50 MG/ML
50 INJECTION INTRAMUSCULAR; INTRAVENOUS AS NEEDED
OUTPATIENT
Start: 2024-07-18

## 2024-07-05 RX ORDER — ACETAMINOPHEN 325 MG/1
650 TABLET ORAL ONCE
OUTPATIENT
Start: 2024-07-12

## 2024-07-05 ASSESSMENT — PAIN SCALES - GENERAL: PAINLEVEL: 0-NO PAIN

## 2024-07-05 NOTE — PROGRESS NOTES
LOCATION:  [facility] at [facility].     HEMATOLOGY & ONCOLOGY PROBLEMS:  1.  IgA kappa multiple myeloma        a.  Initial diagnosis of MGUS in Feb 2022.       b.  Disease progression to multiple myeloma in Oct 2022.       c.  Ist line therapy with single agent Velcade beginning Nov 2022.  2.   Hypercalcemia/lytic lesions.       a.  Maintained on Xgeva  3.  Macrocytic anemia.     CHIEF COMPLAINT:    The patient is in the clinic today for management of multiple myeloma and macrocytic anemia and for continuation of therapy and management of therapy related effects.     HISTORY:   Ms. Cali is a 89 year old pleasant female with multiple myeloma.  She has been in relatively good health for her age and is in fact still working.  Blood work from Feb 2022 showed a hemoglobin of 11.1 with MCV of 101.  WBC, platelets and metabolic  profile was unremarkable except for baseline creatinine of 1.4.  Additional work-up revealed a normal TSH but SPEP showed 0.7 g of IgA kappa monoclonal protein.  Initial evaluation was mainly suggestive of MGUS and she was followed closely.  But routine  follow-up blood work from October 2022 showed 1g of IgA kappa monoclonal protein and markedly elevated kappa free light chain assay.  Creatinine was stable at 1.3 but serum calcium was elevated at 13.1.  PET scan results were unremarkable but a bone marrow  biopsy confirmed plasma cell myeloma.  Cytogenetics and FISH testing results were unremarkable.  After discussion with the patient and family members she is currently being treated with single agent Velcade with good response.  She is also maintained  on intermittent Xgeva for hypercalcemia.  Over time her clinical course has been complicated by fall leading to right leg surgery and then admission for C. difficile colitis etc.     INTERVAL HISTORY:  She is feeling slightly more tired but still fairly functional for her age.  Latest blood work results are concerning for rising  "myeloma markers and calcium level.  She is currently getting Velcade at weekly interval.     PAST MEDICAL HISTORY:   1.  Multiple myeloma as detailed above  2.  Hypertension  3.  Hypothyroidism  4.  GERD  5.  Chronic kidney disease  6.  DJD   7.  Idiopathic neuropathy  8.  Lactose intolerance  9.  Vitamin D deficiency  10.  History of cholecystectomy/hysterectomy/tonsillectomy/left knee replacement surgeries     SOCIAL HISTORY:   She lives in [place] with her son.  Non smoker.  Rare social alcohol intake.  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  Born and raised in [place].     FAMILY HISTORY:    Parents  in their 90s from natural causes but a per the patient both probably had some kind of cancers.  5 siblings 1  from lung problems.  7 children, 12 grandkids and  14 great grandkids all alive and well. No other specific history of bleeding, clotting or malignant disorder in the family.     REVIEW OF SYSTEMS:  Pertinent finding as per the history above.  All other systems have been reviewed and generally negative and noncontributory.     ALLERGY & MEDICATIONS:  Allergies and latest outpatient medications list were reviewed in the EMR.    VITAL SIGNS  BSA: 1.61 meters squared  BP (!) 190/70   Pulse 63   Temp 36.5 °C (97.7 °F)   Resp 18   Ht 1.498 m (4' 10.98\")   Wt 62.6 kg (138 lb 0.1 oz)   SpO2 94%   BMI 27.90 kg/m²     PHYSICAL EXAMINATION:  Detailed examination not done.    LAB RESULTS:  CBC from today shows a hemoglobin of 8.8 with MCV of 90.   White cell count is 7.0 and platelet are 365K.  Metabolic profile is essentially stable with creatinine of 1.5 but calcium level is increased to 11.8 today.  Latest myeloma markers from 2024 showed a kappa light chain level of 12.4.    RADIOLOGY RESULT:  CT chest without contrast 10/30/2023  Impression:  1.  2 mm left subpleural nodule as described.  2. The lungs otherwise are clear considering limitations.  3. " Nonunion of a left acromion fracture.      PET/CT Myeloma Initial [Oct 21 2022 10:35AM]  Impression:  1. Multiple focal hypermetabolic pulmonary nodules throughout the bilateral lungs. Findings likely represent inflammatory process,  versus less likely, malignancy. Recommend short-term follow-up CT to document resolution of nodules or tissue biopsy is clinically  indicated.  2. Several hypermetabolic nonenlarged mediastinal lymph nodes, likely reactive in etiology.  3. Mild increased metabolic activity throughout the axial and appendicular skeleton, which is nonspecific and could be related to patient's anemia versus diffuse myeloma involvement. There is no focal hypermetabolic disease involvement within the skeleton.     PATHOLOGY RESULTS:  Surgical Pathology [Oct 28 2022 1:58PM] ([id number])   Specimens: BONE MARROW CLOT /BONE MARROW CORE /Received in formalin, labeled with the patient's name and hospital number/Received in B-plus fixative, labeled with the patient's name and hospital   Name SUMIT OTT   Accession #: [id number]    Pathologist: ADRIANA NATION MD   Date of Procedure: 10/25/2022   Date Received: 10/25/2022   Date Reported 10/28/2022   Submitting Physician: RENATE MELGAR MD   Location: [facility] Other External #   FINAL DIAGNOSIS   A&B:  BONE MARROW, ASPIRATE WITH CLOT AND CORE BIOPSY WITH TOUCH IMPRINT, RIGHT ILIAC CREST:   -- SLIGHTLY HYPERCELLULAR BONE MARROW WITH 15% KAPPA+ PLASMA CELLS CONSISTENT WITH PLASMA CELL MYELOMA   NOTE: Clinical correlation recommended.   Pending  Genetic/Molecular testing per Hematopathology protocol:   -Chromosome analysis: karyotype   -FISH: Myeloma panel   -Molecular: None   -Microarray: None   The results will be reported separately.   Touch Prep   Differential:  (Normal) %   Promyelocytes (1-5) 1   Myelocytes (5-10) 8   Metamyelocytes (10-25) 13   Bands (10-20) 9   Segmented forms (5-30) 17   Eosinophils (2-4) 11   Basophils (0-1) 1   Lymphocytes  (5-25) 2   Monocytes (0-2)  0   Plasma Cells (0-2) 15   Blasts (0-1) 0   Total Erythroid (17-35) 23   Number of cells counted: 100   Cellularity: cellular   M:E Ratio: 2.7:1   The gross and/or microscopic findings were reviewed in conjunction with pathology  resident, Kirstin Gutierrez M.D.   Electronically Signed Out By ADRIANA NATION MD/NAGI   By the signature on this report, the individual or group listed as making the   Final Interpretation/Diagnosis certifies that they have reviewed this  case.   Diagnostic interpretation performed at [facility] [address] [facility] [address]   Microscopic Description:   CBC: WBC 18.8 x 10E9/L, RBC 2.77 x 10E12/L, Hgb 8.8 g/dl, Hct 26.2%, MCV 95 fL, RDW 13.6%, platelets  416 x 10E9/L.   A 100 cell manual differential count reveals: polys 61%, bands 8%, lymphocytes 13%, monocytes 9%, eosinophils 8%, basophils 1%.   PERIPHERAL SMEAR: Submitted   Red cells: Normocytic with rouleaux formation.   White cells: Eosinophilia    Platelets: Normal, adequate.   ASPIRATE SMEAR: Submitted   Specimen: Aspicular.   Comments: Hypocellular and hemodilute with few hematopoietic precursors and scattered atypical plasma cells, some biunucleated.   TOUCH PREP: Submitted    Specimen: Paucicellular.   Erythropoiesis: Normal maturation.   Granulopoiesis: Normal maturation.   Megakaryocytes: Present. Morphology: Normal.   Comments: Numerous plasma cells (15%). Some very large atypical plasma cells seen.    ASPIRATE CLOT: Submitted   Specimen: Aspicular.   Comments: Rare hematopoietic progenitor cells noted in a hemodiluted sample.   CORE BIOPSY: Submitted   Specimen: Limited. Small subcortical with crush artifact   Cellularity: 40%.    Estimated M:E ratio: Consistent with aspirate smear.   Bony trabeculae: Normal.   Megakaryocytes: Adequate. Morphology: Normal.   Granulomas: Absent.   Lymphoid aggregates: Absent.   Comments: Few small aggregates of plasm cells noted.    SPECIAL  STAINS:   Iron: Inadequate sample to evaluate   IMMUNOHISTOCHEMISTRY: Performed.    - Positive plasma cells approximately (15%)   Cyclin D1 - Negative   IH Kappa - positice in plasma cells   IH Lambda - rare plasma  cells positive.   FLOW CYTOMETRY: Performed, see separate report. A small clonal kappa+, CD19-, CD45- plasma cell population detected.   Immunostains were performed in addition to flow cytometry to fully characterize the phenotype, architecture,  and extent of the atypical population(s). This was medically necessary for the best possible diagnosis.   Clinical History:   C90.00,D64.9   Right side In the front,Iliac Crest      ASSESSMENT & PLAN:  1.  IgA kappa multiple myeloma.  Please refer to the details of initial presentation and management as outlined above. In summary, patient with finding of mild macrocytic  anemia with further work-up revealing 0.7g of IgA kappa monoclonal protein on SPEP in Feb 2022.  Clinically she was essentially asymptomatic and overall functional for her age.  She was initially followed with close observation.  Subsequent routine follow-up  blood work from Oct 2022 showed 1gm of IgA kappa monoclonal protein and markedly elevated kappa free light chain assay.  Creatinine was stable at 1.3 but serum calcium was elevated at 13.1.  PET scan results were unremarkable but a bone marrow biopsy  confirmed plasma cell myeloma.  Cytogenetics and FISH testing was unremarkable. After discussions with the patient and family members she is currently being treated with single agent Velcade with good response.  She is also maintained on intermittent Xgeva for hypercalcemia.     Overall she is tolerating Velcade.  But after some initial good response lately her myeloma numbers are increasing with increase in calcium and creatinine levels also.  Long discussion with the patient and her son and they were explained about the concern regarding rising myeloma markers and calcium level.  Seems  like she is having gradual disease progression on single agent Velcade.  I will change the regimen to DVD with addition of subcutaneous Darzalex and steroids.   Probable side effects of neuropathy, thrombocytopenia, weakness, fatigue, rash etc. were explained to her in detail.  Overall prognosis is guarded.     2.  Hypercalcemia.  Secondary to baseline myeloma.  PET scan as such was negative for bony lytic lesions.  She will continue with the intermittent Xgeva as needed.  She had received Xgeva last week and as such we will support her with extra hydration today in the clinic.     3. Follow up: Follow-up with me in about 4 weeks and will come to the clinic for for continuation of chemotherapy with Darzalex and daratumumab combination as detailed above.  Advised to contact us immediately if there are any new questions or problems.     This note has been transcribed using Dragon voice recognition system and there is a possibility of unintentional typing misprints.

## 2024-07-06 ENCOUNTER — APPOINTMENT (OUTPATIENT)
Dept: CARDIOLOGY | Facility: HOSPITAL | Age: 89
DRG: 372 | End: 2024-07-06
Payer: MEDICARE

## 2024-07-06 ENCOUNTER — APPOINTMENT (OUTPATIENT)
Dept: RADIOLOGY | Facility: HOSPITAL | Age: 89
DRG: 372 | End: 2024-07-06
Payer: MEDICARE

## 2024-07-06 ENCOUNTER — HOSPITAL ENCOUNTER (INPATIENT)
Facility: HOSPITAL | Age: 89
DRG: 372 | End: 2024-07-06
Attending: EMERGENCY MEDICINE | Admitting: STUDENT IN AN ORGANIZED HEALTH CARE EDUCATION/TRAINING PROGRAM
Payer: MEDICARE

## 2024-07-06 DIAGNOSIS — R41.0 CONFUSION: ICD-10-CM

## 2024-07-06 DIAGNOSIS — A41.9 SEPSIS, DUE TO UNSPECIFIED ORGANISM, UNSPECIFIED WHETHER ACUTE ORGAN DYSFUNCTION PRESENT (MULTI): ICD-10-CM

## 2024-07-06 DIAGNOSIS — A04.72 CLOSTRIDIUM DIFFICILE COLITIS: ICD-10-CM

## 2024-07-06 DIAGNOSIS — N18.2 BENIGN HYPERTENSION WITH CKD (CHRONIC KIDNEY DISEASE), STAGE II: ICD-10-CM

## 2024-07-06 DIAGNOSIS — R33.9 URINARY RETENTION: ICD-10-CM

## 2024-07-06 DIAGNOSIS — I12.9 BENIGN HYPERTENSION WITH CKD (CHRONIC KIDNEY DISEASE), STAGE II: ICD-10-CM

## 2024-07-06 DIAGNOSIS — N39.0 URINARY TRACT INFECTION WITHOUT HEMATURIA, SITE UNSPECIFIED: ICD-10-CM

## 2024-07-06 DIAGNOSIS — E83.52 HYPERCALCEMIA: Primary | ICD-10-CM

## 2024-07-06 LAB
ACANTHOCYTES BLD QL SMEAR: ABNORMAL
ALBUMIN SERPL BCP-MCNC: 3.3 G/DL (ref 3.4–5)
ALP SERPL-CCNC: 72 U/L (ref 33–136)
ALT SERPL W P-5'-P-CCNC: 10 U/L (ref 7–45)
ANION GAP SERPL CALC-SCNC: 12 MMOL/L (ref 10–20)
APPEARANCE UR: ABNORMAL
AST SERPL W P-5'-P-CCNC: 14 U/L (ref 9–39)
BACTERIA #/AREA URNS AUTO: ABNORMAL /HPF
BASOPHILS # BLD MANUAL: 0 X10*3/UL (ref 0–0.1)
BASOPHILS NFR BLD MANUAL: 0 %
BILIRUB SERPL-MCNC: 0.4 MG/DL (ref 0–1.2)
BILIRUB UR STRIP.AUTO-MCNC: NEGATIVE MG/DL
BUN SERPL-MCNC: 32 MG/DL (ref 6–23)
CA-I BLD-SCNC: 1.64 MMOL/L (ref 1.1–1.33)
CA-I BLD-SCNC: 1.65 MMOL/L (ref 1.1–1.33)
CALCIUM SERPL-MCNC: 12.1 MG/DL (ref 8.6–10.3)
CARDIAC TROPONIN I PNL SERPL HS: 12 NG/L (ref 0–13)
CHLORIDE SERPL-SCNC: 98 MMOL/L (ref 98–107)
CO2 SERPL-SCNC: 26 MMOL/L (ref 21–32)
COLOR UR: YELLOW
CREAT SERPL-MCNC: 1.49 MG/DL (ref 0.5–1.05)
EGFRCR SERPLBLD CKD-EPI 2021: 33 ML/MIN/1.73M*2
EOSINOPHIL # BLD MANUAL: 0 X10*3/UL (ref 0–0.4)
EOSINOPHIL NFR BLD MANUAL: 0 %
ERYTHROCYTE [DISTWIDTH] IN BLOOD BY AUTOMATED COUNT: 16.3 % (ref 11.5–14.5)
FLUAV RNA RESP QL NAA+PROBE: NOT DETECTED
FLUBV RNA RESP QL NAA+PROBE: NOT DETECTED
GLUCOSE SERPL-MCNC: 127 MG/DL (ref 74–99)
GLUCOSE UR STRIP.AUTO-MCNC: NEGATIVE MG/DL
HCT VFR BLD AUTO: 24.9 % (ref 36–46)
HGB BLD-MCNC: 8.6 G/DL (ref 12–16)
HYALINE CASTS #/AREA URNS AUTO: ABNORMAL /LPF
HYPOCHROMIA BLD QL SMEAR: ABNORMAL
IMM GRANULOCYTES # BLD AUTO: 0.22 X10*3/UL (ref 0–0.5)
IMM GRANULOCYTES NFR BLD AUTO: 0.9 % (ref 0–0.9)
KETONES UR STRIP.AUTO-MCNC: NEGATIVE MG/DL
LACTATE SERPL-SCNC: 1 MMOL/L (ref 0.4–2)
LEUKOCYTE ESTERASE UR QL STRIP.AUTO: ABNORMAL
LIPASE SERPL-CCNC: <3 U/L (ref 9–82)
LYMPHOCYTES # BLD MANUAL: 0.95 X10*3/UL (ref 0.8–3)
LYMPHOCYTES NFR BLD MANUAL: 4 %
MAGNESIUM SERPL-MCNC: 1.51 MG/DL (ref 1.6–2.4)
MCH RBC QN AUTO: 31 PG (ref 26–34)
MCHC RBC AUTO-ENTMCNC: 34.5 G/DL (ref 32–36)
MCV RBC AUTO: 90 FL (ref 80–100)
MONOCYTES # BLD MANUAL: 1.67 X10*3/UL (ref 0.05–0.8)
MONOCYTES NFR BLD MANUAL: 7 %
NEUTROPHILS # BLD MANUAL: 21.19 X10*3/UL (ref 1.6–5.5)
NEUTS BAND # BLD MANUAL: 12.38 X10*3/UL (ref 0–0.5)
NEUTS BAND NFR BLD MANUAL: 52 %
NEUTS SEG # BLD MANUAL: 8.81 X10*3/UL (ref 1.6–5)
NEUTS SEG NFR BLD MANUAL: 37 %
NITRITE UR QL STRIP.AUTO: NEGATIVE
NRBC BLD-RTO: 0 /100 WBCS (ref 0–0)
OVALOCYTES BLD QL SMEAR: ABNORMAL
PH UR STRIP.AUTO: 6 [PH]
PLATELET # BLD AUTO: 358 X10*3/UL (ref 150–450)
POLYCHROMASIA BLD QL SMEAR: ABNORMAL
POTASSIUM SERPL-SCNC: 4 MMOL/L (ref 3.5–5.3)
PROT SERPL-MCNC: 6.6 G/DL (ref 6.4–8.2)
PROT UR STRIP.AUTO-MCNC: ABNORMAL MG/DL
RBC # BLD AUTO: 2.77 X10*6/UL (ref 4–5.2)
RBC # UR STRIP.AUTO: ABNORMAL /UL
RBC #/AREA URNS AUTO: ABNORMAL /HPF
RBC MORPH BLD: ABNORMAL
SARS-COV-2 RNA RESP QL NAA+PROBE: NOT DETECTED
SODIUM SERPL-SCNC: 132 MMOL/L (ref 136–145)
SP GR UR STRIP.AUTO: 1.02
SQUAMOUS #/AREA URNS AUTO: ABNORMAL /HPF
TOTAL CELLS COUNTED BLD: 100
TSH SERPL-ACNC: 2.39 MIU/L (ref 0.44–3.98)
UROBILINOGEN UR STRIP.AUTO-MCNC: ABNORMAL MG/DL
WBC # BLD AUTO: 23.8 X10*3/UL (ref 4.4–11.3)
WBC #/AREA URNS AUTO: ABNORMAL /HPF

## 2024-07-06 PROCEDURE — 82330 ASSAY OF CALCIUM: CPT | Performed by: NURSE PRACTITIONER

## 2024-07-06 PROCEDURE — 70450 CT HEAD/BRAIN W/O DYE: CPT

## 2024-07-06 PROCEDURE — 96361 HYDRATE IV INFUSION ADD-ON: CPT

## 2024-07-06 PROCEDURE — 99222 1ST HOSP IP/OBS MODERATE 55: CPT

## 2024-07-06 PROCEDURE — 81003 URINALYSIS AUTO W/O SCOPE: CPT | Performed by: NURSE PRACTITIONER

## 2024-07-06 PROCEDURE — 71046 X-RAY EXAM CHEST 2 VIEWS: CPT

## 2024-07-06 PROCEDURE — 85027 COMPLETE CBC AUTOMATED: CPT | Performed by: NURSE PRACTITIONER

## 2024-07-06 PROCEDURE — 2500000004 HC RX 250 GENERAL PHARMACY W/ HCPCS (ALT 636 FOR OP/ED)

## 2024-07-06 PROCEDURE — 87040 BLOOD CULTURE FOR BACTERIA: CPT | Mod: PARLAB

## 2024-07-06 PROCEDURE — 83735 ASSAY OF MAGNESIUM: CPT | Performed by: NURSE PRACTITIONER

## 2024-07-06 PROCEDURE — 87636 SARSCOV2 & INF A&B AMP PRB: CPT | Performed by: NURSE PRACTITIONER

## 2024-07-06 PROCEDURE — 36415 COLL VENOUS BLD VENIPUNCTURE: CPT

## 2024-07-06 PROCEDURE — 36415 COLL VENOUS BLD VENIPUNCTURE: CPT | Performed by: NURSE PRACTITIONER

## 2024-07-06 PROCEDURE — 70450 CT HEAD/BRAIN W/O DYE: CPT | Performed by: RADIOLOGY

## 2024-07-06 PROCEDURE — 84443 ASSAY THYROID STIM HORMONE: CPT

## 2024-07-06 PROCEDURE — 85007 BL SMEAR W/DIFF WBC COUNT: CPT | Performed by: NURSE PRACTITIONER

## 2024-07-06 PROCEDURE — 83690 ASSAY OF LIPASE: CPT

## 2024-07-06 PROCEDURE — 93010 ELECTROCARDIOGRAM REPORT: CPT | Performed by: INTERNAL MEDICINE

## 2024-07-06 PROCEDURE — 84484 ASSAY OF TROPONIN QUANT: CPT | Performed by: NURSE PRACTITIONER

## 2024-07-06 PROCEDURE — 96365 THER/PROPH/DIAG IV INF INIT: CPT

## 2024-07-06 PROCEDURE — 2500000001 HC RX 250 WO HCPCS SELF ADMINISTERED DRUGS (ALT 637 FOR MEDICARE OP)

## 2024-07-06 PROCEDURE — 99285 EMERGENCY DEPT VISIT HI MDM: CPT | Mod: 25

## 2024-07-06 PROCEDURE — 83605 ASSAY OF LACTIC ACID: CPT | Performed by: NURSE PRACTITIONER

## 2024-07-06 PROCEDURE — 1200000002 HC GENERAL ROOM WITH TELEMETRY DAILY

## 2024-07-06 PROCEDURE — 80053 COMPREHEN METABOLIC PANEL: CPT | Performed by: NURSE PRACTITIONER

## 2024-07-06 PROCEDURE — 93005 ELECTROCARDIOGRAM TRACING: CPT

## 2024-07-06 PROCEDURE — 71046 X-RAY EXAM CHEST 2 VIEWS: CPT | Performed by: RADIOLOGY

## 2024-07-06 RX ORDER — MAGNESIUM SULFATE HEPTAHYDRATE 40 MG/ML
2 INJECTION, SOLUTION INTRAVENOUS ONCE
Status: COMPLETED | OUTPATIENT
Start: 2024-07-06 | End: 2024-07-06

## 2024-07-06 RX ORDER — AMLODIPINE BESYLATE 5 MG/1
5 TABLET ORAL EVERY MORNING
Status: DISCONTINUED | OUTPATIENT
Start: 2024-07-07 | End: 2024-07-07

## 2024-07-06 RX ORDER — ACETAMINOPHEN 325 MG/1
975 TABLET ORAL ONCE
Status: COMPLETED | OUTPATIENT
Start: 2024-07-06 | End: 2024-07-06

## 2024-07-06 RX ORDER — ENOXAPARIN SODIUM 100 MG/ML
30 INJECTION SUBCUTANEOUS EVERY 24 HOURS
Status: DISCONTINUED | OUTPATIENT
Start: 2024-07-06 | End: 2024-07-16 | Stop reason: HOSPADM

## 2024-07-06 RX ORDER — ACETAMINOPHEN 160 MG/5ML
650 SOLUTION ORAL EVERY 4 HOURS PRN
Status: DISCONTINUED | OUTPATIENT
Start: 2024-07-06 | End: 2024-07-08

## 2024-07-06 RX ORDER — GABAPENTIN 300 MG/1
300 CAPSULE ORAL NIGHTLY
Status: DISCONTINUED | OUTPATIENT
Start: 2024-07-06 | End: 2024-07-16 | Stop reason: HOSPADM

## 2024-07-06 RX ORDER — VANCOMYCIN HYDROCHLORIDE 1 G/200ML
1000 INJECTION, SOLUTION INTRAVENOUS ONCE
Status: COMPLETED | OUTPATIENT
Start: 2024-07-06 | End: 2024-07-06

## 2024-07-06 RX ORDER — LOSARTAN POTASSIUM 50 MG/1
100 TABLET ORAL DAILY
Status: DISCONTINUED | OUTPATIENT
Start: 2024-07-06 | End: 2024-07-16 | Stop reason: HOSPADM

## 2024-07-06 RX ORDER — LORATADINE 10 MG/1
10 TABLET ORAL DAILY
Status: DISCONTINUED | OUTPATIENT
Start: 2024-07-06 | End: 2024-07-16 | Stop reason: HOSPADM

## 2024-07-06 RX ORDER — ACETAMINOPHEN 325 MG/1
650 TABLET ORAL EVERY 4 HOURS PRN
Status: DISCONTINUED | OUTPATIENT
Start: 2024-07-06 | End: 2024-07-08

## 2024-07-06 RX ORDER — ACETAMINOPHEN 650 MG/1
650 SUPPOSITORY RECTAL EVERY 4 HOURS PRN
Status: DISCONTINUED | OUTPATIENT
Start: 2024-07-06 | End: 2024-07-08

## 2024-07-06 RX ORDER — ONDANSETRON HYDROCHLORIDE 2 MG/ML
4 INJECTION, SOLUTION INTRAVENOUS EVERY 8 HOURS PRN
Status: DISCONTINUED | OUTPATIENT
Start: 2024-07-06 | End: 2024-07-16 | Stop reason: HOSPADM

## 2024-07-06 RX ORDER — LEVOTHYROXINE SODIUM 100 UG/1
100 TABLET ORAL
Status: DISCONTINUED | OUTPATIENT
Start: 2024-07-07 | End: 2024-07-16 | Stop reason: HOSPADM

## 2024-07-06 RX ORDER — VANCOMYCIN HYDROCHLORIDE 1 G/20ML
INJECTION, POWDER, LYOPHILIZED, FOR SOLUTION INTRAVENOUS DAILY PRN
Status: DISCONTINUED | OUTPATIENT
Start: 2024-07-06 | End: 2024-07-07

## 2024-07-06 RX ORDER — ONDANSETRON 4 MG/1
4 TABLET, FILM COATED ORAL EVERY 8 HOURS PRN
Status: DISCONTINUED | OUTPATIENT
Start: 2024-07-06 | End: 2024-07-16 | Stop reason: HOSPADM

## 2024-07-06 RX ORDER — SODIUM CHLORIDE 9 MG/ML
100 INJECTION, SOLUTION INTRAVENOUS CONTINUOUS
Status: ACTIVE | OUTPATIENT
Start: 2024-07-06 | End: 2024-07-07

## 2024-07-06 RX ADMIN — SODIUM CHLORIDE, POTASSIUM CHLORIDE, SODIUM LACTATE AND CALCIUM CHLORIDE 1000 ML: 600; 310; 30; 20 INJECTION, SOLUTION INTRAVENOUS at 16:39

## 2024-07-06 RX ADMIN — VANCOMYCIN HYDROCHLORIDE 1000 MG: 1 INJECTION, SOLUTION INTRAVENOUS at 17:15

## 2024-07-06 RX ADMIN — MAGNESIUM SULFATE HEPTAHYDRATE 2 G: 40 INJECTION, SOLUTION INTRAVENOUS at 21:09

## 2024-07-06 RX ADMIN — SODIUM CHLORIDE 100 ML/HR: 9 INJECTION, SOLUTION INTRAVENOUS at 21:01

## 2024-07-06 RX ADMIN — GABAPENTIN 300 MG: 300 CAPSULE ORAL at 21:15

## 2024-07-06 RX ADMIN — ACETAMINOPHEN 975 MG: 325 TABLET ORAL at 17:14

## 2024-07-06 RX ADMIN — LOSARTAN POTASSIUM 100 MG: 50 TABLET, FILM COATED ORAL at 21:15

## 2024-07-06 RX ADMIN — PIPERACILLIN SODIUM AND TAZOBACTAM SODIUM 2.25 G: 2; .25 INJECTION, SOLUTION INTRAVENOUS at 23:16

## 2024-07-06 RX ADMIN — LORATADINE 10 MG: 10 TABLET ORAL at 21:15

## 2024-07-06 RX ADMIN — ENOXAPARIN SODIUM 30 MG: 30 INJECTION SUBCUTANEOUS at 21:15

## 2024-07-06 ASSESSMENT — PAIN SCALES - GENERAL: PAINLEVEL_OUTOF10: 0 - NO PAIN

## 2024-07-06 ASSESSMENT — LIFESTYLE VARIABLES
TOTAL SCORE: 0
HAVE YOU EVER FELT YOU SHOULD CUT DOWN ON YOUR DRINKING: NO
EVER FELT BAD OR GUILTY ABOUT YOUR DRINKING: NO
HAVE PEOPLE ANNOYED YOU BY CRITICIZING YOUR DRINKING: NO
EVER HAD A DRINK FIRST THING IN THE MORNING TO STEADY YOUR NERVES TO GET RID OF A HANGOVER: NO

## 2024-07-06 ASSESSMENT — PAIN - FUNCTIONAL ASSESSMENT: PAIN_FUNCTIONAL_ASSESSMENT: 0-10

## 2024-07-06 NOTE — ED PROVIDER NOTES
CC: weakness, diarrhea, dizziness 5-6 days. cancer patient.     HPI:  This is a 89-year-old female with a past medical history of multiple myeloma currently on chemotherapy treatments with last treatment yesterday who presents to the emergency department with weakness, dizziness, and confusion.  Patient's daughter is at bedside and provides additional history.  States that she received her chemotherapy treatment yesterday and had labs drawn at that time which showed hypercalcemia.  She received a small bag of IV fluids and was discharged back home.  States that today she the patient has had increasing difficulty finding her words.  She has been complaining of extreme weakness and discomfort most notably in her bilateral lower extremities.  The patient also complains of hallucinations which she states she sees individuals wearing checkered flags.  Denies any fevers or recent illnesses from home.  Has been eating and drinking with no nausea or vomiting.  She denies any chest pain.  She denies any shortness of breath.  She denies any abdominal pain.    Limitations to history: None  Independent historian(s): Patient's daughter at bedside  Records Reviewed: Recent available ED and inpatient notes reviewed in EMR.    PMHx/PSHx:  Per HPI.   - has a past medical history of Abdominal bloating (01/04/2024), Acute bacterial bronchitis (01/04/2024), Acute bacterial sinusitis (01/04/2024), Acute kidney failure, unspecified (CMS-HCC) (11/03/2023), Acute kidney injury (CMS-[facility]) (10/14/2022), Acute urinary tract infection (05/09/2024), Allergic rhinitis (01/04/2024), Bronchitis (01/24/2024), Carrier of methicillin susceptible Staphylococcus aureus (MSSA) (01/04/2024), Chronic cough (01/04/2024), Chronic kidney disease, stage 3b (Multi) (02/25/2022), Congestion of respiratory tract (01/04/2024), Contact with and (suspected) exposure to covid-19 (10/31/2022), Contusion of knee (01/04/2024), Deep vein thrombosis (DVT) (Multi)  (01/04/2024), Do not resuscitate (10/31/2022), Enterocolitis due to Clostridium difficile, not specified as recurrent (11/03/2023), Essential (primary) hypertension (12/14/2022), Facial laceration (01/04/2024), Herpes zoster (01/04/2024), History of colonoscopy (01/04/2024), Hypertension (11/07/2011), Hyponatremia (10/31/2022), Iatrogenic thyrotoxicosis (10/31/2022), Infection and inflammatory reaction due to internal fixation device of unspecified site, subsequent encounter (11/03/2023), Lateral epicondylitis of left elbow (01/04/2024), Menopausal problem (01/04/2024), Multiple myeloma (Multi), Neck pain (07/28/2011), Nontraumatic tear of left rotator cuff (01/04/2024), Obstructive and reflux uropathy, unspecified (11/08/2023), Osteoarthritis of right shoulder (02/08/2012), Osteomyelitis (Multi) (10/06/2023), Other specified postprocedural states, Pain in both wrists (01/04/2024), Paronychia of finger of right hand (10/06/2023), Personal history of other medical treatment, Personal history of other medical treatment, Personal history of other medical treatment, Pneumonia (10/27/2022), Procedure and treatment not carried out because of patient's decision for unspecified reasons, Subacute cough (02/02/2024), Tendinitis of right elbow (01/04/2024), and Vitamin D deficiency, unspecified.  - has a past surgical history that includes Other surgical history (04/28/2022); Total knee arthroplasty (01/22/2015); Cholecystectomy (01/22/2015); Tonsillectomy (01/22/2015); Rotator cuff repair (01/22/2015); Hysterectomy (01/22/2015); and Breast biopsy (01/22/2015).    Medications:  Reviewed in EMR. See EMR for complete list of medications and doses.    Allergies:  Morphine, Pollen extracts, Aleve [naproxen sodium], Codeine, and Erythromycin    Social History:  - Tobacco:  reports that she has never smoked. She has never been exposed to tobacco smoke. She has never used smokeless tobacco.   - Alcohol:  reports current alcohol use  of about 1.0 standard drink of alcohol per week.   - Illicit Drugs:  reports no history of drug use.     ROS:  Per HPI.       ???????????????????????????????????????????????????????????????  Triage Vitals:  T 37.4 °C (99.3 °F)  HR 75  /72  RR 18  O2 97 %      Physical Exam    General: Patient resting comfortably in bed, no acute distress, breathing easily, appropriately conversational without confusion or gross mental status changes.  Head: Normocephalic. Atraumatic.  Neck: No meningismus.  No midline cervical spine tenderness with palpation.  FROM. No gross masses.   Eyes: EOMI. No scleral icterus or injection.  ENT: Dry mucous membranes, no apparent trauma or lesions.  CV: Regular rhythm. No murmurs, rubs, gallops appreciated. 2+ radial and DP pulses bilaterally.  Resp: Clear to auscultation bilaterally. No respiratory distress.   GI: Soft, non-distended.  No tenderness with palpation.    EXT: No peripheral edema, contusions, or wounds.  Skin: Warm and dry, no rashes or lesions.  Neuro: Alert and oriented.  Cranial nerves II-XII grossly intact.  No focal neurological deficits.  Equal motor strength in bilateral upper and lower extremities.  Sensation intact throughout.  Speech fluent.  Psych: Appropriate mood and behavior, converses and responds appropriately.    ???????????????????????????????????????????????????????????????  Labs:   Labs Reviewed   CBC WITH AUTO DIFFERENTIAL - Abnormal       Result Value    WBC 23.8 (*)     nRBC 0.0      RBC 2.77 (*)     Hemoglobin 8.6 (*)     Hematocrit 24.9 (*)     MCV 90      MCH 31.0      MCHC 34.5      RDW 16.3 (*)     Platelets 358      Immature Granulocytes %, Automated 0.9      Immature Granulocytes Absolute, Automated 0.22      Narrative:     The previously reported component Neutrophils % is no longer being reported.  The previously reported component Lymphocytes % is no longer being reported.  The previously reported component Monocytes % is no longer being  reported.  The previously   reported component Eosinophils % is no longer being reported.  The previously reported component Basophils % is no longer being reported.  The previously reported component Absolute Neutrophils is no longer being reported.  The previously reported   component Absolute Lymphocytes is no longer being reported.  The previously reported component Absolute Monocytes is no longer being reported.  The previously reported component Absolute Eosinophils is no longer being reported.  The previously reported   component Absolute Basophils is no longer being reported.   MAGNESIUM - Abnormal    Magnesium 1.51 (*)    COMPREHENSIVE METABOLIC PANEL - Abnormal    Glucose 127 (*)     Sodium 132 (*)     Potassium 4.0      Chloride 98      Bicarbonate 26      Anion Gap 12      Urea Nitrogen 32 (*)     Creatinine 1.49 (*)     eGFR 33 (*)     Calcium 12.1 (*)     Albumin 3.3 (*)     Alkaline Phosphatase 72      Total Protein 6.6      AST 14      Bilirubin, Total 0.4      ALT 10     URINALYSIS WITH REFLEX CULTURE AND MICROSCOPIC - Abnormal    Color, Urine Yellow      Appearance, Urine Cloudy (*)     Specific Gravity, Urine 1.025      pH, Urine 6.0      Protein, Urine 100 (2+) (*)     Glucose, Urine NEGATIVE      Blood, Urine 0.2 (2+) (*)     Ketones, Urine NEGATIVE      Bilirubin, Urine NEGATIVE      Urobilinogen, Urine NORM      Nitrite, Urine NEGATIVE      Leukocyte Esterase, Urine 500 Yashira/µL (*)    CALCIUM, IONIZED - Abnormal    POCT Calcium, Ionized 1.65 (*)    CALCIUM, IONIZED - Abnormal    POCT Calcium, Ionized 1.64 (*)    MICROSCOPIC ONLY, URINE - Abnormal    WBC, Urine 11-20 (*)     RBC, Urine 6-10 (*)     Squamous Epithelial Cells, Urine 1-9 (SPARSE)      Bacteria, Urine 3+ (*)     Hyaline Casts, Urine 1+ (*)    MANUAL DIFFERENTIAL - Abnormal    Neutrophils %, Manual 37.0      Bands %, Manual 52.0      Lymphocytes %, Manual 4.0      Monocytes %, Manual 7.0      Eosinophils %, Manual 0.0      Basophils  %, Manual 0.0      Seg Neutrophils Absolute, Manual 8.81 (*)     Bands Absolute, Manual 12.38 (*)     Lymphocytes Absolute, Manual 0.95      Monocytes Absolute, Manual 1.67 (*)     Eosinophils Absolute, Manual 0.00      Basophils Absolute, Manual 0.00      Total Cells Counted 100      Neutrophils Absolute, Manual 21.19 (*)     RBC Morphology See Below      Polychromasia Mild      Hypochromia Mild      Ovalocytes Few      Acanthocytes Few     TROPONIN I, HIGH SENSITIVITY - Normal    Troponin I, High Sensitivity 12      Narrative:     Less than 99th percentile of normal range cutoff-  Female and children under 18 years old <14 ng/L; Male <21 ng/L: Negative  Repeat testing should be performed if clinically indicated.     Female and children under 18 years old 14-50 ng/L; Male 21-50 ng/L:  Consistent with possible cardiac damage and possible increased clinical   risk. Serial measurements may help to assess extent of myocardial damage.     >50 ng/L: Consistent with cardiac damage, increased clinical risk and  myocardial infarction. Serial measurements may help assess extent of   myocardial damage.      NOTE: Children less than 1 year old may have higher baseline troponin   levels and results should be interpreted in conjunction with the overall   clinical context.     NOTE: Troponin I testing is performed using a different   testing methodology at [facility] than at other   [facility]. Direct result comparisons should only   be made within the same method.   INFLUENZA A AND B PCR - Normal    Flu A Result Not Detected      Flu B Result Not Detected      Narrative:     This assay is an in vitro diagnostic multiplex nucleic acid amplification test for the detection and discrimination of Influenza A & B from nasopharyngeal specimens, and has been validated for use at [facility]. Negative results do not preclude Influenza A/B infections, and should not be used as the sole basis for  diagnosis, treatment, or other management decisions. If Influenza A/B and RSV PCR results are negative, testing for Parainfluenza virus, Adenovirus and Metapneumovirus is routinely performed for [facility] and intensive care inpatients, and is available on other patients by placing an add-on request.   SARS-COV-2 PCR - Normal    Coronavirus 2019, PCR Not Detected      Narrative:     This assay has received FDA Emergency Use Authorization (EUA) and is only authorized for the duration of time that circumstances exist to justify the authorization of the emergency use of in vitro diagnostic tests for the detection of SARS-CoV-2 virus and/or diagnosis of COVID-19 infection under section 564(b)(1) of the Act, 21 U.S.C. 360bbb-3(b)(1). This assay is an in vitro diagnostic nucleic acid amplification test for the qualitative detection of SARS-CoV-2 from nasopharyngeal specimens and has been validated for use at [facility]. Negative results do not preclude COVID-19 infections and should not be used as the sole basis for diagnosis, treatment, or other management decisions.     LACTATE - Normal    Lactate 1.0      Narrative:     Venipuncture immediately after or during the administration of Metamizole may lead to falsely low results. Testing should be performed immediately  prior to Metamizole dosing.   BLOOD CULTURE   BLOOD CULTURE   URINALYSIS WITH REFLEX CULTURE AND MICROSCOPIC    Narrative:     The following orders were created for panel order Urinalysis with Reflex Culture and Microscopic.  Procedure                               Abnormality         Status                     ---------                               -----------         ------                     Urinalysis with Reflex C...[[id number]]  Abnormal            Final result               Extra Urine Gray Tube[[id number]]                                                         Please view results for these tests on the individual  orders.   EXTRA URINE GRAY TUBE   LIPASE        Imaging:   CT head wo IV contrast   Final Result   No evidence of acute cortical infarct or intracranial hemorrhage.        Chronic changes as described.        MACRO:   None        Signed by: Zahraa Light 2024 2:47 PM   Dictation workstation:   [id number]      XR chest 2 views   Final Result   Small left pleural effusion with associated atelectasis and/or   pneumonitis.        MACRO:   None        Signed by: Heriberto Colorado 2024 12:45 PM   Dictation workstation:   [id number]           EK: 14: Rate of 75 bpm, regular rhythm, leftward axis, NC interval of 194, QTc interval 446, no evidence of ST segment elevations or depressions, no pattern T wave abnormalities.  Overall similar findings to EKG obtained 2023.    MDM:  This is a 89-year-old female who presents to the emergency department with weakness, dizziness, confusion.  She is hemodynamically stable and in no distress upon arrival.  Initial vital signs showing blood pressure of 170/72 and other vitals are normal.  Patient did develop a low-grade fever while here in the emergency department 100.6 °F.  Patient's physical exam is largely unremarkable.  She does appear tired and fatigued.  No focalizing signs on exam.  Differential considered includes hypocalcemia and sepsis, urinary tract infection, pneumonia, meningitis given her immunocompromise state.  Workup initiated with labs and imaging.  There is a leukocytosis with a white count of 23.8 with a left shift neutrophils of 21.19.  Hemoglobin is 8.6 on CBC which appears relatively consistent with the patient's baseline.  Metabolic panel shows a creatinine of 1.49 which is consistent with the patient's prior labs, calcium of 12.1, other electrolytes are unremarkable.  An ionized calcium was obtained which was increased to 1.65.  COVID and flu swabs are negative.  Urinalysis consistent with urinary tract infection.  CT head was negative and  chest x-ray negative as well.  Patient is given IV fluids as well as empirically treated with antibiotics.  At this time she is appropriate for admission to the hospital.  Discussed with Dr. Loyola who accepted the patient for admission.  She remained hemodynamically stable and is transferred to regular nursing floor.    Patient seen by and discussed with the attending emergency medicine physician.     ED Course:  Diagnoses as of 07/06/24 1837   Hypercalcemia   Sepsis, due to unspecified organism, unspecified whether acute organ dysfunction present (Multi)   Confusion   Urinary tract infection without hematuria, site unspecified       Social Determinants Limiting Care:  None identified    Disposition:  Admit-medicine    Kemar Barreto DO   Emergency Medicine PGY-3  [facility]      Procedures ? SmartLinks last updated 7/6/2024 6:22 PM        Kemar Barreto DO  Resident  07/06/24 1021

## 2024-07-06 NOTE — PROGRESS NOTES
Pharmacy Medication History Review    Bindu Cali is a 89 y.o. female admitted for Hypercalcemia. Pharmacy reviewed the patient's [id number] medications and allergies for accuracy.    The list below reflectives the updated PTA list. Please review each medication in order reconciliation for additional clarification and justification.  Prior to Admission medications    Medication Sig Start Date End Date Taking? Authorizing Provider   acetaminophen (Tylenol 8 HOUR) 650 mg ER tablet Take 1 tablet (650 mg) by mouth if needed for mild pain (1 - 3). Do not crush, chew, or split. Take 1 hour prior to darzalex treatments  Do not fill before July 10, 2024. 7/10/24 9/4/24 Yes Historical Provider, MD   amLODIPine (Norvasc) 5 mg tablet Take 1 tablet (5 mg) by mouth once daily.  Patient taking differently: Take 1 tablet (5 mg) by mouth once daily in the morning. 12/8/23  Yes Radha Garcia MD   biotin 5 mg capsule Take 1 capsule (5 mg) by mouth once daily.   Yes Historical Provider, MD   cholecalciferol (Vitamin D-3) 10 MCG (400 UNIT) tablet Take 1 tablet (400 Units) by mouth once daily. 11/2/23  Yes Lakia Simpson DO   gabapentin (Neurontin) 100 mg capsule Take 3 capsules (300 mg) by mouth once daily at bedtime. 6/19/24  Yes Radha Garcia MD   loratadine (Claritin) 10 mg tablet Take 1 tablet (10 mg) by mouth once daily. 1/22/21  Yes Historical Provider, MD   losartan (Cozaar) 100 mg tablet TAKE 1 TABLET BY MOUTH ONCE  DAILY 6/26/24  Yes aRdha Garcia MD   multivit-min-iron-FA-lutein (Centrum Silver Women) 8 mg iron-400 mcg-300 mcg tablet Take 1 tablet by mouth once daily.   Yes Historical Provider, MD   Synthroid 100 mcg tablet TAKE 1 TABLET EVERY MORNING BEFORE MEALS FOR HYPOTHYROIDISM  Patient taking differently: Take 1 tablet (100 mcg) by mouth once daily in the morning. Take before meals. 5/13/24  Yes Radha Garcia MD   budesonide-formoteroL (Symbicort) 80-4.5 mcg/actuation inhaler  Inhale 2 puffs 2 times a day. Rinse mouth with water after use to reduce aftertaste and incidence of candidiasis. Do not swallow.  Patient not taking: Reported on 7/6/2024 6/5/24 6/5/25 no Radha Garcia MD   dexAMETHasone (Decadron) 4 mg tablet Take 5 tablets (20 mg) by mouth if needed (take 1 hour prior to darzalex treatments). 7/5/24  yes Leopoldo Gray MD   diphenhydrAMINE (BENADryl) 50 mg tablet Take 1 tablet (50 mg) by mouth if needed (1 hour prior to Darzalex treatments).   yes Historical Provider, MD   lactase (Lactaid) 3,000 unit tablet Take 1 tablet (3,000 Units) by mouth if needed. 3/31/17  yes Historical Provider, MD   montelukast (Singulair) 10 mg tablet Take 1 tablet (10 mg) by mouth if needed (take 1 hour prior to darzalex treatments). 7/5/24  yes Leopoldo Gray MD   omeprazole (PriLOSEC) 40 mg DR capsule Take 1 capsule (40 mg) by mouth once daily.  Patient not taking: Reported on 7/6/2024 12/8/23  no Radha Garcia MD   potassium chloride CR 10 mEq ER tablet Take 1 tablet (10 mEq) by mouth once daily. Do not crush, chew, or split.   no Historical Provider, MD   acetaminophen (Tylenol) 500 mg tablet Take 2 tablets (1,000 mg) by mouth every 8 hours if needed. 6/29/15 7/5/24 no Historical Provider, MD   dexAMETHasone (Decadron) 4 mg tablet Take 5 tablets (20 mg) by mouth if needed (take 1 hour prior to darzalex treatments).  7/5/24 no Historical Provider, MD   furosemide (Lasix) 40 mg tablet Take 1 tablet (40 mg) by mouth once daily.  7/5/24 no Historical Provider, MD   montelukast (Singulair) 10 mg tablet Take 1 tablet (10 mg) by mouth if needed (take 1 hour prior to darzalex treatments).  7/5/24 no Historical Provider, MD       The list below reflectives the updated allergy list. Please review each documented allergy for additional clarification and justification.  Allergies  Reviewed by Gabby Jacobsen, EMT on 7/6/2024        Severity Reactions Comments    Morphine Medium Nausea/vomiting      Pollen Extracts Medium Itching, Other, Runny nose Runny nose, sneezing, PND affecting throat and voice. Used to take allergy shots, now controlled with Claritin daily.    Aleve [naproxen Sodium] Low Palpitations     Codeine Low Dizziness, Confusion     Erythromycin Low Nausea/vomiting             Below are additional concerns with the patient's PTA list.        Araceli Back

## 2024-07-06 NOTE — ED TRIAGE NOTES
Pt to ER via triage, c/o dizziness, increased thirst, weakness, diarrhea for the past 5-6 days. Pt states she had some visual hallucination during the night after the radiation treatment yesterday. Pt is currently being treated for multiple myeloma.

## 2024-07-06 NOTE — H&P
SUBJECTIVE     HPI:  Bindu Cali is a 89 y.o. year old female with a history of HTN, CKD, GERD, bronchitis, lumbar spinal stenosis, hypothyroidism, IgA kappa MM c/b hypercalcemia (MGUS diagnosis 2/2022 with progression to MM in October and starting Velcade 11/2022), [facility] with chief complaint of generalized weakness/malaise.  Patient follows with oncology, Dr. Gray, and most recently received her chemotherapy yesterday; receives Denosumab intermittently for hypercalcemia.  Patient says that since receiving her treatment she has been extremely weak with intermittent episodes of diarrhea.  She says that usually she is able to make it to the bathroom on her own however she has been incontinent for the past 2 days.  She is unable to specify frequency.  Confirms associated nausea.  She says that she usually gets diarrhea with her treatment however review of EMR shows history of C. difficile admission.  She also states that she usually develops hypercalcemia after her treatments which makes her loopy.  Additionally son at bedside states that she was disoriented earlier today; talking about things that were not there.  Today on evaluation she is alert and oriented x 4 with no obvious confusion or disorientation.  Denies SOB, chest pain, fever/chills, cough, sick contacts, headache, vision/hearing changes, urinary issues, abdominal pain.    A 10 point ROS was performed with the patient denying any complaint at this time aside from those listed in the HPI above.     In the ED: 100.6 °F, 75 HR, 16 RR, 170/72, 97% room air.  Repeat vitals showed blood pressure as high as 203/85.  CMP showed mild hyponatremia 132, creatinine 1.49 (baseline 1.3-1.5), hypercalcemia 12.1.  CBC showing leukocytosis 23.8, hemoglobin 8.6 (baseline 9), ANC 21.19.  Respiratory viral panel negative.  UA showing 2+ blood, 100 protein, 500 leukocyte Estrace, 3+ bacteria, 11-20 WBC.  EKG on ED physicians initial read  "shows no acute abnormalities.  CT head obtained which was unremarkable.  CXR showing small left pleural effusion with associated atelectasis and/or pneumonitis.  1000 mL IVF bolus administered, vancomycin/Zosyn started.  Patient admitted to general medicine floor for further evaluation.    PMH: as above  PSH: Cholecystectomy, hysterectomy, tonsillectomy, left knee replacement  FH: noncontributory  SH: denies smoking, denies EtOH, denies illicit drug use    OBJECTIVE     Vitals:    07/06/24 1159 07/06/24 1642 07/06/24 1643   BP: 170/72  (!) 203/85   BP Location: Right arm  Left arm   Patient Position: Sitting  Sitting   Pulse: 75 75    Resp: 18 16    Temp: 37.4 °C (99.3 °F) (!) 38.1 °C (100.6 °F)    TempSrc: Temporal Skin    SpO2: 97%  95%   Weight: 59.4 kg (131 lb)     Height: 1.473 m (4' 10\")        Results from last 7 days   Lab Units 07/06/24  1237 07/05/24  0951   WBC AUTO x10*3/uL 23.8* 7.0   HEMOGLOBIN g/dL 8.6* 8.8*   HEMATOCRIT % 24.9* 26.4*   PLATELETS AUTO x10*3/uL 358 365   NEUTROS PCT AUTO %  --  59.6   LYMPHO PCT MAN % 4.0  --    LYMPHS PCT AUTO %  --  17.9   MONO PCT MAN % 7.0  --    MONOS PCT AUTO %  --  17.6   EOSINO PCT MAN % 0.0  --    EOS PCT AUTO %  --  4.2     Results from last 7 days   Lab Units 07/06/24  1237   SODIUM mmol/L 132*   POTASSIUM mmol/L 4.0   CHLORIDE mmol/L 98   CO2 mmol/L 26   BUN mg/dL 32*   CREATININE mg/dL 1.49*   CALCIUM mg/dL 12.1*   PROTEIN TOTAL g/dL 6.6   BILIRUBIN TOTAL mg/dL 0.4   ALK PHOS U/L 72   ALT U/L 10   AST U/L 14   GLUCOSE mg/dL 127*       Scheduled Medications  [START ON 7/7/2024] amLODIPine, 5 mg, oral, q AM  enoxaparin, 30 mg, subcutaneous, q24h  gabapentin, 300 mg, oral, Nightly  [START ON 7/7/2024] levothyroxine, 100 mcg, oral, Daily before breakfast  loratadine, 10 mg, oral, Daily  losartan, 100 mg, oral, Daily  piperacillin-tazobactam, 2.25 g, intravenous, Once       Physical Exam    Constitutional: Well developed, A&Ox3, no acute distress, alert and " "cooperative  Eyes: EOMI, clear sclera  Respiratory/Thorax: CTAB, good chest expansion  Cardiovascular: Regular rate, regular rhythm  Gastrointestinal: Nondistended, soft, non-tender  Extremities: normal extremities, no cyanosis edema  Skin: Warm and dry    ASSESSMENT & PLAN     #Generalized weakness  #Diarrhea  #Fever  #UTI  #Leukocytosis  -Patient has history of C. difficile and given extensive leukocytosis will need to rule out.  -Given patient's complaint of generalized malaise, weakness, extensive leukocytosis and fever; infectious etiology is highly suspected  -CXR 7/6 showing small left pleural effusion with associated atelectasis and/or pneumonitis.  -UA 7/6 showing 2+ blood, 100 protein, 500 leukocyte Estrace, 3+ bacteria, 11-20 WBC.  PLAN:  -C. difficile pending  -Will obtain CTAP to rule out infectious colitis versus chemotherapy induced colitis  -Zofran as needed for nausea  -Holding as needed Tylenol; I would like to better track fevers if they spike  -Continuing with vancomycin, Zosyn  -UA pending reflex culture    IgA kappa multiple myeloma s/p Velcade 7/5 with assoc CRAB Criteria  #Mild hypercalcemia (recurrent), associated with above MM  CKD G3b, at baseline (~1.3-1.5)  Chronic Anemia, stable at baseline  ?Bone Lesions mentioned on XR 6/2023  -MM diagnosed 2022, follows with Dr. Gray; associated with CRAB findings  -Received chemotherapy 7/5, labs drawn at that time showed hypercalcemia, and was given 500 mL IVF and sent home.  Patient says she usually has diarrhea and feeling \"loopy\" associated with her treatments in addition to having episodes of hypercalcemia with similar symptoms in the past.  -On admission: Calcium 12.1, ionized 1.65  PLAN:  -Will obtain TSH to rule out thyroid associated hypercalcemia  -Will obtain repeat EKG  -Starting mIVF /h.  No indication for diuretics; euvolemic, mild symptomatology, mild calcium levels  -Takes Denosumab intermittently for hypercalcemia; not " reconciled in home meds, deferring to hem/onc  -Consulting hematology/oncology, Dr. Gray for continuity of care and recommendations on MM associated hypercalcemia    #Mild hyponatremia  #Mild hypomagnesemia  PLAN:  -Anticipate sodium correction with fluid resuscitation in the setting of dehydration  -Should avoid using LR given its calcium concentration.  Continue with NS  -Magnesium sulfate IV 2 g    Uncontrolled HTN, asymptomatic  -Patient and family state that blood pressure has been elevated for some time.  Averages anywhere from 160s-190s SBP  PLAN:  -Continue home amlodipine 5 mg daily  -Continue home losartan 100 mg daily      Chronic Problems:  GERD  Hypothyroidism  Neuropathy  PLAN:  -Continue home Synthroid  -Continue home gabapentin 300 mg nightly    Diet: Regular  DVT ppx: Lovenox  IVF: mIVF 100/h NS  Consults: Heme-onc  Dispo: PT/OT to evaluate    Sergey Fritz, DO  PGY-2, Internal Medicine  Please SecureChat for any further questions  This is a preliminary note, please await attending attestation for final A/P

## 2024-07-06 NOTE — ED TRIAGE NOTES
TRIAGE NOTE   I saw the patient as the Clinician in Triage and performed a brief history and physical exam, established acuity, and ordered appropriate tests to develop basic plan of care. Patient will be seen by an [facility], resident and/or physician who will independently evaluate the patient. Please see subsequent provider notes for further details and disposition.     Brief HPI: In brief, Bindu Cali is a 89 y.o. female with significant past medical history for multiple myeloma, HTN, HLD, hypothyroidism, CKD, GERD, macrocytic anemia and hypercalcemia/lytic lesions presenting to ED today from home with daughter for evaluation of dizziness.  For the last 5 to 6 days the patient has felt confused with auditory hallucinations.  She has been extremely weak, must use a walker to ambulate.  Mild fever 3 days ago has resolved.  Diarrhea x 1 today.  Radiation treatment yesterday for multiple myeloma, patient was told she had multiple electrolyte abnormalities and received fluids.  She was told to come to the ER for her symptoms.  Denies cough, chest pain, shortness of breath, nausea/vomiting, abdominal pain, urinary symptoms, bloody/black stools or any other complaints.  No smoking, EtOH or drug use.  PCP is Dr. Garcia,     Focused Physical exam:   General: Weak elderly  female, sitting in wheelchair, pleasant and interactive.  Awake and alert, oriented to name and place.  Appears tired but nontoxic looking.  Lips slightly dry, well-nourished.  Skin: Pink, warm and dry.  Cardiac: Regular rate and rhythm.  Pulmonary: Clear bilaterally.  Abdomen: Rounded soft with bowel sounds, nontender.  No CVA tenderness.    Plan/MDM:   89 y.o. female with significant past medical history for multiple myeloma, HTN, HLD, hypothyroidism, CKD, GERD, macrocytic anemia and hypercalcemia/lytic lesions is evaluated at the bedside for 5 to 6 days of confusion, weakness and dizziness with diarrhea x 1 today.  On arrival to the  ED, awake and alert, vital signs blood pressure 170/72 with heart rate 75, patient not hypoxic, low-grade temp at 99.3.  Oriented to name and place.  Appears weak.  IV established, basic labs, UA/urine culture, CT brain, EKG and chest x-ray will be obtained.  Patient agreeable to this plan.    Please see subsequent provider note for further details and disposition

## 2024-07-07 LAB
ABO GROUP (TYPE) IN BLOOD: NORMAL
ANION GAP SERPL CALC-SCNC: 11 MMOL/L (ref 10–20)
ANTIBODY SCREEN: NORMAL
BUN SERPL-MCNC: 28 MG/DL (ref 6–23)
CALCIUM SERPL-MCNC: 10.6 MG/DL (ref 8.6–10.3)
CHLORIDE SERPL-SCNC: 100 MMOL/L (ref 98–107)
CO2 SERPL-SCNC: 24 MMOL/L (ref 21–32)
CREAT SERPL-MCNC: 1.3 MG/DL (ref 0.5–1.05)
EGFRCR SERPLBLD CKD-EPI 2021: 39 ML/MIN/1.73M*2
ERYTHROCYTE [DISTWIDTH] IN BLOOD BY AUTOMATED COUNT: 16.5 % (ref 11.5–14.5)
GLUCOSE SERPL-MCNC: 101 MG/DL (ref 74–99)
HCT VFR BLD AUTO: 25.2 % (ref 36–46)
HGB BLD-MCNC: 8.3 G/DL (ref 12–16)
MCH RBC QN AUTO: 29.5 PG (ref 26–34)
MCHC RBC AUTO-ENTMCNC: 32.9 G/DL (ref 32–36)
MCV RBC AUTO: 90 FL (ref 80–100)
MRSA DNA SPEC QL NAA+PROBE: NOT DETECTED
NRBC BLD-RTO: 0 /100 WBCS (ref 0–0)
PLATELET # BLD AUTO: 359 X10*3/UL (ref 150–450)
POTASSIUM SERPL-SCNC: 3.7 MMOL/L (ref 3.5–5.3)
RBC # BLD AUTO: 2.81 X10*6/UL (ref 4–5.2)
RH FACTOR (ANTIGEN D): NORMAL
SODIUM SERPL-SCNC: 131 MMOL/L (ref 136–145)
VANCOMYCIN TROUGH SERPL-MCNC: 6.9 UG/ML (ref 5–20)
WBC # BLD AUTO: 20.2 X10*3/UL (ref 4.4–11.3)

## 2024-07-07 PROCEDURE — 2500000001 HC RX 250 WO HCPCS SELF ADMINISTERED DRUGS (ALT 637 FOR MEDICARE OP)

## 2024-07-07 PROCEDURE — 2500000004 HC RX 250 GENERAL PHARMACY W/ HCPCS (ALT 636 FOR OP/ED)

## 2024-07-07 PROCEDURE — 36415 COLL VENOUS BLD VENIPUNCTURE: CPT

## 2024-07-07 PROCEDURE — 99233 SBSQ HOSP IP/OBS HIGH 50: CPT

## 2024-07-07 PROCEDURE — 80048 BASIC METABOLIC PNL TOTAL CA: CPT

## 2024-07-07 PROCEDURE — 87641 MR-STAPH DNA AMP PROBE: CPT

## 2024-07-07 PROCEDURE — 85027 COMPLETE CBC AUTOMATED: CPT

## 2024-07-07 PROCEDURE — 1200000002 HC GENERAL ROOM WITH TELEMETRY DAILY

## 2024-07-07 PROCEDURE — 80202 ASSAY OF VANCOMYCIN: CPT

## 2024-07-07 PROCEDURE — 86901 BLOOD TYPING SEROLOGIC RH(D): CPT

## 2024-07-07 RX ORDER — HYDRALAZINE HYDROCHLORIDE 20 MG/ML
10 INJECTION INTRAMUSCULAR; INTRAVENOUS EVERY 6 HOURS PRN
Status: DISCONTINUED | OUTPATIENT
Start: 2024-07-07 | End: 2024-07-15

## 2024-07-07 RX ORDER — CEFTRIAXONE 1 G/50ML
1 INJECTION, SOLUTION INTRAVENOUS EVERY 24 HOURS
Status: DISCONTINUED | OUTPATIENT
Start: 2024-07-07 | End: 2024-07-08

## 2024-07-07 RX ORDER — AMLODIPINE BESYLATE 10 MG/1
10 TABLET ORAL EVERY MORNING
Status: DISCONTINUED | OUTPATIENT
Start: 2024-07-08 | End: 2024-07-15

## 2024-07-07 RX ADMIN — AMLODIPINE BESYLATE 5 MG: 5 TABLET ORAL at 08:51

## 2024-07-07 RX ADMIN — ENOXAPARIN SODIUM 30 MG: 30 INJECTION SUBCUTANEOUS at 20:55

## 2024-07-07 RX ADMIN — LEVOTHYROXINE SODIUM 100 MCG: 0.1 TABLET ORAL at 06:00

## 2024-07-07 RX ADMIN — LOSARTAN POTASSIUM 100 MG: 50 TABLET, FILM COATED ORAL at 08:51

## 2024-07-07 RX ADMIN — CEFTRIAXONE SODIUM 1 G: 1 INJECTION, SOLUTION INTRAVENOUS at 11:38

## 2024-07-07 RX ADMIN — HYDRALAZINE HYDROCHLORIDE 10 MG: 20 INJECTION INTRAMUSCULAR; INTRAVENOUS at 21:27

## 2024-07-07 RX ADMIN — SODIUM CHLORIDE 100 ML/HR: 9 INJECTION, SOLUTION INTRAVENOUS at 08:48

## 2024-07-07 RX ADMIN — GABAPENTIN 300 MG: 300 CAPSULE ORAL at 20:56

## 2024-07-07 RX ADMIN — LORATADINE 10 MG: 10 TABLET ORAL at 08:51

## 2024-07-07 SDOH — SOCIAL STABILITY: SOCIAL INSECURITY: ABUSE: ADULT

## 2024-07-07 SDOH — SOCIAL STABILITY: SOCIAL INSECURITY: DO YOU FEEL UNSAFE GOING BACK TO THE PLACE WHERE YOU ARE LIVING?: NO

## 2024-07-07 SDOH — SOCIAL STABILITY: SOCIAL INSECURITY: DOES ANYONE TRY TO KEEP YOU FROM HAVING/CONTACTING OTHER FRIENDS OR DOING THINGS OUTSIDE YOUR HOME?: NO

## 2024-07-07 SDOH — SOCIAL STABILITY: SOCIAL INSECURITY: ARE THERE ANY APPARENT SIGNS OF INJURIES/BEHAVIORS THAT COULD BE RELATED TO ABUSE/NEGLECT?: NO

## 2024-07-07 SDOH — ECONOMIC STABILITY: INCOME INSECURITY: IN THE LAST 12 MONTHS, WAS THERE A TIME WHEN YOU WERE NOT ABLE TO PAY THE MORTGAGE OR RENT ON TIME?: NO

## 2024-07-07 SDOH — ECONOMIC STABILITY: HOUSING INSECURITY: IN THE LAST 12 MONTHS, HOW MANY PLACES HAVE YOU LIVED?: 1

## 2024-07-07 SDOH — ECONOMIC STABILITY: INCOME INSECURITY: HOW HARD IS IT FOR YOU TO PAY FOR THE VERY BASICS LIKE FOOD, HOUSING, MEDICAL CARE, AND HEATING?: NOT HARD AT ALL

## 2024-07-07 SDOH — SOCIAL STABILITY: SOCIAL INSECURITY: HAVE YOU HAD ANY THOUGHTS OF HARMING ANYONE ELSE?: NO

## 2024-07-07 SDOH — SOCIAL STABILITY: SOCIAL INSECURITY: DO YOU FEEL ANYONE HAS EXPLOITED OR TAKEN ADVANTAGE OF YOU FINANCIALLY OR OF YOUR PERSONAL PROPERTY?: NO

## 2024-07-07 SDOH — SOCIAL STABILITY: SOCIAL INSECURITY: HAVE YOU HAD THOUGHTS OF HARMING ANYONE ELSE?: NO

## 2024-07-07 SDOH — SOCIAL STABILITY: SOCIAL INSECURITY: ARE YOU OR HAVE YOU BEEN THREATENED OR ABUSED PHYSICALLY, EMOTIONALLY, OR SEXUALLY BY ANYONE?: NO

## 2024-07-07 SDOH — SOCIAL STABILITY: SOCIAL INSECURITY: WERE YOU ABLE TO COMPLETE ALL THE BEHAVIORAL HEALTH SCREENINGS?: YES

## 2024-07-07 SDOH — SOCIAL STABILITY: SOCIAL INSECURITY: HAS ANYONE EVER THREATENED TO HURT YOUR FAMILY OR YOUR PETS?: NO

## 2024-07-07 ASSESSMENT — COGNITIVE AND FUNCTIONAL STATUS - GENERAL
MOVING TO AND FROM BED TO CHAIR: A LITTLE
DAILY ACTIVITIY SCORE: 19
DRESSING REGULAR LOWER BODY CLOTHING: A LITTLE
MOBILITY SCORE: 18
TOILETING: A LITTLE
PATIENT BASELINE BEDBOUND: NO
WALKING IN HOSPITAL ROOM: A LITTLE
CLIMB 3 TO 5 STEPS WITH RAILING: A LITTLE
DRESSING REGULAR UPPER BODY CLOTHING: A LITTLE
PERSONAL GROOMING: A LITTLE
HELP NEEDED FOR BATHING: A LITTLE
STANDING UP FROM CHAIR USING ARMS: A LITTLE
TURNING FROM BACK TO SIDE WHILE IN FLAT BAD: A LITTLE
MOVING FROM LYING ON BACK TO SITTING ON SIDE OF FLAT BED WITH BEDRAILS: A LITTLE

## 2024-07-07 ASSESSMENT — ACTIVITIES OF DAILY LIVING (ADL)
TOILETING: NEEDS ASSISTANCE
DRESSING YOURSELF: NEEDS ASSISTANCE
FEEDING YOURSELF: INDEPENDENT
JUDGMENT_ADEQUATE_SAFELY_COMPLETE_DAILY_ACTIVITIES: YES
ASSISTIVE_DEVICE: WALKER
LACK_OF_TRANSPORTATION: NO
WALKS IN HOME: NEEDS ASSISTANCE
PATIENT'S MEMORY ADEQUATE TO SAFELY COMPLETE DAILY ACTIVITIES?: YES
HEARING - LEFT EAR: FUNCTIONAL
HEARING - RIGHT EAR: FUNCTIONAL
ADEQUATE_TO_COMPLETE_ADL: YES
GROOMING: NEEDS ASSISTANCE
BATHING: NEEDS ASSISTANCE

## 2024-07-07 ASSESSMENT — PATIENT HEALTH QUESTIONNAIRE - PHQ9
SUM OF ALL RESPONSES TO PHQ9 QUESTIONS 1 & 2: 0
1. LITTLE INTEREST OR PLEASURE IN DOING THINGS: NOT AT ALL
2. FEELING DOWN, DEPRESSED OR HOPELESS: NOT AT ALL

## 2024-07-07 ASSESSMENT — PAIN SCALES - GENERAL
PAINLEVEL_OUTOF10: 0 - NO PAIN
PAINLEVEL_OUTOF10: 0 - NO PAIN

## 2024-07-07 ASSESSMENT — LIFESTYLE VARIABLES
HOW OFTEN DO YOU HAVE 6 OR MORE DRINKS ON ONE OCCASION: NEVER
HOW MANY STANDARD DRINKS CONTAINING ALCOHOL DO YOU HAVE ON A TYPICAL DAY: PATIENT DOES NOT DRINK
HOW OFTEN DO YOU HAVE A DRINK CONTAINING ALCOHOL: NEVER
SKIP TO QUESTIONS 9-10: 1
AUDIT-C TOTAL SCORE: 0
AUDIT-C TOTAL SCORE: 0

## 2024-07-07 NOTE — CARE PLAN
The patient's goals for the shift include      The clinical goals for the shift include Patient will remain hemodynamically stable      Problem: Pain - Adult  Goal: Verbalizes/displays adequate comfort level or baseline comfort level  Outcome: Progressing     Problem: Safety - Adult  Goal: Free from fall injury  Outcome: Progressing     Problem: Skin  Goal: Prevent/manage excess moisture  Outcome: Progressing  Flowsheets (Taken 7/7/2024 1712)  Prevent/manage excess moisture:   Cleanse incontinence/protect with barrier cream   Monitor for/manage infection if present     Problem: Skin  Goal: Prevent/minimize sheer/friction injuries  Outcome: Progressing  Flowsheets (Taken 7/7/2024 1712)  Prevent/minimize sheer/friction injuries:   Use pull sheet   HOB 30 degrees or less     Problem: Skin  Goal: Promote/optimize nutrition  Outcome: Progressing  Flowsheets (Taken 7/7/2024 1712)  Promote/optimize nutrition:   Consume > 50% meals/supplements   Offer water/supplements/favorite foods

## 2024-07-07 NOTE — PROGRESS NOTES
"Bindu Cali is a 89 y.o. female on day 1 of admission presenting with Hypercalcemia.      Subjective   Patient seen and examined this morning. She is alert and oriented x 4 and endorses feeling tired, weak and just wants to sleep.  Also endorsing some diarrhea stating that she thinks she has had about 2 episodes since her chemotherapy treatment on Thursday.  She states that she is having some difficulty with word finding and just feels a little bit \"slow.\"  She states that she has had hypercalcemia previously after having chemotherapy treatments and that these treatments usually make her a little more tired and sometimes confused.  Otherwise denies abdominal pain, fever, chills, chest pain, shortness of breath, nausea, vomiting.       Objective     Last Recorded Vitals  /60   Pulse 66   Temp 36.8 °C (98.2 °F) (Temporal)   Resp 18   Wt 59.4 kg (131 lb)   SpO2 90%   Intake/Output last 3 Shifts:    Intake/Output Summary (Last 24 hours) at 7/7/2024 0741  Last data filed at 7/7/2024 0600  Gross per 24 hour   Intake 1098.33 ml   Output --   Net 1098.33 ml       Admission Weight  Weight: 59.4 kg (131 lb) (07/06/24 1159)    Daily Weight  07/06/24 : 59.4 kg (131 lb)    Image Results  CT head wo IV contrast  Narrative: Interpreted By:  Zahraa Light,   STUDY:  CT HEAD WO IV CONTRAST;  7/6/2024 2:01 pm      INDICATION:  Signs/Symptoms:Confusion, history of multiple myeloma.      COMPARISON:  10/28/2023      ACCESSION NUMBER(S):  [id number]      ORDERING CLINICIAN:  KEANU REECE      TECHNIQUE:  Noncontrast axial CT scan of head was performed. Angled reformats in  brain and bone windows were generated. The images were reviewed in  bone, brain, blood and soft tissue windows.      FINDINGS:  The ventricles, cisterns and sulci are prominent, consistent with  mild diffuse volume loss. There are areas of nonspecific white matter  hypodensity, which are probably age-related or microvascular in  nature.    "   Gray-white differentiation is intact and there is no evidence of  acute cortical infarct. No mass, mass effect or midline shift is  seen. There is no evidence of hemorrhage.      The visualized paranasal sinuses are clear. Nasal septal deviation to  the right.      Impression: No evidence of acute cortical infarct or intracranial hemorrhage.      Chronic changes as described.      MACRO:  None      Signed by: Zahraa Light 7/6/2024 2:47 PM  Dictation workstation:   [id number]  XR chest 2 views  Narrative: Interpreted By:  Heriberto Colorado,   STUDY:  Chest dated  7/6/2024.      INDICATION:  Signs/Symptoms:Confusion      COMPARISON:  Chest dated 04/22/2023.      ACCESSION NUMBER(S):  [id number]      ORDERING CLINICIAN:  KEANU REECE      TECHNIQUE:  Two views of the chest.      FINDINGS:  There is blunting of the left costophrenic angle with linear left  basilar opacities. Fullness to the kavita may be related to the  pulmonary vasculature as can be seen with pulmonary arterial  hypertension. No pneumothorax  is evident. The cardiomediastinal  silhouette is  enlarged but similar to the prior exam.Degenerative  change is seen of the spine and shoulders.      Impression: Small left pleural effusion with associated atelectasis and/or  pneumonitis.      MACRO:  None      Signed by: Heriberto Colorado 7/6/2024 12:45 PM  Dictation workstation:   [id number]      Physical Exam  Vitals and nursing note reviewed.   Constitutional:       General: She is not in acute distress.     Appearance: Normal appearance.   HENT:      Head: Normocephalic and atraumatic.   Cardiovascular:      Rate and Rhythm: Normal rate and regular rhythm.   Pulmonary:      Effort: Pulmonary effort is normal.      Breath sounds: Normal breath sounds.   Abdominal:      General: Abdomen is flat. There is no distension.      Palpations: Abdomen is soft.      Tenderness: There is no abdominal tenderness.   Musculoskeletal:      Right lower leg: No edema.      Left  lower leg: No edema.   Skin:     General: Skin is warm and dry.   Neurological:      General: No focal deficit present.      Mental Status: She is alert and oriented to person, place, and time.       Relevant Results               Assessment/Plan    89 y.o. year old female with a history of HTN, CKD, GERD, bronchitis, lumbar spinal stenosis, hypothyroidism, IgA kappa MM c/b hypercalcemia (MGUS diagnosis 2/2022 with progression to MM in October and starting Velcade 11/2022), who presents to [facility] on 7/6 with chief complaint of generalized weakness/malaise.     #Generalized weakness  #Diarrhea  #Fever  #UTI  #Leukocytosis  -Patient has history of C. difficile and given extensive leukocytosis will need to rule out.  -Given patient's complaint of generalized malaise, weakness, extensive leukocytosis and fever; infectious etiology is highly suspected  -CXR 7/6 showing small left pleural effusion with associated atelectasis and/or pneumonitis.  -UA 7/6 showing 2+ blood, 100 protein, 500 leukocyte Estrace, 3+ bacteria, 11-20 WBC. MRSA probe negative    PLAN:  -Given lack of abdominal pain or respiratory symptoms, will hold off on further imaging at the moment  -C. difficile pending  -Zofran as needed for nausea  -UA pending reflex culture  -Will change antibiotics to Rocephin for empiric UTI coverage until cultures result    #IgA kappa multiple myeloma s/p Velcade 7/5 with assoc CRAB Criteria  #Mild hypercalcemia (recurrent), associated with above MM  #CKD G3b  #Chronic anemia  -MM diagnosed 2022, follows with Dr. Gray; associated with CRAB findings including bone lesions on chest x-ray 6/2023  -Received chemotherapy 7/5, labs drawn at that time showed hypercalcemia, and was given 500 mL IVF and sent home.  Endorses similar symptoms after previous chemotherapy treatments  -On admission: Hemoglobin stable around baseline of 9, creatinine 1.49 (baseline 1.3-1.5), TSH normal, calcium elevated at 12.1, ionized 1.65, EKG  largely unchanged from previous    PLAN:  -Starting mIVF /h.  No indication for diuretics; euvolemic, mild symptomatology, mild calcium levels  -Will recheck calcium and consider adding calcitonin and bisphosphonate if it remains elevated, otherwise we will continue IVF  -Hematology/oncology consulted for continuity of care while inpatient    #Mild hyponatremia  #Mild hypomagnesemia  PLAN:  -Anticipate sodium correction with fluid resuscitation in the setting of dehydration  -Should avoid using LR given its calcium concentration.  Continue with NS  -Monitor BMP daily     Uncontrolled HTN  -Currently asymptomatic, patient and family state that blood pressure has been elevated for some time.  Averages anywhere from 160s-190s SBP  PLAN:  -Will increase home amlodipine to 10 mg and continue losartan 100 mg daily and consider as needed medications if BP remains elevated      Chronic Problems  #GERD  #Hypothyroidism  #Neuropathy  PLAN:  -Continue home Synthroid  -Continue home gabapentin 300 mg nightly    Narayan Mercado MD  PGY-1, Internal Medicine

## 2024-07-07 NOTE — CONSULTS
Vancomycin Dosing by Pharmacy- INITIAL    Bindujennifer Cali is a 89 y.o. year old female who Pharmacy has been consulted for vancomycin dosing for pneumonia. Based on the patient's indication and renal status this patient will be dosed based on a goal trough/random level of 15-20.     Renal function is currently stable.    Visit Vitals  BP (!) 194/75   Pulse 72   Temp 37.1 °C (98.8 °F)   Resp 18        Lab Results   Component Value Date    CREATININE 1.49 (H) 2024    CREATININE 1.53 (H) 2024    CREATININE 1.40 (H) 2024    CREATININE 2.22 (H) 2024        Patient weight is as follows:   Vitals:    24 1159   Weight: 59.4 kg (131 lb)       Cultures:  No results found for the encounter in last 14 days.        No intake/output data recorded.  I/O during current shift:  No intake/output data recorded.    Temp (24hrs), Av.4 °C (99.4 °F), Min:37.1 °C (98.8 °F), Max:38.1 °C (100.6 °F)         Assessment/Plan     Patient will not be given a loading dose.  Vancomycin maintenance, a one time dose of 1000 mg was given in ER.    Follow-up level will be ordered on 24 at 1500 unless clinically indicated sooner.  Will continue to monitor renal function daily while on vancomycin and order serum creatinine at least every 48 hours if not already ordered.  Follow for continued vancomycin needs, clinical response, and signs/symptoms of toxicity.       Oralia Bowens, PharmD

## 2024-07-07 NOTE — CARE PLAN
The patient's goals for the shift include      The clinical goals for the shift include  Patient will remain hemodynamically stable    Problem: Pain - Adult  Goal: Verbalizes/displays adequate comfort level or baseline comfort level  Outcome: Progressing     Problem: Safety - Adult  Goal: Free from fall injury  Outcome: Progressing     Problem: Discharge Planning  Goal: Discharge to home or other facility with appropriate resources  Outcome: Progressing     Problem: Chronic Conditions and Co-morbidities  Goal: Patient's chronic conditions and co-morbidity symptoms are monitored and maintained or improved  Outcome: Progressing

## 2024-07-07 NOTE — PROGRESS NOTES
Vancomycin Dosing by Pharmacy- Cessation of Therapy    Consult to pharmacy for vancomycin dosing has been discontinued by the prescriber, pharmacy will sign off at this time.    Please call pharmacy if there are further questions or re-enter a consult if vancomycin is resumed.     Oralia Bowens, [place]

## 2024-07-08 ENCOUNTER — APPOINTMENT (OUTPATIENT)
Dept: RADIOLOGY | Facility: HOSPITAL | Age: 89
DRG: 372 | End: 2024-07-08
Payer: MEDICARE

## 2024-07-08 LAB
ANION GAP SERPL CALC-SCNC: 13 MMOL/L (ref 10–20)
ATRIAL RATE: 68 BPM
BUN SERPL-MCNC: 24 MG/DL (ref 6–23)
C DIF TOX TCDA+TCDB STL QL NAA+PROBE: DETECTED
CALCIUM SERPL-MCNC: 10.3 MG/DL (ref 8.6–10.3)
CHLORIDE SERPL-SCNC: 102 MMOL/L (ref 98–107)
CO2 SERPL-SCNC: 21 MMOL/L (ref 21–32)
CREAT SERPL-MCNC: 1.24 MG/DL (ref 0.5–1.05)
CRP SERPL-MCNC: 18.24 MG/DL
EGFRCR SERPLBLD CKD-EPI 2021: 41 ML/MIN/1.73M*2
ERYTHROCYTE [DISTWIDTH] IN BLOOD BY AUTOMATED COUNT: 16.7 % (ref 11.5–14.5)
ERYTHROCYTE [SEDIMENTATION RATE] IN BLOOD BY WESTERGREN METHOD: 66 MM/H (ref 0–30)
GLUCOSE SERPL-MCNC: 79 MG/DL (ref 74–99)
HCT VFR BLD AUTO: 24.4 % (ref 36–46)
HGB BLD-MCNC: 8.2 G/DL (ref 12–16)
MCH RBC QN AUTO: 30 PG (ref 26–34)
MCHC RBC AUTO-ENTMCNC: 33.6 G/DL (ref 32–36)
MCV RBC AUTO: 89 FL (ref 80–100)
NRBC BLD-RTO: 0 /100 WBCS (ref 0–0)
P AXIS: 30 DEGREES
P OFFSET: 176 MS
P ONSET: 117 MS
PLATELET # BLD AUTO: 350 X10*3/UL (ref 150–450)
POTASSIUM SERPL-SCNC: 3.7 MMOL/L (ref 3.5–5.3)
PR INTERVAL: 188 MS
PROCALCITONIN SERPL-MCNC: 0.35 NG/ML
Q ONSET: 211 MS
QRS COUNT: 12 BEATS
QRS DURATION: 94 MS
QT INTERVAL: 358 MS
QTC CALCULATION(BAZETT): 380 MS
QTC FREDERICIA: 373 MS
R AXIS: -30 DEGREES
RBC # BLD AUTO: 2.73 X10*6/UL (ref 4–5.2)
SODIUM SERPL-SCNC: 132 MMOL/L (ref 136–145)
T AXIS: 15 DEGREES
T OFFSET: 390 MS
VENTRICULAR RATE: 68 BPM
WBC # BLD AUTO: 24.8 X10*3/UL (ref 4.4–11.3)

## 2024-07-08 PROCEDURE — 99232 SBSQ HOSP IP/OBS MODERATE 35: CPT

## 2024-07-08 PROCEDURE — 86140 C-REACTIVE PROTEIN: CPT

## 2024-07-08 PROCEDURE — 87493 C DIFF AMPLIFIED PROBE: CPT | Mod: PARLAB

## 2024-07-08 PROCEDURE — 74176 CT ABD & PELVIS W/O CONTRAST: CPT | Performed by: RADIOLOGY

## 2024-07-08 PROCEDURE — 2500000004 HC RX 250 GENERAL PHARMACY W/ HCPCS (ALT 636 FOR OP/ED)

## 2024-07-08 PROCEDURE — 2500000001 HC RX 250 WO HCPCS SELF ADMINISTERED DRUGS (ALT 637 FOR MEDICARE OP)

## 2024-07-08 PROCEDURE — 80048 BASIC METABOLIC PNL TOTAL CA: CPT

## 2024-07-08 PROCEDURE — 85027 COMPLETE CBC AUTOMATED: CPT

## 2024-07-08 PROCEDURE — 84145 PROCALCITONIN (PCT): CPT | Mod: PARLAB

## 2024-07-08 PROCEDURE — 74176 CT ABD & PELVIS W/O CONTRAST: CPT

## 2024-07-08 PROCEDURE — 1200000002 HC GENERAL ROOM WITH TELEMETRY DAILY

## 2024-07-08 PROCEDURE — 87324 CLOSTRIDIUM AG IA: CPT | Mod: 59,PARLAB

## 2024-07-08 PROCEDURE — 97530 THERAPEUTIC ACTIVITIES: CPT | Mod: GP

## 2024-07-08 PROCEDURE — 71250 CT THORAX DX C-: CPT | Performed by: RADIOLOGY

## 2024-07-08 PROCEDURE — 97161 PT EVAL LOW COMPLEX 20 MIN: CPT | Mod: GP

## 2024-07-08 PROCEDURE — 97165 OT EVAL LOW COMPLEX 30 MIN: CPT | Mod: GO

## 2024-07-08 PROCEDURE — 85652 RBC SED RATE AUTOMATED: CPT

## 2024-07-08 PROCEDURE — 36415 COLL VENOUS BLD VENIPUNCTURE: CPT

## 2024-07-08 PROCEDURE — 99222 1ST HOSP IP/OBS MODERATE 55: CPT

## 2024-07-08 RX ORDER — ACETAMINOPHEN 650 MG/1
650 SUPPOSITORY RECTAL EVERY 4 HOURS PRN
Status: DISCONTINUED | OUTPATIENT
Start: 2024-07-08 | End: 2024-07-16 | Stop reason: HOSPADM

## 2024-07-08 RX ORDER — ACETAMINOPHEN 325 MG/1
650 TABLET ORAL EVERY 4 HOURS PRN
Status: DISCONTINUED | OUTPATIENT
Start: 2024-07-08 | End: 2024-07-16 | Stop reason: HOSPADM

## 2024-07-08 RX ORDER — ACETAMINOPHEN 160 MG/5ML
650 SOLUTION ORAL EVERY 4 HOURS PRN
Status: DISCONTINUED | OUTPATIENT
Start: 2024-07-08 | End: 2024-07-16 | Stop reason: HOSPADM

## 2024-07-08 RX ADMIN — ENOXAPARIN SODIUM 30 MG: 30 INJECTION SUBCUTANEOUS at 21:11

## 2024-07-08 RX ADMIN — LORATADINE 10 MG: 10 TABLET ORAL at 08:44

## 2024-07-08 RX ADMIN — PIPERACILLIN SODIUM AND TAZOBACTAM SODIUM 3.38 G: 3; .375 INJECTION, SOLUTION INTRAVENOUS at 08:44

## 2024-07-08 RX ADMIN — ACETAMINOPHEN 650 MG: 325 TABLET ORAL at 16:36

## 2024-07-08 RX ADMIN — HYDRALAZINE HYDROCHLORIDE 10 MG: 20 INJECTION INTRAMUSCULAR; INTRAVENOUS at 04:03

## 2024-07-08 RX ADMIN — PIPERACILLIN SODIUM AND TAZOBACTAM SODIUM 3.38 G: 3; .375 INJECTION, SOLUTION INTRAVENOUS at 14:43

## 2024-07-08 RX ADMIN — LOSARTAN POTASSIUM 100 MG: 50 TABLET, FILM COATED ORAL at 08:44

## 2024-07-08 RX ADMIN — AMLODIPINE BESYLATE 10 MG: 10 TABLET ORAL at 08:44

## 2024-07-08 RX ADMIN — GABAPENTIN 300 MG: 300 CAPSULE ORAL at 21:11

## 2024-07-08 RX ADMIN — PIPERACILLIN SODIUM AND TAZOBACTAM SODIUM 3.38 G: 3; .375 INJECTION, SOLUTION INTRAVENOUS at 21:11

## 2024-07-08 RX ADMIN — LEVOTHYROXINE SODIUM 100 MCG: 0.1 TABLET ORAL at 06:20

## 2024-07-08 ASSESSMENT — PAIN - FUNCTIONAL ASSESSMENT
PAIN_FUNCTIONAL_ASSESSMENT: 0-10
PAIN_FUNCTIONAL_ASSESSMENT: 0-10

## 2024-07-08 ASSESSMENT — COGNITIVE AND FUNCTIONAL STATUS - GENERAL
CLIMB 3 TO 5 STEPS WITH RAILING: A LITTLE
MOVING TO AND FROM BED TO CHAIR: A LITTLE
STANDING UP FROM CHAIR USING ARMS: A LITTLE
DAILY ACTIVITIY SCORE: 18
PERSONAL GROOMING: A LITTLE
WALKING IN HOSPITAL ROOM: A LITTLE
TOILETING: A LITTLE
DRESSING REGULAR UPPER BODY CLOTHING: A LITTLE
HELP NEEDED FOR BATHING: A LITTLE
MOBILITY SCORE: 20
DRESSING REGULAR LOWER BODY CLOTHING: A LOT

## 2024-07-08 ASSESSMENT — PAIN SCALES - GENERAL
PAINLEVEL_OUTOF10: 0 - NO PAIN

## 2024-07-08 ASSESSMENT — ACTIVITIES OF DAILY LIVING (ADL)
ADL_ASSISTANCE: INDEPENDENT
ADLS_ADDRESSED: YES
BATHING_ASSISTANCE: MODERATE
ADL_ASSISTANCE: INDEPENDENT

## 2024-07-08 NOTE — PROGRESS NOTES
Occupational Therapy    Occupational Therapy    Evaluation    Patient Name: Bindu Cali  MRN: [id number]  Today's Date: 7/8/2024  Time Calculation  Start Time: 1103  Stop Time: 1118  Time Calculation (min): 15 min  911/911-A    Assessment  IP OT Assessment  OT Assessment: impaired adls  Prognosis: Good  Medical Staff Made Aware: Yes  End of Session Communication: Bedside nurse  End of Session Patient Position: Up in chair (call light in reach. all needs met)    Plan:  Treatment Interventions: ADL retraining, Functional transfer training, UE strengthening/ROM, Endurance training, Patient/family training, Equipment evaluation/education  OT Frequency: 3 times per week  OT Discharge Recommendations: Low intensity level of continued care (initial 24 hr. support/supervision)  OT - OK to Discharge: Yes (once cleared by medical team)    Subjective     Current Problem:  1. Hypercalcemia        2. Sepsis, due to unspecified organism, unspecified whether acute organ dysfunction present (Multi)        3. Confusion        4. Urinary tract infection without hematuria, site unspecified            General:  General  Reason for Referral: OT Eval and treat; Generalized weakness in setting of Multiple Myeloma, UTI  Referred By: Sergey Fritz DO  Past Medical History Relevant to Rehab: ckd, gerd, htn, hypothyroidism, lumbar stenosis, multiple myeloma, macrocytic anemia, neuropathy  Family/Caregiver Present: Yes  Caregiver Feedback: son and dtr. in law  Co-Treatment: PT  Co-Treatment Reason: to maximize pt. safety and mobility  Prior to Session Communication: Bedside nurse  Patient Position Received: Bed, 2 rail up, Alarm off, not on at start of session  General Comment: 89 year old female admit with c/o of weakness, malaise, diarrhea and fever. Diagnosed with hypercalcemia and UTI in setting of Multiple Myeloma.    Precautions:  Medical Precautions: Fall precautions    Pain:  Pain Assessment  0-10 (Numeric) Pain Score: 0 - No  pain    Objective     Cognition:  Overall Cognitive Status: Within Functional Limits     Home Living:  Type of Home: House  Lives With:  (with son who works (gets home by 1 pm))  Home Adaptive Equipment: Cane, Walker rolling or standard  Home Layout: One level  Home Access: Stairs to enter with rails  Entrance Stairs-Number of Steps: 2  Bathroom Shower/Tub: Walk-in shower  Bathroom Toilet: Handicapped height  Bathroom Equipment: Grab bars in shower, Shower chair with back, Hand-held shower hose     Prior Function:  Level of [place]: Independent with ADLs and functional transfers, Needs assistance with homemaking  Receives Help From: Family  ADL Assistance: Independent  Homemaking Assistance: Needs assistance  Ambulatory Assistance:  (st. 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 Son manages all household tasks, drives, cooks, cleans, launders, shops.    IADL History:  IADL Comments: pt. does not drive.    ADL:  Eating Assistance: Independent  Grooming Assistance: Independent  Bathing Assistance: Moderate  UE Dressing Assistance: Stand by  LE Dressing Assistance: Maximal  Toileting Assistance with Device: Minimal    Bed Mobility/Transfers:   Bed Mobility  Bed Mobility:  (sba supine to sit at eob. hob elevated)  Transfers  Transfer:  (sit to stand with cga progressing to sba)    Ambulation/Gait Training:  Functional Mobility  Functional Mobility Performed:  (pt. ambulated with rolling walker with cga)    Sensation:  Light Touch: No apparent deficits    Strength:  Strength Comments: bue shoulder flexion to ~90 deg., distally wfl. bilat. elbow/ strength 4-/5    Hand Function:  Hand Function  Coordination: Functional    Outcome Measures: [facility] Daily Activity  Putting on and taking off regular lower body clothing: A lot  Bathing (including washing,  rinsing, drying): A little  Putting on and taking off regular upper body clothing: A little  Toileting, which includes using toilet, bedpan or urinal: A little  Taking care of personal grooming such as brushing teeth: A little  Eating Meals: None  Daily Activity - Total Score: 18        EDUCATION:       Goals:   Encounter Problems       Encounter Problems (Active)       OT Goals       OT Goal 1       Start:  07/08/24    Expected End:  07/22/24       Pt. will perform lower body bathing and dressing ind.'ly using adaptive equipment as needed.           OT Goal 2       Start:  07/08/24    Expected End:  07/22/24       Pt. will perform toileting ind.'ly using adaptive equipment as needed.           OT Goal 3       Start:  07/08/24    Expected End:  07/22/24       Pt. will perform bed mobility/chair/commode/shower chair transfers ind.'ly.         OT Goal 4       Start:  07/08/24    Expected End:  07/22/24       Pt. will perform functional mobility with rolling walker ind.'ly in prep. for homemaking tasks.           OT Goal 5       Start:  07/08/24    Expected End:  07/22/24       Pt. will tolerate 6-8 min. of standing tasks with mod. Ind. in preparation for grooming at sink.

## 2024-07-08 NOTE — PROGRESS NOTES
07/08/24 1422   Discharge Planning   Living Arrangements Children   Support Systems Children   Type of Residence Private residence   Who is requesting discharge planning? Provider   Patient expects to be discharged to: home     Pt admit for hypercalcemia and elevated WBC.  Pt denies any needs, [facility] 20.  Plan is for home at discharge, will follow for needs.  Rebecca Blackwood RN [facility]

## 2024-07-08 NOTE — PROGRESS NOTES
Bindu Cali is a 89 y.o. female on day 2 of admission presenting with Hypercalcemia.    Subjective   Doing well this morning.  She denies further episodes of diarrhea.  She feels like she is less weak than yesterday after being rehydrated.  Denies fever, chills, shortness of breath, cough, nausea, abdominal pain.     Objective     Last Recorded Vitals  BP (!) 208/83   Pulse 77   Temp 37 °C (98.6 °F)   Resp 17   Wt 59.4 kg (131 lb)   SpO2 92%   Intake/Output last 3 Shifts:    Intake/Output Summary (Last 24 hours) at 7/8/2024 0820  Last data filed at 7/8/2024 0600  Gross per 24 hour   Intake 1650 ml   Output 400 ml   Net 1250 ml       Admission Weight  Weight: 59.4 kg (131 lb) (07/06/24 1159)    Daily Weight  07/06/24 : 59.4 kg (131 lb)    Image Results  CT head wo IV contrast  Narrative: Interpreted By:  Zahraa Light,   STUDY:  CT HEAD WO IV CONTRAST;  7/6/2024 2:01 pm      INDICATION:  Signs/Symptoms:Confusion, history of multiple myeloma.      COMPARISON:  10/28/2023      ACCESSION NUMBER(S):  [id number]      ORDERING CLINICIAN:  KEANU REECE      TECHNIQUE:  Noncontrast axial CT scan of head was performed. Angled reformats in  brain and bone windows were generated. The images were reviewed in  bone, brain, blood and soft tissue windows.      FINDINGS:  The ventricles, cisterns and sulci are prominent, consistent with  mild diffuse volume loss. There are areas of nonspecific white matter  hypodensity, which are probably age-related or microvascular in  nature.      Gray-white differentiation is intact and there is no evidence of  acute cortical infarct. No mass, mass effect or midline shift is  seen. There is no evidence of hemorrhage.      The visualized paranasal sinuses are clear. Nasal septal deviation to  the right.      Impression: No evidence of acute cortical infarct or intracranial hemorrhage.      Chronic changes as described.      MACRO:  None      Signed by: Zahraa Light 7/6/2024 2:47  PM  Dictation workstation:   [id number]  XR chest 2 views  Narrative: Interpreted By:  Heriberto Colorado,   STUDY:  Chest dated  7/6/2024.      INDICATION:  Signs/Symptoms:Confusion      COMPARISON:  Chest dated 04/22/2023.      ACCESSION NUMBER(S):  [id number]      ORDERING CLINICIAN:  KEANU REECE      TECHNIQUE:  Two views of the chest.      FINDINGS:  There is blunting of the left costophrenic angle with linear left  basilar opacities. Fullness to the akvita may be related to the  pulmonary vasculature as can be seen with pulmonary arterial  hypertension. No pneumothorax  is evident. The cardiomediastinal  silhouette is  enlarged but similar to the prior exam.Degenerative  change is seen of the spine and shoulders.      Impression: Small left pleural effusion with associated atelectasis and/or  pneumonitis.      MACRO:  None      Signed by: Heriberto Colorado 7/6/2024 12:45 PM  Dictation workstation:   [id number]      Physical Exam  Vitals and nursing note reviewed.   Constitutional:       General: She is not in acute distress.     Appearance: Normal appearance.   HENT:      Head: Normocephalic and atraumatic.   Cardiovascular:      Rate and Rhythm: Normal rate and regular rhythm.   Pulmonary:      Effort: Pulmonary effort is normal.      Breath sounds: Normal breath sounds.   Abdominal:      General: Abdomen is flat. There is no distension.      Palpations: Abdomen is soft.      Tenderness: There is no abdominal tenderness.   Musculoskeletal:      Right lower leg: No edema.      Left lower leg: No edema.   Skin:     General: Skin is warm and dry.   Neurological:      General: No focal deficit present.      Mental Status: She is alert and oriented to person, place, and time.       Assessment/Plan    89 y.o. year old female with a history of HTN, CKD, GERD, bronchitis, lumbar spinal stenosis, hypothyroidism, IgA kappa MM c/b hypercalcemia (MGUS diagnosis 2/2022 with progression to MM in October and starting Velcade  11/2022), who presents to [facility] on 7/6 with chief complaint of generalized weakness/malaise.     #Generalized weakness  #Diarrhea  #Fever  #UTI  #Leukocytosis  -Patient has history of C. difficile and given extensive leukocytosis will need to rule out.  -Given patient's complaint of generalized malaise, weakness, extensive leukocytosis and fever; infectious etiology is highly suspected  -CXR 7/6 showing small left pleural effusion with associated atelectasis and/or pneumonitis.  -UA 7/6 showing 2+ blood, 100 protein, 500 leukocyte Estrace, 3+ bacteria, 11-20 WBC. MRSA probe negative    PLAN:  -Zofran as needed for nausea  -UA pending reflex culture  -Rocephin changed to Zosyn for broader and biotic coverage in the setting of increasing leukocytosis  -With increased leukocytosis today, CT chest abdomen pelvis ordered to further evaluate lung parenchyma and check for signs of colitis    #IgA kappa multiple myeloma s/p Velcade 7/5 with assoc CRAB Criteria  #Mild hypercalcemia (recurrent), associated with above MM, resolved  #CKD G3b  #Chronic anemia  -MM diagnosed 2022, follows with Dr. Gray; associated with CRAB findings including bone lesions on chest x-ray 6/2023  -Received chemotherapy 7/5, labs drawn at that time showed hypercalcemia, and was given 500 mL IVF and sent home.  Endorses similar symptoms after previous chemotherapy treatments  -On admission: Hemoglobin stable around baseline of 9, creatinine 1.49 (baseline 1.3-1.5), TSH normal, calcium elevated at 12.1, ionized 1.65, EKG largely unchanged from previous    PLAN:  -Status post rehydration with NS with resolution of hypercalcemia.   -Monitor BMP while inpatient  -Hematology/oncology consulted for continuity of care while inpatient    #Mild hyponatremia  #Mild hypomagnesemia  - Suspect in the setting of poor PO intake    PLAN:  -Anticipate sodium correction with fluid resuscitation in the setting of dehydration  -Should avoid using LR given its  calcium concentration.  Continue with NS  -Monitor BMP daily     Uncontrolled HTN  -Currently asymptomatic, patient and family state that blood pressure has been elevated for some time.  Averages anywhere from 160s-190s SBP  PLAN:  -Will increase home amlodipine to 10 mg and continue losartan 100 mg daily and consider as needed medications if BP remains elevated      Chronic Problems  #GERD  #Hypothyroidism  #Neuropathy  PLAN:  -Continue home Synthroid  -Continue home gabapentin 300 mg nightly  -PT/OT    Narayan Mercado MD  PGY-1, Internal Medicine

## 2024-07-08 NOTE — CARE PLAN
The patient's goals for the shift include      The clinical goals for the shift include blood pressure will decrease to acceptable limit  Problem: Fall/Injury  Goal: Not fall by end of shift  Outcome: Progressing  Goal: Be free from injury by end of the shift  Outcome: Progressing  Goal: Verbalize understanding of personal risk factors for fall in the hospital  Outcome: Progressing  Goal: Verbalize understanding of risk factor reduction measures to prevent injury from fall in the home  Outcome: Progressing  Goal: Use assistive devices by end of the shift  Outcome: Progressing  Goal: Pace activities to prevent fatigue by end of the shift  Outcome: Progressing

## 2024-07-08 NOTE — PROGRESS NOTES
Physical Therapy    Physical Therapy    Physical Therapy Evaluation    Patient Name: Bindu Cali  MRN: [id number]  Today's Date: 7/8/2024   Time Calculation  Start Time: 1105  Stop Time: 1133  Time Calculation (min): 28 min  911/911-A    Assessment/Plan   PT Assessment  PT Assessment Results: Decreased strength, Decreased endurance, Decreased mobility  Rehab Prognosis: Good  Evaluation/Treatment Tolerance: Patient limited by fatigue  Strengths: Living arrangement secure  End of Session Communication: Bedside nurse  Assessment Comment: Tolerated functional mobility tasks, progress as tolerated.  End of Session Patient Position: Up in chair  IP OR SWING BED PT PLAN  Inpatient or Swing Bed: Inpatient  PT Plan  PT Plan: Ongoing PT  PT Frequency: 3 times per week  PT Discharge Recommendations: Low intensity level of continued care  PT Recommended Transfer Status: Contact guard  PT - OK to Discharge: Yes (when cleared by medical team.)    Subjective     Current Problem:  1. Hypercalcemia        2. Sepsis, due to unspecified organism, unspecified whether acute organ dysfunction present (Multi)        3. Confusion        4. Urinary tract infection without hematuria, site unspecified          Patient Active Problem List   Diagnosis    Hypothyroidism    Anemia due to stage 3b chronic kidney disease (Multi)    Arthritis of left shoulder region    Carpal tunnel syndrome of right wrist    Carpal tunnel syndrome on left    Cervicalgia    Chronic back pain    Benign hypertension with CKD (chronic kidney disease), stage II    Diastolic dysfunction    Essential hypertension    Gastroesophageal reflux disease    Hypercalcemia    Idiopathic peripheral neuropathy    Lactose intolerance    Left knee pain    Lumbar radiculopathy, chronic    Macrocytic anemia    Monoclonal gammopathy of unknown significance (MGUS)    Multiple myeloma (Multi)    Nontraumatic incomplete tear of left rotator cuff    Right knee pain    Right shoulder  pain    Spinal stenosis of lumbar region without neurogenic claudication    Vitamin D deficiency    Arthritis    Fall    Laceration of hand    Periprosthetic fracture of proximal end of tibia    Status post total bilateral knee replacement    Fracture of tibia    Anemia in other chronic diseases classified elsewhere    Hip pain    Neurogenic claudication due to lumbar spinal stenosis    Iliotibial band syndrome of left side    Anemia    Spinal stenosis    History of total knee arthroplasty    Abnormal mammogram    Weakness    Female stress incontinence    Lumbar spondylosis    Osteoarthritis of hip    Cellulitis and abscess of right lower extremity    Postoperative wound infection    Pulmonary hypertension (Multi)    Clostridium difficile colitis    Acute kidney injury superimposed on CKD (CMS-HCC)    Pain of left hand    Diarrhea    Edema    History of removal of joint prosthesis of right hip due to infection    Skin ulcer (Multi)    Muscle weakness    Stage 2 chronic kidney disease       General Visit Information:  General  Reason for Referral: PT Eval and treat; Generalized weakness in setting of Multiple Myeloma, UTI  Referred By: Sergey Fritz DO  Family/Caregiver Present: Yes  Caregiver Feedback: Son, daughter in law supportive of patient  Co-Treatment: OT  Co-Treatment Reason: Maximize patient mobility and safety  Prior to Session Communication: Bedside nurse  Patient Position Received: Bed, 2 rail up, Alarm off, not on at start of session  General Comment: 89 year old female admit with c/o of weakness, malaise, diarrhea and fever.  Diagnosed with hypercalcemia and UTI in setting of Multiple Myeloma.    Home Living:  Home Living  Type of Home: House  Lives With: Adult children  Home Adaptive Equipment: Cane, Walker rolling or standard  Home Layout: Two level, Laundry in basement  Home Access: Stairs to enter with rails  Entrance Stairs-Number of Steps: 2  Bathroom Shower/Tub: Walk-in shower  Bathroom Toilet:  Handicapped height  Bathroom Equipment: Grab bars in shower, Shower chair with back, Hand-held shower hose  Home Living Comments: Lives with son in [facility], 1st floor bed and bath.    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  Hand Dominance: Right  Prior Function Comments: Patient indep with ADL's, amb with cane or ww.  Son manages all household tasks, drives, cooks, cleans, launders, shops.    Precautions:  Precautions  Hearing/Visual Limitations: wears glasses  Medical Precautions: Fall precautions  Precautions Comment: Up with assist    Vital Signs:     Objective     Pain:  Pain Assessment  Pain Assessment: 0-10  0-10 (Numeric) Pain Score: 0 - No pain    Cognition:  Cognition  Overall Cognitive Status: Within Functional Limits    General Assessments:  General Observation  General Observation: Pleasant and cooperative with treatment. Reports it felt good to walk!   Activity Tolerance  Endurance: Tolerates less than 10 min exercise, no significant change in vital signs  Activity Tolerance Comments: Tolerated 2 trials ambulation and toileting without complaint of weakness.     Strength  Strength Comments: at least 4+/5     Coordination  Movements are Fluid and Coordinated: Yes  Finger to [employer]: Intact  Coordination Comment: Able to pull underwear up and down, wipe at toilet.  Postural Control  Posture Comment: Increased thoracokyphosis and forward head posture  Static Sitting Balance  Static Sitting-Comment/Number of Minutes: tolerated sitting on EOB, chair and toilet  Dynamic Sitting Balance  Dynamic Sitting-Comments: able to cleanse at toilet  Static Standing Balance  Static Standing-Comment/Number of Minutes: good with  use of ww  Dynamic Standing Balance  Dynamic Standing-Comments: good to ambulate using ww    Functional Assessments:  ADL  ADL's Addressed: Yes  ADL Comments: Patient reports indep with dressing and bathing.  Bed Mobility  Bed Mobility: Yes (SBA)  Transfers  Transfer: Yes (SBA)  Ambulation/Gait Training  Ambulation/Gait Training Performed: Yes (Amb 100 ft using ww with CGA.  Slow pace with decreased stride length, grossly steady but moves very slowly.)          Extremity/Trunk Assessments:  RUE   RUE : Within Functional Limits  LUE   LUE: Within Functional Limits  RLE   RLE : Within Functional Limits  LLE   LLE : Within Functional Limits    Outcome Measures:     [facility] Basic Mobility  Turning from your back to your side while in a flat bed without using bedrails: None  Moving from lying on your back to sitting on the side of a flat bed without using bedrails: None  Moving to and from bed to chair (including a wheelchair): A little  Standing up from a chair using your arms (e.g. wheelchair or bedside chair): A little  To walk in hospital room: A little  Climbing 3-5 steps with railing: A little  Basic Mobility - Total Score: 20                                        Goals:  Encounter Problems       Encounter Problems (Active)       PT Problem       PT Goal 1 (Progressing)       Start:  07/08/24    Expected End:  07/18/24       STG - Pt will transition supine <> sitting with modified independence          PT Goal 2 (Progressing)       Start:  07/08/24    Expected End:  07/18/24       STG - Pt will transfer STS with modified independence            PT Goal 3 (Progressing)       Start:  07/08/24    Expected End:  07/18/24       STG - Pt will amb 250 ft' using ww with modified independence.               Pain - Adult            Education Documentation  Mobility Training, taught by Elena Galdamez, PT at 7/8/2024 12:00 PM.  Learner: Family, Patient  Readiness: Acceptance  Method: Explanation  Response: Verbalizes  Understanding    Education Comments  No comments found.

## 2024-07-08 NOTE — CONSULTS
Hem/Onc Consult Note:        Subjective   Bindu Cali is a 89 y.o. female with a PMHx of HTN, CKD, GERD, bronchitis, lumbar spinal stenosis, hypothyroidism, IgA kappa MM c/b hypercalcemia (MGUS diagnosis 2/2022 with progression to MM in October and starting Velcade 11/2022), who presented to [facility] on 7/6 with chief complaint of generalized weakness/malaise, bowel incontinence.     Ms. Cali is a 89 year old pleasant female with multiple myeloma.  She has been in relatively good health for her age and is in fact still working. Blood work from Feb 2022 showed a hemoglobin of 11.1 with MCV of 101.  WBC, platelets and metabolic profile was unremarkable except for baseline creatinine of 1.4.  Additional work-up revealed a normal TSH but SPEP showed 0.7 g of IgA kappa monoclonal protein.  Initial evaluation was mainly suggestive of MGUS and she was followed closely.  But routine follow-up blood work from October 2022 showed 1g of IgA kappa monoclonal protein and markedly elevated kappa free light chain assay.  Creatinine was stable at 1.3 but serum calcium was elevated at 13.1.  PET scan results were unremarkable but a bone marrow  biopsy confirmed plasma cell myeloma.  Cytogenetics and FISH testing results were unremarkable.  After discussion with the patient and family members she is currently being treated with single agent Velcade with good response.  She is also maintained on intermittent Xgeva (denosumab) for hypercalcemia.  Over time her clinical course has been complicated by fall leading to right leg surgery and then admission for C. difficile colitis etc.  Overall she is tolerating Velcade.  The patient was seen in the clinic recently for management of multiple myeloma and macrocytic anemia and for continuation of therapy and management of therapy related effects.  After good initial response, her myeloma numbers are increasing with increase in calcium and creatinine levels also.  A discussion with  the patient and her son was had addressing.  Seems like she is having gradual disease progression on single agent Velcade.  Shared decision was made to change the regimen to DVD with addition of subcutaneous Darzalex and steroids.  Probable side effects of neuropathy, thrombocytopenia, weakness, fatigue, rash etc. were explained to her in detail.  Overall prognosis is guarded.    A 10 point ROS was performed with the patient denying any complaint at this time aside from those listed in the HPI above.     Current Outpatient Medications   Medication Instructions    [START ON 7/10/2024] acetaminophen (TYLENOL 8 HOUR) 650 mg, oral, As needed, Do not crush, chew, or split. Take 1 hour prior to darzalex treatments     amLODIPine (NORVASC) 5 mg, oral, Daily    biotin 5 mg, oral, Daily    cholecalciferol (VITAMIN D-3) 400 Units, oral, Daily    dexAMETHasone (DECADRON) 20 mg, oral, As needed    diphenhydrAMINE (BENADRYL) 50 mg, oral, As needed    gabapentin (NEURONTIN) 300 mg, oral, Nightly    lactase (Lactaid) 3,000 unit tablet 1 tablet, oral, As needed    loratadine (Claritin) 10 mg tablet 1 tablet, oral, Daily    losartan (COZAAR) 100 mg, oral, Daily    montelukast (SINGULAIR) 10 mg, oral, As needed    multivit-min-iron-FA-lutein (Centrum Silver Women) 8 mg iron-400 mcg-300 mcg tablet 1 tablet, oral, Daily    potassium chloride CR 10 mEq ER tablet 10 mEq, oral, Daily, Do not crush, chew, or split.    Synthroid 100 mcg tablet TAKE 1 TABLET EVERY MORNING BEFORE MEALS FOR HYPOTHYROIDISM      HEMATOLOGY & ONCOLOGY PROBLEMS:  1.  IgA kappa multiple myeloma        a.  Initial diagnosis of MGUS in Feb 2022.       b.  Disease progression to multiple myeloma in Oct 2022.       c.  Ist line therapy with single agent Velcade beginning Nov 2022.  2.   Hypercalcemia/lytic lesions.       a.  Maintained on Xgeva  3.  Macrocytic anemia.    Past Medical History:   Diagnosis Date    Abdominal bloating 01/04/2024    Acute bacterial  bronchitis 01/04/2024    Acute bacterial sinusitis 01/04/2024    Acute kidney failure, unspecified (CMS-HCC) 11/03/2023    Acute kidney injury (CMS-[facility]) 10/14/2022    Acute urinary tract infection 05/09/2024    Allergic rhinitis 01/04/2024    Bronchitis 01/24/2024    Carrier of methicillin susceptible Staphylococcus aureus (MSSA) 01/04/2024    Chronic cough 01/04/2024    Chronic kidney disease, stage 3b (Multi) 02/25/2022    Anemia due to stage 3b chronic kidney disease    Congestion of respiratory tract 01/04/2024    Contact with and (suspected) exposure to covid-19 10/31/2022    Contusion of knee 01/04/2024    Deep vein thrombosis (DVT) (Multi) 01/04/2024    Do not resuscitate 10/31/2022    Enterocolitis due to Clostridium difficile, not specified as recurrent 11/03/2023    Essential (primary) hypertension 12/14/2022    Essential hypertension    Facial laceration 01/04/2024    Herpes zoster 01/04/2024    History of colonoscopy 01/04/2024    Comment on above: 2-;    Hypertension 11/07/2011    Hyponatremia 10/31/2022    Iatrogenic thyrotoxicosis 10/31/2022    Infection and inflammatory reaction due to internal fixation device of unspecified site, subsequent encounter 11/03/2023    Lateral epicondylitis of left elbow 01/04/2024    Menopausal problem 01/04/2024    Multiple myeloma (Multi)     Neck pain 07/28/2011    Nontraumatic tear of left rotator cuff 01/04/2024    Obstructive and reflux uropathy, unspecified 11/08/2023    Osteoarthritis of right shoulder 02/08/2012    Osteomyelitis (Multi) 10/06/2023    Other specified postprocedural states     H/O colonoscopy    Pain in both wrists 01/04/2024    Paronychia of finger of right hand 10/06/2023    Personal history of other medical treatment     History of mammogram    Personal history of other medical treatment     History of bone density study    Personal history of other medical treatment     H/O bone density study    Pneumonia 10/27/2022    Comment on  above: PNEUMONIA    Procedure and treatment not carried out because of patient's decision for unspecified reasons     Colonoscopy refused    Subacute cough 02/02/2024    Tendinitis of right elbow 01/04/2024    Vitamin D deficiency, unspecified     Vitamin D insufficiency      Past Surgical History:   Procedure Laterality Date    BREAST BIOPSY  01/22/2015    Biopsy Breast Open    CHOLECYSTECTOMY  01/22/2015    Cholecystectomy    HYSTERECTOMY  01/22/2015    Hysterectomy    OTHER SURGICAL HISTORY  04/28/2022    Shoulder surgery    ROTATOR CUFF REPAIR  01/22/2015    Rotator Cuff Repair    TONSILLECTOMY  01/22/2015    Tonsillectomy    TOTAL KNEE ARTHROPLASTY  01/22/2015    Knee Replacement     Family History   Problem Relation Name Age of Onset    Cancer Mother      Other (cardiac disorder) Mother      Other (cardiac disorder) Father      Other (cardiac disorder) Brother      Other (CVA) Brother      Other (HEART BASE TUMOR) Brother       Allergies   Allergen Reactions    Morphine Nausea/vomiting    Pollen Extracts Itching, Other and Runny nose     Runny nose, sneezing, PND affecting throat and voice. Used to take allergy shots, now controlled with Claritin daily.    Aleve [Naproxen Sodium] Palpitations    Codeine Dizziness and Confusion    Erythromycin Nausea/vomiting     Code Status: Full Code    Objective   Vitals:    07/08/24 0433 07/08/24 0500 07/08/24 0756 07/08/24 0843   BP: 170/70 140/60 (!) 208/83 164/56   BP Location: Right arm Right arm  Right arm   Pulse:   77    Resp:       Temp:   37 °C (98.6 °F)    TempSrc:       SpO2:   92%    Weight:       Height:         Labs:   Results from last 7 days   Lab Units 07/08/24  0443 07/07/24  0940 07/06/24  1237   SODIUM mmol/L 132* 131* 132*   POTASSIUM mmol/L 3.7 3.7 4.0   CHLORIDE mmol/L 102 100 98   CO2 mmol/L 21 24 26   BUN mg/dL 24* 28* 32*   CREATININE mg/dL 1.24* 1.30* 1.49*   GLUCOSE mg/dL 79 101* 127*   CALCIUM mg/dL 10.3 10.6* 12.1*     Results from last 7 days    Lab Units 07/08/24  0443   WBC AUTO x10*3/uL 24.8*   HEMOGLOBIN g/dL 8.2*   HEMATOCRIT % 24.4*   PLATELETS AUTO x10*3/uL 350     Imaging:  CT chest abdomen pelvis wo IV contrast   Final Result   CHEST   1.  New small 9 mm nodular density in the medial left lower lobe and   a few tiny scattered pulmonary nodules and reticulonodular opacities   in the bilateral upper lobes and right lower lobe. These may   represent infectious/inflammatory or neoplastic nodules. Attention on   short interval follow-up CT chest is recommended.   2. No dense area of consolidation to suggest pneumonia.   3. Tiny layering left pleural effusion with adjacent left basilar   subsegmental atelectasis.   4. Other chronic findings, as detailed above.        ABDOMEN - PELVIS   1.  Diffuse colonic wall thickening and pericolonic stranding   throughout the entire colon, concerning for acute colitis of   infectious, inflammatory or ischemic etiology.   2. Diffuse extensive diverticulosis again seen.   3. Intermediate to high density bilateral renal lesions are   indeterminate. These may represent hemorrhagic/proteinaceous renal   cysts, however solid lesion is not excluded. This may be further   assessed with nonemergent renal ultrasound and/or MRI.   4. Additional chronic incidental findings, as detailed above.             MACRO:   None        Signed by: Brandy Pinzon 7/8/2024 1:30 PM   Dictation workstation:   [id number]      CT head wo IV contrast   Final Result   No evidence of acute cortical infarct or intracranial hemorrhage.        Chronic changes as described.        MACRO:   None        Signed by: Zahraa Light 7/6/2024 2:47 PM   Dictation workstation:   [id number]      XR chest 2 views   Final Result   Small left pleural effusion with associated atelectasis and/or   pneumonitis.        MACRO:   None        Signed by: Heriberto Colorado 7/6/2024 12:45 PM   Dictation workstation:   [id number]        CT chest without contrast  10/30/2023  Impression:  1.  2 mm left subpleural nodule as described.  2. The lungs otherwise are clear considering limitations.  3. Nonunion of a left acromion fracture.      PET/CT Myeloma Initial [Oct 21 2022 10:35AM]  Impression:  1. Multiple focal hypermetabolic pulmonary nodules throughout the bilateral lungs. Findings likely represent inflammatory process,  versus less likely, malignancy. Recommend short-term follow-up CT to document resolution of nodules or tissue biopsy is clinically  indicated.  2. Several hypermetabolic nonenlarged mediastinal lymph nodes, likely reactive in etiology.  3. Mild increased metabolic activity throughout the axial and appendicular skeleton, which is nonspecific and could be related to patient's anemia versus diffuse myeloma involvement. There is no focal hypermetabolic disease involvement within the skeleton.    Physical Exam:   Exam not performed    Assessment/Plan   Bindu Cali is a 89 y.o. female with a PMHx of HTN, CKD, GERD, bronchitis, lumbar spinal stenosis, hypothyroidism, IgA kappa MM c/b hypercalcemia (MGUS diagnosis 2/2022 with progression to MM in October and starting Velcade 11/2022), [facility] chief complaint of generalized weakness/malaise, bowel incontinence.     1.  IgA kappa multiple myeloma.  Please refer to the details of initial presentation and management as outlined above. In summary, patient with finding of mild macrocytic  anemia with further work-up revealing 0.7g of IgA kappa monoclonal protein on SPEP in Feb 2022.  Clinically she was essentially asymptomatic and overall functional for her age.  She was initially followed with close observation.  Subsequent routine follow-up  blood work from Oct 2022 showed 1gm of IgA kappa monoclonal protein and markedly elevated kappa free light chain assay.  Creatinine was stable at 1.3 but serum calcium was elevated at 13.1.  PET scan results were unremarkable but a bone marrow  biopsy  confirmed plasma cell myeloma.  Cytogenetics and FISH testing was unremarkable. After discussions with the patient and family members she is currently being treated with single agent Velcade with good response.  She is also maintained on intermittent Xgeva for hypercalcemia.     Overall she is tolerating Velcade.  But after some initial good response lately her myeloma numbers are increasing with increase in calcium and creatinine levels also.  Long discussion with the patient and her son and they were explained about the concern regarding rising myeloma markers and calcium level.  Seems like she is having gradual disease progression on single agent Velcade.  Her regiment was adjusted to DVD with addition of subcutaneous Darzalex and steroids.   Probable side effects of neuropathy, thrombocytopenia, weakness, fatigue, rash etc. were explained to her in detail.  Overall prognosis is guarded.  Continue supportive care as currently facilitated by primary team.  She is clear for discharge per hem/onc.     2.  Hypercalcemia.  Secondary to baseline myeloma.  PET scan as such was negative for bony lytic lesions.  She will continue with the intermittent Xgeva as needed.  She had received Xgeva recently and will continue to receive them outpatient.     3. Follow up: Follow-up with Dr. Gray in about 4 weeks for continuation and optimization of chemotherapy with Darzalex and daratumumab combination as detailed above.  Advised to contact us immediately if there are any new questions or problems.      Sumeet Verdin, DO   Internal Medicine PGY-1     This is a preliminary note written by the resident. Please wait for attending addendum for finalization of note and recommendations.

## 2024-07-09 LAB
ANION GAP SERPL CALC-SCNC: 12 MMOL/L (ref 10–20)
BASOPHILS # BLD AUTO: 0.07 X10*3/UL (ref 0–0.1)
BASOPHILS NFR BLD AUTO: 0.2 %
BUN SERPL-MCNC: 25 MG/DL (ref 6–23)
C DIFF TOX A+B STL QL IA: POSITIVE
CALCIUM SERPL-MCNC: 9.7 MG/DL (ref 8.6–10.3)
CHLORIDE SERPL-SCNC: 99 MMOL/L (ref 98–107)
CO2 SERPL-SCNC: 21 MMOL/L (ref 21–32)
CREAT SERPL-MCNC: 1.5 MG/DL (ref 0.5–1.05)
EGFRCR SERPLBLD CKD-EPI 2021: 33 ML/MIN/1.73M*2
EOSINOPHIL # BLD AUTO: 0.11 X10*3/UL (ref 0–0.4)
EOSINOPHIL NFR BLD AUTO: 0.4 %
ERYTHROCYTE [DISTWIDTH] IN BLOOD BY AUTOMATED COUNT: 16.7 % (ref 11.5–14.5)
GLUCOSE SERPL-MCNC: 90 MG/DL (ref 74–99)
HCT VFR BLD AUTO: 25.2 % (ref 36–46)
HGB BLD-MCNC: 8.4 G/DL (ref 12–16)
HOLD SPECIMEN: NORMAL
IMM GRANULOCYTES # BLD AUTO: 0.75 X10*3/UL (ref 0–0.5)
IMM GRANULOCYTES NFR BLD AUTO: 2.4 % (ref 0–0.9)
LYMPHOCYTES # BLD AUTO: 2 X10*3/UL (ref 0.8–3)
LYMPHOCYTES NFR BLD AUTO: 6.5 %
MAGNESIUM SERPL-MCNC: 1.73 MG/DL (ref 1.6–2.4)
MCH RBC QN AUTO: 29.5 PG (ref 26–34)
MCHC RBC AUTO-ENTMCNC: 33.3 G/DL (ref 32–36)
MCV RBC AUTO: 88 FL (ref 80–100)
MONOCYTES # BLD AUTO: 2.95 X10*3/UL (ref 0.05–0.8)
MONOCYTES NFR BLD AUTO: 9.6 %
NEUTROPHILS # BLD AUTO: 24.93 X10*3/UL (ref 1.6–5.5)
NEUTROPHILS NFR BLD AUTO: 80.9 %
NRBC BLD-RTO: 0 /100 WBCS (ref 0–0)
PLATELET # BLD AUTO: 382 X10*3/UL (ref 150–450)
POTASSIUM SERPL-SCNC: 3.4 MMOL/L (ref 3.5–5.3)
RBC # BLD AUTO: 2.85 X10*6/UL (ref 4–5.2)
SODIUM SERPL-SCNC: 129 MMOL/L (ref 136–145)
WBC # BLD AUTO: 30.8 X10*3/UL (ref 4.4–11.3)

## 2024-07-09 PROCEDURE — 2500000001 HC RX 250 WO HCPCS SELF ADMINISTERED DRUGS (ALT 637 FOR MEDICARE OP)

## 2024-07-09 PROCEDURE — 99233 SBSQ HOSP IP/OBS HIGH 50: CPT

## 2024-07-09 PROCEDURE — 2500000002 HC RX 250 W HCPCS SELF ADMINISTERED DRUGS (ALT 637 FOR MEDICARE OP, ALT 636 FOR OP/ED)

## 2024-07-09 PROCEDURE — 2500000004 HC RX 250 GENERAL PHARMACY W/ HCPCS (ALT 636 FOR OP/ED)

## 2024-07-09 PROCEDURE — 1200000002 HC GENERAL ROOM WITH TELEMETRY DAILY

## 2024-07-09 PROCEDURE — 80048 BASIC METABOLIC PNL TOTAL CA: CPT

## 2024-07-09 PROCEDURE — 85025 COMPLETE CBC W/AUTO DIFF WBC: CPT

## 2024-07-09 PROCEDURE — 83735 ASSAY OF MAGNESIUM: CPT

## 2024-07-09 PROCEDURE — 36415 COLL VENOUS BLD VENIPUNCTURE: CPT

## 2024-07-09 RX ORDER — POTASSIUM CHLORIDE 1.5 G/1.58G
20 POWDER, FOR SOLUTION ORAL ONCE
Status: COMPLETED | OUTPATIENT
Start: 2024-07-09 | End: 2024-07-09

## 2024-07-09 RX ORDER — METRONIDAZOLE 500 MG/100ML
500 INJECTION, SOLUTION INTRAVENOUS EVERY 8 HOURS
Status: DISCONTINUED | OUTPATIENT
Start: 2024-07-09 | End: 2024-07-11

## 2024-07-09 RX ORDER — VANCOMYCIN HYDROCHLORIDE 125 MG/1
125 CAPSULE ORAL 4 TIMES DAILY
Status: DISCONTINUED | OUTPATIENT
Start: 2024-07-09 | End: 2024-07-09

## 2024-07-09 RX ORDER — VANCOMYCIN HYDROCHLORIDE 250 MG/1
500 CAPSULE ORAL 4 TIMES DAILY
Status: DISCONTINUED | OUTPATIENT
Start: 2024-07-09 | End: 2024-07-11

## 2024-07-09 RX ADMIN — PIPERACILLIN SODIUM AND TAZOBACTAM SODIUM 3.38 G: 3; .375 INJECTION, SOLUTION INTRAVENOUS at 17:08

## 2024-07-09 RX ADMIN — VANCOMYCIN HYDROCHLORIDE 500 MG: 250 CAPSULE ORAL at 20:52

## 2024-07-09 RX ADMIN — PIPERACILLIN SODIUM AND TAZOBACTAM SODIUM 3.38 G: 3; .375 INJECTION, SOLUTION INTRAVENOUS at 20:51

## 2024-07-09 RX ADMIN — METRONIDAZOLE 500 MG: 5 INJECTION, SOLUTION INTRAVENOUS at 17:46

## 2024-07-09 RX ADMIN — VANCOMYCIN HYDROCHLORIDE 500 MG: 250 CAPSULE ORAL at 17:09

## 2024-07-09 RX ADMIN — POTASSIUM CHLORIDE FOR ORAL SOLUTION 20 MEQ: 1.5 POWDER, FOR SOLUTION ORAL at 10:57

## 2024-07-09 RX ADMIN — GABAPENTIN 300 MG: 300 CAPSULE ORAL at 20:51

## 2024-07-09 RX ADMIN — PIPERACILLIN SODIUM AND TAZOBACTAM SODIUM 3.38 G: 3; .375 INJECTION, SOLUTION INTRAVENOUS at 03:02

## 2024-07-09 RX ADMIN — LEVOTHYROXINE SODIUM 100 MCG: 0.1 TABLET ORAL at 06:36

## 2024-07-09 RX ADMIN — METRONIDAZOLE 500 MG: 5 INJECTION, SOLUTION INTRAVENOUS at 11:42

## 2024-07-09 RX ADMIN — LORATADINE 10 MG: 10 TABLET ORAL at 10:57

## 2024-07-09 RX ADMIN — ENOXAPARIN SODIUM 30 MG: 30 INJECTION SUBCUTANEOUS at 20:51

## 2024-07-09 RX ADMIN — METRONIDAZOLE 500 MG: 5 INJECTION, SOLUTION INTRAVENOUS at 23:40

## 2024-07-09 RX ADMIN — PIPERACILLIN SODIUM AND TAZOBACTAM SODIUM 3.38 G: 3; .375 INJECTION, SOLUTION INTRAVENOUS at 10:58

## 2024-07-09 RX ADMIN — LOSARTAN POTASSIUM 100 MG: 50 TABLET, FILM COATED ORAL at 10:57

## 2024-07-09 RX ADMIN — VANCOMYCIN HYDROCHLORIDE 500 MG: 250 CAPSULE ORAL at 13:12

## 2024-07-09 RX ADMIN — AMLODIPINE BESYLATE 10 MG: 10 TABLET ORAL at 10:57

## 2024-07-09 RX ADMIN — ACETAMINOPHEN 650 MG: 325 TABLET ORAL at 21:00

## 2024-07-09 RX ADMIN — VANCOMYCIN HYDROCHLORIDE 500 MG: 250 CAPSULE ORAL at 10:57

## 2024-07-09 RX ADMIN — SODIUM CHLORIDE 500 ML: 9 INJECTION, SOLUTION INTRAVENOUS at 13:10

## 2024-07-09 ASSESSMENT — PAIN - FUNCTIONAL ASSESSMENT: PAIN_FUNCTIONAL_ASSESSMENT: 0-10

## 2024-07-09 ASSESSMENT — PAIN SCALES - GENERAL
PAINLEVEL_OUTOF10: 0 - NO PAIN
PAINLEVEL_OUTOF10: 0 - NO PAIN

## 2024-07-09 ASSESSMENT — ENCOUNTER SYMPTOMS: FEVER: 1

## 2024-07-09 NOTE — PROGRESS NOTES
Bindu Cali is a 89 y.o. female on day 3 of admission presenting with Hypercalcemia.    Subjective   Patient febrile again overnight to 101.3.  She reports a couple episodes of diarrhea this morning, but otherwise feels slightly better today.  Denies nausea, vomiting, abdominal pain, chest pain.     Objective     Last Recorded Vitals  /70   Pulse 71   Temp 37.7 °C (99.9 °F) (Temporal)   Resp 14   Wt 59.4 kg (131 lb)   SpO2 97%   Intake/Output last 3 Shifts:    Intake/Output Summary (Last 24 hours) at 7/9/2024 0732  Last data filed at 7/9/2024 0355  Gross per 24 hour   Intake 930 ml   Output 250 ml   Net 680 ml       Admission Weight  Weight: 59.4 kg (131 lb) (07/06/24 1159)    Daily Weight  07/06/24 : 59.4 kg (131 lb)    Image Results  CT chest abdomen pelvis wo IV contrast  Narrative: Interpreted By:  Brandy Pinzon,   STUDY:  CT CHEST ABDOMEN PELVIS WO CONTRAST;  7/8/2024 11:47 am      INDICATION:  Signs/Symptoms:concern for pneumonia      COMPARISON:  3 mm left upper lobe nodule (image 88),      ACCESSION NUMBER(S):  [id number]      ORDERING CLINICIAN:  SABA NEW      TECHNIQUE:  CT of the chest, abdomen, and pelvis was performed.  Contiguous axial images were obtained at  5 mm slice thickness  through the chest, and at  3 mm through the abdomen and pelvis.  Coronal and sagittal reconstructions at  3 mm slice thickness were  performed. No intravenous or oral contrast agents were administered.      FINDINGS:  CHEST:      LUNG/PLEURA/LARGE AIRWAYS:  Trachea and central airways are patent.      Patchy linear atelectasis and/or scarring noted in the inferior  lingula and left lower lobe.      There is a new small 9 mm nodular density in the medial aspect of the  left lower lobe (series 603, image 196. A few new tiny nodules and  reticulonodular opacities are noted in the bilateral upper lobes and  right lower lobe. For example, a 3 mm nodule in the left upper lobe  (image 88), right upper lobe  reticulonodular opacities (images  95-96), right lower lobe reticulonodular opacities (images [id number]),  2-3 mm nodules in the right lower lobe (for example image 170). An  8.5 mm right middle lobe perifissural nodule along the right minor  fissure is stable. No focal consolidation or other airspace opacity.      Tiny layering left pleural effusion. No right pleural effusion. No  pneumothorax.      VESSELS:  Thoracic aorta is normal in caliber, mildly tortuous with mild  atherosclerotic calcifications. Main pulmonary artery and its  branches are normal in caliber.  Coronary artery calcifications noted.      HEART:  Heart is mildly enlarged. Trace pericardial fluid.      MEDIASTINUM AND HUSEYIN:  No mediastinal adenopathy. No hilar fullness. The esophagus is normal  in course and caliber.      CHEST WALL AND LOWER NECK:  Thyroid not well seen. Mild diffuse chest wall subcutaneous edema.      No acute bony abnormality or suspicious bone lesion. Multilevel  thoracic spine degenerative changes. Reverse left shoulder  arthroplasty. Severe right shoulder joint degenerative changes.      ABDOMEN:      LIVER:  Stable 1.9 cm simple hepatic cysts in the right liver lobe within  segment 7.      GALLBLADDER:  Prior cholecystectomy.      BILE DUCTS:  No intra or extrahepatic bile duct dilation.      PANCREAS:  Pancreatic parenchymal atrophy again noted. No ductal dilation or  evidence of a focal lesion.      SPLEEN:  Unremarkable.      ADRENAL GLANDS:  Unremarkable.      KIDNEYS AND URETERS:  There is an intermediate density lesion in the right mid kidney  measuring 2.7 cm with internal Hounsfield units of 29. Another  probable cortical lesion in the anterior right mid kidney measures  1.2 cm and 43 Hounsfield units. A hyperdense lesion in the posterior  left mid kidney measures 2.6 cm and up to 48 Hounsfield units. No  hydroureteronephrosis or nephroureterolithiasis is present.      PELVIS:      BLADDER:  Urinary bladder is  distended and without inflammatory changes.      REPRODUCTIVE ORGANS:  Prior hysterectomy. No adnexal abnormality.      BOWEL:  Small sliding hiatal hernia. Partially distended stomach is  unremarkable. Large duodenal diverticulum filled with gas and fluid  is unchanged. The small bowel is normal in caliber without evidence  of focal wall thickening or obstruction.  Extensive scattered  diverticulosis is again seen, greatest in the descending and sigmoid  colon. There is diffuse circumferential wall thickening and  pericolonic stranding throughout the entire colon, concerning for  acute colitis. No pneumatosis seen. Appendix not seen.      VESSELS:  Abdominal aorta is tortuous and normal in caliber with mild  atherosclerotic calcifications.      PERITONEUM/RETROPERITONEUM/LYMPH NODES:  There is no free or loculated fluid collection, no free  intraperitoneal air.  No abdominopelvic lymphadenopathy is present.      BONES AND ABDOMINAL WALL:  Diffuse abdominal wall subcutaneous edema noted. No acute bony  abnormality or suspicious bone lesion. Multilevel lumbar spine  degenerative changes and levo scoliotic curvature again seen.      Impression: CHEST  1.  New small 9 mm nodular density in the medial left lower lobe and  a few tiny scattered pulmonary nodules and reticulonodular opacities  in the bilateral upper lobes and right lower lobe. These may  represent infectious/inflammatory or neoplastic nodules. Attention on  short interval follow-up CT chest is recommended.  2. No dense area of consolidation to suggest pneumonia.  3. Tiny layering left pleural effusion with adjacent left basilar  subsegmental atelectasis.  4. Other chronic findings, as detailed above.      ABDOMEN - PELVIS  1.  Diffuse colonic wall thickening and pericolonic stranding  throughout the entire colon, concerning for acute colitis of  infectious, inflammatory or ischemic etiology.  2. Diffuse extensive diverticulosis again seen.  3. Intermediate  to high density bilateral renal lesions are  indeterminate. These may represent hemorrhagic/proteinaceous renal  cysts, however solid lesion is not excluded. This may be further  assessed with nonemergent renal ultrasound and/or MRI.  4. Additional chronic incidental findings, as detailed above.          MACRO:  None      Signed by: Brandy Pinzon 7/8/2024 1:30 PM  Dictation workstation:   [id number]  ECG 12 lead  Normal sinus rhythm  Left axis deviation  Abnormal ECG  When compared with ECG of 06-JUL-2024 22:54, (unconfirmed)  No significant change was found      Physical Exam  Vitals and nursing note reviewed.   Constitutional:       General: She is not in acute distress.     Appearance: Normal appearance.   HENT:      Head: Normocephalic and atraumatic.   Cardiovascular:      Rate and Rhythm: Normal rate and regular rhythm.   Pulmonary:      Effort: Pulmonary effort is normal.      Breath sounds: Normal breath sounds.   Abdominal:      General: Abdomen is flat. There is no distension.      Palpations: Abdomen is soft.      Tenderness: There is no abdominal tenderness.   Musculoskeletal:      Right lower leg: No edema.      Left lower leg: No edema.   Skin:     General: Skin is warm and dry.   Neurological:      General: No focal deficit present.      Mental Status: She is alert and oriented to person, place, and time.       Assessment/Plan    89 y.o. year old female with a history of HTN, CKD, GERD, bronchitis, lumbar spinal stenosis, hypothyroidism, IgA kappa MM c/b hypercalcemia (MGUS diagnosis 2/2022 with progression to MM in October and starting Velcade 11/2022), who presents to [facility] on 7/6 with chief complaint of generalized weakness/malaise.     #Generalized weakness  #Diarrhea  #Fever  #UTI  #C. difficile colitis  #Leukocytosis  -Febrile, with leukocytosis and generalized fatigue on presentation  -CXR 7/6 showing small left pleural effusion with associated atelectasis and/or pneumonitis.  -CT AP  7/19/2024 showing diffuse colonic thickening and fat stranding suggestive of acute colitis  -UA 7/6 showing 2+ blood, 100 protein, 500 leukocyte Estrace, 3+ bacteria, 11-20 WBC. MRSA probe negative.  C. difficile PCR and toxin both positive    PLAN:  -Zofran as needed for nausea  -UA pending reflex culture  -Rocephin changed to Zosyn for broader and biotic coverage in the setting of increasing leukocytosis  -P.o. vancomycin 500 mg 4x daily and IV Flagyl started 7/9/2024 for recurrent C. difficile with concerns for fulminant infection    #IgA kappa multiple myeloma s/p Velcade 7/5 with assoc CRAB Criteria  #Mild hypercalcemia (recurrent), associated with above MM, resolved  #CKD G3b  #Chronic anemia  -MM diagnosed 2022, follows with Dr. Gray; associated with CRAB findings including bone lesions on chest x-ray 6/2023  -Received chemotherapy 7/5, labs drawn at that time showed hypercalcemia, and was given 500 mL IVF and sent home.  Endorses similar symptoms after previous chemotherapy treatments  -On admission: Hemoglobin stable around baseline of 9, creatinine 1.49 (baseline 1.3-1.5), TSH normal, calcium elevated at 12.1, ionized 1.65, EKG largely unchanged from previous    PLAN:  -Status post rehydration with NS with resolution of hypercalcemia.   -Monitor BMP while inpatient  -Hematology/oncology followed, no intervention required    #Mild hyponatremia  #Mild hypomagnesemia  - Suspect in the setting of poor PO intake    PLAN:  -Anticipate sodium correction with fluid resuscitation in the setting of dehydration  -Will give a 500cc bolus of NS, should avoid using LR given its calcium concentration  -Monitor BMP daily     Uncontrolled HTN  -Currently asymptomatic, patient and family state that blood pressure has been elevated for some time.  Averages anywhere from 160s-190s SBP  PLAN:  -Will increase home amlodipine to 10 mg and continue losartan 100 mg daily and consider as needed medications if BP remains  elevated      Chronic Problems  #GERD  #Hypothyroidism  #Neuropathy  PLAN:  -Continue home Synthroid  -Continue home gabapentin 300 mg nightly  -PT/OT    Narayan Mercado MD  PGY-1, Internal Medicine

## 2024-07-09 NOTE — CARE PLAN
The patient's goals for the shift include  rest    The clinical goals for the shift include maintain safety    Over the shift, the patient did make progress toward the following goals. Barriers to progression include none. Recommendations to address these barriers include none.

## 2024-07-09 NOTE — CARE PLAN
The patient's goals for the shift include      The clinical goals for the shift include Electrolyte WNL      Problem: Pain - Adult  Goal: Verbalizes/displays adequate comfort level or baseline comfort level  Outcome: Progressing     Problem: Safety - Adult  Goal: Free from fall injury  Outcome: Progressing     Problem: Discharge Planning  Goal: Discharge to home or other facility with appropriate resources  Outcome: Progressing     Problem: Chronic Conditions and Co-morbidities  Goal: Patient's chronic conditions and co-morbidity symptoms are monitored and maintained or improved  Outcome: Progressing     Problem: Fall/Injury  Goal: Not fall by end of shift  Outcome: Progressing  Goal: Be free from injury by end of the shift  Outcome: Progressing  Goal: Verbalize understanding of personal risk factors for fall in the hospital  Outcome: Progressing  Goal: Verbalize understanding of risk factor reduction measures to prevent injury from fall in the home  Outcome: Progressing  Goal: Use assistive devices by end of the shift  Outcome: Progressing  Goal: Pace activities to prevent fatigue by end of the shift  Outcome: Progressing     Problem: Skin  Goal: Participates in plan/prevention/treatment measures  Outcome: Progressing  Goal: Prevent/manage excess moisture  Outcome: Progressing  Goal: Prevent/minimize sheer/friction injuries  Outcome: Progressing  Goal: Promote/optimize nutrition  Outcome: Progressing

## 2024-07-10 LAB
ANION GAP SERPL CALC-SCNC: 10 MMOL/L (ref 10–20)
BASOPHILS # BLD AUTO: 0.05 X10*3/UL (ref 0–0.1)
BASOPHILS NFR BLD AUTO: 0.2 %
BUN SERPL-MCNC: 26 MG/DL (ref 6–23)
CALCIUM SERPL-MCNC: 8.5 MG/DL (ref 8.6–10.3)
CHLORIDE SERPL-SCNC: 101 MMOL/L (ref 98–107)
CHLORIDE UR-SCNC: <15 MMOL/L
CHLORIDE/CREATININE (MMOL/G) IN URINE: NORMAL
CO2 SERPL-SCNC: 20 MMOL/L (ref 21–32)
CREAT SERPL-MCNC: 1.53 MG/DL (ref 0.5–1.05)
CREAT UR-MCNC: 79.6 MG/DL (ref 20–320)
EGFRCR SERPLBLD CKD-EPI 2021: 32 ML/MIN/1.73M*2
EOSINOPHIL # BLD AUTO: 0.27 X10*3/UL (ref 0–0.4)
EOSINOPHIL NFR BLD AUTO: 1.2 %
ERYTHROCYTE [DISTWIDTH] IN BLOOD BY AUTOMATED COUNT: 16.6 % (ref 11.5–14.5)
GLUCOSE SERPL-MCNC: 93 MG/DL (ref 74–99)
HCT VFR BLD AUTO: 21.3 % (ref 36–46)
HGB BLD-MCNC: 7.3 G/DL (ref 12–16)
IMM GRANULOCYTES # BLD AUTO: 0.72 X10*3/UL (ref 0–0.5)
IMM GRANULOCYTES NFR BLD AUTO: 3.2 % (ref 0–0.9)
LYMPHOCYTES # BLD AUTO: 0.99 X10*3/UL (ref 0.8–3)
LYMPHOCYTES NFR BLD AUTO: 4.4 %
MAGNESIUM SERPL-MCNC: 1.52 MG/DL (ref 1.6–2.4)
MCH RBC QN AUTO: 30.3 PG (ref 26–34)
MCHC RBC AUTO-ENTMCNC: 34.3 G/DL (ref 32–36)
MCV RBC AUTO: 88 FL (ref 80–100)
MONOCYTES # BLD AUTO: 2.28 X10*3/UL (ref 0.05–0.8)
MONOCYTES NFR BLD AUTO: 10.1 %
NEUTROPHILS # BLD AUTO: 18.29 X10*3/UL (ref 1.6–5.5)
NEUTROPHILS NFR BLD AUTO: 80.9 %
NRBC BLD-RTO: 0 /100 WBCS (ref 0–0)
OSMOLALITY SERPL: 269 MOSM/KG (ref 280–300)
OSMOLALITY UR: 318 MOSM/KG (ref 200–1200)
PLATELET # BLD AUTO: 315 X10*3/UL (ref 150–450)
POTASSIUM SERPL-SCNC: 3.4 MMOL/L (ref 3.5–5.3)
POTASSIUM UR-SCNC: 20 MMOL/L
POTASSIUM/CREAT UR-RTO: 25 MMOL/G CREAT
RBC # BLD AUTO: 2.41 X10*6/UL (ref 4–5.2)
SODIUM SERPL-SCNC: 128 MMOL/L (ref 136–145)
SODIUM UR-SCNC: 15 MMOL/L
SODIUM/CREAT UR-RTO: 19 MMOL/G CREAT
WBC # BLD AUTO: 22.6 X10*3/UL (ref 4.4–11.3)

## 2024-07-10 PROCEDURE — 83935 ASSAY OF URINE OSMOLALITY: CPT | Mod: PARLAB

## 2024-07-10 PROCEDURE — 2500000004 HC RX 250 GENERAL PHARMACY W/ HCPCS (ALT 636 FOR OP/ED)

## 2024-07-10 PROCEDURE — 2500000001 HC RX 250 WO HCPCS SELF ADMINISTERED DRUGS (ALT 637 FOR MEDICARE OP)

## 2024-07-10 PROCEDURE — 85025 COMPLETE CBC W/AUTO DIFF WBC: CPT

## 2024-07-10 PROCEDURE — 82436 ASSAY OF URINE CHLORIDE: CPT

## 2024-07-10 PROCEDURE — 2500000002 HC RX 250 W HCPCS SELF ADMINISTERED DRUGS (ALT 637 FOR MEDICARE OP, ALT 636 FOR OP/ED)

## 2024-07-10 PROCEDURE — 99233 SBSQ HOSP IP/OBS HIGH 50: CPT

## 2024-07-10 PROCEDURE — 80048 BASIC METABOLIC PNL TOTAL CA: CPT

## 2024-07-10 PROCEDURE — 36415 COLL VENOUS BLD VENIPUNCTURE: CPT

## 2024-07-10 PROCEDURE — 1200000002 HC GENERAL ROOM WITH TELEMETRY DAILY

## 2024-07-10 PROCEDURE — 83930 ASSAY OF BLOOD OSMOLALITY: CPT | Mod: PARLAB

## 2024-07-10 PROCEDURE — 83735 ASSAY OF MAGNESIUM: CPT

## 2024-07-10 RX ORDER — POTASSIUM CHLORIDE 1.5 G/1.58G
20 POWDER, FOR SOLUTION ORAL ONCE
Status: COMPLETED | OUTPATIENT
Start: 2024-07-10 | End: 2024-07-10

## 2024-07-10 RX ORDER — MAGNESIUM SULFATE HEPTAHYDRATE 40 MG/ML
2 INJECTION, SOLUTION INTRAVENOUS ONCE
Status: COMPLETED | OUTPATIENT
Start: 2024-07-10 | End: 2024-07-10

## 2024-07-10 RX ADMIN — SODIUM CHLORIDE, POTASSIUM CHLORIDE, SODIUM LACTATE AND CALCIUM CHLORIDE 1000 ML: 600; 310; 30; 20 INJECTION, SOLUTION INTRAVENOUS at 09:37

## 2024-07-10 RX ADMIN — VANCOMYCIN HYDROCHLORIDE 500 MG: 250 CAPSULE ORAL at 21:21

## 2024-07-10 RX ADMIN — ACETAMINOPHEN 650 MG: 325 TABLET ORAL at 16:16

## 2024-07-10 RX ADMIN — AMLODIPINE BESYLATE 10 MG: 10 TABLET ORAL at 08:34

## 2024-07-10 RX ADMIN — POTASSIUM CHLORIDE FOR ORAL SOLUTION 20 MEQ: 1.5 POWDER, FOR SOLUTION ORAL at 08:34

## 2024-07-10 RX ADMIN — PIPERACILLIN SODIUM AND TAZOBACTAM SODIUM 3.38 G: 3; .375 INJECTION, SOLUTION INTRAVENOUS at 03:10

## 2024-07-10 RX ADMIN — GABAPENTIN 300 MG: 300 CAPSULE ORAL at 21:21

## 2024-07-10 RX ADMIN — VANCOMYCIN HYDROCHLORIDE 500 MG: 250 CAPSULE ORAL at 06:01

## 2024-07-10 RX ADMIN — MAGNESIUM SULFATE HEPTAHYDRATE 2 G: 40 INJECTION, SOLUTION INTRAVENOUS at 11:40

## 2024-07-10 RX ADMIN — VANCOMYCIN HYDROCHLORIDE 500 MG: 250 CAPSULE ORAL at 13:59

## 2024-07-10 RX ADMIN — METRONIDAZOLE 500 MG: 5 INJECTION, SOLUTION INTRAVENOUS at 16:11

## 2024-07-10 RX ADMIN — LEVOTHYROXINE SODIUM 100 MCG: 0.1 TABLET ORAL at 06:01

## 2024-07-10 RX ADMIN — PIPERACILLIN SODIUM AND TAZOBACTAM SODIUM 3.38 G: 3; .375 INJECTION, SOLUTION INTRAVENOUS at 08:31

## 2024-07-10 RX ADMIN — ENOXAPARIN SODIUM 30 MG: 30 INJECTION SUBCUTANEOUS at 21:22

## 2024-07-10 RX ADMIN — LOSARTAN POTASSIUM 100 MG: 50 TABLET, FILM COATED ORAL at 08:34

## 2024-07-10 RX ADMIN — VANCOMYCIN HYDROCHLORIDE 500 MG: 250 CAPSULE ORAL at 16:16

## 2024-07-10 RX ADMIN — METRONIDAZOLE 500 MG: 5 INJECTION, SOLUTION INTRAVENOUS at 23:05

## 2024-07-10 RX ADMIN — LORATADINE 10 MG: 10 TABLET ORAL at 08:34

## 2024-07-10 RX ADMIN — METRONIDAZOLE 500 MG: 5 INJECTION, SOLUTION INTRAVENOUS at 09:27

## 2024-07-10 SDOH — ECONOMIC STABILITY: HOUSING INSECURITY: IN THE PAST 12 MONTHS, HOW MANY TIMES HAVE YOU MOVED WHERE YOU WERE LIVING?: 1

## 2024-07-10 SDOH — ECONOMIC STABILITY: HOUSING INSECURITY: AT ANY TIME IN THE PAST 12 MONTHS, WERE YOU HOMELESS OR LIVING IN A SHELTER (INCLUDING NOW)?: NO

## 2024-07-10 ASSESSMENT — PAIN SCALES - GENERAL
PAINLEVEL_OUTOF10: 1
PAINLEVEL_OUTOF10: 0 - NO PAIN
PAINLEVEL_OUTOF10: 3
PAINLEVEL_OUTOF10: 0 - NO PAIN

## 2024-07-10 ASSESSMENT — COGNITIVE AND FUNCTIONAL STATUS - GENERAL
CLIMB 3 TO 5 STEPS WITH RAILING: A LITTLE
TOILETING: A LITTLE
MOVING TO AND FROM BED TO CHAIR: A LITTLE
HELP NEEDED FOR BATHING: A LITTLE
WALKING IN HOSPITAL ROOM: A LITTLE
DAILY ACTIVITIY SCORE: 18
DRESSING REGULAR LOWER BODY CLOTHING: A LOT
DRESSING REGULAR UPPER BODY CLOTHING: A LITTLE
STANDING UP FROM CHAIR USING ARMS: A LITTLE
MOBILITY SCORE: 20
PERSONAL GROOMING: A LITTLE

## 2024-07-10 ASSESSMENT — PAIN - FUNCTIONAL ASSESSMENT
PAIN_FUNCTIONAL_ASSESSMENT: 0-10

## 2024-07-10 ASSESSMENT — PAIN DESCRIPTION - LOCATION: LOCATION: SHOULDER

## 2024-07-10 NOTE — CARE PLAN
The patient's goals for the shift include  rest    The clinical goals for the shift include patient will remain hemodynamically stable    Over the shift, the patient did  make progress toward the following goals. Barriers to progression include none. Recommendations to address these barriers include none.

## 2024-07-10 NOTE — PROGRESS NOTES
Bindu Cali is a 89 y.o. female on day 4 of admission presenting with Hypercalcemia.    Subjective   No acute overnight events.  Patient states that she feels better this morning, and has not had diarrhea so far today.  Does not have much of an appetite because she has been sitting in bed all day and feels like her mouth is somewhat dry.  Denies fevers, chills, abdominal pain, shortness of breath, chest pain.     Objective     Last Recorded Vitals  /60   Pulse 62   Temp 36.1 °C (97 °F)   Resp 18   Wt 59.4 kg (131 lb)   SpO2 98%   Intake/Output last 3 Shifts:    Intake/Output Summary (Last 24 hours) at 7/10/2024 0740  Last data filed at 7/9/2024 1932  Gross per 24 hour   Intake 750 ml   Output 450 ml   Net 300 ml       Admission Weight  Weight: 59.4 kg (131 lb) (07/06/24 1159)    Daily Weight  07/06/24 : 59.4 kg (131 lb)    Image Results  CT chest abdomen pelvis wo IV contrast  Narrative: Interpreted By:  Brandy Pinzon,   STUDY:  CT CHEST ABDOMEN PELVIS WO CONTRAST;  7/8/2024 11:47 am      INDICATION:  Signs/Symptoms:concern for pneumonia      COMPARISON:  3 mm left upper lobe nodule (image 88),      ACCESSION NUMBER(S):  [id number]      ORDERING CLINICIAN:  SABA NEW      TECHNIQUE:  CT of the chest, abdomen, and pelvis was performed.  Contiguous axial images were obtained at  5 mm slice thickness  through the chest, and at  3 mm through the abdomen and pelvis.  Coronal and sagittal reconstructions at  3 mm slice thickness were  performed. No intravenous or oral contrast agents were administered.      FINDINGS:  CHEST:      LUNG/PLEURA/LARGE AIRWAYS:  Trachea and central airways are patent.      Patchy linear atelectasis and/or scarring noted in the inferior  lingula and left lower lobe.      There is a new small 9 mm nodular density in the medial aspect of the  left lower lobe (series 603, image 196. A few new tiny nodules and  reticulonodular opacities are noted in the bilateral upper  lobes and  right lower lobe. For example, a 3 mm nodule in the left upper lobe  (image 88), right upper lobe reticulonodular opacities (images  95-96), right lower lobe reticulonodular opacities (images [id number]),  2-3 mm nodules in the right lower lobe (for example image 170). An  8.5 mm right middle lobe perifissural nodule along the right minor  fissure is stable. No focal consolidation or other airspace opacity.      Tiny layering left pleural effusion. No right pleural effusion. No  pneumothorax.      VESSELS:  Thoracic aorta is normal in caliber, mildly tortuous with mild  atherosclerotic calcifications. Main pulmonary artery and its  branches are normal in caliber.  Coronary artery calcifications noted.      HEART:  Heart is mildly enlarged. Trace pericardial fluid.      MEDIASTINUM AND HUSEYIN:  No mediastinal adenopathy. No hilar fullness. The esophagus is normal  in course and caliber.      CHEST WALL AND LOWER NECK:  Thyroid not well seen. Mild diffuse chest wall subcutaneous edema.      No acute bony abnormality or suspicious bone lesion. Multilevel  thoracic spine degenerative changes. Reverse left shoulder  arthroplasty. Severe right shoulder joint degenerative changes.      ABDOMEN:      LIVER:  Stable 1.9 cm simple hepatic cysts in the right liver lobe within  segment 7.      GALLBLADDER:  Prior cholecystectomy.      BILE DUCTS:  No intra or extrahepatic bile duct dilation.      PANCREAS:  Pancreatic parenchymal atrophy again noted. No ductal dilation or  evidence of a focal lesion.      SPLEEN:  Unremarkable.      ADRENAL GLANDS:  Unremarkable.      KIDNEYS AND URETERS:  There is an intermediate density lesion in the right mid kidney  measuring 2.7 cm with internal Hounsfield units of 29. Another  probable cortical lesion in the anterior right mid kidney measures  1.2 cm and 43 Hounsfield units. A hyperdense lesion in the posterior  left mid kidney measures 2.6 cm and up to 48 Hounsfield units.  No  hydroureteronephrosis or nephroureterolithiasis is present.      PELVIS:      BLADDER:  Urinary bladder is distended and without inflammatory changes.      REPRODUCTIVE ORGANS:  Prior hysterectomy. No adnexal abnormality.      BOWEL:  Small sliding hiatal hernia. Partially distended stomach is  unremarkable. Large duodenal diverticulum filled with gas and fluid  is unchanged. The small bowel is normal in caliber without evidence  of focal wall thickening or obstruction.  Extensive scattered  diverticulosis is again seen, greatest in the descending and sigmoid  colon. There is diffuse circumferential wall thickening and  pericolonic stranding throughout the entire colon, concerning for  acute colitis. No pneumatosis seen. Appendix not seen.      VESSELS:  Abdominal aorta is tortuous and normal in caliber with mild  atherosclerotic calcifications.      PERITONEUM/RETROPERITONEUM/LYMPH NODES:  There is no free or loculated fluid collection, no free  intraperitoneal air.  No abdominopelvic lymphadenopathy is present.      BONES AND ABDOMINAL WALL:  Diffuse abdominal wall subcutaneous edema noted. No acute bony  abnormality or suspicious bone lesion. Multilevel lumbar spine  degenerative changes and levo scoliotic curvature again seen.      Impression: CHEST  1.  New small 9 mm nodular density in the medial left lower lobe and  a few tiny scattered pulmonary nodules and reticulonodular opacities  in the bilateral upper lobes and right lower lobe. These may  represent infectious/inflammatory or neoplastic nodules. Attention on  short interval follow-up CT chest is recommended.  2. No dense area of consolidation to suggest pneumonia.  3. Tiny layering left pleural effusion with adjacent left basilar  subsegmental atelectasis.  4. Other chronic findings, as detailed above.      ABDOMEN - PELVIS  1.  Diffuse colonic wall thickening and pericolonic stranding  throughout the entire colon, concerning for acute colitis  of  infectious, inflammatory or ischemic etiology.  2. Diffuse extensive diverticulosis again seen.  3. Intermediate to high density bilateral renal lesions are  indeterminate. These may represent hemorrhagic/proteinaceous renal  cysts, however solid lesion is not excluded. This may be further  assessed with nonemergent renal ultrasound and/or MRI.  4. Additional chronic incidental findings, as detailed above.          MACRO:  None      Signed by: Brandy Pinzon 7/8/2024 1:30 PM  Dictation workstation:   [id number]  ECG 12 lead  Normal sinus rhythm  Left axis deviation  Abnormal ECG  When compared with ECG of 06-JUL-2024 22:54, (unconfirmed)  No significant change was found      Physical Exam  Vitals and nursing note reviewed.   Constitutional:       General: She is not in acute distress.     Appearance: Normal appearance.   HENT:      Head: Normocephalic and atraumatic.   Cardiovascular:      Rate and Rhythm: Normal rate and regular rhythm.   Pulmonary:      Effort: Pulmonary effort is normal.      Breath sounds: Normal breath sounds.   Abdominal:      General: Abdomen is flat. There is no distension.      Palpations: Abdomen is soft.      Tenderness: There is no abdominal tenderness.   Musculoskeletal:      Right lower leg: No edema.      Left lower leg: No edema.   Skin:     General: Skin is warm and dry.   Neurological:      General: No focal deficit present.      Mental Status: She is alert and oriented to person, place, and time.       Assessment/Plan    89 y.o. year old female with a history of HTN, CKD, GERD, bronchitis, lumbar spinal stenosis, hypothyroidism, IgA kappa MM c/b hypercalcemia (MGUS diagnosis 2/2022 with progression to MM in October and starting Velcade 11/2022), who presents to [facility] on 7/6 with chief complaint of generalized weakness/malaise.     #Generalized weakness  #Diarrhea  #Fever  #UTI  #C. difficile colitis  #Leukocytosis  -Febrile, with leukocytosis and generalized fatigue on  presentation  -CXR 7/6 showing small left pleural effusion with associated atelectasis and/or pneumonitis.  -CT AP 7/19/2024 showing diffuse colonic thickening and fat stranding suggestive of acute colitis  -UA 7/6 showing 2+ blood, 100 protein, 500 leukocyte Estrace, 3+ bacteria, 11-20 WBC. MRSA probe negative.  C. difficile PCR and toxin both positive    PLAN:  -Zofran as needed for nausea  -Discontinue Zosyn  -Continue p.o. vancomycin 500 mg 4x daily and IV Flagyl started on 7/9/2024     #IgA kappa multiple myeloma s/p Velcade 7/5 with assoc CRAB Criteria  #Mild hypercalcemia (recurrent), associated with above MM, resolved  #CKD G3b  #Chronic anemia  -MM diagnosed 2022, follows with Dr. Gray; associated with CRAB findings including bone lesions on chest x-ray 6/2023  -Received chemotherapy 7/5, labs drawn at that time showed hypercalcemia, and was given 500 mL IVF and sent home.  Endorses similar symptoms after previous chemotherapy treatments  -On admission: Hemoglobin stable around baseline of 9, creatinine 1.49 (baseline 1.3-1.5), TSH normal, calcium elevated at 12.1, ionized 1.65, EKG largely unchanged from previous    PLAN:  -Status post rehydration with NS with resolution of hypercalcemia.   -Monitor BMP while inpatient  -Hematology/oncology followed, no intervention required    #Mild hyponatremia  #Mild hypomagnesemia  - Suspect in the setting of poor PO intake    PLAN:  -Anticipate sodium correction with fluid resuscitation in the setting of dehydration  -Will give 1 L of LR today, as patient appears dry and reports decreased oral intake  -Monitor BMP daily and replete as necessary     Uncontrolled HTN  -Currently asymptomatic, patient and family state that blood pressure has been elevated for some time.  Averages anywhere from 160s-190s SBP    PLAN:  -Will increase home amlodipine to 10 mg and continue losartan 100 mg daily and consider as needed medications if BP remains elevated      Chronic  Problems  #GERD  #Hypothyroidism  #Neuropathy  PLAN:  -Continue home Synthroid  -Continue home gabapentin 300 mg nightly  -PT/OT    Narayan Mercado MD  PGY-1, Internal Medicine

## 2024-07-11 ENCOUNTER — APPOINTMENT (OUTPATIENT)
Dept: HEMATOLOGY/ONCOLOGY | Facility: CLINIC | Age: 89
End: 2024-07-11
Payer: MEDICARE

## 2024-07-11 LAB
ACANTHOCYTES BLD QL SMEAR: ABNORMAL
ANION GAP SERPL CALC-SCNC: 8 MMOL/L (ref 10–20)
BACTERIA BLD CULT: NORMAL
BACTERIA BLD CULT: NORMAL
BASOPHILS # BLD MANUAL: 0 X10*3/UL (ref 0–0.1)
BASOPHILS NFR BLD MANUAL: 0 %
BUN SERPL-MCNC: 24 MG/DL (ref 6–23)
BURR CELLS BLD QL SMEAR: ABNORMAL
CALCIUM SERPL-MCNC: 8 MG/DL (ref 8.6–10.3)
CHLORIDE SERPL-SCNC: 104 MMOL/L (ref 98–107)
CO2 SERPL-SCNC: 20 MMOL/L (ref 21–32)
CREAT SERPL-MCNC: 1.36 MG/DL (ref 0.5–1.05)
EGFRCR SERPLBLD CKD-EPI 2021: 37 ML/MIN/1.73M*2
EOSINOPHIL # BLD MANUAL: 0.42 X10*3/UL (ref 0–0.4)
EOSINOPHIL NFR BLD MANUAL: 3 %
ERYTHROCYTE [DISTWIDTH] IN BLOOD BY AUTOMATED COUNT: 16.8 % (ref 11.5–14.5)
GLUCOSE SERPL-MCNC: 95 MG/DL (ref 74–99)
HCT VFR BLD AUTO: 21.4 % (ref 36–46)
HGB BLD-MCNC: 7.1 G/DL (ref 12–16)
IMM GRANULOCYTES # BLD AUTO: 0.89 X10*3/UL (ref 0–0.5)
IMM GRANULOCYTES NFR BLD AUTO: 6.3 % (ref 0–0.9)
LYMPHOCYTES # BLD MANUAL: 1.13 X10*3/UL (ref 0.8–3)
LYMPHOCYTES NFR BLD MANUAL: 8 %
MCH RBC QN AUTO: 29.7 PG (ref 26–34)
MCHC RBC AUTO-ENTMCNC: 33.2 G/DL (ref 32–36)
MCV RBC AUTO: 90 FL (ref 80–100)
METAMYELOCYTES # BLD MANUAL: 0.42 X10*3/UL
METAMYELOCYTES NFR BLD MANUAL: 3 %
MONOCYTES # BLD MANUAL: 1.41 X10*3/UL (ref 0.05–0.8)
MONOCYTES NFR BLD MANUAL: 10 %
MYELOCYTES # BLD MANUAL: 0.28 X10*3/UL
MYELOCYTES NFR BLD MANUAL: 2 %
NEUTROPHILS # BLD MANUAL: 10.44 X10*3/UL (ref 1.6–5.5)
NEUTS BAND # BLD MANUAL: 0.99 X10*3/UL (ref 0–0.5)
NEUTS BAND NFR BLD MANUAL: 7 %
NEUTS SEG # BLD MANUAL: 9.45 X10*3/UL (ref 1.6–5)
NEUTS SEG NFR BLD MANUAL: 67 %
NRBC BLD-RTO: 0 /100 WBCS (ref 0–0)
OVALOCYTES BLD QL SMEAR: ABNORMAL
PLATELET # BLD AUTO: 329 X10*3/UL (ref 150–450)
POTASSIUM SERPL-SCNC: 3.4 MMOL/L (ref 3.5–5.3)
RBC # BLD AUTO: 2.39 X10*6/UL (ref 4–5.2)
RBC MORPH BLD: ABNORMAL
SCHISTOCYTES BLD QL SMEAR: ABNORMAL
SODIUM SERPL-SCNC: 129 MMOL/L (ref 136–145)
TOTAL CELLS COUNTED BLD: 100
WBC # BLD AUTO: 14.1 X10*3/UL (ref 4.4–11.3)

## 2024-07-11 PROCEDURE — 99232 SBSQ HOSP IP/OBS MODERATE 35: CPT

## 2024-07-11 PROCEDURE — 1200000002 HC GENERAL ROOM WITH TELEMETRY DAILY

## 2024-07-11 PROCEDURE — 2500000001 HC RX 250 WO HCPCS SELF ADMINISTERED DRUGS (ALT 637 FOR MEDICARE OP)

## 2024-07-11 PROCEDURE — 36415 COLL VENOUS BLD VENIPUNCTURE: CPT

## 2024-07-11 PROCEDURE — 2500000004 HC RX 250 GENERAL PHARMACY W/ HCPCS (ALT 636 FOR OP/ED)

## 2024-07-11 PROCEDURE — 85007 BL SMEAR W/DIFF WBC COUNT: CPT

## 2024-07-11 PROCEDURE — 2500000002 HC RX 250 W HCPCS SELF ADMINISTERED DRUGS (ALT 637 FOR MEDICARE OP, ALT 636 FOR OP/ED)

## 2024-07-11 PROCEDURE — 85027 COMPLETE CBC AUTOMATED: CPT

## 2024-07-11 PROCEDURE — 82374 ASSAY BLOOD CARBON DIOXIDE: CPT

## 2024-07-11 RX ORDER — VANCOMYCIN HYDROCHLORIDE 125 MG/1
125 CAPSULE ORAL 4 TIMES DAILY
Status: DISCONTINUED | OUTPATIENT
Start: 2024-07-11 | End: 2024-07-16 | Stop reason: HOSPADM

## 2024-07-11 RX ORDER — POTASSIUM CHLORIDE 1.5 G/1.58G
40 POWDER, FOR SOLUTION ORAL ONCE
Status: COMPLETED | OUTPATIENT
Start: 2024-07-11 | End: 2024-07-11

## 2024-07-11 RX ADMIN — LEVOTHYROXINE SODIUM 100 MCG: 0.1 TABLET ORAL at 06:16

## 2024-07-11 RX ADMIN — METRONIDAZOLE 500 MG: 5 INJECTION, SOLUTION INTRAVENOUS at 09:53

## 2024-07-11 RX ADMIN — VANCOMYCIN HYDROCHLORIDE 500 MG: 250 CAPSULE ORAL at 06:16

## 2024-07-11 RX ADMIN — LORATADINE 10 MG: 10 TABLET ORAL at 09:52

## 2024-07-11 RX ADMIN — VANCOMYCIN HYDROCHLORIDE 125 MG: 125 CAPSULE ORAL at 17:13

## 2024-07-11 RX ADMIN — ACETAMINOPHEN 650 MG: 325 TABLET ORAL at 23:31

## 2024-07-11 RX ADMIN — AMLODIPINE BESYLATE 10 MG: 10 TABLET ORAL at 09:52

## 2024-07-11 RX ADMIN — ENOXAPARIN SODIUM 30 MG: 30 INJECTION SUBCUTANEOUS at 20:17

## 2024-07-11 RX ADMIN — SODIUM CHLORIDE 1000 ML: 9 INJECTION, SOLUTION INTRAVENOUS at 09:53

## 2024-07-11 RX ADMIN — ACETAMINOPHEN 650 MG: 325 TABLET ORAL at 13:44

## 2024-07-11 RX ADMIN — VANCOMYCIN HYDROCHLORIDE 125 MG: 125 CAPSULE ORAL at 20:17

## 2024-07-11 RX ADMIN — POTASSIUM CHLORIDE FOR ORAL SOLUTION 40 MEQ: 1.5 POWDER, FOR SOLUTION ORAL at 09:52

## 2024-07-11 RX ADMIN — VANCOMYCIN HYDROCHLORIDE 125 MG: 125 CAPSULE ORAL at 13:39

## 2024-07-11 RX ADMIN — LOSARTAN POTASSIUM 100 MG: 50 TABLET, FILM COATED ORAL at 09:52

## 2024-07-11 RX ADMIN — GABAPENTIN 300 MG: 300 CAPSULE ORAL at 20:17

## 2024-07-11 ASSESSMENT — COGNITIVE AND FUNCTIONAL STATUS - GENERAL
DRESSING REGULAR UPPER BODY CLOTHING: A LITTLE
TOILETING: A LITTLE
WALKING IN HOSPITAL ROOM: A LITTLE
STANDING UP FROM CHAIR USING ARMS: A LITTLE
MOBILITY SCORE: 17
CLIMB 3 TO 5 STEPS WITH RAILING: A LOT
MOVING FROM LYING ON BACK TO SITTING ON SIDE OF FLAT BED WITH BEDRAILS: A LITTLE
TURNING FROM BACK TO SIDE WHILE IN FLAT BAD: A LITTLE
PERSONAL GROOMING: A LITTLE
MOVING TO AND FROM BED TO CHAIR: A LITTLE
DAILY ACTIVITIY SCORE: 19
DRESSING REGULAR LOWER BODY CLOTHING: A LITTLE
HELP NEEDED FOR BATHING: A LITTLE

## 2024-07-11 ASSESSMENT — PAIN SCALES - GENERAL
PAINLEVEL_OUTOF10: 4
PAINLEVEL_OUTOF10: 0 - NO PAIN

## 2024-07-11 ASSESSMENT — PAIN DESCRIPTION - LOCATION: LOCATION: GENERALIZED

## 2024-07-11 ASSESSMENT — PAIN SCALES - PAIN ASSESSMENT IN ADVANCED DEMENTIA (PAINAD): TOTALSCORE: MEDICATION (SEE MAR)

## 2024-07-11 ASSESSMENT — PAIN - FUNCTIONAL ASSESSMENT
PAIN_FUNCTIONAL_ASSESSMENT: 0-10

## 2024-07-11 NOTE — CARE PLAN
Problem: Pain - Adult  Goal: Verbalizes/displays adequate comfort level or baseline comfort level  Outcome: Progressing     Problem: Safety - Adult  Goal: Free from fall injury  Outcome: Progressing     Problem: Discharge Planning  Goal: Discharge to home or other facility with appropriate resources  Outcome: Progressing     Problem: Chronic Conditions and Co-morbidities  Goal: Patient's chronic conditions and co-morbidity symptoms are monitored and maintained or improved  Outcome: Progressing     Problem: Fall/Injury  Goal: Not fall by end of shift  Outcome: Progressing  Goal: Be free from injury by end of the shift  Outcome: Progressing  Goal: Verbalize understanding of personal risk factors for fall in the hospital  Outcome: Progressing  Goal: Verbalize understanding of risk factor reduction measures to prevent injury from fall in the home  Outcome: Progressing  Goal: Use assistive devices by end of the shift  Outcome: Progressing  Goal: Pace activities to prevent fatigue by end of the shift  Outcome: Progressing     Problem: Skin  Goal: Participates in plan/prevention/treatment measures  Outcome: Progressing  Goal: Prevent/manage excess moisture  Outcome: Progressing  Goal: Prevent/minimize sheer/friction injuries  Outcome: Progressing  Goal: Promote/optimize nutrition  Outcome: Progressing   The patient's goals for the shift include      The clinical goals for the shift include bowel regimen & antibiotics

## 2024-07-11 NOTE — PROGRESS NOTES
Bindu Cali is a 89 y.o. female on day 5 of admission presenting with Hypercalcemia.    Subjective   Seen and examined this morning, no acute events overnight.  She reports that she feels better today and feels less weak and tired.  She feels like she has had diarrhea although has been told by staff that she has not had any.  Patient states that she is eating well, had oatmeal and toast for breakfast this morning.     Objective     Last Recorded Vitals  /70   Pulse 64   Temp 36.1 °C (97 °F)   Resp 18   Wt 59.4 kg (131 lb)   SpO2 98%   Intake/Output last 3 Shifts:    Intake/Output Summary (Last 24 hours) at 7/11/2024 0756  Last data filed at 7/11/2024 0434  Gross per 24 hour   Intake 1400 ml   Output 1000 ml   Net 400 ml       Admission Weight  Weight: 59.4 kg (131 lb) (07/06/24 1159)    Daily Weight  07/06/24 : 59.4 kg (131 lb)    Image Results  CT chest abdomen pelvis wo IV contrast  Narrative: Interpreted By:  Brandy Pinzon,   STUDY:  CT CHEST ABDOMEN PELVIS WO CONTRAST;  7/8/2024 11:47 am      INDICATION:  Signs/Symptoms:concern for pneumonia      COMPARISON:  3 mm left upper lobe nodule (image 88),      ACCESSION NUMBER(S):  [id number]      ORDERING CLINICIAN:  SABA NEW      TECHNIQUE:  CT of the chest, abdomen, and pelvis was performed.  Contiguous axial images were obtained at  5 mm slice thickness  through the chest, and at  3 mm through the abdomen and pelvis.  Coronal and sagittal reconstructions at  3 mm slice thickness were  performed. No intravenous or oral contrast agents were administered.      FINDINGS:  CHEST:      LUNG/PLEURA/LARGE AIRWAYS:  Trachea and central airways are patent.      Patchy linear atelectasis and/or scarring noted in the inferior  lingula and left lower lobe.      There is a new small 9 mm nodular density in the medial aspect of the  left lower lobe (series 603, image 196. A few new tiny nodules and  reticulonodular opacities are noted in the bilateral  upper lobes and  right lower lobe. For example, a 3 mm nodule in the left upper lobe  (image 88), right upper lobe reticulonodular opacities (images  95-96), right lower lobe reticulonodular opacities (images [id number]),  2-3 mm nodules in the right lower lobe (for example image 170). An  8.5 mm right middle lobe perifissural nodule along the right minor  fissure is stable. No focal consolidation or other airspace opacity.      Tiny layering left pleural effusion. No right pleural effusion. No  pneumothorax.      VESSELS:  Thoracic aorta is normal in caliber, mildly tortuous with mild  atherosclerotic calcifications. Main pulmonary artery and its  branches are normal in caliber.  Coronary artery calcifications noted.      HEART:  Heart is mildly enlarged. Trace pericardial fluid.      MEDIASTINUM AND HUSEYIN:  No mediastinal adenopathy. No hilar fullness. The esophagus is normal  in course and caliber.      CHEST WALL AND LOWER NECK:  Thyroid not well seen. Mild diffuse chest wall subcutaneous edema.      No acute bony abnormality or suspicious bone lesion. Multilevel  thoracic spine degenerative changes. Reverse left shoulder  arthroplasty. Severe right shoulder joint degenerative changes.      ABDOMEN:      LIVER:  Stable 1.9 cm simple hepatic cysts in the right liver lobe within  segment 7.      GALLBLADDER:  Prior cholecystectomy.      BILE DUCTS:  No intra or extrahepatic bile duct dilation.      PANCREAS:  Pancreatic parenchymal atrophy again noted. No ductal dilation or  evidence of a focal lesion.      SPLEEN:  Unremarkable.      ADRENAL GLANDS:  Unremarkable.      KIDNEYS AND URETERS:  There is an intermediate density lesion in the right mid kidney  measuring 2.7 cm with internal Hounsfield units of 29. Another  probable cortical lesion in the anterior right mid kidney measures  1.2 cm and 43 Hounsfield units. A hyperdense lesion in the posterior  left mid kidney measures 2.6 cm and up to 48 Hounsfield units.  No  hydroureteronephrosis or nephroureterolithiasis is present.      PELVIS:      BLADDER:  Urinary bladder is distended and without inflammatory changes.      REPRODUCTIVE ORGANS:  Prior hysterectomy. No adnexal abnormality.      BOWEL:  Small sliding hiatal hernia. Partially distended stomach is  unremarkable. Large duodenal diverticulum filled with gas and fluid  is unchanged. The small bowel is normal in caliber without evidence  of focal wall thickening or obstruction.  Extensive scattered  diverticulosis is again seen, greatest in the descending and sigmoid  colon. There is diffuse circumferential wall thickening and  pericolonic stranding throughout the entire colon, concerning for  acute colitis. No pneumatosis seen. Appendix not seen.      VESSELS:  Abdominal aorta is tortuous and normal in caliber with mild  atherosclerotic calcifications.      PERITONEUM/RETROPERITONEUM/LYMPH NODES:  There is no free or loculated fluid collection, no free  intraperitoneal air.  No abdominopelvic lymphadenopathy is present.      BONES AND ABDOMINAL WALL:  Diffuse abdominal wall subcutaneous edema noted. No acute bony  abnormality or suspicious bone lesion. Multilevel lumbar spine  degenerative changes and levo scoliotic curvature again seen.      Impression: CHEST  1.  New small 9 mm nodular density in the medial left lower lobe and  a few tiny scattered pulmonary nodules and reticulonodular opacities  in the bilateral upper lobes and right lower lobe. These may  represent infectious/inflammatory or neoplastic nodules. Attention on  short interval follow-up CT chest is recommended.  2. No dense area of consolidation to suggest pneumonia.  3. Tiny layering left pleural effusion with adjacent left basilar  subsegmental atelectasis.  4. Other chronic findings, as detailed above.      ABDOMEN - PELVIS  1.  Diffuse colonic wall thickening and pericolonic stranding  throughout the entire colon, concerning for acute colitis  of  infectious, inflammatory or ischemic etiology.  2. Diffuse extensive diverticulosis again seen.  3. Intermediate to high density bilateral renal lesions are  indeterminate. These may represent hemorrhagic/proteinaceous renal  cysts, however solid lesion is not excluded. This may be further  assessed with nonemergent renal ultrasound and/or MRI.  4. Additional chronic incidental findings, as detailed above.          MACRO:  None      Signed by: Brandy Pinzon 7/8/2024 1:30 PM  Dictation workstation:   [id number]  ECG 12 lead  Normal sinus rhythm  Left axis deviation  Abnormal ECG  When compared with ECG of 06-JUL-2024 22:54, (unconfirmed)  No significant change was found      Physical Exam  Vitals and nursing note reviewed.   Constitutional:       General: She is not in acute distress.     Appearance: Normal appearance.   HENT:      Head: Normocephalic and atraumatic.   Cardiovascular:      Rate and Rhythm: Normal rate and regular rhythm.   Pulmonary:      Effort: Pulmonary effort is normal.      Breath sounds: Normal breath sounds.   Abdominal:      General: Abdomen is flat. There is no distension.      Palpations: Abdomen is soft.      Tenderness: There is no abdominal tenderness.   Musculoskeletal:      Right lower leg: No edema.      Left lower leg: No edema.   Skin:     General: Skin is warm and dry.   Neurological:      General: No focal deficit present.      Mental Status: She is alert and oriented to person, place, and time.       Assessment/Plan    89 y.o. year old female with a history of HTN, CKD, GERD, bronchitis, lumbar spinal stenosis, hypothyroidism, IgA kappa MM c/b hypercalcemia (MGUS diagnosis 2/2022 with progression to MM in October and starting Velcade 11/2022), who presents to [facility] on 7/6 with chief complaint of generalized weakness/malaise. C.diff testing positive, currently on tapering regimen of vancomycin for recurrent c diff infection.    #Generalized  weakness  #Diarrhea  #Fever  #UTI  #Recurrent C. difficile colitis  #Leukocytosis  -Febrile, with leukocytosis and generalized fatigue on presentation  -CXR 7/6 showing small left pleural effusion with associated atelectasis and/or pneumonitis.  -CT AP 7/19/2024 showing diffuse colonic thickening and fat stranding suggestive of acute colitis  -UA 7/6 showing 2+ blood, 100 protein, 500 leukocyte Estrace, 3+ bacteria, 11-20 WBC. MRSA probe negative.  C. difficile PCR and toxin both positive    PLAN:  -Zofran as needed for nausea  -Will discontinue Flagyl today as fulminant C. difficile is unlikely.  Decrease vancomycin to 125 mg 4 times daily.  Given patient's recurrent C. difficile she will be on a pulse and taper regimen of Vancomycin: 125 mg 4 times daily for 7 days followed by 125 mg twice daily for 7 days, 125 milligrams daily for 7 days, and finish on 125 mg every other day for 2 weeks    #IgA kappa multiple myeloma s/p Velcade 7/5 with assoc CRAB Criteria  #Mild hypercalcemia (recurrent), associated with above MM, resolved  #CKD G3b  #Chronic anemia  -MM diagnosed 2022, follows with Dr. Gray; associated with CRAB findings including bone lesions on chest x-ray 6/2023  -Received chemotherapy 7/5, labs drawn at that time showed hypercalcemia, and was given 500 mL IVF and sent home.  Endorses similar symptoms after previous chemotherapy treatments  -On admission: Hemoglobin stable around baseline of 9, creatinine 1.49 (baseline 1.3-1.5), TSH normal, calcium elevated at 12.1, ionized 1.65, EKG largely unchanged from previous    PLAN:  -Status post rehydration with NS with resolution of hypercalcemia.   -Monitor BMP while inpatient  -Hematology/oncology followed, no intervention required    #Mild hyponatremia  #Mild hypomagnesemia  -Suspect in the setting of poor PO intake  -Serum osmolality slightly decreased at 269, urine sodium 15, urine osmolality 318 indicating hypotonic hyponatremia    PLAN:  -Anticipate  sodium correction with fluid resuscitation in the setting of dehydration  -Will give additional 1 L NS bolus   -Monitor BMP daily and replete as necessary     Uncontrolled HTN  -Currently asymptomatic, patient and family state that blood pressure has been elevated for some time.  Averages anywhere from 160s-190s SBP    PLAN:  -Will increase home amlodipine to 10 mg and continue losartan 100 mg daily and consider as needed medications if BP remains elevated      Chronic Problems  #GERD  #Hypothyroidism  #Neuropathy  PLAN:  -Continue home Synthroid  -Continue home gabapentin 300 mg nightly  -PT/OT    Narayan Mercado MD  PGY-1, Internal Medicine

## 2024-07-11 NOTE — PROGRESS NOTES
MET WITH PT, PT IN CHAIR AT BEDSIDE , PT CONCERNED ABOUT GOING HOME IF SHE NEEDS THERAPY, IF SO PT'S PREFERENCE IS [facility] , HAD IN PAST, ASKING THERAPY TO [place] PT IN AM.  REFERRAL PLACED.  Rebecca Blackwood RN [facility]

## 2024-07-12 ENCOUNTER — APPOINTMENT (OUTPATIENT)
Dept: RADIOLOGY | Facility: HOSPITAL | Age: 89
DRG: 372 | End: 2024-07-12
Payer: MEDICARE

## 2024-07-12 LAB
ANION GAP SERPL CALC-SCNC: 11 MMOL/L (ref 10–20)
BUN SERPL-MCNC: 21 MG/DL (ref 6–23)
CALCIUM SERPL-MCNC: 8.1 MG/DL (ref 8.6–10.3)
CHLORIDE SERPL-SCNC: 106 MMOL/L (ref 98–107)
CO2 SERPL-SCNC: 18 MMOL/L (ref 21–32)
CREAT SERPL-MCNC: 1.19 MG/DL (ref 0.5–1.05)
EGFRCR SERPLBLD CKD-EPI 2021: 43 ML/MIN/1.73M*2
ERYTHROCYTE [DISTWIDTH] IN BLOOD BY AUTOMATED COUNT: 17.2 % (ref 11.5–14.5)
GLUCOSE SERPL-MCNC: 83 MG/DL (ref 74–99)
HCT VFR BLD AUTO: 23.3 % (ref 36–46)
HGB BLD-MCNC: 7.8 G/DL (ref 12–16)
MCH RBC QN AUTO: 30.4 PG (ref 26–34)
MCHC RBC AUTO-ENTMCNC: 33.5 G/DL (ref 32–36)
MCV RBC AUTO: 91 FL (ref 80–100)
NRBC BLD-RTO: 0.2 /100 WBCS (ref 0–0)
PLATELET # BLD AUTO: 400 X10*3/UL (ref 150–450)
POTASSIUM SERPL-SCNC: 4.2 MMOL/L (ref 3.5–5.3)
RBC # BLD AUTO: 2.57 X10*6/UL (ref 4–5.2)
SODIUM SERPL-SCNC: 131 MMOL/L (ref 136–145)
WBC # BLD AUTO: 11.9 X10*3/UL (ref 4.4–11.3)

## 2024-07-12 PROCEDURE — 85027 COMPLETE CBC AUTOMATED: CPT

## 2024-07-12 PROCEDURE — 99221 1ST HOSP IP/OBS SF/LOW 40: CPT

## 2024-07-12 PROCEDURE — 2500000001 HC RX 250 WO HCPCS SELF ADMINISTERED DRUGS (ALT 637 FOR MEDICARE OP)

## 2024-07-12 PROCEDURE — 76770 US EXAM ABDO BACK WALL COMP: CPT | Performed by: RADIOLOGY

## 2024-07-12 PROCEDURE — 36415 COLL VENOUS BLD VENIPUNCTURE: CPT

## 2024-07-12 PROCEDURE — 2500000002 HC RX 250 W HCPCS SELF ADMINISTERED DRUGS (ALT 637 FOR MEDICARE OP, ALT 636 FOR OP/ED)

## 2024-07-12 PROCEDURE — 1200000002 HC GENERAL ROOM WITH TELEMETRY DAILY

## 2024-07-12 PROCEDURE — 76770 US EXAM ABDO BACK WALL COMP: CPT

## 2024-07-12 PROCEDURE — 80048 BASIC METABOLIC PNL TOTAL CA: CPT

## 2024-07-12 PROCEDURE — 2500000004 HC RX 250 GENERAL PHARMACY W/ HCPCS (ALT 636 FOR OP/ED)

## 2024-07-12 PROCEDURE — 97110 THERAPEUTIC EXERCISES: CPT | Mod: GP,CQ

## 2024-07-12 PROCEDURE — 99232 SBSQ HOSP IP/OBS MODERATE 35: CPT

## 2024-07-12 PROCEDURE — 97535 SELF CARE MNGMENT TRAINING: CPT | Mod: GP,CQ

## 2024-07-12 RX ORDER — VANCOMYCIN HYDROCHLORIDE 125 MG/1
CAPSULE ORAL
Start: 2024-07-12 | End: 2024-08-14

## 2024-07-12 RX ORDER — TAMSULOSIN HYDROCHLORIDE 0.4 MG/1
0.4 CAPSULE ORAL DAILY
Qty: 30 CAPSULE | Refills: 1 | Status: SHIPPED | OUTPATIENT
Start: 2024-07-12 | End: 2024-09-10

## 2024-07-12 RX ORDER — AMLODIPINE BESYLATE 10 MG/1
10 TABLET ORAL EVERY MORNING
Qty: 30 TABLET | Refills: 1 | Status: SHIPPED | OUTPATIENT
Start: 2024-07-12 | End: 2024-09-10

## 2024-07-12 RX ADMIN — GABAPENTIN 300 MG: 300 CAPSULE ORAL at 20:24

## 2024-07-12 RX ADMIN — HYDRALAZINE HYDROCHLORIDE 10 MG: 20 INJECTION INTRAMUSCULAR; INTRAVENOUS at 17:39

## 2024-07-12 RX ADMIN — ENOXAPARIN SODIUM 30 MG: 30 INJECTION SUBCUTANEOUS at 20:24

## 2024-07-12 RX ADMIN — LEVOTHYROXINE SODIUM 100 MCG: 0.1 TABLET ORAL at 06:07

## 2024-07-12 RX ADMIN — VANCOMYCIN HYDROCHLORIDE 125 MG: 125 CAPSULE ORAL at 20:24

## 2024-07-12 RX ADMIN — VANCOMYCIN HYDROCHLORIDE 125 MG: 125 CAPSULE ORAL at 23:04

## 2024-07-12 RX ADMIN — VANCOMYCIN HYDROCHLORIDE 125 MG: 125 CAPSULE ORAL at 06:08

## 2024-07-12 RX ADMIN — ACETAMINOPHEN 650 MG: 325 TABLET ORAL at 23:04

## 2024-07-12 RX ADMIN — VANCOMYCIN HYDROCHLORIDE 125 MG: 125 CAPSULE ORAL at 17:17

## 2024-07-12 RX ADMIN — LOSARTAN POTASSIUM 100 MG: 50 TABLET, FILM COATED ORAL at 10:12

## 2024-07-12 RX ADMIN — AMLODIPINE BESYLATE 10 MG: 10 TABLET ORAL at 10:12

## 2024-07-12 RX ADMIN — LORATADINE 10 MG: 10 TABLET ORAL at 10:12

## 2024-07-12 ASSESSMENT — COGNITIVE AND FUNCTIONAL STATUS - GENERAL
DRESSING REGULAR UPPER BODY CLOTHING: A LITTLE
DRESSING REGULAR LOWER BODY CLOTHING: A LITTLE
HELP NEEDED FOR BATHING: A LITTLE
TURNING FROM BACK TO SIDE WHILE IN FLAT BAD: A LITTLE
MOBILITY SCORE: 19
MOBILITY SCORE: 17
STANDING UP FROM CHAIR USING ARMS: A LITTLE
TOILETING: A LITTLE
DAILY ACTIVITIY SCORE: 20
CLIMB 3 TO 5 STEPS WITH RAILING: A LOT
TURNING FROM BACK TO SIDE WHILE IN FLAT BAD: A LITTLE
WALKING IN HOSPITAL ROOM: A LITTLE
MOVING TO AND FROM BED TO CHAIR: A LITTLE
CLIMB 3 TO 5 STEPS WITH RAILING: A LITTLE
MOVING TO AND FROM BED TO CHAIR: A LITTLE
STANDING UP FROM CHAIR USING ARMS: A LITTLE
MOVING FROM LYING ON BACK TO SITTING ON SIDE OF FLAT BED WITH BEDRAILS: A LITTLE
WALKING IN HOSPITAL ROOM: A LITTLE

## 2024-07-12 ASSESSMENT — PAIN SCALES - GENERAL
PAINLEVEL_OUTOF10: 0 - NO PAIN
PAINLEVEL_OUTOF10: 3
PAINLEVEL_OUTOF10: 0 - NO PAIN

## 2024-07-12 ASSESSMENT — PAIN - FUNCTIONAL ASSESSMENT
PAIN_FUNCTIONAL_ASSESSMENT: 0-10
PAIN_FUNCTIONAL_ASSESSMENT: 0-10

## 2024-07-12 ASSESSMENT — PAIN DESCRIPTION - LOCATION: LOCATION: GENERALIZED

## 2024-07-12 NOTE — PROGRESS NOTES
Physical Therapy    Physical Therapy Treatment    Patient Name: Bindu Cali  MRN: [id number]  Today's Date: 7/12/2024  Time Calculation  Start Time: 0945  Stop Time: 1011  Time Calculation (min): 26 min  919-A    Assessment/Plan   PT Assessment  Evaluation/Treatment Tolerance:  (pt reports feeling very fatigued by end of tx session)  End of Session Communication: Bedside nurse  End of Session Patient Position: Up in chair, Alarm on     PT Plan  PT Plan: Ongoing PT  PT Frequency: 3 times per week  PT Discharge Recommendations: Low intensity level of continued care  PT Recommended Transfer Status: Contact guard  PT - OK to Discharge: Yes (when cleared by medical team.)      General Visit Information:   PT  Visit  PT Received On: 07/12/24  General  Prior to Session Communication: Bedside nurse, PCT/NA/CTA  Patient Position Received: Bed, 2 rail up, Alarm off, not on at start of session  General Comment:  (pt very pleasant and agreeable to participate in therapy session)  FWW vended for discharge home this date (per communication with [facility])    Subjective   Precautions:  Precautions  Medical Precautions: Fall precautions, Infection precautions (contact plus (c-diff))  Precautions Comment:  (avila catheter)       Objective   Pain:  Pain Assessment  Pain Assessment: 0-10  0-10 (Numeric) Pain Score: 0 - No pain     Treatments:  Therapeutic Exercise  Therapeutic Exercise Performed:  (seated BLE AP, LAQ, and marching.  standing (with FWW support) BLE high marches, HR/TR, and HC x 10 reps ea.)    Bed Mobility  Bed Mobility:  (sup > sit with SBA)    Ambulation/Gait Training  Ambulation/Gait Training Performed:  (pt ambulates several feet each bed > BSC > chair as well as several feet forward/retro to complete standing ther ex.  FWW and CGA to SBA.)    Transfers  Transfer:  (sit <> stand x3 trials, including from various surface levels, with FWW and min A x 1 progressing to CGA.  cuing for safe hand placement and  sequencing.)    Outcome Measures:  [facility] Basic Mobility  Turning from your back to your side while in a flat bed without using bedrails: A little  Moving from lying on your back to sitting on the side of a flat bed without using bedrails: A little  Moving to and from bed to chair (including a wheelchair): A little  Standing up from a chair using your arms (e.g. wheelchair or bedside chair): A little  To walk in hospital room: A little  Climbing 3-5 steps with railing: A lot  Basic Mobility - Total Score: 17    Education Documentation  Mobility Training, taught by Carol Santo PTA at 7/12/2024 11:46 AM.  Learner: Patient  Readiness: Acceptance  Method: Explanation  Response: Verbalizes Understanding    Education Comments  No comments found.        EDUCATION:       Encounter Problems       Encounter Problems (Active)       PT Problem       PT Goal 1 (Progressing)       Start:  07/08/24    Expected End:  07/18/24       STG - Pt will transition supine <> sitting with modified independence          PT Goal 2 (Progressing)       Start:  07/08/24    Expected End:  07/18/24       STG - Pt will transfer STS with modified independence            PT Goal 3 (Progressing)       Start:  07/08/24    Expected End:  07/18/24       STG - Pt will amb 250 ft' using ww with modified independence.

## 2024-07-12 NOTE — PROGRESS NOTES
Hem/Onc Progress Note:        Subjective   NAEON. NAD.    HEMATOLOGY & ONCOLOGY PROBLEMS:  1.  IgA kappa multiple myeloma        a.  Initial diagnosis of MGUS in Feb 2022.       b.  Disease progression to multiple myeloma in Oct 2022.       c.  Ist line therapy with single agent Velcade beginning Nov 2022.  2.   Hypercalcemia/lytic lesions.       a.  Maintained on Xgeva  3.  Macrocytic anemia.    Code Status: Full Code    Objective   Vitals:    07/11/24 1543 07/11/24 1928 07/12/24 0423 07/12/24 0827   BP: 170/74 164/71 147/63 170/65   BP Location:  Right arm Right arm Left arm   Patient Position:  Lying Lying Lying   Pulse: 70 71 66 72   Resp: 18 18 18 18   Temp: 36.3 °C (97.3 °F) 36.3 °C (97.3 °F) 36.3 °C (97.3 °F) 36.2 °C (97.2 °F)   TempSrc: Temporal Temporal Temporal Temporal   SpO2: 97% 96% 93% 95%   Weight:       Height:         Labs:   Results from last 7 days   Lab Units 07/12/24  0449 07/11/24  0450 07/10/24  0453   SODIUM mmol/L 131* 129* 128*   POTASSIUM mmol/L 4.2 3.4* 3.4*   CHLORIDE mmol/L 106 104 101   CO2 mmol/L 18* 20* 20*   BUN mg/dL 21 24* 26*   CREATININE mg/dL 1.19* 1.36* 1.53*   GLUCOSE mg/dL 83 95 93   CALCIUM mg/dL 8.1* 8.0* 8.5*     Results from last 7 days   Lab Units 07/12/24  0449   WBC AUTO x10*3/uL 11.9*   HEMOGLOBIN g/dL 7.8*   HEMATOCRIT % 23.3*   PLATELETS AUTO x10*3/uL 400     Imaging:  CT chest abdomen pelvis wo IV contrast   Final Result   CHEST   1.  New small 9 mm nodular density in the medial left lower lobe and   a few tiny scattered pulmonary nodules and reticulonodular opacities   in the bilateral upper lobes and right lower lobe. These may   represent infectious/inflammatory or neoplastic nodules. Attention on   short interval follow-up CT chest is recommended.   2. No dense area of consolidation to suggest pneumonia.   3. Tiny layering left pleural effusion with adjacent left basilar   subsegmental atelectasis.   4. Other chronic findings, as detailed above.        ABDOMEN  - PELVIS   1.  Diffuse colonic wall thickening and pericolonic stranding   throughout the entire colon, concerning for acute colitis of   infectious, inflammatory or ischemic etiology.   2. Diffuse extensive diverticulosis again seen.   3. Intermediate to high density bilateral renal lesions are   indeterminate. These may represent hemorrhagic/proteinaceous renal   cysts, however solid lesion is not excluded. This may be further   assessed with nonemergent renal ultrasound and/or MRI.   4. Additional chronic incidental findings, as detailed above.             MACRO:   None        Signed by: Brandy Pinzon 7/8/2024 1:30 PM   Dictation workstation:   [id number]      CT head wo IV contrast   Final Result   No evidence of acute cortical infarct or intracranial hemorrhage.        Chronic changes as described.        MACRO:   None        Signed by: Zahraa Light 7/6/2024 2:47 PM   Dictation workstation:   [id number]      XR chest 2 views   Final Result   Small left pleural effusion with associated atelectasis and/or   pneumonitis.        MACRO:   None        Signed by: Heriberto Colorado 7/6/2024 12:45 PM   Dictation workstation:   [id number]      US renal complete    (Results Pending)     CT chest without contrast 10/30/2023  Impression:  1.  2 mm left subpleural nodule as described.  2. The lungs otherwise are clear considering limitations.  3. Nonunion of a left acromion fracture.      PET/CT Myeloma Initial [Oct 21 2022 10:35AM]  Impression:  1. Multiple focal hypermetabolic pulmonary nodules throughout the bilateral lungs. Findings likely represent inflammatory process,  versus less likely, malignancy. Recommend short-term follow-up CT to document resolution of nodules or tissue biopsy is clinically  indicated.  2. Several hypermetabolic nonenlarged mediastinal lymph nodes, likely reactive in etiology.  3. Mild increased metabolic activity throughout the axial and appendicular skeleton, which is nonspecific and could be  related to patient's anemia versus diffuse myeloma involvement. There is no focal hypermetabolic disease involvement within the skeleton.    Physical Exam:   Exam not performed    Assessment/Plan   Bindujennifer Cali is a 89 y.o. female with a PMHx of HTN, CKD, GERD, bronchitis, lumbar spinal stenosis, hypothyroidism, IgA kappa MM c/b hypercalcemia (MGUS diagnosis 2/2022 with progression to MM in October and starting Velcade 11/2022), who presented to [facility] on 7/6 with chief complaint of generalized weakness/malaise, bowel incontinence.     1.  IgA kappa multiple myeloma.  Please refer to the details of initial presentation and management as outlined above. In summary, patient with finding of mild macrocytic  anemia with further work-up revealing 0.7g of IgA kappa monoclonal protein on SPEP in Feb 2022.  Clinically she was essentially asymptomatic and overall functional for her age.  She was initially followed with close observation.  Subsequent routine follow-up  blood work from Oct 2022 showed 1gm of IgA kappa monoclonal protein and markedly elevated kappa free light chain assay.  Creatinine was stable at 1.3 but serum calcium was elevated at 13.1.  PET scan results were unremarkable but a bone marrow biopsy  confirmed plasma cell myeloma.  Cytogenetics and FISH testing was unremarkable. After discussions with the patient and family members she is currently being treated with single agent Velcade with good response.  She is also maintained on intermittent Xgeva for hypercalcemia.     Overall she is tolerating Velcade.  But after some initial good response lately her myeloma numbers are increasing with increase in calcium and creatinine levels also.  Long discussion with the patient and her son and they were explained about the concern regarding rising myeloma markers and calcium level.  Seems like she is having gradual disease progression on single agent Velcade.  Her regiment was adjusted to DVD with addition  of subcutaneous Darzalex and steroids.   Probable side effects of neuropathy, thrombocytopenia, weakness, fatigue, rash etc. were explained to her in detail.  Overall prognosis is guarded.  Continue supportive care as currently facilitated by primary team.  She is clear for discharge per hem/onc.     2.  Hypercalcemia.  Secondary to baseline myeloma.  PET scan as such was negative for bony lytic lesions.  She will continue with the intermittent Xgeva as needed.  She had received Xgeva recently and will continue to receive them outpatient.     3. Follow up: Follow-up with Dr. Gray in about 4 weeks for continuation and optimization of chemotherapy with Darzalex and daratumumab combination as detailed above.  Advised to contact us immediately if there are any new questions or problems.      Sumeet Verdin, DO   Internal Medicine PGY-1     This is a preliminary note written by the resident. Please wait for attending addendum for finalization of note and recommendations.

## 2024-07-12 NOTE — CONSULTS
Inpatient consult to Urology  Consult performed by: Channing Lora, APRN-CNP  Consult ordered by: Mayur Ferreira DO  Reason for consult: Acute urinary retention          Reason For Consult  Urinary retention    History Of Present Illness  Bindu Cali is a 89 y.o. who presented to ED 7/6/24 with weakness, dizziness, and confusion secondary to hypercalcemia. Urine + RBC, leuks, +3 bacteria; nitrite negative. No urine culture completed. Catheter placed on admission, removed 7/11/24 with minimal urine output; bladder scan showed 325 mL. Catheter replaced & Urology consulted.    On active treatment for multiple myeloma. Hx urinary retention after ORIF, seen by Dr. Boyd 11/24/23; post-void residual 280 mL.     ***     Past Medical History  She has a past medical history of Abdominal bloating (01/04/2024), Acute bacterial bronchitis (01/04/2024), Acute bacterial sinusitis (01/04/2024), Acute kidney failure, unspecified (CMS-HCC) (11/03/2023), Acute kidney injury (CMS-[facility]) (10/14/2022), Acute urinary tract infection (05/09/2024), Allergic rhinitis (01/04/2024), Bronchitis (01/24/2024), Carrier of methicillin susceptible Staphylococcus aureus (MSSA) (01/04/2024), Chronic cough (01/04/2024), Chronic kidney disease, stage 3b (Multi) (02/25/2022), Congestion of respiratory tract (01/04/2024), Contact with and (suspected) exposure to covid-19 (10/31/2022), Contusion of knee (01/04/2024), Deep vein thrombosis (DVT) (Multi) (01/04/2024), Do not resuscitate (10/31/2022), Enterocolitis due to Clostridium difficile, not specified as recurrent (11/03/2023), Essential (primary) hypertension (12/14/2022), Facial laceration (01/04/2024), Herpes zoster (01/04/2024), History of colonoscopy (01/04/2024), Hypertension (11/07/2011), Hyponatremia (10/31/2022), Iatrogenic thyrotoxicosis (10/31/2022), Infection and inflammatory reaction due to internal fixation device of unspecified site, subsequent encounter (11/03/2023),  "Lateral epicondylitis of left elbow (01/04/2024), Menopausal problem (01/04/2024), Multiple myeloma (Multi), Neck pain (07/28/2011), Nontraumatic tear of left rotator cuff (01/04/2024), Obstructive and reflux uropathy, unspecified (11/08/2023), Osteoarthritis of right shoulder (02/08/2012), Osteomyelitis (Multi) (10/06/2023), Other specified postprocedural states, Pain in both wrists (01/04/2024), Paronychia of finger of right hand (10/06/2023), Personal history of other medical treatment, Personal history of other medical treatment, Personal history of other medical treatment, Pneumonia (10/27/2022), Procedure and treatment not carried out because of patient's decision for unspecified reasons, Subacute cough (02/02/2024), Tendinitis of right elbow (01/04/2024), and Vitamin D deficiency, unspecified.    Surgical History  She has a past surgical history that includes Other surgical history (04/28/2022); Total knee arthroplasty (01/22/2015); Cholecystectomy (01/22/2015); Tonsillectomy (01/22/2015); Rotator cuff repair (01/22/2015); Hysterectomy (01/22/2015); and Breast biopsy (01/22/2015).     Social History  She reports that she has never smoked. She has never been exposed to tobacco smoke. She has never used smokeless tobacco. She reports current alcohol use of about 1.0 standard drink of alcohol per week. She reports that she does not use drugs.    Family History  Family History   Problem Relation Name Age of Onset    Cancer Mother      Other (cardiac disorder) Mother      Other (cardiac disorder) Father      Other (cardiac disorder) Brother      Other (CVA) Brother      Other (HEART BASE TUMOR) Brother          Allergies  Morphine, Pollen extracts, Aleve [naproxen sodium], Codeine, and Erythromycin    Review of Systems     Physical Exam     Last Recorded Vitals  Blood pressure 170/65, pulse 72, temperature 36.2 °C (97.2 °F), temperature source Temporal, resp. rate 18, height 1.473 m (4' 10\"), weight 59.4 kg (131 " lb), SpO2 95%.    Relevant Results  Scheduled medications  amLODIPine, 10 mg, oral, q AM  enoxaparin, 30 mg, subcutaneous, q24h  gabapentin, 300 mg, oral, Nightly  levothyroxine, 100 mcg, oral, Daily before breakfast  loratadine, 10 mg, oral, Daily  losartan, 100 mg, oral, Daily  vancomycin, 125 mg, oral, 4x daily      Continuous medications     PRN medications  PRN medications: acetaminophen **OR** acetaminophen **OR** acetaminophen, hydrALAZINE, ondansetron **OR** ondansetron    Results for orders placed or performed during the hospital encounter of 07/06/24 (from the past 24 hour(s))   CBC   Result Value Ref Range    WBC 11.9 (H) 4.4 - 11.3 x10*3/uL    nRBC 0.2 (H) 0.0 - 0.0 /100 WBCs    RBC 2.57 (L) 4.00 - 5.20 x10*6/uL    Hemoglobin 7.8 (L) 12.0 - 16.0 g/dL    Hematocrit 23.3 (L) 36.0 - 46.0 %    MCV 91 80 - 100 fL    MCH 30.4 26.0 - 34.0 pg    MCHC 33.5 32.0 - 36.0 g/dL    RDW 17.2 (H) 11.5 - 14.5 %    Platelets 400 150 - 450 x10*3/uL   Basic metabolic panel   Result Value Ref Range    Glucose 83 74 - 99 mg/dL    Sodium 131 (L) 136 - 145 mmol/L    Potassium 4.2 3.5 - 5.3 mmol/L    Chloride 106 98 - 107 mmol/L    Bicarbonate 18 (L) 21 - 32 mmol/L    Anion Gap 11 10 - 20 mmol/L    Urea Nitrogen 21 6 - 23 mg/dL    Creatinine 1.19 (H) 0.50 - 1.05 mg/dL    eGFR 43 (L) >60 mL/min/1.73m*2    Calcium 8.1 (L) 8.6 - 10.3 mg/dL       CT chest abdomen pelvis wo IV contrast  Result Date: 7/8/2024  Interpreted By:  Brandy Pinzon, STUDY: CT CHEST ABDOMEN PELVIS WO CONTRAST;  7/8/2024 11:47 am   INDICATION: Signs/Symptoms:concern for pneumonia   COMPARISON: 3 mm left upper lobe nodule (image 88),   ACCESSION NUMBER(S): [id number]   ORDERING CLINICIAN: SABA NEW   TECHNIQUE: CT of the chest, abdomen, and pelvis was performed. Contiguous axial images were obtained at  5 mm slice thickness through the chest, and at  3 mm through the abdomen and pelvis. Coronal and sagittal reconstructions at  3 mm slice thickness were  performed. No intravenous or oral contrast agents were administered.   FINDINGS: CHEST:   LUNG/PLEURA/LARGE AIRWAYS: Trachea and central airways are patent.   Patchy linear atelectasis and/or scarring noted in the inferior lingula and left lower lobe.   There is a new small 9 mm nodular density in the medial aspect of the left lower lobe (series 603, image 196. A few new tiny nodules and reticulonodular opacities are noted in the bilateral upper lobes and right lower lobe. For example, a 3 mm nodule in the left upper lobe (image 88), right upper lobe reticulonodular opacities (images 95-96), right lower lobe reticulonodular opacities (images [id number]), 2-3 mm nodules in the right lower lobe (for example image 170). An 8.5 mm right middle lobe perifissural nodule along the right minor fissure is stable. No focal consolidation or other airspace opacity.   Tiny layering left pleural effusion. No right pleural effusion. No pneumothorax.   VESSELS: Thoracic aorta is normal in caliber, mildly tortuous with mild atherosclerotic calcifications. Main pulmonary artery and its branches are normal in caliber.  Coronary artery calcifications noted.   HEART: Heart is mildly enlarged. Trace pericardial fluid.   MEDIASTINUM AND HUSEYIN: No mediastinal adenopathy. No hilar fullness. The esophagus is normal in course and caliber.   CHEST WALL AND LOWER NECK: Thyroid not well seen. Mild diffuse chest wall subcutaneous edema.   No acute bony abnormality or suspicious bone lesion. Multilevel thoracic spine degenerative changes. Reverse left shoulder arthroplasty. Severe right shoulder joint degenerative changes.   ABDOMEN:   LIVER: Stable 1.9 cm simple hepatic cysts in the right liver lobe within segment 7.   GALLBLADDER: Prior cholecystectomy.   BILE DUCTS: No intra or extrahepatic bile duct dilation.   PANCREAS: Pancreatic parenchymal atrophy again noted. No ductal dilation or evidence of a focal lesion.   SPLEEN: Unremarkable.   ADRENAL  GLANDS: Unremarkable.   KIDNEYS AND URETERS: There is an intermediate density lesion in the right mid kidney measuring 2.7 cm with internal Hounsfield units of 29. Another probable cortical lesion in the anterior right mid kidney measures 1.2 cm and 43 Hounsfield units. A hyperdense lesion in the posterior left mid kidney measures 2.6 cm and up to 48 Hounsfield units. No hydroureteronephrosis or nephroureterolithiasis is present.   PELVIS:   BLADDER: Urinary bladder is distended and without inflammatory changes.   REPRODUCTIVE ORGANS: Prior hysterectomy. No adnexal abnormality.   BOWEL: Small sliding hiatal hernia. Partially distended stomach is unremarkable. Large duodenal diverticulum filled with gas and fluid is unchanged. The small bowel is normal in caliber without evidence of focal wall thickening or obstruction.  Extensive scattered diverticulosis is again seen, greatest in the descending and sigmoid colon. There is diffuse circumferential wall thickening and pericolonic stranding throughout the entire colon, concerning for acute colitis. No pneumatosis seen. Appendix not seen.   VESSELS: Abdominal aorta is tortuous and normal in caliber with mild atherosclerotic calcifications.   PERITONEUM/RETROPERITONEUM/LYMPH NODES: There is no free or loculated fluid collection, no free intraperitoneal air.  No abdominopelvic lymphadenopathy is present.   BONES AND ABDOMINAL WALL: Diffuse abdominal wall subcutaneous edema noted. No acute bony abnormality or suspicious bone lesion. Multilevel lumbar spine degenerative changes and levo scoliotic curvature again seen.       CHEST 1.  New small 9 mm nodular density in the medial left lower lobe and a few tiny scattered pulmonary nodules and reticulonodular opacities in the bilateral upper lobes and right lower lobe. These may represent infectious/inflammatory or neoplastic nodules. Attention on short interval follow-up CT chest is recommended. 2. No dense area of  consolidation to suggest pneumonia. 3. Tiny layering left pleural effusion with adjacent left basilar subsegmental atelectasis. 4. Other chronic findings, as detailed above.   ABDOMEN - PELVIS 1.  Diffuse colonic wall thickening and pericolonic stranding throughout the entire colon, concerning for acute colitis of infectious, inflammatory or ischemic etiology. 2. Diffuse extensive diverticulosis again seen. 3. Intermediate to high density bilateral renal lesions are indeterminate. These may represent hemorrhagic/proteinaceous renal cysts, however solid lesion is not excluded. This may be further assessed with nonemergent renal ultrasound and/or MRI. 4. Additional chronic incidental findings, as detailed above.     MACRO: None   Signed by: Brandy Pinzon 7/8/2024 1:30 PM Dictation workstation:   [id number]    CT head wo IV contrast  Result Date: 7/6/2024  Interpreted By:  Zahraa Light, STUDY: CT HEAD WO IV CONTRAST;  7/6/2024 2:01 pm   INDICATION: Signs/Symptoms:Confusion, history of multiple myeloma.   COMPARISON: 10/28/2023   ACCESSION NUMBER(S): [id number]   ORDERING CLINICIAN: KEANU REECE   TECHNIQUE: Noncontrast axial CT scan of head was performed. Angled reformats in brain and bone windows were generated. The images were reviewed in bone, brain, blood and soft tissue windows.   FINDINGS: The ventricles, cisterns and sulci are prominent, consistent with mild diffuse volume loss. There are areas of nonspecific white matter hypodensity, which are probably age-related or microvascular in nature.   Gray-white differentiation is intact and there is no evidence of acute cortical infarct. No mass, mass effect or midline shift is seen. There is no evidence of hemorrhage.   The visualized paranasal sinuses are clear. Nasal septal deviation to the right.       No evidence of acute cortical infarct or intracranial hemorrhage.   Chronic changes as described.   MACRO: None   Signed by: Zahraa Light 7/6/2024 2:47 PM  Dictation workstation:   [id number]    XR chest 2 views  Result Date: 7/6/2024  Interpreted By:  Heriberto Colorado, STUDY: Chest dated  7/6/2024.   INDICATION: Signs/Symptoms:Confusion   COMPARISON: Chest dated 04/22/2023.   ACCESSION NUMBER(S): [id number]   ORDERING CLINICIAN: KEANU REECE   TECHNIQUE: Two views of the chest.   FINDINGS: There is blunting of the left costophrenic angle with linear left basilar opacities. Fullness to the kavita may be related to the pulmonary vasculature as can be seen with pulmonary arterial hypertension. No pneumothorax  is evident. The cardiomediastinal silhouette is  enlarged but similar to the prior exam.Degenerative change is seen of the spine and shoulders.       Small left pleural effusion with associated atelectasis and/or pneumonitis.   MACRO: None   Signed by: Heriberto Colorado 7/6/2024 12:45 PM Dictation workstation:   [id number]        Assessment/Plan     ***    Will be discussed with attending Dr. Gonsales.  I spent *** minutes in the professional and overall care of this patient.      Channing Lora, APRN-CNP

## 2024-07-12 NOTE — DISCHARGE INSTRUCTIONS
1.  You will be discharged with a prescription for vancomycin for the next several weeks.  Please take this medication according to the prescription instructions: Take 4 pills daily for the next 5 days, followed by 2 pills daily for 1 week, followed by 1 pill daily for 1 week, followed by 1 pill every other day for 2 weeks.    2.  We increased your dose of amlodipine from 5 to 10 mg daily, follow-up with your primary care physician for any changes needed in blood pressure medication.  You will also be discharged with a new medication called Flomax to help with your urinary retention.  Please take this medication as prescribed and follow-up with urology within 2 weeks of discharge.    2.  Follow-up with your oncologist within 2 weeks of discharge and your PCP within 1 week of discharge

## 2024-07-12 NOTE — CARE PLAN
The patient's goals for the shift include      The clinical goals for the shift include decreased bowel mov'ts    Over the shift, the patient did not make progress toward the following goals. Barriers to progression include ***. Recommendations to address these barriers include ***.

## 2024-07-12 NOTE — CONSULTS
Consults    Reason For Consult  RETENTION    History Of Present Illness  Bindu Cali is a 89 y.o. female presenting with CHANGE IN MENTAL STATUS.  Admitted for weakness and confusion . Had chemotherapy for multiple myeloma earlier this week.  Symptoms began thereafter.  She is feeling better.  Is now lucid and a and o x3  Avila placed as she was unable to void.  She states this happened previously during an admission.  Was voiding at home w/o difficulty  She is tolerating the avila  No hematuria  Noncontrast ct revealed bilateral hyperdense lesions. Proteinaceous cysts vs solid lesions.       Past Medical History  She has a past medical history of Abdominal bloating (01/04/2024), Acute bacterial bronchitis (01/04/2024), Acute bacterial sinusitis (01/04/2024), Acute kidney failure, unspecified (CMS-HCC) (11/03/2023), Acute kidney injury (CMS-[facility]) (10/14/2022), Acute urinary tract infection (05/09/2024), Allergic rhinitis (01/04/2024), Bronchitis (01/24/2024), Carrier of methicillin susceptible Staphylococcus aureus (MSSA) (01/04/2024), Chronic cough (01/04/2024), Chronic kidney disease, stage 3b (Multi) (02/25/2022), Congestion of respiratory tract (01/04/2024), Contact with and (suspected) exposure to covid-19 (10/31/2022), Contusion of knee (01/04/2024), Deep vein thrombosis (DVT) (Multi) (01/04/2024), Do not resuscitate (10/31/2022), Enterocolitis due to Clostridium difficile, not specified as recurrent (11/03/2023), Essential (primary) hypertension (12/14/2022), Facial laceration (01/04/2024), Herpes zoster (01/04/2024), History of colonoscopy (01/04/2024), Hypertension (11/07/2011), Hyponatremia (10/31/2022), Iatrogenic thyrotoxicosis (10/31/2022), Infection and inflammatory reaction due to internal fixation device of unspecified site, subsequent encounter (11/03/2023), Lateral epicondylitis of left elbow (01/04/2024), Menopausal problem (01/04/2024), Multiple myeloma (Multi), Neck pain (07/28/2011),  "Nontraumatic tear of left rotator cuff (01/04/2024), Obstructive and reflux uropathy, unspecified (11/08/2023), Osteoarthritis of right shoulder (02/08/2012), Osteomyelitis (Multi) (10/06/2023), Other specified postprocedural states, Pain in both wrists (01/04/2024), Paronychia of finger of right hand (10/06/2023), Personal history of other medical treatment, Personal history of other medical treatment, Personal history of other medical treatment, Pneumonia (10/27/2022), Procedure and treatment not carried out because of patient's decision for unspecified reasons, Subacute cough (02/02/2024), Tendinitis of right elbow (01/04/2024), and Vitamin D deficiency, unspecified.    Surgical History  She has a past surgical history that includes Other surgical history (04/28/2022); Total knee arthroplasty (01/22/2015); Cholecystectomy (01/22/2015); Tonsillectomy (01/22/2015); Rotator cuff repair (01/22/2015); Hysterectomy (01/22/2015); and Breast biopsy (01/22/2015).     Social History  She reports that she has never smoked. She has never been exposed to tobacco smoke. She has never used smokeless tobacco. She reports current alcohol use of about 1.0 standard drink of alcohol per week. She reports that she does not use drugs.    Family History  Family History   Problem Relation Name Age of Onset    Cancer Mother      Other (cardiac disorder) Mother      Other (cardiac disorder) Father      Other (cardiac disorder) Brother      Other (CVA) Brother      Other (HEART BASE TUMOR) Brother          Allergies  Morphine, Pollen extracts, Aleve [naproxen sodium], Codeine, and Erythromycin    Review of Systems     Physical Exam  Larson draining clear yellow     Last Recorded Vitals  Blood pressure 170/65, pulse 72, temperature 36.2 °C (97.2 °F), temperature source Temporal, resp. rate 18, height 1.473 m (4' 10\"), weight 59.4 kg (131 lb), SpO2 95%.    Relevant Results  Scheduled medications  amLODIPine, 10 mg, oral, q AM  enoxaparin, 30 " mg, subcutaneous, q24h  gabapentin, 300 mg, oral, Nightly  levothyroxine, 100 mcg, oral, Daily before breakfast  loratadine, 10 mg, oral, Daily  losartan, 100 mg, oral, Daily  vancomycin, 125 mg, oral, 4x daily      Continuous medications     PRN medications  PRN medications: acetaminophen **OR** acetaminophen **OR** acetaminophen, hydrALAZINE, ondansetron **OR** ondansetron   CT chest abdomen pelvis wo IV contrast    Result Date: 7/8/2024  Interpreted By:  Brandy Pinzon, STUDY: CT CHEST ABDOMEN PELVIS WO CONTRAST;  7/8/2024 11:47 am   INDICATION: Signs/Symptoms:concern for pneumonia   COMPARISON: 3 mm left upper lobe nodule (image 88),   ACCESSION NUMBER(S): [id number]   ORDERING CLINICIAN: SABA NEW   TECHNIQUE: CT of the chest, abdomen, and pelvis was performed. Contiguous axial images were obtained at  5 mm slice thickness through the chest, and at  3 mm through the abdomen and pelvis. Coronal and sagittal reconstructions at  3 mm slice thickness were performed. No intravenous or oral contrast agents were administered.   FINDINGS: CHEST:   LUNG/PLEURA/LARGE AIRWAYS: Trachea and central airways are patent.   Patchy linear atelectasis and/or scarring noted in the inferior lingula and left lower lobe.   There is a new small 9 mm nodular density in the medial aspect of the left lower lobe (series 603, image 196. A few new tiny nodules and reticulonodular opacities are noted in the bilateral upper lobes and right lower lobe. For example, a 3 mm nodule in the left upper lobe (image 88), right upper lobe reticulonodular opacities (images 95-96), right lower lobe reticulonodular opacities (images [id number]), 2-3 mm nodules in the right lower lobe (for example image 170). An 8.5 mm right middle lobe perifissural nodule along the right minor fissure is stable. No focal consolidation or other airspace opacity.   Tiny layering left pleural effusion. No right pleural effusion. No pneumothorax.   VESSELS: Thoracic aorta is  normal in caliber, mildly tortuous with mild atherosclerotic calcifications. Main pulmonary artery and its branches are normal in caliber.  Coronary artery calcifications noted.   HEART: Heart is mildly enlarged. Trace pericardial fluid.   MEDIASTINUM AND HUSEYIN: No mediastinal adenopathy. No hilar fullness. The esophagus is normal in course and caliber.   CHEST WALL AND LOWER NECK: Thyroid not well seen. Mild diffuse chest wall subcutaneous edema.   No acute bony abnormality or suspicious bone lesion. Multilevel thoracic spine degenerative changes. Reverse left shoulder arthroplasty. Severe right shoulder joint degenerative changes.   ABDOMEN:   LIVER: Stable 1.9 cm simple hepatic cysts in the right liver lobe within segment 7.   GALLBLADDER: Prior cholecystectomy.   BILE DUCTS: No intra or extrahepatic bile duct dilation.   PANCREAS: Pancreatic parenchymal atrophy again noted. No ductal dilation or evidence of a focal lesion.   SPLEEN: Unremarkable.   ADRENAL GLANDS: Unremarkable.   KIDNEYS AND URETERS: There is an intermediate density lesion in the right mid kidney measuring 2.7 cm with internal Hounsfield units of 29. Another probable cortical lesion in the anterior right mid kidney measures 1.2 cm and 43 Hounsfield units. A hyperdense lesion in the posterior left mid kidney measures 2.6 cm and up to 48 Hounsfield units. No hydroureteronephrosis or nephroureterolithiasis is present.   PELVIS:   BLADDER: Urinary bladder is distended and without inflammatory changes.   REPRODUCTIVE ORGANS: Prior hysterectomy. No adnexal abnormality.   BOWEL: Small sliding hiatal hernia. Partially distended stomach is unremarkable. Large duodenal diverticulum filled with gas and fluid is unchanged. The small bowel is normal in caliber without evidence of focal wall thickening or obstruction.  Extensive scattered diverticulosis is again seen, greatest in the descending and sigmoid colon. There is diffuse circumferential wall  thickening and pericolonic stranding throughout the entire colon, concerning for acute colitis. No pneumatosis seen. Appendix not seen.   VESSELS: Abdominal aorta is tortuous and normal in caliber with mild atherosclerotic calcifications.   PERITONEUM/RETROPERITONEUM/LYMPH NODES: There is no free or loculated fluid collection, no free intraperitoneal air.  No abdominopelvic lymphadenopathy is present.   BONES AND ABDOMINAL WALL: Diffuse abdominal wall subcutaneous edema noted. No acute bony abnormality or suspicious bone lesion. Multilevel lumbar spine degenerative changes and levo scoliotic curvature again seen.       CHEST 1.  New small 9 mm nodular density in the medial left lower lobe and a few tiny scattered pulmonary nodules and reticulonodular opacities in the bilateral upper lobes and right lower lobe. These may represent infectious/inflammatory or neoplastic nodules. Attention on short interval follow-up CT chest is recommended. 2. No dense area of consolidation to suggest pneumonia. 3. Tiny layering left pleural effusion with adjacent left basilar subsegmental atelectasis. 4. Other chronic findings, as detailed above.   ABDOMEN - PELVIS 1.  Diffuse colonic wall thickening and pericolonic stranding throughout the entire colon, concerning for acute colitis of infectious, inflammatory or ischemic etiology. 2. Diffuse extensive diverticulosis again seen. 3. Intermediate to high density bilateral renal lesions are indeterminate. These may represent hemorrhagic/proteinaceous renal cysts, however solid lesion is not excluded. This may be further assessed with nonemergent renal ultrasound and/or MRI. 4. Additional chronic incidental findings, as detailed above.     MACRO: None   Signed by: Brandy Pinzon 7/8/2024 1:30 PM Dictation workstation:   [id number]    ECG 12 lead    Result Date: 7/8/2024  Normal sinus rhythm Left axis deviation Abnormal ECG When compared with ECG of 06-JUL-2024 22:54, (unconfirmed) No  significant change was found    CT head wo IV contrast    Result Date: 7/6/2024  Interpreted By:  Zahraa Light, STUDY: CT HEAD WO IV CONTRAST;  7/6/2024 2:01 pm   INDICATION: Signs/Symptoms:Confusion, history of multiple myeloma.   COMPARISON: 10/28/2023   ACCESSION NUMBER(S): [id number]   ORDERING CLINICIAN: KEANU REECE   TECHNIQUE: Noncontrast axial CT scan of head was performed. Angled reformats in brain and bone windows were generated. The images were reviewed in bone, brain, blood and soft tissue windows.   FINDINGS: The ventricles, cisterns and sulci are prominent, consistent with mild diffuse volume loss. There are areas of nonspecific white matter hypodensity, which are probably age-related or microvascular in nature.   Gray-white differentiation is intact and there is no evidence of acute cortical infarct. No mass, mass effect or midline shift is seen. There is no evidence of hemorrhage.   The visualized paranasal sinuses are clear. Nasal septal deviation to the right.       No evidence of acute cortical infarct or intracranial hemorrhage.   Chronic changes as described.   MACRO: None   Signed by: Zahraa Light 7/6/2024 2:47 PM Dictation workstation:   [id number]    XR chest 2 views    Result Date: 7/6/2024  Interpreted By:  Heriberto Colorado, STUDY: Chest dated  7/6/2024.   INDICATION: Signs/Symptoms:Confusion   COMPARISON: Chest dated 04/22/2023.   ACCESSION NUMBER(S): [id number]   ORDERING CLINICIAN: KEANU REECE   TECHNIQUE: Two views of the chest.   FINDINGS: There is blunting of the left costophrenic angle with linear left basilar opacities. Fullness to the kavita may be related to the pulmonary vasculature as can be seen with pulmonary arterial hypertension. No pneumothorax  is evident. The cardiomediastinal silhouette is  enlarged but similar to the prior exam.Degenerative change is seen of the spine and shoulders.       Small left pleural effusion with associated atelectasis and/or pneumonitis.    MACRO: None   Signed by: Heriberto Colorado 7/6/2024 12:45 PM Dictation workstation:   [id number]    Results for orders placed or performed during the hospital encounter of 07/06/24 (from the past 24 hour(s))   CBC   Result Value Ref Range    WBC 11.9 (H) 4.4 - 11.3 x10*3/uL    nRBC 0.2 (H) 0.0 - 0.0 /100 WBCs    RBC 2.57 (L) 4.00 - 5.20 x10*6/uL    Hemoglobin 7.8 (L) 12.0 - 16.0 g/dL    Hematocrit 23.3 (L) 36.0 - 46.0 %    MCV 91 80 - 100 fL    MCH 30.4 26.0 - 34.0 pg    MCHC 33.5 32.0 - 36.0 g/dL    RDW 17.2 (H) 11.5 - 14.5 %    Platelets 400 150 - 450 x10*3/uL   Basic metabolic panel   Result Value Ref Range    Glucose 83 74 - 99 mg/dL    Sodium 131 (L) 136 - 145 mmol/L    Potassium 4.2 3.5 - 5.3 mmol/L    Chloride 106 98 - 107 mmol/L    Bicarbonate 18 (L) 21 - 32 mmol/L    Anion Gap 11 10 - 20 mmol/L    Urea Nitrogen 21 6 - 23 mg/dL    Creatinine 1.19 (H) 0.50 - 1.05 mg/dL    eGFR 43 (L) >60 mL/min/1.73m*2    Calcium 8.1 (L) 8.6 - 10.3 mg/dL          reviewed     Assessment/Plan     Retention  Will begin flomax and voiding trial 3 days (either here or as op)  Renal us  Will follow  Thank you    I spent 20 minutes in the professional and overall care of this patient.

## 2024-07-12 NOTE — PROGRESS NOTES
Bindu Cali is a 89 y.o. female on day 6 of admission presenting with Hypercalcemia.    Subjective   Seen and examined this morning.  She continues to endorse improvement in her overall fatigue and states that her head feels clearer today.  She does say that she feels like whenever she eats the food goes right through her.    Per nurse, patient had 3-4 small-volume, loose bowel movements overnight, nothing so far this morning.     Objective     Last Recorded Vitals  /63 (BP Location: Right arm, Patient Position: Lying)   Pulse 66   Temp 36.3 °C (97.3 °F) (Temporal)   Resp 18   Wt 59.4 kg (131 lb)   SpO2 93%   Intake/Output last 3 Shifts:    Intake/Output Summary (Last 24 hours) at 7/12/2024 0651  Last data filed at 7/12/2024 0609  Gross per 24 hour   Intake 1760 ml   Output 1710 ml   Net 50 ml       Admission Weight  Weight: 59.4 kg (131 lb) (07/06/24 1159)    Daily Weight  07/06/24 : 59.4 kg (131 lb)    Image Results  CT chest abdomen pelvis wo IV contrast  Narrative: Interpreted By:  Brandy Pinzon,   STUDY:  CT CHEST ABDOMEN PELVIS WO CONTRAST;  7/8/2024 11:47 am      INDICATION:  Signs/Symptoms:concern for pneumonia      COMPARISON:  3 mm left upper lobe nodule (image 88),      ACCESSION NUMBER(S):  [id number]      ORDERING CLINICIAN:  SABA NEW      TECHNIQUE:  CT of the chest, abdomen, and pelvis was performed.  Contiguous axial images were obtained at  5 mm slice thickness  through the chest, and at  3 mm through the abdomen and pelvis.  Coronal and sagittal reconstructions at  3 mm slice thickness were  performed. No intravenous or oral contrast agents were administered.      FINDINGS:  CHEST:      LUNG/PLEURA/LARGE AIRWAYS:  Trachea and central airways are patent.      Patchy linear atelectasis and/or scarring noted in the inferior  lingula and left lower lobe.      There is a new small 9 mm nodular density in the medial aspect of the  left lower lobe (series 603, image 196. A few  new tiny nodules and  reticulonodular opacities are noted in the bilateral upper lobes and  right lower lobe. For example, a 3 mm nodule in the left upper lobe  (image 88), right upper lobe reticulonodular opacities (images  95-96), right lower lobe reticulonodular opacities (images [id number]),  2-3 mm nodules in the right lower lobe (for example image 170). An  8.5 mm right middle lobe perifissural nodule along the right minor  fissure is stable. No focal consolidation or other airspace opacity.      Tiny layering left pleural effusion. No right pleural effusion. No  pneumothorax.      VESSELS:  Thoracic aorta is normal in caliber, mildly tortuous with mild  atherosclerotic calcifications. Main pulmonary artery and its  branches are normal in caliber.  Coronary artery calcifications noted.      HEART:  Heart is mildly enlarged. Trace pericardial fluid.      MEDIASTINUM AND HUSEYIN:  No mediastinal adenopathy. No hilar fullness. The esophagus is normal  in course and caliber.      CHEST WALL AND LOWER NECK:  Thyroid not well seen. Mild diffuse chest wall subcutaneous edema.      No acute bony abnormality or suspicious bone lesion. Multilevel  thoracic spine degenerative changes. Reverse left shoulder  arthroplasty. Severe right shoulder joint degenerative changes.      ABDOMEN:      LIVER:  Stable 1.9 cm simple hepatic cysts in the right liver lobe within  segment 7.      GALLBLADDER:  Prior cholecystectomy.      BILE DUCTS:  No intra or extrahepatic bile duct dilation.      PANCREAS:  Pancreatic parenchymal atrophy again noted. No ductal dilation or  evidence of a focal lesion.      SPLEEN:  Unremarkable.      ADRENAL GLANDS:  Unremarkable.      KIDNEYS AND URETERS:  There is an intermediate density lesion in the right mid kidney  measuring 2.7 cm with internal Hounsfield units of 29. Another  probable cortical lesion in the anterior right mid kidney measures  1.2 cm and 43 Hounsfield units. A hyperdense lesion in the  posterior  left mid kidney measures 2.6 cm and up to 48 Hounsfield units. No  hydroureteronephrosis or nephroureterolithiasis is present.      PELVIS:      BLADDER:  Urinary bladder is distended and without inflammatory changes.      REPRODUCTIVE ORGANS:  Prior hysterectomy. No adnexal abnormality.      BOWEL:  Small sliding hiatal hernia. Partially distended stomach is  unremarkable. Large duodenal diverticulum filled with gas and fluid  is unchanged. The small bowel is normal in caliber without evidence  of focal wall thickening or obstruction.  Extensive scattered  diverticulosis is again seen, greatest in the descending and sigmoid  colon. There is diffuse circumferential wall thickening and  pericolonic stranding throughout the entire colon, concerning for  acute colitis. No pneumatosis seen. Appendix not seen.      VESSELS:  Abdominal aorta is tortuous and normal in caliber with mild  atherosclerotic calcifications.      PERITONEUM/RETROPERITONEUM/LYMPH NODES:  There is no free or loculated fluid collection, no free  intraperitoneal air.  No abdominopelvic lymphadenopathy is present.      BONES AND ABDOMINAL WALL:  Diffuse abdominal wall subcutaneous edema noted. No acute bony  abnormality or suspicious bone lesion. Multilevel lumbar spine  degenerative changes and levo scoliotic curvature again seen.      Impression: CHEST  1.  New small 9 mm nodular density in the medial left lower lobe and  a few tiny scattered pulmonary nodules and reticulonodular opacities  in the bilateral upper lobes and right lower lobe. These may  represent infectious/inflammatory or neoplastic nodules. Attention on  short interval follow-up CT chest is recommended.  2. No dense area of consolidation to suggest pneumonia.  3. Tiny layering left pleural effusion with adjacent left basilar  subsegmental atelectasis.  4. Other chronic findings, as detailed above.      ABDOMEN - PELVIS  1.  Diffuse colonic wall thickening and pericolonic  stranding  throughout the entire colon, concerning for acute colitis of  infectious, inflammatory or ischemic etiology.  2. Diffuse extensive diverticulosis again seen.  3. Intermediate to high density bilateral renal lesions are  indeterminate. These may represent hemorrhagic/proteinaceous renal  cysts, however solid lesion is not excluded. This may be further  assessed with nonemergent renal ultrasound and/or MRI.  4. Additional chronic incidental findings, as detailed above.          MACRO:  None      Signed by: Brandy Pinzon 7/8/2024 1:30 PM  Dictation workstation:   [id number]  ECG 12 lead  Normal sinus rhythm  Left axis deviation  Abnormal ECG  When compared with ECG of 06-JUL-2024 22:54, (unconfirmed)  No significant change was found      Physical Exam  Vitals and nursing note reviewed.   Constitutional:       General: She is not in acute distress.     Appearance: Normal appearance.   HENT:      Head: Normocephalic and atraumatic.   Cardiovascular:      Rate and Rhythm: Normal rate and regular rhythm.   Pulmonary:      Effort: Pulmonary effort is normal.      Breath sounds: Normal breath sounds.   Abdominal:      General: Abdomen is flat. There is no distension.      Palpations: Abdomen is soft.      Tenderness: There is no abdominal tenderness.   Musculoskeletal:      Right lower leg: No edema.      Left lower leg: No edema.   Skin:     General: Skin is warm and dry.   Neurological:      General: No focal deficit present.      Mental Status: She is alert and oriented to person, place, and time.       Assessment/Plan    89 y.o. year old female with a history of HTN, CKD, GERD, bronchitis, lumbar spinal stenosis, hypothyroidism, IgA kappa MM c/b hypercalcemia (MGUS diagnosis 2/2022 with progression to MM in October and starting Velcade 11/2022), who presents to [facility] on 7/6 with chief complaint of generalized weakness/malaise. C.diff testing positive, currently on tapering regimen of vancomycin for recurrent  c diff infection.    #Generalized weakness  #Diarrhea  #Fever  #UTI  #Recurrent C. difficile colitis  #Leukocytosis  -Febrile, with leukocytosis and generalized fatigue on presentation  -CXR 7/6 showing small left pleural effusion with associated atelectasis and/or pneumonitis.  -CT AP 7/19/2024 showing diffuse colonic thickening and fat stranding suggestive of acute colitis  -UA 7/6 showing 2+ blood, 100 protein, 500 leukocyte Estrace, 3+ bacteria, 11-20 WBC. MRSA probe negative.  C. difficile PCR and toxin both positive    PLAN:  -Zofran as needed for nausea  -Will discontinue Flagyl today as fulminant C. difficile is unlikely.  Decrease vancomycin to 125 mg 4 times daily.  Given patient's recurrent C. difficile she will be on a pulse and taper regimen of Vancomycin: 125 mg 4 times daily for 7 days, followed by 125 mg twice daily for 7 days, 125 mg daily for 7 days, and finish on 125 mg every other day for 2 weeks    #Acute urinary retention  -Patient endorses history of urinary retention during previous hospitalizations  -Retaining >800 mL of urine 7/9 and Larson was inserted, Larson was removed and patient continued to retain and catheter was reinserted 7/11  -Urology consulted, appreciate recommendations    #IgA kappa multiple myeloma s/p Velcade 7/5 with assoc CRAB Criteria  #Mild hypercalcemia (recurrent), associated with above MM, resolved  #CKD G3b  #Chronic anemia  -MM diagnosed 2022, follows with Dr. Gray; associated with CRAB findings including bone lesions on chest x-ray 6/2023  -Received chemotherapy 7/5, labs drawn at that time showed hypercalcemia, and was given 500 mL IVF and sent home.  Endorses similar symptoms after previous chemotherapy treatments  -On admission: Hemoglobin stable around baseline of 9, creatinine 1.49 (baseline 1.3-1.5), TSH normal, calcium elevated at 12.1, ionized 1.65, EKG largely unchanged from previous    PLAN:  -Status post rehydration with NS with resolution of  hypercalcemia.   -Monitor BMP while inpatient  -Hematology/oncology followed, no intervention required    #Mild hyponatremia  #Mild hypomagnesemia  -Suspect in the setting of poor PO intake  -Serum osmolality slightly decreased at 269, urine sodium 15, urine osmolality 318 indicating hypotonic hyponatremia    PLAN:  -Anticipate sodium correction with fluid resuscitation in the setting of dehydration  -Monitor BMP daily and replete as necessary     Uncontrolled HTN  -Currently asymptomatic, patient and family state that blood pressure has been elevated for some time.  Averages anywhere from 160s-190s SBP    PLAN:  -Will increase home amlodipine to 10 mg and continue losartan 100 mg daily and consider as needed medications if BP remains elevated      Chronic Problems  #GERD  #Hypothyroidism  #Neuropathy  PLAN:  -Continue home Synthroid  -Continue home gabapentin 300 mg nightly  -PT/OT    Narayan Mercado MD  PGY-1, Internal Medicine

## 2024-07-13 LAB
ANION GAP SERPL CALC-SCNC: 9 MMOL/L (ref 10–20)
BUN SERPL-MCNC: 21 MG/DL (ref 6–23)
CALCIUM SERPL-MCNC: 7.7 MG/DL (ref 8.6–10.3)
CHLORIDE SERPL-SCNC: 105 MMOL/L (ref 98–107)
CO2 SERPL-SCNC: 21 MMOL/L (ref 21–32)
CREAT SERPL-MCNC: 1.08 MG/DL (ref 0.5–1.05)
EGFRCR SERPLBLD CKD-EPI 2021: 49 ML/MIN/1.73M*2
ERYTHROCYTE [DISTWIDTH] IN BLOOD BY AUTOMATED COUNT: 17.1 % (ref 11.5–14.5)
GLUCOSE SERPL-MCNC: 92 MG/DL (ref 74–99)
HCT VFR BLD AUTO: 22.8 % (ref 36–46)
HGB BLD-MCNC: 7.7 G/DL (ref 12–16)
HOLD SPECIMEN: NORMAL
MCH RBC QN AUTO: 29.5 PG (ref 26–34)
MCHC RBC AUTO-ENTMCNC: 33.8 G/DL (ref 32–36)
MCV RBC AUTO: 87 FL (ref 80–100)
NRBC BLD-RTO: 0 /100 WBCS (ref 0–0)
PLATELET # BLD AUTO: 453 X10*3/UL (ref 150–450)
POTASSIUM SERPL-SCNC: 4 MMOL/L (ref 3.5–5.3)
RBC # BLD AUTO: 2.61 X10*6/UL (ref 4–5.2)
SODIUM SERPL-SCNC: 131 MMOL/L (ref 136–145)
WBC # BLD AUTO: 10.6 X10*3/UL (ref 4.4–11.3)

## 2024-07-13 PROCEDURE — 99232 SBSQ HOSP IP/OBS MODERATE 35: CPT

## 2024-07-13 PROCEDURE — 2500000001 HC RX 250 WO HCPCS SELF ADMINISTERED DRUGS (ALT 637 FOR MEDICARE OP)

## 2024-07-13 PROCEDURE — 85027 COMPLETE CBC AUTOMATED: CPT

## 2024-07-13 PROCEDURE — 36415 COLL VENOUS BLD VENIPUNCTURE: CPT

## 2024-07-13 PROCEDURE — 2500000002 HC RX 250 W HCPCS SELF ADMINISTERED DRUGS (ALT 637 FOR MEDICARE OP, ALT 636 FOR OP/ED)

## 2024-07-13 PROCEDURE — 2500000004 HC RX 250 GENERAL PHARMACY W/ HCPCS (ALT 636 FOR OP/ED)

## 2024-07-13 PROCEDURE — 80048 BASIC METABOLIC PNL TOTAL CA: CPT

## 2024-07-13 PROCEDURE — 1200000002 HC GENERAL ROOM WITH TELEMETRY DAILY

## 2024-07-13 RX ADMIN — VANCOMYCIN HYDROCHLORIDE 125 MG: 125 CAPSULE ORAL at 06:37

## 2024-07-13 RX ADMIN — VANCOMYCIN HYDROCHLORIDE 125 MG: 125 CAPSULE ORAL at 17:25

## 2024-07-13 RX ADMIN — LEVOTHYROXINE SODIUM 100 MCG: 0.1 TABLET ORAL at 06:37

## 2024-07-13 RX ADMIN — ENOXAPARIN SODIUM 30 MG: 30 INJECTION SUBCUTANEOUS at 21:31

## 2024-07-13 RX ADMIN — LORATADINE 10 MG: 10 TABLET ORAL at 08:59

## 2024-07-13 RX ADMIN — LOSARTAN POTASSIUM 100 MG: 50 TABLET, FILM COATED ORAL at 08:59

## 2024-07-13 RX ADMIN — AMLODIPINE BESYLATE 10 MG: 10 TABLET ORAL at 08:59

## 2024-07-13 RX ADMIN — HYDRALAZINE HYDROCHLORIDE 10 MG: 20 INJECTION INTRAMUSCULAR; INTRAVENOUS at 21:31

## 2024-07-13 RX ADMIN — VANCOMYCIN HYDROCHLORIDE 125 MG: 125 CAPSULE ORAL at 11:42

## 2024-07-13 RX ADMIN — VANCOMYCIN HYDROCHLORIDE 125 MG: 125 CAPSULE ORAL at 21:31

## 2024-07-13 RX ADMIN — GABAPENTIN 300 MG: 300 CAPSULE ORAL at 21:31

## 2024-07-13 ASSESSMENT — COGNITIVE AND FUNCTIONAL STATUS - GENERAL
TOILETING: A LITTLE
MOBILITY SCORE: 19
CLIMB 3 TO 5 STEPS WITH RAILING: A LITTLE
DAILY ACTIVITIY SCORE: 20
DRESSING REGULAR UPPER BODY CLOTHING: A LITTLE
STANDING UP FROM CHAIR USING ARMS: A LITTLE
HELP NEEDED FOR BATHING: A LITTLE
WALKING IN HOSPITAL ROOM: A LITTLE
MOVING TO AND FROM BED TO CHAIR: A LITTLE
TURNING FROM BACK TO SIDE WHILE IN FLAT BAD: A LITTLE
DRESSING REGULAR LOWER BODY CLOTHING: A LITTLE

## 2024-07-13 ASSESSMENT — PAIN SCALES - GENERAL
PAINLEVEL_OUTOF10: 0 - NO PAIN

## 2024-07-13 ASSESSMENT — PAIN - FUNCTIONAL ASSESSMENT
PAIN_FUNCTIONAL_ASSESSMENT: 0-10

## 2024-07-13 NOTE — PROGRESS NOTES
Bindu Cali is a 89 y.o. female on day 7 of admission presenting with Hypercalcemia.    Subjective     Patient seen and examined.  Patient denies any new or acute symptoms.  Patient states might overnight regarding going home and would like to know to go to SNF due to concern about her ability to care for herself.     Objective     Last Recorded Vitals  /90 (BP Location: Left arm, Patient Position: Sitting)   Pulse 84   Temp 37.2 °C (99 °F) (Temporal)   Resp 22   Wt 59.4 kg (131 lb)   SpO2 97%   Intake/Output last 3 Shifts:    Intake/Output Summary (Last 24 hours) at 7/13/2024 1632  Last data filed at 7/13/2024 1000  Gross per 24 hour   Intake 400 ml   Output 2450 ml   Net -2050 ml       Admission Weight  Weight: 59.4 kg (131 lb) (07/06/24 1159)    Daily Weight  07/06/24 : 59.4 kg (131 lb)    Image Results  US renal complete  Narrative: Interpreted By:  Femi Cabrera,   STUDY:  US RENAL COMPLETE;  7/12/2024 3:44 pm      INDICATION:  Signs/Symptoms:bilateral hyperdense renal lesions. proteinaceous  cysts vs solid lesions.      COMPARISON:  CT of 07/08/2024      ACCESSION NUMBER(S):  [id number]      ORDERING CLINICIAN:  JORDYN KIRK      TECHNIQUE:  Multiple images of the kidneys were obtained  , with color Doppler  for blood flow.      FINDINGS:  RIGHT KIDNEY:  The right kidney measures 9.7 cm in length.  The renal cortex is  within normal limits.  A 2.7 x 3.1 cm cyst is seen laterally. A 1.6 x  1.5 cm cyst with septation is present at the midpole. Considering the  previous CT scan would be most consistent with hemorrhagic cysts. No  hydronephrosis or evidence of nephrolithiasis. Color Doppler  demonstrates renal blood flow.      LEFT KIDNEY:  The left kidney measures 9.0 cm in length. The renal cortex is within  normal limits.  A 2.5 cm cyst is present at the upper pole, complex  with several relatively thick septations. A CT urogram would be  recommended for more definitive evaluation and  Bosniak  classification. This is also hemorrhagic considering the CT scan. A  1.2 x 1.1 cm cyst is present at the midpole, also hemorrhagic  considering the CT scan. No hydronephrosis or evidence of  nephrolithiasis.. Color Doppler demonstrates renal blood flow.      BLADDER:  Decompressed and not well evaluated.      OTHER FINDINGS:  None significant.      Impression: Complex renal cysts as described.      Signed by: Femi Cabrera 7/13/2024 1:47 PM  Dictation workstation:   [id number]      Physical Exam  Vitals and nursing note reviewed.   Constitutional:       General: She is not in acute distress.     Appearance: Normal appearance.   HENT:      Head: Normocephalic and atraumatic.   Cardiovascular:      Rate and Rhythm: Normal rate and regular rhythm.   Pulmonary:      Effort: Pulmonary effort is normal.      Breath sounds: Normal breath sounds.   Abdominal:      General: Abdomen is flat. There is no distension.      Palpations: Abdomen is soft.      Tenderness: There is no abdominal tenderness.   Musculoskeletal:      Right lower leg: No edema.      Left lower leg: No edema.   Skin:     General: Skin is warm and dry.   Neurological:      General: No focal deficit present.      Mental Status: She is alert and oriented to person, place, and time.       Assessment/Plan    89 y.o. year old female with a history of HTN, CKD, GERD, bronchitis, lumbar spinal stenosis, hypothyroidism, IgA kappa MM c/b hypercalcemia (MGUS diagnosis 2/2022 with progression to MM in October and starting Velcade 11/2022), who presents to [facility] on 7/6 with chief complaint of generalized weakness/malaise. C.diff testing positive, currently on tapering regimen of vancomycin for recurrent c diff infection.    #Generalized weakness  #Diarrhea  #Fever  #UTI  #Recurrent C. difficile colitis  #Leukocytosis  -Febrile, with leukocytosis and generalized fatigue on presentation  -CXR 7/6 showing small left pleural effusion with associated atelectasis  and/or pneumonitis.  -CT AP 7/19/2024 showing diffuse colonic thickening and fat stranding suggestive of acute colitis  -UA 7/6 showing 2+ blood, 100 protein, 500 leukocyte Estrace, 3+ bacteria, 11-20 WBC. MRSA probe negative.  C. difficile PCR and toxin both positive    PLAN:  -Zofran as needed for nausea  -Will discontinue Flagyl today as fulminant C. difficile is unlikely.  Decrease vancomycin to 125 mg 4 times daily.  Given patient's recurrent C. difficile she will be on a pulse and taper regimen of Vancomycin: 125 mg 4 times daily for 7 days, followed by 125 mg twice daily for 7 days, 125 mg daily for 7 days, and finish on 125 mg every other day for 2 weeks    #Acute urinary retention  -Patient endorses history of urinary retention during previous hospitalizations  -Retaining >800 mL of urine 7/9 and Larson was inserted, Larson was removed and patient continued to retain and catheter was reinserted 7/11  -Urology consulted, appreciate recommendations    #IgA kappa multiple myeloma s/p Velcade 7/5 with assoc CRAB Criteria  #Mild hypercalcemia (recurrent), associated with above MM, resolved  #CKD G3b  #Chronic anemia  -MM diagnosed 2022, follows with Dr. Gray; associated with CRAB findings including bone lesions on chest x-ray 6/2023  -Received chemotherapy 7/5, labs drawn at that time showed hypercalcemia, and was given 500 mL IVF and sent home.  Endorses similar symptoms after previous chemotherapy treatments  -On admission: Hemoglobin stable around baseline of 9, creatinine 1.49 (baseline 1.3-1.5), TSH normal, calcium elevated at 12.1, ionized 1.65, EKG largely unchanged from previous    PLAN:  -Status post rehydration with NS with resolution of hypercalcemia.   -Monitor BMP while inpatient  -Hematology/oncology followed, no intervention required    #Mild hyponatremia  #Mild hypomagnesemia  -Suspect in the setting of poor PO intake  -Serum osmolality slightly decreased at 269, urine sodium 15, urine  osmolality 318 indicating hypotonic hyponatremia    PLAN:  -Anticipate sodium correction with fluid resuscitation in the setting of dehydration  -Monitor BMP daily and replete as necessary     Uncontrolled HTN  -Currently asymptomatic, patient and family state that blood pressure has been elevated for some time.  Averages anywhere from 160s-190s SBP    PLAN:  -Will increase home amlodipine to 10 mg and continue losartan 100 mg daily and consider as needed medications if BP remains elevated      Chronic Problems  #GERD  #Hypothyroidism  #Neuropathy  PLAN:  -Continue home Synthroid  -Continue home gabapentin 300 mg nightly  -PT/OT

## 2024-07-13 NOTE — CARE PLAN
The patient's goals for the shift include      The clinical goals for the shift include no further neuro changes    Over the shift, the patient did not make progress toward the following goals. Barriers to progression include ***. Recommendations to address these barriers include ***.

## 2024-07-13 NOTE — CARE PLAN
Problem: Pain - Adult  Goal: Verbalizes/displays adequate comfort level or baseline comfort level  Outcome: Progressing     Problem: Safety - Adult  Goal: Free from fall injury  Outcome: Progressing     Problem: Discharge Planning  Goal: Discharge to home or other facility with appropriate resources  Outcome: Progressing     Problem: Chronic Conditions and Co-morbidities  Goal: Patient's chronic conditions and co-morbidity symptoms are monitored and maintained or improved  Outcome: Progressing     Problem: Fall/Injury  Goal: Not fall by end of shift  Outcome: Progressing  Goal: Be free from injury by end of the shift  Outcome: Progressing  Goal: Verbalize understanding of personal risk factors for fall in the hospital  Outcome: Progressing  Goal: Verbalize understanding of risk factor reduction measures to prevent injury from fall in the home  Outcome: Progressing  Goal: Use assistive devices by end of the shift  Outcome: Progressing  Goal: Pace activities to prevent fatigue by end of the shift  Outcome: Progressing     Problem: Skin  Goal: Participates in plan/prevention/treatment measures  Outcome: Progressing  Goal: Prevent/manage excess moisture  Outcome: Progressing  Goal: Prevent/minimize sheer/friction injuries  Outcome: Progressing  Goal: Promote/optimize nutrition  Outcome: Progressing   The patient's goals for the shift include      The clinical goals for the shift include comfort and safety    Over the shift, the patient did not make progress toward the following goals. Barriers to progression include none. Recommendations to address these barriers include n/a.

## 2024-07-14 VITALS
RESPIRATION RATE: 18 BRPM | SYSTOLIC BLOOD PRESSURE: 162 MMHG | OXYGEN SATURATION: 95 % | DIASTOLIC BLOOD PRESSURE: 70 MMHG | BODY MASS INDEX: 27.5 KG/M2 | WEIGHT: 131 LBS | HEIGHT: 58 IN | TEMPERATURE: 98.8 F | HEART RATE: 77 BPM

## 2024-07-14 PROCEDURE — 99232 SBSQ HOSP IP/OBS MODERATE 35: CPT

## 2024-07-14 PROCEDURE — 2500000001 HC RX 250 WO HCPCS SELF ADMINISTERED DRUGS (ALT 637 FOR MEDICARE OP)

## 2024-07-14 PROCEDURE — 2500000004 HC RX 250 GENERAL PHARMACY W/ HCPCS (ALT 636 FOR OP/ED)

## 2024-07-14 PROCEDURE — 2500000002 HC RX 250 W HCPCS SELF ADMINISTERED DRUGS (ALT 637 FOR MEDICARE OP, ALT 636 FOR OP/ED)

## 2024-07-14 PROCEDURE — 1200000002 HC GENERAL ROOM WITH TELEMETRY DAILY

## 2024-07-14 RX ORDER — CALCIUM CARBONATE 200(500)MG
500 TABLET,CHEWABLE ORAL DAILY
Status: DISCONTINUED | OUTPATIENT
Start: 2024-07-14 | End: 2024-07-14

## 2024-07-14 RX ORDER — CALCIUM CARBONATE 200(500)MG
500 TABLET,CHEWABLE ORAL 4 TIMES DAILY PRN
Status: DISCONTINUED | OUTPATIENT
Start: 2024-07-14 | End: 2024-07-14

## 2024-07-14 RX ORDER — CALCIUM CARBONATE 200(500)MG
1000 TABLET,CHEWABLE ORAL 4 TIMES DAILY PRN
Status: DISCONTINUED | OUTPATIENT
Start: 2024-07-14 | End: 2024-07-16 | Stop reason: HOSPADM

## 2024-07-14 RX ADMIN — LEVOTHYROXINE SODIUM 100 MCG: 0.1 TABLET ORAL at 06:28

## 2024-07-14 RX ADMIN — VANCOMYCIN HYDROCHLORIDE 125 MG: 125 CAPSULE ORAL at 20:49

## 2024-07-14 RX ADMIN — GABAPENTIN 300 MG: 300 CAPSULE ORAL at 20:49

## 2024-07-14 RX ADMIN — ENOXAPARIN SODIUM 30 MG: 30 INJECTION SUBCUTANEOUS at 20:49

## 2024-07-14 RX ADMIN — CALCIUM CARBONATE (ANTACID) CHEW TAB 500 MG 500 MG: 500 CHEW TAB at 20:48

## 2024-07-14 RX ADMIN — VANCOMYCIN HYDROCHLORIDE 125 MG: 125 CAPSULE ORAL at 17:27

## 2024-07-14 RX ADMIN — LORATADINE 10 MG: 10 TABLET ORAL at 08:28

## 2024-07-14 RX ADMIN — VANCOMYCIN HYDROCHLORIDE 125 MG: 125 CAPSULE ORAL at 06:28

## 2024-07-14 RX ADMIN — AMLODIPINE BESYLATE 10 MG: 10 TABLET ORAL at 08:28

## 2024-07-14 RX ADMIN — CALCIUM CARBONATE (ANTACID) CHEW TAB 500 MG 500 MG: 500 CHEW TAB at 17:38

## 2024-07-14 RX ADMIN — HYDRALAZINE HYDROCHLORIDE 10 MG: 20 INJECTION INTRAMUSCULAR; INTRAVENOUS at 21:19

## 2024-07-14 RX ADMIN — VANCOMYCIN HYDROCHLORIDE 125 MG: 125 CAPSULE ORAL at 11:24

## 2024-07-14 RX ADMIN — ACETAMINOPHEN 650 MG: 325 TABLET ORAL at 20:49

## 2024-07-14 RX ADMIN — LOSARTAN POTASSIUM 100 MG: 50 TABLET, FILM COATED ORAL at 08:28

## 2024-07-14 ASSESSMENT — COGNITIVE AND FUNCTIONAL STATUS - GENERAL
MOVING TO AND FROM BED TO CHAIR: A LITTLE
MOBILITY SCORE: 17
CLIMB 3 TO 5 STEPS WITH RAILING: A LITTLE
DRESSING REGULAR LOWER BODY CLOTHING: A LITTLE
TOILETING: A LITTLE
STANDING UP FROM CHAIR USING ARMS: A LITTLE
DAILY ACTIVITIY SCORE: 17
DRESSING REGULAR UPPER BODY CLOTHING: A LITTLE
DAILY ACTIVITIY SCORE: 20
HELP NEEDED FOR BATHING: A LITTLE
TURNING FROM BACK TO SIDE WHILE IN FLAT BAD: A LITTLE
EATING MEALS: A LITTLE
MOBILITY SCORE: 19
MOVING FROM LYING ON BACK TO SITTING ON SIDE OF FLAT BED WITH BEDRAILS: A LITTLE
STANDING UP FROM CHAIR USING ARMS: A LITTLE
MOVING TO AND FROM BED TO CHAIR: A LITTLE
DRESSING REGULAR LOWER BODY CLOTHING: A LOT
CLIMB 3 TO 5 STEPS WITH RAILING: A LOT
WALKING IN HOSPITAL ROOM: A LITTLE
TURNING FROM BACK TO SIDE WHILE IN FLAT BAD: A LITTLE
DRESSING REGULAR UPPER BODY CLOTHING: A LITTLE
WALKING IN HOSPITAL ROOM: A LITTLE
PERSONAL GROOMING: A LITTLE
HELP NEEDED FOR BATHING: A LITTLE
TOILETING: A LITTLE

## 2024-07-14 ASSESSMENT — PAIN DESCRIPTION - DESCRIPTORS: DESCRIPTORS: ACHING

## 2024-07-14 ASSESSMENT — PAIN SCALES - GENERAL
PAINLEVEL_OUTOF10: 0 - NO PAIN
PAINLEVEL_OUTOF10: 3
PAINLEVEL_OUTOF10: 0 - NO PAIN

## 2024-07-14 ASSESSMENT — PAIN DESCRIPTION - LOCATION: LOCATION: SHOULDER

## 2024-07-14 ASSESSMENT — PAIN - FUNCTIONAL ASSESSMENT
PAIN_FUNCTIONAL_ASSESSMENT: 0-10

## 2024-07-14 ASSESSMENT — PAIN DESCRIPTION - ORIENTATION: ORIENTATION: RIGHT

## 2024-07-14 NOTE — CARE PLAN
The patient's goals for the shift include  rest    The clinical goals for the shift include remain safe and free from harm.    Over the shift, the patient did not make progress toward the following goals. Barriers to progression include altered mental status. Recommendations to address these barriers include frequent rounding.    Problem: Fall/Injury  Goal: Verbalize understanding of personal risk factors for fall in the hospital  Outcome: Not Progressing  Goal: Verbalize understanding of risk factor reduction measures to prevent injury from fall in the home  Outcome: Not Progressing  Goal: Use assistive devices by end of the shift  Outcome: Not Progressing

## 2024-07-14 NOTE — CARE PLAN
The patient's goals for the shift include  rest    The clinical goals for the shift include comfort and safety    Barriers to progression include none at this time. Recommendations to address barriers include monitoring.    Problem: Fall/Injury  Goal: Verbalize understanding of personal risk factors for fall in the hospital  7/14/2024 0027 by Dayan Galdamez RN  Outcome: Progressing  7/14/2024 0027 by Dayan Galdamez RN  Outcome: Progressing  Goal: Verbalize understanding of risk factor reduction measures to prevent injury from fall in the home  7/14/2024 0027 by Dayan Galdamez RN  Outcome: Progressing  7/14/2024 0027 by Dayan Galdamez RN  Outcome: Progressing  Goal: Use assistive devices by end of the shift  7/14/2024 0027 by Dayan Galdamez RN  Outcome: Progressing  7/14/2024 0027 by Dayan Galdamez RN  Outcome: Progressing

## 2024-07-14 NOTE — PROGRESS NOTES
Bindu Cali is a 89 y.o. female on day 8 of admission presenting with Hypercalcemia.    Subjective     Patient feeling well today.  Denies any new or acute complaints.   with message yesterday and this morning regarding discharge planning.  Would like to go to SNF     Objective     Last Recorded Vitals  /67   Pulse 78   Temp 36.9 °C (98.4 °F) (Temporal)   Resp 18   Wt 59.4 kg (131 lb)   SpO2 93%   Intake/Output last 3 Shifts:    Intake/Output Summary (Last 24 hours) at 7/14/2024 1054  Last data filed at 7/14/2024 0857  Gross per 24 hour   Intake 520 ml   Output 1500 ml   Net -980 ml       Admission Weight  Weight: 59.4 kg (131 lb) (07/06/24 1159)    Daily Weight  07/06/24 : 59.4 kg (131 lb)    Image Results  US renal complete  Narrative: Interpreted By:  Femi Cabrera,   STUDY:  US RENAL COMPLETE;  7/12/2024 3:44 pm      INDICATION:  Signs/Symptoms:bilateral hyperdense renal lesions. proteinaceous  cysts vs solid lesions.      COMPARISON:  CT of 07/08/2024      ACCESSION NUMBER(S):  [id number]      ORDERING CLINICIAN:  JORDYN KIRK      TECHNIQUE:  Multiple images of the kidneys were obtained  , with color Doppler  for blood flow.      FINDINGS:  RIGHT KIDNEY:  The right kidney measures 9.7 cm in length.  The renal cortex is  within normal limits.  A 2.7 x 3.1 cm cyst is seen laterally. A 1.6 x  1.5 cm cyst with septation is present at the midpole. Considering the  previous CT scan would be most consistent with hemorrhagic cysts. No  hydronephrosis or evidence of nephrolithiasis. Color Doppler  demonstrates renal blood flow.      LEFT KIDNEY:  The left kidney measures 9.0 cm in length. The renal cortex is within  normal limits.  A 2.5 cm cyst is present at the upper pole, complex  with several relatively thick septations. A CT urogram would be  recommended for more definitive evaluation and Bosniak  classification. This is also hemorrhagic considering the CT scan. A  1.2 x 1.1 cm  cyst is present at the midpole, also hemorrhagic  considering the CT scan. No hydronephrosis or evidence of  nephrolithiasis.. Color Doppler demonstrates renal blood flow.      BLADDER:  Decompressed and not well evaluated.      OTHER FINDINGS:  None significant.      Impression: Complex renal cysts as described.      Signed by: Femi Cabrera 7/13/2024 1:47 PM  Dictation workstation:   [id number]      Physical Exam  Vitals and nursing note reviewed.   Constitutional:       General: She is not in acute distress.     Appearance: Normal appearance.   HENT:      Head: Normocephalic and atraumatic.   Cardiovascular:      Rate and Rhythm: Normal rate and regular rhythm.   Pulmonary:      Effort: Pulmonary effort is normal.      Breath sounds: Normal breath sounds.   Abdominal:      General: Abdomen is flat. There is no distension.      Palpations: Abdomen is soft.      Tenderness: There is no abdominal tenderness.   Musculoskeletal:      Right lower leg: No edema.      Left lower leg: No edema.   Skin:     General: Skin is warm and dry.   Neurological:      General: No focal deficit present.      Mental Status: She is alert and oriented to person, place, and time.       Assessment/Plan    89 y.o. year old female with a history of HTN, CKD, GERD, bronchitis, lumbar spinal stenosis, hypothyroidism, IgA kappa MM c/b hypercalcemia (MGUS diagnosis 2/2022 with progression to MM in October and starting Velcade 11/2022), who presents to [facility] on 7/6 with chief complaint of generalized weakness/malaise. C.diff testing positive, currently on tapering regimen of vancomycin for recurrent c diff infection.    #Discharge planning  -To be seen by  for SNF selection    #Generalized weakness  #Diarrhea  #UTI  #Recurrent C. difficile colitis  -CXR 7/6 showing small left pleural effusion with associated atelectasis and/or pneumonitis.  -CT AP 7/19/2024 showing diffuse colonic thickening and fat stranding suggestive of acute  colitis  -UA 7/6 showing 2+ blood, 100 protein, 500 leukocyte Estrace, 3+ bacteria, 11-20 WBC. MRSA probe negative.  C. difficile PCR and toxin both positive  PLAN:  -Zofran as needed for nausea  -Continue vancomycin taper (Vancomycin: 125 mg 4 times daily for 7 days, followed by 125 mg twice daily for 7 days, 125 mg daily for 7 days, and finish on 125 mg every other day for 2 weeks)    #Acute urinary retention  -Patient endorses history of urinary retention during previous hospitalizations  -Retaining >800 mL of urine 7/9 and Larson was inserted, Larson was removed and patient continued to retain and catheter was reinserted 7/11  -Urology consulted, appreciate recommendations    #IgA kappa multiple myeloma s/p Velcade 7/5 with assoc CRAB Criteria  #Mild hypercalcemia (recurrent), associated with above MM, resolved  #CKD G3b  #Chronic anemia  -MM diagnosed 2022, follows with Dr. Gray; associated with CRAB findings including bone lesions on chest x-ray 6/2023  -Received chemotherapy 7/5, labs drawn at that time showed hypercalcemia, and was given 500 mL IVF and sent home.  Endorses similar symptoms after previous chemotherapy treatments  -On admission: Hemoglobin stable around baseline of 9, creatinine 1.49 (baseline 1.3-1.5), TSH normal, calcium elevated at 12.1, ionized 1.65, EKG largely unchanged from previous    PLAN:  -Status post rehydration with NS with resolution of hypercalcemia.   -Monitor BMP while inpatient  -Hematology/oncology followed, no intervention required    #Mild hyponatremia  #Mild hypomagnesemia  -Suspect in the setting of poor PO intake  -Serum osmolality slightly decreased at 269, urine sodium 15, urine osmolality 318 indicating hypotonic hyponatremia    PLAN:  -Anticipate sodium correction with fluid resuscitation in the setting of dehydration  -Monitor BMP daily and replete as necessary     Uncontrolled HTN  -Currently asymptomatic, patient and family state that blood pressure has been  elevated for some time.  Averages anywhere from 160s-190s SBP    PLAN:  -Will increase home amlodipine to 10 mg and continue losartan 100 mg daily and consider as needed medications if BP remains elevated      Chronic Problems  #GERD  #Hypothyroidism  #Neuropathy  PLAN:  -Continue home Synthroid  -Continue home gabapentin 300 mg nightly  -PT/OT

## 2024-07-15 LAB
ANION GAP SERPL CALC-SCNC: 9 MMOL/L (ref 10–20)
BUN SERPL-MCNC: 16 MG/DL (ref 6–23)
CALCIUM SERPL-MCNC: 8.3 MG/DL (ref 8.6–10.3)
CHLORIDE SERPL-SCNC: 104 MMOL/L (ref 98–107)
CO2 SERPL-SCNC: 23 MMOL/L (ref 21–32)
CREAT SERPL-MCNC: 1.08 MG/DL (ref 0.5–1.05)
EGFRCR SERPLBLD CKD-EPI 2021: 49 ML/MIN/1.73M*2
ERYTHROCYTE [DISTWIDTH] IN BLOOD BY AUTOMATED COUNT: 17.4 % (ref 11.5–14.5)
GLUCOSE SERPL-MCNC: 86 MG/DL (ref 74–99)
HCT VFR BLD AUTO: 23 % (ref 36–46)
HGB BLD-MCNC: 7.8 G/DL (ref 12–16)
MAGNESIUM SERPL-MCNC: 1.71 MG/DL (ref 1.6–2.4)
MCH RBC QN AUTO: 29.9 PG (ref 26–34)
MCHC RBC AUTO-ENTMCNC: 33.9 G/DL (ref 32–36)
MCV RBC AUTO: 88 FL (ref 80–100)
NRBC BLD-RTO: 0 /100 WBCS (ref 0–0)
PLATELET # BLD AUTO: 585 X10*3/UL (ref 150–450)
POTASSIUM SERPL-SCNC: 5 MMOL/L (ref 3.5–5.3)
RBC # BLD AUTO: 2.61 X10*6/UL (ref 4–5.2)
SODIUM SERPL-SCNC: 131 MMOL/L (ref 136–145)
WBC # BLD AUTO: 9.5 X10*3/UL (ref 4.4–11.3)

## 2024-07-15 PROCEDURE — 2500000001 HC RX 250 WO HCPCS SELF ADMINISTERED DRUGS (ALT 637 FOR MEDICARE OP)

## 2024-07-15 PROCEDURE — 2500000002 HC RX 250 W HCPCS SELF ADMINISTERED DRUGS (ALT 637 FOR MEDICARE OP, ALT 636 FOR OP/ED)

## 2024-07-15 PROCEDURE — 2500000004 HC RX 250 GENERAL PHARMACY W/ HCPCS (ALT 636 FOR OP/ED)

## 2024-07-15 PROCEDURE — 36415 COLL VENOUS BLD VENIPUNCTURE: CPT

## 2024-07-15 PROCEDURE — 83735 ASSAY OF MAGNESIUM: CPT

## 2024-07-15 PROCEDURE — 97535 SELF CARE MNGMENT TRAINING: CPT | Mod: CO,GO

## 2024-07-15 PROCEDURE — 97116 GAIT TRAINING THERAPY: CPT | Mod: GP,CQ

## 2024-07-15 PROCEDURE — 80048 BASIC METABOLIC PNL TOTAL CA: CPT

## 2024-07-15 PROCEDURE — 85027 COMPLETE CBC AUTOMATED: CPT

## 2024-07-15 PROCEDURE — 1200000002 HC GENERAL ROOM WITH TELEMETRY DAILY

## 2024-07-15 PROCEDURE — 99232 SBSQ HOSP IP/OBS MODERATE 35: CPT

## 2024-07-15 RX ORDER — AMLODIPINE BESYLATE 5 MG/1
2.5 TABLET ORAL EVERY MORNING
Status: DISCONTINUED | OUTPATIENT
Start: 2024-07-16 | End: 2024-07-16 | Stop reason: HOSPADM

## 2024-07-15 RX ORDER — HYDRALAZINE HYDROCHLORIDE 20 MG/ML
5 INJECTION INTRAMUSCULAR; INTRAVENOUS EVERY 6 HOURS PRN
Status: DISCONTINUED | OUTPATIENT
Start: 2024-07-15 | End: 2024-07-16 | Stop reason: HOSPADM

## 2024-07-15 RX ADMIN — LEVOTHYROXINE SODIUM 100 MCG: 0.1 TABLET ORAL at 06:42

## 2024-07-15 RX ADMIN — VANCOMYCIN HYDROCHLORIDE 125 MG: 125 CAPSULE ORAL at 06:42

## 2024-07-15 RX ADMIN — AMLODIPINE BESYLATE 10 MG: 10 TABLET ORAL at 09:08

## 2024-07-15 RX ADMIN — CALCIUM CARBONATE (ANTACID) CHEW TAB 500 MG 1000 MG: 500 CHEW TAB at 20:30

## 2024-07-15 RX ADMIN — ENOXAPARIN SODIUM 30 MG: 30 INJECTION SUBCUTANEOUS at 20:28

## 2024-07-15 RX ADMIN — LORATADINE 10 MG: 10 TABLET ORAL at 09:08

## 2024-07-15 RX ADMIN — LOSARTAN POTASSIUM 100 MG: 50 TABLET, FILM COATED ORAL at 09:08

## 2024-07-15 RX ADMIN — VANCOMYCIN HYDROCHLORIDE 125 MG: 125 CAPSULE ORAL at 13:11

## 2024-07-15 RX ADMIN — VANCOMYCIN HYDROCHLORIDE 125 MG: 125 CAPSULE ORAL at 20:28

## 2024-07-15 RX ADMIN — GABAPENTIN 300 MG: 300 CAPSULE ORAL at 20:28

## 2024-07-15 RX ADMIN — ACETAMINOPHEN 650 MG: 325 TABLET ORAL at 20:30

## 2024-07-15 RX ADMIN — VANCOMYCIN HYDROCHLORIDE 125 MG: 125 CAPSULE ORAL at 17:50

## 2024-07-15 ASSESSMENT — PAIN DESCRIPTION - DESCRIPTORS: DESCRIPTORS: ACHING

## 2024-07-15 ASSESSMENT — PAIN SCALES - GENERAL
PAINLEVEL_OUTOF10: 6
PAINLEVEL_OUTOF10: 0 - NO PAIN

## 2024-07-15 ASSESSMENT — COGNITIVE AND FUNCTIONAL STATUS - GENERAL
DRESSING REGULAR LOWER BODY CLOTHING: A LITTLE
STANDING UP FROM CHAIR USING ARMS: A LITTLE
TURNING FROM BACK TO SIDE WHILE IN FLAT BAD: A LITTLE
STANDING UP FROM CHAIR USING ARMS: A LITTLE
CLIMB 3 TO 5 STEPS WITH RAILING: A LOT
DAILY ACTIVITIY SCORE: 19
HELP NEEDED FOR BATHING: A LITTLE
TOILETING: A LITTLE
WALKING IN HOSPITAL ROOM: A LITTLE
MOVING FROM LYING ON BACK TO SITTING ON SIDE OF FLAT BED WITH BEDRAILS: A LITTLE
MOVING TO AND FROM BED TO CHAIR: A LITTLE
MOVING TO AND FROM BED TO CHAIR: A LITTLE
PERSONAL GROOMING: A LITTLE
HELP NEEDED FOR BATHING: A LITTLE
MOBILITY SCORE: 17
DRESSING REGULAR LOWER BODY CLOTHING: A LITTLE
PERSONAL GROOMING: A LITTLE
DRESSING REGULAR UPPER BODY CLOTHING: A LITTLE
CLIMB 3 TO 5 STEPS WITH RAILING: A LOT
WALKING IN HOSPITAL ROOM: A LITTLE
TURNING FROM BACK TO SIDE WHILE IN FLAT BAD: A LITTLE
MOVING FROM LYING ON BACK TO SITTING ON SIDE OF FLAT BED WITH BEDRAILS: A LITTLE
TOILETING: A LITTLE
DRESSING REGULAR UPPER BODY CLOTHING: A LITTLE
MOBILITY SCORE: 17
DAILY ACTIVITIY SCORE: 19

## 2024-07-15 ASSESSMENT — PAIN - FUNCTIONAL ASSESSMENT
PAIN_FUNCTIONAL_ASSESSMENT: 0-10

## 2024-07-15 ASSESSMENT — ACTIVITIES OF DAILY LIVING (ADL): HOME_MANAGEMENT_TIME_ENTRY: 15

## 2024-07-15 NOTE — PROGRESS NOTES
Occupational Therapy    OT Treatment    Patient Name: Bindu Cali  MRN: [id number]  Today's Date: 7/15/2024  Time Calculation  Start Time: 1013  Stop Time: 1043  Time Calculation (min): 30 min        Assessment:  End of Session Communication: Care Coordinator  End of Session Patient Position: Up in chair, Alarm on     Plan:  Treatment Interventions: ADL retraining, Functional transfer training, UE strengthening/ROM, Endurance training, Patient/family training, Equipment evaluation/education  OT Frequency: 3 times per week  OT Discharge Recommendations: Low intensity level of continued care (initial 24 hr. support/supervision)  OT - OK to Discharge: Yes (once cleared by medical team)  Treatment Interventions: ADL retraining, Functional transfer training, UE strengthening/ROM, Endurance training, Patient/family training, Equipment evaluation/education    Subjective   Previous Visit Info:  OT Last Visit  OT Received On: 07/15/24  General:  General  Co-Treatment: PT  Co-Treatment Reason: Maximize patient mobility and safety  Prior to Session Communication: Bedside nurse, PCT/NA/CTA  Patient Position Received: Bed, 2 rail up, Alarm off, not on at start of session  General Comment: pleasant and cooperative  Precautions:  Medical Precautions: Fall precautions, Infection precautions (Cdiff)         Objective         Activities of Daily Living: Grooming  Grooming Level of Assistance: Close supervision  Grooming Where Assessed: Standing sinkside    LE Dressing  LE Dressing: Yes  Pants Level of Assistance: Minimum assistance  LE Dressing Where Assessed: Edge of bed  LE Dressing Comments: assist to thread avila  Functional Standing Tolerance:  Time: 3:00 standing at FWW  Bed Mobility/Transfers: Bed Mobility  Bed Mobility: Yes  Bed Mobility 1  Bed Mobility 1: Supine to sitting  Level of Assistance 1: Close supervision  Bed Mobility Comments 1: with use of bed rail and HOB slightly elevated    Transfers  Transfer:  Yes  Transfer 1  Technique 1: Sit to stand, Stand to sit  Transfer Device 1: Walker  Transfer Level of Assistance 1: Contact guard  Trials/Comments 1: pt completed STS from EOB to FWW and from chair with arms x2 trials      Functional Mobility:  Functional Mobility  Functional Mobility Performed: Yes  Functional Mobility 1  Device 1: Rolling walker  Assistance 1: Contact guard  Comments 1: pt able to ambulated in room through narrow spaces with min vc for increased safety x2 trials      Outcome Measures:[facility] Daily Activity  Putting on and taking off regular lower body clothing: A little  Bathing (including washing, rinsing, drying): A little  Putting on and taking off regular upper body clothing: A little  Toileting, which includes using toilet, bedpan or urinal: A little  Taking care of personal grooming such as brushing teeth: A little  Eating Meals: None  Daily Activity - Total Score: 19        Education Documentation  Body Mechanics, taught by ROBERTO CARLOS Francis at 7/15/2024 11:01 AM.  Learner: Patient  Readiness: Acceptance  Method: Explanation  Response: Verbalizes Understanding    Precautions, taught by ROBERTO CARLOS Francis at 7/15/2024 11:01 AM.  Learner: Patient  Readiness: Acceptance  Method: Explanation  Response: Verbalizes Understanding    ADL Training, taught by ROBERTO CARLOS Francis at 7/15/2024 11:01 AM.  Learner: Patient  Readiness: Acceptance  Method: Explanation  Response: Verbalizes Understanding    Education Comments  No comments found.        OP EDUCATION:       Goals:  Encounter Problems       Encounter Problems (Active)       OT Goals       OT Goal 1 (Progressing)       Start:  07/08/24    Expected End:  07/22/24       Pt. will perform lower body bathing and dressing ind.'ly using adaptive equipment as needed.           OT Goal 2 (Progressing)       Start:  07/08/24    Expected End:  07/22/24       Pt. will perform toileting ind.'ly using adaptive equipment as needed.           OT  Goal 3 (Progressing)       Start:  07/08/24    Expected End:  07/22/24       Pt. will perform bed mobility/chair/commode/shower chair transfers ind.'ly.         OT Goal 4 (Progressing)       Start:  07/08/24    Expected End:  07/22/24       Pt. will perform functional mobility with rolling walker ind.'ly in prep. for homemaking tasks.           OT Goal 5 (Progressing)       Start:  07/08/24    Expected End:  07/22/24       Pt. will tolerate 6-8 min. of standing tasks with mod. Ind. in preparation for grooming at sink.

## 2024-07-15 NOTE — PROGRESS NOTES
Bindu Cali is a 89 y.o. female on day 9 of admission presenting with Hypercalcemia.    Subjective     Feeling well today, no new complaints.  Patient waiting for a bed at the [facility].  Says that her legs are more swollen than normal which has happened before when her amlodipine dose is increased.  No new episodes of diarrhea since Friday.     Objective     Last Recorded Vitals  /72 (BP Location: Left arm, Patient Position: Sitting)   Pulse 74   Temp 37.2 °C (99 °F) (Temporal)   Resp 18   Wt 59.4 kg (131 lb)   SpO2 95%   Intake/Output last 3 Shifts:    Intake/Output Summary (Last 24 hours) at 7/15/2024 1253  Last data filed at 7/15/2024 0911  Gross per 24 hour   Intake 520 ml   Output 2500 ml   Net -1980 ml       Admission Weight  Weight: 59.4 kg (131 lb) (07/06/24 1159)    Daily Weight  07/06/24 : 59.4 kg (131 lb)    Image Results  US renal complete  Narrative: Interpreted By:  Fmei Cabrera,   STUDY:  US RENAL COMPLETE;  7/12/2024 3:44 pm      INDICATION:  Signs/Symptoms:bilateral hyperdense renal lesions. proteinaceous  cysts vs solid lesions.      COMPARISON:  CT of 07/08/2024      ACCESSION NUMBER(S):  [id number]      ORDERING CLINICIAN:  JORDYN KIRK      TECHNIQUE:  Multiple images of the kidneys were obtained  , with color Doppler  for blood flow.      FINDINGS:  RIGHT KIDNEY:  The right kidney measures 9.7 cm in length.  The renal cortex is  within normal limits.  A 2.7 x 3.1 cm cyst is seen laterally. A 1.6 x  1.5 cm cyst with septation is present at the midpole. Considering the  previous CT scan would be most consistent with hemorrhagic cysts. No  hydronephrosis or evidence of nephrolithiasis. Color Doppler  demonstrates renal blood flow.      LEFT KIDNEY:  The left kidney measures 9.0 cm in length. The renal cortex is within  normal limits.  A 2.5 cm cyst is present at the upper pole, complex  with several relatively thick septations. A CT urogram would be  recommended for  more definitive evaluation and Bosniak  classification. This is also hemorrhagic considering the CT scan. A  1.2 x 1.1 cm cyst is present at the midpole, also hemorrhagic  considering the CT scan. No hydronephrosis or evidence of  nephrolithiasis.. Color Doppler demonstrates renal blood flow.      BLADDER:  Decompressed and not well evaluated.      OTHER FINDINGS:  None significant.      Impression: Complex renal cysts as described.      Signed by: Femi Cabrera 7/13/2024 1:47 PM  Dictation workstation:   [id number]      Physical Exam  Vitals and nursing note reviewed.   Constitutional:       General: She is not in acute distress.     Appearance: Normal appearance.   HENT:      Head: Normocephalic and atraumatic.   Cardiovascular:      Rate and Rhythm: Normal rate and regular rhythm.   Pulmonary:      Effort: Pulmonary effort is normal.      Breath sounds: Normal breath sounds.   Abdominal:      General: Abdomen is flat. There is no distension.      Palpations: Abdomen is soft.      Tenderness: There is no abdominal tenderness.   Musculoskeletal:      Right lower leg: No edema.      Left lower leg: No edema.   Skin:     General: Skin is warm and dry.   Neurological:      General: No focal deficit present.      Mental Status: She is alert and oriented to person, place, and time.       Assessment/Plan    89 y.o. year old female with a history of HTN, CKD, GERD, bronchitis, lumbar spinal stenosis, hypothyroidism, IgA kappa MM c/b hypercalcemia (MGUS diagnosis 2/2022 with progression to MM in October and starting Velcade 11/2022), who presents to [facility] on 7/6 with chief complaint of generalized weakness/malaise. C.diff testing positive, currently on tapering regimen of vancomycin for recurrent c diff infection.    #Discharge planning  -To be seen by  for SNF selection    #Generalized weakness  #Diarrhea  #UTI  #Recurrent C. difficile colitis  -CXR 7/6 showing small left pleural effusion with associated  atelectasis and/or pneumonitis.  -CT AP 7/19/2024 showing diffuse colonic thickening and fat stranding suggestive of acute colitis  -UA 7/6 showing 2+ blood, 100 protein, 500 leukocyte Estrace, 3+ bacteria, 11-20 WBC. MRSA probe negative.  C. difficile PCR and toxin both positive  PLAN:  -Zofran as needed for nausea  -Continue vancomycin taper (Vancomycin: 125 mg 4 times daily for 7 days, followed by 125 mg twice daily for 7 days, 125 mg daily for 7 days, and finish on 125 mg every other day for 2 weeks)    #Acute urinary retention  -Patient endorses history of urinary retention during previous hospitalizations  -Retaining >800 mL of urine 7/9 and Larson was inserted, Larson was removed and patient continued to retain and catheter was reinserted 7/11  -Urology consulted, appreciate recommendations    #IgA kappa multiple myeloma s/p Velcade 7/5 with assoc CRAB Criteria  #Mild hypercalcemia (recurrent), associated with above MM, resolved  #CKD G3b  #Chronic anemia  -MM diagnosed 2022, follows with Dr. Gray; associated with CRAB findings including bone lesions on chest x-ray 6/2023  -Received chemotherapy 7/5, labs drawn at that time showed hypercalcemia, and was given 500 mL IVF and sent home.  Endorses similar symptoms after previous chemotherapy treatments  -On admission: Hemoglobin stable around baseline of 9, creatinine 1.49 (baseline 1.3-1.5), TSH normal, calcium elevated at 12.1, ionized 1.65, EKG largely unchanged from previous    PLAN:  -Status post rehydration with NS with resolution of hypercalcemia.   -Monitor BMP while inpatient  -Hematology/oncology followed, no intervention required    #Mild hyponatremia  #Mild hypomagnesemia  -Suspect in the setting of poor PO intake  -Serum osmolality slightly decreased at 269, urine sodium 15, urine osmolality 318 indicating hypotonic hyponatremia    PLAN:  -Anticipate sodium correction with fluid resuscitation in the setting of dehydration  -Monitor BMP daily and  replete as necessary     Uncontrolled HTN  -Currently asymptomatic, patient and family state that blood pressure has been elevated for some time.  Averages anywhere from 160s-190s SBP    PLAN:  -Decrease losartan to 2.5 mg given lower extremity edema, add as needed hydralazine, continue losartan 100 mg daily     Chronic Problems  #GERD  #Hypothyroidism  #Neuropathy  PLAN:  -Continue home Synthroid  -Continue home gabapentin 300 mg nightly  -PT/OT

## 2024-07-15 NOTE — PROGRESS NOTES
Physical Therapy    Physical Therapy Treatment    Patient Name: Bindu Cali  MRN: [id number]  Today's Date: 7/15/2024  Time Calculation  Start Time: 1013  Stop Time: 1043  Time Calculation (min): 30 min    Assessment/Plan   PT Assessment  End of Session Communication: Bedside nurse  End of Session Patient Position: Up in chair, Alarm on (call light and needs within reach.)     PT Plan  PT Plan: Ongoing PT  PT Frequency: 3 times per week  PT Discharge Recommendations: Low intensity level of continued care  PT Recommended Transfer Status: Contact guard  PT - OK to Discharge: Yes (when cleared by medical team.)      General Visit Information:   PT  Visit  PT Received On: 07/15/24  General  Co-Treatment: OT  Co-Treatment Reason: Maximize patient mobility and safety  Prior to Session Communication: Bedside nurse  Patient Position Received: Bed, 2 rail up, Alarm on  General Comment: Patient pleasant and agreeable to therapy.    Subjective   Precautions:  Precautions  Hearing/Visual Limitations: wears glasses  Medical Precautions: Fall precautions, Infection precautions (Contact + (C-diff))  Vital Signs:       Objective   Treatments:  Therapeutic Activity  Therapeutic Activity Performed: Yes  Therapeutic Activity 2: Patient tolerated standing at sink for hand hygiene with SBA to CGA x1.    Bed Mobility  Bed Mobility: Yes  Bed Mobility 1  Bed Mobility 1: Supine to sitting  Level of Assistance 1:  (SBA)  Bed Mobility Comments 1: HOB elevated    Ambulation/Gait Training  Ambulation/Gait Training Performed: Yes  Ambulation/Gait Training 1  Comments/Distance (ft) 1: Patient ambulated ~10ft x2 trials in room wtih CGA x1 and FWW for support.  Transfers  Transfer: Yes  Transfer 1  Trials/Comments 1: Patient performed sit<>stand with CGA x1 and FWW for support. Cues for hand placement and sequencing.  Transfers 2  Trials/Comments 2: Patient performed stand pivot EOB>recliner chair with CGA x1 and FWW for support. Cues for  aligment with surface prior to sitting.    Outcome Measures:  [facility] Basic Mobility  Turning from your back to your side while in a flat bed without using bedrails: A little  Moving from lying on your back to sitting on the side of a flat bed without using bedrails: A little  Moving to and from bed to chair (including a wheelchair): A little  Standing up from a chair using your arms (e.g. wheelchair or bedside chair): A little  To walk in hospital room: A little  Climbing 3-5 steps with railing: A lot  Basic Mobility - Total Score: 17    Education Documentation  Body Mechanics, taught by Xiao Donnelly PTA at 7/15/2024 11:01 AM.  Learner: Patient  Readiness: Acceptance  Method: Explanation  Response: Verbalizes Understanding  Comment: Educated patient on proper hand placement to facilitate safe sit<>stand transfer.    Precautions, taught by Xiao Donnelly PTA at 7/15/2024 11:01 AM.  Learner: Patient  Readiness: Acceptance  Method: Explanation  Response: Verbalizes Understanding  Comment: Educated patient on proper hand placement to facilitate safe sit<>stand transfer.    ADL Training, taught by Xiao Donnelly PTA at 7/15/2024 11:01 AM.  Learner: Patient  Readiness: Acceptance  Method: Explanation  Response: Verbalizes Understanding  Comment: Educated patient on proper hand placement to facilitate safe sit<>stand transfer.    Mobility Training, taught by Xiao Donnelly PTA at 7/15/2024 11:01 AM.  Learner: Patient  Readiness: Acceptance  Method: Explanation  Response: Verbalizes Understanding  Comment: Educated patient on proper hand placement to facilitate safe sit<>stand transfer.    Education Comments  No comments found.        OP EDUCATION:       Encounter Problems       Encounter Problems (Active)       PT Problem       PT Goal 1 (Progressing)       Start:  07/08/24    Expected End:  07/18/24       STG - Pt will transition supine <> sitting with modified independence          PT Goal 2 (Progressing)        Start:  07/08/24    Expected End:  07/18/24       STG - Pt will transfer STS with modified independence            PT Goal 3 (Progressing)       Start:  07/08/24    Expected End:  07/18/24       STG - Pt will amb 250 ft' using ww with modified independence.               Pain - Adult

## 2024-07-15 NOTE — PROGRESS NOTES
Pt feels weak and not strong to go home, requesting to go to the [facility], asking therapy to continue to tx pt.  [address] to be completed.   No precert needed.,  Rebecca Blackwood RN TCC    1432  the [facility] will not have bed until Wed or Thurs, pt can go to their sister facility [facility]  and they will  transport to the [facility] when bed is available,  pt is agreeable , referral placed to [facility] , await acceptance,  looking for discharge soon, 7000 completed, no precert needed.  Rebecca Blackwood RN TCC

## 2024-07-15 NOTE — CARE PLAN
The patient's goals for the shift include  rest    The clinical goals for the shift include maintain blood pressure <180    Barriers to progression include HTN. Recommendations to address these barriers include administer medications and treatments as ordered.      Problem: Pain - Adult  Goal: Verbalizes/displays adequate comfort level or baseline comfort level  Outcome: Progressing     Problem: Safety - Adult  Goal: Free from fall injury  Outcome: Progressing     Problem: Discharge Planning  Goal: Discharge to home or other facility with appropriate resources  Outcome: Progressing     Problem: Chronic Conditions and Co-morbidities  Goal: Patient's chronic conditions and co-morbidity symptoms are monitored and maintained or improved  Outcome: Progressing     Problem: Fall/Injury  Goal: Not fall by end of shift  Outcome: Progressing  Goal: Be free from injury by end of the shift  Outcome: Progressing  Goal: Verbalize understanding of personal risk factors for fall in the hospital  Outcome: Progressing  Goal: Verbalize understanding of risk factor reduction measures to prevent injury from fall in the home  Outcome: Progressing  Goal: Use assistive devices by end of the shift  Outcome: Progressing  Goal: Pace activities to prevent fatigue by end of the shift  Outcome: Progressing     Problem: Skin  Goal: Participates in plan/prevention/treatment measures  Outcome: Progressing  Goal: Prevent/manage excess moisture  Outcome: Progressing  Goal: Prevent/minimize sheer/friction injuries  Outcome: Progressing  Goal: Promote/optimize nutrition  Outcome: Progressing

## 2024-07-15 NOTE — DOCUMENTATION CLARIFICATION NOTE
"    PATIENT:               SUMIT OTT  ACCT #:                  [id number]  MRN:                       [id number]  :                       10/14/1932  ADMIT DATE:       2024 3:50 PM  DISCH DATE:  RESPONDING PROVIDER #:        [id number]          PROVIDER RESPONSE TEXT:    Sepsis was a differential diagnosis and ruled out after study    CDI QUERY TEXT:    Clarification    Instruction:  Based on your assessment of the patient and the clinical information, please provide the requested documentation by clicking on the appropriate radio button and enter any additional information if prompted.    Question: Sepsis was documented in the medical record. Based on the documentation and the clinical information, can the diagnosis be further clarified as    When answering this query, please exercise your independent professional judgment. The fact that a question is being asked, does not imply that any particular answer is desired or expected.    The patient's clinical indicators include:  Clinical Information: Admitted with generalized weakness, diarrhea, fever, UTI, Leukocytosis    Documented Diagnosis: Per ED \"Sepsis, due to unspecified organism, unspecified whether acute organ dysfunction present (Multi)\"    Clinical Indicators:  -Vital Signs: VS range first 6 hours from admit T 37.4-38.1, HR 72-75, RR 16-18, //85, O2 91-97 RA  -WBC: On admit 23.8  -Microbiology Results: c.diff positive, negative for flu and covid  -Band Neutrophil Count/percent Band Neutrophil: on admit 52%, Absolute bands 12.38% on admit, Manual neutrophils on admit 37.0%  -Lactic acid: Lactate 1.0 on admit  -BUN/Creat: on admit BUN 32, Cr 1.49  -Blood cultures: negative x 2 days  -Bilirubin: on admit 0.4  -MAP: first 6 hours from admit 111  -[place] Coma Scale: 15 per nursing charts  -PAO2/FIO2: N/A  -Procalcitonin: 0.35 on   -Platelets: 358  -Other clinical indicators:  Per IM Progress note ; \"UTI, C.difficile " "colitis\"    Treatment: Lab monitoring, 1liter bolus on admit, continuous IVFs,  Rocephin changed to Zosyn, IV Flagyl, P.O. Vancomycin    Risk Factors: UTI, C.diff, fever, leukocytosis  Options provided:  -- Sepsis was a differential diagnosis and ruled out after study  -- Sepsis with other organ dysfunction, Please specify sepsis associated organ dysfunction below  -- Other - I will add my own diagnosis  -- Refer to Clinical Documentation Reviewer    Query created by: Chiara Morales on 7/15/2024 12:56 PM      Electronically signed by:  EDWARD OBREGON MD 7/15/2024 1:06 PM          "

## 2024-07-16 VITALS
HEART RATE: 75 BPM | DIASTOLIC BLOOD PRESSURE: 69 MMHG | SYSTOLIC BLOOD PRESSURE: 164 MMHG | BODY MASS INDEX: 27.5 KG/M2 | OXYGEN SATURATION: 95 % | TEMPERATURE: 99.3 F | HEIGHT: 58 IN | RESPIRATION RATE: 16 BRPM | WEIGHT: 131 LBS

## 2024-07-16 PROCEDURE — 2500000002 HC RX 250 W HCPCS SELF ADMINISTERED DRUGS (ALT 637 FOR MEDICARE OP, ALT 636 FOR OP/ED)

## 2024-07-16 PROCEDURE — 99238 HOSP IP/OBS DSCHRG MGMT 30/<: CPT

## 2024-07-16 PROCEDURE — 2500000001 HC RX 250 WO HCPCS SELF ADMINISTERED DRUGS (ALT 637 FOR MEDICARE OP)

## 2024-07-16 RX ORDER — AMLODIPINE BESYLATE 2.5 MG/1
2.5 TABLET ORAL EVERY MORNING
Qty: 30 TABLET | Refills: 1 | Status: SHIPPED | OUTPATIENT
Start: 2024-07-17 | End: 2024-09-15

## 2024-07-16 RX ADMIN — VANCOMYCIN HYDROCHLORIDE 125 MG: 125 CAPSULE ORAL at 06:22

## 2024-07-16 RX ADMIN — LEVOTHYROXINE SODIUM 100 MCG: 0.1 TABLET ORAL at 06:22

## 2024-07-16 RX ADMIN — LOSARTAN POTASSIUM 100 MG: 50 TABLET, FILM COATED ORAL at 08:56

## 2024-07-16 RX ADMIN — VANCOMYCIN HYDROCHLORIDE 125 MG: 125 CAPSULE ORAL at 12:54

## 2024-07-16 RX ADMIN — AMLODIPINE BESYLATE 2.5 MG: 5 TABLET ORAL at 08:56

## 2024-07-16 ASSESSMENT — COGNITIVE AND FUNCTIONAL STATUS - GENERAL
STANDING UP FROM CHAIR USING ARMS: A LITTLE
CLIMB 3 TO 5 STEPS WITH RAILING: A LITTLE
PERSONAL GROOMING: A LITTLE
HELP NEEDED FOR BATHING: A LITTLE
MOBILITY SCORE: 18
MOVING FROM LYING ON BACK TO SITTING ON SIDE OF FLAT BED WITH BEDRAILS: A LITTLE
DRESSING REGULAR LOWER BODY CLOTHING: A LITTLE
TOILETING: A LITTLE
TURNING FROM BACK TO SIDE WHILE IN FLAT BAD: A LITTLE
MOVING TO AND FROM BED TO CHAIR: A LITTLE
DRESSING REGULAR UPPER BODY CLOTHING: A LITTLE
DAILY ACTIVITIY SCORE: 19
WALKING IN HOSPITAL ROOM: A LITTLE

## 2024-07-16 ASSESSMENT — PAIN SCALES - GENERAL: PAINLEVEL_OUTOF10: 0 - NO PAIN

## 2024-07-16 ASSESSMENT — PAIN - FUNCTIONAL ASSESSMENT: PAIN_FUNCTIONAL_ASSESSMENT: 0-10

## 2024-07-16 NOTE — CONSULTS
Dietitian consult ordered by this RD for LOS day 10. MST=0.   Pt is eating % of Regular diet.  No significant unintentional weight loss.  No pressure injuries.  No apparent muscle wasting or fat losses.    Full nutrition assessment not warranted at this time.  This service remains available.  Please re-consult RD for changes in nutrition status.

## 2024-07-16 NOTE — DISCHARGE SUMMARY
Discharge Diagnosis  Hypercalcemia  C. difficile colitis  Hypertension  Urinary retention  Multiple myeloma  CKD stage IIIb  Chronic anemia    Issues Requiring Follow-Up  Complete vancomycin taper  Follow-up with oncologist    Discharge Meds     Your medication list        START taking these medications        Instructions Last Dose Given Next Dose Due   tamsulosin 0.4 mg 24 hr capsule  Commonly known as: Flomax      Take 1 capsule (0.4 mg) by mouth once daily.       vancomycin 125 mg capsule  Commonly known as: Vancocin  Start taking on: July 12, 2024      Take 1 capsule (125 mg) by mouth 4 times a day for 5 days, THEN 1 capsule (125 mg) 2 times a day for 7 days, THEN 1 capsule (125 mg) once daily for 7 days, THEN 1 capsule (125 mg) every other day for 14 days.              CHANGE how you take these medications        Instructions Last Dose Given Next Dose Due   amLODIPine 10 mg tablet  Commonly known as: Norvasc  What changed:   medication strength  how much to take  when to take this      Take 1 tablet (10 mg) by mouth once daily in the morning.       amLODIPine 2.5 mg tablet  Commonly known as: Norvasc  Start taking on: July 17, 2024  What changed: You were already taking a medication with the same name, and this prescription was added. Make sure you understand how and when to take each.      Take 1 tablet (2.5 mg) by mouth once daily in the morning.       Synthroid 100 mcg tablet  Generic drug: levothyroxine  What changed: See the new instructions.      TAKE 1 TABLET EVERY MORNING BEFORE MEALS FOR HYPOTHYROIDISM              CONTINUE taking these medications        Instructions Last Dose Given Next Dose Due   acetaminophen 650 mg ER tablet  Commonly known as: Tylenol 8 HOUR           biotin 5 mg capsule           Centrum Silver Women 8 mg iron-400 mcg-50 mcg tablet  Generic drug: multivit-min-iron-FA-vit K-lut           cholecalciferol 10 MCG (400 UNIT) tablet  Commonly known as: Vitamin D-3      Take 1 tablet  (400 Units) by mouth once daily.       dexAMETHasone 4 mg tablet  Commonly known as: Decadron      Take 5 tablets (20 mg) by mouth if needed (take 1 hour prior to darzalex treatments).       diphenhydrAMINE 50 mg tablet  Commonly known as: BENADryl           gabapentin 100 mg capsule  Commonly known as: Neurontin      Take 3 capsules (300 mg) by mouth once daily at bedtime.       lactase 3,000 unit tablet  Commonly known as: Lactaid           loratadine 10 mg tablet  Commonly known as: Claritin           losartan 100 mg tablet  Commonly known as: Cozaar      TAKE 1 TABLET BY MOUTH ONCE  DAILY       montelukast 10 mg tablet  Commonly known as: Singulair      Take 1 tablet (10 mg) by mouth if needed (take 1 hour prior to darzalex treatments).       potassium chloride CR 10 mEq ER tablet  Commonly known as: Klor-Con                     Where to Get Your Medications        These medications were sent to [id number] - [place], [place] - [address] [facility]  [address] [facility] [address]      Phone: [phone]   amLODIPine 10 mg tablet  amLODIPine 2.5 mg tablet  tamsulosin 0.4 mg 24 hr capsule       Information about where to get these medications is not yet available    Ask your nurse or doctor about these medications  vancomycin 125 mg capsule         Test Results Pending At Discharge  Pending Labs       Order Current Status    Extra Urine Gray Tube Collected (07/06/24 1702)    Urinalysis with Reflex Culture and Microscopic In process            Hospital Course  89-year-old female with a history of hypertension, CKD, GERD, hypothyroidism, multiple myeloma presented on 7/6 for generalized weakness/malaise.  She had been seen by her oncologist and received chemotherapy the day before admission and had been feeling weaker than normal ever since the treatment.  She also endorsed intermittent episodes of diarrhea.  Patient also had a history of hypercalcemia after chemotherapy sessions.   Initial workup was significant for low-grade fever of 100.6 °F, elevated blood pressure at 203/85, mild hyponatremia at 132, and hypercalcemia 12.1.  Patient also had a significant leukocytosis at 24 and baseline hemoglobin around 9.  She was given a 1 L IV fluid bolus and started on broad-spectrum antibiotics.  Hypercalcemia improved with rehydration, C. difficile testing was sent and was positive for acute infection.  Patient was initially started on p.o. vancomycin and IV Flagyl after CT abdomen pelvis was concerning for fulminant infection.  Antibiotics were de-escalated to p.o. vancomycin alone after patient reported improvement in her weakness and white blood cell count trended down.  Her mild hyponatremia is thought to be secondary to poor p.o. intake and she has been given intermittent NS boluses with slight improvement in her sodium.  Given her history of previous C. difficile infection, she will be given a vancomycin taper to complete outpatient.  Patient feels well enough at this time to be discharged to skilled nursing facility for physical and occupational rehab.    Pertinent Physical Exam At Time of Discharge  Physical Exam  Vitals reviewed.   Constitutional:       Appearance: Normal appearance.   Cardiovascular:      Rate and Rhythm: Normal rate and regular rhythm.   Pulmonary:      Effort: Pulmonary effort is normal.      Breath sounds: Normal breath sounds.   Abdominal:      General: Abdomen is flat.      Palpations: Abdomen is soft.      Tenderness: There is no abdominal tenderness.   Musculoskeletal:      Right lower leg: Edema present.      Left lower leg: Edema present.   Skin:     General: Skin is warm and dry.   Neurological:      General: No focal deficit present.      Mental Status: She is alert and oriented to person, place, and time.   Psychiatric:         Mood and Affect: Mood normal.       Outpatient Follow-Up  Future Appointments   Date Time Provider Department Center   7/18/2024  9:00  AM INF 05 [facility]MA [id number]   7/19/2024 10:00 AM Dana Heck MD [id number] [facility]   7/26/2024  8:00 AM INF 05 PARMA [id number]   8/1/2024  9:00 AM Leopoldo Gray MD [id number] [facility]   8/1/2024  9:30 AM INF 08 PARMA [id number]   10/10/2024  9:20 AM Missy Hermosillo PA-C [id number] [facility]         Narayan Mercado MD

## 2024-07-16 NOTE — PROGRESS NOTES
Pt will be going to [place] rehab today, her son to transport her after 2 pm.  Rebecca Blackwood RN TCC

## 2024-07-16 NOTE — CARE PLAN
The patient's goals for the shift include sleep     The clinical goals for the shift include pt will remain free from injury      Problem: Pain - Adult  Goal: Verbalizes/displays adequate comfort level or baseline comfort level  Outcome: Progressing     Problem: Safety - Adult  Goal: Free from fall injury  Outcome: Progressing     Problem: Discharge Planning  Goal: Discharge to home or other facility with appropriate resources  Outcome: Progressing     Problem: Chronic Conditions and Co-morbidities  Goal: Patient's chronic conditions and co-morbidity symptoms are monitored and maintained or improved  Outcome: Progressing     Problem: Fall/Injury  Goal: Not fall by end of shift  Outcome: Progressing  Goal: Be free from injury by end of the shift  Outcome: Progressing  Goal: Verbalize understanding of personal risk factors for fall in the hospital  Outcome: Progressing  Goal: Verbalize understanding of risk factor reduction measures to prevent injury from fall in the home  Outcome: Progressing  Goal: Use assistive devices by end of the shift  Outcome: Progressing  Goal: Pace activities to prevent fatigue by end of the shift  Outcome: Progressing     Problem: Skin  Goal: Participates in plan/prevention/treatment measures  Outcome: Progressing  Goal: Prevent/manage excess moisture  Outcome: Progressing  Goal: Prevent/minimize sheer/friction injuries  Outcome: Progressing  Goal: Promote/optimize nutrition  Outcome: Progressing     Problem: Pain  Goal: Takes deep breaths with improved pain control throughout the shift  Outcome: Progressing  Goal: Turns in bed with improved pain control throughout the shift  Outcome: Progressing  Goal: Walks with improved pain control throughout the shift  Outcome: Progressing  Goal: Performs ADL's with improved pain control throughout shift  Outcome: Progressing  Goal: Participates in PT with improved pain control throughout the shift  Outcome: Progressing  Goal: Free from opioid side  effects throughout the shift  Outcome: Progressing  Goal: Free from acute confusion related to pain meds throughout the shift  Outcome: Progressing

## 2024-07-18 ENCOUNTER — INFUSION (OUTPATIENT)
Dept: HEMATOLOGY/ONCOLOGY | Facility: CLINIC | Age: 89
End: 2024-07-18
Payer: MEDICARE

## 2024-07-18 ENCOUNTER — LAB (OUTPATIENT)
Dept: LAB | Facility: CLINIC | Age: 89
End: 2024-07-18
Payer: MEDICARE

## 2024-07-18 VITALS
DIASTOLIC BLOOD PRESSURE: 58 MMHG | WEIGHT: 145.94 LBS | RESPIRATION RATE: 18 BRPM | SYSTOLIC BLOOD PRESSURE: 158 MMHG | HEIGHT: 59 IN | BODY MASS INDEX: 29.42 KG/M2 | HEART RATE: 73 BPM | TEMPERATURE: 97.3 F | OXYGEN SATURATION: 96 %

## 2024-07-18 DIAGNOSIS — C90.00 MULTIPLE MYELOMA, REMISSION STATUS UNSPECIFIED (MULTI): ICD-10-CM

## 2024-07-18 DIAGNOSIS — E83.52 HYPERCALCEMIA: ICD-10-CM

## 2024-07-18 LAB
ALBUMIN SERPL BCP-MCNC: 3 G/DL (ref 3.4–5)
ALP SERPL-CCNC: 53 U/L (ref 33–136)
ALT SERPL W P-5'-P-CCNC: 13 U/L (ref 7–45)
ANION GAP SERPL CALC-SCNC: 12 MMOL/L (ref 10–20)
AST SERPL W P-5'-P-CCNC: 17 U/L (ref 9–39)
BASOPHILS # BLD AUTO: 0.04 X10*3/UL (ref 0–0.1)
BASOPHILS NFR BLD AUTO: 0.7 %
BILIRUB SERPL-MCNC: 0.3 MG/DL (ref 0–1.2)
BUN SERPL-MCNC: 14 MG/DL (ref 6–23)
CALCIUM SERPL-MCNC: 9.5 MG/DL (ref 8.6–10.3)
CHLORIDE SERPL-SCNC: 99 MMOL/L (ref 98–107)
CO2 SERPL-SCNC: 26 MMOL/L (ref 21–32)
CREAT SERPL-MCNC: 1.07 MG/DL (ref 0.5–1.05)
EGFRCR SERPLBLD CKD-EPI 2021: 49 ML/MIN/1.73M*2
EOSINOPHIL # BLD AUTO: 0.5 X10*3/UL (ref 0–0.4)
EOSINOPHIL NFR BLD AUTO: 9.3 %
ERYTHROCYTE [DISTWIDTH] IN BLOOD BY AUTOMATED COUNT: 17.4 % (ref 11.5–14.5)
FERRITIN SERPL-MCNC: 129 NG/ML (ref 8–150)
GLUCOSE SERPL-MCNC: 115 MG/DL (ref 74–99)
HCT VFR BLD AUTO: 23.2 % (ref 36–46)
HGB BLD-MCNC: 7.8 G/DL (ref 12–16)
IMM GRANULOCYTES # BLD AUTO: 0.06 X10*3/UL (ref 0–0.5)
IMM GRANULOCYTES NFR BLD AUTO: 1.1 % (ref 0–0.9)
IRON SATN MFR SERPL: 21 % (ref 25–45)
IRON SERPL-MCNC: 54 UG/DL (ref 35–150)
LYMPHOCYTES # BLD AUTO: 1.03 X10*3/UL (ref 0.8–3)
LYMPHOCYTES NFR BLD AUTO: 19.2 %
MCH RBC QN AUTO: 30.4 PG (ref 26–34)
MCHC RBC AUTO-ENTMCNC: 33.6 G/DL (ref 32–36)
MCV RBC AUTO: 90 FL (ref 80–100)
MONOCYTES # BLD AUTO: 0.84 X10*3/UL (ref 0.05–0.8)
MONOCYTES NFR BLD AUTO: 15.6 %
NEUTROPHILS # BLD AUTO: 2.9 X10*3/UL (ref 1.6–5.5)
NEUTROPHILS NFR BLD AUTO: 54.1 %
NRBC BLD-RTO: 0 /100 WBCS (ref 0–0)
PLATELET # BLD AUTO: 707 X10*3/UL (ref 150–450)
POTASSIUM SERPL-SCNC: 4.5 MMOL/L (ref 3.5–5.3)
PROT SERPL-MCNC: 5.7 G/DL (ref 6.4–8.2)
RBC # BLD AUTO: 2.57 X10*6/UL (ref 4–5.2)
SODIUM SERPL-SCNC: 132 MMOL/L (ref 136–145)
TIBC SERPL-MCNC: 262 UG/DL (ref 240–445)
UIBC SERPL-MCNC: 208 UG/DL (ref 110–370)
WBC # BLD AUTO: 5.4 X10*3/UL (ref 4.4–11.3)

## 2024-07-18 PROCEDURE — 2500000004 HC RX 250 GENERAL PHARMACY W/ HCPCS (ALT 636 FOR OP/ED): Mod: JW | Performed by: INTERNAL MEDICINE

## 2024-07-18 PROCEDURE — 83540 ASSAY OF IRON: CPT | Performed by: INTERNAL MEDICINE

## 2024-07-18 PROCEDURE — 36415 COLL VENOUS BLD VENIPUNCTURE: CPT

## 2024-07-18 PROCEDURE — 96401 CHEMO ANTI-NEOPL SQ/IM: CPT

## 2024-07-18 PROCEDURE — 84075 ASSAY ALKALINE PHOSPHATASE: CPT | Performed by: INTERNAL MEDICINE

## 2024-07-18 PROCEDURE — 85025 COMPLETE CBC W/AUTO DIFF WBC: CPT | Performed by: INTERNAL MEDICINE

## 2024-07-18 PROCEDURE — 82728 ASSAY OF FERRITIN: CPT | Mod: PARLAB | Performed by: INTERNAL MEDICINE

## 2024-07-18 RX ORDER — EPINEPHRINE 1 MG/ML
0.3 INJECTION INTRAMUSCULAR; INTRAVENOUS; SUBCUTANEOUS EVERY 5 MIN PRN
Status: DISCONTINUED | OUTPATIENT
Start: 2024-07-18 | End: 2024-07-18 | Stop reason: HOSPADM

## 2024-07-18 RX ORDER — ALBUTEROL SULFATE 0.83 MG/ML
3 SOLUTION RESPIRATORY (INHALATION) AS NEEDED
Status: DISCONTINUED | OUTPATIENT
Start: 2024-07-18 | End: 2024-07-18 | Stop reason: HOSPADM

## 2024-07-18 RX ORDER — DIPHENHYDRAMINE HYDROCHLORIDE 50 MG/ML
50 INJECTION INTRAMUSCULAR; INTRAVENOUS AS NEEDED
Status: DISCONTINUED | OUTPATIENT
Start: 2024-07-18 | End: 2024-07-18 | Stop reason: HOSPADM

## 2024-07-18 RX ORDER — DEXAMETHASONE 4 MG/1
20 TABLET ORAL ONCE
Status: DISCONTINUED | OUTPATIENT
Start: 2024-07-18 | End: 2024-07-18 | Stop reason: HOSPADM

## 2024-07-18 RX ORDER — BORTEZOMIB 3.5 MG/1
1.3 INJECTION, POWDER, LYOPHILIZED, FOR SOLUTION INTRAVENOUS; SUBCUTANEOUS ONCE
Status: COMPLETED | OUTPATIENT
Start: 2024-07-18 | End: 2024-07-18

## 2024-07-18 RX ORDER — FAMOTIDINE 10 MG/ML
20 INJECTION INTRAVENOUS AS NEEDED
Status: DISCONTINUED | OUTPATIENT
Start: 2024-07-18 | End: 2024-07-18 | Stop reason: HOSPADM

## 2024-07-18 ASSESSMENT — PAIN SCALES - GENERAL: PAINLEVEL: 0-NO PAIN

## 2024-07-19 ENCOUNTER — APPOINTMENT (OUTPATIENT)
Dept: PAIN MEDICINE | Facility: CLINIC | Age: 89
End: 2024-07-19
Payer: MEDICARE

## 2024-07-19 ENCOUNTER — NURSING HOME VISIT (OUTPATIENT)
Dept: POST ACUTE CARE | Facility: EXTERNAL LOCATION | Age: 89
End: 2024-07-19
Payer: MEDICARE

## 2024-07-19 DIAGNOSIS — A04.72 CLOSTRIDIUM DIFFICILE COLITIS: ICD-10-CM

## 2024-07-19 DIAGNOSIS — I12.9 BENIGN HYPERTENSION WITH CKD (CHRONIC KIDNEY DISEASE), STAGE II: Primary | ICD-10-CM

## 2024-07-19 DIAGNOSIS — N18.2 BENIGN HYPERTENSION WITH CKD (CHRONIC KIDNEY DISEASE), STAGE II: Primary | ICD-10-CM

## 2024-07-19 DIAGNOSIS — M16.7 OTHER SECONDARY OSTEOARTHRITIS OF RIGHT HIP: ICD-10-CM

## 2024-07-19 DIAGNOSIS — K21.9 GASTROESOPHAGEAL REFLUX DISEASE WITHOUT ESOPHAGITIS: ICD-10-CM

## 2024-07-19 DIAGNOSIS — R53.1 WEAKNESS: ICD-10-CM

## 2024-07-19 PROCEDURE — 99309 SBSQ NF CARE MODERATE MDM 30: CPT

## 2024-07-19 NOTE — LETTER
Patient: Bindu Cali  : 10/14/1932    Encounter Date: 2024    PROGRESS NOTE [id number]    Subjective  Chief complaint: Bindu Cali is a 89 y.o. female who is an acute skilled patient being seen and evaluated for weakness    HPI:  24 Patient offers no complaints today.  No new issues per nursing. Remains in isolation for C-diff. Tolerating abx well. Working with therapy for strengthening and mobility. No concerns per nursing          Objective  Vital signs: 113/66 113/66 - 97.8-74-18-96%  Physical Exam  Constitutional:       Appearance: Normal appearance.   HENT:      Head: Normocephalic.      Mouth/Throat:      Mouth: Mucous membranes are moist.   Eyes:      Extraocular Movements: Extraocular movements intact.   Cardiovascular:      Rate and Rhythm: Normal rate and regular rhythm.      Pulses: Normal pulses.      Heart sounds: Normal heart sounds.   Pulmonary:      Effort: Pulmonary effort is normal.      Breath sounds: Normal breath sounds.   Abdominal:      General: Bowel sounds are normal.      Palpations: Abdomen is soft.   Musculoskeletal:         General: Normal range of motion.      Cervical back: Normal range of motion and neck supple.   Skin:     General: Skin is warm and dry.      Capillary Refill: Capillary refill takes less than 2 seconds.   Neurological:      Mental Status: She is alert and oriented to person, place, and time.   Psychiatric:         Mood and Affect: Mood normal.         Behavior: Behavior normal.         Assessment/Plan  Problem List Items Addressed This Visit       Benign hypertension with CKD (chronic kidney disease), stage II - Primary     BP control  Continue amlodipine, lasix, losartan  Avoid nephrotoxins  Monitor gfr         Gastroesophageal reflux disease     No complaints  Reinforced sitting up after eating         Weakness     Continue working with therapy         Osteoarthritis of hip     Pain managed with current therapy  Continue tylenol,  gabapentin         Clostridium difficile colitis     Isolation precautions  Continue Vancomycin          Medications, treatments, and labs reviewed  Continue medications and treatments as listed in EMR    VERONICA Presley      Electronically Signed By: VERONICA Presley   7/23/24  6:35 PM

## 2024-07-22 ENCOUNTER — NURSING HOME VISIT (OUTPATIENT)
Dept: POST ACUTE CARE | Facility: EXTERNAL LOCATION | Age: 89
End: 2024-07-22
Payer: MEDICARE

## 2024-07-22 DIAGNOSIS — A04.72 CLOSTRIDIUM DIFFICILE COLITIS: Primary | ICD-10-CM

## 2024-07-22 DIAGNOSIS — I12.9 BENIGN HYPERTENSION WITH CKD (CHRONIC KIDNEY DISEASE), STAGE II: ICD-10-CM

## 2024-07-22 DIAGNOSIS — N18.2 BENIGN HYPERTENSION WITH CKD (CHRONIC KIDNEY DISEASE), STAGE II: ICD-10-CM

## 2024-07-22 DIAGNOSIS — C90.01 MULTIPLE MYELOMA IN REMISSION (MULTI): ICD-10-CM

## 2024-07-22 DIAGNOSIS — G60.9 IDIOPATHIC PERIPHERAL NEUROPATHY: ICD-10-CM

## 2024-07-22 DIAGNOSIS — R53.1 WEAKNESS: ICD-10-CM

## 2024-07-22 LAB
ATRIAL RATE: 68 BPM
P AXIS: 30 DEGREES
P OFFSET: 176 MS
P ONSET: 117 MS
PR INTERVAL: 188 MS
Q ONSET: 211 MS
QRS COUNT: 12 BEATS
QRS DURATION: 94 MS
QT INTERVAL: 358 MS
QTC CALCULATION(BAZETT): 380 MS
QTC FREDERICIA: 373 MS
R AXIS: -30 DEGREES
T AXIS: 15 DEGREES
T OFFSET: 390 MS
VENTRICULAR RATE: 68 BPM

## 2024-07-22 PROCEDURE — 99309 SBSQ NF CARE MODERATE MDM 30: CPT

## 2024-07-22 NOTE — LETTER
Patient: Bindu Cali  : 10/14/1932    Encounter Date: 2024    PROGRESS NOTE    Subjective  Chief complaint: Bindu Cali is a 89 y.o. female who is an acute skilled patient being seen and evaluated for weakness    HPI:  24 Patient offers no complaints today.  No new issues per nursing. Remains in isolation for C-diff. Tolerating abx well. Working with therapy for strengthening and mobility. No concerns per nursing    24 Patient states doing well. Transferring to [facility] this morning. C-diff precautions maintained. Tolerating Vancomycin without issue.          Objective  Vital signs: 134/60-97.7-70-17-96%    Physical Exam  Constitutional:       Appearance: Normal appearance.   HENT:      Head: Normocephalic.      Mouth/Throat:      Mouth: Mucous membranes are moist.   Eyes:      Extraocular Movements: Extraocular movements intact.   Cardiovascular:      Rate and Rhythm: Normal rate and regular rhythm.      Pulses: Normal pulses.      Heart sounds: Normal heart sounds.   Pulmonary:      Effort: Pulmonary effort is normal.      Breath sounds: Normal breath sounds.   Abdominal:      General: Bowel sounds are normal.      Palpations: Abdomen is soft.   Musculoskeletal:         General: Normal range of motion.      Cervical back: Normal range of motion and neck supple.   Skin:     General: Skin is warm and dry.      Capillary Refill: Capillary refill takes less than 2 seconds.   Neurological:      Mental Status: She is alert and oriented to person, place, and time.   Psychiatric:         Mood and Affect: Mood normal.         Behavior: Behavior normal.         Assessment/Plan  Problem List Items Addressed This Visit       Benign hypertension with CKD (chronic kidney disease), stage II     BP control  Continue amlodipine, lasix, losartan  Avoid nephrotoxins  Monitor gfr         Idiopathic peripheral neuropathy     No complaints  Continue gabapentin         Multiple myeloma  (Multi)     Continue darzalex with decadron and benadryl  Followed by oncology           Weakness     Continue working with therapy  Walker for mobility         Clostridium difficile colitis - Primary     Isolation precautions  Continue Vancomycin          Medications, treatments, and labs reviewed  Continue medications and treatments as listed in EMR    VERONICA Presley      Electronically Signed By: VERONICA Presley   7/23/24  6:43 PM

## 2024-07-23 ENCOUNTER — NURSING HOME VISIT (OUTPATIENT)
Dept: POST ACUTE CARE | Facility: EXTERNAL LOCATION | Age: 89
End: 2024-07-23
Payer: MEDICARE

## 2024-07-23 DIAGNOSIS — N18.2 STAGE 2 CHRONIC KIDNEY DISEASE: ICD-10-CM

## 2024-07-23 DIAGNOSIS — K21.9 GASTROESOPHAGEAL REFLUX DISEASE WITHOUT ESOPHAGITIS: ICD-10-CM

## 2024-07-23 DIAGNOSIS — A49.8 CLOSTRIDIOIDES DIFFICILE INFECTION: ICD-10-CM

## 2024-07-23 DIAGNOSIS — I10 ESSENTIAL HYPERTENSION: ICD-10-CM

## 2024-07-23 DIAGNOSIS — R53.1 WEAKNESS: ICD-10-CM

## 2024-07-23 PROCEDURE — 99305 1ST NF CARE MODERATE MDM 35: CPT | Performed by: INTERNAL MEDICINE

## 2024-07-23 NOTE — PROGRESS NOTES
PROGRESS NOTE [id number]    Subjective   Chief complaint: Bindu Cali is a 89 y.o. female who is an acute skilled patient being seen and evaluated for weakness    HPI:  7/19/24 Patient offers no complaints today.  No new issues per nursing. Remains in isolation for C-diff. Tolerating abx well. Working with therapy for strengthening and mobility. No concerns per nursing          Objective   Vital signs: 113/66 113/66 - 97.8-74-18-96%  Physical Exam  Constitutional:       Appearance: Normal appearance.   HENT:      Head: Normocephalic.      Mouth/Throat:      Mouth: Mucous membranes are moist.   Eyes:      Extraocular Movements: Extraocular movements intact.   Cardiovascular:      Rate and Rhythm: Normal rate and regular rhythm.      Pulses: Normal pulses.      Heart sounds: Normal heart sounds.   Pulmonary:      Effort: Pulmonary effort is normal.      Breath sounds: Normal breath sounds.   Abdominal:      General: Bowel sounds are normal.      Palpations: Abdomen is soft.   Musculoskeletal:         General: Normal range of motion.      Cervical back: Normal range of motion and neck supple.   Skin:     General: Skin is warm and dry.      Capillary Refill: Capillary refill takes less than 2 seconds.   Neurological:      Mental Status: She is alert and oriented to person, place, and time.   Psychiatric:         Mood and Affect: Mood normal.         Behavior: Behavior normal.         Assessment/Plan   Problem List Items Addressed This Visit       Benign hypertension with CKD (chronic kidney disease), stage II - Primary     BP control  Continue amlodipine, lasix, losartan  Avoid nephrotoxins  Monitor gfr         Gastroesophageal reflux disease     No complaints  Reinforced sitting up after eating         Weakness     Continue working with therapy         Osteoarthritis of hip     Pain managed with current therapy  Continue tylenol, gabapentin         Clostridium difficile colitis     Isolation precautions  Continue  Vancomycin          Medications, treatments, and labs reviewed  Continue medications and treatments as listed in EMR    Myranda Lynch, APRN-CNP

## 2024-07-23 NOTE — LETTER
Patient: Bindu Cali  : 10/14/1932    Encounter Date: 2024    HISTORY & PHYSICAL    Subjective  Chief complaint: Bindu Cali is a 89 y.o. female who is a acute skilled care patient being seen and evaluated for multiple medical problems.  Patient presents for Cdiff    HPI:  HPI Patient with PMH of multiple myeloma, CKD 3, Polyneuropathy, spinal stenosis, GERd, HTN, aemia, chronic bronchitis residing at [facility] transferred to [facility].   Presently she is being treated for Cdiff infection. She continues on Vancomycin, However she reported that as of this morning her BM is solid and less frequent than what it has been. Denies fever, chills, nausea, vomiting or diarrhea. No acute distress. Patient will be evaluated by therapy.   Past Medical History:   Diagnosis Date   • Abdominal bloating 2024   • Acute bacterial bronchitis 2024   • Acute bacterial sinusitis 2024   • Acute kidney failure, unspecified (CMS-HCC) 2023   • Acute kidney injury (CMS-[facility]) 10/14/2022   • Acute urinary tract infection 2024   • Allergic rhinitis 2024   • Bronchitis 2024   • Carrier of methicillin susceptible Staphylococcus aureus (MSSA) 2024   • Chronic cough 2024   • Chronic kidney disease, stage 3b (Multi) 2022    Anemia due to stage 3b chronic kidney disease   • Congestion of respiratory tract 2024   • Contact with and (suspected) exposure to covid-19 10/31/2022   • Contusion of knee 2024   • Deep vein thrombosis (DVT) (Multi) 2024   • Do not resuscitate 10/31/2022   • Enterocolitis due to Clostridium difficile, not specified as recurrent 2023   • Essential (primary) hypertension 2022    Essential hypertension   • Facial laceration 2024   • Herpes zoster 2024   • History of colonoscopy 2024    Comment on above: 2013;   • Hypertension 2011   • Hyponatremia 10/31/2022   •  Iatrogenic thyrotoxicosis 10/31/2022   • Infection and inflammatory reaction due to internal fixation device of unspecified site, subsequent encounter 11/03/2023   • Lateral epicondylitis of left elbow 01/04/2024   • Menopausal problem 01/04/2024   • Multiple myeloma (Multi)    • Neck pain 07/28/2011   • Nontraumatic tear of left rotator cuff 01/04/2024   • Obstructive and reflux uropathy, unspecified 11/08/2023   • Osteoarthritis of right shoulder 02/08/2012   • Osteomyelitis (Multi) 10/06/2023   • Other specified postprocedural states     H/O colonoscopy   • Pain in both wrists 01/04/2024   • Paronychia of finger of right hand 10/06/2023   • Personal history of other medical treatment     History of mammogram   • Personal history of other medical treatment     History of bone density study   • Personal history of other medical treatment     H/O bone density study   • Pneumonia 10/27/2022    Comment on above: PNEUMONIA   • Procedure and treatment not carried out because of patient's decision for unspecified reasons     Colonoscopy refused   • Subacute cough 02/02/2024   • Tendinitis of right elbow 01/04/2024   • Vitamin D deficiency, unspecified     Vitamin D insufficiency       Past Surgical History:   Procedure Laterality Date   • BREAST BIOPSY  01/22/2015    Biopsy Breast Open   • CHOLECYSTECTOMY  01/22/2015    Cholecystectomy   • HYSTERECTOMY  01/22/2015    Hysterectomy   • OTHER SURGICAL HISTORY  04/28/2022    Shoulder surgery   • ROTATOR CUFF REPAIR  01/22/2015    Rotator Cuff Repair   • TONSILLECTOMY  01/22/2015    Tonsillectomy   • TOTAL KNEE ARTHROPLASTY  01/22/2015    Knee Replacement       Family History   Problem Relation Name Age of Onset   • Cancer Mother     • Other (cardiac disorder) Mother     • Other (cardiac disorder) Father     • Other (cardiac disorder) Brother     • Other (CVA) Brother     • Other (HEART BASE TUMOR) Brother         Social History     Socioeconomic History   • Marital status:     Tobacco Use   • Smoking status: Never     Passive exposure: Never   • Smokeless tobacco: Never   Substance and Sexual Activity   • Alcohol use: Yes     Alcohol/week: 1.0 standard drink of alcohol     Types: 1 Glasses of wine per week     Comment: MODERATELY   • Drug use: Never   • Sexual activity: Defer     Social Determinants of Health     Financial Resource Strain: Low Risk  (7/7/2024)    Overall Financial Resource Strain (CARDIA)    • Difficulty of Paying Living Expenses: Not hard at all   Food Insecurity: No Food Insecurity (10/7/2023)    Hunger Vital Sign    • Worried About Running Out of Food in the Last Year: Never true    • Ran Out of Food in the Last Year: Never true   Transportation Needs: No Transportation Needs (7/7/2024)    PRAPARE - Transportation    • Lack of Transportation (Medical): No    • Lack of Transportation (Non-Medical): No   Physical Activity: Inactive (10/7/2023)    Exercise Vital Sign    • Days of Exercise per Week: 0 days    • Minutes of Exercise per Session: 0 min   Stress: No Stress Concern Present (10/7/2023)    [employer] [place] [employer] - Occupational Stress Questionnaire    • Feeling of Stress : Not at all   Social Connections: Feeling Socially Integrated (10/16/2023)    OASIS : Social Isolation    • Frequency of experiencing loneliness or isolation: Never   Intimate Partner Violence: Not At Risk (10/7/2023)    Humiliation, Afraid, Rape, and Kick questionnaire    • Fear of Current or Ex-Partner: No    • Emotionally Abused: No    • Physically Abused: No    • Sexually Abused: No   Housing Stability: Low Risk  (7/10/2024)    Housing Stability Vital Sign    • Unable to Pay for Housing in the Last Year: No    • Number of Times Moved in the Last Year: 1    • Homeless in the Last Year: No       Vital signs: 112/60, 96%, 18, 67, 97.3    Objective  Physical Exam  Constitutional:       General: She is not in acute distress.  Eyes:      Extraocular Movements:  Extraocular movements intact.   Pulmonary:      Effort: Pulmonary effort is normal.   Musculoskeletal:      Cervical back: Neck supple.   Neurological:      Mental Status: She is alert.   Psychiatric:         Mood and Affect: Mood normal.         Behavior: Behavior is cooperative.         Assessment/Plan  Problem List Items Addressed This Visit       Essential hypertension     Continue antihypertensive  Monitor BP         Gastroesophageal reflux disease    Weakness     Continue PT OT         Clostridioides difficile infection     Vanco         Stage 2 chronic kidney disease     Medications, treatments, and labs reviewed  Continue medications and treatments as listed in PCC    Scribe Attestation  ILakia Scribe   attest that this documentation has been prepared under the direction and in the presence of Paul Guerrier MD.    Provider Attestation - Scribe documentation  All medical record entries made by the Scribe were at my direction and personally dictated by me. I have reviewed the chart and agree that the record accurately reflects my personal performance of the history, physical exam, discussion and plan.  Paul Guerrier MD        Electronically Signed By: Paul Guerrier MD   7/23/24  2:46 PM

## 2024-07-23 NOTE — PROGRESS NOTES
HISTORY & PHYSICAL    Subjective   Chief complaint: Bindu Cali is a 89 y.o. female who is a acute skilled care patient being seen and evaluated for multiple medical problems.  Patient presents for Cdiff    HPI:  HPI Patient with PMH of multiple myeloma, CKD 3, Polyneuropathy, spinal stenosis, GERd, HTN, aemia, chronic bronchitis residing at [facility] transferred to [facility].   Presently she is being treated for Cdiff infection. She continues on Vancomycin, However she reported that as of this morning her BM is solid and less frequent than what it has been. Denies fever, chills, nausea, vomiting or diarrhea. No acute distress. Patient will be evaluated by therapy.   Past Medical History:   Diagnosis Date    Abdominal bloating 01/04/2024    Acute bacterial bronchitis 01/04/2024    Acute bacterial sinusitis 01/04/2024    Acute kidney failure, unspecified (CMS-HCC) 11/03/2023    Acute kidney injury (CMS-[facility]) 10/14/2022    Acute urinary tract infection 05/09/2024    Allergic rhinitis 01/04/2024    Bronchitis 01/24/2024    Carrier of methicillin susceptible Staphylococcus aureus (MSSA) 01/04/2024    Chronic cough 01/04/2024    Chronic kidney disease, stage 3b (Multi) 02/25/2022    Anemia due to stage 3b chronic kidney disease    Congestion of respiratory tract 01/04/2024    Contact with and (suspected) exposure to covid-19 10/31/2022    Contusion of knee 01/04/2024    Deep vein thrombosis (DVT) (Multi) 01/04/2024    Do not resuscitate 10/31/2022    Enterocolitis due to Clostridium difficile, not specified as recurrent 11/03/2023    Essential (primary) hypertension 12/14/2022    Essential hypertension    Facial laceration 01/04/2024    Herpes zoster 01/04/2024    History of colonoscopy 01/04/2024    Comment on above: 2-;    Hypertension 11/07/2011    Hyponatremia 10/31/2022    Iatrogenic thyrotoxicosis 10/31/2022    Infection and inflammatory reaction due to internal fixation  device of unspecified site, subsequent encounter 11/03/2023    Lateral epicondylitis of left elbow 01/04/2024    Menopausal problem 01/04/2024    Multiple myeloma (Multi)     Neck pain 07/28/2011    Nontraumatic tear of left rotator cuff 01/04/2024    Obstructive and reflux uropathy, unspecified 11/08/2023    Osteoarthritis of right shoulder 02/08/2012    Osteomyelitis (Multi) 10/06/2023    Other specified postprocedural states     H/O colonoscopy    Pain in both wrists 01/04/2024    Paronychia of finger of right hand 10/06/2023    Personal history of other medical treatment     History of mammogram    Personal history of other medical treatment     History of bone density study    Personal history of other medical treatment     H/O bone density study    Pneumonia 10/27/2022    Comment on above: PNEUMONIA    Procedure and treatment not carried out because of patient's decision for unspecified reasons     Colonoscopy refused    Subacute cough 02/02/2024    Tendinitis of right elbow 01/04/2024    Vitamin D deficiency, unspecified     Vitamin D insufficiency       Past Surgical History:   Procedure Laterality Date    BREAST BIOPSY  01/22/2015    Biopsy Breast Open    CHOLECYSTECTOMY  01/22/2015    Cholecystectomy    HYSTERECTOMY  01/22/2015    Hysterectomy    OTHER SURGICAL HISTORY  04/28/2022    Shoulder surgery    ROTATOR CUFF REPAIR  01/22/2015    Rotator Cuff Repair    TONSILLECTOMY  01/22/2015    Tonsillectomy    TOTAL KNEE ARTHROPLASTY  01/22/2015    Knee Replacement       Family History   Problem Relation Name Age of Onset    Cancer Mother      Other (cardiac disorder) Mother      Other (cardiac disorder) Father      Other (cardiac disorder) Brother      Other (CVA) Brother      Other (HEART BASE TUMOR) Brother         Social History     Socioeconomic History    Marital status:    Tobacco Use    Smoking status: Never     Passive exposure: Never    Smokeless tobacco: Never   Substance and Sexual Activity     Alcohol use: Yes     Alcohol/week: 1.0 standard drink of alcohol     Types: 1 Glasses of wine per week     Comment: MODERATELY    Drug use: Never    Sexual activity: Defer     Social Determinants of Health     Financial Resource Strain: Low Risk  (7/7/2024)    Overall Financial Resource Strain (CARDIA)     Difficulty of Paying Living Expenses: Not hard at all   Food Insecurity: No Food Insecurity (10/7/2023)    Hunger Vital Sign     Worried About Running Out of Food in the Last Year: Never true     Ran Out of Food in the Last Year: Never true   Transportation Needs: No Transportation Needs (7/7/2024)    PRAPARE - Transportation     Lack of Transportation (Medical): No     Lack of Transportation (Non-Medical): No   Physical Activity: Inactive (10/7/2023)    Exercise Vital Sign     Days of Exercise per Week: 0 days     Minutes of Exercise per Session: 0 min   Stress: No Stress Concern Present (10/7/2023)    [employer] [place] [employer] - Occupational Stress Questionnaire     Feeling of Stress : Not at all   Social Connections: Feeling Socially Integrated (10/16/2023)    OASIS : Social Isolation     Frequency of experiencing loneliness or isolation: Never   Intimate Partner Violence: Not At Risk (10/7/2023)    Humiliation, Afraid, Rape, and Kick questionnaire     Fear of Current or Ex-Partner: No     Emotionally Abused: No     Physically Abused: No     Sexually Abused: No   Housing Stability: Low Risk  (7/10/2024)    Housing Stability Vital Sign     Unable to Pay for Housing in the Last Year: No     Number of Times Moved in the Last Year: 1     Homeless in the Last Year: No       Vital signs: 112/60, 96%, 18, 67, 97.3    Objective   Physical Exam  Constitutional:       General: She is not in acute distress.  Eyes:      Extraocular Movements: Extraocular movements intact.   Pulmonary:      Effort: Pulmonary effort is normal.   Musculoskeletal:      Cervical back: Neck supple.   Neurological:       Mental Status: She is alert.   Psychiatric:         Mood and Affect: Mood normal.         Behavior: Behavior is cooperative.         Assessment/Plan   Problem List Items Addressed This Visit       Essential hypertension     Continue antihypertensive  Monitor BP         Gastroesophageal reflux disease    Weakness     Continue PT OT         Clostridioides difficile infection     Vanco         Stage 2 chronic kidney disease     Medications, treatments, and labs reviewed  Continue medications and treatments as listed in PCC    Scribe Attestation  Lakia MICHAEL Scribe   attest that this documentation has been prepared under the direction and in the presence of Paul Guerrier MD.    Provider Attestation - Scribe documentation  All medical record entries made by the Scribe were at my direction and personally dictated by me. I have reviewed the chart and agree that the record accurately reflects my personal performance of the history, physical exam, discussion and plan.  Paul Guerrier MD

## 2024-07-23 NOTE — PROGRESS NOTES
PROGRESS NOTE    Subjective   Chief complaint: Bindu Cali is a 89 y.o. female who is an acute skilled patient being seen and evaluated for weakness    HPI:  7/19/24 Patient offers no complaints today.  No new issues per nursing. Remains in isolation for C-diff. Tolerating abx well. Working with therapy for strengthening and mobility. No concerns per nursing    7/22/24 Patient states doing well. Transferring to [facility] this morning. C-diff precautions maintained. Tolerating Vancomycin without issue.          Objective   Vital signs: 134/60-97.7-70-17-96%    Physical Exam  Constitutional:       Appearance: Normal appearance.   HENT:      Head: Normocephalic.      Mouth/Throat:      Mouth: Mucous membranes are moist.   Eyes:      Extraocular Movements: Extraocular movements intact.   Cardiovascular:      Rate and Rhythm: Normal rate and regular rhythm.      Pulses: Normal pulses.      Heart sounds: Normal heart sounds.   Pulmonary:      Effort: Pulmonary effort is normal.      Breath sounds: Normal breath sounds.   Abdominal:      General: Bowel sounds are normal.      Palpations: Abdomen is soft.   Musculoskeletal:         General: Normal range of motion.      Cervical back: Normal range of motion and neck supple.   Skin:     General: Skin is warm and dry.      Capillary Refill: Capillary refill takes less than 2 seconds.   Neurological:      Mental Status: She is alert and oriented to person, place, and time.   Psychiatric:         Mood and Affect: Mood normal.         Behavior: Behavior normal.         Assessment/Plan   Problem List Items Addressed This Visit       Benign hypertension with CKD (chronic kidney disease), stage II     BP control  Continue amlodipine, lasix, losartan  Avoid nephrotoxins  Monitor gfr         Idiopathic peripheral neuropathy     No complaints  Continue gabapentin         Multiple myeloma (Multi)     Continue darzalex with decadron and benadryl  Followed by  oncology           Weakness     Continue working with therapy  Walker for mobility         Clostridium difficile colitis - Primary     Isolation precautions  Continue Vancomycin          Medications, treatments, and labs reviewed  Continue medications and treatments as listed in EMR    Myranda Lynch, APRN-CNP

## 2024-07-24 ENCOUNTER — NURSING HOME VISIT (OUTPATIENT)
Dept: POST ACUTE CARE | Facility: EXTERNAL LOCATION | Age: 89
End: 2024-07-24
Payer: MEDICARE

## 2024-07-24 DIAGNOSIS — R53.1 WEAKNESS: Primary | ICD-10-CM

## 2024-07-24 DIAGNOSIS — E87.1 HYPONATREMIA: ICD-10-CM

## 2024-07-24 PROCEDURE — 99308 SBSQ NF CARE LOW MDM 20: CPT | Performed by: REGISTERED NURSE

## 2024-07-24 NOTE — LETTER
Patient: Bindu Cali  : 10/14/1932    Encounter Date: 2024    PROGRESS NOTE    Subjective  Chief complaint: Bindu Cali is a 89 y.o. female who is an acute skilled patient being seen and evaluated for weakness    HPI:  24 Patient admitted to SNF for therapy d/t weakness after recent hospitalization.   Patient requires assist with ADLs and transfers.  No new complaints.      Objective  Vital signs: 135/53, 98, 73, 17, 95%    Physical Exam  Constitutional:       General: She is not in acute distress.  Eyes:      Extraocular Movements: Extraocular movements intact.   Pulmonary:      Effort: Pulmonary effort is normal.   Musculoskeletal:      Cervical back: Neck supple.   Neurological:      Mental Status: She is alert.   Psychiatric:         Mood and Affect: Mood normal.         Behavior: Behavior is cooperative.         Assessment/Plan  Problem List Items Addressed This Visit       Weakness - Primary     Continue working with therapy  Walker for mobility         Hyponatremia     NA- 126  Limit fluid intake  Repeat lab              Medications, treatments, and labs reviewed  Continue medications and treatments as listed in [facility]    Scribe Attestation  IJennifer Scribe   attest that this documentation has been prepared under the direction and in the presence of VERONICA Arana.    Provider Attestation - Scribe documentation  All medical record entries made by the Scribe were at my direction and personally dictated by me. I have reviewed the chart and agree that the record accurately reflects my personal performance of the history, physical exam, discussion and plan.    VERONICA Arana        Electronically Signed By: VERONICA Arana   24  7:04 PM

## 2024-07-25 ENCOUNTER — NURSING HOME VISIT (OUTPATIENT)
Dept: POST ACUTE CARE | Facility: EXTERNAL LOCATION | Age: 89
End: 2024-07-25
Payer: MEDICARE

## 2024-07-25 ENCOUNTER — APPOINTMENT (OUTPATIENT)
Dept: HEMATOLOGY/ONCOLOGY | Facility: CLINIC | Age: 89
End: 2024-07-25
Payer: MEDICARE

## 2024-07-25 DIAGNOSIS — A49.8 CLOSTRIDIOIDES DIFFICILE INFECTION: ICD-10-CM

## 2024-07-25 DIAGNOSIS — E87.1 HYPONATREMIA: ICD-10-CM

## 2024-07-25 DIAGNOSIS — R53.1 WEAKNESS: ICD-10-CM

## 2024-07-25 PROCEDURE — 99309 SBSQ NF CARE MODERATE MDM 30: CPT | Performed by: INTERNAL MEDICINE

## 2024-07-25 NOTE — LETTER
Patient: Bindu Cali  : 10/14/1932    Encounter Date: 2024    PROGRESS NOTE    Subjective  Chief complaint: Bindu Cali is a 89 y.o. female who is an acute skilled patient being seen and evaluated for weakness    HPI:  HPI  patient has no new complaints or concerns today.  Patient is working in therapy.  Denies n/v/f/c.  Patient uses walker for ambulating with standby assistance.    Patient continues on vancomycin for C. Difficile. .  Denies nausea, vomiting, fever, and reports that her stools are formed and less frequent.     Patient most recent labs revealed low sodium at 126 and her calcium is 10.7. Patient will likely discharge to assisted living tomorrow.  She will follow-up with her hematologist upon DC. No acute distress.  Objective  Vital signs: 133/58, 95%, 17, 71, 98    Physical Exam  Constitutional:       General: She is not in acute distress.  Eyes:      Extraocular Movements: Extraocular movements intact.   Cardiovascular:      Rate and Rhythm: Normal rate and regular rhythm.   Pulmonary:      Effort: Pulmonary effort is normal.      Breath sounds: Normal breath sounds.   Abdominal:      General: Bowel sounds are normal.      Palpations: Abdomen is soft.   Musculoskeletal:      Cervical back: Neck supple.      Right lower leg: No edema.      Left lower leg: No edema.   Neurological:      Mental Status: She is alert.   Psychiatric:         Mood and Affect: Mood normal.         Behavior: Behavior is cooperative.         Assessment/Plan  Problem List Items Addressed This Visit       Weakness     Continue working with therapy  Walker for mobility         Clostridioides difficile infection     Vanco         Hyponatremia     NA- 126, Ca 10.7  Limit fluid intake  Repeat lab in am  FU with hematologist upon dc to [place]            Medications, treatments, and labs reviewed  Continue medications and treatments as listed in [facility]    Paul Guerrier MD    1. Clostridioides difficile infection         2. Weakness        3. Hyponatremia             Scribe Attestation  By signing my name below, I, Martha Guamanibabhishek   attest that this documentation has been prepared under the direction and in the presence of Paul Guerrier MD.    Provider Attestation - Scribe documentation  All medical record entries made by the Scribe were at my direction and personally dictated by me. I have reviewed the chart and agree that the record accurately reflects my personal performance of the history, physical exam, discussion and plan.      Electronically Signed By: Paul Guerrier MD   7/25/24  8:18 PM

## 2024-07-25 NOTE — PROGRESS NOTES
PROGRESS NOTE    Subjective   Chief complaint: Bindu Cali is a 89 y.o. female who is an acute skilled patient being seen and evaluated for weakness    HPI:  HPI  patient has no new complaints or concerns today.  Patient is working in therapy.  Denies n/v/f/c.  Patient uses walker for ambulating with standby assistance.    Patient continues on vancomycin for C. Difficile. .  Denies nausea, vomiting, fever, and reports that her stools are formed and less frequent.     Patient most recent labs revealed low sodium at 126 and her calcium is 10.7. Patient will likely discharge to assisted living tomorrow.  She will follow-up with her hematologist upon DC. No acute distress.  Objective   Vital signs: 133/58, 95%, 17, 71, 98    Physical Exam  Constitutional:       General: She is not in acute distress.  Eyes:      Extraocular Movements: Extraocular movements intact.   Cardiovascular:      Rate and Rhythm: Normal rate and regular rhythm.   Pulmonary:      Effort: Pulmonary effort is normal.      Breath sounds: Normal breath sounds.   Abdominal:      General: Bowel sounds are normal.      Palpations: Abdomen is soft.   Musculoskeletal:      Cervical back: Neck supple.      Right lower leg: No edema.      Left lower leg: No edema.   Neurological:      Mental Status: She is alert.   Psychiatric:         Mood and Affect: Mood normal.         Behavior: Behavior is cooperative.         Assessment/Plan   Problem List Items Addressed This Visit       Weakness     Continue working with therapy  Walker for mobility         Clostridioides difficile infection     Vanco         Hyponatremia     NA- 126, Ca 10.7  Limit fluid intake  Repeat lab in am  FU with hematologist upon dc to [facility]            Medications, treatments, and labs reviewed  Continue medications and treatments as listed in [facility]    Paul Guerrier MD    1. Clostridioides difficile infection        2. Weakness        3. Hyponatremia             Scribe Attestation  By  signing my name below, I, Lorenzo Guaman   attest that this documentation has been prepared under the direction and in the presence of Paul Guerrier MD.    Provider Attestation - Scribe documentation  All medical record entries made by the Scribe were at my direction and personally dictated by me. I have reviewed the chart and agree that the record accurately reflects my personal performance of the history, physical exam, discussion and plan.

## 2024-07-25 NOTE — PROGRESS NOTES
PROGRESS NOTE    Subjective   Chief complaint: Bindu Cali is a 89 y.o. female who is an acute skilled patient being seen and evaluated for weakness    HPI:  7/24/24 Patient admitted to SNF for therapy d/t weakness after recent hospitalization.   Patient requires assist with ADLs and transfers.  No new complaints.      Objective   Vital signs: 135/53, 98, 73, 17, 95%    Physical Exam  Constitutional:       General: She is not in acute distress.  Eyes:      Extraocular Movements: Extraocular movements intact.   Pulmonary:      Effort: Pulmonary effort is normal.   Musculoskeletal:      Cervical back: Neck supple.   Neurological:      Mental Status: She is alert.   Psychiatric:         Mood and Affect: Mood normal.         Behavior: Behavior is cooperative.         Assessment/Plan   Problem List Items Addressed This Visit       Weakness - Primary     Continue working with therapy  Walker for mobility         Hyponatremia     NA- 126  Limit fluid intake  Repeat lab              Medications, treatments, and labs reviewed  Continue medications and treatments as listed in [facility]    Scribe Attestation  Jennifer MICHAEL Scribe   attest that this documentation has been prepared under the direction and in the presence of VERONICA Arana.    Provider Attestation - Scribe documentation  All medical record entries made by the Scribe were at my direction and personally dictated by me. I have reviewed the chart and agree that the record accurately reflects my personal performance of the history, physical exam, discussion and plan.    VERONICA Arana

## 2024-07-26 ENCOUNTER — NURSING HOME VISIT (OUTPATIENT)
Dept: POST ACUTE CARE | Facility: EXTERNAL LOCATION | Age: 89
End: 2024-07-26

## 2024-07-26 ENCOUNTER — INFUSION (OUTPATIENT)
Dept: HEMATOLOGY/ONCOLOGY | Facility: CLINIC | Age: 89
End: 2024-07-26
Payer: MEDICARE

## 2024-07-26 ENCOUNTER — LAB (OUTPATIENT)
Dept: LAB | Facility: CLINIC | Age: 89
End: 2024-07-26
Payer: MEDICARE

## 2024-07-26 VITALS
HEART RATE: 76 BPM | WEIGHT: 146.83 LBS | DIASTOLIC BLOOD PRESSURE: 62 MMHG | TEMPERATURE: 97.7 F | HEIGHT: 59 IN | RESPIRATION RATE: 18 BRPM | SYSTOLIC BLOOD PRESSURE: 136 MMHG | OXYGEN SATURATION: 95 % | BODY MASS INDEX: 29.6 KG/M2

## 2024-07-26 DIAGNOSIS — A49.8 CLOSTRIDIOIDES DIFFICILE INFECTION: ICD-10-CM

## 2024-07-26 DIAGNOSIS — E83.52 HYPERCALCEMIA: ICD-10-CM

## 2024-07-26 DIAGNOSIS — R53.1 WEAKNESS: Primary | ICD-10-CM

## 2024-07-26 DIAGNOSIS — I10 ESSENTIAL HYPERTENSION: ICD-10-CM

## 2024-07-26 DIAGNOSIS — C90.00 MULTIPLE MYELOMA NOT HAVING ACHIEVED REMISSION (MULTI): ICD-10-CM

## 2024-07-26 DIAGNOSIS — C90.00 MULTIPLE MYELOMA, REMISSION STATUS UNSPECIFIED (MULTI): ICD-10-CM

## 2024-07-26 LAB
ALBUMIN SERPL BCP-MCNC: 3.3 G/DL (ref 3.4–5)
ALP SERPL-CCNC: 54 U/L (ref 33–136)
ALT SERPL W P-5'-P-CCNC: 9 U/L (ref 7–45)
ANION GAP SERPL CALC-SCNC: 12 MMOL/L (ref 10–20)
AST SERPL W P-5'-P-CCNC: 14 U/L (ref 9–39)
BASOPHILS # BLD AUTO: 0.05 X10*3/UL (ref 0–0.1)
BASOPHILS NFR BLD AUTO: 0.6 %
BILIRUB SERPL-MCNC: 0.3 MG/DL (ref 0–1.2)
BUN SERPL-MCNC: 28 MG/DL (ref 6–23)
CALCIUM SERPL-MCNC: 10.5 MG/DL (ref 8.6–10.3)
CHLORIDE SERPL-SCNC: 93 MMOL/L (ref 98–107)
CO2 SERPL-SCNC: 26 MMOL/L (ref 21–32)
CREAT SERPL-MCNC: 1.48 MG/DL (ref 0.5–1.05)
EGFRCR SERPLBLD CKD-EPI 2021: 33 ML/MIN/1.73M*2
EOSINOPHIL # BLD AUTO: 0.41 X10*3/UL (ref 0–0.4)
EOSINOPHIL NFR BLD AUTO: 5.1 %
ERYTHROCYTE [DISTWIDTH] IN BLOOD BY AUTOMATED COUNT: 17.2 % (ref 11.5–14.5)
GLUCOSE SERPL-MCNC: 96 MG/DL (ref 74–99)
HCT VFR BLD AUTO: 23.3 % (ref 36–46)
HGB BLD-MCNC: 7.7 G/DL (ref 12–16)
IMM GRANULOCYTES # BLD AUTO: 0.04 X10*3/UL (ref 0–0.5)
IMM GRANULOCYTES NFR BLD AUTO: 0.5 % (ref 0–0.9)
LYMPHOCYTES # BLD AUTO: 0.7 X10*3/UL (ref 0.8–3)
LYMPHOCYTES NFR BLD AUTO: 8.7 %
MCH RBC QN AUTO: 30.3 PG (ref 26–34)
MCHC RBC AUTO-ENTMCNC: 33 G/DL (ref 32–36)
MCV RBC AUTO: 92 FL (ref 80–100)
MONOCYTES # BLD AUTO: 1.05 X10*3/UL (ref 0.05–0.8)
MONOCYTES NFR BLD AUTO: 13 %
NEUTROPHILS # BLD AUTO: 5.84 X10*3/UL (ref 1.6–5.5)
NEUTROPHILS NFR BLD AUTO: 72.1 %
NRBC BLD-RTO: 0 /100 WBCS (ref 0–0)
PLATELET # BLD AUTO: 400 X10*3/UL (ref 150–450)
POTASSIUM SERPL-SCNC: 4.7 MMOL/L (ref 3.5–5.3)
PROT SERPL-MCNC: 6 G/DL (ref 6.4–8.2)
RBC # BLD AUTO: 2.54 X10*6/UL (ref 4–5.2)
SODIUM SERPL-SCNC: 126 MMOL/L (ref 136–145)
WBC # BLD AUTO: 8.1 X10*3/UL (ref 4.4–11.3)

## 2024-07-26 PROCEDURE — 36415 COLL VENOUS BLD VENIPUNCTURE: CPT

## 2024-07-26 PROCEDURE — 85025 COMPLETE CBC W/AUTO DIFF WBC: CPT | Performed by: INTERNAL MEDICINE

## 2024-07-26 PROCEDURE — 99308 SBSQ NF CARE LOW MDM 20: CPT | Performed by: REGISTERED NURSE

## 2024-07-26 PROCEDURE — 96372 THER/PROPH/DIAG INJ SC/IM: CPT

## 2024-07-26 PROCEDURE — 96401 CHEMO ANTI-NEOPL SQ/IM: CPT

## 2024-07-26 PROCEDURE — 2500000004 HC RX 250 GENERAL PHARMACY W/ HCPCS (ALT 636 FOR OP/ED): Performed by: INTERNAL MEDICINE

## 2024-07-26 PROCEDURE — 80053 COMPREHEN METABOLIC PANEL: CPT | Performed by: INTERNAL MEDICINE

## 2024-07-26 RX ORDER — ACETAMINOPHEN 325 MG/1
650 TABLET ORAL ONCE
Status: COMPLETED | OUTPATIENT
Start: 2024-07-26 | End: 2024-07-26

## 2024-07-26 RX ORDER — DIPHENHYDRAMINE HYDROCHLORIDE 50 MG/ML
50 INJECTION INTRAMUSCULAR; INTRAVENOUS AS NEEDED
OUTPATIENT
Start: 2024-08-15

## 2024-07-26 RX ORDER — HEPARIN 100 UNIT/ML
500 SYRINGE INTRAVENOUS AS NEEDED
OUTPATIENT
Start: 2024-07-26

## 2024-07-26 RX ORDER — FAMOTIDINE 10 MG/ML
20 INJECTION INTRAVENOUS ONCE AS NEEDED
OUTPATIENT
Start: 2024-08-15

## 2024-07-26 RX ORDER — ALBUTEROL SULFATE 0.83 MG/ML
3 SOLUTION RESPIRATORY (INHALATION) AS NEEDED
OUTPATIENT
Start: 2024-08-15

## 2024-07-26 RX ORDER — HEPARIN SODIUM,PORCINE/PF 10 UNIT/ML
50 SYRINGE (ML) INTRAVENOUS AS NEEDED
OUTPATIENT
Start: 2024-07-26

## 2024-07-26 RX ORDER — EPINEPHRINE 0.3 MG/.3ML
0.3 INJECTION SUBCUTANEOUS EVERY 5 MIN PRN
OUTPATIENT
Start: 2024-08-15

## 2024-07-26 RX ORDER — BORTEZOMIB 3.5 MG/1
1.3 INJECTION, POWDER, LYOPHILIZED, FOR SOLUTION INTRAVENOUS; SUBCUTANEOUS ONCE
Status: COMPLETED | OUTPATIENT
Start: 2024-07-26 | End: 2024-07-26

## 2024-07-26 ASSESSMENT — PAIN DESCRIPTION - LOCATION: LOCATION: HEAD

## 2024-07-26 ASSESSMENT — PAIN SCALES - GENERAL
PAINLEVEL_OUTOF10: 5
PAINLEVEL_OUTOF10: 0-NO PAIN
PAINLEVEL: 0-NO PAIN
PAINLEVEL_OUTOF10: 5 - MODERATE PAIN

## 2024-07-26 NOTE — SIGNIFICANT EVENT
07/26/24 0845   Prechemo Checklist   Has the patient been in the hospital, ED, or urgent care since last date of service Yes   Chemo/Immuno Consent Completed and Signed Yes  (7/5/2024)   Protocol/Indications Verified Yes   Confirmed to previous date/time of medication Yes   Compared to previous dose Yes   All medications are dated accurately Yes   Pregnancy Test Negative Not applicable   Parameters Met Yes   Provider Notified Yes   Is Patient Proceeding With Treatment? Yes   BSA/Weight-Height Verified Yes   Dose Calculations Verified (current, total, cumulative) Yes

## 2024-07-26 NOTE — PROGRESS NOTES
0835 patient roomed and awaiting labs, son (Dino) at chairside.  Reviewed care plan for the day and scheduled medication and associated 6 hour observation to follow.  Patient reports and son confirms she took prescribed pre-treatment medications at home @ approximately 7:15am. Son reports he will be leaving and plan to return at completion of observation.    0929 Darzalex given and observation period initiated.  Patient resting in chair, given water and coffee per request. Patient drowsy due to pre-treat Benadryl.  Eyes closed, breathing unlabored.  1017 Call placed to patient's son (Dino) and updated with planned end time for observation period (15:30).   1050 patient reports shivers, possible mild rigors. Patient given newly warmed blankets and under close observation.    1100 patient assisted to restroom and returned to chair by wheelchair without difficulty.   1112 Patient reports headache. Given Tylenol (see MAR).  1217 Patient resting in chair, eyes closed, breathing even and unlabored, awakened to reassess pain. Reports headache has fully resolved. No pain. Provided pillow upon request, denies other needs.  1314 Patient resting in chair, eyes closed breathing even and unlabored.  1440 Patient awake in chair, denies needs.  1515 Patient's son (Dino) arrived to transport patient to facility.  Reviewed today's lab and treatment schedule with patient and son. Also reviewed the expected pre-treatment regimen per orders as placed in [facility]. Patient had expressed concern about the extent of her sleeping and was instructed to review pre-treatment medications with facility who had administered them this morning.

## 2024-07-26 NOTE — LETTER
Patient: Bindu Cali  : 10/14/1932    Encounter Date: 2024    PROGRESS NOTE    Subjective  Chief complaint: Bindu Cali is a 89 y.o. female who is an acute skilled patient being seen and evaluated for weakness    HPI:  24 Patient admitted to SNF for therapy d/t weakness after recent hospitalization.   Patient requires assist with ADLs and transfers.  No new complaints.      24 Therapy has been working with the patient to improve strength and endurance with ADLs, transfers, and mobility.  Patient continues to work toward goals.  Patient is stable and has no new complaints.  Nursing staff voices no new concerns today.    Objective  Vital signs: 138/56, 98.5, 81, 16, 97%    Physical Exam  Constitutional:       General: She is not in acute distress.  Eyes:      Extraocular Movements: Extraocular movements intact.   Pulmonary:      Effort: Pulmonary effort is normal.   Musculoskeletal:      Cervical back: Neck supple.   Neurological:      Mental Status: She is alert.   Psychiatric:         Mood and Affect: Mood normal.         Behavior: Behavior is cooperative.         Assessment/Plan  Problem List Items Addressed This Visit       Essential hypertension     Continue antihypertensive  Monitor BP         Weakness - Primary     Continue working with therapy  Walker for mobility         Clostridioides difficile infection     Vanco  Isolation   Monitor           Medications, treatments, and labs reviewed  Continue medications and treatments as listed in [facility]    Scribe Attestation  IJennifer Scribe   attest that this documentation has been prepared under the direction and in the presence of VERONICA Arana.    Provider Attestation - Scribe documentation  All medical record entries made by the Scribe were at my direction and personally dictated by me. I have reviewed the chart and agree that the record accurately reflects my personal performance of the history, physical  exam, discussion and plan.    VERONICA Arana        Electronically Signed By: VERONICA Arana   7/31/24  9:13 PM

## 2024-07-29 ENCOUNTER — TELEPHONE (OUTPATIENT)
Dept: HEMATOLOGY/ONCOLOGY | Facility: CLINIC | Age: 89
End: 2024-07-29
Payer: MEDICARE

## 2024-07-29 ENCOUNTER — NURSING HOME VISIT (OUTPATIENT)
Dept: POST ACUTE CARE | Facility: EXTERNAL LOCATION | Age: 89
End: 2024-07-29
Payer: MEDICARE

## 2024-07-29 DIAGNOSIS — N18.2 BENIGN HYPERTENSION WITH CKD (CHRONIC KIDNEY DISEASE), STAGE II: ICD-10-CM

## 2024-07-29 DIAGNOSIS — I12.9 BENIGN HYPERTENSION WITH CKD (CHRONIC KIDNEY DISEASE), STAGE II: ICD-10-CM

## 2024-07-29 DIAGNOSIS — A49.8 CLOSTRIDIOIDES DIFFICILE INFECTION: ICD-10-CM

## 2024-07-29 DIAGNOSIS — U07.1 COVID: ICD-10-CM

## 2024-07-29 DIAGNOSIS — K21.9 GASTROESOPHAGEAL REFLUX DISEASE WITHOUT ESOPHAGITIS: ICD-10-CM

## 2024-07-29 DIAGNOSIS — R53.1 WEAKNESS: ICD-10-CM

## 2024-07-29 PROCEDURE — 99308 SBSQ NF CARE LOW MDM 20: CPT | Performed by: INTERNAL MEDICINE

## 2024-07-29 NOTE — PROGRESS NOTES
PROGRESS NOTE    Subjective   Chief complaint: Bindu Cali is a 89 y.o. female who is an acute skilled patient being seen and evaluated for weakness    HPI:  7/24/24 Patient admitted to SNF for therapy d/t weakness after recent hospitalization.   Patient requires assist with ADLs and transfers.  No new complaints.      7/26/24 Therapy has been working with the patient to improve strength and endurance with ADLs, transfers, and mobility.  Patient continues to work toward goals.  Patient is stable and has no new complaints.  Nursing staff voices no new concerns today.    Objective   Vital signs: 138/56, 98.5, 81, 16, 97%    Physical Exam  Constitutional:       General: She is not in acute distress.  Eyes:      Extraocular Movements: Extraocular movements intact.   Pulmonary:      Effort: Pulmonary effort is normal.   Musculoskeletal:      Cervical back: Neck supple.   Neurological:      Mental Status: She is alert.   Psychiatric:         Mood and Affect: Mood normal.         Behavior: Behavior is cooperative.         Assessment/Plan   Problem List Items Addressed This Visit       Essential hypertension     Continue antihypertensive  Monitor BP         Weakness - Primary     Continue working with therapy  Walker for mobility         Clostridioides difficile infection     Vanco  Isolation   Monitor           Medications, treatments, and labs reviewed  Continue medications and treatments as listed in [facility]    Scribe Attestation  IJennifer Scribe   attest that this documentation has been prepared under the direction and in the presence of VERONICA Arana.    Provider Attestation - Scribe documentation  All medical record entries made by the Scribe were at my direction and personally dictated by me. I have reviewed the chart and agree that the record accurately reflects my personal performance of the history, physical exam, discussion and plan.    VERONICA Arana

## 2024-07-29 NOTE — PROGRESS NOTES
PROGRESS NOTE    Subjective   Chief complaint: Bindu Cali is a 89 y.o. female who is an acute skilled patient being seen and evaluated for weakness    HPI:  Patient presents for f/u therapy and general medical care.  Patient seen and examined at beside.  Therapy has been working with the patient to improve strength, endurance, ADLs, and transfers d/t generalized weakness. She is COVID positive, asymptomatic. She does not want to take any medications at this time. On isolation for cdiff. Tolerating vanco without adverse reactions. Patient has no acute concerns today.      Objective   Vital signs: 146/59,76,97%    Physical Exam  Constitutional:       General: She is not in acute distress.  Eyes:      Extraocular Movements: Extraocular movements intact.   Cardiovascular:      Rate and Rhythm: Normal rate and regular rhythm.   Pulmonary:      Effort: Pulmonary effort is normal.      Breath sounds: Normal breath sounds.   Abdominal:      General: Bowel sounds are normal.      Palpations: Abdomen is soft.   Musculoskeletal:      Cervical back: Neck supple.      Right lower leg: No edema.      Left lower leg: No edema.   Neurological:      Mental Status: She is alert.   Psychiatric:         Mood and Affect: Mood normal.         Behavior: Behavior is cooperative.         Assessment/Plan   Problem List Items Addressed This Visit       Benign hypertension with CKD (chronic kidney disease), stage II     BP control  Continue amlodipine, lasix, losartan  Avoid nephrotoxins           Gastroesophageal reflux disease     No complaints  Reinforced sitting up after eating         Weakness     Continue working with therapy  Walker for mobility         Clostridioides difficile infection     Vanco  Isolation   Monitor          COVID     Treat symptoms as they appear  Monitor           Medications, treatments, and labs reviewed  Continue medications and treatments as listed in EMR    Scribe Attestation  I, Jennie Juarez,  Scribe   attest that this documentation has been prepared under the direction and in the presence of Paul Guerrier MD.     Provider Attestation - Scribe documentation  All medical record entries made by the Scribe were at my direction and personally dictated by me. I have reviewed the chart and agree that the record accurately reflects my personal performance of the history, physical exam, discussion and plan.   Paul Guerrier MD

## 2024-07-29 NOTE — LETTER
Patient: Bindu Cali  : 10/14/1932    Encounter Date: 2024    PROGRESS NOTE    Subjective  Chief complaint: Bindu Cali is a 89 y.o. female who is an acute skilled patient being seen and evaluated for weakness    HPI:  Patient presents for f/u therapy and general medical care.  Patient seen and examined at beside.  Therapy has been working with the patient to improve strength, endurance, ADLs, and transfers d/t generalized weakness. She is COVID positive, asymptomatic. She does not want to take any medications at this time. On isolation for cdiff. Tolerating vanco without adverse reactions. Patient has no acute concerns today.      Objective  Vital signs: 146/59,76,97%    Physical Exam  Constitutional:       General: She is not in acute distress.  Eyes:      Extraocular Movements: Extraocular movements intact.   Cardiovascular:      Rate and Rhythm: Normal rate and regular rhythm.   Pulmonary:      Effort: Pulmonary effort is normal.      Breath sounds: Normal breath sounds.   Abdominal:      General: Bowel sounds are normal.      Palpations: Abdomen is soft.   Musculoskeletal:      Cervical back: Neck supple.      Right lower leg: No edema.      Left lower leg: No edema.   Neurological:      Mental Status: She is alert.   Psychiatric:         Mood and Affect: Mood normal.         Behavior: Behavior is cooperative.         Assessment/Plan  Problem List Items Addressed This Visit       Benign hypertension with CKD (chronic kidney disease), stage II     BP control  Continue amlodipine, lasix, losartan  Avoid nephrotoxins           Gastroesophageal reflux disease     No complaints  Reinforced sitting up after eating         Weakness     Continue working with therapy  Walker for mobility         Clostridioides difficile infection     Vanco  Isolation   Monitor          COVID     Treat symptoms as they appear  Monitor           Medications, treatments, and labs reviewed  Continue medications and  treatments as listed in EMR    Scribe Attestation  IJennie Scribe   attest that this documentation has been prepared under the direction and in the presence of Paul Guerrier MD.     Provider Attestation - Scribe documentation  All medical record entries made by the Scribe were at my direction and personally dictated by me. I have reviewed the chart and agree that the record accurately reflects my personal performance of the history, physical exam, discussion and plan.   Paul Guerrier MD      Electronically Signed By: Paul Guerrier MD   7/29/24  6:59 PM

## 2024-07-29 NOTE — TELEPHONE ENCOUNTER
Patient with new COVID positive test; cancelled treatment this week to recover and will come next week for second darzarlex.

## 2024-07-30 ENCOUNTER — NURSING HOME VISIT (OUTPATIENT)
Dept: POST ACUTE CARE | Facility: EXTERNAL LOCATION | Age: 89
End: 2024-07-30
Payer: MEDICARE

## 2024-07-30 DIAGNOSIS — A49.8 CLOSTRIDIOIDES DIFFICILE INFECTION: ICD-10-CM

## 2024-07-30 DIAGNOSIS — U07.1 COVID: ICD-10-CM

## 2024-07-30 DIAGNOSIS — R53.1 WEAKNESS: ICD-10-CM

## 2024-07-30 PROCEDURE — 99309 SBSQ NF CARE MODERATE MDM 30: CPT | Performed by: INTERNAL MEDICINE

## 2024-07-30 NOTE — PROGRESS NOTES
PROGRESS NOTE    Subjective   Chief complaint: Bindu Cali is a 89 y.o. female who is an acute skilled patient being seen and evaluated for weakness    HPI:  HPI  patient in therapy d/t generalized weakness.  Patient presents for f/u.  Continues to work toward goals in therapy.  No new complaints at this time.  Patient ambulates with walker within the facility.  Patient tested positive for COVID and placed on isolation.  Patient is asymptomatic and refused Paxlovid.  No acute distress.  Objective   Vital signs:   134/67, 98%, 14, 68, 97.9    Physical Exam  Constitutional:       General: She is not in acute distress.  Eyes:      Extraocular Movements: Extraocular movements intact.   Cardiovascular:      Rate and Rhythm: Normal rate and regular rhythm.   Pulmonary:      Effort: Pulmonary effort is normal.      Breath sounds: Normal breath sounds.   Abdominal:      General: Bowel sounds are normal.      Palpations: Abdomen is soft.   Musculoskeletal:      Cervical back: Neck supple.      Right lower leg: No edema.      Left lower leg: No edema.   Neurological:      Mental Status: She is alert.   Psychiatric:         Mood and Affect: Mood normal.         Behavior: Behavior is cooperative.         Assessment/Plan   Problem List Items Addressed This Visit       Weakness     Continue working with therapy  Walker for mobility         Clostridioides difficile infection     Vanco  Isolation   Monitor          COVID     Treat symptoms as they appear  Monitor   Refused Paxlovid- as she is asymptomatic          Medications, treatments, and labs reviewed  Continue medications and treatments as listed in [facility]    Scribe Attestation  By signing my name below, Lakia MICHAEL Scribe   attest that this documentation has been prepared under the direction and in the presence of Paul Guerrier MD.    Provider Attestation - Scribe documentation  All medical record entries made by the Scribe were at my direction and personally  dictated by me. I have reviewed the chart and agree that the record accurately reflects my personal performance of the history, physical exam, discussion and plan.

## 2024-07-30 NOTE — LETTER
Patient: Bindu Cali  : 10/14/1932    Encounter Date: 2024    PROGRESS NOTE    Subjective  Chief complaint: Bindu Cali is a 89 y.o. female who is an acute skilled patient being seen and evaluated for weakness    HPI:  HPI  patient in therapy d/t generalized weakness.  Patient presents for f/u.  Continues to work toward goals in therapy.  No new complaints at this time.  Patient ambulates with walker within the facility.  Patient tested positive for COVID and placed on isolation.  Patient is asymptomatic and refused Paxlovid.  No acute distress.  Objective  Vital signs:   134/67, 98%, 14, 68, 97.9    Physical Exam  Constitutional:       General: She is not in acute distress.  Eyes:      Extraocular Movements: Extraocular movements intact.   Cardiovascular:      Rate and Rhythm: Normal rate and regular rhythm.   Pulmonary:      Effort: Pulmonary effort is normal.      Breath sounds: Normal breath sounds.   Abdominal:      General: Bowel sounds are normal.      Palpations: Abdomen is soft.   Musculoskeletal:      Cervical back: Neck supple.      Right lower leg: No edema.      Left lower leg: No edema.   Neurological:      Mental Status: She is alert.   Psychiatric:         Mood and Affect: Mood normal.         Behavior: Behavior is cooperative.         Assessment/Plan  Problem List Items Addressed This Visit       Weakness     Continue working with therapy  Walker for mobility         Clostridioides difficile infection     Vanco  Isolation   Monitor          COVID     Treat symptoms as they appear  Monitor   Refused Paxlovid- as she is asymptomatic          Medications, treatments, and labs reviewed  Continue medications and treatments as listed in [facility]    Scribe Attestation  By signing my name below, ILakia, Scribe   attest that this documentation has been prepared under the direction and in the presence of Paul Guerrier MD.    Provider Attestation - Scribe documentation  All  medical record entries made by the Scribe were at my direction and personally dictated by me. I have reviewed the chart and agree that the record accurately reflects my personal performance of the history, physical exam, discussion and plan.      Electronically Signed By: Paul Guerrier MD   7/30/24  2:18 PM

## 2024-07-31 ENCOUNTER — NURSING HOME VISIT (OUTPATIENT)
Dept: POST ACUTE CARE | Facility: EXTERNAL LOCATION | Age: 89
End: 2024-07-31
Payer: MEDICARE

## 2024-07-31 DIAGNOSIS — U07.1 COVID: Primary | ICD-10-CM

## 2024-07-31 DIAGNOSIS — M54.50 CHRONIC LOW BACK PAIN, UNSPECIFIED BACK PAIN LATERALITY, UNSPECIFIED WHETHER SCIATICA PRESENT: ICD-10-CM

## 2024-07-31 DIAGNOSIS — G89.29 CHRONIC LOW BACK PAIN, UNSPECIFIED BACK PAIN LATERALITY, UNSPECIFIED WHETHER SCIATICA PRESENT: ICD-10-CM

## 2024-07-31 DIAGNOSIS — A49.8 CLOSTRIDIOIDES DIFFICILE INFECTION: ICD-10-CM

## 2024-07-31 DIAGNOSIS — I10 ESSENTIAL HYPERTENSION: ICD-10-CM

## 2024-07-31 PROCEDURE — 99309 SBSQ NF CARE MODERATE MDM 30: CPT | Performed by: REGISTERED NURSE

## 2024-07-31 NOTE — LETTER
Patient: Bindu Cali  : 10/14/1932    Encounter Date: 2024    PROGRESS NOTE    Subjective  Chief complaint: Bindu Cali is a 89 y.o. female who is an acute skilled patient being seen and evaluated for weakness    HPI:  24 Patient admitted to SNF for therapy d/t weakness after recent hospitalization.   Patient requires assist with ADLs and transfers.  No new complaints.      24 Therapy has been working with the patient to improve strength and endurance with ADLs, transfers, and mobility.  Patient continues to work toward goals.  Patient is stable and has no new complaints.  Nursing staff voices no new concerns today.    24 Patient seen and examined at bedside.  Patient denies n/v/f/c.  Continues working in therapy.  No new complaints.    Objective  Vital signs: 131/70, 97.4, 73, 22, 95%    Physical Exam  Constitutional:       General: She is not in acute distress.  Eyes:      Extraocular Movements: Extraocular movements intact.   Pulmonary:      Effort: Pulmonary effort is normal.   Musculoskeletal:      Cervical back: Neck supple.   Neurological:      Mental Status: She is alert.   Psychiatric:         Mood and Affect: Mood normal.         Behavior: Behavior is cooperative.         Assessment/Plan  Problem List Items Addressed This Visit       Chronic back pain     Monitor   Pain mgmt          Essential hypertension     Continue antihypertensive  Monitor BP         Clostridioides difficile infection     Vanco taper          COVID - Primary     Isolation precautions  Supportive treatment  Start Mucinex, Flonase             Medications, treatments, and labs reviewed  Continue medications and treatments as listed in [facility]    Scribe Attestation  I, Lorenzo Hendrickson   attest that this documentation has been prepared under the direction and in the presence of VERONICA Arana.    Provider Attestation - Scribe documentation  All medical record entries made by the  Scribe were at my direction and personally dictated by me. I have reviewed the chart and agree that the record accurately reflects my personal performance of the history, physical exam, discussion and plan.    VERONICA Arana        Electronically Signed By: VERONICA Arana   8/7/24  1:58 PM

## 2024-08-01 ENCOUNTER — APPOINTMENT (OUTPATIENT)
Dept: HEMATOLOGY/ONCOLOGY | Facility: CLINIC | Age: 89
End: 2024-08-01
Payer: MEDICARE

## 2024-08-01 ENCOUNTER — NURSING HOME VISIT (OUTPATIENT)
Dept: POST ACUTE CARE | Facility: EXTERNAL LOCATION | Age: 89
End: 2024-08-01
Payer: MEDICARE

## 2024-08-01 DIAGNOSIS — N18.2 BENIGN HYPERTENSION WITH CKD (CHRONIC KIDNEY DISEASE), STAGE II: ICD-10-CM

## 2024-08-01 DIAGNOSIS — D64.9 ANEMIA, UNSPECIFIED TYPE: ICD-10-CM

## 2024-08-01 DIAGNOSIS — U07.1 COVID: Primary | ICD-10-CM

## 2024-08-01 DIAGNOSIS — R53.1 WEAKNESS: ICD-10-CM

## 2024-08-01 DIAGNOSIS — I12.9 BENIGN HYPERTENSION WITH CKD (CHRONIC KIDNEY DISEASE), STAGE II: ICD-10-CM

## 2024-08-01 DIAGNOSIS — K21.9 GASTROESOPHAGEAL REFLUX DISEASE WITHOUT ESOPHAGITIS: ICD-10-CM

## 2024-08-01 DIAGNOSIS — A49.8 CLOSTRIDIOIDES DIFFICILE INFECTION: ICD-10-CM

## 2024-08-01 PROCEDURE — 99309 SBSQ NF CARE MODERATE MDM 30: CPT | Performed by: INTERNAL MEDICINE

## 2024-08-01 NOTE — PROGRESS NOTES
PROGRESS NOTE    Subjective   Chief complaint: Bindu Cali is a 89 y.o. female who is an acute skilled patient being seen and evaluated for weakness    HPI:  HPI  Patient presents for general medical care and f/u.  Patient seen and examined at bedside.  No issues per nursing.  Patient has no acute complaints.  Patient is currently working in therapy.  Patient is working towards goals.  Patient is COVID-positive, and is on isolation precautions.  Patient is asymptomatic other than a cough that had prior to admission.  Patient has no shortness of breath, fever, GI symptoms.  Orders placed for patient to start on Mucinex and Flonase.  Patient is also seated positive.  Continues on vancomycin HTN Denies chest pain and headache.  Anemia stable, denies fatigue, sob, and palpitations.  GERD controlled.  Denies heartburn, regurgitation, epigastric discomfort, sour taste, and cough.  CKD denies change in urinary frequency or appetite decrease.  Mentation at baseline, no acute distress    Objective   Vital signs: 154/58, 75, 17, 97.3, 96%    Physical Exam  Constitutional:       General: She is not in acute distress.  Eyes:      Extraocular Movements: Extraocular movements intact.   Cardiovascular:      Rate and Rhythm: Normal rate and regular rhythm.   Pulmonary:      Effort: Pulmonary effort is normal.      Breath sounds: Normal breath sounds.   Abdominal:      General: Bowel sounds are normal.      Palpations: Abdomen is soft.   Musculoskeletal:      Cervical back: Neck supple.      Right lower leg: No edema.      Left lower leg: No edema.   Neurological:      Mental Status: She is alert.   Psychiatric:         Mood and Affect: Mood normal.         Behavior: Behavior is cooperative.         Assessment/Plan   Problem List Items Addressed This Visit       Benign hypertension with CKD (chronic kidney disease), stage II     Monitor blood pressure continue amlodipine, lasix, losartan  Avoid nephrotoxins            Gastroesophageal reflux disease     Monitor GI symptoms  Reinforced sitting up after eating         Anemia     Monitor labs         Weakness     Continue to work towards goals in therapy         Clostridioides difficile infection     Vanco  Isolation   Monitor          COVID - Primary     Isolation precautions  Supportive treatment  Start Mucinex, Flonase          Medications, treatments, and labs reviewed  Continue medications and treatments as listed in EMR      Scribe Attestation  Oralia MICHAEL Scribe   attest that this documentation has been prepared under the direction and in the presence of Paul Guerrier MD    Provider Attestation - Scribe documentation  All medical record entries made by the Scribe were at my direction and personally dictated by me. I have reviewed the chart and agree that the record accurately reflects my personal performance of the history, physical exam, discussion and plan.   Paul Guerrier MD

## 2024-08-01 NOTE — LETTER
Patient: Bindu Cali  : 10/14/1932    Encounter Date: 2024    PROGRESS NOTE    Subjective  Chief complaint: Bindu Cali is a 89 y.o. female who is an acute skilled patient being seen and evaluated for weakness    HPI:  HPI  Patient presents for general medical care and f/u.  Patient seen and examined at bedside.  No issues per nursing.  Patient has no acute complaints.  Patient is currently working in therapy.  Patient is working towards goals.  Patient is COVID-positive, and is on isolation precautions.  Patient is asymptomatic other than a cough that had prior to admission.  Patient has no shortness of breath, fever, GI symptoms.  Orders placed for patient to start on Mucinex and Flonase.  Patient is also seated positive.  Continues on vancomycin HTN Denies chest pain and headache.  Anemia stable, denies fatigue, sob, and palpitations.  GERD controlled.  Denies heartburn, regurgitation, epigastric discomfort, sour taste, and cough.  CKD denies change in urinary frequency or appetite decrease.  Mentation at baseline, no acute distress    Objective  Vital signs: 154/58, 75, 17, 97.3, 96%    Physical Exam  Constitutional:       General: She is not in acute distress.  Eyes:      Extraocular Movements: Extraocular movements intact.   Cardiovascular:      Rate and Rhythm: Normal rate and regular rhythm.   Pulmonary:      Effort: Pulmonary effort is normal.      Breath sounds: Normal breath sounds.   Abdominal:      General: Bowel sounds are normal.      Palpations: Abdomen is soft.   Musculoskeletal:      Cervical back: Neck supple.      Right lower leg: No edema.      Left lower leg: No edema.   Neurological:      Mental Status: She is alert.   Psychiatric:         Mood and Affect: Mood normal.         Behavior: Behavior is cooperative.         Assessment/Plan  Problem List Items Addressed This Visit       Benign hypertension with CKD (chronic kidney disease), stage II     Monitor blood  pressure continue amlodipine, lasix, losartan  Avoid nephrotoxins           Gastroesophageal reflux disease     Monitor GI symptoms  Reinforced sitting up after eating         Anemia     Monitor labs         Weakness     Continue to work towards goals in therapy         Clostridioides difficile infection     Vanco  Isolation   Monitor          COVID - Primary     Isolation precautions  Supportive treatment  Start Mucinex, Flonase          Medications, treatments, and labs reviewed  Continue medications and treatments as listed in EMR      Scribe Attestation  IOralia Scribe   attest that this documentation has been prepared under the direction and in the presence of Paul Guerrier MD    Provider Attestation - Scribe documentation  All medical record entries made by the Scribe were at my direction and personally dictated by me. I have reviewed the chart and agree that the record accurately reflects my personal performance of the history, physical exam, discussion and plan.   Paul Guerrier MD        Electronically Signed By: Paul Guerrier MD   8/1/24  3:06 PM

## 2024-08-02 ENCOUNTER — NURSING HOME VISIT (OUTPATIENT)
Dept: POST ACUTE CARE | Facility: EXTERNAL LOCATION | Age: 89
End: 2024-08-02
Payer: MEDICARE

## 2024-08-02 DIAGNOSIS — I10 ESSENTIAL HYPERTENSION: ICD-10-CM

## 2024-08-02 DIAGNOSIS — R53.1 WEAKNESS: Primary | ICD-10-CM

## 2024-08-02 DIAGNOSIS — M54.50 CHRONIC LOW BACK PAIN, UNSPECIFIED BACK PAIN LATERALITY, UNSPECIFIED WHETHER SCIATICA PRESENT: ICD-10-CM

## 2024-08-02 DIAGNOSIS — G89.29 CHRONIC LOW BACK PAIN, UNSPECIFIED BACK PAIN LATERALITY, UNSPECIFIED WHETHER SCIATICA PRESENT: ICD-10-CM

## 2024-08-02 PROCEDURE — 99308 SBSQ NF CARE LOW MDM 20: CPT | Performed by: REGISTERED NURSE

## 2024-08-02 NOTE — LETTER
Patient: Bindu Cali  : 10/14/1932    Encounter Date: 2024    PROGRESS NOTE    Subjective  Chief complaint: Bindu Cali is a 89 y.o. female who is an acute skilled patient being seen and evaluated for weakness    HPI:  24 Patient admitted to SNF for therapy d/t weakness after recent hospitalization.   Patient requires assist with ADLs and transfers.  No new complaints.      24 Therapy has been working with the patient to improve strength and endurance with ADLs, transfers, and mobility.  Patient continues to work toward goals.  Patient is stable and has no new complaints.  Nursing staff voices no new concerns today.    24 Patient seen and examined at bedside.  Patient denies n/v/f/c.  Continues working in therapy.  No new complaints.    24 Patient has no new complaints or concerns today.  Continues working towards goals in therapy.  Denies constitutional symptoms.    Objective  Vital signs: 131/70, 97.4, 73, 22, 95%    Physical Exam  Constitutional:       General: She is not in acute distress.  Eyes:      Extraocular Movements: Extraocular movements intact.   Pulmonary:      Effort: Pulmonary effort is normal.   Musculoskeletal:      Cervical back: Neck supple.   Neurological:      Mental Status: She is alert.   Psychiatric:         Mood and Affect: Mood normal.         Behavior: Behavior is cooperative.         Assessment/Plan  Problem List Items Addressed This Visit       Chronic back pain     Monitor   Pain mgmt          Essential hypertension     Continue antihypertensive  Monitor BP         Weakness - Primary     Continue working with therapy  Walker for mobility          Medications, treatments, and labs reviewed  Continue medications and treatments as listed in [facility]    Scribe Attestation  I, Lorenzo Hendrickson   attest that this documentation has been prepared under the direction and in the presence of VERONICA Arana.    Provider Attestation -  Scribe documentation  All medical record entries made by the Scribe were at my direction and personally dictated by me. I have reviewed the chart and agree that the record accurately reflects my personal performance of the history, physical exam, discussion and plan.    VERONICA Arana        Electronically Signed By: VERONICA Arana   8/8/24  1:24 PM

## 2024-08-04 PROCEDURE — G0180 MD CERTIFICATION HHA PATIENT: HCPCS | Performed by: INTERNAL MEDICINE

## 2024-08-05 ENCOUNTER — TELEPHONE (OUTPATIENT)
Dept: HEMATOLOGY/ONCOLOGY | Facility: CLINIC | Age: 89
End: 2024-08-05
Payer: MEDICARE

## 2024-08-05 ENCOUNTER — DOCUMENTATION (OUTPATIENT)
Dept: PRIMARY CARE | Facility: CLINIC | Age: 89
End: 2024-08-05
Payer: MEDICARE

## 2024-08-05 NOTE — PROGRESS NOTES
PROGRESS NOTE    Subjective   Chief complaint: Bindu Cali is a 89 y.o. female who is an acute skilled patient being seen and evaluated for weakness    HPI:  7/24/24 Patient admitted to SNF for therapy d/t weakness after recent hospitalization.   Patient requires assist with ADLs and transfers.  No new complaints.      7/26/24 Therapy has been working with the patient to improve strength and endurance with ADLs, transfers, and mobility.  Patient continues to work toward goals.  Patient is stable and has no new complaints.  Nursing staff voices no new concerns today.    7/31/24 Patient seen and examined at bedside.  Patient denies n/v/f/c.  Continues working in therapy.  No new complaints.    8/2/24 Patient has no new complaints or concerns today.  Continues working towards goals in therapy.  Denies constitutional symptoms.    Objective   Vital signs: 131/70, 97.4, 73, 22, 95%    Physical Exam  Constitutional:       General: She is not in acute distress.  Eyes:      Extraocular Movements: Extraocular movements intact.   Pulmonary:      Effort: Pulmonary effort is normal.   Musculoskeletal:      Cervical back: Neck supple.   Neurological:      Mental Status: She is alert.   Psychiatric:         Mood and Affect: Mood normal.         Behavior: Behavior is cooperative.         Assessment/Plan   Problem List Items Addressed This Visit       Chronic back pain     Monitor   Pain mgmt          Essential hypertension     Continue antihypertensive  Monitor BP         Weakness - Primary     Continue working with therapy  Walker for mobility          Medications, treatments, and labs reviewed  Continue medications and treatments as listed in [facility]    Scribe Attestation  I, Lorenzo Hendrickson   attest that this documentation has been prepared under the direction and in the presence of VERONICA Arana.    Provider Attestation - Scribe documentation  All medical record entries made by the Scribe were at  my direction and personally dictated by me. I have reviewed the chart and agree that the record accurately reflects my personal performance of the history, physical exam, discussion and plan.    Dez Duggan, APRN-CNP

## 2024-08-05 NOTE — PROGRESS NOTES
PROGRESS NOTE    Subjective   Chief complaint: Bindu Cali is a 89 y.o. female who is an acute skilled patient being seen and evaluated for weakness    HPI:  7/24/24 Patient admitted to SNF for therapy d/t weakness after recent hospitalization.   Patient requires assist with ADLs and transfers.  No new complaints.      7/26/24 Therapy has been working with the patient to improve strength and endurance with ADLs, transfers, and mobility.  Patient continues to work toward goals.  Patient is stable and has no new complaints.  Nursing staff voices no new concerns today.    7/31/24 Patient seen and examined at bedside.  Patient denies n/v/f/c.  Continues working in therapy.  No new complaints.    Objective   Vital signs: 131/70, 97.4, 73, 22, 95%    Physical Exam  Constitutional:       General: She is not in acute distress.  Eyes:      Extraocular Movements: Extraocular movements intact.   Pulmonary:      Effort: Pulmonary effort is normal.   Musculoskeletal:      Cervical back: Neck supple.   Neurological:      Mental Status: She is alert.   Psychiatric:         Mood and Affect: Mood normal.         Behavior: Behavior is cooperative.         Assessment/Plan   Problem List Items Addressed This Visit       Chronic back pain     Monitor   Pain mgmt          Essential hypertension     Continue antihypertensive  Monitor BP         Clostridioides difficile infection     Vanco taper          COVID - Primary     Isolation precautions  Supportive treatment  Start Mucinex, Flonase             Medications, treatments, and labs reviewed  Continue medications and treatments as listed in [facility]    Scribe Attestation  IJennifer Scribe   attest that this documentation has been prepared under the direction and in the presence of VERONICA Arana.    Provider Attestation - Scribe documentation  All medical record entries made by the Scribe were at my direction and personally dictated by me. I have reviewed  the chart and agree that the record accurately reflects my personal performance of the history, physical exam, discussion and plan.    Dez Duggan, APRN-CNP

## 2024-08-06 ENCOUNTER — PATIENT OUTREACH (OUTPATIENT)
Dept: CARE COORDINATION | Facility: CLINIC | Age: 89
End: 2024-08-06
Payer: MEDICARE

## 2024-08-06 NOTE — PROGRESS NOTES
Discharge Facility:[facility]   Discharge Diagnosis:  Other elevated white blood cell count  2019-nCoV acute respiratory disease  Hypercalcemia  Enterocolitis due to Clostridium difficile, not specified as recurrent  Multiple myeloma not having achieved remission  Monoclonal gammopathy  Solitary pulmonary nodule  Chronic kidney disease, stage 3b  Anemia, unspecified  Polyneuropathy, unspecified  Spinal stenosis, lumbar region without neurogenic claudication  Mild protein-calorie malnutrition    Admission Date:7/22/2024  Discharge Date: 8/3/2024    PCP Appointment Date:8/16/2024  Specialist Appointment Date:   8/8, 8/16, 8/22, 8/29 Hem/Onc/[facility] Encounter and Summary Linked: Yes  See discharge assessment below for further details  Engagement  Call Start Time: 1750 (8/6/2024  6:01 PM)    Medications  Medications reviewed with patient/caregiver?: Yes (8/6/2024  6:01 PM)  Is the patient having any side effects they believe may be caused by any medication additions or changes?: No (8/6/2024  6:01 PM)  Does the patient have all medications ordered at discharge?: Yes (8/6/2024  6:01 PM)  Care Management Interventions: Provided patient education (8/6/2024  6:01 PM)  Prescription Comments: -- (Was sent additional Vanco to pharmacy, she contacted her PCP and was told to take as directed.) (8/6/2024  6:01 PM)  Is the patient taking all medications as directed (includes completed medication regime)?: Yes (8/6/2024  6:01 PM)    Appointments  Does the patient have a primary care provider?: Yes (8/6/2024  6:01 PM)  Care Management Interventions: Verified appointment date/time/provider (8/16/2024) (8/6/2024  6:01 PM)  Has the patient kept scheduled appointments due by today?: Yes (8/6/2024  6:01 PM)    Self Management  What is the home health agency?: -- ([facility]) (8/6/2024  6:01 PM)  Has home health visited the patient within 72 hours of discharge?: Yes (8/6/2024  6:01 PM)  What Durable Medical  Equipment (DME) was ordered?: -- (N/A) (8/6/2024  6:01 PM)    Patient Teaching  Does the patient have access to their discharge instructions?: Yes (8/6/2024  6:01 PM)  What is the patient's perception of their health status since discharge?: Improving (8/6/2024  6:01 PM)  Is the patient/caregiver able to teach back the hierarchy of who to call/visit for symptoms/problems? PCP, Specialist, Home Health nurse, Urgent Care, ED, 911: Yes (8/6/2024  6:01 PM)    Wrap Up  Wrap Up Additional Comments: -- (Bindu is doing much better. She will continue Vancomycin till 8/19. She will report any concerns to provider.) (8/6/2024  6:01 PM)

## 2024-08-08 ENCOUNTER — INFUSION (OUTPATIENT)
Dept: HEMATOLOGY/ONCOLOGY | Facility: CLINIC | Age: 89
End: 2024-08-08
Payer: MEDICARE

## 2024-08-08 ENCOUNTER — LAB (OUTPATIENT)
Dept: LAB | Facility: CLINIC | Age: 89
End: 2024-08-08
Payer: MEDICARE

## 2024-08-08 VITALS
HEIGHT: 59 IN | WEIGHT: 134.92 LBS | DIASTOLIC BLOOD PRESSURE: 81 MMHG | BODY MASS INDEX: 27.2 KG/M2 | OXYGEN SATURATION: 95 % | RESPIRATION RATE: 20 BRPM | TEMPERATURE: 97.9 F | HEART RATE: 78 BPM | SYSTOLIC BLOOD PRESSURE: 180 MMHG

## 2024-08-08 DIAGNOSIS — C90.00 MULTIPLE MYELOMA NOT HAVING ACHIEVED REMISSION (MULTI): ICD-10-CM

## 2024-08-08 DIAGNOSIS — C90.00 MULTIPLE MYELOMA, REMISSION STATUS UNSPECIFIED (MULTI): ICD-10-CM

## 2024-08-08 DIAGNOSIS — C90.00 MULTIPLE MYELOMA, REMISSION STATUS UNSPECIFIED (MULTI): Primary | ICD-10-CM

## 2024-08-08 DIAGNOSIS — E83.52 HYPERCALCEMIA: ICD-10-CM

## 2024-08-08 LAB
ALBUMIN SERPL BCP-MCNC: 3.3 G/DL (ref 3.4–5)
ALP SERPL-CCNC: 103 U/L (ref 33–136)
ALT SERPL W P-5'-P-CCNC: 25 U/L (ref 7–45)
ANION GAP SERPL CALC-SCNC: 14 MMOL/L (ref 10–20)
AST SERPL W P-5'-P-CCNC: 22 U/L (ref 9–39)
BASOPHILS # BLD AUTO: 0.02 X10*3/UL (ref 0–0.1)
BASOPHILS NFR BLD AUTO: 0.3 %
BILIRUB SERPL-MCNC: 0.3 MG/DL (ref 0–1.2)
BUN SERPL-MCNC: 29 MG/DL (ref 6–23)
CALCIUM SERPL-MCNC: 8.7 MG/DL (ref 8.6–10.3)
CHLORIDE SERPL-SCNC: 102 MMOL/L (ref 98–107)
CO2 SERPL-SCNC: 23 MMOL/L (ref 21–32)
CREAT SERPL-MCNC: 1.39 MG/DL (ref 0.5–1.05)
EGFRCR SERPLBLD CKD-EPI 2021: 36 ML/MIN/1.73M*2
EOSINOPHIL # BLD AUTO: 0.14 X10*3/UL (ref 0–0.4)
EOSINOPHIL NFR BLD AUTO: 1.9 %
ERYTHROCYTE [DISTWIDTH] IN BLOOD BY AUTOMATED COUNT: 18 % (ref 11.5–14.5)
GLUCOSE SERPL-MCNC: 166 MG/DL (ref 74–99)
HCT VFR BLD AUTO: 22.8 % (ref 36–46)
HGB BLD-MCNC: 7.3 G/DL (ref 12–16)
IMM GRANULOCYTES # BLD AUTO: 0.02 X10*3/UL (ref 0–0.5)
IMM GRANULOCYTES NFR BLD AUTO: 0.3 % (ref 0–0.9)
LYMPHOCYTES # BLD AUTO: 0.39 X10*3/UL (ref 0.8–3)
LYMPHOCYTES NFR BLD AUTO: 5.4 %
MCH RBC QN AUTO: 29.3 PG (ref 26–34)
MCHC RBC AUTO-ENTMCNC: 32 G/DL (ref 32–36)
MCV RBC AUTO: 92 FL (ref 80–100)
MONOCYTES # BLD AUTO: 0.48 X10*3/UL (ref 0.05–0.8)
MONOCYTES NFR BLD AUTO: 6.6 %
NEUTROPHILS # BLD AUTO: 6.17 X10*3/UL (ref 1.6–5.5)
NEUTROPHILS NFR BLD AUTO: 85.5 %
NRBC BLD-RTO: 0 /100 WBCS (ref 0–0)
PLATELET # BLD AUTO: 400 X10*3/UL (ref 150–450)
POTASSIUM SERPL-SCNC: 4.6 MMOL/L (ref 3.5–5.3)
PROT SERPL-MCNC: 5.9 G/DL (ref 6.4–8.2)
RBC # BLD AUTO: 2.49 X10*6/UL (ref 4–5.2)
SODIUM SERPL-SCNC: 134 MMOL/L (ref 136–145)
WBC # BLD AUTO: 7.2 X10*3/UL (ref 4.4–11.3)

## 2024-08-08 PROCEDURE — 96401 CHEMO ANTI-NEOPL SQ/IM: CPT

## 2024-08-08 PROCEDURE — 2500000004 HC RX 250 GENERAL PHARMACY W/ HCPCS (ALT 636 FOR OP/ED): Mod: JZ | Performed by: INTERNAL MEDICINE

## 2024-08-08 PROCEDURE — 36415 COLL VENOUS BLD VENIPUNCTURE: CPT

## 2024-08-08 PROCEDURE — 85025 COMPLETE CBC W/AUTO DIFF WBC: CPT | Performed by: INTERNAL MEDICINE

## 2024-08-08 PROCEDURE — 80053 COMPREHEN METABOLIC PANEL: CPT | Performed by: INTERNAL MEDICINE

## 2024-08-08 RX ORDER — BORTEZOMIB 3.5 MG/1
1.3 INJECTION, POWDER, LYOPHILIZED, FOR SOLUTION INTRAVENOUS; SUBCUTANEOUS ONCE
Status: COMPLETED | OUTPATIENT
Start: 2024-08-08 | End: 2024-08-08

## 2024-08-08 ASSESSMENT — PAIN SCALES - GENERAL: PAINLEVEL: 0-NO PAIN

## 2024-08-08 NOTE — SIGNIFICANT EVENT
08/08/24 0921   Prechemo Checklist   Has the patient been in the hospital, ED, or urgent care since last date of service No   Chemo/Immuno Consent Completed and Signed Yes  (7/5/2024)   Protocol/Indications Verified Yes   Confirmed to previous date/time of medication Yes   Compared to previous dose Yes   All medications are dated accurately Yes   Pregnancy Test Negative Not applicable   Parameters Met Yes   Provider Notified Yes   Is Patient Proceeding With Treatment? Yes   BSA/Weight-Height Verified Yes   Dose Calculations Verified (current, total, cumulative) Yes

## 2024-08-09 ENCOUNTER — OFFICE VISIT (OUTPATIENT)
Dept: PRIMARY CARE | Facility: CLINIC | Age: 89
End: 2024-08-09
Payer: MEDICARE

## 2024-08-09 VITALS — WEIGHT: 135 LBS | SYSTOLIC BLOOD PRESSURE: 160 MMHG | BODY MASS INDEX: 27.29 KG/M2 | DIASTOLIC BLOOD PRESSURE: 50 MMHG

## 2024-08-09 DIAGNOSIS — H91.92 HEARING LOSS OF LEFT EAR, UNSPECIFIED HEARING LOSS TYPE: ICD-10-CM

## 2024-08-09 DIAGNOSIS — N18.2 BENIGN HYPERTENSION WITH CKD (CHRONIC KIDNEY DISEASE), STAGE II: ICD-10-CM

## 2024-08-09 DIAGNOSIS — I12.9 BENIGN HYPERTENSION WITH CKD (CHRONIC KIDNEY DISEASE), STAGE II: ICD-10-CM

## 2024-08-09 DIAGNOSIS — I10 ESSENTIAL HYPERTENSION: Primary | ICD-10-CM

## 2024-08-09 PROCEDURE — 1111F DSCHRG MED/CURRENT MED MERGE: CPT | Performed by: INTERNAL MEDICINE

## 2024-08-09 PROCEDURE — 1159F MED LIST DOCD IN RCRD: CPT | Performed by: INTERNAL MEDICINE

## 2024-08-09 PROCEDURE — G2211 COMPLEX E/M VISIT ADD ON: HCPCS | Performed by: INTERNAL MEDICINE

## 2024-08-09 PROCEDURE — 3078F DIAST BP <80 MM HG: CPT | Performed by: INTERNAL MEDICINE

## 2024-08-09 PROCEDURE — 1160F RVW MEDS BY RX/DR IN RCRD: CPT | Performed by: INTERNAL MEDICINE

## 2024-08-09 PROCEDURE — 1123F ACP DISCUSS/DSCN MKR DOCD: CPT | Performed by: INTERNAL MEDICINE

## 2024-08-09 PROCEDURE — 1036F TOBACCO NON-USER: CPT | Performed by: INTERNAL MEDICINE

## 2024-08-09 PROCEDURE — 3077F SYST BP >= 140 MM HG: CPT | Performed by: INTERNAL MEDICINE

## 2024-08-09 PROCEDURE — 99214 OFFICE O/P EST MOD 30 MIN: CPT | Performed by: INTERNAL MEDICINE

## 2024-08-09 RX ORDER — AMLODIPINE BESYLATE 2.5 MG/1
2.5 TABLET ORAL EVERY MORNING
Qty: 90 TABLET | Refills: 1 | Status: SHIPPED | OUTPATIENT
Start: 2024-08-09 | End: 2025-02-05

## 2024-08-09 NOTE — PROGRESS NOTES
Chief complaint   Can't hear    HPI  Pt here with daughter, no new issues. Recent Covid bout note as mild and now with hearing loss. Recently went through rehab.,       ROS  Gen-no wt loss,wt gain,fevers,apnea at night,snoring  HEENT- no ear pain, no oral pain,  no sinus pain or drainage  Pulm- no sob,weezing,cough,hemoptysis,productive mucous  Cardio-no cp,dash,orthopnea,palpitations  Gi- no diarrhea,constipation,n,v,hematemesis,dysphagia,change in bowel habits  Endo- no polydipsia,polyuria,wt gain,extreme fatigue,polyphagia,blurred vision  Neuro-no ha,loc,paresthesias,transient limb weakness,acute memory loss,visual changes  Heme- no blood loss,transfusions,unusual bleeding from skin and/or mucosa  Urology- no dysuria,hematuria,urinary frequency,bladder pain  Gyn- no pelvic pain,vaginal dc,vaginal bleeding,  Psych- no depression,mood swings,anxiety,insomnia,hallucinations  Skin- no rash,new discolorations  Musculoskeletal- no joint aches / pains,muscle pains,new back pain  Vascular- no pain in calve w walkin,discolored painful toes      Vitals:    08/09/24 1146   BP: 160/50       Physical Exam  NAD  HEENT- nl ear tm bilat,nl pharynx, mucosa moist,no icterus  Lungs clear, good AE, no wheezing  Heart-normal S1-S2 no murmur gallop rub, rhythm normal  Neuro alert and oriented ×3, cranial nerves II through XII normal, motor function symmetrical,sensory exam symmetrical ,gait nl  Extrem- no edema, no tenderness, no cords    Assessment/Plan   Diagnoses and all orders for this visit:  Essential hypertension- controlled , no change  Benign hypertension with CKD (chronic kidney disease), stage II- controlled  Hearing loss of left ear, unspecified hearing loss type-no findings on exam , refer to ent if sx persist.    Return in 2 months  Follow up from rehab and med refill

## 2024-08-14 ENCOUNTER — TELEPHONE (OUTPATIENT)
Dept: HEMATOLOGY/ONCOLOGY | Facility: CLINIC | Age: 89
End: 2024-08-14
Payer: MEDICARE

## 2024-08-14 NOTE — TELEPHONE ENCOUNTER
Patient asking if she can skip her benadryl po before treatment tomorrow.  This is ok per Dr. Gray and I left her a message letting her know.  Message sent to charge RN , Pharmacy and treatment RN for tomorrow so they are aware.

## 2024-08-15 ENCOUNTER — INFUSION (OUTPATIENT)
Dept: HEMATOLOGY/ONCOLOGY | Facility: CLINIC | Age: 89
End: 2024-08-15
Payer: MEDICARE

## 2024-08-15 ENCOUNTER — LAB (OUTPATIENT)
Dept: LAB | Facility: CLINIC | Age: 89
End: 2024-08-15
Payer: MEDICARE

## 2024-08-15 VITALS
HEART RATE: 70 BPM | BODY MASS INDEX: 26 KG/M2 | HEIGHT: 59 IN | TEMPERATURE: 97.3 F | SYSTOLIC BLOOD PRESSURE: 131 MMHG | WEIGHT: 128.97 LBS | OXYGEN SATURATION: 95 % | RESPIRATION RATE: 16 BRPM | DIASTOLIC BLOOD PRESSURE: 55 MMHG

## 2024-08-15 DIAGNOSIS — L02.415 CELLULITIS AND ABSCESS OF RIGHT LOWER EXTREMITY: ICD-10-CM

## 2024-08-15 DIAGNOSIS — M86.9 OSTEOMYELITIS, UNSPECIFIED SITE, UNSPECIFIED TYPE (MULTI): ICD-10-CM

## 2024-08-15 DIAGNOSIS — L03.115 CELLULITIS AND ABSCESS OF RIGHT LOWER EXTREMITY: ICD-10-CM

## 2024-08-15 DIAGNOSIS — C90.00 MULTIPLE MYELOMA, REMISSION STATUS UNSPECIFIED (MULTI): ICD-10-CM

## 2024-08-15 LAB
ANION GAP SERPL CALC-SCNC: 15 MMOL/L (ref 10–20)
BASOPHILS # BLD AUTO: 0.02 X10*3/UL (ref 0–0.1)
BASOPHILS NFR BLD AUTO: 0.3 %
BUN SERPL-MCNC: 32 MG/DL (ref 6–23)
CALCIUM SERPL-MCNC: 10.1 MG/DL (ref 8.6–10.3)
CHLORIDE SERPL-SCNC: 100 MMOL/L (ref 98–107)
CO2 SERPL-SCNC: 24 MMOL/L (ref 21–32)
CREAT SERPL-MCNC: 1.6 MG/DL (ref 0.5–1.05)
EGFRCR SERPLBLD CKD-EPI 2021: 30 ML/MIN/1.73M*2
EOSINOPHIL # BLD AUTO: 0.26 X10*3/UL (ref 0–0.4)
EOSINOPHIL NFR BLD AUTO: 3.5 %
ERYTHROCYTE [DISTWIDTH] IN BLOOD BY AUTOMATED COUNT: 17.8 % (ref 11.5–14.5)
GLUCOSE SERPL-MCNC: 145 MG/DL (ref 74–99)
HCT VFR BLD AUTO: 26 % (ref 36–46)
HGB BLD-MCNC: 8.3 G/DL (ref 12–16)
IMM GRANULOCYTES # BLD AUTO: 0.05 X10*3/UL (ref 0–0.5)
IMM GRANULOCYTES NFR BLD AUTO: 0.7 % (ref 0–0.9)
LYMPHOCYTES # BLD AUTO: 0.71 X10*3/UL (ref 0.8–3)
LYMPHOCYTES NFR BLD AUTO: 9.5 %
MCH RBC QN AUTO: 29.1 PG (ref 26–34)
MCHC RBC AUTO-ENTMCNC: 31.9 G/DL (ref 32–36)
MCV RBC AUTO: 91 FL (ref 80–100)
MONOCYTES # BLD AUTO: 0.5 X10*3/UL (ref 0.05–0.8)
MONOCYTES NFR BLD AUTO: 6.7 %
NEUTROPHILS # BLD AUTO: 5.93 X10*3/UL (ref 1.6–5.5)
NEUTROPHILS NFR BLD AUTO: 79.3 %
NRBC BLD-RTO: 0 /100 WBCS (ref 0–0)
PLATELET # BLD AUTO: 369 X10*3/UL (ref 150–450)
POTASSIUM SERPL-SCNC: 4.5 MMOL/L (ref 3.5–5.3)
RBC # BLD AUTO: 2.85 X10*6/UL (ref 4–5.2)
SODIUM SERPL-SCNC: 134 MMOL/L (ref 136–145)
WBC # BLD AUTO: 7.7 X10*3/UL (ref 4.4–11.3)

## 2024-08-15 PROCEDURE — 80048 BASIC METABOLIC PNL TOTAL CA: CPT | Performed by: INTERNAL MEDICINE

## 2024-08-15 PROCEDURE — 2500000004 HC RX 250 GENERAL PHARMACY W/ HCPCS (ALT 636 FOR OP/ED): Mod: JZ | Performed by: INTERNAL MEDICINE

## 2024-08-15 PROCEDURE — 85027 COMPLETE CBC AUTOMATED: CPT | Performed by: INTERNAL MEDICINE

## 2024-08-15 PROCEDURE — 36415 COLL VENOUS BLD VENIPUNCTURE: CPT

## 2024-08-15 PROCEDURE — 96401 CHEMO ANTI-NEOPL SQ/IM: CPT

## 2024-08-15 RX ORDER — EPINEPHRINE 0.3 MG/.3ML
0.3 INJECTION SUBCUTANEOUS EVERY 5 MIN PRN
Status: DISCONTINUED | OUTPATIENT
Start: 2024-08-15 | End: 2024-08-15 | Stop reason: HOSPADM

## 2024-08-15 RX ORDER — ALBUTEROL SULFATE 0.83 MG/ML
3 SOLUTION RESPIRATORY (INHALATION) AS NEEDED
Status: DISCONTINUED | OUTPATIENT
Start: 2024-08-15 | End: 2024-08-15 | Stop reason: HOSPADM

## 2024-08-15 RX ORDER — DIPHENHYDRAMINE HYDROCHLORIDE 50 MG/ML
50 INJECTION INTRAMUSCULAR; INTRAVENOUS AS NEEDED
Status: DISCONTINUED | OUTPATIENT
Start: 2024-08-15 | End: 2024-08-15 | Stop reason: HOSPADM

## 2024-08-15 RX ORDER — FAMOTIDINE 10 MG/ML
20 INJECTION INTRAVENOUS ONCE AS NEEDED
Status: DISCONTINUED | OUTPATIENT
Start: 2024-08-15 | End: 2024-08-15 | Stop reason: HOSPADM

## 2024-08-15 RX ORDER — BORTEZOMIB 3.5 MG/1
1.3 INJECTION, POWDER, LYOPHILIZED, FOR SOLUTION INTRAVENOUS; SUBCUTANEOUS ONCE
Status: COMPLETED | OUTPATIENT
Start: 2024-08-15 | End: 2024-08-15

## 2024-08-15 ASSESSMENT — PAIN SCALES - GENERAL: PAINLEVEL: 0-NO PAIN

## 2024-08-15 NOTE — SIGNIFICANT EVENT
08/15/24 1024   Prechemo Checklist   Has the patient been in the hospital, ED, or urgent care since last date of service No   Chemo/Immuno Consent Completed and Signed Yes   Protocol/Indications Verified Yes   Confirmed to previous date/time of medication Yes   Compared to previous dose Yes   All medications are dated accurately Yes   Parameters Met Yes   Provider Notified Yes   Is Patient Proceeding With Treatment? Yes   BSA/Weight-Height Verified Yes   Dose Calculations Verified (current, total, cumulative) Yes

## 2024-08-16 ENCOUNTER — APPOINTMENT (OUTPATIENT)
Dept: PRIMARY CARE | Facility: CLINIC | Age: 89
End: 2024-08-16
Payer: MEDICARE

## 2024-08-21 ENCOUNTER — PATIENT OUTREACH (OUTPATIENT)
Dept: CARE COORDINATION | Facility: CLINIC | Age: 89
End: 2024-08-21
Payer: MEDICARE

## 2024-08-21 RX ORDER — BORTEZOMIB 3.5 MG/1
1.3 INJECTION, POWDER, LYOPHILIZED, FOR SOLUTION INTRAVENOUS; SUBCUTANEOUS ONCE
OUTPATIENT
Start: 2024-08-29

## 2024-08-21 RX ORDER — FAMOTIDINE 10 MG/ML
20 INJECTION INTRAVENOUS ONCE AS NEEDED
OUTPATIENT
Start: 2024-09-05

## 2024-08-21 RX ORDER — MONTELUKAST SODIUM 10 MG/1
10 TABLET ORAL ONCE
OUTPATIENT
Start: 2024-09-12

## 2024-08-21 RX ORDER — EPINEPHRINE 0.3 MG/.3ML
0.3 INJECTION SUBCUTANEOUS EVERY 5 MIN PRN
Status: CANCELLED | OUTPATIENT
Start: 2024-08-22

## 2024-08-21 RX ORDER — DIPHENHYDRAMINE HCL 50 MG
50 CAPSULE ORAL ONCE
Status: CANCELLED | OUTPATIENT
Start: 2024-08-22

## 2024-08-21 RX ORDER — ACETAMINOPHEN 325 MG/1
650 TABLET ORAL ONCE
OUTPATIENT
Start: 2024-09-12

## 2024-08-21 RX ORDER — ALBUTEROL SULFATE 0.83 MG/ML
3 SOLUTION RESPIRATORY (INHALATION) AS NEEDED
OUTPATIENT
Start: 2024-08-29

## 2024-08-21 RX ORDER — BORTEZOMIB 3.5 MG/1
1.3 INJECTION, POWDER, LYOPHILIZED, FOR SOLUTION INTRAVENOUS; SUBCUTANEOUS ONCE
OUTPATIENT
Start: 2024-09-12

## 2024-08-21 RX ORDER — DIPHENHYDRAMINE HCL 50 MG
50 CAPSULE ORAL ONCE
OUTPATIENT
Start: 2024-08-29

## 2024-08-21 RX ORDER — ACETAMINOPHEN 325 MG/1
650 TABLET ORAL ONCE
OUTPATIENT
Start: 2024-08-29

## 2024-08-21 RX ORDER — BORTEZOMIB 3.5 MG/1
1.3 INJECTION, POWDER, LYOPHILIZED, FOR SOLUTION INTRAVENOUS; SUBCUTANEOUS ONCE
OUTPATIENT
Start: 2024-09-05

## 2024-08-21 RX ORDER — DIPHENHYDRAMINE HYDROCHLORIDE 50 MG/ML
50 INJECTION INTRAMUSCULAR; INTRAVENOUS AS NEEDED
Status: CANCELLED | OUTPATIENT
Start: 2024-08-22

## 2024-08-21 RX ORDER — EPINEPHRINE 0.3 MG/.3ML
0.3 INJECTION SUBCUTANEOUS EVERY 5 MIN PRN
OUTPATIENT
Start: 2024-09-05

## 2024-08-21 RX ORDER — DIPHENHYDRAMINE HYDROCHLORIDE 50 MG/ML
50 INJECTION INTRAMUSCULAR; INTRAVENOUS AS NEEDED
OUTPATIENT
Start: 2024-09-12

## 2024-08-21 RX ORDER — ALBUTEROL SULFATE 0.83 MG/ML
3 SOLUTION RESPIRATORY (INHALATION) AS NEEDED
OUTPATIENT
Start: 2024-09-12

## 2024-08-21 RX ORDER — DIPHENHYDRAMINE HCL 50 MG
50 CAPSULE ORAL ONCE
OUTPATIENT
Start: 2024-09-05

## 2024-08-21 RX ORDER — ALBUTEROL SULFATE 0.83 MG/ML
3 SOLUTION RESPIRATORY (INHALATION) AS NEEDED
Status: CANCELLED | OUTPATIENT
Start: 2024-08-22

## 2024-08-21 RX ORDER — ACETAMINOPHEN 325 MG/1
650 TABLET ORAL ONCE
OUTPATIENT
Start: 2024-09-05

## 2024-08-21 RX ORDER — FAMOTIDINE 10 MG/ML
20 INJECTION INTRAVENOUS ONCE AS NEEDED
Status: CANCELLED | OUTPATIENT
Start: 2024-08-22

## 2024-08-21 RX ORDER — FAMOTIDINE 10 MG/ML
20 INJECTION INTRAVENOUS ONCE AS NEEDED
OUTPATIENT
Start: 2024-08-29

## 2024-08-21 RX ORDER — MONTELUKAST SODIUM 10 MG/1
10 TABLET ORAL ONCE
Status: CANCELLED | OUTPATIENT
Start: 2024-08-22

## 2024-08-21 RX ORDER — FAMOTIDINE 10 MG/ML
20 INJECTION INTRAVENOUS ONCE AS NEEDED
OUTPATIENT
Start: 2024-09-12

## 2024-08-21 RX ORDER — MONTELUKAST SODIUM 10 MG/1
10 TABLET ORAL ONCE
OUTPATIENT
Start: 2024-09-05

## 2024-08-21 RX ORDER — MONTELUKAST SODIUM 10 MG/1
10 TABLET ORAL ONCE
OUTPATIENT
Start: 2024-08-29

## 2024-08-21 RX ORDER — DIPHENHYDRAMINE HYDROCHLORIDE 50 MG/ML
50 INJECTION INTRAMUSCULAR; INTRAVENOUS AS NEEDED
OUTPATIENT
Start: 2024-09-05

## 2024-08-21 RX ORDER — EPINEPHRINE 0.3 MG/.3ML
0.3 INJECTION SUBCUTANEOUS EVERY 5 MIN PRN
OUTPATIENT
Start: 2024-08-29

## 2024-08-21 RX ORDER — DIPHENHYDRAMINE HYDROCHLORIDE 50 MG/ML
50 INJECTION INTRAMUSCULAR; INTRAVENOUS AS NEEDED
OUTPATIENT
Start: 2024-08-29

## 2024-08-21 RX ORDER — ALBUTEROL SULFATE 0.83 MG/ML
3 SOLUTION RESPIRATORY (INHALATION) AS NEEDED
OUTPATIENT
Start: 2024-09-05

## 2024-08-21 RX ORDER — DIPHENHYDRAMINE HCL 50 MG
50 CAPSULE ORAL ONCE
OUTPATIENT
Start: 2024-09-12

## 2024-08-21 RX ORDER — EPINEPHRINE 0.3 MG/.3ML
0.3 INJECTION SUBCUTANEOUS EVERY 5 MIN PRN
OUTPATIENT
Start: 2024-09-12

## 2024-08-21 RX ORDER — ACETAMINOPHEN 325 MG/1
650 TABLET ORAL ONCE
Status: CANCELLED | OUTPATIENT
Start: 2024-08-22

## 2024-08-21 NOTE — PROGRESS NOTES
Unable to reach patient for call back after patient's follow up appointment with PCP.   ROMANAM with call back number for patient to call if needed   If no voicemail available call attempts x 2 were made to contact the patient to assist with any questions or concerns patient may have.

## 2024-08-22 ENCOUNTER — LAB (OUTPATIENT)
Dept: LAB | Facility: CLINIC | Age: 89
End: 2024-08-22
Payer: MEDICARE

## 2024-08-22 ENCOUNTER — INFUSION (OUTPATIENT)
Dept: HEMATOLOGY/ONCOLOGY | Facility: CLINIC | Age: 89
End: 2024-08-22
Payer: MEDICARE

## 2024-08-22 VITALS
HEIGHT: 59 IN | TEMPERATURE: 97 F | WEIGHT: 132.72 LBS | BODY MASS INDEX: 26.76 KG/M2 | RESPIRATION RATE: 20 BRPM | SYSTOLIC BLOOD PRESSURE: 178 MMHG | HEART RATE: 77 BPM | OXYGEN SATURATION: 97 % | DIASTOLIC BLOOD PRESSURE: 60 MMHG

## 2024-08-22 DIAGNOSIS — E83.52 HYPERCALCEMIA: ICD-10-CM

## 2024-08-22 DIAGNOSIS — C90.00 MULTIPLE MYELOMA NOT HAVING ACHIEVED REMISSION (MULTI): ICD-10-CM

## 2024-08-22 DIAGNOSIS — C90.00 MULTIPLE MYELOMA, REMISSION STATUS UNSPECIFIED (MULTI): Primary | ICD-10-CM

## 2024-08-22 DIAGNOSIS — C90.00 MULTIPLE MYELOMA, REMISSION STATUS UNSPECIFIED (MULTI): ICD-10-CM

## 2024-08-22 LAB
ALBUMIN SERPL BCP-MCNC: 3.5 G/DL (ref 3.4–5)
ALP SERPL-CCNC: 78 U/L (ref 33–136)
ALT SERPL W P-5'-P-CCNC: 14 U/L (ref 7–45)
ANION GAP SERPL CALC-SCNC: 15 MMOL/L (ref 10–20)
AST SERPL W P-5'-P-CCNC: 17 U/L (ref 9–39)
B2 MICROGLOB SERPL-MCNC: 15.7 MG/L (ref 0.7–2.2)
BASOPHILS # BLD AUTO: 0.03 X10*3/UL (ref 0–0.1)
BASOPHILS NFR BLD AUTO: 0.3 %
BILIRUB SERPL-MCNC: 0.4 MG/DL (ref 0–1.2)
BUN SERPL-MCNC: 40 MG/DL (ref 6–23)
CALCIUM SERPL-MCNC: 9.9 MG/DL (ref 8.6–10.3)
CHLORIDE SERPL-SCNC: 100 MMOL/L (ref 98–107)
CO2 SERPL-SCNC: 24 MMOL/L (ref 21–32)
CREAT SERPL-MCNC: 1.55 MG/DL (ref 0.5–1.05)
EGFRCR SERPLBLD CKD-EPI 2021: 31 ML/MIN/1.73M*2
EOSINOPHIL # BLD AUTO: 0.43 X10*3/UL (ref 0–0.4)
EOSINOPHIL NFR BLD AUTO: 4.2 %
ERYTHROCYTE [DISTWIDTH] IN BLOOD BY AUTOMATED COUNT: 17.9 % (ref 11.5–14.5)
GLUCOSE SERPL-MCNC: 100 MG/DL (ref 74–99)
HCT VFR BLD AUTO: 27.1 % (ref 36–46)
HGB BLD-MCNC: 8.7 G/DL (ref 12–16)
IGA SERPL-MCNC: 670 MG/DL (ref 70–400)
IGG SERPL-MCNC: 584 MG/DL (ref 700–1600)
IGM SERPL-MCNC: 13 MG/DL (ref 40–230)
IMM GRANULOCYTES # BLD AUTO: 0.03 X10*3/UL (ref 0–0.5)
IMM GRANULOCYTES NFR BLD AUTO: 0.3 % (ref 0–0.9)
LYMPHOCYTES # BLD AUTO: 1.04 X10*3/UL (ref 0.8–3)
LYMPHOCYTES NFR BLD AUTO: 10.1 %
MCH RBC QN AUTO: 29.2 PG (ref 26–34)
MCHC RBC AUTO-ENTMCNC: 32.1 G/DL (ref 32–36)
MCV RBC AUTO: 91 FL (ref 80–100)
MONOCYTES # BLD AUTO: 0.73 X10*3/UL (ref 0.05–0.8)
MONOCYTES NFR BLD AUTO: 7.1 %
NEUTROPHILS # BLD AUTO: 8.07 X10*3/UL (ref 1.6–5.5)
NEUTROPHILS NFR BLD AUTO: 78 %
NRBC BLD-RTO: 0 /100 WBCS (ref 0–0)
PLATELET # BLD AUTO: 317 X10*3/UL (ref 150–450)
POTASSIUM SERPL-SCNC: 4.3 MMOL/L (ref 3.5–5.3)
PROT SERPL-MCNC: 6.4 G/DL (ref 6.4–8.2)
PROT SERPL-MCNC: 6.6 G/DL (ref 6.4–8.2)
RBC # BLD AUTO: 2.98 X10*6/UL (ref 4–5.2)
SODIUM SERPL-SCNC: 135 MMOL/L (ref 136–145)
WBC # BLD AUTO: 10.3 X10*3/UL (ref 4.4–11.3)

## 2024-08-22 PROCEDURE — 83521 IG LIGHT CHAINS FREE EACH: CPT | Mod: PARLAB | Performed by: INTERNAL MEDICINE

## 2024-08-22 PROCEDURE — 85025 COMPLETE CBC W/AUTO DIFF WBC: CPT | Performed by: INTERNAL MEDICINE

## 2024-08-22 PROCEDURE — 82784 ASSAY IGA/IGD/IGG/IGM EACH: CPT | Mod: PARLAB | Performed by: INTERNAL MEDICINE

## 2024-08-22 PROCEDURE — 96401 CHEMO ANTI-NEOPL SQ/IM: CPT

## 2024-08-22 PROCEDURE — 36415 COLL VENOUS BLD VENIPUNCTURE: CPT

## 2024-08-22 PROCEDURE — 82232 ASSAY OF BETA-2 PROTEIN: CPT | Mod: PARLAB | Performed by: INTERNAL MEDICINE

## 2024-08-22 PROCEDURE — 84075 ASSAY ALKALINE PHOSPHATASE: CPT | Performed by: INTERNAL MEDICINE

## 2024-08-22 PROCEDURE — 96372 THER/PROPH/DIAG INJ SC/IM: CPT

## 2024-08-22 PROCEDURE — 2500000004 HC RX 250 GENERAL PHARMACY W/ HCPCS (ALT 636 FOR OP/ED): Performed by: INTERNAL MEDICINE

## 2024-08-22 PROCEDURE — 86334 IMMUNOFIX E-PHORESIS SERUM: CPT | Mod: PARLAB | Performed by: INTERNAL MEDICINE

## 2024-08-22 PROCEDURE — 84155 ASSAY OF PROTEIN SERUM: CPT | Mod: PARLAB | Performed by: INTERNAL MEDICINE

## 2024-08-22 RX ORDER — DIPHENHYDRAMINE HYDROCHLORIDE 50 MG/ML
50 INJECTION INTRAMUSCULAR; INTRAVENOUS AS NEEDED
OUTPATIENT
Start: 2024-08-23

## 2024-08-22 RX ORDER — FAMOTIDINE 10 MG/ML
20 INJECTION INTRAVENOUS ONCE AS NEEDED
OUTPATIENT
Start: 2024-08-23

## 2024-08-22 RX ORDER — ALBUTEROL SULFATE 0.83 MG/ML
3 SOLUTION RESPIRATORY (INHALATION) AS NEEDED
OUTPATIENT
Start: 2024-08-23

## 2024-08-22 RX ORDER — BORTEZOMIB 3.5 MG/1
1.3 INJECTION, POWDER, LYOPHILIZED, FOR SOLUTION INTRAVENOUS; SUBCUTANEOUS ONCE
Status: COMPLETED | OUTPATIENT
Start: 2024-08-22 | End: 2024-08-22

## 2024-08-22 RX ORDER — EPINEPHRINE 0.3 MG/.3ML
0.3 INJECTION SUBCUTANEOUS EVERY 5 MIN PRN
OUTPATIENT
Start: 2024-08-23

## 2024-08-22 ASSESSMENT — PAIN SCALES - GENERAL: PAINLEVEL: 0-NO PAIN

## 2024-08-22 NOTE — SIGNIFICANT EVENT
08/22/24 0957   Prechemo Checklist   Has the patient been in the hospital, ED, or urgent care since last date of service No   Chemo/Immuno Consent Completed and Signed Yes   Protocol/Indications Verified Yes   Confirmed to previous date/time of medication Yes   Compared to previous dose Yes   All medications are dated accurately Yes   Pregnancy Test Negative Not applicable   Parameters Met Yes   Provider Notified Yes   Is Patient Proceeding With Treatment? Yes   BSA/Weight-Height Verified Yes   Dose Calculations Verified (current, total, cumulative) Yes

## 2024-08-25 DIAGNOSIS — M48.061 SPINAL STENOSIS OF LUMBAR REGION, UNSPECIFIED WHETHER NEUROGENIC CLAUDICATION PRESENT: ICD-10-CM

## 2024-08-25 LAB
KAPPA LC SERPL-MCNC: 10.06 MG/DL (ref 0.33–1.94)
KAPPA LC/LAMBDA SER: 8.6 {RATIO} (ref 0.26–1.65)
LAMBDA LC SERPL-MCNC: 1.17 MG/DL (ref 0.57–2.63)

## 2024-08-26 LAB
ALBUMIN: 3.4 G/DL (ref 3.4–5)
ALPHA 1 GLOBULIN: 0.5 G/DL (ref 0.2–0.6)
ALPHA 2 GLOBULIN: 1 G/DL (ref 0.4–1.1)
BETA GLOBULIN: 1.3 G/DL (ref 0.5–1.2)
GAMMA GLOBULIN: 0.5 G/DL (ref 0.5–1.4)
IMMUNOFIXATION COMMENT: ABNORMAL
M-PROTEIN 1: 0.4 G/DL
M-PROTEIN 2: 0.1 G/DL
PATH REVIEW - SERUM IMMUNOFIXATION: ABNORMAL
PATH REVIEW-SERUM PROTEIN ELECTROPHORESIS: ABNORMAL
PROTEIN ELECTROPHORESIS COMMENT: ABNORMAL

## 2024-08-26 RX ORDER — GABAPENTIN 100 MG/1
CAPSULE ORAL
Qty: 270 CAPSULE | Refills: 0 | Status: SHIPPED | OUTPATIENT
Start: 2024-08-26

## 2024-08-29 ENCOUNTER — INFUSION (OUTPATIENT)
Dept: HEMATOLOGY/ONCOLOGY | Facility: CLINIC | Age: 89
End: 2024-08-29
Payer: MEDICARE

## 2024-08-29 ENCOUNTER — LAB (OUTPATIENT)
Dept: LAB | Facility: CLINIC | Age: 89
End: 2024-08-29
Payer: MEDICARE

## 2024-08-29 ENCOUNTER — OFFICE VISIT (OUTPATIENT)
Dept: HEMATOLOGY/ONCOLOGY | Facility: CLINIC | Age: 89
End: 2024-08-29
Payer: MEDICARE

## 2024-08-29 VITALS
OXYGEN SATURATION: 99 % | TEMPERATURE: 97.9 F | RESPIRATION RATE: 20 BRPM | DIASTOLIC BLOOD PRESSURE: 75 MMHG | SYSTOLIC BLOOD PRESSURE: 176 MMHG | HEART RATE: 72 BPM

## 2024-08-29 VITALS
TEMPERATURE: 97.7 F | OXYGEN SATURATION: 99 % | SYSTOLIC BLOOD PRESSURE: 174 MMHG | HEIGHT: 59 IN | RESPIRATION RATE: 20 BRPM | HEART RATE: 74 BPM | DIASTOLIC BLOOD PRESSURE: 73 MMHG | WEIGHT: 130.07 LBS | BODY MASS INDEX: 26.22 KG/M2

## 2024-08-29 DIAGNOSIS — C90.00 MULTIPLE MYELOMA, REMISSION STATUS UNSPECIFIED (MULTI): ICD-10-CM

## 2024-08-29 DIAGNOSIS — C90.00 MULTIPLE MYELOMA NOT HAVING ACHIEVED REMISSION (MULTI): ICD-10-CM

## 2024-08-29 LAB
ALBUMIN SERPL BCP-MCNC: 3.5 G/DL (ref 3.4–5)
ALP SERPL-CCNC: 78 U/L (ref 33–136)
ALT SERPL W P-5'-P-CCNC: 15 U/L (ref 7–45)
ANION GAP SERPL CALC-SCNC: 13 MMOL/L (ref 10–20)
AST SERPL W P-5'-P-CCNC: 16 U/L (ref 9–39)
BASOPHILS # BLD AUTO: 0.02 X10*3/UL (ref 0–0.1)
BASOPHILS NFR BLD AUTO: 0.2 %
BILIRUB SERPL-MCNC: 0.4 MG/DL (ref 0–1.2)
BUN SERPL-MCNC: 42 MG/DL (ref 6–23)
CALCIUM SERPL-MCNC: 9.8 MG/DL (ref 8.6–10.3)
CHLORIDE SERPL-SCNC: 101 MMOL/L (ref 98–107)
CO2 SERPL-SCNC: 25 MMOL/L (ref 21–32)
CREAT SERPL-MCNC: 1.42 MG/DL (ref 0.5–1.05)
EGFRCR SERPLBLD CKD-EPI 2021: 35 ML/MIN/1.73M*2
EOSINOPHIL # BLD AUTO: 0.36 X10*3/UL (ref 0–0.4)
EOSINOPHIL NFR BLD AUTO: 3.2 %
ERYTHROCYTE [DISTWIDTH] IN BLOOD BY AUTOMATED COUNT: 17.8 % (ref 11.5–14.5)
GLUCOSE SERPL-MCNC: 107 MG/DL (ref 74–99)
HCT VFR BLD AUTO: 27.7 % (ref 36–46)
HGB BLD-MCNC: 8.9 G/DL (ref 12–16)
IMM GRANULOCYTES # BLD AUTO: 0.05 X10*3/UL (ref 0–0.5)
IMM GRANULOCYTES NFR BLD AUTO: 0.4 % (ref 0–0.9)
LYMPHOCYTES # BLD AUTO: 1.12 X10*3/UL (ref 0.8–3)
LYMPHOCYTES NFR BLD AUTO: 10 %
MCH RBC QN AUTO: 29.2 PG (ref 26–34)
MCHC RBC AUTO-ENTMCNC: 32.1 G/DL (ref 32–36)
MCV RBC AUTO: 91 FL (ref 80–100)
MONOCYTES # BLD AUTO: 0.6 X10*3/UL (ref 0.05–0.8)
MONOCYTES NFR BLD AUTO: 5.3 %
NEUTROPHILS # BLD AUTO: 9.07 X10*3/UL (ref 1.6–5.5)
NEUTROPHILS NFR BLD AUTO: 80.9 %
NRBC BLD-RTO: 0 /100 WBCS (ref 0–0)
PLATELET # BLD AUTO: 281 X10*3/UL (ref 150–450)
POTASSIUM SERPL-SCNC: 4.3 MMOL/L (ref 3.5–5.3)
PROT SERPL-MCNC: 6.4 G/DL (ref 6.4–8.2)
RBC # BLD AUTO: 3.05 X10*6/UL (ref 4–5.2)
SODIUM SERPL-SCNC: 135 MMOL/L (ref 136–145)
WBC # BLD AUTO: 11.2 X10*3/UL (ref 4.4–11.3)

## 2024-08-29 PROCEDURE — 3077F SYST BP >= 140 MM HG: CPT | Performed by: INTERNAL MEDICINE

## 2024-08-29 PROCEDURE — 96401 CHEMO ANTI-NEOPL SQ/IM: CPT

## 2024-08-29 PROCEDURE — 1126F AMNT PAIN NOTED NONE PRSNT: CPT | Performed by: INTERNAL MEDICINE

## 2024-08-29 PROCEDURE — 1123F ACP DISCUSS/DSCN MKR DOCD: CPT | Performed by: INTERNAL MEDICINE

## 2024-08-29 PROCEDURE — 99215 OFFICE O/P EST HI 40 MIN: CPT | Performed by: INTERNAL MEDICINE

## 2024-08-29 PROCEDURE — 2500000004 HC RX 250 GENERAL PHARMACY W/ HCPCS (ALT 636 FOR OP/ED): Mod: JZ | Performed by: INTERNAL MEDICINE

## 2024-08-29 PROCEDURE — 1159F MED LIST DOCD IN RCRD: CPT | Performed by: INTERNAL MEDICINE

## 2024-08-29 PROCEDURE — 3078F DIAST BP <80 MM HG: CPT | Performed by: INTERNAL MEDICINE

## 2024-08-29 PROCEDURE — 85025 COMPLETE CBC W/AUTO DIFF WBC: CPT | Performed by: INTERNAL MEDICINE

## 2024-08-29 PROCEDURE — 84075 ASSAY ALKALINE PHOSPHATASE: CPT | Performed by: INTERNAL MEDICINE

## 2024-08-29 PROCEDURE — 36415 COLL VENOUS BLD VENIPUNCTURE: CPT

## 2024-08-29 RX ORDER — BORTEZOMIB 3.5 MG/1
1.3 INJECTION, POWDER, LYOPHILIZED, FOR SOLUTION INTRAVENOUS; SUBCUTANEOUS ONCE
Status: COMPLETED | OUTPATIENT
Start: 2024-08-29 | End: 2024-08-29

## 2024-08-29 ASSESSMENT — PAIN SCALES - GENERAL: PAINLEVEL: 0-NO PAIN

## 2024-08-29 NOTE — PROGRESS NOTES
"LOCATION:  [facility].     HEMATOLOGY & ONCOLOGY PROBLEMS:  1.  IgA kappa multiple myeloma        a.  Initial diagnosis of MGUS in Feb 2022.       b.  Disease progression to multiple myeloma in Oct 2022.       c.  Ist line therapy with single agent Velcade from Nov 2022 to June 2024.       D. 2nd line therapy with Darzalex/Velcade/Dex beginning July 2024.  2.   Hypercalcemia/lytic lesions.       a.  Maintained on Xgeva  3.  Macrocytic anemia.     CHIEF COMPLAINT:    The patient is in the clinic today for management of multiple myeloma and macrocytic anemia and for continuation of therapy and management of therapy related effects.     HISTORY:   Ms. Cali is a 89 year old pleasant female with multiple myeloma.  She has been in relatively good health for her age and is in fact still working.  Blood work from Feb 2022 showed a hemoglobin of 11.1 with MCV of 101.  WBC, platelets and metabolic  profile was unremarkable except for baseline creatinine of 1.4.  Additional work-up revealed a normal TSH but SPEP showed 0.7 g of IgA kappa monoclonal protein.  Initial evaluation was mainly suggestive of MGUS and she was followed closely.  But routine  follow-up blood work from October 2022 showed 1g of IgA kappa monoclonal protein and markedly elevated kappa free light chain assay.  Creatinine was stable at 1.3 but serum calcium was elevated at 13.1.  PET scan results were unremarkable but a bone marrow  biopsy confirmed plasma cell myeloma.  Cytogenetics and FISH testing results were unremarkable.  After discussions with the patient and family members she was treated with single agent Velcade with good initial response.  Clinical course was complicated by issues with fall leading to right leg surgery, recurrent C. difficile colitis and intermittent hypercalcemia related complications.  Later on she was also noted to have rising myeloma markers and treatment \"regimen has been changed to DVD " "protocol with addition of subcutaneous Darzalex.       INTERVAL HISTORY:  So far she is tolerating Velcade and Darzalex combination well.  Latest myeloma markers from 2024 showed declining M protein and free kappa light chain level.  She is feeling slightly more tired but still fairly functional for her age.  She is currently getting Velcade at weekly interval.     PAST MEDICAL HISTORY:   1.  Multiple myeloma as detailed above  2.  Hypertension  3.  Hypothyroidism  4.  GERD  5.  Chronic kidney disease  6.  DJD   7.  Idiopathic neuropathy  8.  Lactose intolerance  9.  Vitamin D deficiency  10.  History of cholecystectomy/hysterectomy/tonsillectomy/left knee replacement surgeries     SOCIAL HISTORY:   She lives in [place] with her son.  Non smoker.  Rare social alcohol intake.  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  Born and raised in [facility].     FAMILY HISTORY:    Parents  in their 90s from natural causes but a per the patient both probably had some kind of cancers.  5 siblings 1  from lung problems.  7 children, 12 grandkids and  14 great grandkids all alive and well. No other specific history of bleeding, clotting or malignant disorder in the family.     REVIEW OF SYSTEMS:  Pertinent finding as per the history above.  All other systems have been reviewed and generally negative and noncontributory.     ALLERGY & MEDICATIONS:  Allergies and latest outpatient medications list were reviewed in the EMR.    VITAL SIGNS  BSA: 1.57 meters squared  /73 Comment: not unusual for MD apt  Pulse 74   Temp 36.5 °C (97.7 °F) (Temporal)   Resp 20   Ht (S) 1.498 m (4' 10.98\")   Wt 59 kg (130 lb 1.1 oz)   SpO2 99%   BMI 26.29 kg/m²     PHYSICAL EXAMINATION:  Detailed examination not done.    LAB RESULTS:  CBC from today shows a hemoglobin of 8.9 with MCV of 91.   White cell count is 11.2 and platelet are 281.  Metabolic profile is essentially stable with creatinine " of 1.4 and calcium of 9.8.  Latest myeloma markers from 08/22/2024 showed a kappa light chain level of 10.4.  SPEP showed 0.6 g of IgA kappa monoclonal protein.    RADIOLOGY RESULT:  Renal ultrasound 7/13/2024   Impression:  Complex renal cysts as described.    CT chest without contrast 7/8/2024   Impression:  CHEST  1.  New small 9 mm nodular density in the medial left lower lobe and a few tiny scattered pulmonary nodules and reticulonodular opacities in the bilateral upper lobes and right lower lobe. These may represent infectious/inflammatory or neoplastic nodules. Attention on short interval follow-up CT chest is recommended.  2. No dense area of consolidation to suggest pneumonia.  3. Tiny layering left pleural effusion with adjacent left basilar subsegmental atelectasis.  4. Other chronic findings, as detailed above.      ABDOMEN - PELVIS  1.  Diffuse colonic wall thickening and pericolonic stranding throughout the entire colon, concerning for acute colitis of infectious, inflammatory or ischemic etiology.  2. Diffuse extensive diverticulosis again seen.  3. Intermediate to high density bilateral renal lesions are indeterminate. These may represent hemorrhagic/proteinaceous renal cysts, however solid lesion is not excluded. This may be further assessed with nonemergent renal ultrasound and/or MRI.  4. Additional chronic incidental findings, as detailed above.      PET/CT Myeloma Initial [Oct 21 2022 10:35AM]  Impression:  1. Multiple focal hypermetabolic pulmonary nodules throughout the bilateral lungs. Findings likely represent inflammatory process,  versus less likely, malignancy. Recommend short-term follow-up CT to document resolution of nodules or tissue biopsy is clinically  indicated.  2. Several hypermetabolic nonenlarged mediastinal lymph nodes, likely reactive in etiology.  3. Mild increased metabolic activity throughout the axial and appendicular skeleton, which is nonspecific and could be related  to patient's anemia versus diffuse myeloma involvement. There is no focal hypermetabolic disease involvement within the skeleton.     PATHOLOGY RESULTS:  Surgical Pathology [Oct 28 2022 1:58PM] ([id number])   Specimens: BONE MARROW CLOT /BONE MARROW CORE /Received in formalin, labeled with the patient's name and hospital number/Received in B-plus fixative, labeled with the patient's name and hospital   Name SUMIT OTT   Accession #: [id number]    Pathologist: ADRIANA NATION MD   Date of Procedure: 10/25/2022   Date Received: 10/25/2022   Date Reported 10/28/2022   Submitting Physician: RENATE MELGAR MD   Location: [facility] Other External #   FINAL DIAGNOSIS   A&B:  BONE MARROW, ASPIRATE WITH CLOT AND CORE BIOPSY WITH TOUCH IMPRINT, RIGHT ILIAC CREST:   -- SLIGHTLY HYPERCELLULAR BONE MARROW WITH 15% KAPPA+ PLASMA CELLS CONSISTENT WITH PLASMA CELL MYELOMA   NOTE: Clinical correlation recommended.   Pending  Genetic/Molecular testing per Hematopathology protocol:   -Chromosome analysis: karyotype   -FISH: Myeloma panel   -Molecular: None   -Microarray: None   The results will be reported separately.   Touch Prep   Differential:  (Normal) %   Promyelocytes (1-5) 1   Myelocytes (5-10) 8   Metamyelocytes (10-25) 13   Bands (10-20) 9   Segmented forms (5-30) 17   Eosinophils (2-4) 11   Basophils (0-1) 1   Lymphocytes (5-25) 2   Monocytes (0-2)  0   Plasma Cells (0-2) 15   Blasts (0-1) 0   Total Erythroid (17-35) 23   Number of cells counted: 100   Cellularity: cellular   M:E Ratio: 2.7:1   The gross and/or microscopic findings were reviewed in conjunction with pathology  resident, Kirstin Gutierrez M.D.   Electronically Signed Out By ADRIANA NATION MD/NAGI   By the signature on this report, the individual or group listed as making the   Final Interpretation/Diagnosis certifies that they have reviewed this  case.   Diagnostic interpretation performed at [facility] [address]  [facility] [id number]   Microscopic Description:   CBC: WBC 18.8 x 10E9/L, RBC 2.77 x 10E12/L, Hgb 8.8 g/dl, Hct 26.2%, MCV 95 fL, RDW 13.6%, platelets  416 x 10E9/L.   A 100 cell manual differential count reveals: polys 61%, bands 8%, lymphocytes 13%, monocytes 9%, eosinophils 8%, basophils 1%.   PERIPHERAL SMEAR: Submitted   Red cells: Normocytic with rouleaux formation.   White cells: Eosinophilia    Platelets: Normal, adequate.   ASPIRATE SMEAR: Submitted   Specimen: Aspicular.   Comments: Hypocellular and hemodilute with few hematopoietic precursors and scattered atypical plasma cells, some biunucleated.   TOUCH PREP: Submitted    Specimen: Paucicellular.   Erythropoiesis: Normal maturation.   Granulopoiesis: Normal maturation.   Megakaryocytes: Present. Morphology: Normal.   Comments: Numerous plasma cells (15%). Some very large atypical plasma cells seen.    ASPIRATE CLOT: Submitted   Specimen: Aspicular.   Comments: Rare hematopoietic progenitor cells noted in a hemodiluted sample.   CORE BIOPSY: Submitted   Specimen: Limited. Small subcortical with crush artifact   Cellularity: 40%.    Estimated M:E ratio: Consistent with aspirate smear.   Bony trabeculae: Normal.   Megakaryocytes: Adequate. Morphology: Normal.   Granulomas: Absent.   Lymphoid aggregates: Absent.   Comments: Few small aggregates of plasm cells noted.    SPECIAL STAINS:   Iron: Inadequate sample to evaluate   IMMUNOHISTOCHEMISTRY: Performed.    - Positive plasma cells approximately (15%)   Cyclin D1 - Negative   IH Kappa - positice in plasma cells   IH Lambda - rare plasma  cells positive.   FLOW CYTOMETRY: Performed, see separate report. A small clonal kappa+, CD19-, CD45- plasma cell population detected.   Immunostains were performed in addition to flow cytometry to fully characterize the phenotype, architecture,  and extent of the atypical population(s). This was medically necessary for the best possible diagnosis.   Clinical History:  "  C90.00,D64.9   Right side In the front,Iliac Crest      ASSESSMENT & PLAN:  1.  IgA kappa multiple myeloma.  Please refer to the details of initial presentation and management as outlined above. In summary, patient with finding of mild macrocytic  anemia with further work-up revealing 0.7g of IgA kappa monoclonal protein on SPEP in Feb 2022. Clinically she was essentially asymptomatic and overall functional for her age.  She was initially followed with close observation.  Subsequent routine follow-up  blood work from Oct 2022 showed 1gm of IgA kappa monoclonal protein and markedly elevated kappa free light chain assay.  Creatinine was stable at 1.3 but serum calcium was elevated at 13.1.  PET scan results were unremarkable but a bone marrow biopsy  confirmed plasma cell myeloma.  Cytogenetics and FISH testing was unremarkable. After discussions with the patient and family members she was treated with single agent Velcade with good initial response.  Clinical course was complicated by issues with fall leading to right leg surgery, recurrent C. difficile colitis and intermittent hypercalcemia related complications.  Later on she was also noted to have rising myeloma markers and treatment \"regimen has been changed to DVD protocol with addition of subcutaneous Darzalex.       Overall she is tolerating Velcade/Darzalex combination well.  Probable side effects of neuropathy, thrombocytopenia, weakness, fatigue, rash etc. were explained to her in detail.  Overall prognosis remains guarded.    2.  Hypercalcemia.  Secondary to baseline myeloma.  PET scan as such was negative for bony lytic lesions.  She will continue with Xgeva.     3. Follow up: Follow-up with me in about 4 weeks and will come to the clinic for for continuation of chemotherapy with Darzalex and daratumumab combination as detailed above.  Advised to contact us immediately if there are any new questions or problems.     This note has been transcribed using " [employer] voice recognition system and there is a possibility of unintentional typing misprints.

## 2024-09-05 ENCOUNTER — LAB (OUTPATIENT)
Dept: LAB | Facility: CLINIC | Age: 89
End: 2024-09-05
Payer: MEDICARE

## 2024-09-05 ENCOUNTER — INFUSION (OUTPATIENT)
Dept: HEMATOLOGY/ONCOLOGY | Facility: CLINIC | Age: 89
End: 2024-09-05
Payer: MEDICARE

## 2024-09-05 VITALS
BODY MASS INDEX: 26.67 KG/M2 | WEIGHT: 132.28 LBS | HEART RATE: 76 BPM | HEIGHT: 59 IN | DIASTOLIC BLOOD PRESSURE: 74 MMHG | SYSTOLIC BLOOD PRESSURE: 176 MMHG | TEMPERATURE: 98.2 F | OXYGEN SATURATION: 98 % | RESPIRATION RATE: 18 BRPM

## 2024-09-05 DIAGNOSIS — M86.9 OSTEOMYELITIS, UNSPECIFIED SITE, UNSPECIFIED TYPE (MULTI): ICD-10-CM

## 2024-09-05 DIAGNOSIS — C90.00 MULTIPLE MYELOMA, REMISSION STATUS UNSPECIFIED (MULTI): ICD-10-CM

## 2024-09-05 DIAGNOSIS — L03.115 CELLULITIS AND ABSCESS OF RIGHT LOWER EXTREMITY: ICD-10-CM

## 2024-09-05 DIAGNOSIS — L02.415 CELLULITIS AND ABSCESS OF RIGHT LOWER EXTREMITY: ICD-10-CM

## 2024-09-05 LAB
ANION GAP SERPL CALC-SCNC: 13 MMOL/L
BASOPHILS # BLD AUTO: 0.05 X10*3/UL (ref 0–0.1)
BASOPHILS NFR BLD AUTO: 0.4 %
BUN SERPL-MCNC: 41 MG/DL (ref 6–23)
CALCIUM SERPL-MCNC: 8.9 MG/DL (ref 8.6–10.3)
CHLORIDE SERPL-SCNC: 102 MMOL/L (ref 98–107)
CO2 SERPL-SCNC: 23 MMOL/L (ref 21–32)
CREAT SERPL-MCNC: 1.61 MG/DL (ref 0.5–1.05)
EGFRCR SERPLBLD CKD-EPI 2021: 30 ML/MIN/1.73M*2
EOSINOPHIL # BLD AUTO: 0.52 X10*3/UL (ref 0–0.4)
EOSINOPHIL NFR BLD AUTO: 4.2 %
ERYTHROCYTE [DISTWIDTH] IN BLOOD BY AUTOMATED COUNT: 17.8 % (ref 11.5–14.5)
GLUCOSE SERPL-MCNC: 97 MG/DL (ref 74–99)
HCT VFR BLD AUTO: 26.7 % (ref 36–46)
HGB BLD-MCNC: 8.5 G/DL (ref 12–16)
IMM GRANULOCYTES # BLD AUTO: 0.05 X10*3/UL (ref 0–0.5)
IMM GRANULOCYTES NFR BLD AUTO: 0.4 % (ref 0–0.9)
LYMPHOCYTES # BLD AUTO: 1.08 X10*3/UL (ref 0.8–3)
LYMPHOCYTES NFR BLD AUTO: 8.8 %
MCH RBC QN AUTO: 28.5 PG (ref 26–34)
MCHC RBC AUTO-ENTMCNC: 31.8 G/DL (ref 32–36)
MCV RBC AUTO: 90 FL (ref 80–100)
MONOCYTES # BLD AUTO: 0.73 X10*3/UL (ref 0.05–0.8)
MONOCYTES NFR BLD AUTO: 5.9 %
NEUTROPHILS # BLD AUTO: 9.86 X10*3/UL (ref 1.6–5.5)
NEUTROPHILS NFR BLD AUTO: 80.3 %
NRBC BLD-RTO: 0 /100 WBCS (ref 0–0)
PLATELET # BLD AUTO: 323 X10*3/UL (ref 150–450)
POTASSIUM SERPL-SCNC: 4.4 MMOL/L (ref 3.5–5.3)
RBC # BLD AUTO: 2.98 X10*6/UL (ref 4–5.2)
SODIUM SERPL-SCNC: 134 MMOL/L (ref 136–145)
WBC # BLD AUTO: 12.3 X10*3/UL (ref 4.4–11.3)

## 2024-09-05 PROCEDURE — 36415 COLL VENOUS BLD VENIPUNCTURE: CPT

## 2024-09-05 PROCEDURE — 82374 ASSAY BLOOD CARBON DIOXIDE: CPT | Performed by: INTERNAL MEDICINE

## 2024-09-05 PROCEDURE — 2500000004 HC RX 250 GENERAL PHARMACY W/ HCPCS (ALT 636 FOR OP/ED): Mod: JZ | Performed by: INTERNAL MEDICINE

## 2024-09-05 PROCEDURE — 85025 COMPLETE CBC W/AUTO DIFF WBC: CPT | Performed by: INTERNAL MEDICINE

## 2024-09-05 PROCEDURE — 96401 CHEMO ANTI-NEOPL SQ/IM: CPT

## 2024-09-05 RX ORDER — BORTEZOMIB 3.5 MG/1
1.3 INJECTION, POWDER, LYOPHILIZED, FOR SOLUTION INTRAVENOUS; SUBCUTANEOUS ONCE
Status: COMPLETED | OUTPATIENT
Start: 2024-09-05 | End: 2024-09-05

## 2024-09-05 ASSESSMENT — PAIN SCALES - GENERAL: PAINLEVEL: 0-NO PAIN

## 2024-09-12 ENCOUNTER — INFUSION (OUTPATIENT)
Dept: HEMATOLOGY/ONCOLOGY | Facility: CLINIC | Age: 89
End: 2024-09-12
Payer: MEDICARE

## 2024-09-12 ENCOUNTER — LAB (OUTPATIENT)
Dept: LAB | Facility: CLINIC | Age: 89
End: 2024-09-12
Payer: MEDICARE

## 2024-09-12 VITALS
DIASTOLIC BLOOD PRESSURE: 54 MMHG | HEIGHT: 59 IN | RESPIRATION RATE: 20 BRPM | HEART RATE: 87 BPM | WEIGHT: 132.72 LBS | SYSTOLIC BLOOD PRESSURE: 165 MMHG | BODY MASS INDEX: 26.76 KG/M2 | TEMPERATURE: 97.3 F

## 2024-09-12 DIAGNOSIS — C90.00 MULTIPLE MYELOMA, REMISSION STATUS UNSPECIFIED (MULTI): ICD-10-CM

## 2024-09-12 DIAGNOSIS — C90.00 MULTIPLE MYELOMA NOT HAVING ACHIEVED REMISSION (MULTI): ICD-10-CM

## 2024-09-12 DIAGNOSIS — E83.52 HYPERCALCEMIA: ICD-10-CM

## 2024-09-12 LAB
ALBUMIN SERPL BCP-MCNC: 3.2 G/DL (ref 3.4–5)
ALP SERPL-CCNC: 79 U/L (ref 33–136)
ALT SERPL W P-5'-P-CCNC: 10 U/L (ref 7–45)
ANION GAP SERPL CALC-SCNC: 14 MMOL/L (ref 10–20)
AST SERPL W P-5'-P-CCNC: 13 U/L (ref 9–39)
BASOPHILS # BLD AUTO: 0.04 X10*3/UL (ref 0–0.1)
BASOPHILS NFR BLD AUTO: 0.3 %
BILIRUB SERPL-MCNC: 0.4 MG/DL (ref 0–1.2)
BUN SERPL-MCNC: 24 MG/DL (ref 6–23)
CALCIUM SERPL-MCNC: 9.4 MG/DL (ref 8.6–10.3)
CHLORIDE SERPL-SCNC: 103 MMOL/L (ref 98–107)
CO2 SERPL-SCNC: 22 MMOL/L (ref 21–32)
CREAT SERPL-MCNC: 1.35 MG/DL (ref 0.5–1.05)
EGFRCR SERPLBLD CKD-EPI 2021: 37 ML/MIN/1.73M*2
EOSINOPHIL # BLD AUTO: 0.86 X10*3/UL (ref 0–0.4)
EOSINOPHIL NFR BLD AUTO: 6.2 %
ERYTHROCYTE [DISTWIDTH] IN BLOOD BY AUTOMATED COUNT: 17.8 % (ref 11.5–14.5)
GLUCOSE SERPL-MCNC: 112 MG/DL (ref 74–99)
HCT VFR BLD AUTO: 26.4 % (ref 36–46)
HGB BLD-MCNC: 8.5 G/DL (ref 12–16)
IMM GRANULOCYTES # BLD AUTO: 0.07 X10*3/UL (ref 0–0.5)
IMM GRANULOCYTES NFR BLD AUTO: 0.5 % (ref 0–0.9)
LYMPHOCYTES # BLD AUTO: 1.35 X10*3/UL (ref 0.8–3)
LYMPHOCYTES NFR BLD AUTO: 9.7 %
MCH RBC QN AUTO: 28.7 PG (ref 26–34)
MCHC RBC AUTO-ENTMCNC: 32.2 G/DL (ref 32–36)
MCV RBC AUTO: 89 FL (ref 80–100)
MONOCYTES # BLD AUTO: 0.99 X10*3/UL (ref 0.05–0.8)
MONOCYTES NFR BLD AUTO: 7.1 %
NEUTROPHILS # BLD AUTO: 10.61 X10*3/UL (ref 1.6–5.5)
NEUTROPHILS NFR BLD AUTO: 76.2 %
NRBC BLD-RTO: 0 /100 WBCS (ref 0–0)
PLATELET # BLD AUTO: 350 X10*3/UL (ref 150–450)
POTASSIUM SERPL-SCNC: 3.7 MMOL/L (ref 3.5–5.3)
PROT SERPL-MCNC: 6.3 G/DL (ref 6.4–8.2)
RBC # BLD AUTO: 2.96 X10*6/UL (ref 4–5.2)
SODIUM SERPL-SCNC: 135 MMOL/L (ref 136–145)
WBC # BLD AUTO: 13.9 X10*3/UL (ref 4.4–11.3)

## 2024-09-12 PROCEDURE — 84075 ASSAY ALKALINE PHOSPHATASE: CPT | Performed by: INTERNAL MEDICINE

## 2024-09-12 PROCEDURE — 96401 CHEMO ANTI-NEOPL SQ/IM: CPT

## 2024-09-12 PROCEDURE — 2500000004 HC RX 250 GENERAL PHARMACY W/ HCPCS (ALT 636 FOR OP/ED): Performed by: INTERNAL MEDICINE

## 2024-09-12 PROCEDURE — 36415 COLL VENOUS BLD VENIPUNCTURE: CPT

## 2024-09-12 PROCEDURE — 85025 COMPLETE CBC W/AUTO DIFF WBC: CPT | Performed by: INTERNAL MEDICINE

## 2024-09-12 RX ORDER — BORTEZOMIB 3.5 MG/1
1.3 INJECTION, POWDER, LYOPHILIZED, FOR SOLUTION INTRAVENOUS; SUBCUTANEOUS ONCE
Status: COMPLETED | OUTPATIENT
Start: 2024-09-12 | End: 2024-09-12

## 2024-09-12 RX ORDER — EPINEPHRINE 0.3 MG/.3ML
0.3 INJECTION SUBCUTANEOUS EVERY 5 MIN PRN
Status: DISCONTINUED | OUTPATIENT
Start: 2024-09-12 | End: 2024-09-12 | Stop reason: HOSPADM

## 2024-09-12 RX ORDER — DIPHENHYDRAMINE HYDROCHLORIDE 50 MG/ML
50 INJECTION INTRAMUSCULAR; INTRAVENOUS AS NEEDED
Status: DISCONTINUED | OUTPATIENT
Start: 2024-09-12 | End: 2024-09-12 | Stop reason: HOSPADM

## 2024-09-12 RX ORDER — FAMOTIDINE 10 MG/ML
20 INJECTION INTRAVENOUS ONCE AS NEEDED
Status: DISCONTINUED | OUTPATIENT
Start: 2024-09-12 | End: 2024-09-12 | Stop reason: HOSPADM

## 2024-09-12 RX ORDER — ALBUTEROL SULFATE 0.83 MG/ML
3 SOLUTION RESPIRATORY (INHALATION) AS NEEDED
Status: DISCONTINUED | OUTPATIENT
Start: 2024-09-12 | End: 2024-09-12 | Stop reason: HOSPADM

## 2024-09-12 ASSESSMENT — PAIN SCALES - GENERAL: PAINLEVEL: 0-NO PAIN

## 2024-09-12 NOTE — SIGNIFICANT EVENT
09/12/24 1007   Prechemo Checklist   Has the patient been in the hospital, ED, or urgent care since last date of service No   Chemo/Immuno Consent Completed and Signed Yes   Protocol/Indications Verified Yes   Confirmed to previous date/time of medication Yes   Compared to previous dose Yes   All medications are dated accurately Yes   Pregnancy Test Negative Not applicable   Parameters Met Yes   Provider Notified Yes   Is Patient Proceeding With Treatment? Yes   BSA/Weight-Height Verified Yes   Dose Calculations Verified (current, total, cumulative) Yes

## 2024-09-13 DIAGNOSIS — E03.9 ACQUIRED HYPOTHYROIDISM: ICD-10-CM

## 2024-09-13 RX ORDER — LEVOTHYROXINE SODIUM 100 UG/1
TABLET ORAL
Qty: 90 TABLET | Refills: 0 | Status: SHIPPED | OUTPATIENT
Start: 2024-09-13

## 2024-09-19 ENCOUNTER — APPOINTMENT (OUTPATIENT)
Dept: HEMATOLOGY/ONCOLOGY | Facility: CLINIC | Age: 89
End: 2024-09-19
Payer: MEDICARE

## 2024-09-20 ENCOUNTER — LAB (OUTPATIENT)
Dept: LAB | Facility: CLINIC | Age: 89
End: 2024-09-20
Payer: MEDICARE

## 2024-09-20 ENCOUNTER — INFUSION (OUTPATIENT)
Dept: HEMATOLOGY/ONCOLOGY | Facility: CLINIC | Age: 89
End: 2024-09-20
Payer: MEDICARE

## 2024-09-20 VITALS
RESPIRATION RATE: 18 BRPM | HEART RATE: 77 BPM | WEIGHT: 130.29 LBS | HEIGHT: 59 IN | OXYGEN SATURATION: 99 % | BODY MASS INDEX: 26.27 KG/M2 | TEMPERATURE: 97.7 F | DIASTOLIC BLOOD PRESSURE: 66 MMHG | SYSTOLIC BLOOD PRESSURE: 161 MMHG

## 2024-09-20 DIAGNOSIS — C90.00 MULTIPLE MYELOMA, REMISSION STATUS UNSPECIFIED (MULTI): ICD-10-CM

## 2024-09-20 DIAGNOSIS — L03.115 CELLULITIS AND ABSCESS OF RIGHT LOWER EXTREMITY: ICD-10-CM

## 2024-09-20 DIAGNOSIS — C90.00 MULTIPLE MYELOMA NOT HAVING ACHIEVED REMISSION (MULTI): ICD-10-CM

## 2024-09-20 DIAGNOSIS — M86.9 OSTEOMYELITIS, UNSPECIFIED SITE, UNSPECIFIED TYPE (MULTI): ICD-10-CM

## 2024-09-20 DIAGNOSIS — C90.00 MULTIPLE MYELOMA, REMISSION STATUS UNSPECIFIED (MULTI): Primary | ICD-10-CM

## 2024-09-20 DIAGNOSIS — L02.415 CELLULITIS AND ABSCESS OF RIGHT LOWER EXTREMITY: ICD-10-CM

## 2024-09-20 DIAGNOSIS — E83.52 HYPERCALCEMIA: ICD-10-CM

## 2024-09-20 LAB
ALBUMIN SERPL BCP-MCNC: 3.2 G/DL (ref 3.4–5)
ALP SERPL-CCNC: 97 U/L (ref 33–136)
ALT SERPL W P-5'-P-CCNC: 10 U/L (ref 7–45)
ANION GAP SERPL CALC-SCNC: 14 MMOL/L (ref 10–20)
ANION GAP SERPL CALC-SCNC: 14 MMOL/L (ref 10–20)
AST SERPL W P-5'-P-CCNC: 13 U/L (ref 9–39)
BASOPHILS # BLD AUTO: 0.06 X10*3/UL (ref 0–0.1)
BASOPHILS NFR BLD AUTO: 0.4 %
BILIRUB SERPL-MCNC: 0.5 MG/DL (ref 0–1.2)
BUN SERPL-MCNC: 28 MG/DL (ref 6–23)
BUN SERPL-MCNC: 28 MG/DL (ref 6–23)
CALCIUM SERPL-MCNC: 8.3 MG/DL (ref 8.6–10.3)
CALCIUM SERPL-MCNC: 8.3 MG/DL (ref 8.6–10.3)
CHLORIDE SERPL-SCNC: 102 MMOL/L (ref 98–107)
CHLORIDE SERPL-SCNC: 102 MMOL/L (ref 98–107)
CO2 SERPL-SCNC: 22 MMOL/L (ref 21–32)
CO2 SERPL-SCNC: 22 MMOL/L (ref 21–32)
CREAT SERPL-MCNC: 1.37 MG/DL (ref 0.5–1.05)
CREAT SERPL-MCNC: 1.37 MG/DL (ref 0.5–1.05)
EGFRCR SERPLBLD CKD-EPI 2021: 37 ML/MIN/1.73M*2
EGFRCR SERPLBLD CKD-EPI 2021: 37 ML/MIN/1.73M*2
EOSINOPHIL # BLD AUTO: 0.51 X10*3/UL (ref 0–0.4)
EOSINOPHIL NFR BLD AUTO: 3.6 %
ERYTHROCYTE [DISTWIDTH] IN BLOOD BY AUTOMATED COUNT: 17.8 % (ref 11.5–14.5)
GLUCOSE SERPL-MCNC: 130 MG/DL (ref 74–99)
GLUCOSE SERPL-MCNC: 130 MG/DL (ref 74–99)
HCT VFR BLD AUTO: 26 % (ref 36–46)
HGB BLD-MCNC: 8.3 G/DL (ref 12–16)
IMM GRANULOCYTES # BLD AUTO: 0.12 X10*3/UL (ref 0–0.5)
IMM GRANULOCYTES NFR BLD AUTO: 0.8 % (ref 0–0.9)
LYMPHOCYTES # BLD AUTO: 1.27 X10*3/UL (ref 0.8–3)
LYMPHOCYTES NFR BLD AUTO: 8.9 %
MCH RBC QN AUTO: 27.8 PG (ref 26–34)
MCHC RBC AUTO-ENTMCNC: 31.9 G/DL (ref 32–36)
MCV RBC AUTO: 87 FL (ref 80–100)
MONOCYTES # BLD AUTO: 1.3 X10*3/UL (ref 0.05–0.8)
MONOCYTES NFR BLD AUTO: 9.2 %
NEUTROPHILS # BLD AUTO: 10.93 X10*3/UL (ref 1.6–5.5)
NEUTROPHILS NFR BLD AUTO: 77.1 %
NRBC BLD-RTO: 0 /100 WBCS (ref 0–0)
PLATELET # BLD AUTO: 400 X10*3/UL (ref 150–450)
POTASSIUM SERPL-SCNC: 4.2 MMOL/L (ref 3.5–5.3)
POTASSIUM SERPL-SCNC: 4.2 MMOL/L (ref 3.5–5.3)
PROT SERPL-MCNC: 5.6 G/DL (ref 6.4–8.2)
RBC # BLD AUTO: 2.99 X10*6/UL (ref 4–5.2)
SODIUM SERPL-SCNC: 134 MMOL/L (ref 136–145)
SODIUM SERPL-SCNC: 134 MMOL/L (ref 136–145)
WBC # BLD AUTO: 14.5 X10*3/UL (ref 4.4–11.3)

## 2024-09-20 PROCEDURE — 96401 CHEMO ANTI-NEOPL SQ/IM: CPT

## 2024-09-20 PROCEDURE — 80053 COMPREHEN METABOLIC PANEL: CPT

## 2024-09-20 PROCEDURE — 36415 COLL VENOUS BLD VENIPUNCTURE: CPT

## 2024-09-20 PROCEDURE — 80048 BASIC METABOLIC PNL TOTAL CA: CPT | Mod: CCI

## 2024-09-20 PROCEDURE — 85025 COMPLETE CBC W/AUTO DIFF WBC: CPT

## 2024-09-20 PROCEDURE — 2500000004 HC RX 250 GENERAL PHARMACY W/ HCPCS (ALT 636 FOR OP/ED): Mod: JZ | Performed by: INTERNAL MEDICINE

## 2024-09-20 PROCEDURE — 96372 THER/PROPH/DIAG INJ SC/IM: CPT

## 2024-09-20 RX ORDER — ALBUTEROL SULFATE 0.83 MG/ML
3 SOLUTION RESPIRATORY (INHALATION) AS NEEDED
OUTPATIENT
Start: 2024-10-11

## 2024-09-20 RX ORDER — DIPHENHYDRAMINE HYDROCHLORIDE 50 MG/ML
50 INJECTION INTRAMUSCULAR; INTRAVENOUS AS NEEDED
Status: CANCELLED | OUTPATIENT
Start: 2024-09-20

## 2024-09-20 RX ORDER — ACETAMINOPHEN 325 MG/1
650 TABLET ORAL ONCE
OUTPATIENT
Start: 2024-10-04

## 2024-09-20 RX ORDER — ACETAMINOPHEN 325 MG/1
650 TABLET ORAL ONCE
Status: CANCELLED | OUTPATIENT
Start: 2024-09-20

## 2024-09-20 RX ORDER — DIPHENHYDRAMINE HCL 50 MG
50 CAPSULE ORAL ONCE
Status: CANCELLED | OUTPATIENT
Start: 2024-09-20

## 2024-09-20 RX ORDER — FAMOTIDINE 10 MG/ML
20 INJECTION INTRAVENOUS ONCE AS NEEDED
OUTPATIENT
Start: 2024-09-27

## 2024-09-20 RX ORDER — DIPHENHYDRAMINE HYDROCHLORIDE 50 MG/ML
50 INJECTION INTRAMUSCULAR; INTRAVENOUS AS NEEDED
OUTPATIENT
Start: 2024-10-11

## 2024-09-20 RX ORDER — BORTEZOMIB 3.5 MG/1
1.3 INJECTION, POWDER, LYOPHILIZED, FOR SOLUTION INTRAVENOUS; SUBCUTANEOUS ONCE
OUTPATIENT
Start: 2024-10-04

## 2024-09-20 RX ORDER — ALBUTEROL SULFATE 0.83 MG/ML
3 SOLUTION RESPIRATORY (INHALATION) AS NEEDED
OUTPATIENT
Start: 2024-10-04

## 2024-09-20 RX ORDER — EPINEPHRINE 0.3 MG/.3ML
0.3 INJECTION SUBCUTANEOUS EVERY 5 MIN PRN
OUTPATIENT
Start: 2024-09-27

## 2024-09-20 RX ORDER — FAMOTIDINE 10 MG/ML
20 INJECTION INTRAVENOUS ONCE AS NEEDED
OUTPATIENT
Start: 2024-10-11

## 2024-09-20 RX ORDER — DIPHENHYDRAMINE HCL 50 MG
50 CAPSULE ORAL ONCE
OUTPATIENT
Start: 2024-10-04

## 2024-09-20 RX ORDER — ALBUTEROL SULFATE 0.83 MG/ML
3 SOLUTION RESPIRATORY (INHALATION) AS NEEDED
OUTPATIENT
Start: 2024-10-10

## 2024-09-20 RX ORDER — FAMOTIDINE 10 MG/ML
20 INJECTION INTRAVENOUS ONCE AS NEEDED
OUTPATIENT
Start: 2024-10-04

## 2024-09-20 RX ORDER — EPINEPHRINE 0.3 MG/.3ML
0.3 INJECTION SUBCUTANEOUS EVERY 5 MIN PRN
Status: CANCELLED | OUTPATIENT
Start: 2024-09-20

## 2024-09-20 RX ORDER — BORTEZOMIB 3.5 MG/1
1.3 INJECTION, POWDER, LYOPHILIZED, FOR SOLUTION INTRAVENOUS; SUBCUTANEOUS ONCE
Status: COMPLETED | OUTPATIENT
Start: 2024-09-20 | End: 2024-09-20

## 2024-09-20 RX ORDER — FAMOTIDINE 10 MG/ML
20 INJECTION INTRAVENOUS ONCE AS NEEDED
OUTPATIENT
Start: 2024-10-10

## 2024-09-20 RX ORDER — ALBUTEROL SULFATE 0.83 MG/ML
3 SOLUTION RESPIRATORY (INHALATION) AS NEEDED
OUTPATIENT
Start: 2024-09-27

## 2024-09-20 RX ORDER — EPINEPHRINE 0.3 MG/.3ML
0.3 INJECTION SUBCUTANEOUS EVERY 5 MIN PRN
OUTPATIENT
Start: 2024-10-10

## 2024-09-20 RX ORDER — DIPHENHYDRAMINE HYDROCHLORIDE 50 MG/ML
50 INJECTION INTRAMUSCULAR; INTRAVENOUS AS NEEDED
OUTPATIENT
Start: 2024-10-10

## 2024-09-20 RX ORDER — ALBUTEROL SULFATE 0.83 MG/ML
3 SOLUTION RESPIRATORY (INHALATION) AS NEEDED
Status: CANCELLED | OUTPATIENT
Start: 2024-09-20

## 2024-09-20 RX ORDER — BORTEZOMIB 3.5 MG/1
1.3 INJECTION, POWDER, LYOPHILIZED, FOR SOLUTION INTRAVENOUS; SUBCUTANEOUS ONCE
OUTPATIENT
Start: 2024-10-11

## 2024-09-20 RX ORDER — DIPHENHYDRAMINE HYDROCHLORIDE 50 MG/ML
50 INJECTION INTRAMUSCULAR; INTRAVENOUS AS NEEDED
OUTPATIENT
Start: 2024-10-04

## 2024-09-20 RX ORDER — EPINEPHRINE 0.3 MG/.3ML
0.3 INJECTION SUBCUTANEOUS EVERY 5 MIN PRN
OUTPATIENT
Start: 2024-10-11

## 2024-09-20 RX ORDER — MONTELUKAST SODIUM 10 MG/1
10 TABLET ORAL ONCE
OUTPATIENT
Start: 2024-10-04

## 2024-09-20 RX ORDER — MONTELUKAST SODIUM 10 MG/1
10 TABLET ORAL ONCE
Status: CANCELLED | OUTPATIENT
Start: 2024-09-20

## 2024-09-20 RX ORDER — FAMOTIDINE 10 MG/ML
20 INJECTION INTRAVENOUS ONCE AS NEEDED
Status: CANCELLED | OUTPATIENT
Start: 2024-09-20

## 2024-09-20 RX ORDER — DIPHENHYDRAMINE HYDROCHLORIDE 50 MG/ML
50 INJECTION INTRAMUSCULAR; INTRAVENOUS AS NEEDED
OUTPATIENT
Start: 2024-09-27

## 2024-09-20 RX ORDER — EPINEPHRINE 0.3 MG/.3ML
0.3 INJECTION SUBCUTANEOUS EVERY 5 MIN PRN
OUTPATIENT
Start: 2024-10-04

## 2024-09-20 RX ORDER — BORTEZOMIB 3.5 MG/1
1.3 INJECTION, POWDER, LYOPHILIZED, FOR SOLUTION INTRAVENOUS; SUBCUTANEOUS ONCE
OUTPATIENT
Start: 2024-09-27

## 2024-09-20 ASSESSMENT — PAIN SCALES - GENERAL: PAINLEVEL: 0-NO PAIN

## 2024-09-25 ENCOUNTER — TELEPHONE (OUTPATIENT)
Dept: HEMATOLOGY/ONCOLOGY | Facility: CLINIC | Age: 89
End: 2024-09-25
Payer: MEDICARE

## 2024-09-25 NOTE — TELEPHONE ENCOUNTER
Pt called and said she is feeling more tired lately after her last treatment. Explained that this could be a side effect of her treatment. Dr. Gray aware and reassured pt that her treatments will now be spaced to every two weeks and she needs to continue to eat well, stay hydrated and rest when she needs to . Will continue to monitor .

## 2024-09-26 ENCOUNTER — LAB (OUTPATIENT)
Dept: LAB | Facility: CLINIC | Age: 89
End: 2024-09-26
Payer: MEDICARE

## 2024-09-26 ENCOUNTER — TELEPHONE (OUTPATIENT)
Dept: HEMATOLOGY/ONCOLOGY | Facility: CLINIC | Age: 89
End: 2024-09-26
Payer: MEDICARE

## 2024-09-26 ENCOUNTER — TELEPHONE (OUTPATIENT)
Dept: HEMATOLOGY/ONCOLOGY | Facility: CLINIC | Age: 89
End: 2024-09-26

## 2024-09-26 DIAGNOSIS — C90.00 MULTIPLE MYELOMA NOT HAVING ACHIEVED REMISSION (MULTI): ICD-10-CM

## 2024-09-26 DIAGNOSIS — C90.00 MULTIPLE MYELOMA NOT HAVING ACHIEVED REMISSION (MULTI): Primary | ICD-10-CM

## 2024-09-26 LAB
ALBUMIN SERPL BCP-MCNC: 2.9 G/DL (ref 3.4–5)
ALP SERPL-CCNC: 89 U/L (ref 33–136)
ALT SERPL W P-5'-P-CCNC: 10 U/L (ref 7–45)
ANION GAP SERPL CALC-SCNC: 12 MMOL/L (ref 10–20)
AST SERPL W P-5'-P-CCNC: 15 U/L (ref 9–39)
BASOPHILS # BLD AUTO: 0.03 X10*3/UL (ref 0–0.1)
BASOPHILS NFR BLD AUTO: 0.3 %
BILIRUB SERPL-MCNC: 0.3 MG/DL (ref 0–1.2)
BUN SERPL-MCNC: 27 MG/DL (ref 6–23)
CALCIUM SERPL-MCNC: 9.2 MG/DL (ref 8.6–10.3)
CHLORIDE SERPL-SCNC: 101 MMOL/L (ref 98–107)
CO2 SERPL-SCNC: 23 MMOL/L (ref 21–32)
CREAT SERPL-MCNC: 1.34 MG/DL (ref 0.5–1.05)
EGFRCR SERPLBLD CKD-EPI 2021: 38 ML/MIN/1.73M*2
EOSINOPHIL # BLD AUTO: 0.49 X10*3/UL (ref 0–0.4)
EOSINOPHIL NFR BLD AUTO: 4.2 %
ERYTHROCYTE [DISTWIDTH] IN BLOOD BY AUTOMATED COUNT: 17.9 % (ref 11.5–14.5)
GLUCOSE SERPL-MCNC: 119 MG/DL (ref 74–99)
HCT VFR BLD AUTO: 24.8 % (ref 36–46)
HGB BLD-MCNC: 8 G/DL (ref 12–16)
IMM GRANULOCYTES # BLD AUTO: 0.08 X10*3/UL (ref 0–0.5)
IMM GRANULOCYTES NFR BLD AUTO: 0.7 % (ref 0–0.9)
LYMPHOCYTES # BLD AUTO: 0.93 X10*3/UL (ref 0.8–3)
LYMPHOCYTES NFR BLD AUTO: 7.9 %
MCH RBC QN AUTO: 27.5 PG (ref 26–34)
MCHC RBC AUTO-ENTMCNC: 32.3 G/DL (ref 32–36)
MCV RBC AUTO: 85 FL (ref 80–100)
MONOCYTES # BLD AUTO: 0.95 X10*3/UL (ref 0.05–0.8)
MONOCYTES NFR BLD AUTO: 8.1 %
NEUTROPHILS # BLD AUTO: 9.28 X10*3/UL (ref 1.6–5.5)
NEUTROPHILS NFR BLD AUTO: 78.8 %
NRBC BLD-RTO: 0 /100 WBCS (ref 0–0)
PLATELET # BLD AUTO: 326 X10*3/UL (ref 150–450)
POTASSIUM SERPL-SCNC: 4.1 MMOL/L (ref 3.5–5.3)
PROT SERPL-MCNC: 5.2 G/DL (ref 6.4–8.2)
RBC # BLD AUTO: 2.91 X10*6/UL (ref 4–5.2)
SODIUM SERPL-SCNC: 132 MMOL/L (ref 136–145)
WBC # BLD AUTO: 11.8 X10*3/UL (ref 4.4–11.3)

## 2024-09-26 PROCEDURE — 85025 COMPLETE CBC W/AUTO DIFF WBC: CPT

## 2024-09-26 PROCEDURE — 80053 COMPREHEN METABOLIC PANEL: CPT

## 2024-09-26 PROCEDURE — 36415 COLL VENOUS BLD VENIPUNCTURE: CPT

## 2024-09-26 RX ORDER — SODIUM CHLORIDE 9 MG/ML
1000 INJECTION, SOLUTION INTRAVENOUS CONTINUOUS
Status: CANCELLED | OUTPATIENT
Start: 2024-09-26

## 2024-09-26 RX ORDER — ALBUTEROL SULFATE 0.83 MG/ML
3 SOLUTION RESPIRATORY (INHALATION) AS NEEDED
Status: CANCELLED | OUTPATIENT
Start: 2024-09-26

## 2024-09-26 RX ORDER — EPINEPHRINE 0.3 MG/.3ML
0.3 INJECTION SUBCUTANEOUS EVERY 5 MIN PRN
Status: CANCELLED | OUTPATIENT
Start: 2024-09-26

## 2024-09-26 RX ORDER — DIPHENHYDRAMINE HYDROCHLORIDE 50 MG/ML
50 INJECTION INTRAMUSCULAR; INTRAVENOUS AS NEEDED
Status: CANCELLED | OUTPATIENT
Start: 2024-09-26

## 2024-09-26 RX ORDER — FAMOTIDINE 10 MG/ML
20 INJECTION INTRAVENOUS ONCE AS NEEDED
Status: CANCELLED | OUTPATIENT
Start: 2024-09-26

## 2024-09-26 NOTE — TELEPHONE ENCOUNTER
Pt called and is just not feeling well. Labs were drawn and DR. Gray aware. Pt will come for hydration tomorrow and is agreeable to that plan.

## 2024-09-27 ENCOUNTER — INFUSION (OUTPATIENT)
Dept: HEMATOLOGY/ONCOLOGY | Facility: CLINIC | Age: 89
End: 2024-09-27
Payer: MEDICARE

## 2024-09-27 ENCOUNTER — TELEPHONE (OUTPATIENT)
Dept: PRIMARY CARE | Facility: CLINIC | Age: 89
End: 2024-09-27

## 2024-09-27 VITALS
HEART RATE: 73 BPM | RESPIRATION RATE: 18 BRPM | TEMPERATURE: 98.4 F | OXYGEN SATURATION: 96 % | DIASTOLIC BLOOD PRESSURE: 70 MMHG | SYSTOLIC BLOOD PRESSURE: 180 MMHG

## 2024-09-27 DIAGNOSIS — E83.52 HYPERCALCEMIA: ICD-10-CM

## 2024-09-27 DIAGNOSIS — C90.00 MULTIPLE MYELOMA, REMISSION STATUS UNSPECIFIED (MULTI): ICD-10-CM

## 2024-09-27 DIAGNOSIS — C90.00 MULTIPLE MYELOMA NOT HAVING ACHIEVED REMISSION (MULTI): ICD-10-CM

## 2024-09-27 PROCEDURE — 2500000004 HC RX 250 GENERAL PHARMACY W/ HCPCS (ALT 636 FOR OP/ED): Performed by: INTERNAL MEDICINE

## 2024-09-27 PROCEDURE — 96360 HYDRATION IV INFUSION INIT: CPT | Mod: INF

## 2024-09-27 RX ORDER — DIPHENHYDRAMINE HYDROCHLORIDE 50 MG/ML
50 INJECTION INTRAMUSCULAR; INTRAVENOUS AS NEEDED
OUTPATIENT
Start: 2024-09-27

## 2024-09-27 RX ORDER — ALBUTEROL SULFATE 0.83 MG/ML
3 SOLUTION RESPIRATORY (INHALATION) AS NEEDED
OUTPATIENT
Start: 2024-09-27

## 2024-09-27 RX ORDER — FAMOTIDINE 10 MG/ML
20 INJECTION INTRAVENOUS ONCE AS NEEDED
OUTPATIENT
Start: 2024-09-27

## 2024-09-27 RX ORDER — SODIUM CHLORIDE 9 MG/ML
1000 INJECTION, SOLUTION INTRAVENOUS CONTINUOUS
Status: DISCONTINUED | OUTPATIENT
Start: 2024-09-27 | End: 2024-09-27 | Stop reason: HOSPADM

## 2024-09-27 RX ORDER — HEPARIN SODIUM,PORCINE/PF 10 UNIT/ML
50 SYRINGE (ML) INTRAVENOUS AS NEEDED
OUTPATIENT
Start: 2024-09-27

## 2024-09-27 RX ORDER — SODIUM CHLORIDE 9 MG/ML
1000 INJECTION, SOLUTION INTRAVENOUS CONTINUOUS
OUTPATIENT
Start: 2024-09-27

## 2024-09-27 RX ORDER — HEPARIN SODIUM,PORCINE/PF 10 UNIT/ML
50 SYRINGE (ML) INTRAVENOUS AS NEEDED
Status: CANCELLED | OUTPATIENT
Start: 2024-09-27

## 2024-09-27 RX ORDER — EPINEPHRINE 0.3 MG/.3ML
0.3 INJECTION SUBCUTANEOUS EVERY 5 MIN PRN
OUTPATIENT
Start: 2024-09-27

## 2024-09-27 RX ORDER — HEPARIN 100 UNIT/ML
500 SYRINGE INTRAVENOUS AS NEEDED
OUTPATIENT
Start: 2024-09-27

## 2024-09-27 RX ORDER — HEPARIN 100 UNIT/ML
500 SYRINGE INTRAVENOUS AS NEEDED
Status: CANCELLED | OUTPATIENT
Start: 2024-09-27
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
## 2024-10-03 ENCOUNTER — OFFICE VISIT (OUTPATIENT)
Dept: OTOLARYNGOLOGY | Facility: CLINIC | Age: 89
End: 2024-10-03
Payer: MEDICARE

## 2024-10-03 ENCOUNTER — CLINICAL SUPPORT (OUTPATIENT)
Dept: AUDIOLOGY | Facility: CLINIC | Age: 89
End: 2024-10-03
Payer: MEDICARE

## 2024-10-03 VITALS
HEIGHT: 59 IN | SYSTOLIC BLOOD PRESSURE: 177 MMHG | DIASTOLIC BLOOD PRESSURE: 65 MMHG | TEMPERATURE: 98.2 F | WEIGHT: 136 LBS | BODY MASS INDEX: 27.42 KG/M2 | HEART RATE: 70 BPM

## 2024-10-03 DIAGNOSIS — R94.128 ABNORMAL TYMPANOGRAM: ICD-10-CM

## 2024-10-03 DIAGNOSIS — H61.23 EXCESSIVE CERUMEN IN BOTH EAR CANALS: ICD-10-CM

## 2024-10-03 DIAGNOSIS — H90.3 SENSORINEURAL HEARING LOSS (SNHL) OF BOTH EARS: Primary | ICD-10-CM

## 2024-10-03 DIAGNOSIS — H90.3 SENSORINEURAL HEARING LOSS (SNHL), BILATERAL: Primary | ICD-10-CM

## 2024-10-03 PROCEDURE — 92557 COMPREHENSIVE HEARING TEST: CPT | Performed by: AUDIOLOGIST

## 2024-10-03 PROCEDURE — 1159F MED LIST DOCD IN RCRD: CPT | Performed by: NURSE PRACTITIONER

## 2024-10-03 PROCEDURE — 3077F SYST BP >= 140 MM HG: CPT | Performed by: NURSE PRACTITIONER

## 2024-10-03 PROCEDURE — 99203 OFFICE O/P NEW LOW 30 MIN: CPT | Performed by: NURSE PRACTITIONER

## 2024-10-03 PROCEDURE — 1126F AMNT PAIN NOTED NONE PRSNT: CPT | Performed by: NURSE PRACTITIONER

## 2024-10-03 PROCEDURE — 92567 TYMPANOMETRY: CPT | Performed by: AUDIOLOGIST

## 2024-10-03 PROCEDURE — 99213 OFFICE O/P EST LOW 20 MIN: CPT | Performed by: NURSE PRACTITIONER

## 2024-10-03 PROCEDURE — 1123F ACP DISCUSS/DSCN MKR DOCD: CPT | Performed by: NURSE PRACTITIONER

## 2024-10-03 PROCEDURE — 3078F DIAST BP <80 MM HG: CPT | Performed by: NURSE PRACTITIONER

## 2024-10-03 PROCEDURE — 1036F TOBACCO NON-USER: CPT | Performed by: NURSE PRACTITIONER

## 2024-10-03 RX ORDER — GABAPENTIN 300 MG/1
300 CAPSULE ORAL NIGHTLY
COMMUNITY
Start: 2024-08-03

## 2024-10-03 SDOH — ECONOMIC STABILITY: FOOD INSECURITY: WITHIN THE PAST 12 MONTHS, YOU WORRIED THAT YOUR FOOD WOULD RUN OUT BEFORE YOU GOT MONEY TO BUY MORE.: NEVER TRUE

## 2024-10-03 SDOH — ECONOMIC STABILITY: FOOD INSECURITY: WITHIN THE PAST 12 MONTHS, THE FOOD YOU BOUGHT JUST DIDN'T LAST AND YOU DIDN'T HAVE MONEY TO GET MORE.: NEVER TRUE

## 2024-10-03 ASSESSMENT — ENCOUNTER SYMPTOMS
DEPRESSION: 0
OCCASIONAL FEELINGS OF UNSTEADINESS: 1
LOSS OF SENSATION IN FEET: 0

## 2024-10-03 ASSESSMENT — PAIN SCALES - GENERAL: PAINLEVEL: 0-NO PAIN

## 2024-10-03 NOTE — PROGRESS NOTES
"AUDIOLOGY ADULT AUDIOMETRIC EVALUATION      Name:  Bindu Cail  :  10/14/1932  Age:  89 y.o.  Date of Evaluation:  10/3/2024    HISTORY  Reason for visit:  hearing loss  Ms. Cali is seen 10/3/2024 at the request of Renée Reynoso CNP for an evaluation of hearing.      Chief complaint:    - left hearing loss, since around the beginning of August  - concern for left middle fluid  - symptoms started around the time she had covid-19, also around the time of a change in medication    Hearing loss:  mild hearing loss, hearing symmetric prior to August  Tinnitus:   denies  Otitis Media:  infections when she swam, in childhood  Otologic surgical history:  denies  Dizziness/imbalance:  dizziness/somewhat light-headedness, possibly associated with a  new medication for multiple myeloma  Otalgia:  denies   Ear pressure/fullness:  denies  History of excessive noise exposure:  denies   Other: congestion; being treated for multiple myeloma    Hearing aid history: none         EVALUATION  Please find audiogram in \"Media\" tab (Document Type:  Audiology Report) or included at the bottom of this note.    RESULTS   Otoscopic Evaluation: clear canals bilaterally       Immittance Measures (226 Hz probe tone):   tympanometry is consistent with low middle ear pressure and restricted tympanic membrane mobility bilaterally     Test technique:  standard behavioral technique via insert earphones.  Reliability is good.    Pure Tone Audiometry:    Right ear:  Hearing sensitivity is in the mild to severe hearing loss range.   Left ear: Hearing sensitivity is in the mild to moderately-severe hearing loss range.     Speech Audiometry:        Right Ear:  Speech Reception Threshold (SRT) was obtained at 35 dBHL                 Speech discrimination score was 96% in quiet when words were presented at 85 dBHL      Left Ear:  Speech Reception Threshold (SRT) was obtained at 35 dBHL                 Speech discrimination score was 96% " in quiet when words were presented at 85 dBHL    IMPRESSIONS:  Patient is expected to have communication difficulty in adverse listening environments.    Patient is expected to benefit from devices that provide amplification (e.g., hearing aids) and improve the desired sound signal over that of background noise as well as from effective communication strategies.      RECOMMENDATIONS  Continue with medical follow-up with Renée Reynoso CNP.  Hearing Aid Evaluation with an audiologist to discuss hearing technology (such as hearing aids) and services.   Reassess hearing in 1 year (or sooner if medically indicated or if there is a concern for a change in hearing).    Continue with medical follow-up as indicated.      PATIENT EDUCATION  Discussed results and recommendations with patient.  Questions were addressed and the patient was encouraged to contact our department should concerns arise.       NATALY Cardenas, [facility]  Licensed Audiologist

## 2024-10-03 NOTE — PROGRESS NOTES
Subjective   Patient ID: Bindu Cali is a 89 y.o. female who presents for Ear Fullness.    HPI  Patient here for her hearing. She is accompanied by her son. She had COVID in July. [place]. PCP told her she had fluid in her ear. This has been going on for about 2 months. Denies ear pain and drainage. It crackles and pops when she swallows. No real trouble hearing. Denies tinnitus. Denies vertigo.  Denies previous ear surgery.  She has been using Flonase for the last month everyday.     Patient Active Problem List   Diagnosis    Hypothyroidism    Anemia due to stage 3b chronic kidney disease (Multi)    Arthritis of left shoulder region    Carpal tunnel syndrome of right wrist    Carpal tunnel syndrome on left    Cervicalgia    Chronic back pain    Benign hypertension with CKD (chronic kidney disease), stage II    Diastolic dysfunction    Essential hypertension    Gastroesophageal reflux disease    Hypercalcemia    Idiopathic peripheral neuropathy    Lactose intolerance    Left knee pain    Lumbar radiculopathy, chronic    Macrocytic anemia    Monoclonal gammopathy of unknown significance (MGUS)    Multiple myeloma    Nontraumatic incomplete tear of left rotator cuff    Right knee pain    Right shoulder pain    Spinal stenosis of lumbar region without neurogenic claudication    Vitamin D deficiency    Arthritis    Fall    Laceration of hand    Periprosthetic fracture of proximal end of tibia    Status post total bilateral knee replacement    Fracture of tibia    Anemia in other chronic diseases classified elsewhere    Hip pain    Neurogenic claudication due to lumbar spinal stenosis    Iliotibial band syndrome of left side    Anemia    Spinal stenosis    History of total knee arthroplasty    Abnormal mammogram    Weakness    Female stress incontinence    Lumbar spondylosis    Osteoarthritis of hip    Cellulitis and abscess of right lower extremity    Postoperative wound  infection    Pulmonary hypertension (Multi)    Clostridium difficile colitis    Acute kidney injury superimposed on CKD (CMS-HCC)    Pain of left hand    Diarrhea    Edema    History of removal of joint prosthesis of right hip due to infection    Clostridioides difficile infection    Hyponatremia    Skin ulcer (Multi)    Muscle weakness    Stage 2 chronic kidney disease    COVID     Past Surgical History:   Procedure Laterality Date    BREAST BIOPSY  01/22/2015    Biopsy Breast Open    CHOLECYSTECTOMY  01/22/2015    Cholecystectomy    HYSTERECTOMY  01/22/2015    Hysterectomy    OTHER SURGICAL HISTORY  04/28/2022    Shoulder surgery    ROTATOR CUFF REPAIR  01/22/2015    Rotator Cuff Repair    TONSILLECTOMY  01/22/2015    Tonsillectomy    TOTAL KNEE ARTHROPLASTY  01/22/2015    Knee Replacement     Review of Systems    All other systems have been reviewed and are negative for complaints except for those mentioned in history of present illness, past medical history and problem list.    Objective   Physical Exam    Constitutional: No fever, chills, weight loss or weight gain  General appearance: Appears well, well-nourished, well groomed. No acute distress.    Communication: Normal communication    Psychiatric: Oriented to person, place and time. Normal mood and affect.    Neurologic: Cranial nerves II-XII grossly intact and symmetric bilaterally.    Head and Face:  Head: Atraumatic with no masses, lesions or scarring.  Face: Normal symmetry. No scars or deformities.  TMJ: Normal, no trismus.    Eyes: Conjunctiva not edematous or erythematous.     Right Ear: External inspection of ear with no deformity, scars, or masses. Ear canal is with non-occluding cerumen that was removed using the microscope and alligator forceps. After removal, TM is intact with no sign of infection, effusion, or retraction.      Left Ear: External inspection of ear with no deformity, scars, or masses. Ear canal is with non-occluding cerumen that  was removed using the microscope and alligator forceps. After removal, TM is intact with no sign of infection, effusion, or retraction.      Nose: External inspection of nose: No nasal lesions, lacerations or scars. Anterior rhinoscopy with limited visualization past the inferior turbinates. No tenderness on frontal or maxillary sinus palpation.    Oral Cavity/Mouth: Oral cavity and oropharynx mucosa moist and pink. No lesions or masses. Dentition normal. Tonsils appear surgically removed. Uvula is midline. Tongue with no masses or lesions. Tongue with good mobility. The oropharynx is clear.    Neck: Normal appearing, symmetric, trachea midline.     Cardiovascular: Examination of peripheral vascular system shows no clubbing or cyanosis.    Respiratory: No respiratory distress increased work of breathing. Inspection of the chest with symmetric chest expansion and normal respiratory effort.    Skin: No head and neck rashes.    Lymph nodes: No adenopathy.     Diagnostic Results   I personally reviewed audiogram from today which shows mild sloping to moderately severe sensorineural hearing loss bilaterally.  Type C/As tympanograms bilaterally.  Excellent word recognition scores of 96% at 85 dB bilaterally.    Assessment/Plan   Diagnoses and all orders for this visit:  Sensorineural hearing loss (SNHL) of both ears  Abnormal tympanogram  Excessive cerumen in both ear canals  Bilateral ears were successfully cleaned.  We reviewed audiogram in detail.  Reassurance given that there is not a conductive loss on audiogram and her exam is essentially normal.  Although she does have negative pressure behind the eardrums I do not feel like should be a good candidate for myringotomy/ear tube.  Since she has had some improvement with Flonase over the last month I would recommend continuing that and following up in 4 to 6 weeks.    All questions answered to patient's satisfaction.    This note was created using speech recognition  transcription software. Despite proofreading, several typographical errors might be present that might affect the meaning of the content. Please call with any questions.      VERONICA Thakkar 10/03/24 2:52 PM

## 2024-10-04 ENCOUNTER — APPOINTMENT (OUTPATIENT)
Dept: PRIMARY CARE | Facility: CLINIC | Age: 89
End: 2024-10-04
Payer: MEDICARE

## 2024-10-04 VITALS
HEIGHT: 59 IN | BODY MASS INDEX: 26.61 KG/M2 | WEIGHT: 132 LBS | DIASTOLIC BLOOD PRESSURE: 62 MMHG | SYSTOLIC BLOOD PRESSURE: 187 MMHG

## 2024-10-04 DIAGNOSIS — E03.9 HYPOTHYROIDISM, UNSPECIFIED TYPE: ICD-10-CM

## 2024-10-04 DIAGNOSIS — N18.2 BENIGN HYPERTENSION WITH CKD (CHRONIC KIDNEY DISEASE), STAGE II: Primary | ICD-10-CM

## 2024-10-04 DIAGNOSIS — R53.1 WEAKNESS: ICD-10-CM

## 2024-10-04 DIAGNOSIS — I12.9 BENIGN HYPERTENSION WITH CKD (CHRONIC KIDNEY DISEASE), STAGE II: Primary | ICD-10-CM

## 2024-10-04 DIAGNOSIS — C90.01 MULTIPLE MYELOMA IN REMISSION (MULTI): ICD-10-CM

## 2024-10-04 DIAGNOSIS — D64.9 ANEMIA, UNSPECIFIED TYPE: ICD-10-CM

## 2024-10-04 DIAGNOSIS — I10 ESSENTIAL HYPERTENSION: ICD-10-CM

## 2024-10-04 PROCEDURE — 1160F RVW MEDS BY RX/DR IN RCRD: CPT | Performed by: INTERNAL MEDICINE

## 2024-10-04 PROCEDURE — 3077F SYST BP >= 140 MM HG: CPT | Performed by: INTERNAL MEDICINE

## 2024-10-04 PROCEDURE — 1159F MED LIST DOCD IN RCRD: CPT | Performed by: INTERNAL MEDICINE

## 2024-10-04 PROCEDURE — 99214 OFFICE O/P EST MOD 30 MIN: CPT | Performed by: INTERNAL MEDICINE

## 2024-10-04 PROCEDURE — 3078F DIAST BP <80 MM HG: CPT | Performed by: INTERNAL MEDICINE

## 2024-10-04 PROCEDURE — 1123F ACP DISCUSS/DSCN MKR DOCD: CPT | Performed by: INTERNAL MEDICINE

## 2024-10-04 NOTE — PROGRESS NOTES
"Subjective   Patient ID: Bindu Cali is a 89 y.o. female who presents for Follow-up.    HPI     Review of Systems    Objective   Ht 1.499 m (4' 11\")   Wt 59.9 kg (132 lb)   BMI 26.66 kg/m²     Physical Exam    Assessment/Plan          "

## 2024-10-04 NOTE — PROGRESS NOTES
"e   Patient ID: Bindu Cali is a 89 y.o. female who presents for Follow-up.    HPI   Receiving 3 chemo meds, and receiving darzelax which she perceives sas too strong. She feels weak.  Last week felt dizzy and fell. Rug burns on forehead. , now stable. Appetite is ok ,     Review of Systems  Gen-no ,wt gain,fevers,apnea at night,snoring  HEENT- no ear pain, no oral pain, no hearing loss no sinus pain or drainage  Pulm- no sob,weezing,cough,hemoptysis,productive mucous  Cardio-no cp,dash,orthopnea,palpitations  Gi- no diarrhea,constipation,n,v,hematemesis,dysphagia,change in bowel habits  Endo- no polydipsia,polyuria,wt gain,extreme fatigue,polyphagia,blurred vision  Neuro-no ha,loc,paresthesias,transient limb weakness,acute memory loss,visual changes  Heme- no blood loss,transfusions,unusual bleeding from skin and/or mucosa  Urology- no dysuria,hematuria,urinary frequency,bladder pain  Gyn- no pelvic pain,vaginal dc,vaginal bleeding,  Psych- no depression,mood swings,anxiety,insomnia,hallucinations  Skin- no rash,new discolorations  Musculoskeletal- no joint aches / pains,muscle pains,new back pain  Vascular- no pain in calve w walkin,discolored painful toes  Objective   BP (!) 187/62   Ht 1.499 m (4' 11\")   Wt 59.9 kg (132 lb)   BMI 26.66 kg/m²     Physical Exam  NAD  HEENT- nl ear tm bilat,nl pharynx, mucosa moist,no icterus  Lungs clear, good AE, no wheezing  Heart-normal S1-S2 no murmur gallop rub, rhythm normal  Neck-no JVD, thyroid normal, no carotid bruits, no LN  Neuro alert and oriented ×3, cranial nerves II through XII normal, motor function symmetrical,sensory exam symmetrical ,gait nl  Extrem- no edema, no tenderness, no cords  Skin- nl turgor, no ulcers,no rash       Assessment/Plan     Assessment/Plan   Diagnoses and all orders for this visit:  Benign hypertension with CKD (chronic kidney disease), stage II-  increase norvasc to 5 mg  Hypothyroidism, unspecified type- fatigued , " tsh  Multiple myeloma in remission (Multi)- ubder onc care  Anemia, unspecified type- observe  Weakness- 2 to MM tx      Return in 3 months

## 2024-10-08 ENCOUNTER — APPOINTMENT (OUTPATIENT)
Dept: HEMATOLOGY/ONCOLOGY | Facility: CLINIC | Age: 89
End: 2024-10-08
Payer: MEDICARE

## 2024-10-10 ENCOUNTER — INFUSION (OUTPATIENT)
Dept: HEMATOLOGY/ONCOLOGY | Facility: CLINIC | Age: 89
End: 2024-10-10
Payer: MEDICARE

## 2024-10-10 ENCOUNTER — LAB (OUTPATIENT)
Dept: LAB | Facility: CLINIC | Age: 89
End: 2024-10-10
Payer: MEDICARE

## 2024-10-10 ENCOUNTER — APPOINTMENT (OUTPATIENT)
Dept: ORTHOPEDIC SURGERY | Facility: CLINIC | Age: 89
End: 2024-10-10
Payer: MEDICARE

## 2024-10-10 ENCOUNTER — APPOINTMENT (OUTPATIENT)
Dept: HEMATOLOGY/ONCOLOGY | Facility: CLINIC | Age: 89
End: 2024-10-10
Payer: MEDICARE

## 2024-10-10 VITALS
HEIGHT: 59 IN | OXYGEN SATURATION: 94 % | WEIGHT: 129.19 LBS | TEMPERATURE: 97.2 F | DIASTOLIC BLOOD PRESSURE: 65 MMHG | SYSTOLIC BLOOD PRESSURE: 187 MMHG | HEART RATE: 70 BPM | RESPIRATION RATE: 18 BRPM | BODY MASS INDEX: 26.04 KG/M2

## 2024-10-10 DIAGNOSIS — C90.00 MULTIPLE MYELOMA, REMISSION STATUS UNSPECIFIED (MULTI): ICD-10-CM

## 2024-10-10 DIAGNOSIS — C90.00 MULTIPLE MYELOMA, REMISSION STATUS UNSPECIFIED (MULTI): Primary | ICD-10-CM

## 2024-10-10 DIAGNOSIS — C90.00 MULTIPLE MYELOMA NOT HAVING ACHIEVED REMISSION (MULTI): ICD-10-CM

## 2024-10-10 DIAGNOSIS — E83.52 HYPERCALCEMIA: ICD-10-CM

## 2024-10-10 LAB
ALBUMIN SERPL BCP-MCNC: 3.5 G/DL (ref 3.4–5)
ALP SERPL-CCNC: 77 U/L (ref 33–136)
ALT SERPL W P-5'-P-CCNC: 13 U/L (ref 7–45)
ANION GAP SERPL CALC-SCNC: 13 MMOL/L (ref 10–20)
AST SERPL W P-5'-P-CCNC: 17 U/L (ref 9–39)
BASOPHILS # BLD AUTO: 0.07 X10*3/UL (ref 0–0.1)
BASOPHILS NFR BLD AUTO: 0.8 %
BILIRUB SERPL-MCNC: 0.4 MG/DL (ref 0–1.2)
BUN SERPL-MCNC: 25 MG/DL (ref 6–23)
CALCIUM SERPL-MCNC: 10 MG/DL (ref 8.6–10.3)
CHLORIDE SERPL-SCNC: 101 MMOL/L (ref 98–107)
CO2 SERPL-SCNC: 25 MMOL/L (ref 21–32)
CREAT SERPL-MCNC: 1.25 MG/DL (ref 0.5–1.05)
EGFRCR SERPLBLD CKD-EPI 2021: 41 ML/MIN/1.73M*2
EOSINOPHIL # BLD AUTO: 1.16 X10*3/UL (ref 0–0.4)
EOSINOPHIL NFR BLD AUTO: 13.3 %
ERYTHROCYTE [DISTWIDTH] IN BLOOD BY AUTOMATED COUNT: 19.9 % (ref 11.5–14.5)
GLUCOSE SERPL-MCNC: 121 MG/DL (ref 74–99)
HCT VFR BLD AUTO: 28.4 % (ref 36–46)
HGB BLD-MCNC: 9 G/DL (ref 12–16)
IMM GRANULOCYTES # BLD AUTO: 0.02 X10*3/UL (ref 0–0.5)
IMM GRANULOCYTES NFR BLD AUTO: 0.2 % (ref 0–0.9)
LYMPHOCYTES # BLD AUTO: 1.59 X10*3/UL (ref 0.8–3)
LYMPHOCYTES NFR BLD AUTO: 18.3 %
MCH RBC QN AUTO: 27.8 PG (ref 26–34)
MCHC RBC AUTO-ENTMCNC: 31.7 G/DL (ref 32–36)
MCV RBC AUTO: 88 FL (ref 80–100)
MONOCYTES # BLD AUTO: 0.97 X10*3/UL (ref 0.05–0.8)
MONOCYTES NFR BLD AUTO: 11.2 %
NEUTROPHILS # BLD AUTO: 4.88 X10*3/UL (ref 1.6–5.5)
NEUTROPHILS NFR BLD AUTO: 56.2 %
NRBC BLD-RTO: 0 /100 WBCS (ref 0–0)
PLATELET # BLD AUTO: 487 X10*3/UL (ref 150–450)
POTASSIUM SERPL-SCNC: 3.9 MMOL/L (ref 3.5–5.3)
PROT SERPL-MCNC: 6.1 G/DL (ref 6.4–8.2)
RBC # BLD AUTO: 3.24 X10*6/UL (ref 4–5.2)
SODIUM SERPL-SCNC: 135 MMOL/L (ref 136–145)
WBC # BLD AUTO: 8.7 X10*3/UL (ref 4.4–11.3)

## 2024-10-10 PROCEDURE — 2500000004 HC RX 250 GENERAL PHARMACY W/ HCPCS (ALT 636 FOR OP/ED): Mod: JZ | Performed by: INTERNAL MEDICINE

## 2024-10-10 PROCEDURE — 85025 COMPLETE CBC W/AUTO DIFF WBC: CPT

## 2024-10-10 PROCEDURE — 36415 COLL VENOUS BLD VENIPUNCTURE: CPT

## 2024-10-10 PROCEDURE — 80053 COMPREHEN METABOLIC PANEL: CPT

## 2024-10-10 PROCEDURE — 96401 CHEMO ANTI-NEOPL SQ/IM: CPT

## 2024-10-10 RX ORDER — BORTEZOMIB 3.5 MG/1
1.3 INJECTION, POWDER, LYOPHILIZED, FOR SOLUTION INTRAVENOUS; SUBCUTANEOUS ONCE
OUTPATIENT
Start: 2024-10-24

## 2024-10-10 RX ORDER — EPINEPHRINE 0.3 MG/.3ML
0.3 INJECTION SUBCUTANEOUS EVERY 5 MIN PRN
Status: DISCONTINUED | OUTPATIENT
Start: 2024-10-10 | End: 2024-10-10 | Stop reason: HOSPADM

## 2024-10-10 RX ORDER — FAMOTIDINE 10 MG/ML
20 INJECTION INTRAVENOUS ONCE AS NEEDED
OUTPATIENT
Start: 2024-10-24

## 2024-10-10 RX ORDER — FAMOTIDINE 10 MG/ML
20 INJECTION INTRAVENOUS ONCE AS NEEDED
Status: DISCONTINUED | OUTPATIENT
Start: 2024-10-10 | End: 2024-10-10 | Stop reason: HOSPADM

## 2024-10-10 RX ORDER — ALBUTEROL SULFATE 0.83 MG/ML
3 SOLUTION RESPIRATORY (INHALATION) AS NEEDED
OUTPATIENT
Start: 2024-10-24

## 2024-10-10 RX ORDER — DIPHENHYDRAMINE HCL 50 MG
50 CAPSULE ORAL ONCE
OUTPATIENT
Start: 2024-10-24

## 2024-10-10 RX ORDER — DIPHENHYDRAMINE HYDROCHLORIDE 50 MG/ML
50 INJECTION INTRAMUSCULAR; INTRAVENOUS AS NEEDED
OUTPATIENT
Start: 2024-10-24

## 2024-10-10 RX ORDER — ACETAMINOPHEN 325 MG/1
650 TABLET ORAL ONCE
OUTPATIENT
Start: 2024-10-24

## 2024-10-10 RX ORDER — MONTELUKAST SODIUM 10 MG/1
10 TABLET ORAL ONCE
Status: DISCONTINUED | OUTPATIENT
Start: 2024-10-10 | End: 2024-10-10 | Stop reason: HOSPADM

## 2024-10-10 RX ORDER — EPINEPHRINE 0.3 MG/.3ML
0.3 INJECTION SUBCUTANEOUS EVERY 5 MIN PRN
OUTPATIENT
Start: 2024-10-24

## 2024-10-10 RX ORDER — DIPHENHYDRAMINE HCL 50 MG
50 CAPSULE ORAL ONCE
Status: DISCONTINUED | OUTPATIENT
Start: 2024-10-10 | End: 2024-10-10 | Stop reason: HOSPADM

## 2024-10-10 RX ORDER — ALBUTEROL SULFATE 0.83 MG/ML
3 SOLUTION RESPIRATORY (INHALATION) AS NEEDED
Status: DISCONTINUED | OUTPATIENT
Start: 2024-10-10 | End: 2024-10-10 | Stop reason: HOSPADM

## 2024-10-10 RX ORDER — BORTEZOMIB 3.5 MG/1
1.3 INJECTION, POWDER, LYOPHILIZED, FOR SOLUTION INTRAVENOUS; SUBCUTANEOUS ONCE
Status: COMPLETED | OUTPATIENT
Start: 2024-10-10 | End: 2024-10-10

## 2024-10-10 RX ORDER — MONTELUKAST SODIUM 10 MG/1
10 TABLET ORAL ONCE
OUTPATIENT
Start: 2024-10-24

## 2024-10-10 RX ORDER — ACETAMINOPHEN 325 MG/1
650 TABLET ORAL ONCE
Status: DISCONTINUED | OUTPATIENT
Start: 2024-10-10 | End: 2024-10-10 | Stop reason: HOSPADM

## 2024-10-10 RX ORDER — DIPHENHYDRAMINE HYDROCHLORIDE 50 MG/ML
50 INJECTION INTRAMUSCULAR; INTRAVENOUS AS NEEDED
Status: DISCONTINUED | OUTPATIENT
Start: 2024-10-10 | End: 2024-10-10 | Stop reason: HOSPADM

## 2024-10-10 ASSESSMENT — PAIN SCALES - GENERAL: PAINLEVEL: 0-NO PAIN

## 2024-10-10 NOTE — SIGNIFICANT EVENT
10/10/24 1136   Prechemo Checklist   Has the patient been in the hospital, ED, or urgent care since last date of service Yes   Chemo/Immuno Consent Completed and Signed Yes   Protocol/Indications Verified Yes   Confirmed to previous date/time of medication Yes   Compared to previous dose Yes   All medications are dated accurately Yes   Pregnancy Test Negative Not applicable   Parameters Met Yes   Provider Notified Yes   Is Patient Proceeding With Treatment? Yes   BSA/Weight-Height Verified Yes   Dose Calculations Verified (current, total, cumulative) Yes

## 2024-10-11 ENCOUNTER — TELEPHONE (OUTPATIENT)
Dept: PRIMARY CARE | Facility: CLINIC | Age: 89
End: 2024-10-11
Payer: MEDICARE

## 2024-10-11 DIAGNOSIS — I12.9 BENIGN HYPERTENSION WITH CKD (CHRONIC KIDNEY DISEASE), STAGE II: Primary | ICD-10-CM

## 2024-10-11 DIAGNOSIS — N18.2 BENIGN HYPERTENSION WITH CKD (CHRONIC KIDNEY DISEASE), STAGE II: Primary | ICD-10-CM

## 2024-10-11 NOTE — TELEPHONE ENCOUNTER
Saw you 10/4/24. You told her to get labs 10/11/24 but no orders in system.  How much protein did you tell her to have each day?

## 2024-10-16 ENCOUNTER — PATIENT OUTREACH (OUTPATIENT)
Dept: PRIMARY CARE | Facility: CLINIC | Age: 89
End: 2024-10-16
Payer: MEDICARE

## 2024-10-18 ENCOUNTER — APPOINTMENT (OUTPATIENT)
Dept: HEMATOLOGY/ONCOLOGY | Facility: CLINIC | Age: 89
End: 2024-10-18
Payer: MEDICARE

## 2024-10-22 ENCOUNTER — OFFICE VISIT (OUTPATIENT)
Dept: HEMATOLOGY/ONCOLOGY | Facility: CLINIC | Age: 89
End: 2024-10-22
Payer: MEDICARE

## 2024-10-22 ENCOUNTER — LAB (OUTPATIENT)
Dept: LAB | Facility: CLINIC | Age: 89
End: 2024-10-22
Payer: MEDICARE

## 2024-10-22 ENCOUNTER — INFUSION (OUTPATIENT)
Dept: HEMATOLOGY/ONCOLOGY | Facility: CLINIC | Age: 89
End: 2024-10-22
Payer: MEDICARE

## 2024-10-22 VITALS
HEIGHT: 59 IN | DIASTOLIC BLOOD PRESSURE: 78 MMHG | OXYGEN SATURATION: 96 % | SYSTOLIC BLOOD PRESSURE: 188 MMHG | HEART RATE: 71 BPM | RESPIRATION RATE: 22 BRPM | BODY MASS INDEX: 26.36 KG/M2 | TEMPERATURE: 98.1 F | WEIGHT: 130.73 LBS

## 2024-10-22 VITALS
RESPIRATION RATE: 20 BRPM | OXYGEN SATURATION: 96 % | DIASTOLIC BLOOD PRESSURE: 76 MMHG | SYSTOLIC BLOOD PRESSURE: 186 MMHG | HEART RATE: 73 BPM | TEMPERATURE: 97.9 F

## 2024-10-22 DIAGNOSIS — C90.00 MULTIPLE MYELOMA, REMISSION STATUS UNSPECIFIED (MULTI): ICD-10-CM

## 2024-10-22 DIAGNOSIS — I12.9 BENIGN HYPERTENSION WITH CKD (CHRONIC KIDNEY DISEASE), STAGE II: ICD-10-CM

## 2024-10-22 DIAGNOSIS — C90.00 MULTIPLE MYELOMA NOT HAVING ACHIEVED REMISSION (MULTI): ICD-10-CM

## 2024-10-22 DIAGNOSIS — N18.2 BENIGN HYPERTENSION WITH CKD (CHRONIC KIDNEY DISEASE), STAGE II: ICD-10-CM

## 2024-10-22 DIAGNOSIS — E83.52 HYPERCALCEMIA: ICD-10-CM

## 2024-10-22 LAB
ALBUMIN SERPL BCP-MCNC: 3.5 G/DL (ref 3.4–5)
ALP SERPL-CCNC: 67 U/L (ref 33–136)
ALT SERPL W P-5'-P-CCNC: 13 U/L (ref 7–45)
ANION GAP SERPL CALC-SCNC: 11 MMOL/L (ref 10–20)
AST SERPL W P-5'-P-CCNC: 19 U/L (ref 9–39)
B2 MICROGLOB SERPL-MCNC: 9 MG/L (ref 0.7–2.2)
BASOPHILS # BLD AUTO: 0.09 X10*3/UL (ref 0–0.1)
BASOPHILS NFR BLD AUTO: 1.3 %
BILIRUB SERPL-MCNC: 0.4 MG/DL (ref 0–1.2)
BUN SERPL-MCNC: 37 MG/DL (ref 6–23)
CALCIUM SERPL-MCNC: 10.2 MG/DL (ref 8.6–10.3)
CHLORIDE SERPL-SCNC: 104 MMOL/L (ref 98–107)
CO2 SERPL-SCNC: 26 MMOL/L (ref 21–32)
CREAT SERPL-MCNC: 1.46 MG/DL (ref 0.5–1.05)
EGFRCR SERPLBLD CKD-EPI 2021: 34 ML/MIN/1.73M*2
EOSINOPHIL # BLD AUTO: 0.87 X10*3/UL (ref 0–0.4)
EOSINOPHIL NFR BLD AUTO: 12.1 %
ERYTHROCYTE [DISTWIDTH] IN BLOOD BY AUTOMATED COUNT: 21.2 % (ref 11.5–14.5)
GLUCOSE SERPL-MCNC: 110 MG/DL (ref 74–99)
HCT VFR BLD AUTO: 30.3 % (ref 36–46)
HGB BLD-MCNC: 9.3 G/DL (ref 12–16)
IGA SERPL-MCNC: 492 MG/DL (ref 70–400)
IGG SERPL-MCNC: 410 MG/DL (ref 700–1600)
IGM SERPL-MCNC: 16 MG/DL (ref 40–230)
IMM GRANULOCYTES # BLD AUTO: 0.02 X10*3/UL (ref 0–0.5)
IMM GRANULOCYTES NFR BLD AUTO: 0.3 % (ref 0–0.9)
LYMPHOCYTES # BLD AUTO: 1.94 X10*3/UL (ref 0.8–3)
LYMPHOCYTES NFR BLD AUTO: 27.1 %
MCH RBC QN AUTO: 28 PG (ref 26–34)
MCHC RBC AUTO-ENTMCNC: 30.7 G/DL (ref 32–36)
MCV RBC AUTO: 91 FL (ref 80–100)
MONOCYTES # BLD AUTO: 0.79 X10*3/UL (ref 0.05–0.8)
MONOCYTES NFR BLD AUTO: 11 %
NEUTROPHILS # BLD AUTO: 3.46 X10*3/UL (ref 1.6–5.5)
NEUTROPHILS NFR BLD AUTO: 48.2 %
NRBC BLD-RTO: 0 /100 WBCS (ref 0–0)
OVALOCYTES BLD QL SMEAR: NORMAL
PLATELET # BLD AUTO: 390 X10*3/UL (ref 150–450)
POTASSIUM SERPL-SCNC: 4.1 MMOL/L (ref 3.5–5.3)
PROT SERPL-MCNC: 6.3 G/DL (ref 6.4–8.2)
PROT SERPL-MCNC: 6.3 G/DL (ref 6.4–8.2)
RBC # BLD AUTO: 3.32 X10*6/UL (ref 4–5.2)
RBC MORPH BLD: NORMAL
SODIUM SERPL-SCNC: 137 MMOL/L (ref 136–145)
WBC # BLD AUTO: 7.2 X10*3/UL (ref 4.4–11.3)

## 2024-10-22 PROCEDURE — 83521 IG LIGHT CHAINS FREE EACH: CPT | Mod: PARLAB

## 2024-10-22 PROCEDURE — 3078F DIAST BP <80 MM HG: CPT | Performed by: INTERNAL MEDICINE

## 2024-10-22 PROCEDURE — 85025 COMPLETE CBC W/AUTO DIFF WBC: CPT

## 2024-10-22 PROCEDURE — 1126F AMNT PAIN NOTED NONE PRSNT: CPT | Performed by: INTERNAL MEDICINE

## 2024-10-22 PROCEDURE — 84075 ASSAY ALKALINE PHOSPHATASE: CPT

## 2024-10-22 PROCEDURE — 1123F ACP DISCUSS/DSCN MKR DOCD: CPT | Performed by: INTERNAL MEDICINE

## 2024-10-22 PROCEDURE — 82232 ASSAY OF BETA-2 PROTEIN: CPT | Mod: PARLAB

## 2024-10-22 PROCEDURE — 96401 CHEMO ANTI-NEOPL SQ/IM: CPT

## 2024-10-22 PROCEDURE — 82784 ASSAY IGA/IGD/IGG/IGM EACH: CPT | Mod: PARLAB

## 2024-10-22 PROCEDURE — 2500000004 HC RX 250 GENERAL PHARMACY W/ HCPCS (ALT 636 FOR OP/ED): Mod: JZ | Performed by: INTERNAL MEDICINE

## 2024-10-22 PROCEDURE — 84155 ASSAY OF PROTEIN SERUM: CPT | Mod: PARLAB

## 2024-10-22 PROCEDURE — 3077F SYST BP >= 140 MM HG: CPT | Performed by: INTERNAL MEDICINE

## 2024-10-22 PROCEDURE — 99215 OFFICE O/P EST HI 40 MIN: CPT | Performed by: INTERNAL MEDICINE

## 2024-10-22 PROCEDURE — 36415 COLL VENOUS BLD VENIPUNCTURE: CPT

## 2024-10-22 PROCEDURE — 99215 OFFICE O/P EST HI 40 MIN: CPT | Mod: 25 | Performed by: INTERNAL MEDICINE

## 2024-10-22 PROCEDURE — 96372 THER/PROPH/DIAG INJ SC/IM: CPT | Mod: 59

## 2024-10-22 PROCEDURE — 1159F MED LIST DOCD IN RCRD: CPT | Performed by: INTERNAL MEDICINE

## 2024-10-22 RX ORDER — EPINEPHRINE 0.3 MG/.3ML
0.3 INJECTION SUBCUTANEOUS EVERY 5 MIN PRN
OUTPATIENT
Start: 2024-11-07

## 2024-10-22 RX ORDER — DIPHENHYDRAMINE HYDROCHLORIDE 50 MG/ML
50 INJECTION INTRAMUSCULAR; INTRAVENOUS AS NEEDED
OUTPATIENT
Start: 2024-11-07

## 2024-10-22 RX ORDER — BORTEZOMIB 3.5 MG/1
1.3 INJECTION, POWDER, LYOPHILIZED, FOR SOLUTION INTRAVENOUS; SUBCUTANEOUS ONCE
Status: COMPLETED | OUTPATIENT
Start: 2024-10-22 | End: 2024-10-22

## 2024-10-22 RX ORDER — MONTELUKAST SODIUM 10 MG/1
10 TABLET ORAL ONCE
Status: DISCONTINUED | OUTPATIENT
Start: 2024-10-22 | End: 2024-10-22 | Stop reason: HOSPADM

## 2024-10-22 RX ORDER — ACETAMINOPHEN 325 MG/1
650 TABLET ORAL ONCE
Status: DISCONTINUED | OUTPATIENT
Start: 2024-10-22 | End: 2024-10-22 | Stop reason: HOSPADM

## 2024-10-22 RX ORDER — ALBUTEROL SULFATE 0.83 MG/ML
3 SOLUTION RESPIRATORY (INHALATION) AS NEEDED
OUTPATIENT
Start: 2024-11-07

## 2024-10-22 RX ORDER — DIPHENHYDRAMINE HCL 50 MG
50 CAPSULE ORAL ONCE
Status: DISCONTINUED | OUTPATIENT
Start: 2024-10-22 | End: 2024-10-22 | Stop reason: HOSPADM

## 2024-10-22 RX ORDER — FAMOTIDINE 10 MG/ML
20 INJECTION INTRAVENOUS ONCE AS NEEDED
OUTPATIENT
Start: 2024-11-07

## 2024-10-22 ASSESSMENT — PAIN SCALES - GENERAL: PAINLEVEL_OUTOF10: 0-NO PAIN

## 2024-10-22 NOTE — PROGRESS NOTES
"LOCATION:  [facility].     HEMATOLOGY & ONCOLOGY PROBLEMS:  1.  IgA kappa multiple myeloma        a.  Initial diagnosis of MGUS in Feb 2022.       b.  Disease progression to multiple myeloma in Oct 2022.       c.  Ist line therapy with single agent Velcade from Nov 2022 to June 2024.       d. 2nd line therapy with Darzalex/Velcade/Dex beginning July 2024.  2.   Hypercalcemia/lytic lesions.       a.  Maintained on Xgeva  3.  Macrocytic anemia.     CHIEF COMPLAINT:    The patient is in the clinic today for management of multiple myeloma and macrocytic anemia and for continuation of therapy and management of therapy related effects.     HISTORY:   Ms. Cali is a 89 year old pleasant female with multiple myeloma.  She has been in relatively good health for her age and is in fact still working.  Blood work from Feb 2022 showed a hemoglobin of 11.1 with MCV of 101.  WBC, platelets and metabolic  profile was unremarkable except for baseline creatinine of 1.4.  Additional work-up revealed a normal TSH but SPEP showed 0.7 g of IgA kappa monoclonal protein.  Initial evaluation was mainly suggestive of MGUS and she was followed closely.  But routine  follow-up blood work from October 2022 showed 1g of IgA kappa monoclonal protein and markedly elevated kappa free light chain assay.  Creatinine was stable at 1.3 but serum calcium was elevated at 13.1.  PET scan results were unremarkable but a bone marrow  biopsy confirmed plasma cell myeloma.  Cytogenetics and FISH testing results were unremarkable.  After discussions with the patient and family members she was treated with single agent Velcade with good initial response.  Clinical course was complicated by issues with fall leading to right leg surgery, recurrent C. difficile colitis and intermittent hypercalcemia related complications.  Later on she was also noted to have rising myeloma markers and treatment \"regimen has been changed to DVD " "protocol with addition of subcutaneous Darzalex.       INTERVAL HISTORY:  So far she is tolerating Velcade and Darzalex combination well.  Latest myeloma markers from 2024 showed declining M protein and free kappa light chain level.  Lately she is starting to feel slightly better.  Repeat calcium level from today is 10.2.    PAST MEDICAL HISTORY:   1.  Multiple myeloma as detailed above  2.  Hypertension  3.  Hypothyroidism  4.  GERD  5.  Chronic kidney disease  6.  DJD   7.  Idiopathic neuropathy  8.  Lactose intolerance  9.  Vitamin D deficiency  10.  History of cholecystectomy/hysterectomy/tonsillectomy/left knee replacement surgeries     SOCIAL HISTORY:   Lives in [place] with her son.  Non smoker.  Rare social alcohol intake.  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  Born and raised in [place].     FAMILY HISTORY:    Parents  in their 90s from natural causes but a per the patient both probably had some kind of cancers.  5 siblings 1  from lung problems.  7 children, 12 grandkids and  14 great grandkids all alive and well. No other specific history of bleeding, clotting or malignant disorder in the family.     REVIEW OF SYSTEMS:  Pertinent finding as per the history above.  All other systems have been reviewed and generally negative and noncontributory.     ALLERGY & MEDICATIONS:  Allergies and latest outpatient medications list were reviewed in the EMR.    VITAL SIGNS  BSA: 1.57 meters squared  Pulse 71   Temp 36.7 °C (98.1 °F)   Resp 22   Ht (S) 1.498 m (4' 10.98\")   Wt 59.3 kg (130 lb 11.7 oz)   SpO2 96%   BMI 26.43 kg/m²     PHYSICAL EXAMINATION:  Detailed examination not done.    LAB RESULTS:  CBC from today shows a hemoglobin of 9.3 with MCV of 91.   White cell count is 7.2 and platelet are 390.  Metabolic profile is essentially stable with creatinine of 1.4 and calcium of 10.2.  Latest myeloma markers from 2024 showed a kappa light chain " level of 10.06.  SPEP showed 0.4 g of IgA kappa monoclonal protein.    RADIOLOGY RESULT:  Renal ultrasound 7/13/2024   Impression:  Complex renal cysts as described.    CT chest without contrast 7/8/2024   Impression:  CHEST  1.  New small 9 mm nodular density in the medial left lower lobe and a few tiny scattered pulmonary nodules and reticulonodular opacities in the bilateral upper lobes and right lower lobe. These may represent infectious/inflammatory or neoplastic nodules. Attention on short interval follow-up CT chest is recommended.  2. No dense area of consolidation to suggest pneumonia.  3. Tiny layering left pleural effusion with adjacent left basilar subsegmental atelectasis.  4. Other chronic findings, as detailed above.      ABDOMEN - PELVIS  1.  Diffuse colonic wall thickening and pericolonic stranding throughout the entire colon, concerning for acute colitis of infectious, inflammatory or ischemic etiology.  2.  Diffuse extensive diverticulosis again seen.  3.  Intermediate to high density bilateral renal lesions are indeterminate. These may represent hemorrhagic/proteinaceous renal cysts, however solid lesion is not excluded. This may be further assessed with nonemergent renal ultrasound and/or MRI.  4.  Additional chronic incidental findings, as detailed above.      PET/CT Myeloma Initial [Oct 21 2022 10:35AM]  Impression:  1. Multiple focal hypermetabolic pulmonary nodules throughout the bilateral lungs. Findings likely represent inflammatory process, versus less likely, malignancy. Recommend short-term follow-up CT to document resolution of nodules or tissue biopsy is clinically indicated.  2. Several hypermetabolic nonenlarged mediastinal lymph nodes, likely reactive in etiology.  3. Mild increased metabolic activity throughout the axial and appendicular skeleton, which is nonspecific and could be related to patient's anemia versus diffuse myeloma involvement. There is no focal hypermetabolic  disease involvement within the skeleton.     PATHOLOGY RESULTS:  Surgical Pathology [Oct 28 2022 1:58PM] ([id number])  Specimens: BONE MARROW CLOT /BONE MARROW CORE   Name SUMIT OTT   Accession #: [id number]    Pathologist: ADRIANA NATION MD   Date of Procedure: 10/25/2022   Submitting Physician: RENATE MELGAR MD   Location: [facility] Other External #   FINAL DIAGNOSIS   A&B:  BONE MARROW, ASPIRATE WITH CLOT AND CORE BIOPSY WITH TOUCH IMPRINT, RIGHT ILIAC CREST:   -- SLIGHTLY HYPERCELLULAR BONE MARROW WITH 15% KAPPA+ PLASMA CELLS CONSISTENT WITH PLASMA CELL MYELOMA   NOTE: Clinical correlation recommended.   Pending  Genetic/Molecular testing per Hematopathology protocol:   -Chromosome analysis: karyotype   -FISH: Myeloma panel   -Molecular: None   -Microarray: None   The results will be reported separately.   Touch Prep   Differential:  (Normal) %   Promyelocytes (1-5) 1   Myelocytes (5-10) 8   Metamyelocytes (10-25) 13   Bands (10-20) 9   Segmented forms (5-30) 17   Eosinophils (2-4) 11   Basophils (0-1) 1   Lymphocytes (5-25) 2   Monocytes (0-2)  0   Plasma Cells (0-2) 15   Blasts (0-1) 0   Total Erythroid (17-35) 23   Number of cells counted: 100   Cellularity: cellular   M:E Ratio: 2.7:1   The gross and/or microscopic findings were reviewed in conjunction with pathology  resident, Kirstin Gutierrez M.D.   Electronically Signed Out By ADRIANA NATION MD/[facility]   Microscopic Description:   CBC: WBC 18.8 x 10E9/L, RBC 2.77 x 10E12/L, Hgb 8.8 g/dl, Hct 26.2%, MCV 95 fL, RDW 13.6%, platelets  416 x 10E9/L.   A 100 cell manual differential count reveals: polys 61%, bands 8%, lymphocytes 13%, monocytes 9%, eosinophils 8%, basophils 1%.   PERIPHERAL SMEAR: Submitted   Red cells: Normocytic with rouleaux formation.   White cells: Eosinophilia    Platelets: Normal, adequate.   ASPIRATE SMEAR: Submitted   Specimen: Aspicular.   Comments: Hypocellular and hemodilute with few hematopoietic precursors and  scattered atypical plasma cells, some biunucleated.   TOUCH PREP: Submitted    Specimen: Paucicellular.   Erythropoiesis: Normal maturation.   Granulopoiesis: Normal maturation.   Megakaryocytes: Present. Morphology: Normal.   Comments: Numerous plasma cells (15%). Some very large atypical plasma cells seen.   ASPIRATE CLOT: Submitted   Specimen: Aspicular.   Comments: Rare hematopoietic progenitor cells noted in a hemodiluted sample.   CORE BIOPSY: Submitted   Specimen: Limited. Small subcortical with crush artifact   Cellularity: 40%.   Estimated M:E ratio: Consistent with aspirate smear.   Bony trabeculae: Normal.   Megakaryocytes: Adequate. Morphology: Normal.   Granulomas: Absent.   Lymphoid aggregates: Absent.   Comments: Few small aggregates of plasm cells noted.    SPECIAL STAINS:   Iron: Inadequate sample to evaluate   IMMUNOHISTOCHEMISTRY: Performed.    - Positive plasma cells approximately (15%)   Cyclin D1 - Negative   IH Kappa - positice in plasma cells   IH Lambda - rare plasma  cells positive.   FLOW CYTOMETRY: Performed, see separate report. A small clonal kappa+, CD19-, CD45- plasma cell population detected.   Immunostains were performed in addition to flow cytometry to fully characterize the phenotype, architecture,  and extent of the atypical population(s). This was medically necessary for the best possible diagnosis.      ASSESSMENT & PLAN:  1.  IgA kappa multiple myeloma.  Please refer to the details of initial presentation and management as outlined above. In summary, patient with finding of mild macrocytic  anemia with further work-up revealing 0.7g of IgA kappa monoclonal protein on SPEP in Feb 2022. Clinically she was essentially asymptomatic and overall functional for her age. She was initially followed with close observation.  Subsequent routine follow-up blood work from Oct 2022 showed 1gm of IgA kappa monoclonal protein and markedly elevated kappa free light chain assay.  Creatinine  "was stable at 1.3 but serum calcium was elevated at 13.1.  PET scan results were unremarkable but a bone marrow biopsy  confirmed plasma cell myeloma.  Cytogenetics and FISH testing was unremarkable. After discussions with the patient and family members she was treated with single agent Velcade with good initial response.  Clinical course was complicated by issues with fall leading to right leg surgery, recurrent C. difficile colitis and intermittent hypercalcemia related complications.  Later on she was also noted to have rising myeloma markers and treatment \"regimen has been changed to DVD protocol with addition of subcutaneous Darzalex.       Overall she is tolerating Velcade/Darzalex combination well at the current once every 2-week schedule.  Probable side effects of neuropathy, thrombocytopenia, weakness, fatigue, rash etc. were explained to her in detail.  Repeat myeloma markers from today are pending but I noticed her calcium is trending up again.  It is 10.2 today.  If myeloma markers are increasing again then we will probably have to change the chemo schedule to weekly dosing.  Will be always a challenge to find a balance between myeloma activity, intensity of the treatment and quality of life issues, at her age.  Overall prognosis remains guarded.    2.  Hypercalcemia.  Secondary to baseline myeloma.  PET scan as such was negative for bony lytic lesions.  She will continue with Xgeva.     3. Follow up: Follow-up with me in about 4 weeks and will come to the clinic for for continuation of chemotherapy with Darzalex and daratumumab combination as detailed above.  Advised to contact us immediately if there are any new questions or problems.     This note has been transcribed using Dragon voice recognition system and there is a possibility of unintentional typing misprints.      "

## 2024-10-23 LAB
KAPPA LC SERPL-MCNC: 8.26 MG/DL (ref 0.33–1.94)
KAPPA LC/LAMBDA SER: 9.72 {RATIO} (ref 0.26–1.65)
LAMBDA LC SERPL-MCNC: 0.85 MG/DL (ref 0.57–2.63)

## 2024-10-28 LAB
ALBUMIN: 3.5 G/DL (ref 3.4–5)
ALPHA 1 GLOBULIN: 0.4 G/DL (ref 0.2–0.6)
ALPHA 2 GLOBULIN: 0.9 G/DL (ref 0.4–1.1)
BETA GLOBULIN: 1.1 G/DL (ref 0.5–1.2)
GAMMA GLOBULIN: 0.4 G/DL (ref 0.5–1.4)
IMMUNOFIXATION COMMENT: ABNORMAL
M-PROTEIN 1: 0.4 G/DL
M-PROTEIN 2: 0.1 G/DL
PATH REVIEW - SERUM IMMUNOFIXATION: ABNORMAL
PATH REVIEW-SERUM PROTEIN ELECTROPHORESIS: ABNORMAL
PROTEIN ELECTROPHORESIS COMMENT: ABNORMAL

## 2024-11-01 ENCOUNTER — PATIENT OUTREACH (OUTPATIENT)
Dept: PRIMARY CARE | Facility: CLINIC | Age: 89
End: 2024-11-01
Payer: MEDICARE

## 2024-11-01 ENCOUNTER — APPOINTMENT (OUTPATIENT)
Dept: HEMATOLOGY/ONCOLOGY | Facility: CLINIC | Age: 89
End: 2024-11-01
Payer: MEDICARE

## 2024-11-05 ENCOUNTER — APPOINTMENT (OUTPATIENT)
Dept: HEMATOLOGY/ONCOLOGY | Facility: CLINIC | Age: 89
End: 2024-11-05
Payer: MEDICARE

## 2024-11-06 RX ORDER — DIPHENHYDRAMINE HCL 50 MG
50 CAPSULE ORAL ONCE
OUTPATIENT
Start: 2024-11-21

## 2024-11-06 RX ORDER — ACETAMINOPHEN 325 MG/1
650 TABLET ORAL ONCE
Status: CANCELLED | OUTPATIENT
Start: 2024-11-07

## 2024-11-06 RX ORDER — MONTELUKAST SODIUM 10 MG/1
10 TABLET ORAL ONCE
Status: CANCELLED | OUTPATIENT
Start: 2024-11-07

## 2024-11-06 RX ORDER — ACETAMINOPHEN 325 MG/1
650 TABLET ORAL ONCE
OUTPATIENT
Start: 2024-11-21

## 2024-11-06 RX ORDER — ALBUTEROL SULFATE 0.83 MG/ML
3 SOLUTION RESPIRATORY (INHALATION) AS NEEDED
OUTPATIENT
Start: 2024-11-21

## 2024-11-06 RX ORDER — BORTEZOMIB 3.5 MG/1
1.3 INJECTION, POWDER, LYOPHILIZED, FOR SOLUTION INTRAVENOUS; SUBCUTANEOUS ONCE
OUTPATIENT
Start: 2024-11-21

## 2024-11-06 RX ORDER — DIPHENHYDRAMINE HYDROCHLORIDE 50 MG/ML
50 INJECTION INTRAMUSCULAR; INTRAVENOUS AS NEEDED
OUTPATIENT
Start: 2024-11-21

## 2024-11-06 RX ORDER — DIPHENHYDRAMINE HCL 50 MG
50 CAPSULE ORAL ONCE
Status: CANCELLED | OUTPATIENT
Start: 2024-11-07

## 2024-11-06 RX ORDER — FAMOTIDINE 10 MG/ML
20 INJECTION INTRAVENOUS ONCE AS NEEDED
OUTPATIENT
Start: 2024-11-21

## 2024-11-06 RX ORDER — MONTELUKAST SODIUM 10 MG/1
10 TABLET ORAL ONCE
OUTPATIENT
Start: 2024-11-21

## 2024-11-06 RX ORDER — EPINEPHRINE 0.3 MG/.3ML
0.3 INJECTION SUBCUTANEOUS EVERY 5 MIN PRN
OUTPATIENT
Start: 2024-11-21

## 2024-11-07 ENCOUNTER — LAB (OUTPATIENT)
Dept: LAB | Facility: CLINIC | Age: 89
End: 2024-11-07
Payer: MEDICARE

## 2024-11-07 ENCOUNTER — INFUSION (OUTPATIENT)
Dept: HEMATOLOGY/ONCOLOGY | Facility: CLINIC | Age: 89
End: 2024-11-07
Payer: MEDICARE

## 2024-11-07 VITALS
WEIGHT: 129.19 LBS | DIASTOLIC BLOOD PRESSURE: 64 MMHG | RESPIRATION RATE: 18 BRPM | BODY MASS INDEX: 26.04 KG/M2 | HEIGHT: 59 IN | TEMPERATURE: 97.7 F | HEART RATE: 66 BPM | SYSTOLIC BLOOD PRESSURE: 179 MMHG | OXYGEN SATURATION: 97 %

## 2024-11-07 DIAGNOSIS — C90.00 MULTIPLE MYELOMA, REMISSION STATUS UNSPECIFIED (MULTI): ICD-10-CM

## 2024-11-07 DIAGNOSIS — E83.52 HYPERCALCEMIA: ICD-10-CM

## 2024-11-07 DIAGNOSIS — C90.00 MULTIPLE MYELOMA, REMISSION STATUS UNSPECIFIED (MULTI): Primary | ICD-10-CM

## 2024-11-07 DIAGNOSIS — C90.00 MULTIPLE MYELOMA NOT HAVING ACHIEVED REMISSION (MULTI): ICD-10-CM

## 2024-11-07 LAB
ALBUMIN SERPL BCP-MCNC: 3.9 G/DL (ref 3.4–5)
ALP SERPL-CCNC: 59 U/L (ref 33–136)
ALT SERPL W P-5'-P-CCNC: 13 U/L (ref 7–45)
ANION GAP SERPL CALC-SCNC: 11 MMOL/L (ref 10–20)
AST SERPL W P-5'-P-CCNC: 19 U/L (ref 9–39)
BASOPHILS # BLD AUTO: 0.06 X10*3/UL (ref 0–0.1)
BASOPHILS NFR BLD AUTO: 0.5 %
BILIRUB SERPL-MCNC: 0.3 MG/DL (ref 0–1.2)
BUN SERPL-MCNC: 41 MG/DL (ref 6–23)
CALCIUM SERPL-MCNC: 10.2 MG/DL (ref 8.6–10.3)
CHLORIDE SERPL-SCNC: 106 MMOL/L (ref 98–107)
CO2 SERPL-SCNC: 26 MMOL/L (ref 21–32)
CREAT SERPL-MCNC: 1.35 MG/DL (ref 0.5–1.05)
EGFRCR SERPLBLD CKD-EPI 2021: 37 ML/MIN/1.73M*2
EOSINOPHIL # BLD AUTO: 0.54 X10*3/UL (ref 0–0.4)
EOSINOPHIL NFR BLD AUTO: 4.9 %
ERYTHROCYTE [DISTWIDTH] IN BLOOD BY AUTOMATED COUNT: 19.4 % (ref 11.5–14.5)
GLUCOSE SERPL-MCNC: 114 MG/DL (ref 74–99)
HCT VFR BLD AUTO: 32.1 % (ref 36–46)
HGB BLD-MCNC: 9.8 G/DL (ref 12–16)
IMM GRANULOCYTES # BLD AUTO: 0.05 X10*3/UL (ref 0–0.5)
IMM GRANULOCYTES NFR BLD AUTO: 0.5 % (ref 0–0.9)
LYMPHOCYTES # BLD AUTO: 1.53 X10*3/UL (ref 0.8–3)
LYMPHOCYTES NFR BLD AUTO: 13.9 %
MCH RBC QN AUTO: 28 PG (ref 26–34)
MCHC RBC AUTO-ENTMCNC: 30.5 G/DL (ref 32–36)
MCV RBC AUTO: 92 FL (ref 80–100)
MONOCYTES # BLD AUTO: 0.86 X10*3/UL (ref 0.05–0.8)
MONOCYTES NFR BLD AUTO: 7.8 %
NEUTROPHILS # BLD AUTO: 7.95 X10*3/UL (ref 1.6–5.5)
NEUTROPHILS NFR BLD AUTO: 72.4 %
NRBC BLD-RTO: 0 /100 WBCS (ref 0–0)
PLATELET # BLD AUTO: 339 X10*3/UL (ref 150–450)
POTASSIUM SERPL-SCNC: 3.9 MMOL/L (ref 3.5–5.3)
PROT SERPL-MCNC: 6.6 G/DL (ref 6.4–8.2)
RBC # BLD AUTO: 3.5 X10*6/UL (ref 4–5.2)
SODIUM SERPL-SCNC: 139 MMOL/L (ref 136–145)
WBC # BLD AUTO: 11 X10*3/UL (ref 4.4–11.3)

## 2024-11-07 PROCEDURE — 2500000004 HC RX 250 GENERAL PHARMACY W/ HCPCS (ALT 636 FOR OP/ED): Mod: JG | Performed by: INTERNAL MEDICINE

## 2024-11-07 PROCEDURE — 36415 COLL VENOUS BLD VENIPUNCTURE: CPT

## 2024-11-07 PROCEDURE — 85025 COMPLETE CBC W/AUTO DIFF WBC: CPT

## 2024-11-07 PROCEDURE — 96401 CHEMO ANTI-NEOPL SQ/IM: CPT

## 2024-11-07 PROCEDURE — 84075 ASSAY ALKALINE PHOSPHATASE: CPT

## 2024-11-07 RX ORDER — BORTEZOMIB 3.5 MG/1
1.3 INJECTION, POWDER, LYOPHILIZED, FOR SOLUTION INTRAVENOUS; SUBCUTANEOUS ONCE
Status: COMPLETED | OUTPATIENT
Start: 2024-11-07 | End: 2024-11-07

## 2024-11-07 RX ORDER — EPINEPHRINE 0.3 MG/.3ML
0.3 INJECTION SUBCUTANEOUS EVERY 5 MIN PRN
Status: DISCONTINUED | OUTPATIENT
Start: 2024-11-07 | End: 2024-11-07 | Stop reason: HOSPADM

## 2024-11-07 RX ORDER — DIPHENHYDRAMINE HYDROCHLORIDE 50 MG/ML
50 INJECTION INTRAMUSCULAR; INTRAVENOUS AS NEEDED
Status: DISCONTINUED | OUTPATIENT
Start: 2024-11-07 | End: 2024-11-07 | Stop reason: HOSPADM

## 2024-11-07 RX ORDER — FAMOTIDINE 10 MG/ML
20 INJECTION INTRAVENOUS ONCE AS NEEDED
Status: DISCONTINUED | OUTPATIENT
Start: 2024-11-07 | End: 2024-11-07 | Stop reason: HOSPADM

## 2024-11-07 RX ORDER — ALBUTEROL SULFATE 0.83 MG/ML
3 SOLUTION RESPIRATORY (INHALATION) AS NEEDED
Status: DISCONTINUED | OUTPATIENT
Start: 2024-11-07 | End: 2024-11-07 | Stop reason: HOSPADM

## 2024-11-07 ASSESSMENT — PAIN SCALES - GENERAL: PAINLEVEL_OUTOF10: 0-NO PAIN

## 2024-11-07 NOTE — SIGNIFICANT EVENT
11/07/24 1029   Prechemo Checklist   Has the patient been in the hospital, ED, or urgent care since last date of service No   Chemo/Immuno Consent Completed and Signed Yes   Protocol/Indications Verified Yes   Confirmed to previous date/time of medication Yes   Compared to previous dose Yes   All medications are dated accurately Yes   Pregnancy Test Negative Not applicable   Parameters Met Yes   Provider Notified Yes   Is Patient Proceeding With Treatment? Yes   BSA/Weight-Height Verified Yes   Dose Calculations Verified (current, total, cumulative) Yes

## 2024-11-12 ENCOUNTER — APPOINTMENT (OUTPATIENT)
Dept: OPHTHALMOLOGY | Facility: CLINIC | Age: 89
End: 2024-11-12
Payer: MEDICARE

## 2024-11-12 DIAGNOSIS — H52.203 HYPEROPIA OF BOTH EYES WITH ASTIGMATISM AND PRESBYOPIA: Primary | ICD-10-CM

## 2024-11-12 DIAGNOSIS — H52.4 HYPEROPIA OF BOTH EYES WITH ASTIGMATISM AND PRESBYOPIA: Primary | ICD-10-CM

## 2024-11-12 DIAGNOSIS — H35.63 RETINAL HEMORRHAGE OF BOTH EYES: ICD-10-CM

## 2024-11-12 DIAGNOSIS — H35.54 RETINAL PIGMENT EPITHELIAL ATROPHY: ICD-10-CM

## 2024-11-12 DIAGNOSIS — H52.03 HYPEROPIA OF BOTH EYES WITH ASTIGMATISM AND PRESBYOPIA: Primary | ICD-10-CM

## 2024-11-12 DIAGNOSIS — Z96.1 PSEUDOPHAKIA OF BOTH EYES: ICD-10-CM

## 2024-11-12 PROCEDURE — 92015 DETERMINE REFRACTIVE STATE: CPT | Performed by: OPTOMETRIST

## 2024-11-12 PROCEDURE — 92004 COMPRE OPH EXAM NEW PT 1/>: CPT | Performed by: OPTOMETRIST

## 2024-11-12 PROCEDURE — 92134 CPTRZ OPH DX IMG PST SGM RTA: CPT | Performed by: OPTOMETRIST

## 2024-11-12 ASSESSMENT — REFRACTION_WEARINGRX
OD_SPHERE: +2.00
OD_AXIS: 114
OD_SPHERE: +1.75
OD_AXIS: 102
OD_ADD: +2.50
OS_ADD: +2.50
OS_AXIS: 070
OS_CYLINDER: -0.75
OS_SPHERE: +2.00
OD_CYLINDER: -1.25
OS_SPHERE: +2.00
OD_ADD: +3.00
OS_CYLINDER: -1.25
OS_ADD: +3.00
OD_CYLINDER: -1.50
OS_AXIS: 075

## 2024-11-12 ASSESSMENT — VISUAL ACUITY
OS_CC: 20/30+2
OD_CC: 20/25-2
OS_CC: J2
OD_CC: J2
OD_CC: 20/30+2
OS_CC: J1-1
OD_CC: J2-2
METHOD: SNELLEN - LINEAR
OS_CC: 20/30
METHOD: SNELLEN - LINEAR
CORRECTION_TYPE: GLASSES

## 2024-11-12 ASSESSMENT — REFRACTION_MANIFEST
OS_SPHERE: +1.75
METHOD_AUTOREFRACTION: 1
OD_SPHERE: +2.00
OS_AXIS: 063
OS_SPHERE: +1.50
OS_CYLINDER: -1.25
OD_CYLINDER: -1.75
OS_AXIS: 075
OD_SPHERE: +1.75
OS_ADD: +3.00
OD_CYLINDER: -1.50
OD_AXIS: 093
OS_CYLINDER: -1.25
OD_ADD: +3.00
OD_AXIS: 100

## 2024-11-12 ASSESSMENT — CONF VISUAL FIELD
OS_SUPERIOR_NASAL_RESTRICTION: 0
OS_NORMAL: 1
OD_SUPERIOR_NASAL_RESTRICTION: 0
OS_SUPERIOR_TEMPORAL_RESTRICTION: 0
OS_INFERIOR_NASAL_RESTRICTION: 0
OD_INFERIOR_TEMPORAL_RESTRICTION: 0
OD_INFERIOR_NASAL_RESTRICTION: 0
OD_NORMAL: 1
OS_INFERIOR_TEMPORAL_RESTRICTION: 0
OD_SUPERIOR_TEMPORAL_RESTRICTION: 0

## 2024-11-12 ASSESSMENT — TONOMETRY
OD_IOP_MMHG: 15
IOP_METHOD: TONOPEN
OS_IOP_MMHG: 16

## 2024-11-12 ASSESSMENT — EXTERNAL EXAM - LEFT EYE: OS_EXAM: NORMAL

## 2024-11-12 ASSESSMENT — CUP TO DISC RATIO
OS_RATIO: 0.75
OD_RATIO: 0.65

## 2024-11-12 ASSESSMENT — SLIT LAMP EXAM - LIDS
COMMENTS: NORMAL
COMMENTS: NORMAL

## 2024-11-12 ASSESSMENT — ENCOUNTER SYMPTOMS: EYES NEGATIVE: 1

## 2024-11-12 ASSESSMENT — EXTERNAL EXAM - RIGHT EYE: OD_EXAM: NORMAL

## 2024-11-12 NOTE — PROGRESS NOTES
Assessment/Plan   Problem List Items Addressed This Visit          Eye/Vision problems    Hyperopia of both eyes with astigmatism and presbyopia - Primary     Mild change from habitual Rx.   Pt educated on findings. Release Rx for FTW. Discussed adaptation. Monitor annually. Pt voiced understanding.            Pseudophakia of both eyes     Posterior chamber intraocular lens (PCIOL) status post (s/p) YAG in both eyes. Right eye PCO membrane does not appear to be completely lasered.  Pt educated on findings. Continue to monitor annually at this time. Pt voiced understanding.         Retinal hemorrhage of both eyes     Right eye with scattered dot and blot hemorrhages in posterior pole. Left eye with single blot hemorrhage ~3DD superior to ONH. No Hx of diabetes mellitus (DM), +HTN.  Pt educated on findings and importance of continuing are with PCP as directed. RTC 3 months for DFE follow-up or sooner if symptoms worsen. Repeat macular OCT. Pt voiced understanding.          Retinal pigment epithelial atrophy     Parafoveal retinal pigment epithelium (RPE)/photoreceptor loss, diffuse.  Pt educated on findings. Monitor only at this time - will repeat macular OCT at follow-up in 3 months. Pt voiced understanding.          Relevant Orders    OCT, Retina - OU - Both Eyes (Completed)    OCT, Optic Nerve - OU - Both Eyes (Completed)

## 2024-11-12 NOTE — ASSESSMENT & PLAN NOTE
Parafoveal retinal pigment epithelium (RPE)/photoreceptor loss, diffuse.  Pt educated on findings. Monitor only at this time - will repeat macular OCT at follow-up in 3 months. Pt voiced understanding.

## 2024-11-12 NOTE — ASSESSMENT & PLAN NOTE
Right eye with scattered dot and blot hemorrhages in posterior pole. Left eye with single blot hemorrhage ~3DD superior to ONH. No Hx of diabetes mellitus (DM), +HTN.  Pt educated on findings and importance of continuing are with PCP as directed. RTC 3 months for DFE follow-up or sooner if symptoms worsen. Repeat macular OCT. Pt voiced understanding.

## 2024-11-12 NOTE — ASSESSMENT & PLAN NOTE
Posterior chamber intraocular lens (PCIOL) status post (s/p) YAG in both eyes. Right eye PCO membrane does not appear to be completely lasered.  Pt educated on findings. Continue to monitor annually at this time. Pt voiced understanding.

## 2024-11-12 NOTE — ASSESSMENT & PLAN NOTE
Mild change from habitual Rx.   Pt educated on findings. Release Rx for FTW. Discussed adaptation. Monitor annually. Pt voiced understanding.

## 2024-11-13 DIAGNOSIS — I10 ESSENTIAL HYPERTENSION: ICD-10-CM

## 2024-11-15 ENCOUNTER — APPOINTMENT (OUTPATIENT)
Dept: HEMATOLOGY/ONCOLOGY | Facility: CLINIC | Age: 89
End: 2024-11-15
Payer: MEDICARE

## 2024-11-15 RX ORDER — LOSARTAN POTASSIUM 100 MG/1
100 TABLET ORAL DAILY
Qty: 90 TABLET | Refills: 0 | Status: SHIPPED | OUTPATIENT
Start: 2024-11-15

## 2024-11-19 ENCOUNTER — APPOINTMENT (OUTPATIENT)
Dept: HEMATOLOGY/ONCOLOGY | Facility: CLINIC | Age: 89
End: 2024-11-19
Payer: MEDICARE

## 2024-11-21 ENCOUNTER — INFUSION (OUTPATIENT)
Dept: HEMATOLOGY/ONCOLOGY | Facility: CLINIC | Age: 89
End: 2024-11-21
Payer: MEDICARE

## 2024-11-21 ENCOUNTER — OFFICE VISIT (OUTPATIENT)
Dept: HEMATOLOGY/ONCOLOGY | Facility: CLINIC | Age: 89
End: 2024-11-21
Payer: MEDICARE

## 2024-11-21 ENCOUNTER — LAB (OUTPATIENT)
Dept: LAB | Facility: CLINIC | Age: 89
End: 2024-11-21
Payer: MEDICARE

## 2024-11-21 VITALS
BODY MASS INDEX: 26 KG/M2 | HEART RATE: 70 BPM | TEMPERATURE: 97.7 F | RESPIRATION RATE: 20 BRPM | WEIGHT: 128.97 LBS | OXYGEN SATURATION: 98 % | SYSTOLIC BLOOD PRESSURE: 182 MMHG | DIASTOLIC BLOOD PRESSURE: 76 MMHG | HEIGHT: 59 IN

## 2024-11-21 DIAGNOSIS — C90.00 MULTIPLE MYELOMA NOT HAVING ACHIEVED REMISSION (MULTI): ICD-10-CM

## 2024-11-21 DIAGNOSIS — E83.52 HYPERCALCEMIA: ICD-10-CM

## 2024-11-21 DIAGNOSIS — C90.00 MULTIPLE MYELOMA, REMISSION STATUS UNSPECIFIED (MULTI): ICD-10-CM

## 2024-11-21 LAB
ALBUMIN SERPL BCP-MCNC: 3.9 G/DL (ref 3.4–5)
ALP SERPL-CCNC: 52 U/L (ref 33–136)
ALT SERPL W P-5'-P-CCNC: 11 U/L (ref 7–45)
ANION GAP SERPL CALC-SCNC: 11 MMOL/L (ref 10–20)
AST SERPL W P-5'-P-CCNC: 17 U/L (ref 9–39)
BASOPHILS # BLD AUTO: 0.04 X10*3/UL (ref 0–0.1)
BASOPHILS NFR BLD AUTO: 0.5 %
BILIRUB SERPL-MCNC: 0.4 MG/DL (ref 0–1.2)
BUN SERPL-MCNC: 50 MG/DL (ref 6–23)
CALCIUM SERPL-MCNC: 10.4 MG/DL (ref 8.6–10.3)
CHLORIDE SERPL-SCNC: 107 MMOL/L (ref 98–107)
CO2 SERPL-SCNC: 24 MMOL/L (ref 21–32)
CREAT SERPL-MCNC: 1.44 MG/DL (ref 0.5–1.05)
EGFRCR SERPLBLD CKD-EPI 2021: 34 ML/MIN/1.73M*2
EOSINOPHIL # BLD AUTO: 0.28 X10*3/UL (ref 0–0.4)
EOSINOPHIL NFR BLD AUTO: 3.5 %
ERYTHROCYTE [DISTWIDTH] IN BLOOD BY AUTOMATED COUNT: 18.5 % (ref 11.5–14.5)
GLUCOSE SERPL-MCNC: 129 MG/DL (ref 74–99)
HCT VFR BLD AUTO: 33.3 % (ref 36–46)
HGB BLD-MCNC: 10.3 G/DL (ref 12–16)
IMM GRANULOCYTES # BLD AUTO: 0.03 X10*3/UL (ref 0–0.5)
IMM GRANULOCYTES NFR BLD AUTO: 0.4 % (ref 0–0.9)
LYMPHOCYTES # BLD AUTO: 1.61 X10*3/UL (ref 0.8–3)
LYMPHOCYTES NFR BLD AUTO: 20 %
MCH RBC QN AUTO: 27.9 PG (ref 26–34)
MCHC RBC AUTO-ENTMCNC: 30.9 G/DL (ref 32–36)
MCV RBC AUTO: 90 FL (ref 80–100)
MONOCYTES # BLD AUTO: 0.68 X10*3/UL (ref 0.05–0.8)
MONOCYTES NFR BLD AUTO: 8.5 %
NEUTROPHILS # BLD AUTO: 5.4 X10*3/UL (ref 1.6–5.5)
NEUTROPHILS NFR BLD AUTO: 67.1 %
NRBC BLD-RTO: 0 /100 WBCS (ref 0–0)
PLATELET # BLD AUTO: 345 X10*3/UL (ref 150–450)
POTASSIUM SERPL-SCNC: 4.3 MMOL/L (ref 3.5–5.3)
PROT SERPL-MCNC: 6.5 G/DL (ref 6.4–8.2)
RBC # BLD AUTO: 3.69 X10*6/UL (ref 4–5.2)
SODIUM SERPL-SCNC: 138 MMOL/L (ref 136–145)
WBC # BLD AUTO: 8 X10*3/UL (ref 4.4–11.3)

## 2024-11-21 PROCEDURE — 1123F ACP DISCUSS/DSCN MKR DOCD: CPT | Performed by: INTERNAL MEDICINE

## 2024-11-21 PROCEDURE — 96401 CHEMO ANTI-NEOPL SQ/IM: CPT

## 2024-11-21 PROCEDURE — 1036F TOBACCO NON-USER: CPT | Performed by: INTERNAL MEDICINE

## 2024-11-21 PROCEDURE — 36415 COLL VENOUS BLD VENIPUNCTURE: CPT

## 2024-11-21 PROCEDURE — 96372 THER/PROPH/DIAG INJ SC/IM: CPT

## 2024-11-21 PROCEDURE — 3077F SYST BP >= 140 MM HG: CPT | Performed by: INTERNAL MEDICINE

## 2024-11-21 PROCEDURE — 85025 COMPLETE CBC W/AUTO DIFF WBC: CPT

## 2024-11-21 PROCEDURE — 2500000004 HC RX 250 GENERAL PHARMACY W/ HCPCS (ALT 636 FOR OP/ED): Mod: JZ,JG | Performed by: INTERNAL MEDICINE

## 2024-11-21 PROCEDURE — 1159F MED LIST DOCD IN RCRD: CPT | Performed by: INTERNAL MEDICINE

## 2024-11-21 PROCEDURE — 1126F AMNT PAIN NOTED NONE PRSNT: CPT | Performed by: INTERNAL MEDICINE

## 2024-11-21 PROCEDURE — 80053 COMPREHEN METABOLIC PANEL: CPT

## 2024-11-21 PROCEDURE — 3078F DIAST BP <80 MM HG: CPT | Performed by: INTERNAL MEDICINE

## 2024-11-21 PROCEDURE — 99214 OFFICE O/P EST MOD 30 MIN: CPT | Performed by: INTERNAL MEDICINE

## 2024-11-21 PROCEDURE — 1157F ADVNC CARE PLAN IN RCRD: CPT | Performed by: INTERNAL MEDICINE

## 2024-11-21 RX ORDER — BORTEZOMIB 3.5 MG/1
1.3 INJECTION, POWDER, LYOPHILIZED, FOR SOLUTION INTRAVENOUS; SUBCUTANEOUS ONCE
Status: COMPLETED | OUTPATIENT
Start: 2024-11-21 | End: 2024-11-21

## 2024-11-21 RX ORDER — DIPHENHYDRAMINE HYDROCHLORIDE 50 MG/ML
50 INJECTION INTRAMUSCULAR; INTRAVENOUS AS NEEDED
OUTPATIENT
Start: 2024-12-05

## 2024-11-21 RX ORDER — FAMOTIDINE 10 MG/ML
20 INJECTION INTRAVENOUS ONCE AS NEEDED
Status: DISCONTINUED | OUTPATIENT
Start: 2024-11-21 | End: 2024-11-21 | Stop reason: HOSPADM

## 2024-11-21 RX ORDER — EPINEPHRINE 0.3 MG/.3ML
0.3 INJECTION SUBCUTANEOUS EVERY 5 MIN PRN
Status: DISCONTINUED | OUTPATIENT
Start: 2024-11-21 | End: 2024-11-21 | Stop reason: HOSPADM

## 2024-11-21 RX ORDER — ALBUTEROL SULFATE 0.83 MG/ML
3 SOLUTION RESPIRATORY (INHALATION) AS NEEDED
OUTPATIENT
Start: 2024-12-05

## 2024-11-21 RX ORDER — ALBUTEROL SULFATE 0.83 MG/ML
3 SOLUTION RESPIRATORY (INHALATION) AS NEEDED
Status: DISCONTINUED | OUTPATIENT
Start: 2024-11-21 | End: 2024-11-21 | Stop reason: HOSPADM

## 2024-11-21 RX ORDER — EPINEPHRINE 0.3 MG/.3ML
0.3 INJECTION SUBCUTANEOUS EVERY 5 MIN PRN
OUTPATIENT
Start: 2024-12-05

## 2024-11-21 RX ORDER — DIPHENHYDRAMINE HYDROCHLORIDE 50 MG/ML
50 INJECTION INTRAMUSCULAR; INTRAVENOUS AS NEEDED
Status: DISCONTINUED | OUTPATIENT
Start: 2024-11-21 | End: 2024-11-21 | Stop reason: HOSPADM

## 2024-11-21 RX ORDER — FAMOTIDINE 10 MG/ML
20 INJECTION INTRAVENOUS ONCE AS NEEDED
OUTPATIENT
Start: 2024-12-05

## 2024-11-21 ASSESSMENT — PAIN SCALES - GENERAL: PAINLEVEL_OUTOF10: 0-NO PAIN

## 2024-11-21 NOTE — PROGRESS NOTES
"LOCATION:  [facility].     HEMATOLOGY & ONCOLOGY PROBLEMS:  1.  IgA kappa multiple myeloma        a.  Initial diagnosis of MGUS in Feb 2022.       b.  Disease progression to multiple myeloma in Oct 2022.       c.   Ist line therapy with single agent Velcade from Nov 2022 to June 2024.       d.  2nd line therapy with Darzalex/Velcade/Dex beginning July 2024.  2.   Hypercalcemia/lytic lesions.       a.  Maintained on Xgeva  3.  Macrocytic anemia.     CHIEF COMPLAINT:    The patient is in the clinic today for management of multiple myeloma and macrocytic anemia and for continuation of therapy and management of therapy related effects.     HISTORY:   Ms. Cali is a 89 year old pleasant female with multiple myeloma.  She has been in relatively good health for her age and is in fact still working.  Blood work from Feb 2022 showed a hemoglobin of 11.1 with MCV of 101.  WBC, platelets and metabolic  profile was unremarkable except for baseline creatinine of 1.4.  Additional work-up revealed a normal TSH but SPEP showed 0.7 g of IgA kappa monoclonal protein.  Initial evaluation was mainly suggestive of MGUS and she was followed closely.  But routine  follow-up blood work from October 2022 showed 1g of IgA kappa monoclonal protein and markedly elevated kappa free light chain assay.  Creatinine was stable at 1.3 but serum calcium was elevated at 13.1.  PET scan results were unremarkable but a bone marrow  biopsy confirmed plasma cell myeloma.  Cytogenetics and FISH testing results were unremarkable.  After discussions with the patient and family members she was treated with single agent Velcade with good initial response.  Clinical course was complicated by issues with fall leading to right leg surgery, recurrent C. difficile colitis and intermittent hypercalcemia related complications.  Later on she was also noted to have rising myeloma markers and treatment \"regimen has been changed to DVD " "protocol with addition of subcutaneous Darzalex.       INTERVAL HISTORY:  So far she is tolerating Velcade and Darzalex combination well.  Latest myeloma markers from 2024 showed declining M protein and free kappa light chain level.  Lately she is starting to feel slightly better.  Repeat calcium level from today is 10.4.    PAST MEDICAL HISTORY:   1.  Multiple myeloma as detailed above  2.  Hypertension  3.  Hypothyroidism  4.  GERD  5.  Chronic kidney disease  6.  DJD   7.  Idiopathic neuropathy  8.  Lactose intolerance  9.  Vitamin D deficiency  10.  History of cholecystectomy/hysterectomy/tonsillectomy/left knee replacement surgeries     SOCIAL HISTORY:   Lives in [place] with her son.  Non smoker.  Rare social alcohol intake.  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  Born and raised in [facility] [place].     FAMILY HISTORY:    Parents  in their 90s from natural causes but a per the patient both probably had some kind of cancers.  5 siblings 1  from lung problems.  7 children, 12 grandkids and  14 great grandkids all alive and well. No other specific history of bleeding, clotting or malignant disorder in the family.     REVIEW OF SYSTEMS:  Pertinent finding as per the history above.  All other systems have been reviewed and generally negative and noncontributory.     ALLERGY & MEDICATIONS:  Allergies and latest outpatient medications list were reviewed in the EMR.    VITAL SIGNS  BSA: 1.56 meters squared  BP (!) 182/76 Comment: not unusual for MD visit  Pulse 70   Temp 36.5 °C (97.7 °F)   Resp 20   Ht (S) 1.498 m (4' 10.98\")   Wt 58.5 kg (128 lb 15.5 oz)   SpO2 98%   BMI 26.07 kg/m²     PHYSICAL EXAMINATION:  Detailed examination not done.    LAB RESULTS:  CBC from today shows a hemoglobin of 10.3 with MCV of 90.   White cell count is 8.0 and platelet are 345.  Metabolic profile is essentially stable with creatinine of 1.4 and calcium of 10.4.  Latest myeloma " markers from 10/22/2024 showed a kappa light chain level of 8.2.  SPEP showed 0.4 g of IgA kappa monoclonal protein.    RADIOLOGY RESULT:  Renal ultrasound 7/13/2024   Impression:  Complex renal cysts as described.    CT chest without contrast 7/8/2024   Impression:  CHEST  1.  New small 9 mm nodular density in the medial left lower lobe and a few tiny scattered pulmonary nodules and reticulonodular opacities in the bilateral upper lobes and right lower lobe. These may represent infectious/inflammatory or neoplastic nodules. Attention on short interval follow-up CT chest is recommended.  2. No dense area of consolidation to suggest pneumonia.  3. Tiny layering left pleural effusion with adjacent left basilar subsegmental atelectasis.  4. Other chronic findings, as detailed above.      ABDOMEN - PELVIS  1.  Diffuse colonic wall thickening and pericolonic stranding throughout the entire colon, concerning for acute colitis of infectious, inflammatory or ischemic etiology.  2.  Diffuse extensive diverticulosis again seen.  3.  Intermediate to high density bilateral renal lesions are indeterminate. These may represent hemorrhagic/proteinaceous renal cysts, however solid lesion is not excluded. This may be further assessed with nonemergent renal ultrasound and/or MRI.  4.  Additional chronic incidental findings, as detailed above.      PET/CT Myeloma Initial [Oct 21 2022 10:35AM]  Impression:  1. Multiple focal hypermetabolic pulmonary nodules throughout the bilateral lungs. Findings likely represent inflammatory process, versus less likely, malignancy. Recommend short-term follow-up CT to document resolution of nodules or tissue biopsy is clinically indicated.  2. Several hypermetabolic nonenlarged mediastinal lymph nodes, likely reactive in etiology.  3. Mild increased metabolic activity throughout the axial and appendicular skeleton, which is nonspecific and could be related to patient's anemia versus diffuse myeloma  involvement. There is no focal hypermetabolic disease involvement within the skeleton.     PATHOLOGY RESULTS:  Surgical Pathology [Oct 28 2022 1:58PM] ([id number])  Specimens: BONE MARROW CLOT /BONE MARROW CORE   Name SUMIT OTT   Accession #: [id number]    Pathologist: ADRIANA NATION MD   Date of Procedure: 10/25/2022   Submitting Physician: RENATE MELGAR MD   Location: [facility] Other External #   FINAL DIAGNOSIS   A&B:  BONE MARROW, ASPIRATE WITH CLOT AND CORE BIOPSY WITH TOUCH IMPRINT, RIGHT ILIAC CREST:   -- SLIGHTLY HYPERCELLULAR BONE MARROW WITH 15% KAPPA+ PLASMA CELLS CONSISTENT WITH PLASMA CELL MYELOMA   NOTE: Clinical correlation recommended.   Pending  Genetic/Molecular testing per Hematopathology protocol:   -Chromosome analysis: karyotype   -FISH: Myeloma panel   -Molecular: None   -Microarray: None   The results will be reported separately.   Touch Prep   Differential:  (Normal) %   Promyelocytes (1-5) 1   Myelocytes (5-10) 8   Metamyelocytes (10-25) 13   Bands (10-20) 9   Segmented forms (5-30) 17   Eosinophils (2-4) 11   Basophils (0-1) 1   Lymphocytes (5-25) 2   Monocytes (0-2)  0   Plasma Cells (0-2) 15   Blasts (0-1) 0   Total Erythroid (17-35) 23   Number of cells counted: 100   Cellularity: cellular   M:E Ratio: 2.7:1   The gross and/or microscopic findings were reviewed in conjunction with pathology  resident, Kirstin Gutierrez M.D.   Electronically Signed Out By ADRIANA NATION MD/[facility]   Microscopic Description:   CBC: WBC 18.8 x 10E9/L, RBC 2.77 x 10E12/L, Hgb 8.8 g/dl, Hct 26.2%, MCV 95 fL, RDW 13.6%, platelets  416 x 10E9/L.   A 100 cell manual differential count reveals: polys 61%, bands 8%, lymphocytes 13%, monocytes 9%, eosinophils 8%, basophils 1%.   PERIPHERAL SMEAR: Submitted   Red cells: Normocytic with rouleaux formation.   White cells: Eosinophilia    Platelets: Normal, adequate.   ASPIRATE SMEAR: Submitted   Specimen: Aspicular.   Comments: Hypocellular and  hemodilute with few hematopoietic precursors and scattered atypical plasma cells, some biunucleated.   TOUCH PREP: Submitted    Specimen: Paucicellular.   Erythropoiesis: Normal maturation.   Granulopoiesis: Normal maturation.   Megakaryocytes: Present. Morphology: Normal.   Comments: Numerous plasma cells (15%). Some very large atypical plasma cells seen.   ASPIRATE CLOT: Submitted   Specimen: Aspicular.   Comments: Rare hematopoietic progenitor cells noted in a hemodiluted sample.   CORE BIOPSY: Submitted   Specimen: Limited. Small subcortical with crush artifact   Cellularity: 40%.   Estimated M:E ratio: Consistent with aspirate smear.   Bony trabeculae: Normal.   Megakaryocytes: Adequate. Morphology: Normal.   Granulomas: Absent.   Lymphoid aggregates: Absent.   Comments: Few small aggregates of plasm cells noted.    SPECIAL STAINS:   Iron: Inadequate sample to evaluate   IMMUNOHISTOCHEMISTRY: Performed.    - Positive plasma cells approximately (15%)   Cyclin D1 - Negative   IH Kappa - positice in plasma cells   IH Lambda - rare plasma  cells positive.   FLOW CYTOMETRY: Performed, see separate report. A small clonal kappa+, CD19-, CD45- plasma cell population detected.   Immunostains were performed in addition to flow cytometry to fully characterize the phenotype, architecture,  and extent of the atypical population(s). This was medically necessary for the best possible diagnosis.      ASSESSMENT & PLAN:  1.  IgA kappa multiple myeloma.  Please refer to the details of initial presentation and management as outlined above. In summary, patient with finding of mild macrocytic  anemia with further work-up revealing 0.7g of IgA kappa monoclonal protein on SPEP in Feb 2022. Clinically she was essentially asymptomatic and overall functional for her age. She was initially followed with close observation.  Subsequent routine follow-up blood work from Oct 2022 showed 1gm of IgA kappa monoclonal protein and markedly  "elevated kappa free light chain assay.  Creatinine was stable at 1.3 but serum calcium was elevated at 13.1.  PET scan results were unremarkable but a bone marrow biopsy  confirmed plasma cell myeloma.  Cytogenetics and FISH testing was unremarkable. After discussions with the patient and family members she was treated with single agent Velcade with good initial response.  Clinical course was complicated by issues with fall leading to right leg surgery, recurrent C. difficile colitis and intermittent hypercalcemia related complications.  Later on she was also noted to have rising myeloma markers and treatment \"regimen has been changed to DVD protocol with addition of subcutaneous Darzalex.       Overall she is tolerating Velcade/Darzalex combination well.  Probable side effects of neuropathy, thrombocytopenia, weakness, fatigue, rash etc. were explained to her in detail.  Repeat myeloma markers from today are pending but I noticed her calcium is trending up again.  It is 10.4 today.  If myeloma markers are increasing again then we will probably have to change the chemo schedule to weekly dosing.  It will be always a challenge to find a balance between myeloma activity, intensity of the treatment and quality of life issues, at her age.  Overall prognosis remains guarded.    2.  Hypercalcemia.  Secondary to baseline myeloma.  PET scan as such was negative for bony lytic lesions.  She will continue with Xgeva.     3. Follow up: Follow-up with me in about 4 weeks and will come to the clinic for for continuation of chemotherapy with Darzalex and daratumumab combination as detailed above.  Advised to contact us immediately if there are any new questions or problems.     This note has been transcribed using Dragon voice recognition system and there is a possibility of unintentional typing misprints.      "

## 2024-11-21 NOTE — SIGNIFICANT EVENT
11/21/24 1104   Prechemo Checklist   Has the patient been in the hospital, ED, or urgent care since last date of service No   Chemo/Immuno Consent Completed and Signed Yes   Protocol/Indications Verified Yes   Confirmed to previous date/time of medication Yes   Compared to previous dose Yes   All medications are dated accurately Yes   Pregnancy Test Negative Not applicable   Parameters Met Yes   Provider Notified Yes   Is Patient Proceeding With Treatment? Yes   BSA/Weight-Height Verified Yes   Dose Calculations Verified (current, total, cumulative) Yes

## 2024-11-29 ENCOUNTER — APPOINTMENT (OUTPATIENT)
Dept: HEMATOLOGY/ONCOLOGY | Facility: CLINIC | Age: 89
End: 2024-11-29
Payer: MEDICARE

## 2024-12-03 ENCOUNTER — APPOINTMENT (OUTPATIENT)
Dept: HEMATOLOGY/ONCOLOGY | Facility: CLINIC | Age: 89
End: 2024-12-03
Payer: MEDICARE

## 2024-12-03 RX ORDER — DIPHENHYDRAMINE HCL 50 MG
50 CAPSULE ORAL ONCE
OUTPATIENT
Start: 2024-12-19

## 2024-12-03 RX ORDER — ACETAMINOPHEN 325 MG/1
650 TABLET ORAL ONCE
Status: CANCELLED | OUTPATIENT
Start: 2024-12-05

## 2024-12-03 RX ORDER — ALBUTEROL SULFATE 0.83 MG/ML
3 SOLUTION RESPIRATORY (INHALATION) AS NEEDED
OUTPATIENT
Start: 2024-12-19

## 2024-12-03 RX ORDER — DIPHENHYDRAMINE HCL 50 MG
50 CAPSULE ORAL ONCE
Status: CANCELLED | OUTPATIENT
Start: 2024-12-05

## 2024-12-03 RX ORDER — EPINEPHRINE 0.3 MG/.3ML
0.3 INJECTION SUBCUTANEOUS EVERY 5 MIN PRN
Status: CANCELLED | OUTPATIENT
Start: 2024-12-05

## 2024-12-03 RX ORDER — DIPHENHYDRAMINE HYDROCHLORIDE 50 MG/ML
50 INJECTION INTRAMUSCULAR; INTRAVENOUS AS NEEDED
OUTPATIENT
Start: 2024-12-19

## 2024-12-03 RX ORDER — BORTEZOMIB 3.5 MG/1
1.3 INJECTION, POWDER, LYOPHILIZED, FOR SOLUTION INTRAVENOUS; SUBCUTANEOUS ONCE
OUTPATIENT
Start: 2024-12-19

## 2024-12-03 RX ORDER — DIPHENHYDRAMINE HYDROCHLORIDE 50 MG/ML
50 INJECTION INTRAMUSCULAR; INTRAVENOUS AS NEEDED
Status: CANCELLED | OUTPATIENT
Start: 2024-12-05

## 2024-12-03 RX ORDER — ACETAMINOPHEN 325 MG/1
650 TABLET ORAL ONCE
OUTPATIENT
Start: 2024-12-19

## 2024-12-03 RX ORDER — FAMOTIDINE 10 MG/ML
20 INJECTION INTRAVENOUS ONCE AS NEEDED
OUTPATIENT
Start: 2024-12-19

## 2024-12-03 RX ORDER — EPINEPHRINE 0.3 MG/.3ML
0.3 INJECTION SUBCUTANEOUS EVERY 5 MIN PRN
OUTPATIENT
Start: 2024-12-19

## 2024-12-03 RX ORDER — MONTELUKAST SODIUM 10 MG/1
10 TABLET ORAL ONCE
OUTPATIENT
Start: 2024-12-19

## 2024-12-03 RX ORDER — MONTELUKAST SODIUM 10 MG/1
10 TABLET ORAL ONCE
Status: CANCELLED | OUTPATIENT
Start: 2024-12-05

## 2024-12-03 RX ORDER — FAMOTIDINE 10 MG/ML
20 INJECTION INTRAVENOUS ONCE AS NEEDED
Status: CANCELLED | OUTPATIENT
Start: 2024-12-05

## 2024-12-03 RX ORDER — ALBUTEROL SULFATE 0.83 MG/ML
3 SOLUTION RESPIRATORY (INHALATION) AS NEEDED
Status: CANCELLED | OUTPATIENT
Start: 2024-12-05

## 2024-12-05 ENCOUNTER — INFUSION (OUTPATIENT)
Dept: HEMATOLOGY/ONCOLOGY | Facility: CLINIC | Age: 89
End: 2024-12-05
Payer: MEDICARE

## 2024-12-05 ENCOUNTER — LAB (OUTPATIENT)
Dept: LAB | Facility: CLINIC | Age: 89
End: 2024-12-05
Payer: MEDICARE

## 2024-12-05 VITALS
TEMPERATURE: 97.5 F | OXYGEN SATURATION: 96 % | BODY MASS INDEX: 26.62 KG/M2 | WEIGHT: 132.06 LBS | HEIGHT: 59 IN | HEART RATE: 65 BPM | RESPIRATION RATE: 18 BRPM

## 2024-12-05 DIAGNOSIS — C90.00 MULTIPLE MYELOMA, REMISSION STATUS UNSPECIFIED (MULTI): Primary | ICD-10-CM

## 2024-12-05 DIAGNOSIS — C90.00 MULTIPLE MYELOMA, REMISSION STATUS UNSPECIFIED (MULTI): ICD-10-CM

## 2024-12-05 LAB
ALBUMIN SERPL BCP-MCNC: 4.1 G/DL (ref 3.4–5)
ALP SERPL-CCNC: 46 U/L (ref 33–136)
ALT SERPL W P-5'-P-CCNC: 13 U/L (ref 7–45)
ANION GAP SERPL CALC-SCNC: 12 MMOL/L (ref 10–20)
AST SERPL W P-5'-P-CCNC: 18 U/L (ref 9–39)
BASOPHILS # BLD AUTO: 0.04 X10*3/UL (ref 0–0.1)
BASOPHILS NFR BLD AUTO: 0.5 %
BILIRUB SERPL-MCNC: 0.3 MG/DL (ref 0–1.2)
BUN SERPL-MCNC: 60 MG/DL (ref 6–23)
CALCIUM SERPL-MCNC: 10.1 MG/DL (ref 8.6–10.3)
CHLORIDE SERPL-SCNC: 105 MMOL/L (ref 98–107)
CO2 SERPL-SCNC: 23 MMOL/L (ref 21–32)
CREAT SERPL-MCNC: 1.47 MG/DL (ref 0.5–1.05)
EGFRCR SERPLBLD CKD-EPI 2021: 33 ML/MIN/1.73M*2
EOSINOPHIL # BLD AUTO: 0.22 X10*3/UL (ref 0–0.4)
EOSINOPHIL NFR BLD AUTO: 2.7 %
ERYTHROCYTE [DISTWIDTH] IN BLOOD BY AUTOMATED COUNT: 17.8 % (ref 11.5–14.5)
GLUCOSE SERPL-MCNC: 103 MG/DL (ref 74–99)
HCT VFR BLD AUTO: 33.1 % (ref 36–46)
HGB BLD-MCNC: 10.4 G/DL (ref 12–16)
IMM GRANULOCYTES # BLD AUTO: 0.03 X10*3/UL (ref 0–0.5)
IMM GRANULOCYTES NFR BLD AUTO: 0.4 % (ref 0–0.9)
LYMPHOCYTES # BLD AUTO: 1.67 X10*3/UL (ref 0.8–3)
LYMPHOCYTES NFR BLD AUTO: 20.3 %
MCH RBC QN AUTO: 28.2 PG (ref 26–34)
MCHC RBC AUTO-ENTMCNC: 31.4 G/DL (ref 32–36)
MCV RBC AUTO: 90 FL (ref 80–100)
MONOCYTES # BLD AUTO: 0.76 X10*3/UL (ref 0.05–0.8)
MONOCYTES NFR BLD AUTO: 9.2 %
NEUTROPHILS # BLD AUTO: 5.51 X10*3/UL (ref 1.6–5.5)
NEUTROPHILS NFR BLD AUTO: 66.9 %
NRBC BLD-RTO: 0 /100 WBCS (ref 0–0)
PLATELET # BLD AUTO: 282 X10*3/UL (ref 150–450)
POTASSIUM SERPL-SCNC: 4.2 MMOL/L (ref 3.5–5.3)
PROT SERPL-MCNC: 6.7 G/DL (ref 6.4–8.2)
RBC # BLD AUTO: 3.69 X10*6/UL (ref 4–5.2)
SODIUM SERPL-SCNC: 136 MMOL/L (ref 136–145)
WBC # BLD AUTO: 8.2 X10*3/UL (ref 4.4–11.3)

## 2024-12-05 PROCEDURE — 85025 COMPLETE CBC W/AUTO DIFF WBC: CPT

## 2024-12-05 PROCEDURE — 96401 CHEMO ANTI-NEOPL SQ/IM: CPT

## 2024-12-05 PROCEDURE — 80053 COMPREHEN METABOLIC PANEL: CPT

## 2024-12-05 PROCEDURE — 84075 ASSAY ALKALINE PHOSPHATASE: CPT

## 2024-12-05 PROCEDURE — 2500000004 HC RX 250 GENERAL PHARMACY W/ HCPCS (ALT 636 FOR OP/ED): Mod: JG | Performed by: INTERNAL MEDICINE

## 2024-12-05 PROCEDURE — 36415 COLL VENOUS BLD VENIPUNCTURE: CPT

## 2024-12-05 RX ORDER — HEPARIN SODIUM,PORCINE/PF 10 UNIT/ML
50 SYRINGE (ML) INTRAVENOUS AS NEEDED
OUTPATIENT
Start: 2024-12-05

## 2024-12-05 RX ORDER — BORTEZOMIB 3.5 MG/1
1.3 INJECTION, POWDER, LYOPHILIZED, FOR SOLUTION INTRAVENOUS; SUBCUTANEOUS ONCE
Status: COMPLETED | OUTPATIENT
Start: 2024-12-05 | End: 2024-12-05

## 2024-12-05 RX ORDER — HEPARIN 100 UNIT/ML
500 SYRINGE INTRAVENOUS AS NEEDED
OUTPATIENT
Start: 2024-12-05

## 2024-12-05 ASSESSMENT — PAIN SCALES - GENERAL: PAINLEVEL_OUTOF10: 0-NO PAIN

## 2024-12-05 NOTE — SIGNIFICANT EVENT
12/05/24 1040   Prechemo Checklist   Has the patient been in the hospital, ED, or urgent care since last date of service No   Chemo/Immuno Consent Completed and Signed Yes   Protocol/Indications Verified Yes   Confirmed to previous date/time of medication Yes   Compared to previous dose Yes   Pregnancy Test Negative Not applicable   Parameters Met Yes   Provider Notified Yes   Is Patient Proceeding With Treatment? Yes   BSA/Weight-Height Verified Yes   Dose Calculations Verified (current, total, cumulative) Yes

## 2024-12-10 NOTE — PROGRESS NOTES
SUBJECTIVE:  This is 89 y.o.  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  Her exam showed bilateral flank pain over the iliac crest with severe tenderness over the OARRS of her scoliosis.  I reviewed her CT scan from 2023 and the patient has really severe scoliosis with rotation and foraminal stenosis.  Will plan on bilateral L2-3 or L3-4 interlaminar SUSAN pending on the easier access.  Use every time we give her an injection her relief lasted for a year or more.      Prior office visit:  3/16/2023: This is 89 year year old female recall past medical history of multiple myeloma, stage III kidney disease, GERD, peripheral neuropathy in addition to multiple joint arthritis with left reverse shoulder replacement on 4/19/2022 that failed bilateral suprascapular NB, had bilateral SI joint injection in 2022. Reports right sacroiliac joint injection continues to provide relief on her right side lower back. She reports left SI provided minimal relief and she continues to have left knee pain as well. She currently manages her pain with Tylenol 500 mg as needed. Informed the patient do not exceed 3 g or 3000 mg/day. She recently had lumbar x-ray and left knee x-ray. Reviewed x-rays with patient, left hip with severe osteoarthritis. And left knee hardware intact, no changes, small joint effusion. Discussed treatment options at this time, will continue to manage her symptoms with medications. She leaves Saturday for [place] to visit her daughter. Discussed left hip joint steroid injection in the future. We will continue to assess     3/21/2023 addendum:  The patient x-rays of her hips and pelvis showed very high  probability of crystals of calcium pyrophosphate dihydrate (CPP). Despite the fact that the treatment for this disease could benefit from colchicine 0.5 mg twice daily but because of her age I highly recommend referral to rheumatology for possible antiinterleukin therapy as well.    Procedures:  8/22/2022 left SI joint injection and the patient has had 70% improvement in pain and function  3/28/2022 bilateral suprascapular NB 70% but only for a week  1/31/2022 left SI joint injection 80% for a year  In 2020 at left L3-4 and L4-5 by Alma Rosa Lackey that did not help  Multiple injection in 2011 lat [facility] ast one helped by       Portions of record reviewed for pertinent issues: active problem list, medication list, allergies, family history, social history, notes from last encounter, encounters, lab results, imaging and other available records.      I have personally reviewed the OARRS report for this patient. This report is scanned into the electronic medical record. I have considered the risks of abuse, dependence, addiction and diversion. It showed: Gabapentin 100 mg  OPIOID RISK ASSESSMENT SCORE 0/26  Aberrant behavior: none  My patient has no underlying substance abuse or alcohol abuse and there's no mental health conditions contributing to the patient's pain.      Diagnostic studies:  FINDINGS:  CT CHEST:     Heart size is enlarged. Enlarged main pulmonary artery compatible  pulmonary hypertension. No growing hilar or mediastinal lymph nodes.  Mild mitral and aortic valve calcification. No central airway lesion.     No pneumothorax or pleural effusion infiltrate seen previously have  improved with some mild residual parenchymal scarring. Features may  reflect a component of mild edema as well. Demineralization. No  definite acute rib fracture        CT ABDOMEN/PELVIS:  No evidence of acute intra-abdominal hemorrhage. Stable hepatic  cysts. Robust abdominal aortic calcification.  Extensive  diverticulosis. No diverticulitis. Stable renal cysts. No adrenal  mass. Pancreas and spleen otherwise unchanged. Distended urinary  bladder. Uterus is not seen suggesting prior removal.           CT THORACIC SPINE:     Scoliosis and multilevel degenerative disc disease  No findings of acute thoracic spine fracture demineralization of the  bones. Marrow is heterogeneous.     CT LUMBAR SPINE:  Marked scoliosis and degenerative disc disease. No findings of acute  lumbar spine fracture. Heterogeneous appearance of the marrow with  demineralization. Grade 1 anterolisthesis L5-S1.      Impression   CT CHEST/ABDOMEN/PELVIS:  Senescent changes. No evidence of acute intrathoracic pathology.  Enlarged heart and main pulmonary artery. Small sliding hiatal  hernia. No displaced rib fracture. Parenchymal scarring seen from  prior examination.  Extensive diverticulosis. No diverticulitis     CT THORACIC AND LUMBAR SPINE:  Multilevel degenerative disc disease with demineralization. No  evidence of acute thoracic or lumbar spine fracture.     Review of Systems   HENT: Negative.     Eyes: Negative.    Respiratory: Negative.     Cardiovascular: Negative.    Gastrointestinal: Negative.    Endocrine: Negative.    Genitourinary: Negative.    Musculoskeletal:  Positive for back pain, gait problem and myalgias.   Skin: Negative.    Hematological: Negative.    Psychiatric/Behavioral: Negative.          Physical Exam  Vitals and nursing note reviewed.   Constitutional:       Appearance: Normal appearance.          Comments: Scoliosis with pain radiating to bilateral iliac crest flank area   HENT:      Head: Normocephalic and atraumatic.      Nose: Nose normal.   Eyes:      Extraocular Movements: Extraocular movements intact.      Conjunctiva/sclera: Conjunctivae normal.      Pupils: Pupils are equal, round, and reactive to light.   Cardiovascular:      Rate and Rhythm: Normal rate and regular rhythm.      Pulses: Normal pulses.       Heart sounds: Normal heart sounds.   Pulmonary:      Effort: Pulmonary effort is normal.      Breath sounds: Normal breath sounds.   Abdominal:      General: Abdomen is flat. Bowel sounds are normal.      Palpations: Abdomen is soft.   Skin:     General: Skin is warm.   Neurological:      Mental Status: She is alert.   Psychiatric:         Mood and Affect: Mood normal.         Behavior: Behavior normal.                      Plan  At least 50% of the visit was involved in the discussion of the options for treatment. We discussed exercises, medication, interventional therapies and surgery. Healthy life style is essential with patient hard work to achieve the wellness. In addition; discussion with the patient and/or family about any of the diagnostic results, impressions and/or recommended diagnostic studies, prognosis, risks and benefits of treatment options, instructions for treatment and/or follow-up, importance of compliance with chosen treatment options, risk-factor reduction, and patient/family education.         Recommended Pool therapy, walking in the pool, at least 3x per week for 30 minutes  Continue self-directed physical therapy with at least daily exercises for minimum of 20-minute, brochure was handed to the patient  Bilateral L2-3 or L3-4 at the level or below the level of the scoliosis interlaminar SUSAN  Healthy lifestyle and anti-inflammatory diet in addition to weight control discussed with the patient  Alternative chronic pain therapies was discussed, encouraged and information was handed  Return to Clinic after the injection     *Please note this report has been produced using speech recognition software and may contain errors related to that system including grammar, punctuation and spelling as well as words and phrases that may be inappropriate. If there are questions or concerns, please feel free to contact me to clarify.    Dana Heck MD

## 2024-12-12 ENCOUNTER — OFFICE VISIT (OUTPATIENT)
Dept: PAIN MEDICINE | Facility: CLINIC | Age: 89
End: 2024-12-12
Payer: MEDICARE

## 2024-12-12 VITALS
HEART RATE: 68 BPM | WEIGHT: 132 LBS | HEIGHT: 59 IN | RESPIRATION RATE: 16 BRPM | DIASTOLIC BLOOD PRESSURE: 62 MMHG | TEMPERATURE: 98.1 F | OXYGEN SATURATION: 96 % | BODY MASS INDEX: 26.61 KG/M2 | SYSTOLIC BLOOD PRESSURE: 169 MMHG

## 2024-12-12 DIAGNOSIS — M54.16 LUMBAR RADICULOPATHY: Primary | ICD-10-CM

## 2024-12-12 DIAGNOSIS — M41.126 ADOLESCENT IDIOPATHIC SCOLIOSIS OF LUMBAR REGION: ICD-10-CM

## 2024-12-12 PROCEDURE — 1159F MED LIST DOCD IN RCRD: CPT | Performed by: ANESTHESIOLOGY

## 2024-12-12 PROCEDURE — 3077F SYST BP >= 140 MM HG: CPT | Performed by: ANESTHESIOLOGY

## 2024-12-12 PROCEDURE — 1036F TOBACCO NON-USER: CPT | Performed by: ANESTHESIOLOGY

## 2024-12-12 PROCEDURE — 99214 OFFICE O/P EST MOD 30 MIN: CPT | Performed by: ANESTHESIOLOGY

## 2024-12-12 PROCEDURE — 1123F ACP DISCUSS/DSCN MKR DOCD: CPT | Performed by: ANESTHESIOLOGY

## 2024-12-12 PROCEDURE — 3078F DIAST BP <80 MM HG: CPT | Performed by: ANESTHESIOLOGY

## 2024-12-12 PROCEDURE — 1157F ADVNC CARE PLAN IN RCRD: CPT | Performed by: ANESTHESIOLOGY

## 2024-12-12 PROCEDURE — 1125F AMNT PAIN NOTED PAIN PRSNT: CPT | Performed by: ANESTHESIOLOGY

## 2024-12-12 SDOH — ECONOMIC STABILITY: FOOD INSECURITY: WITHIN THE PAST 12 MONTHS, YOU WORRIED THAT YOUR FOOD WOULD RUN OUT BEFORE YOU GOT MONEY TO BUY MORE.: NEVER TRUE

## 2024-12-12 SDOH — ECONOMIC STABILITY: FOOD INSECURITY: WITHIN THE PAST 12 MONTHS, THE FOOD YOU BOUGHT JUST DIDN'T LAST AND YOU DIDN'T HAVE MONEY TO GET MORE.: NEVER TRUE

## 2024-12-12 ASSESSMENT — ENCOUNTER SYMPTOMS
OCCASIONAL FEELINGS OF UNSTEADINESS: 1
RESPIRATORY NEGATIVE: 1
HEMATOLOGIC/LYMPHATIC NEGATIVE: 1
MYALGIAS: 1
CARDIOVASCULAR NEGATIVE: 1
EYES NEGATIVE: 1
ENDOCRINE NEGATIVE: 1
DEPRESSION: 0
GASTROINTESTINAL NEGATIVE: 1
PSYCHIATRIC NEGATIVE: 1
LOSS OF SENSATION IN FEET: 0
BACK PAIN: 1

## 2024-12-12 ASSESSMENT — LIFESTYLE VARIABLES
HOW OFTEN DO YOU HAVE A DRINK CONTAINING ALCOHOL: MONTHLY OR LESS
SKIP TO QUESTIONS 9-10: 1
AUDIT-C TOTAL SCORE: 1
TOTAL SCORE: 0
AUDIT TOTAL SCORE: 1
HOW OFTEN DO YOU HAVE SIX OR MORE DRINKS ON ONE OCCASION: NEVER
HAS A RELATIVE, FRIEND, DOCTOR, OR ANOTHER HEALTH PROFESSIONAL EXPRESSED CONCERN ABOUT YOUR DRINKING OR SUGGESTED YOU CUT DOWN: NO
HOW MANY STANDARD DRINKS CONTAINING ALCOHOL DO YOU HAVE ON A TYPICAL DAY: 1 OR 2
HAVE YOU OR SOMEONE ELSE BEEN INJURED AS A RESULT OF YOUR DRINKING: NO

## 2024-12-12 ASSESSMENT — PAIN - FUNCTIONAL ASSESSMENT: PAIN_FUNCTIONAL_ASSESSMENT: 0-10

## 2024-12-12 ASSESSMENT — PAIN SCALES - GENERAL
PAINLEVEL_OUTOF10: 6
PAINLEVEL_OUTOF10: 6

## 2024-12-12 ASSESSMENT — PAIN DESCRIPTION - DESCRIPTORS: DESCRIPTORS: STABBING

## 2024-12-12 NOTE — PROGRESS NOTES
This is 89 y.o.  female with who has been treated for  back pain , sciatic nerve pain  . Pain is worse, The pain is described as stabbing and is relieved by stocking ,and tylenol and Advil  .Here for follow-up , would like to discuss having another injection.  Chief Complaint   Patient presents with    Follow-up     Back pain , sciatic nerve pain bilateral      Hx mutiple follow-up myeloma  Pain Therapies: Injections        Laci Each Box that Applies Female Male   FAMILY HISTORY OF SUBSTANCE ABUSE  Laci the boxes that applies   Alcohol ?  1    ? 3   Illegal drugs ?  2 ? 3   Rx drugs ?  4 ? 4   PERSONAL HISTORY OF SUBSTNACE ABUSE   Alcohol ?  3 ?  3   Illegal drugs ?  4 ?  4    Rx drugs ?  5 ?  5   Age Between 16-45 years ?  1 ?  1   History of Preadolescent Sexual Abuse ?  3 ?  0   PSYCHOLOGIC DISEASE   ADD, OCD, bipolar, schizophrenia ?  2 ?  2   Depression ?  1 ?  1   Scoring Totals 0      Scoring (Risk)  0-3 - Low  4-7 - Moderate  8 - High    Opioid Risk Assessment Score 0/26

## 2024-12-16 ENCOUNTER — HOSPITAL ENCOUNTER (OUTPATIENT)
Dept: PAIN MEDICINE | Facility: CLINIC | Age: 89
Discharge: HOME | End: 2024-12-16
Payer: MEDICARE

## 2024-12-16 VITALS
DIASTOLIC BLOOD PRESSURE: 77 MMHG | RESPIRATION RATE: 16 BRPM | HEIGHT: 59 IN | OXYGEN SATURATION: 95 % | HEART RATE: 66 BPM | WEIGHT: 132 LBS | SYSTOLIC BLOOD PRESSURE: 181 MMHG | TEMPERATURE: 97.9 F | BODY MASS INDEX: 26.61 KG/M2

## 2024-12-16 DIAGNOSIS — M41.126 ADOLESCENT IDIOPATHIC SCOLIOSIS OF LUMBAR REGION: ICD-10-CM

## 2024-12-16 DIAGNOSIS — M54.16 LUMBAR RADICULOPATHY: ICD-10-CM

## 2024-12-16 PROCEDURE — 2500000004 HC RX 250 GENERAL PHARMACY W/ HCPCS (ALT 636 FOR OP/ED): Performed by: ANESTHESIOLOGY

## 2024-12-16 PROCEDURE — 62323 NJX INTERLAMINAR LMBR/SAC: CPT | Performed by: ANESTHESIOLOGY

## 2024-12-16 PROCEDURE — 2500000005 HC RX 250 GENERAL PHARMACY W/O HCPCS: Performed by: ANESTHESIOLOGY

## 2024-12-16 PROCEDURE — 2550000001 HC RX 255 CONTRASTS: Performed by: ANESTHESIOLOGY

## 2024-12-16 RX ORDER — LIDOCAINE HYDROCHLORIDE 5 MG/ML
INJECTION, SOLUTION INFILTRATION; INTRAVENOUS AS NEEDED
Status: COMPLETED | OUTPATIENT
Start: 2024-12-16 | End: 2024-12-16

## 2024-12-16 RX ORDER — METHYLPREDNISOLONE ACETATE 80 MG/ML
INJECTION, SUSPENSION INTRA-ARTICULAR; INTRALESIONAL; INTRAMUSCULAR; SOFT TISSUE AS NEEDED
Status: COMPLETED | OUTPATIENT
Start: 2024-12-16 | End: 2024-12-16

## 2024-12-16 RX ORDER — SODIUM CHLORIDE 9 MG/ML
INJECTION, SOLUTION INTRAMUSCULAR; INTRAVENOUS; SUBCUTANEOUS AS NEEDED
Status: COMPLETED | OUTPATIENT
Start: 2024-12-16 | End: 2024-12-16

## 2024-12-16 ASSESSMENT — ENCOUNTER SYMPTOMS
LOSS OF SENSATION IN FEET: 0
DEPRESSION: 0
OCCASIONAL FEELINGS OF UNSTEADINESS: 1

## 2024-12-16 ASSESSMENT — PAIN - FUNCTIONAL ASSESSMENT
PAIN_FUNCTIONAL_ASSESSMENT: 0-10
PAIN_FUNCTIONAL_ASSESSMENT: 0-10

## 2024-12-16 ASSESSMENT — PAIN DESCRIPTION - DESCRIPTORS: DESCRIPTORS: ACHING

## 2024-12-16 ASSESSMENT — PAIN SCALES - GENERAL: PAINLEVEL_OUTOF10: 0 - NO PAIN

## 2024-12-19 ENCOUNTER — INFUSION (OUTPATIENT)
Dept: HEMATOLOGY/ONCOLOGY | Facility: CLINIC | Age: 89
End: 2024-12-19
Payer: MEDICARE

## 2024-12-19 ENCOUNTER — LAB (OUTPATIENT)
Dept: LAB | Facility: CLINIC | Age: 89
End: 2024-12-19
Payer: MEDICARE

## 2024-12-19 VITALS
SYSTOLIC BLOOD PRESSURE: 186 MMHG | DIASTOLIC BLOOD PRESSURE: 90 MMHG | BODY MASS INDEX: 27.02 KG/M2 | TEMPERATURE: 98.1 F | OXYGEN SATURATION: 98 % | WEIGHT: 134.04 LBS | RESPIRATION RATE: 18 BRPM | HEIGHT: 59 IN | HEART RATE: 65 BPM

## 2024-12-19 DIAGNOSIS — C90.00 MULTIPLE MYELOMA, REMISSION STATUS UNSPECIFIED (MULTI): ICD-10-CM

## 2024-12-19 DIAGNOSIS — E83.52 HYPERCALCEMIA: ICD-10-CM

## 2024-12-19 DIAGNOSIS — C90.00 MULTIPLE MYELOMA NOT HAVING ACHIEVED REMISSION (MULTI): ICD-10-CM

## 2024-12-19 LAB
BASOPHILS # BLD AUTO: 0.02 X10*3/UL (ref 0–0.1)
BASOPHILS NFR BLD AUTO: 0.3 %
EOSINOPHIL # BLD AUTO: 0.17 X10*3/UL (ref 0–0.4)
EOSINOPHIL NFR BLD AUTO: 2.4 %
ERYTHROCYTE [DISTWIDTH] IN BLOOD BY AUTOMATED COUNT: 17.3 % (ref 11.5–14.5)
HCT VFR BLD AUTO: 32.3 % (ref 36–46)
HGB BLD-MCNC: 10.4 G/DL (ref 12–16)
HOLD SPECIMEN: NORMAL
IMM GRANULOCYTES # BLD AUTO: 0.02 X10*3/UL (ref 0–0.5)
IMM GRANULOCYTES NFR BLD AUTO: 0.3 % (ref 0–0.9)
LYMPHOCYTES # BLD AUTO: 2.18 X10*3/UL (ref 0.8–3)
LYMPHOCYTES NFR BLD AUTO: 31.3 %
MCH RBC QN AUTO: 29.1 PG (ref 26–34)
MCHC RBC AUTO-ENTMCNC: 32.2 G/DL (ref 32–36)
MCV RBC AUTO: 90 FL (ref 80–100)
MONOCYTES # BLD AUTO: 0.83 X10*3/UL (ref 0.05–0.8)
MONOCYTES NFR BLD AUTO: 11.9 %
NEUTROPHILS # BLD AUTO: 3.75 X10*3/UL (ref 1.6–5.5)
NEUTROPHILS NFR BLD AUTO: 53.8 %
NRBC BLD-RTO: 0 /100 WBCS (ref 0–0)
PLATELET # BLD AUTO: 305 X10*3/UL (ref 150–450)
RBC # BLD AUTO: 3.58 X10*6/UL (ref 4–5.2)
WBC # BLD AUTO: 7 X10*3/UL (ref 4.4–11.3)

## 2024-12-19 PROCEDURE — 96401 CHEMO ANTI-NEOPL SQ/IM: CPT

## 2024-12-19 PROCEDURE — 36415 COLL VENOUS BLD VENIPUNCTURE: CPT

## 2024-12-19 PROCEDURE — 85025 COMPLETE CBC W/AUTO DIFF WBC: CPT

## 2024-12-19 PROCEDURE — 96372 THER/PROPH/DIAG INJ SC/IM: CPT

## 2024-12-19 PROCEDURE — 2500000004 HC RX 250 GENERAL PHARMACY W/ HCPCS (ALT 636 FOR OP/ED): Mod: JW,JG | Performed by: INTERNAL MEDICINE

## 2024-12-19 RX ORDER — BORTEZOMIB 3.5 MG/1
1.3 INJECTION, POWDER, LYOPHILIZED, FOR SOLUTION INTRAVENOUS; SUBCUTANEOUS ONCE
Status: COMPLETED | OUTPATIENT
Start: 2024-12-19 | End: 2024-12-19

## 2024-12-19 RX ORDER — ALBUTEROL SULFATE 0.83 MG/ML
3 SOLUTION RESPIRATORY (INHALATION) AS NEEDED
Status: DISCONTINUED | OUTPATIENT
Start: 2024-12-19 | End: 2024-12-19 | Stop reason: HOSPADM

## 2024-12-19 RX ORDER — DIPHENHYDRAMINE HYDROCHLORIDE 50 MG/ML
50 INJECTION INTRAMUSCULAR; INTRAVENOUS AS NEEDED
Status: DISCONTINUED | OUTPATIENT
Start: 2024-12-19 | End: 2024-12-19 | Stop reason: HOSPADM

## 2024-12-19 RX ORDER — FAMOTIDINE 10 MG/ML
20 INJECTION INTRAVENOUS ONCE AS NEEDED
OUTPATIENT
Start: 2025-01-02

## 2024-12-19 RX ORDER — FAMOTIDINE 10 MG/ML
20 INJECTION INTRAVENOUS ONCE AS NEEDED
Status: DISCONTINUED | OUTPATIENT
Start: 2024-12-19 | End: 2024-12-19 | Stop reason: HOSPADM

## 2024-12-19 RX ORDER — EPINEPHRINE 0.3 MG/.3ML
0.3 INJECTION SUBCUTANEOUS EVERY 5 MIN PRN
OUTPATIENT
Start: 2025-01-02

## 2024-12-19 RX ORDER — ALBUTEROL SULFATE 0.83 MG/ML
3 SOLUTION RESPIRATORY (INHALATION) AS NEEDED
OUTPATIENT
Start: 2025-01-02

## 2024-12-19 RX ORDER — EPINEPHRINE 0.3 MG/.3ML
0.3 INJECTION SUBCUTANEOUS EVERY 5 MIN PRN
Status: DISCONTINUED | OUTPATIENT
Start: 2024-12-19 | End: 2024-12-19 | Stop reason: HOSPADM

## 2024-12-19 RX ORDER — DIPHENHYDRAMINE HYDROCHLORIDE 50 MG/ML
50 INJECTION INTRAMUSCULAR; INTRAVENOUS AS NEEDED
OUTPATIENT
Start: 2025-01-02

## 2024-12-19 ASSESSMENT — PAIN SCALES - GENERAL: PAINLEVEL_OUTOF10: 0-NO PAIN

## 2024-12-19 NOTE — SIGNIFICANT EVENT
12/19/24 1042   Prechemo Checklist   Has the patient been in the hospital, ED, or urgent care since last date of service No   Chemo/Immuno Consent Completed and Signed Yes   Protocol/Indications Verified Yes   Confirmed to previous date/time of medication Yes   Compared to previous dose Yes   All medications are dated accurately Yes   Pregnancy Test Negative Not applicable   Parameters Met Yes   Is Patient Proceeding With Treatment? Yes   BSA/Weight-Height Verified Yes   Dose Calculations Verified (current, total, cumulative) Yes

## 2024-12-20 DIAGNOSIS — E03.9 ACQUIRED HYPOTHYROIDISM: ICD-10-CM

## 2024-12-22 RX ORDER — LEVOTHYROXINE SODIUM 100 UG/1
TABLET ORAL
Qty: 90 TABLET | Refills: 0 | Status: SHIPPED | OUTPATIENT
Start: 2024-12-22

## 2024-12-30 ENCOUNTER — TELEPHONE (OUTPATIENT)
Dept: HEMATOLOGY/ONCOLOGY | Facility: CLINIC | Age: 89
End: 2024-12-30
Payer: MEDICARE

## 2024-12-30 NOTE — TELEPHONE ENCOUNTER
Patient asking to reschedule treatment from Thursday as she has to go out of [place] for a . Moved to Friday at 1100 and agreeable.

## 2025-01-02 ENCOUNTER — APPOINTMENT (OUTPATIENT)
Dept: HEMATOLOGY/ONCOLOGY | Facility: CLINIC | Age: OVER 89
End: 2025-01-02
Payer: MEDICARE

## 2025-01-02 RX ORDER — DIPHENHYDRAMINE HCL 50 MG
50 CAPSULE ORAL ONCE
Status: CANCELLED | OUTPATIENT
Start: 2025-01-03

## 2025-01-02 RX ORDER — ACETAMINOPHEN 325 MG/1
650 TABLET ORAL ONCE
Status: CANCELLED | OUTPATIENT
Start: 2025-01-03

## 2025-01-03 ENCOUNTER — INFUSION (OUTPATIENT)
Dept: HEMATOLOGY/ONCOLOGY | Facility: CLINIC | Age: OVER 89
End: 2025-01-03
Payer: MEDICARE

## 2025-01-03 ENCOUNTER — LAB (OUTPATIENT)
Dept: LAB | Facility: CLINIC | Age: OVER 89
End: 2025-01-03
Payer: MEDICARE

## 2025-01-03 VITALS
SYSTOLIC BLOOD PRESSURE: 166 MMHG | HEIGHT: 59 IN | RESPIRATION RATE: 18 BRPM | DIASTOLIC BLOOD PRESSURE: 71 MMHG | TEMPERATURE: 99 F | OXYGEN SATURATION: 97 % | WEIGHT: 133.82 LBS | HEART RATE: 66 BPM | BODY MASS INDEX: 26.98 KG/M2

## 2025-01-03 DIAGNOSIS — C90.00 MULTIPLE MYELOMA, REMISSION STATUS UNSPECIFIED (MULTI): ICD-10-CM

## 2025-01-03 DIAGNOSIS — C90.00 MULTIPLE MYELOMA NOT HAVING ACHIEVED REMISSION (MULTI): ICD-10-CM

## 2025-01-03 DIAGNOSIS — C90.00 MULTIPLE MYELOMA, REMISSION STATUS UNSPECIFIED (MULTI): Primary | ICD-10-CM

## 2025-01-03 LAB
ALBUMIN SERPL BCP-MCNC: 4.1 G/DL (ref 3.4–5)
ALP SERPL-CCNC: 51 U/L (ref 33–136)
ALT SERPL W P-5'-P-CCNC: 16 U/L (ref 7–45)
ANION GAP SERPL CALC-SCNC: 12 MMOL/L (ref 10–20)
AST SERPL W P-5'-P-CCNC: 19 U/L (ref 9–39)
BASOPHILS # BLD AUTO: 0.04 X10*3/UL (ref 0–0.1)
BASOPHILS NFR BLD AUTO: 0.5 %
BILIRUB SERPL-MCNC: 0.4 MG/DL (ref 0–1.2)
BUN SERPL-MCNC: 53 MG/DL (ref 6–23)
CALCIUM SERPL-MCNC: 9.7 MG/DL (ref 8.6–10.3)
CHLORIDE SERPL-SCNC: 109 MMOL/L (ref 98–107)
CO2 SERPL-SCNC: 23 MMOL/L (ref 21–32)
CREAT SERPL-MCNC: 1.6 MG/DL (ref 0.5–1.05)
EGFRCR SERPLBLD CKD-EPI 2021: 30 ML/MIN/1.73M*2
EOSINOPHIL # BLD AUTO: 0.22 X10*3/UL (ref 0–0.4)
EOSINOPHIL NFR BLD AUTO: 2.8 %
ERYTHROCYTE [DISTWIDTH] IN BLOOD BY AUTOMATED COUNT: 17 % (ref 11.5–14.5)
GLUCOSE SERPL-MCNC: 97 MG/DL (ref 74–99)
HCT VFR BLD AUTO: 34 % (ref 36–46)
HGB BLD-MCNC: 10.8 G/DL (ref 12–16)
IMM GRANULOCYTES # BLD AUTO: 0.02 X10*3/UL (ref 0–0.5)
IMM GRANULOCYTES NFR BLD AUTO: 0.3 % (ref 0–0.9)
LYMPHOCYTES # BLD AUTO: 1.75 X10*3/UL (ref 0.8–3)
LYMPHOCYTES NFR BLD AUTO: 22.2 %
MCH RBC QN AUTO: 29.1 PG (ref 26–34)
MCHC RBC AUTO-ENTMCNC: 31.8 G/DL (ref 32–36)
MCV RBC AUTO: 92 FL (ref 80–100)
MONOCYTES # BLD AUTO: 0.88 X10*3/UL (ref 0.05–0.8)
MONOCYTES NFR BLD AUTO: 11.2 %
NEUTROPHILS # BLD AUTO: 4.97 X10*3/UL (ref 1.6–5.5)
NEUTROPHILS NFR BLD AUTO: 63 %
NRBC BLD-RTO: 0 /100 WBCS (ref 0–0)
PLATELET # BLD AUTO: 303 X10*3/UL (ref 150–450)
POTASSIUM SERPL-SCNC: 4.1 MMOL/L (ref 3.5–5.3)
PROT SERPL-MCNC: 6.7 G/DL (ref 6.4–8.2)
RBC # BLD AUTO: 3.71 X10*6/UL (ref 4–5.2)
SODIUM SERPL-SCNC: 140 MMOL/L (ref 136–145)
WBC # BLD AUTO: 7.9 X10*3/UL (ref 4.4–11.3)

## 2025-01-03 PROCEDURE — 85025 COMPLETE CBC W/AUTO DIFF WBC: CPT

## 2025-01-03 PROCEDURE — 96401 CHEMO ANTI-NEOPL SQ/IM: CPT

## 2025-01-03 PROCEDURE — 2500000004 HC RX 250 GENERAL PHARMACY W/ HCPCS (ALT 636 FOR OP/ED): Mod: JZ,JG,TB | Performed by: INTERNAL MEDICINE

## 2025-01-03 PROCEDURE — 36415 COLL VENOUS BLD VENIPUNCTURE: CPT

## 2025-01-03 PROCEDURE — 84075 ASSAY ALKALINE PHOSPHATASE: CPT

## 2025-01-03 RX ORDER — MONTELUKAST SODIUM 10 MG/1
10 TABLET ORAL ONCE
Status: DISCONTINUED | OUTPATIENT
Start: 2025-01-03 | End: 2025-01-03 | Stop reason: HOSPADM

## 2025-01-03 RX ORDER — DEXAMETHASONE 6 MG/1
12 TABLET ORAL ONCE
Status: DISCONTINUED | OUTPATIENT
Start: 2025-01-03 | End: 2025-01-03 | Stop reason: HOSPADM

## 2025-01-03 RX ORDER — EPINEPHRINE 0.3 MG/.3ML
0.3 INJECTION SUBCUTANEOUS EVERY 5 MIN PRN
Status: DISCONTINUED | OUTPATIENT
Start: 2025-01-03 | End: 2025-01-03 | Stop reason: HOSPADM

## 2025-01-03 RX ORDER — ALBUTEROL SULFATE 0.83 MG/ML
3 SOLUTION RESPIRATORY (INHALATION) AS NEEDED
Status: DISCONTINUED | OUTPATIENT
Start: 2025-01-03 | End: 2025-01-03 | Stop reason: HOSPADM

## 2025-01-03 RX ORDER — DIPHENHYDRAMINE HYDROCHLORIDE 50 MG/ML
50 INJECTION INTRAMUSCULAR; INTRAVENOUS AS NEEDED
Status: DISCONTINUED | OUTPATIENT
Start: 2025-01-03 | End: 2025-01-03 | Stop reason: HOSPADM

## 2025-01-03 RX ORDER — FAMOTIDINE 10 MG/ML
20 INJECTION INTRAVENOUS ONCE AS NEEDED
Status: DISCONTINUED | OUTPATIENT
Start: 2025-01-03 | End: 2025-01-03 | Stop reason: HOSPADM

## 2025-01-03 RX ORDER — BORTEZOMIB 3.5 MG/1
1.3 INJECTION, POWDER, LYOPHILIZED, FOR SOLUTION INTRAVENOUS; SUBCUTANEOUS ONCE
Status: COMPLETED | OUTPATIENT
Start: 2025-01-03 | End: 2025-01-03

## 2025-01-03 RX ORDER — MONTELUKAST SODIUM 10 MG/1
TABLET ORAL
Qty: 3 TABLET | Refills: 3 | Status: SHIPPED | OUTPATIENT
Start: 2025-01-03

## 2025-01-03 RX ADMIN — DARATUMUMAB AND HYALURONIDASE-FIHJ (HUMAN RECOMBINANT) 1800 MG: 1800; 30000 INJECTION SUBCUTANEOUS at 12:05

## 2025-01-03 RX ADMIN — BORTEZOMIB 2.1 MG: 1 INJECTION, POWDER, LYOPHILIZED, FOR SOLUTION INTRAVENOUS; SUBCUTANEOUS at 12:07

## 2025-01-03 ASSESSMENT — PAIN SCALES - GENERAL: PAINLEVEL_OUTOF10: 0-NO PAIN

## 2025-01-10 ENCOUNTER — APPOINTMENT (OUTPATIENT)
Dept: PRIMARY CARE | Facility: CLINIC | Age: OVER 89
End: 2025-01-10
Payer: MEDICARE

## 2025-01-10 VITALS — BODY MASS INDEX: 27.29 KG/M2 | WEIGHT: 135 LBS | SYSTOLIC BLOOD PRESSURE: 160 MMHG | DIASTOLIC BLOOD PRESSURE: 66 MMHG

## 2025-01-10 DIAGNOSIS — I12.9 BENIGN HYPERTENSION WITH CKD (CHRONIC KIDNEY DISEASE), STAGE II: Primary | ICD-10-CM

## 2025-01-10 DIAGNOSIS — N17.9 ACUTE KIDNEY INJURY SUPERIMPOSED ON CKD (CMS-HCC): ICD-10-CM

## 2025-01-10 DIAGNOSIS — N18.2 BENIGN HYPERTENSION WITH CKD (CHRONIC KIDNEY DISEASE), STAGE II: Primary | ICD-10-CM

## 2025-01-10 DIAGNOSIS — C90.01 MULTIPLE MYELOMA IN REMISSION (MULTI): ICD-10-CM

## 2025-01-10 DIAGNOSIS — E03.9 HYPOTHYROIDISM, UNSPECIFIED TYPE: ICD-10-CM

## 2025-01-10 DIAGNOSIS — N18.9 ACUTE KIDNEY INJURY SUPERIMPOSED ON CKD (CMS-HCC): ICD-10-CM

## 2025-01-10 PROCEDURE — 1157F ADVNC CARE PLAN IN RCRD: CPT | Performed by: INTERNAL MEDICINE

## 2025-01-10 PROCEDURE — 1159F MED LIST DOCD IN RCRD: CPT | Performed by: INTERNAL MEDICINE

## 2025-01-10 PROCEDURE — 1123F ACP DISCUSS/DSCN MKR DOCD: CPT | Performed by: INTERNAL MEDICINE

## 2025-01-10 PROCEDURE — 3078F DIAST BP <80 MM HG: CPT | Performed by: INTERNAL MEDICINE

## 2025-01-10 PROCEDURE — 99214 OFFICE O/P EST MOD 30 MIN: CPT | Performed by: INTERNAL MEDICINE

## 2025-01-10 PROCEDURE — 1160F RVW MEDS BY RX/DR IN RCRD: CPT | Performed by: INTERNAL MEDICINE

## 2025-01-10 PROCEDURE — 3077F SYST BP >= 140 MM HG: CPT | Performed by: INTERNAL MEDICINE

## 2025-01-10 NOTE — PROGRESS NOTES
Chief complaint   Routine visit    HPI  Doing well, no acute oncologic problems to fu next week .    General aches ni joints, shoulder pains.    ROS  Gen-no wt loss,wt gain,fevers,apnea at night,snoring  HEENT- no ear pain, no oral pain, no hearing loss no sinus pain or drainage  Pulm- no sob,weezing,cough,hemoptysis,productive mucous  Cardio-no cp,dash,orthopnea,palpitations  Gi- no diarrhea,constipation,n,v,hematemesis,dysphagia,change in bowel habits  Endo- no polydipsia,polyuria,wt gain,extreme fatigue,polyphagia,blurred vision  Neuro-no ha,loc,paresthesias,transient limb weakness,acute memory loss,visual changes  Heme- no blood loss,transfusions,unusual bleeding from skin and/or mucosa  Urology- no dysuria,hematuria,urinary frequency,bladder pain  Gyn- no pelvic pain,vaginal dc,vaginal bleeding,  Psych- no depression,mood swings,anxiety,insomnia,hallucinations  Skin- no rash,new discolorations    The lef      Vascular- no pain in calve w walkin,discolored painful toes      Vitals:    01/10/25 1124   BP: 160/66       Physical Exam  NAD  HEENT- nl ear tm bilat,nl pharynx, mucosa moist,no icterus  Lungs clear, good AE, no wheezing  Heart-normal S1-S2 no murmur gallop rub, rhythm normal  Abdomen- soft, nontender, pos bowel sounds, Neck-no JVD, thyroid normal, no carotid bruits, no LN  Neuro alert and oriented ×3, cranial nerves II through XII normal, motor function symmetrical,sensory exam symmetrical ,gait nl  Extrem- no edema, no tenderness, no cords  Skin- nl turgor, no ulcers,no rash         Assessment/Plan   Diagnoses and all orders for this visit:  Benign hypertension with CKD (chronic kidney disease), stage II  Hypothyroidism, unspecified type  Acute kidney injury superimposed on CKD (CMS-HCC)  Multiple myeloma in remission (Multi)C    Stable exam , labs reviewed. No new symptoms,.  Return in 3 months

## 2025-01-16 ENCOUNTER — INFUSION (OUTPATIENT)
Dept: HEMATOLOGY/ONCOLOGY | Facility: CLINIC | Age: OVER 89
End: 2025-01-16
Payer: MEDICARE

## 2025-01-16 ENCOUNTER — APPOINTMENT (OUTPATIENT)
Dept: HEMATOLOGY/ONCOLOGY | Facility: CLINIC | Age: OVER 89
End: 2025-01-16
Payer: MEDICARE

## 2025-01-16 ENCOUNTER — LAB (OUTPATIENT)
Dept: LAB | Facility: CLINIC | Age: OVER 89
End: 2025-01-16
Payer: MEDICARE

## 2025-01-16 VITALS
TEMPERATURE: 98.2 F | SYSTOLIC BLOOD PRESSURE: 160 MMHG | DIASTOLIC BLOOD PRESSURE: 60 MMHG | BODY MASS INDEX: 27.29 KG/M2 | OXYGEN SATURATION: 98 % | HEIGHT: 59 IN | WEIGHT: 135.36 LBS | HEART RATE: 65 BPM | RESPIRATION RATE: 18 BRPM

## 2025-01-16 DIAGNOSIS — C90.00 MULTIPLE MYELOMA, REMISSION STATUS UNSPECIFIED (MULTI): ICD-10-CM

## 2025-01-16 DIAGNOSIS — C90.00 MULTIPLE MYELOMA NOT HAVING ACHIEVED REMISSION (MULTI): ICD-10-CM

## 2025-01-16 DIAGNOSIS — C90.00 MULTIPLE MYELOMA NOT HAVING ACHIEVED REMISSION (MULTI): Primary | ICD-10-CM

## 2025-01-16 DIAGNOSIS — E83.52 HYPERCALCEMIA: ICD-10-CM

## 2025-01-16 LAB
ALBUMIN SERPL BCP-MCNC: 4 G/DL (ref 3.4–5)
ALP SERPL-CCNC: 47 U/L (ref 33–136)
ALT SERPL W P-5'-P-CCNC: 13 U/L (ref 7–45)
ANION GAP SERPL CALC-SCNC: 12 MMOL/L (ref 10–20)
AST SERPL W P-5'-P-CCNC: 19 U/L (ref 9–39)
BASOPHILS # BLD AUTO: 0.05 X10*3/UL (ref 0–0.1)
BASOPHILS NFR BLD AUTO: 0.5 %
BILIRUB SERPL-MCNC: 0.3 MG/DL (ref 0–1.2)
BUN SERPL-MCNC: 59 MG/DL (ref 6–23)
CALCIUM SERPL-MCNC: 9.9 MG/DL (ref 8.6–10.3)
CHLORIDE SERPL-SCNC: 109 MMOL/L (ref 98–107)
CO2 SERPL-SCNC: 22 MMOL/L (ref 21–32)
CREAT SERPL-MCNC: 1.78 MG/DL (ref 0.5–1.05)
EGFRCR SERPLBLD CKD-EPI 2021: 27 ML/MIN/1.73M*2
EOSINOPHIL # BLD AUTO: 0.24 X10*3/UL (ref 0–0.4)
EOSINOPHIL NFR BLD AUTO: 2.2 %
ERYTHROCYTE [DISTWIDTH] IN BLOOD BY AUTOMATED COUNT: 16.5 % (ref 11.5–14.5)
GLUCOSE SERPL-MCNC: 110 MG/DL (ref 74–99)
HCT VFR BLD AUTO: 34.1 % (ref 36–46)
HGB BLD-MCNC: 10.9 G/DL (ref 12–16)
IMM GRANULOCYTES # BLD AUTO: 0.04 X10*3/UL (ref 0–0.5)
IMM GRANULOCYTES NFR BLD AUTO: 0.4 % (ref 0–0.9)
LYMPHOCYTES # BLD AUTO: 1.87 X10*3/UL (ref 0.8–3)
LYMPHOCYTES NFR BLD AUTO: 17.5 %
MCH RBC QN AUTO: 29.5 PG (ref 26–34)
MCHC RBC AUTO-ENTMCNC: 32 G/DL (ref 32–36)
MCV RBC AUTO: 92 FL (ref 80–100)
MONOCYTES # BLD AUTO: 0.98 X10*3/UL (ref 0.05–0.8)
MONOCYTES NFR BLD AUTO: 9.2 %
NEUTROPHILS # BLD AUTO: 7.51 X10*3/UL (ref 1.6–5.5)
NEUTROPHILS NFR BLD AUTO: 70.2 %
NRBC BLD-RTO: 0 /100 WBCS (ref 0–0)
PLATELET # BLD AUTO: 299 X10*3/UL (ref 150–450)
POTASSIUM SERPL-SCNC: 4.3 MMOL/L (ref 3.5–5.3)
PROT SERPL-MCNC: 6.7 G/DL (ref 6.4–8.2)
RBC # BLD AUTO: 3.69 X10*6/UL (ref 4–5.2)
SODIUM SERPL-SCNC: 139 MMOL/L (ref 136–145)
WBC # BLD AUTO: 10.7 X10*3/UL (ref 4.4–11.3)

## 2025-01-16 PROCEDURE — 80053 COMPREHEN METABOLIC PANEL: CPT

## 2025-01-16 PROCEDURE — 85025 COMPLETE CBC W/AUTO DIFF WBC: CPT

## 2025-01-16 PROCEDURE — 2500000004 HC RX 250 GENERAL PHARMACY W/ HCPCS (ALT 636 FOR OP/ED): Mod: JG,TB | Performed by: INTERNAL MEDICINE

## 2025-01-16 PROCEDURE — 96401 CHEMO ANTI-NEOPL SQ/IM: CPT

## 2025-01-16 PROCEDURE — 96372 THER/PROPH/DIAG INJ SC/IM: CPT

## 2025-01-16 PROCEDURE — 36415 COLL VENOUS BLD VENIPUNCTURE: CPT

## 2025-01-16 RX ORDER — FAMOTIDINE 10 MG/ML
20 INJECTION INTRAVENOUS ONCE AS NEEDED
Status: DISCONTINUED | OUTPATIENT
Start: 2025-01-16 | End: 2025-01-16 | Stop reason: HOSPADM

## 2025-01-16 RX ORDER — FAMOTIDINE 10 MG/ML
20 INJECTION INTRAVENOUS ONCE AS NEEDED
OUTPATIENT
Start: 2025-01-30

## 2025-01-16 RX ORDER — EPINEPHRINE 0.3 MG/.3ML
0.3 INJECTION SUBCUTANEOUS EVERY 5 MIN PRN
OUTPATIENT
Start: 2025-01-30

## 2025-01-16 RX ORDER — DIPHENHYDRAMINE HYDROCHLORIDE 50 MG/ML
50 INJECTION INTRAMUSCULAR; INTRAVENOUS AS NEEDED
OUTPATIENT
Start: 2025-01-30

## 2025-01-16 RX ORDER — ALBUTEROL SULFATE 0.83 MG/ML
3 SOLUTION RESPIRATORY (INHALATION) AS NEEDED
Status: DISCONTINUED | OUTPATIENT
Start: 2025-01-16 | End: 2025-01-16 | Stop reason: HOSPADM

## 2025-01-16 RX ORDER — DIPHENHYDRAMINE HCL 50 MG
50 CAPSULE ORAL ONCE
Status: CANCELLED | OUTPATIENT
Start: 2025-01-17

## 2025-01-16 RX ORDER — DIPHENHYDRAMINE HYDROCHLORIDE 50 MG/ML
50 INJECTION INTRAMUSCULAR; INTRAVENOUS AS NEEDED
Status: DISCONTINUED | OUTPATIENT
Start: 2025-01-16 | End: 2025-01-16 | Stop reason: HOSPADM

## 2025-01-16 RX ORDER — MONTELUKAST SODIUM 10 MG/1
10 TABLET ORAL ONCE
Status: CANCELLED | OUTPATIENT
Start: 2025-01-17

## 2025-01-16 RX ORDER — ACETAMINOPHEN 325 MG/1
650 TABLET ORAL ONCE
Status: CANCELLED | OUTPATIENT
Start: 2025-01-17

## 2025-01-16 RX ORDER — EPINEPHRINE 0.3 MG/.3ML
0.3 INJECTION SUBCUTANEOUS EVERY 5 MIN PRN
Status: DISCONTINUED | OUTPATIENT
Start: 2025-01-16 | End: 2025-01-16 | Stop reason: HOSPADM

## 2025-01-16 RX ORDER — ALBUTEROL SULFATE 0.83 MG/ML
3 SOLUTION RESPIRATORY (INHALATION) AS NEEDED
OUTPATIENT
Start: 2025-01-30

## 2025-01-16 RX ORDER — BORTEZOMIB 3.5 MG/1
1.3 INJECTION, POWDER, LYOPHILIZED, FOR SOLUTION INTRAVENOUS; SUBCUTANEOUS ONCE
Status: COMPLETED | OUTPATIENT
Start: 2025-01-16 | End: 2025-01-16

## 2025-01-16 RX ADMIN — BORTEZOMIB 2.1 MG: 1 INJECTION, POWDER, LYOPHILIZED, FOR SOLUTION INTRAVENOUS; SUBCUTANEOUS at 11:25

## 2025-01-16 RX ADMIN — DENOSUMAB 120 MG: 120 INJECTION SUBCUTANEOUS at 11:09

## 2025-01-16 ASSESSMENT — PAIN SCALES - GENERAL: PAINLEVEL_OUTOF10: 0-NO PAIN

## 2025-01-21 DIAGNOSIS — I10 ESSENTIAL HYPERTENSION: ICD-10-CM

## 2025-01-21 DIAGNOSIS — I12.9 BENIGN HYPERTENSION WITH CKD (CHRONIC KIDNEY DISEASE), STAGE II: ICD-10-CM

## 2025-01-21 DIAGNOSIS — N18.2 BENIGN HYPERTENSION WITH CKD (CHRONIC KIDNEY DISEASE), STAGE II: ICD-10-CM

## 2025-01-27 RX ORDER — AMLODIPINE BESYLATE 2.5 MG/1
2.5 TABLET ORAL
Qty: 90 TABLET | Refills: 3 | Status: SHIPPED | OUTPATIENT
Start: 2025-01-27

## 2025-01-27 RX ORDER — LOSARTAN POTASSIUM 100 MG/1
100 TABLET ORAL DAILY
Qty: 90 TABLET | Refills: 3 | Status: SHIPPED | OUTPATIENT
Start: 2025-01-27

## 2025-01-28 ENCOUNTER — APPOINTMENT (OUTPATIENT)
Dept: PAIN MEDICINE | Facility: CLINIC | Age: OVER 89
End: 2025-01-28
Payer: MEDICARE

## 2025-01-28 VITALS
RESPIRATION RATE: 18 BRPM | WEIGHT: 135 LBS | OXYGEN SATURATION: 98 % | HEIGHT: 60 IN | HEART RATE: 66 BPM | SYSTOLIC BLOOD PRESSURE: 190 MMHG | TEMPERATURE: 98.1 F | BODY MASS INDEX: 26.5 KG/M2 | DIASTOLIC BLOOD PRESSURE: 77 MMHG

## 2025-01-28 DIAGNOSIS — G89.29 CHRONIC PAIN OF LEFT KNEE: ICD-10-CM

## 2025-01-28 DIAGNOSIS — M54.16 LUMBAR RADICULOPATHY, CHRONIC: ICD-10-CM

## 2025-01-28 DIAGNOSIS — M47.816 LUMBAR SPONDYLOSIS: Primary | ICD-10-CM

## 2025-01-28 DIAGNOSIS — M25.562 CHRONIC PAIN OF LEFT KNEE: ICD-10-CM

## 2025-01-28 PROCEDURE — 99215 OFFICE O/P EST HI 40 MIN: CPT | Performed by: NURSE PRACTITIONER

## 2025-01-28 PROCEDURE — 1036F TOBACCO NON-USER: CPT | Performed by: NURSE PRACTITIONER

## 2025-01-28 PROCEDURE — 1123F ACP DISCUSS/DSCN MKR DOCD: CPT | Performed by: NURSE PRACTITIONER

## 2025-01-28 PROCEDURE — 3078F DIAST BP <80 MM HG: CPT | Performed by: NURSE PRACTITIONER

## 2025-01-28 PROCEDURE — 3077F SYST BP >= 140 MM HG: CPT | Performed by: NURSE PRACTITIONER

## 2025-01-28 PROCEDURE — 1159F MED LIST DOCD IN RCRD: CPT | Performed by: NURSE PRACTITIONER

## 2025-01-28 PROCEDURE — 1157F ADVNC CARE PLAN IN RCRD: CPT | Performed by: NURSE PRACTITIONER

## 2025-01-28 ASSESSMENT — ENCOUNTER SYMPTOMS
RECTAL PAIN: 0
FREQUENCY: 1
MYALGIAS: 1
LOSS OF SENSATION IN FEET: 1
WEAKNESS: 0
AGITATION: 0
DIARRHEA: 0
SHORTNESS OF BREATH: 0
BACK PAIN: 1
WHEEZING: 0
NUMBNESS: 0
OCCASIONAL FEELINGS OF UNSTEADINESS: 1
FATIGUE: 0
CHILLS: 0
CHEST TIGHTNESS: 0
CONFUSION: 0
PALPITATIONS: 0
DEPRESSION: 0
NAUSEA: 0

## 2025-01-28 ASSESSMENT — PAIN - FUNCTIONAL ASSESSMENT: PAIN_FUNCTIONAL_ASSESSMENT: 0-10

## 2025-01-28 ASSESSMENT — PAIN SCALES - GENERAL
PAINLEVEL_OUTOF10: 0 - NO PAIN
PAINLEVEL_OUTOF10: 0-NO PAIN

## 2025-01-28 ASSESSMENT — PAIN DESCRIPTION - DESCRIPTORS: DESCRIPTORS: ACHING;BURNING;DULL

## 2025-01-28 NOTE — PROGRESS NOTES
Pt is a follow up visit. Pt states she has no pain at this time. Pts pain is in low back, right buttock and left knee. Pt had a lumbar epidural steroid injection 12/16/24 and states that she received 10% pain relief for 2 weeks. Pt has a history of back surgery.

## 2025-01-28 NOTE — PROGRESS NOTES
Chief Complain  Follow-up for lower back pain right buttock pain and left knee pain.    History Of Present Illness  Bindu Cali is a 89 y.o. female here for lower back pain radiating to right buttocks and left knee pain. The patient rates the pain at 0  on a scale from 0-10.  The patient describes pain as dull, aching.  The pain is worsened by bending forward, standing, and walking and is alleviated by position change, sitting, and lying down.  Since the last visit the pain has improved.    The patient denies any fever, chills, weight loss, weakness, bladder/ bowel incontinence, history of cancer, history of IV drug abuse, recent trauma.     Prior office visit:  12/12/2024 Dr. Heck  This is 89 y.o.  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  Her exam showed bilateral flank pain over the iliac crest with severe tenderness over the OARRS of her scoliosis.  I reviewed her CT scan from 2023 and the patient has really severe scoliosis with rotation and foraminal stenosis.  Will plan on bilateral L2-3 or L3-4 interlaminar SUSAN pending on the easier access.  Use every time we give her an injection her relief lasted for a year or more.     Procedures:  12/16/2024 bilateral L2-3 or L3-4 at the level or below the level of the scoliosis interlaminar SUSAN  the patient has had a 10% improvement in pain and function for roughly 3 days  8/22/2022 left SI joint injection and the patient has had 70% improvement in pain and function  3/28/2022 bilateral suprascapular NB 70% but only for a week  1/31/2022 left SI joint injection 80% for a year  In 2020 at left L3-4 and L4-5 by Alma Rosa Lackey that did not  help  Multiple injection in 2011 lat [facility] ast one helped by     Portions of record reviewed for pertinent issues: active problem list, medication list, allergies, family history, social history, notes from last encounter, encounters, lab results, imaging and other available records.      I have personally reviewed the OARRS report for this patient. This report is scanned into the electronic medical record. I have considered the risks of abuse, dependence, addiction and diversion. It showed:   Aberrant behavior: None    Past Medical History  She has a past medical history of Abdominal bloating (01/04/2024), Acute bacterial bronchitis (01/04/2024), Acute bacterial sinusitis (01/04/2024), Acute kidney failure, unspecified (CMS-HCC) (11/03/2023), Acute kidney injury (CMS-[facility]) (10/14/2022), Acute urinary tract infection (05/09/2024), Allergic rhinitis (01/04/2024), Bronchitis (01/24/2024), Carrier of methicillin susceptible Staphylococcus aureus (MSSA) (01/04/2024), Chronic cough (01/04/2024), Chronic kidney disease, stage 3b (Multi) (02/25/2022), Congestion of respiratory tract (01/04/2024), Contact with and (suspected) exposure to covid-19 (10/31/2022), Contusion of knee (01/04/2024), Deep vein thrombosis (DVT) (Multi) (01/04/2024), Do not resuscitate (10/31/2022), Enterocolitis due to Clostridium difficile, not specified as recurrent (11/03/2023), Essential (primary) hypertension (12/14/2022), Facial laceration (01/04/2024), Herpes zoster (01/04/2024), History of colonoscopy (01/04/2024), Hypertension (11/07/2011), Hyponatremia (10/31/2022), Iatrogenic thyrotoxicosis (10/31/2022), Infection and inflammatory reaction due to internal fixation device of unspecified site, subsequent encounter (11/03/2023), Lateral epicondylitis of left elbow (01/04/2024), Menopausal problem (01/04/2024), Multiple myeloma, Neck pain (07/28/2011), Nontraumatic tear of left rotator cuff (01/04/2024), Obstructive and reflux  uropathy, unspecified (11/08/2023), Osteoarthritis of right shoulder (02/08/2012), Osteomyelitis (10/06/2023), Other specified postprocedural states, Pain in both wrists (01/04/2024), Paronychia of finger of right hand (10/06/2023), Personal history of other medical treatment, Personal history of other medical treatment, Personal history of other medical treatment, Pneumonia (10/27/2022), Procedure and treatment not carried out because of patient's decision for unspecified reasons, Subacute cough (02/02/2024), Tendinitis of right elbow (01/04/2024), and Vitamin D deficiency, unspecified.    Surgical History  She has a past surgical history that includes Other surgical history (04/28/2022); Total knee arthroplasty (01/22/2015); Cholecystectomy (01/22/2015); Tonsillectomy (01/22/2015); Rotator cuff repair (01/22/2015); Hysterectomy (01/22/2015); and Breast biopsy (01/22/2015).     Social History  She reports that she has never smoked. She has never been exposed to tobacco smoke. She has never used smokeless tobacco. She reports current alcohol use of about 1.0 standard drink of alcohol per week. She reports that she does not use drugs.    Family History  Family History   Problem Relation Name Age of Onset    Cancer Mother      Other (cardiac disorder) Mother      Other (cardiac disorder) Father      Other (cardiac disorder) Brother      Other (CVA) Brother      Other (HEART BASE TUMOR) Brother          Allergies  Morphine, Pollen extracts, Aleve [naproxen sodium], Codeine, and Erythromycin    Review of Systems  Review of Systems   Constitutional:  Negative for chills and fatigue.   Respiratory:  Negative for chest tightness, shortness of breath and wheezing.    Cardiovascular:  Negative for chest pain and palpitations.   Gastrointestinal:  Negative for diarrhea, nausea and rectal pain.   Genitourinary:  Positive for frequency and urgency.   Musculoskeletal:  Positive for back pain and myalgias.        Scoliosis LBP  radiating left hip region and chronic left knee pain    Neurological:  Negative for weakness and numbness.   Psychiatric/Behavioral:  Negative for agitation, confusion and suicidal ideas.         Physical Exam  Physical Exam  Constitutional:       General: She is not in acute distress.     Appearance: Normal appearance.       HENT:      Head: Normocephalic.   Eyes:      Extraocular Movements: Extraocular movements intact.   Musculoskeletal:         General: Tenderness present.      Lumbar back: Tenderness present. Decreased range of motion. Negative right straight leg raise test and negative left straight leg raise test.      Comments: Lumbar decreased range of motion with flexion extension lateral bending, pain on palpation of bilateral lumbar paraspinals, bilateral lower extremity diminished reflexes sensation intact.  Denies any bowel or bladder incontinence or saddle paresthesia.   Neurological:      General: No focal deficit present.      Mental Status: She is alert and oriented to person, place, and time.      GCS: GCS eye subscore is 4. GCS verbal subscore is 5. GCS motor subscore is 6.      Cranial Nerves: Cranial nerves 2-12 are intact.      Sensory: Sensation is intact.      Motor: Weakness present.      Coordination: Coordination abnormal.      Deep Tendon Reflexes:      Reflex Scores:       Patellar reflexes are 2+ on the right side and 2+ on the left side.       Achilles reflexes are 2+ on the right side and 2+ on the left side.     Comments: Antalgic gait, uses walker in public, uses cane at home   Psychiatric:         Attention and Perception: Attention and perception normal.         Mood and Affect: Mood normal.         Speech: Speech normal.         Behavior: Behavior normal.         Thought Content: Thought content normal.         Judgment: Judgment normal.           Last Recorded Vitals  Pulse 66, temperature 36.7 °C (98.1 °F), resp. rate 18, height 1.524 m (5'), weight 61.2 kg (135 lb), SpO2  98%.      Reviewed Labs  Lab Results   Component Value Date    GLUCOSE 110 (H) 01/16/2025    CALCIUM 9.9 01/16/2025     01/16/2025    K 4.3 01/16/2025    CO2 22 01/16/2025     (H) 01/16/2025    BUN 59 (H) 01/16/2025    CREATININE 1.78 (H) 01/16/2025        Assessment/Plan     Bindu Cali is a very pleasant 89 y.o. female here with one of her 7 children, recall past medical history of multiple melanoma stage III kidney disease, GERD peripheral neuropathy, multiple areas of joint arthritis with left reverse shoulder replacement right TKA, who is here for follow-up of bilateral L2-3 and L3-4 intralaminar SUSAN which she reports provided 10% relief for roughly 2 to 3 days.  She reports usually the injections last several weeks however this injection did not provide much relief.  She reports overall she is doing well she reports 0 out of 10 pain at rest and moderate amount of pain with activity is controlled with Tylenol and gabapentin.  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  She denies any medication side effects.  She denies any new neurological or constitutional symptoms.  On physical examination she has some myofascial adhesions noted in the lumbar region.  Provided a referral for massage therapy to help reduce myofascial adhesions.  Discussed trialing patient on trigger point injections however she would prefer to start with massage therapy.  Plan to continue with current pain medication regimen as prescribed.  All questions and concerns answered.  Instructed the patient to call office if symptoms become worse or negatively affecting her ADLs.  Patient verbalized understanding.      Plan  At least 50% of the visit was involved in the discussion of the options for treatment. We discussed exercises, medication, interventional therapies and surgery. Healthy life style is essential with patient hard work to achieve the wellness. In addition;  discussion with the patient and/or family about any of the diagnostic results, impressions and/or recommended diagnostic studies, prognosis, risks and benefits of treatment options, instructions for treatment and/or follow-up, importance of compliance with chosen treatment options, risk-factor reduction, and patient/family education.     Provided referral for myasthenia gravis therapy  Continue self-directed physical therapy  We will consider repeating bilateral lumbar L2-3 L3-4 SUSAN in the future.  Healthy lifestyle and anti-inflammatory diet in addition to weight control discussed with the patient  Alternative chronic pain therapies was discussed, encouraged and information was handed  Return to Clinic 3 months or sooner if needed.     *Please note this report has been produced using speech recognition software and may contain errors related to that system including grammar, punctuation and spelling as well as words and phrases that may be inappropriate. If there are questions or concerns, please feel free to contact me to clarify.      I spent 50 minutes in the professional and overall care of this patient.       Emile King, APRN-CNP

## 2025-01-29 RX ORDER — DEXAMETHASONE 6 MG/1
12 TABLET ORAL ONCE
Status: CANCELLED | OUTPATIENT
Start: 2025-01-30

## 2025-01-29 RX ORDER — ACETAMINOPHEN 325 MG/1
650 TABLET ORAL ONCE
Status: CANCELLED | OUTPATIENT
Start: 2025-01-30

## 2025-01-29 RX ORDER — BORTEZOMIB 3.5 MG/1
1.3 INJECTION, POWDER, LYOPHILIZED, FOR SOLUTION INTRAVENOUS; SUBCUTANEOUS ONCE
OUTPATIENT
Start: 2025-02-13 | End: 2025-02-13

## 2025-01-29 RX ORDER — DIPHENHYDRAMINE HCL 50 MG
50 CAPSULE ORAL ONCE
Status: CANCELLED | OUTPATIENT
Start: 2025-01-30

## 2025-01-29 RX ORDER — MONTELUKAST SODIUM 10 MG/1
10 TABLET ORAL ONCE
Status: CANCELLED | OUTPATIENT
Start: 2025-01-30

## 2025-01-30 ENCOUNTER — INFUSION (OUTPATIENT)
Dept: HEMATOLOGY/ONCOLOGY | Facility: CLINIC | Age: OVER 89
End: 2025-01-30
Payer: MEDICARE

## 2025-01-30 ENCOUNTER — OFFICE VISIT (OUTPATIENT)
Dept: HEMATOLOGY/ONCOLOGY | Facility: CLINIC | Age: OVER 89
End: 2025-01-30
Payer: MEDICARE

## 2025-01-30 ENCOUNTER — LAB (OUTPATIENT)
Dept: LAB | Facility: CLINIC | Age: OVER 89
End: 2025-01-30
Payer: MEDICARE

## 2025-01-30 VITALS
WEIGHT: 134.48 LBS | HEART RATE: 64 BPM | BODY MASS INDEX: 27.11 KG/M2 | TEMPERATURE: 97 F | OXYGEN SATURATION: 96 % | SYSTOLIC BLOOD PRESSURE: 220 MMHG | HEIGHT: 59 IN | DIASTOLIC BLOOD PRESSURE: 96 MMHG | RESPIRATION RATE: 18 BRPM

## 2025-01-30 DIAGNOSIS — C90.00 MULTIPLE MYELOMA, REMISSION STATUS UNSPECIFIED (MULTI): ICD-10-CM

## 2025-01-30 DIAGNOSIS — C90.00 MULTIPLE MYELOMA NOT HAVING ACHIEVED REMISSION (MULTI): Primary | ICD-10-CM

## 2025-01-30 DIAGNOSIS — C90.00 MULTIPLE MYELOMA, REMISSION STATUS UNSPECIFIED (MULTI): Primary | ICD-10-CM

## 2025-01-30 LAB
ALBUMIN SERPL BCP-MCNC: 3.9 G/DL (ref 3.4–5)
ALP SERPL-CCNC: 49 U/L (ref 33–136)
ALT SERPL W P-5'-P-CCNC: 12 U/L (ref 7–45)
ANION GAP SERPL CALC-SCNC: 13 MMOL/L (ref 10–20)
AST SERPL W P-5'-P-CCNC: 19 U/L (ref 9–39)
B2 MICROGLOB SERPL-MCNC: 6.4 MG/L (ref 0.7–2.2)
BASOPHILS # BLD AUTO: 0.03 X10*3/UL (ref 0–0.1)
BASOPHILS NFR BLD AUTO: 0.4 %
BILIRUB SERPL-MCNC: 0.4 MG/DL (ref 0–1.2)
BUN SERPL-MCNC: 44 MG/DL (ref 6–23)
CALCIUM SERPL-MCNC: 10 MG/DL (ref 8.6–10.3)
CHLORIDE SERPL-SCNC: 105 MMOL/L (ref 98–107)
CO2 SERPL-SCNC: 25 MMOL/L (ref 21–32)
CREAT SERPL-MCNC: 1.48 MG/DL (ref 0.5–1.05)
EGFRCR SERPLBLD CKD-EPI 2021: 33 ML/MIN/1.73M*2
EOSINOPHIL # BLD AUTO: 0.19 X10*3/UL (ref 0–0.4)
EOSINOPHIL NFR BLD AUTO: 2.3 %
ERYTHROCYTE [DISTWIDTH] IN BLOOD BY AUTOMATED COUNT: 15.6 % (ref 11.5–14.5)
GLUCOSE SERPL-MCNC: 92 MG/DL (ref 74–99)
HCT VFR BLD AUTO: 33.2 % (ref 36–46)
HGB BLD-MCNC: 10.4 G/DL (ref 12–16)
IGA SERPL-MCNC: 431 MG/DL (ref 70–400)
IGG SERPL-MCNC: 469 MG/DL (ref 700–1600)
IGM SERPL-MCNC: 13 MG/DL (ref 40–230)
IMM GRANULOCYTES # BLD AUTO: 0.03 X10*3/UL (ref 0–0.5)
IMM GRANULOCYTES NFR BLD AUTO: 0.4 % (ref 0–0.9)
LYMPHOCYTES # BLD AUTO: 1.77 X10*3/UL (ref 0.8–3)
LYMPHOCYTES NFR BLD AUTO: 21 %
MCH RBC QN AUTO: 28.6 PG (ref 26–34)
MCHC RBC AUTO-ENTMCNC: 31.3 G/DL (ref 32–36)
MCV RBC AUTO: 91 FL (ref 80–100)
MONOCYTES # BLD AUTO: 0.84 X10*3/UL (ref 0.05–0.8)
MONOCYTES NFR BLD AUTO: 10 %
NEUTROPHILS # BLD AUTO: 5.56 X10*3/UL (ref 1.6–5.5)
NEUTROPHILS NFR BLD AUTO: 65.9 %
NRBC BLD-RTO: 0 /100 WBCS (ref 0–0)
PLATELET # BLD AUTO: 314 X10*3/UL (ref 150–450)
POTASSIUM SERPL-SCNC: 4.2 MMOL/L (ref 3.5–5.3)
PROT SERPL-MCNC: 6.5 G/DL (ref 6.4–8.2)
PROT SERPL-MCNC: 6.6 G/DL (ref 6.4–8.2)
RBC # BLD AUTO: 3.64 X10*6/UL (ref 4–5.2)
SODIUM SERPL-SCNC: 139 MMOL/L (ref 136–145)
WBC # BLD AUTO: 8.4 X10*3/UL (ref 4.4–11.3)

## 2025-01-30 PROCEDURE — 84075 ASSAY ALKALINE PHOSPHATASE: CPT

## 2025-01-30 PROCEDURE — 84165 PROTEIN E-PHORESIS SERUM: CPT | Mod: PARLAB

## 2025-01-30 PROCEDURE — 3077F SYST BP >= 140 MM HG: CPT | Performed by: INTERNAL MEDICINE

## 2025-01-30 PROCEDURE — 84155 ASSAY OF PROTEIN SERUM: CPT | Mod: PARLAB

## 2025-01-30 PROCEDURE — 1157F ADVNC CARE PLAN IN RCRD: CPT | Performed by: INTERNAL MEDICINE

## 2025-01-30 PROCEDURE — 1126F AMNT PAIN NOTED NONE PRSNT: CPT | Performed by: INTERNAL MEDICINE

## 2025-01-30 PROCEDURE — 3080F DIAST BP >= 90 MM HG: CPT | Performed by: INTERNAL MEDICINE

## 2025-01-30 PROCEDURE — 1123F ACP DISCUSS/DSCN MKR DOCD: CPT | Performed by: INTERNAL MEDICINE

## 2025-01-30 PROCEDURE — 85025 COMPLETE CBC W/AUTO DIFF WBC: CPT

## 2025-01-30 PROCEDURE — 83521 IG LIGHT CHAINS FREE EACH: CPT | Mod: PARLAB

## 2025-01-30 PROCEDURE — 82784 ASSAY IGA/IGD/IGG/IGM EACH: CPT | Mod: PARLAB

## 2025-01-30 PROCEDURE — 99215 OFFICE O/P EST HI 40 MIN: CPT | Performed by: INTERNAL MEDICINE

## 2025-01-30 PROCEDURE — 36415 COLL VENOUS BLD VENIPUNCTURE: CPT

## 2025-01-30 PROCEDURE — 82232 ASSAY OF BETA-2 PROTEIN: CPT | Mod: PARLAB

## 2025-01-30 PROCEDURE — 96401 CHEMO ANTI-NEOPL SQ/IM: CPT

## 2025-01-30 PROCEDURE — 2500000004 HC RX 250 GENERAL PHARMACY W/ HCPCS (ALT 636 FOR OP/ED): Mod: TB | Performed by: INTERNAL MEDICINE

## 2025-01-30 PROCEDURE — 1159F MED LIST DOCD IN RCRD: CPT | Performed by: INTERNAL MEDICINE

## 2025-01-30 RX ORDER — DIPHENHYDRAMINE HYDROCHLORIDE 50 MG/ML
50 INJECTION INTRAMUSCULAR; INTRAVENOUS AS NEEDED
Status: DISCONTINUED | OUTPATIENT
Start: 2025-01-30 | End: 2025-01-30 | Stop reason: HOSPADM

## 2025-01-30 RX ORDER — FAMOTIDINE 10 MG/ML
20 INJECTION INTRAVENOUS ONCE AS NEEDED
Status: DISCONTINUED | OUTPATIENT
Start: 2025-01-30 | End: 2025-01-30 | Stop reason: HOSPADM

## 2025-01-30 RX ORDER — EPINEPHRINE 0.3 MG/.3ML
0.3 INJECTION SUBCUTANEOUS EVERY 5 MIN PRN
Status: DISCONTINUED | OUTPATIENT
Start: 2025-01-30 | End: 2025-01-30 | Stop reason: HOSPADM

## 2025-01-30 RX ORDER — ALBUTEROL SULFATE 0.83 MG/ML
3 SOLUTION RESPIRATORY (INHALATION) AS NEEDED
Status: DISCONTINUED | OUTPATIENT
Start: 2025-01-30 | End: 2025-01-30 | Stop reason: HOSPADM

## 2025-01-30 RX ORDER — BORTEZOMIB 3.5 MG/1
1.3 INJECTION, POWDER, LYOPHILIZED, FOR SOLUTION INTRAVENOUS; SUBCUTANEOUS ONCE
Status: COMPLETED | OUTPATIENT
Start: 2025-01-30 | End: 2025-01-30

## 2025-01-30 RX ADMIN — BORTEZOMIB 2.1 MG: 1 INJECTION, POWDER, LYOPHILIZED, FOR SOLUTION INTRAVENOUS; SUBCUTANEOUS at 11:26

## 2025-01-30 RX ADMIN — DARATUMUMAB AND HYALURONIDASE-FIHJ (HUMAN RECOMBINANT) 1800 MG: 1800; 30000 INJECTION SUBCUTANEOUS at 11:14

## 2025-01-30 ASSESSMENT — PAIN SCALES - GENERAL: PAINLEVEL_OUTOF10: 0-NO PAIN

## 2025-01-30 NOTE — PROGRESS NOTES
"LOCATION:  [facility].     HEMATOLOGY & ONCOLOGY PROBLEMS:  1.  IgA kappa multiple myeloma        a.  Initial diagnosis of MGUS in Feb 2022.       b.  Disease progression to multiple myeloma in Oct 2022.       c.   Ist line therapy with single agent Velcade from Nov 2022 to June 2024.       d.  2nd line therapy with Darzalex/Velcade/Dex beginning July 2024.  2.   Hypercalcemia/lytic lesions.       a.  Maintained on Xgeva  3.  Macrocytic anemia.     CHIEF COMPLAINT:    The patient is in the clinic today for management of multiple myeloma and macrocytic anemia and for continuation of therapy and management of therapy related effects.     HISTORY:   Ms. Cali is a 89 year old pleasant female with multiple myeloma.  She has been in relatively good health for her age and is in fact still working.  Blood work from Feb 2022 showed a hemoglobin of 11.1 with MCV of 101.  WBC, platelets and metabolic  profile was unremarkable except for baseline creatinine of 1.4.  Additional work-up revealed a normal TSH but SPEP showed 0.7 g of IgA kappa monoclonal protein.  Initial evaluation was mainly suggestive of MGUS and she was followed closely.  But routine  follow-up blood work from October 2022 showed 1g of IgA kappa monoclonal protein and markedly elevated kappa free light chain assay.  Creatinine was stable at 1.3 but serum calcium was elevated at 13.1.  PET scan results were unremarkable but a bone marrow  biopsy confirmed plasma cell myeloma.  Cytogenetics and FISH testing results were unremarkable.  After discussions with the patient and family members she was treated with single agent Velcade with good initial response.  Clinical course was complicated by issues with fall leading to right leg surgery, recurrent C. difficile colitis and intermittent hypercalcemia related complications.  Later on she was also noted to have rising myeloma markers and treatment \"regimen has been changed to DVD " "protocol with addition of subcutaneous Darzalex.       INTERVAL HISTORY:  So far she is tolerating Velcade and Darzalex combination well.  Latest myeloma markers from Oct 2024 showed stable M protein and free kappa light chain level.  Lately she is starting to feel slightly better.  Repeat calcium level from today is 10.0.    PAST MEDICAL HISTORY:   1.  Multiple myeloma as detailed above  2.  Hypertension  3.  Hypothyroidism  4.  GERD  5.  Chronic kidney disease  6.  DJD   7.  Idiopathic neuropathy  8.  Lactose intolerance  9.  Vitamin D deficiency  10.  History of cholecystectomy/hysterectomy/tonsillectomy/left knee replacement surgeries     SOCIAL HISTORY:   Lives in [place] with her son.  Non smoker.  Rare social alcohol intake.  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  Born and raised in [place].     FAMILY HISTORY:    Parents  in their 90s from natural causes but a per the patient both probably had some kind of cancers.  5 siblings 1  from lung problems.  7 children, 12 grandkids and  14 great grandkids all alive and well. No other specific history of bleeding, clotting or malignant disorder in the family.     REVIEW OF SYSTEMS:  Pertinent finding as per the history above.  All other systems have been reviewed and generally negative and noncontributory.     ALLERGY & MEDICATIONS:  Allergies and latest outpatient medications list were reviewed in the EMR.    VITAL SIGNS  BSA: 1.59 meters squared  BP (!) 220/96   Pulse 64   Temp 36.1 °C (97 °F)   Resp 18   Ht 1.498 m (4' 10.98\")   Wt 61 kg (134 lb 7.7 oz)   SpO2 96%   BMI 27.18 kg/m²     PHYSICAL EXAMINATION:  Detailed examination not done.    LAB RESULTS:  CBC from today shows a hemoglobin of 10.4 with MCV of 91.   White cell count is 8.4 and platelet are 314.  Metabolic profile is essentially stable with creatinine of 1.4 and calcium of 10.0.  Repeat myeloma markers from today are pending.  Latest myeloma " markers from 10/22/2024 showed a kappa light chain level of 8.2.  SPEP showed 0.4 g of IgA kappa monoclonal protein.    RADIOLOGY RESULT:  Renal ultrasound 7/13/2024   Impression:  Complex renal cysts as described.    CT chest without contrast 7/8/2024   Impression:  CHEST  1.  New small 9 mm nodular density in the medial left lower lobe and a few tiny scattered pulmonary nodules and reticulonodular opacities in the bilateral upper lobes and right lower lobe. These may represent infectious/inflammatory or neoplastic nodules. Attention on short interval follow-up CT chest is recommended.  2. No dense area of consolidation to suggest pneumonia.  3. Tiny layering left pleural effusion with adjacent left basilar subsegmental atelectasis.  4. Other chronic findings, as detailed above.      ABDOMEN - PELVIS  1.  Diffuse colonic wall thickening and pericolonic stranding throughout the entire colon, concerning for acute colitis of infectious, inflammatory or ischemic etiology.  2.  Diffuse extensive diverticulosis again seen.  3.  Intermediate to high density bilateral renal lesions are indeterminate. These may represent hemorrhagic/proteinaceous renal cysts, however solid lesion is not excluded. This may be further assessed with nonemergent renal ultrasound and/or MRI.  4.  Additional chronic incidental findings, as detailed above.      PET/CT Myeloma Initial [Oct 21 2022 10:35AM]  Impression:  1. Multiple focal hypermetabolic pulmonary nodules throughout the bilateral lungs. Findings likely represent inflammatory process, versus less likely, malignancy. Recommend short-term follow-up CT to document resolution of nodules or tissue biopsy is clinically indicated.  2. Several hypermetabolic nonenlarged mediastinal lymph nodes, likely reactive in etiology.  3. Mild increased metabolic activity throughout the axial and appendicular skeleton, which is nonspecific and could be related to patient's anemia versus diffuse myeloma  involvement. There is no focal hypermetabolic disease involvement within the skeleton.     PATHOLOGY RESULTS:  Surgical Pathology [Oct 28 2022 1:58PM] ([id number])  Specimens: BONE MARROW CLOT /BONE MARROW CORE   Name SUMIT OTT   Accession #: [id number]    Pathologist: ADRIANA NATION MD   Date of Procedure: 10/25/2022   Submitting Physician: RENATE MELGAR MD   Location: [facility] Other External #   FINAL DIAGNOSIS   A&B:  BONE MARROW, ASPIRATE WITH CLOT AND CORE BIOPSY WITH TOUCH IMPRINT, RIGHT ILIAC CREST:   -- SLIGHTLY HYPERCELLULAR BONE MARROW WITH 15% KAPPA+ PLASMA CELLS CONSISTENT WITH PLASMA CELL MYELOMA   NOTE: Clinical correlation recommended.   Pending  Genetic/Molecular testing per Hematopathology protocol:   -Chromosome analysis: karyotype   -FISH: Myeloma panel   -Molecular: None   -Microarray: None   The results will be reported separately.   Touch Prep   Differential:  (Normal) %   Promyelocytes (1-5) 1   Myelocytes (5-10) 8   Metamyelocytes (10-25) 13   Bands (10-20) 9   Segmented forms (5-30) 17   Eosinophils (2-4) 11   Basophils (0-1) 1   Lymphocytes (5-25) 2   Monocytes (0-2)  0   Plasma Cells (0-2) 15   Blasts (0-1) 0   Total Erythroid (17-35) 23   Number of cells counted: 100   Cellularity: cellular   M:E Ratio: 2.7:1   The gross and/or microscopic findings were reviewed in conjunction with pathology  resident, Kirstin Gutierrez M.D.   Electronically Signed Out By ADRIANA NATION MD/[facility]   Microscopic Description:   CBC: WBC 18.8 x 10E9/L, RBC 2.77 x 10E12/L, Hgb 8.8 g/dl, Hct 26.2%, MCV 95 fL, RDW 13.6%, platelets  416 x 10E9/L.   A 100 cell manual differential count reveals: polys 61%, bands 8%, lymphocytes 13%, monocytes 9%, eosinophils 8%, basophils 1%.   PERIPHERAL SMEAR: Submitted   Red cells: Normocytic with rouleaux formation.   White cells: Eosinophilia    Platelets: Normal, adequate.   ASPIRATE SMEAR: Submitted   Specimen: Aspicular.   Comments: Hypocellular and  hemodilute with few hematopoietic precursors and scattered atypical plasma cells, some biunucleated.   TOUCH PREP: Submitted    Specimen: Paucicellular.   Erythropoiesis: Normal maturation.   Granulopoiesis: Normal maturation.   Megakaryocytes: Present. Morphology: Normal.   Comments: Numerous plasma cells (15%). Some very large atypical plasma cells seen.   ASPIRATE CLOT: Submitted   Specimen: Aspicular.   Comments: Rare hematopoietic progenitor cells noted in a hemodiluted sample.   CORE BIOPSY: Submitted   Specimen: Limited. Small subcortical with crush artifact   Cellularity: 40%.   Estimated M:E ratio: Consistent with aspirate smear.   Bony trabeculae: Normal.   Megakaryocytes: Adequate. Morphology: Normal.   Granulomas: Absent.   Lymphoid aggregates: Absent.   Comments: Few small aggregates of plasm cells noted.    SPECIAL STAINS:   Iron: Inadequate sample to evaluate   IMMUNOHISTOCHEMISTRY: Performed.    - Positive plasma cells approximately (15%)   Cyclin D1 - Negative   IH Kappa - positice in plasma cells   IH Lambda - rare plasma  cells positive.   FLOW CYTOMETRY: Performed, see separate report. A small clonal kappa+, CD19-, CD45- plasma cell population detected.   Immunostains were performed in addition to flow cytometry to fully characterize the phenotype, architecture,  and extent of the atypical population(s). This was medically necessary for the best possible diagnosis.      ASSESSMENT & PLAN:  1.  IgA kappa multiple myeloma.  Please refer to the details of initial presentation and management as outlined above. In summary, patient with finding of mild macrocytic  anemia with further work-up revealing 0.7g of IgA kappa monoclonal protein on SPEP in Feb 2022. Clinically she was essentially asymptomatic and overall functional for her age. She was initially followed with close observation.  Subsequent routine follow-up blood work from Oct 2022 showed 1gm of IgA kappa monoclonal protein and markedly  elevated kappa free light chain assay.  Creatinine was stable at 1.3 but serum calcium was elevated at 13.1.  PET scan results were unremarkable but a bone marrow biopsy  confirmed plasma cell myeloma.  Cytogenetics and FISH testing was unremarkable. After discussions with the patient and family members she was treated with single agent Velcade with good initial response.  Clinical course was complicated by issues with fall leading to right leg surgery, recurrent C. difficile colitis and intermittent hypercalcemia related complications.  Later on she was also noted to have rising myeloma markers and treatment regimen has been changed to DVD protocol with addition of subcutaneous Darzalex since July 2024.       Overall she is tolerating Velcade/Darzalex combination well.  Probable side effects of neuropathy, thrombocytopenia, weakness, fatigue, rash etc. were explained to her in detail.  Repeat myeloma markers from today are pending but they were essentially stable in October 2024.  It will be always a challenge to find a balance between myeloma activity, intensity of the treatment and quality of life issues, at her age.  Overall prognosis remains guarded.    2.  Hypercalcemia.  Secondary to baseline myeloma.  PET scan as such was negative for bony lytic lesions.  She will continue with Xgeva.     3. Follow up: Follow-up with me in about 4 weeks and will come to the clinic for for continuation of chemotherapy with Darzalex and Velcade combination as detailed above.  Advised to contact us immediately if there are any new questions or problems.     This note has been transcribed using Dragon voice recognition system and there is a possibility of unintentional typing misprints.

## 2025-01-30 NOTE — SIGNIFICANT EVENT
01/30/25 1047   Prechemo Checklist   Has the patient been in the hospital, ED, or urgent care since last date of service No   Chemo/Immuno Consent Completed and Signed Not applicable   All medications are dated accurately Yes   Pregnancy Test Negative Not applicable   Parameters Met Yes   Provider Notified Yes   BSA/Weight-Height Verified Yes   Dose Calculations Verified (current, total, cumulative) Yes

## 2025-01-31 LAB
KAPPA LC SERPL-MCNC: 6.07 MG/DL (ref 0.33–1.94)
KAPPA LC/LAMBDA SER: 5.23 {RATIO} (ref 0.26–1.65)
LAMBDA LC SERPL-MCNC: 1.16 MG/DL (ref 0.57–2.63)

## 2025-02-03 LAB
ALBUMIN: 4.1 G/DL (ref 3.4–5)
ALPHA 1 GLOBULIN: 0.3 G/DL (ref 0.2–0.6)
ALPHA 2 GLOBULIN: 0.8 G/DL (ref 0.4–1.1)
BETA GLOBULIN: 1 G/DL (ref 0.5–1.2)
GAMMA GLOBULIN: 0.4 G/DL (ref 0.5–1.4)
IMMUNOFIXATION COMMENT: ABNORMAL
M-PROTEIN 1: 0.3 G/DL
M-PROTEIN 2: 0.1 G/DL
M-PROTEIN 3: 0.1 G/DL
PATH REVIEW - SERUM IMMUNOFIXATION: ABNORMAL
PATH REVIEW-SERUM PROTEIN ELECTROPHORESIS: ABNORMAL
PROTEIN ELECTROPHORESIS COMMENT: ABNORMAL

## 2025-02-12 RX ORDER — EPINEPHRINE 0.3 MG/.3ML
0.3 INJECTION SUBCUTANEOUS EVERY 5 MIN PRN
Status: CANCELLED | OUTPATIENT
Start: 2025-02-13

## 2025-02-12 RX ORDER — FAMOTIDINE 10 MG/ML
20 INJECTION INTRAVENOUS ONCE AS NEEDED
Status: CANCELLED | OUTPATIENT
Start: 2025-02-13

## 2025-02-12 RX ORDER — DIPHENHYDRAMINE HYDROCHLORIDE 50 MG/ML
50 INJECTION INTRAMUSCULAR; INTRAVENOUS AS NEEDED
Status: CANCELLED | OUTPATIENT
Start: 2025-02-13

## 2025-02-12 RX ORDER — ALBUTEROL SULFATE 0.83 MG/ML
3 SOLUTION RESPIRATORY (INHALATION) AS NEEDED
Status: CANCELLED | OUTPATIENT
Start: 2025-02-13

## 2025-02-13 ENCOUNTER — LAB (OUTPATIENT)
Dept: LAB | Facility: CLINIC | Age: OVER 89
End: 2025-02-13
Payer: MEDICARE

## 2025-02-13 ENCOUNTER — INFUSION (OUTPATIENT)
Dept: HEMATOLOGY/ONCOLOGY | Facility: CLINIC | Age: OVER 89
End: 2025-02-13
Payer: MEDICARE

## 2025-02-13 VITALS
SYSTOLIC BLOOD PRESSURE: 193 MMHG | DIASTOLIC BLOOD PRESSURE: 65 MMHG | HEART RATE: 68 BPM | TEMPERATURE: 97.5 F | BODY MASS INDEX: 27.29 KG/M2 | HEIGHT: 59 IN | WEIGHT: 135.36 LBS | RESPIRATION RATE: 16 BRPM | OXYGEN SATURATION: 97 %

## 2025-02-13 DIAGNOSIS — C90.00 MULTIPLE MYELOMA, REMISSION STATUS UNSPECIFIED (MULTI): ICD-10-CM

## 2025-02-13 DIAGNOSIS — C90.00 MULTIPLE MYELOMA NOT HAVING ACHIEVED REMISSION (MULTI): ICD-10-CM

## 2025-02-13 DIAGNOSIS — E83.52 HYPERCALCEMIA: ICD-10-CM

## 2025-02-13 DIAGNOSIS — C90.00 MULTIPLE MYELOMA NOT HAVING ACHIEVED REMISSION (MULTI): Primary | ICD-10-CM

## 2025-02-13 LAB
ALBUMIN SERPL BCP-MCNC: 4.1 G/DL (ref 3.4–5)
ALP SERPL-CCNC: 49 U/L (ref 33–136)
ALT SERPL W P-5'-P-CCNC: 17 U/L (ref 7–45)
ANION GAP SERPL CALC-SCNC: 13 MMOL/L (ref 10–20)
AST SERPL W P-5'-P-CCNC: 18 U/L (ref 9–39)
BASOPHILS # BLD AUTO: 0.05 X10*3/UL (ref 0–0.1)
BASOPHILS NFR BLD AUTO: 0.6 %
BILIRUB SERPL-MCNC: 0.4 MG/DL (ref 0–1.2)
BUN SERPL-MCNC: 43 MG/DL (ref 6–23)
CALCIUM SERPL-MCNC: 9.4 MG/DL (ref 8.6–10.3)
CHLORIDE SERPL-SCNC: 106 MMOL/L (ref 98–107)
CO2 SERPL-SCNC: 24 MMOL/L (ref 21–32)
CREAT SERPL-MCNC: 1.52 MG/DL (ref 0.5–1.05)
EGFRCR SERPLBLD CKD-EPI 2021: 32 ML/MIN/1.73M*2
EOSINOPHIL # BLD AUTO: 0.28 X10*3/UL (ref 0–0.4)
EOSINOPHIL NFR BLD AUTO: 3.4 %
ERYTHROCYTE [DISTWIDTH] IN BLOOD BY AUTOMATED COUNT: 15.9 % (ref 11.5–14.5)
GLUCOSE SERPL-MCNC: 115 MG/DL (ref 74–99)
HCT VFR BLD AUTO: 33.6 % (ref 36–46)
HGB BLD-MCNC: 10.8 G/DL (ref 12–16)
IMM GRANULOCYTES # BLD AUTO: 0.03 X10*3/UL (ref 0–0.5)
IMM GRANULOCYTES NFR BLD AUTO: 0.4 % (ref 0–0.9)
LYMPHOCYTES # BLD AUTO: 2.01 X10*3/UL (ref 0.8–3)
LYMPHOCYTES NFR BLD AUTO: 24.7 %
MCH RBC QN AUTO: 29.8 PG (ref 26–34)
MCHC RBC AUTO-ENTMCNC: 32.1 G/DL (ref 32–36)
MCV RBC AUTO: 93 FL (ref 80–100)
MONOCYTES # BLD AUTO: 0.9 X10*3/UL (ref 0.05–0.8)
MONOCYTES NFR BLD AUTO: 11 %
NEUTROPHILS # BLD AUTO: 4.88 X10*3/UL (ref 1.6–5.5)
NEUTROPHILS NFR BLD AUTO: 59.9 %
NRBC BLD-RTO: 0 /100 WBCS (ref 0–0)
PLATELET # BLD AUTO: 300 X10*3/UL (ref 150–450)
POTASSIUM SERPL-SCNC: 4.4 MMOL/L (ref 3.5–5.3)
PROT SERPL-MCNC: 6.7 G/DL (ref 6.4–8.2)
RBC # BLD AUTO: 3.63 X10*6/UL (ref 4–5.2)
SODIUM SERPL-SCNC: 139 MMOL/L (ref 136–145)
WBC # BLD AUTO: 8.2 X10*3/UL (ref 4.4–11.3)

## 2025-02-13 PROCEDURE — 96401 CHEMO ANTI-NEOPL SQ/IM: CPT

## 2025-02-13 PROCEDURE — 85025 COMPLETE CBC W/AUTO DIFF WBC: CPT

## 2025-02-13 PROCEDURE — 96372 THER/PROPH/DIAG INJ SC/IM: CPT

## 2025-02-13 PROCEDURE — 80053 COMPREHEN METABOLIC PANEL: CPT

## 2025-02-13 PROCEDURE — 36415 COLL VENOUS BLD VENIPUNCTURE: CPT

## 2025-02-13 PROCEDURE — 2500000004 HC RX 250 GENERAL PHARMACY W/ HCPCS (ALT 636 FOR OP/ED): Mod: JW | Performed by: INTERNAL MEDICINE

## 2025-02-13 RX ORDER — ALBUTEROL SULFATE 0.83 MG/ML
3 SOLUTION RESPIRATORY (INHALATION) AS NEEDED
OUTPATIENT
Start: 2025-03-13

## 2025-02-13 RX ORDER — DIPHENHYDRAMINE HYDROCHLORIDE 50 MG/ML
50 INJECTION INTRAMUSCULAR; INTRAVENOUS AS NEEDED
OUTPATIENT
Start: 2025-03-13

## 2025-02-13 RX ORDER — BORTEZOMIB 3.5 MG/1
1.3 INJECTION, POWDER, LYOPHILIZED, FOR SOLUTION INTRAVENOUS; SUBCUTANEOUS ONCE
Status: COMPLETED | OUTPATIENT
Start: 2025-02-13 | End: 2025-02-13

## 2025-02-13 RX ORDER — FAMOTIDINE 10 MG/ML
20 INJECTION INTRAVENOUS ONCE AS NEEDED
OUTPATIENT
Start: 2025-03-13

## 2025-02-13 RX ORDER — EPINEPHRINE 0.3 MG/.3ML
0.3 INJECTION SUBCUTANEOUS EVERY 5 MIN PRN
OUTPATIENT
Start: 2025-03-13

## 2025-02-13 RX ADMIN — BORTEZOMIB 2.1 MG: 1 INJECTION, POWDER, LYOPHILIZED, FOR SOLUTION INTRAVENOUS; SUBCUTANEOUS at 10:58

## 2025-02-13 ASSESSMENT — PAIN SCALES - GENERAL: PAINLEVEL_OUTOF10: 0-NO PAIN

## 2025-02-13 NOTE — SIGNIFICANT EVENT
02/13/25 0940   Prechemo Checklist   Has the patient been in the hospital, ED, or urgent care since last date of service No   Chemo/Immuno Consent Completed and Signed Yes  (7/5/24)   Protocol/Indications Verified Yes   Confirmed to previous date/time of medication Yes   Compared to previous dose Yes   All medications are dated accurately Yes   Pregnancy Test Negative Not applicable   Parameters Met Yes   Provider Name dr Gray   BSA/Weight-Height Verified Yes   Dose Calculations Verified (current, total, cumulative) Yes

## 2025-02-19 ENCOUNTER — TELEPHONE (OUTPATIENT)
Dept: PAIN MEDICINE | Facility: CLINIC | Age: OVER 89
End: 2025-02-19
Payer: MEDICARE

## 2025-02-19 NOTE — TELEPHONE ENCOUNTER
Patient called asking about a referral for a massage therapy that you and her spoke about during her last office visit with you.  Could you please put in the referral for the patient and I will give her a call once it's in.  Thank you

## 2025-02-26 RX ORDER — ACETAMINOPHEN 325 MG/1
650 TABLET ORAL ONCE
Status: CANCELLED | OUTPATIENT
Start: 2025-02-27

## 2025-02-26 RX ORDER — DIPHENHYDRAMINE HCL 50 MG
50 CAPSULE ORAL ONCE
Status: CANCELLED | OUTPATIENT
Start: 2025-02-27

## 2025-02-26 RX ORDER — BORTEZOMIB 3.5 MG/1
1.3 INJECTION, POWDER, LYOPHILIZED, FOR SOLUTION INTRAVENOUS; SUBCUTANEOUS ONCE
OUTPATIENT
Start: 2025-03-13 | End: 2025-03-13

## 2025-02-26 RX ORDER — MONTELUKAST SODIUM 10 MG/1
10 TABLET ORAL ONCE
Status: CANCELLED | OUTPATIENT
Start: 2025-02-27

## 2025-02-26 RX ORDER — DEXAMETHASONE 6 MG/1
12 TABLET ORAL ONCE
Status: CANCELLED | OUTPATIENT
Start: 2025-02-27

## 2025-02-27 ENCOUNTER — LAB (OUTPATIENT)
Dept: LAB | Facility: CLINIC | Age: OVER 89
End: 2025-02-27
Payer: MEDICARE

## 2025-02-27 ENCOUNTER — INFUSION (OUTPATIENT)
Dept: HEMATOLOGY/ONCOLOGY | Facility: CLINIC | Age: OVER 89
End: 2025-02-27
Payer: MEDICARE

## 2025-02-27 VITALS
RESPIRATION RATE: 18 BRPM | WEIGHT: 135.8 LBS | TEMPERATURE: 97.5 F | HEIGHT: 59 IN | DIASTOLIC BLOOD PRESSURE: 74 MMHG | SYSTOLIC BLOOD PRESSURE: 168 MMHG | BODY MASS INDEX: 27.38 KG/M2 | OXYGEN SATURATION: 96 % | HEART RATE: 63 BPM

## 2025-02-27 DIAGNOSIS — C90.00 MULTIPLE MYELOMA NOT HAVING ACHIEVED REMISSION (MULTI): ICD-10-CM

## 2025-02-27 DIAGNOSIS — C90.00 MULTIPLE MYELOMA, REMISSION STATUS UNSPECIFIED (MULTI): ICD-10-CM

## 2025-02-27 DIAGNOSIS — C90.00 MULTIPLE MYELOMA, REMISSION STATUS UNSPECIFIED (MULTI): Primary | ICD-10-CM

## 2025-02-27 LAB
ALBUMIN SERPL BCP-MCNC: 4 G/DL (ref 3.4–5)
ALP SERPL-CCNC: 44 U/L (ref 33–136)
ALT SERPL W P-5'-P-CCNC: 11 U/L (ref 7–45)
ANION GAP SERPL CALC-SCNC: 14 MMOL/L (ref 10–20)
AST SERPL W P-5'-P-CCNC: 16 U/L (ref 9–39)
BASOPHILS # BLD AUTO: 0.04 X10*3/UL (ref 0–0.1)
BASOPHILS NFR BLD AUTO: 0.5 %
BILIRUB SERPL-MCNC: 0.4 MG/DL (ref 0–1.2)
BUN SERPL-MCNC: 46 MG/DL (ref 6–23)
CALCIUM SERPL-MCNC: 9.3 MG/DL (ref 8.6–10.3)
CHLORIDE SERPL-SCNC: 106 MMOL/L (ref 98–107)
CO2 SERPL-SCNC: 25 MMOL/L (ref 21–32)
CREAT SERPL-MCNC: 1.42 MG/DL (ref 0.5–1.05)
EGFRCR SERPLBLD CKD-EPI 2021: 35 ML/MIN/1.73M*2
EOSINOPHIL # BLD AUTO: 0.27 X10*3/UL (ref 0–0.4)
EOSINOPHIL NFR BLD AUTO: 3.7 %
ERYTHROCYTE [DISTWIDTH] IN BLOOD BY AUTOMATED COUNT: 15.5 % (ref 11.5–14.5)
GLUCOSE SERPL-MCNC: 112 MG/DL (ref 74–99)
HCT VFR BLD AUTO: 31.6 % (ref 36–46)
HGB BLD-MCNC: 10 G/DL (ref 12–16)
IMM GRANULOCYTES # BLD AUTO: 0.01 X10*3/UL (ref 0–0.5)
IMM GRANULOCYTES NFR BLD AUTO: 0.1 % (ref 0–0.9)
LYMPHOCYTES # BLD AUTO: 1.57 X10*3/UL (ref 0.8–3)
LYMPHOCYTES NFR BLD AUTO: 21.4 %
MCH RBC QN AUTO: 29.5 PG (ref 26–34)
MCHC RBC AUTO-ENTMCNC: 31.6 G/DL (ref 32–36)
MCV RBC AUTO: 93 FL (ref 80–100)
MONOCYTES # BLD AUTO: 0.75 X10*3/UL (ref 0.05–0.8)
MONOCYTES NFR BLD AUTO: 10.2 %
NEUTROPHILS # BLD AUTO: 4.68 X10*3/UL (ref 1.6–5.5)
NEUTROPHILS NFR BLD AUTO: 64.1 %
NRBC BLD-RTO: 0 /100 WBCS (ref 0–0)
PLATELET # BLD AUTO: 298 X10*3/UL (ref 150–450)
POTASSIUM SERPL-SCNC: 4.2 MMOL/L (ref 3.5–5.3)
PROT SERPL-MCNC: 6.1 G/DL (ref 6.4–8.2)
RBC # BLD AUTO: 3.39 X10*6/UL (ref 4–5.2)
SODIUM SERPL-SCNC: 141 MMOL/L (ref 136–145)
WBC # BLD AUTO: 7.3 X10*3/UL (ref 4.4–11.3)

## 2025-02-27 PROCEDURE — 84075 ASSAY ALKALINE PHOSPHATASE: CPT

## 2025-02-27 PROCEDURE — 36415 COLL VENOUS BLD VENIPUNCTURE: CPT

## 2025-02-27 PROCEDURE — 96401 CHEMO ANTI-NEOPL SQ/IM: CPT

## 2025-02-27 PROCEDURE — 85025 COMPLETE CBC W/AUTO DIFF WBC: CPT

## 2025-02-27 PROCEDURE — 2500000004 HC RX 250 GENERAL PHARMACY W/ HCPCS (ALT 636 FOR OP/ED): Mod: JW | Performed by: INTERNAL MEDICINE

## 2025-02-27 RX ORDER — ALBUTEROL SULFATE 0.83 MG/ML
3 SOLUTION RESPIRATORY (INHALATION) AS NEEDED
Status: DISCONTINUED | OUTPATIENT
Start: 2025-02-27 | End: 2025-02-27 | Stop reason: HOSPADM

## 2025-02-27 RX ORDER — EPINEPHRINE 0.3 MG/.3ML
0.3 INJECTION SUBCUTANEOUS EVERY 5 MIN PRN
Status: DISCONTINUED | OUTPATIENT
Start: 2025-02-27 | End: 2025-02-27 | Stop reason: HOSPADM

## 2025-02-27 RX ORDER — DIPHENHYDRAMINE HYDROCHLORIDE 50 MG/ML
50 INJECTION INTRAMUSCULAR; INTRAVENOUS AS NEEDED
Status: DISCONTINUED | OUTPATIENT
Start: 2025-02-27 | End: 2025-02-27 | Stop reason: HOSPADM

## 2025-02-27 RX ORDER — MONTELUKAST SODIUM 10 MG/1
TABLET ORAL
Qty: 3 TABLET | Refills: 3 | Status: SHIPPED | OUTPATIENT
Start: 2025-02-27

## 2025-02-27 RX ORDER — BORTEZOMIB 3.5 MG/1
1.3 INJECTION, POWDER, LYOPHILIZED, FOR SOLUTION INTRAVENOUS; SUBCUTANEOUS ONCE
Status: COMPLETED | OUTPATIENT
Start: 2025-02-27 | End: 2025-02-27

## 2025-02-27 RX ORDER — FAMOTIDINE 10 MG/ML
20 INJECTION, SOLUTION INTRAVENOUS ONCE AS NEEDED
Status: DISCONTINUED | OUTPATIENT
Start: 2025-02-27 | End: 2025-02-27 | Stop reason: HOSPADM

## 2025-02-27 RX ADMIN — BORTEZOMIB 2.1 MG: 1 INJECTION, POWDER, LYOPHILIZED, FOR SOLUTION INTRAVENOUS; SUBCUTANEOUS at 11:05

## 2025-02-27 RX ADMIN — DARATUMUMAB AND HYALURONIDASE-FIHJ (HUMAN RECOMBINANT) 1800 MG: 1800; 30000 INJECTION SUBCUTANEOUS at 11:05

## 2025-02-27 ASSESSMENT — PAIN SCALES - GENERAL: PAINLEVEL_OUTOF10: 0-NO PAIN

## 2025-02-27 NOTE — SIGNIFICANT EVENT
02/27/25 1038   Prechemo Checklist   Has the patient been in the hospital, ED, or urgent care since last date of service No   Chemo/Immuno Consent Completed and Signed Yes  (7/5/24)   Protocol/Indications Verified Yes   Confirmed to previous date/time of medication Yes   Compared to previous dose Yes   All medications are dated accurately Yes   Pregnancy Test Negative Not applicable   Parameters Met Yes   BSA/Weight-Height Verified Yes   Dose Calculations Verified (current, total, cumulative) Yes

## 2025-03-04 DIAGNOSIS — M54.50 CHRONIC LOW BACK PAIN, UNSPECIFIED BACK PAIN LATERALITY, UNSPECIFIED WHETHER SCIATICA PRESENT: Primary | ICD-10-CM

## 2025-03-04 DIAGNOSIS — G89.29 CHRONIC LOW BACK PAIN, UNSPECIFIED BACK PAIN LATERALITY, UNSPECIFIED WHETHER SCIATICA PRESENT: Primary | ICD-10-CM

## 2025-03-13 ENCOUNTER — INFUSION (OUTPATIENT)
Dept: HEMATOLOGY/ONCOLOGY | Facility: CLINIC | Age: OVER 89
End: 2025-03-13
Payer: MEDICARE

## 2025-03-13 ENCOUNTER — LAB (OUTPATIENT)
Dept: LAB | Facility: CLINIC | Age: OVER 89
End: 2025-03-13
Payer: MEDICARE

## 2025-03-13 VITALS
HEART RATE: 75 BPM | RESPIRATION RATE: 20 BRPM | WEIGHT: 134.7 LBS | TEMPERATURE: 97.3 F | BODY MASS INDEX: 27.16 KG/M2 | SYSTOLIC BLOOD PRESSURE: 168 MMHG | OXYGEN SATURATION: 94 % | DIASTOLIC BLOOD PRESSURE: 80 MMHG | HEIGHT: 59 IN

## 2025-03-13 DIAGNOSIS — E83.52 HYPERCALCEMIA: ICD-10-CM

## 2025-03-13 DIAGNOSIS — C90.00 MULTIPLE MYELOMA, REMISSION STATUS UNSPECIFIED (MULTI): ICD-10-CM

## 2025-03-13 DIAGNOSIS — C90.00 MULTIPLE MYELOMA NOT HAVING ACHIEVED REMISSION (MULTI): ICD-10-CM

## 2025-03-13 LAB
ALBUMIN SERPL BCP-MCNC: 4.2 G/DL (ref 3.4–5)
ALP SERPL-CCNC: 44 U/L (ref 33–136)
ALT SERPL W P-5'-P-CCNC: 11 U/L (ref 7–45)
ANION GAP SERPL CALC-SCNC: 11 MMOL/L (ref 10–20)
AST SERPL W P-5'-P-CCNC: 18 U/L (ref 9–39)
BASOPHILS # BLD AUTO: 0.05 X10*3/UL (ref 0–0.1)
BASOPHILS NFR BLD AUTO: 0.5 %
BILIRUB SERPL-MCNC: 0.4 MG/DL (ref 0–1.2)
BUN SERPL-MCNC: 44 MG/DL (ref 6–23)
CALCIUM SERPL-MCNC: 10.3 MG/DL (ref 8.6–10.3)
CHLORIDE SERPL-SCNC: 106 MMOL/L (ref 98–107)
CO2 SERPL-SCNC: 27 MMOL/L (ref 21–32)
CREAT SERPL-MCNC: 1.4 MG/DL (ref 0.5–1.05)
EGFRCR SERPLBLD CKD-EPI 2021: 35 ML/MIN/1.73M*2
EOSINOPHIL # BLD AUTO: 0.32 X10*3/UL (ref 0–0.4)
EOSINOPHIL NFR BLD AUTO: 3.1 %
ERYTHROCYTE [DISTWIDTH] IN BLOOD BY AUTOMATED COUNT: 15.3 % (ref 11.5–14.5)
GLUCOSE SERPL-MCNC: 107 MG/DL (ref 74–99)
HCT VFR BLD AUTO: 32.7 % (ref 36–46)
HGB BLD-MCNC: 10.4 G/DL (ref 12–16)
IMM GRANULOCYTES # BLD AUTO: 0.04 X10*3/UL (ref 0–0.5)
IMM GRANULOCYTES NFR BLD AUTO: 0.4 % (ref 0–0.9)
LYMPHOCYTES # BLD AUTO: 1.9 X10*3/UL (ref 0.8–3)
LYMPHOCYTES NFR BLD AUTO: 18.6 %
MCH RBC QN AUTO: 30.8 PG (ref 26–34)
MCHC RBC AUTO-ENTMCNC: 31.8 G/DL (ref 32–36)
MCV RBC AUTO: 97 FL (ref 80–100)
MONOCYTES # BLD AUTO: 0.8 X10*3/UL (ref 0.05–0.8)
MONOCYTES NFR BLD AUTO: 7.8 %
NEUTROPHILS # BLD AUTO: 7.11 X10*3/UL (ref 1.6–5.5)
NEUTROPHILS NFR BLD AUTO: 69.6 %
NRBC BLD-RTO: 0 /100 WBCS (ref 0–0)
PLATELET # BLD AUTO: 307 X10*3/UL (ref 150–450)
POTASSIUM SERPL-SCNC: 4.5 MMOL/L (ref 3.5–5.3)
PROT SERPL-MCNC: 6.7 G/DL (ref 6.4–8.2)
RBC # BLD AUTO: 3.38 X10*6/UL (ref 4–5.2)
SODIUM SERPL-SCNC: 139 MMOL/L (ref 136–145)
WBC # BLD AUTO: 10.2 X10*3/UL (ref 4.4–11.3)

## 2025-03-13 PROCEDURE — 96401 CHEMO ANTI-NEOPL SQ/IM: CPT

## 2025-03-13 PROCEDURE — 84075 ASSAY ALKALINE PHOSPHATASE: CPT

## 2025-03-13 PROCEDURE — 85025 COMPLETE CBC W/AUTO DIFF WBC: CPT

## 2025-03-13 PROCEDURE — 96372 THER/PROPH/DIAG INJ SC/IM: CPT

## 2025-03-13 PROCEDURE — 2500000004 HC RX 250 GENERAL PHARMACY W/ HCPCS (ALT 636 FOR OP/ED): Performed by: INTERNAL MEDICINE

## 2025-03-13 PROCEDURE — 36415 COLL VENOUS BLD VENIPUNCTURE: CPT

## 2025-03-13 RX ORDER — FAMOTIDINE 10 MG/ML
20 INJECTION, SOLUTION INTRAVENOUS ONCE AS NEEDED
OUTPATIENT
Start: 2025-04-10

## 2025-03-13 RX ORDER — ALBUTEROL SULFATE 0.83 MG/ML
3 SOLUTION RESPIRATORY (INHALATION) AS NEEDED
OUTPATIENT
Start: 2025-04-10

## 2025-03-13 RX ORDER — BORTEZOMIB 3.5 MG/1
1.3 INJECTION, POWDER, LYOPHILIZED, FOR SOLUTION INTRAVENOUS; SUBCUTANEOUS ONCE
Status: COMPLETED | OUTPATIENT
Start: 2025-03-13 | End: 2025-03-13

## 2025-03-13 RX ORDER — EPINEPHRINE 0.3 MG/.3ML
0.3 INJECTION SUBCUTANEOUS EVERY 5 MIN PRN
OUTPATIENT
Start: 2025-04-10

## 2025-03-13 RX ORDER — DIPHENHYDRAMINE HYDROCHLORIDE 50 MG/ML
50 INJECTION INTRAMUSCULAR; INTRAVENOUS AS NEEDED
OUTPATIENT
Start: 2025-04-10

## 2025-03-13 RX ADMIN — DENOSUMAB 120 MG: 120 INJECTION SUBCUTANEOUS at 11:24

## 2025-03-13 RX ADMIN — BORTEZOMIB 2.1 MG: 1 INJECTION, POWDER, LYOPHILIZED, FOR SOLUTION INTRAVENOUS; SUBCUTANEOUS at 11:25

## 2025-03-13 ASSESSMENT — PAIN SCALES - GENERAL: PAINLEVEL_OUTOF10: 0-NO PAIN

## 2025-03-13 NOTE — SIGNIFICANT EVENT
03/13/25 0958   Prechemo Checklist   Has the patient been in the hospital, ED, or urgent care since last date of service No   Chemo/Immuno Consent Completed and Signed Yes  (7/5/24)   Protocol/Indications Verified Yes   Confirmed to previous date/time of medication Yes   Compared to previous dose Yes   All medications are dated accurately Yes   Pregnancy Test Negative Not applicable   Parameters Met Yes   BSA/Weight-Height Verified Yes   Dose Calculations Verified (current, total, cumulative) Yes

## 2025-03-20 ENCOUNTER — HOSPITAL ENCOUNTER (OUTPATIENT)
Dept: RADIOLOGY | Facility: CLINIC | Age: OVER 89
Discharge: HOME | End: 2025-03-20
Payer: MEDICARE

## 2025-03-20 ENCOUNTER — OFFICE VISIT (OUTPATIENT)
Dept: ORTHOPEDIC SURGERY | Facility: CLINIC | Age: OVER 89
End: 2025-03-20
Payer: MEDICARE

## 2025-03-20 DIAGNOSIS — G89.29 BILATERAL CHRONIC KNEE PAIN: ICD-10-CM

## 2025-03-20 DIAGNOSIS — M25.561 BILATERAL CHRONIC KNEE PAIN: ICD-10-CM

## 2025-03-20 DIAGNOSIS — M25.562 BILATERAL CHRONIC KNEE PAIN: ICD-10-CM

## 2025-03-20 DIAGNOSIS — Z96.659 PERIPROSTHETIC FRACTURE OF PROXIMAL END OF TIBIA, SUBSEQUENT ENCOUNTER: Primary | ICD-10-CM

## 2025-03-20 DIAGNOSIS — M97.8XXD PERIPROSTHETIC FRACTURE OF PROXIMAL END OF TIBIA, SUBSEQUENT ENCOUNTER: Primary | ICD-10-CM

## 2025-03-20 PROCEDURE — 73560 X-RAY EXAM OF KNEE 1 OR 2: CPT | Mod: LT

## 2025-03-20 PROCEDURE — 73560 X-RAY EXAM OF KNEE 1 OR 2: CPT | Mod: RT

## 2025-03-20 PROCEDURE — 1123F ACP DISCUSS/DSCN MKR DOCD: CPT | Performed by: STUDENT IN AN ORGANIZED HEALTH CARE EDUCATION/TRAINING PROGRAM

## 2025-03-20 PROCEDURE — 1157F ADVNC CARE PLAN IN RCRD: CPT | Performed by: STUDENT IN AN ORGANIZED HEALTH CARE EDUCATION/TRAINING PROGRAM

## 2025-03-20 PROCEDURE — 99214 OFFICE O/P EST MOD 30 MIN: CPT | Performed by: STUDENT IN AN ORGANIZED HEALTH CARE EDUCATION/TRAINING PROGRAM

## 2025-03-20 PROCEDURE — 1159F MED LIST DOCD IN RCRD: CPT | Performed by: STUDENT IN AN ORGANIZED HEALTH CARE EDUCATION/TRAINING PROGRAM

## 2025-03-20 PROCEDURE — 73560 X-RAY EXAM OF KNEE 1 OR 2: CPT | Mod: 50

## 2025-03-20 NOTE — PROGRESS NOTES
Department of Orthopaedic Surgery  Division of Adult Reconstruction    Chief Complaint: Left lower extremity pain, history of bilateral knee replacements    HPI:  Bindu Cali is a pleasant 89 y.o. year-old female who is seen today for evaluation of left lower extremity pain.  Patient reports of burning pain over the left thigh that extends in the left calf.  She is here today for checkup regarding bilateral knee replacements performed in 2008 by Dr. Lopresti.  Regarding the right knee, she did very well following surgery except for a lateral condyle fracture that required open reduction internal fixation and subsequent removal due to infection.  Despite the proximity to the facet joint, she never developed a prosthetic joint infection.  The right knee is currently functioning well.  Regarding the left knee, it is also functioning well except being in proximity to the burning pain.  She has a history of low back pain and left lower extremity radicular symptoms and has seen pain management in the past.  She denies swelling and erythema to the knees.  She uses a walker for community distances.    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    Review of Symptoms:  The patient denies any fever, chills, chest pain, shortness of breath or difficulty breathing.      Adult patient history sheet was filled out by the patient today in clinic and will be scanned into the EMR.  I personally reviewed this form which will be scanned into the EMR.  This includes Past Medical History, Past Surgical History, Medications, Allergies, Social History, Family History and 12 point review of systems.    Past Medical History:    Past Medical History:   Diagnosis Date    Abdominal bloating 01/04/2024    Acute bacterial bronchitis 01/04/2024    Acute bacterial sinusitis 01/04/2024    Acute kidney failure, unspecified (CMS-[facility]) 11/03/2023    Acute kidney injury (CMS-[facility]) 10/14/2022    Acute urinary tract infection 05/09/2024    Allergic  rhinitis 01/04/2024    Bronchitis 01/24/2024    Carrier of methicillin susceptible Staphylococcus aureus (MSSA) 01/04/2024    Chronic cough 01/04/2024    Chronic kidney disease, stage 3b (Multi) 02/25/2022    Anemia due to stage 3b chronic kidney disease    Congestion of respiratory tract 01/04/2024    Contact with and (suspected) exposure to covid-19 10/31/2022    Contusion of knee 01/04/2024    Deep vein thrombosis (DVT) (Multi) 01/04/2024    Do not resuscitate 10/31/2022    Enterocolitis due to Clostridium difficile, not specified as recurrent 11/03/2023    Essential (primary) hypertension 12/14/2022    Essential hypertension    Facial laceration 01/04/2024    Herpes zoster 01/04/2024    History of colonoscopy 01/04/2024    Comment on above: 2-;    Hypertension 11/07/2011    Hyponatremia 10/31/2022    Iatrogenic thyrotoxicosis 10/31/2022    Infection and inflammatory reaction due to internal fixation device of unspecified site, subsequent encounter 11/03/2023    Lateral epicondylitis of left elbow 01/04/2024    Menopausal problem 01/04/2024    Multiple myeloma     Neck pain 07/28/2011    Nontraumatic tear of left rotator cuff 01/04/2024    Obstructive and reflux uropathy, unspecified 11/08/2023    Osteoarthritis of right shoulder 02/08/2012    Osteomyelitis 10/06/2023    Other specified postprocedural states     H/O colonoscopy    Pain in both wrists 01/04/2024    Paronychia of finger of right hand 10/06/2023    Personal history of other medical treatment     History of mammogram    Personal history of other medical treatment     History of bone density study    Personal history of other medical treatment     H/O bone density study    Pneumonia 10/27/2022    Comment on above: PNEUMONIA    Procedure and treatment not carried out because of patient's decision for unspecified reasons     Colonoscopy refused    Subacute cough 02/02/2024    Tendinitis of right elbow 01/04/2024    Vitamin D deficiency,  unspecified     Vitamin D insufficiency     Past Surgical History:    Past Surgical History:   Procedure Laterality Date    BREAST BIOPSY  01/22/2015    Biopsy Breast Open    CHOLECYSTECTOMY  01/22/2015    Cholecystectomy    HYSTERECTOMY  01/22/2015    Hysterectomy    OTHER SURGICAL HISTORY  04/28/2022    Shoulder surgery    ROTATOR CUFF REPAIR  01/22/2015    Rotator Cuff Repair    TONSILLECTOMY  01/22/2015    Tonsillectomy    TOTAL KNEE ARTHROPLASTY  01/22/2015    Knee Replacement     Allergies:    Allergies   Allergen Reactions    Morphine Nausea/vomiting    Pollen Extracts Itching, Other and Runny nose     Runny nose, sneezing, PND affecting throat and voice. Used to take allergy shots, now controlled with Claritin daily.    Aleve [Naproxen Sodium] Palpitations    Codeine Dizziness and Confusion    Erythromycin Nausea/vomiting     Medications:      Current Outpatient Medications on File Prior to Visit   Medication Sig Dispense Refill    amLODIPine (Norvasc) 2.5 mg tablet TAKE 1 TABLET BY MOUTH ONCE  DAILY IN THE MORNING 90 tablet 3    biotin 5 mg capsule Take 1 capsule (5 mg) by mouth once daily.      cholecalciferol (Vitamin D-3) 10 MCG (400 UNIT) tablet Take 1 tablet (400 Units) by mouth once daily. 30 tablet 0    dexAMETHasone (Decadron) 4 mg tablet Take 5 tablets (20 mg) by mouth if needed (take 1 hour prior to darzalex treatments). 40 tablet 1    diphenhydrAMINE (BENADryl) 50 mg tablet Take 1 tablet (50 mg) by mouth if needed (1 hour prior to Darzalex treatments). (Patient not taking: Reported on 1/28/2025)      gabapentin (Neurontin) 100 mg capsule TAKE 3 CAPSULES BY MOUTH ONCE  DAILY AT BEDTIME 270 capsule 0    gabapentin (Neurontin) 300 mg capsule Take 1 capsule (300 mg) by mouth once daily at bedtime. Take at bedtime.      lactase (Lactaid) 3,000 unit tablet Take 1 tablet (3,000 Units) by mouth if needed.      loratadine (Claritin) 10 mg tablet Take 1 tablet (10 mg) by mouth once daily.      losartan  (Cozaar) 100 mg tablet TAKE 1 TABLET BY MOUTH ONCE  DAILY 90 tablet 3    montelukast (Singulair) 10 mg tablet Take every 4 weeks 1 hour prior to Darzalex chemotherapy 3 tablet 3    multivit-min-iron-FA-lutein (Centrum Silver Women) 8 mg iron-400 mcg-300 mcg tablet Take 1 tablet by mouth once daily.      potassium chloride CR 10 mEq ER tablet Take 1 tablet (10 mEq) by mouth once daily. Do not crush, chew, or split. (Patient not taking: Reported on 1/28/2025)      Synthroid 100 mcg tablet TAKE 1 TABLET EVERY MORNING BEFORE MEALS FOR HYPOTHYROIDISM  Strength: 100 mcg 90 tablet 0     No current facility-administered medications on file prior to visit.     Social History:    Social History     Occupational History    Not on file   Tobacco Use    Smoking status: Never     Passive exposure: Never    Smokeless tobacco: Never   Substance and Sexual Activity    Alcohol use: Yes     Alcohol/week: 1.0 standard drink of alcohol     Types: 1 Glasses of wine per week     Comment: rarely    Drug use: Never    Sexual activity: Defer     Family History:    Family History   Problem Relation Name Age of Onset    Cancer Mother      Other (cardiac disorder) Mother      Other (cardiac disorder) Father      Other (cardiac disorder) Brother      Other (CVA) Brother      Other (HEART BASE TUMOR) Brother       Exam:  General: Well-appearing female in no acute distress.  Awake, alert and oriented.  Pleasant and cooperative.  Respiratory: Non-labored breathing  Mood: Euthymic   Gait: Slow with a walker, reciprocal    Affected Right Knee  Limb Alignment: Neutral  ROM: 0-120  Stable to varus and valgus stress at full extension and 30 degrees of flexion  Skin: Intact, no abrasions or draining sinuses  Effusion: None  Quad Strength: 5/5  Hamstring Strength: 5/5  Patella Crepitus: None  Sensation: Intact to light touch distally  Motor function: Able to fire TA, EHL, G/S  Pulses: Palpable DP pulse    Affected Left Knee  Limb Alignment: Neutral  ROM:  0-120  Stable to varus and valgus stress at full extension and 30 degrees of flexion  Skin: Intact, no abrasions or draining sinuses  Effusion: None  Quad Strength: 5/5  Hamstring Strength: 5/5  Patella Crepitus: None  Sensation: Intact to light touch distally  Motor function: Able to fire TA, EHL, G/S  Pulses: Palpable DP pulse    Imaging interpretation:   X-rays of the bilateral knees demonstrate cemented total knee arthroplasties, no signs of loosening.  Sequela of right lateral tibial plateau fracture with subsequent healing and hardware removal.    Assessment and Plan:  89-year-old female with left lower extremity pain bilateral total knee arthroplasties performed 2008.  We discussed possible etiologies of the pain and is most likely she is having radicular symptoms.  Not seem to be terribly bothersome, but I encouraged her to return to her pain management specialist for possible injections if it becomes quality-of-life limiting.  Regarding her knee arthroplasties, they are functioning well and appear well-fixed, no signs of infection.  She will return to clinic on as-needed basis.    Michele Girard MD  Department of Orthopaedic Surgery  Division of Adult Reconstruction  , [facility]

## 2025-03-25 RX ORDER — MONTELUKAST SODIUM 10 MG/1
10 TABLET ORAL ONCE
Status: CANCELLED | OUTPATIENT
Start: 2025-03-27

## 2025-03-25 RX ORDER — DIPHENHYDRAMINE HCL 50 MG
50 CAPSULE ORAL ONCE
Status: CANCELLED | OUTPATIENT
Start: 2025-03-27

## 2025-03-25 RX ORDER — ACETAMINOPHEN 325 MG/1
650 TABLET ORAL ONCE
Status: CANCELLED | OUTPATIENT
Start: 2025-03-27

## 2025-03-25 RX ORDER — BORTEZOMIB 3.5 MG/1
1.3 INJECTION, POWDER, LYOPHILIZED, FOR SOLUTION INTRAVENOUS; SUBCUTANEOUS ONCE
OUTPATIENT
Start: 2025-04-10 | End: 2025-04-10

## 2025-03-25 RX ORDER — DEXAMETHASONE 6 MG/1
12 TABLET ORAL ONCE
Status: CANCELLED | OUTPATIENT
Start: 2025-03-27

## 2025-03-27 ENCOUNTER — INFUSION (OUTPATIENT)
Dept: HEMATOLOGY/ONCOLOGY | Facility: CLINIC | Age: OVER 89
End: 2025-03-27
Payer: MEDICARE

## 2025-03-27 ENCOUNTER — APPOINTMENT (OUTPATIENT)
Dept: HEMATOLOGY/ONCOLOGY | Facility: CLINIC | Age: OVER 89
End: 2025-03-27
Payer: MEDICARE

## 2025-03-27 ENCOUNTER — LAB (OUTPATIENT)
Dept: LAB | Facility: CLINIC | Age: OVER 89
End: 2025-03-27
Payer: MEDICARE

## 2025-03-27 VITALS
BODY MASS INDEX: 27.18 KG/M2 | TEMPERATURE: 97.3 F | OXYGEN SATURATION: 97 % | HEART RATE: 64 BPM | WEIGHT: 134.48 LBS | RESPIRATION RATE: 18 BRPM | SYSTOLIC BLOOD PRESSURE: 199 MMHG | DIASTOLIC BLOOD PRESSURE: 72 MMHG

## 2025-03-27 DIAGNOSIS — C90.00 MULTIPLE MYELOMA, REMISSION STATUS UNSPECIFIED (MULTI): ICD-10-CM

## 2025-03-27 DIAGNOSIS — C90.00 MULTIPLE MYELOMA, REMISSION STATUS UNSPECIFIED (MULTI): Primary | ICD-10-CM

## 2025-03-27 LAB
ALBUMIN SERPL BCP-MCNC: 4.2 G/DL (ref 3.4–5)
ALP SERPL-CCNC: 40 U/L (ref 33–136)
ALT SERPL W P-5'-P-CCNC: 12 U/L (ref 7–45)
ANION GAP SERPL CALC-SCNC: 12 MMOL/L (ref 10–20)
AST SERPL W P-5'-P-CCNC: 19 U/L (ref 9–39)
BASOPHILS # BLD AUTO: 0.04 X10*3/UL (ref 0–0.1)
BASOPHILS NFR BLD AUTO: 0.5 %
BILIRUB SERPL-MCNC: 0.3 MG/DL (ref 0–1.2)
BUN SERPL-MCNC: 50 MG/DL (ref 6–23)
CALCIUM SERPL-MCNC: 9.7 MG/DL (ref 8.6–10.3)
CHLORIDE SERPL-SCNC: 105 MMOL/L (ref 98–107)
CO2 SERPL-SCNC: 26 MMOL/L (ref 21–32)
CREAT SERPL-MCNC: 1.7 MG/DL (ref 0.5–1.05)
EGFRCR SERPLBLD CKD-EPI 2021: 28 ML/MIN/1.73M*2
EOSINOPHIL # BLD AUTO: 0.28 X10*3/UL (ref 0–0.4)
EOSINOPHIL NFR BLD AUTO: 3.7 %
ERYTHROCYTE [DISTWIDTH] IN BLOOD BY AUTOMATED COUNT: 15 % (ref 11.5–14.5)
GLUCOSE SERPL-MCNC: 105 MG/DL (ref 74–99)
HCT VFR BLD AUTO: 33.5 % (ref 36–46)
HGB BLD-MCNC: 10.6 G/DL (ref 12–16)
IMM GRANULOCYTES # BLD AUTO: 0.01 X10*3/UL (ref 0–0.5)
IMM GRANULOCYTES NFR BLD AUTO: 0.1 % (ref 0–0.9)
LYMPHOCYTES # BLD AUTO: 1.72 X10*3/UL (ref 0.8–3)
LYMPHOCYTES NFR BLD AUTO: 23 %
MCH RBC QN AUTO: 31.1 PG (ref 26–34)
MCHC RBC AUTO-ENTMCNC: 31.6 G/DL (ref 32–36)
MCV RBC AUTO: 98 FL (ref 80–100)
MONOCYTES # BLD AUTO: 0.86 X10*3/UL (ref 0.05–0.8)
MONOCYTES NFR BLD AUTO: 11.5 %
NEUTROPHILS # BLD AUTO: 4.57 X10*3/UL (ref 1.6–5.5)
NEUTROPHILS NFR BLD AUTO: 61.2 %
NRBC BLD-RTO: 0 /100 WBCS (ref 0–0)
PLATELET # BLD AUTO: 291 X10*3/UL (ref 150–450)
POTASSIUM SERPL-SCNC: 4.5 MMOL/L (ref 3.5–5.3)
PROT SERPL-MCNC: 6.6 G/DL (ref 6.4–8.2)
RBC # BLD AUTO: 3.41 X10*6/UL (ref 4–5.2)
SODIUM SERPL-SCNC: 138 MMOL/L (ref 136–145)
WBC # BLD AUTO: 7.5 X10*3/UL (ref 4.4–11.3)

## 2025-03-27 PROCEDURE — 80053 COMPREHEN METABOLIC PANEL: CPT

## 2025-03-27 PROCEDURE — 96401 CHEMO ANTI-NEOPL SQ/IM: CPT

## 2025-03-27 PROCEDURE — 2500000004 HC RX 250 GENERAL PHARMACY W/ HCPCS (ALT 636 FOR OP/ED): Performed by: INTERNAL MEDICINE

## 2025-03-27 PROCEDURE — 36415 COLL VENOUS BLD VENIPUNCTURE: CPT

## 2025-03-27 PROCEDURE — 85025 COMPLETE CBC W/AUTO DIFF WBC: CPT

## 2025-03-27 RX ORDER — BORTEZOMIB 3.5 MG/1
1.3 INJECTION, POWDER, LYOPHILIZED, FOR SOLUTION INTRAVENOUS; SUBCUTANEOUS ONCE
Status: COMPLETED | OUTPATIENT
Start: 2025-03-27 | End: 2025-03-27

## 2025-03-27 RX ORDER — FAMOTIDINE 10 MG/ML
20 INJECTION, SOLUTION INTRAVENOUS ONCE AS NEEDED
Status: DISCONTINUED | OUTPATIENT
Start: 2025-03-27 | End: 2025-03-27 | Stop reason: HOSPADM

## 2025-03-27 RX ORDER — EPINEPHRINE 0.3 MG/.3ML
0.3 INJECTION SUBCUTANEOUS EVERY 5 MIN PRN
Status: DISCONTINUED | OUTPATIENT
Start: 2025-03-27 | End: 2025-03-27 | Stop reason: HOSPADM

## 2025-03-27 RX ORDER — DIPHENHYDRAMINE HYDROCHLORIDE 50 MG/ML
50 INJECTION, SOLUTION INTRAMUSCULAR; INTRAVENOUS AS NEEDED
Status: DISCONTINUED | OUTPATIENT
Start: 2025-03-27 | End: 2025-03-27 | Stop reason: HOSPADM

## 2025-03-27 RX ORDER — ALBUTEROL SULFATE 0.83 MG/ML
3 SOLUTION RESPIRATORY (INHALATION) AS NEEDED
Status: DISCONTINUED | OUTPATIENT
Start: 2025-03-27 | End: 2025-03-27 | Stop reason: HOSPADM

## 2025-03-27 RX ADMIN — DARATUMUMAB AND HYALURONIDASE-FIHJ (HUMAN RECOMBINANT) 1800 MG: 1800; 30000 INJECTION SUBCUTANEOUS at 11:03

## 2025-03-27 RX ADMIN — BORTEZOMIB 2.1 MG: 1 INJECTION, POWDER, LYOPHILIZED, FOR SOLUTION INTRAVENOUS; SUBCUTANEOUS at 11:17

## 2025-03-27 ASSESSMENT — PAIN SCALES - GENERAL: PAINLEVEL_OUTOF10: 0-NO PAIN

## 2025-03-27 NOTE — SIGNIFICANT EVENT
03/27/25 1026   Prechemo Checklist   Has the patient been in the hospital, ED, or urgent care since last date of service No   Chemo/Immuno Consent Completed and Signed Yes   Protocol/Indications Verified Yes   Confirmed to previous date/time of medication Yes   Compared to previous dose Yes   All medications are dated accurately Yes   Pregnancy Test Negative Not applicable   Parameters Met Yes   Provider Notified Yes   Is Patient Proceeding With Treatment? Yes   BSA/Weight-Height Verified Yes   Dose Calculations Verified (current, total, cumulative) Yes

## 2025-04-10 ENCOUNTER — INFUSION (OUTPATIENT)
Dept: HEMATOLOGY/ONCOLOGY | Facility: CLINIC | Age: OVER 89
End: 2025-04-10
Payer: MEDICARE

## 2025-04-10 ENCOUNTER — OFFICE VISIT (OUTPATIENT)
Dept: HEMATOLOGY/ONCOLOGY | Facility: CLINIC | Age: OVER 89
End: 2025-04-10
Payer: MEDICARE

## 2025-04-10 ENCOUNTER — LAB (OUTPATIENT)
Dept: LAB | Facility: CLINIC | Age: OVER 89
End: 2025-04-10
Payer: MEDICARE

## 2025-04-10 VITALS — SYSTOLIC BLOOD PRESSURE: 182 MMHG | DIASTOLIC BLOOD PRESSURE: 84 MMHG

## 2025-04-10 VITALS
RESPIRATION RATE: 20 BRPM | HEART RATE: 64 BPM | BODY MASS INDEX: 27.11 KG/M2 | OXYGEN SATURATION: 98 % | HEIGHT: 59 IN | TEMPERATURE: 97 F | WEIGHT: 134.48 LBS

## 2025-04-10 DIAGNOSIS — C90.00 MULTIPLE MYELOMA, REMISSION STATUS UNSPECIFIED (MULTI): ICD-10-CM

## 2025-04-10 DIAGNOSIS — E83.52 HYPERCALCEMIA: ICD-10-CM

## 2025-04-10 DIAGNOSIS — C90.00 MULTIPLE MYELOMA NOT HAVING ACHIEVED REMISSION (MULTI): ICD-10-CM

## 2025-04-10 LAB
ALBUMIN SERPL BCP-MCNC: 4.1 G/DL (ref 3.4–5)
ALP SERPL-CCNC: 43 U/L (ref 33–136)
ALT SERPL W P-5'-P-CCNC: 11 U/L (ref 7–45)
ANION GAP SERPL CALC-SCNC: 11 MMOL/L (ref 10–20)
AST SERPL W P-5'-P-CCNC: 17 U/L (ref 9–39)
B2 MICROGLOB SERPL-MCNC: 7.6 MG/L (ref 0.7–2.2)
BASOPHILS # BLD AUTO: 0.03 X10*3/UL (ref 0–0.1)
BASOPHILS NFR BLD AUTO: 0.3 %
BILIRUB SERPL-MCNC: 0.4 MG/DL (ref 0–1.2)
BUN SERPL-MCNC: 57 MG/DL (ref 6–23)
CALCIUM SERPL-MCNC: 9.6 MG/DL (ref 8.6–10.3)
CHLORIDE SERPL-SCNC: 107 MMOL/L (ref 98–107)
CO2 SERPL-SCNC: 23 MMOL/L (ref 21–32)
CREAT SERPL-MCNC: 1.52 MG/DL (ref 0.5–1.05)
EGFRCR SERPLBLD CKD-EPI 2021: 32 ML/MIN/1.73M*2
EOSINOPHIL # BLD AUTO: 0.24 X10*3/UL (ref 0–0.4)
EOSINOPHIL NFR BLD AUTO: 2.7 %
ERYTHROCYTE [DISTWIDTH] IN BLOOD BY AUTOMATED COUNT: 14.7 % (ref 11.5–14.5)
GLUCOSE SERPL-MCNC: 94 MG/DL (ref 74–99)
HCT VFR BLD AUTO: 33.6 % (ref 36–46)
HGB BLD-MCNC: 10.3 G/DL (ref 12–16)
IGA SERPL-MCNC: 471 MG/DL (ref 70–400)
IGG SERPL-MCNC: 410 MG/DL (ref 700–1600)
IGM SERPL-MCNC: 11 MG/DL (ref 40–230)
IMM GRANULOCYTES # BLD AUTO: 0.03 X10*3/UL (ref 0–0.5)
IMM GRANULOCYTES NFR BLD AUTO: 0.3 % (ref 0–0.9)
LYMPHOCYTES # BLD AUTO: 1.9 X10*3/UL (ref 0.8–3)
LYMPHOCYTES NFR BLD AUTO: 21.2 %
MCH RBC QN AUTO: 30.3 PG (ref 26–34)
MCHC RBC AUTO-ENTMCNC: 30.7 G/DL (ref 32–36)
MCV RBC AUTO: 99 FL (ref 80–100)
MONOCYTES # BLD AUTO: 0.94 X10*3/UL (ref 0.05–0.8)
MONOCYTES NFR BLD AUTO: 10.5 %
NEUTROPHILS # BLD AUTO: 5.84 X10*3/UL (ref 1.6–5.5)
NEUTROPHILS NFR BLD AUTO: 65 %
NRBC BLD-RTO: 0 /100 WBCS (ref 0–0)
PLATELET # BLD AUTO: 316 X10*3/UL (ref 150–450)
POTASSIUM SERPL-SCNC: 4.4 MMOL/L (ref 3.5–5.3)
PROT SERPL-MCNC: 6.6 G/DL (ref 6.4–8.2)
PROT SERPL-MCNC: 6.7 G/DL (ref 6.4–8.2)
RBC # BLD AUTO: 3.4 X10*6/UL (ref 4–5.2)
SODIUM SERPL-SCNC: 137 MMOL/L (ref 136–145)
WBC # BLD AUTO: 9 X10*3/UL (ref 4.4–11.3)

## 2025-04-10 PROCEDURE — 1159F MED LIST DOCD IN RCRD: CPT | Performed by: INTERNAL MEDICINE

## 2025-04-10 PROCEDURE — 85025 COMPLETE CBC W/AUTO DIFF WBC: CPT

## 2025-04-10 PROCEDURE — 2500000004 HC RX 250 GENERAL PHARMACY W/ HCPCS (ALT 636 FOR OP/ED): Mod: JZ,TB | Performed by: INTERNAL MEDICINE

## 2025-04-10 PROCEDURE — 99215 OFFICE O/P EST HI 40 MIN: CPT | Performed by: INTERNAL MEDICINE

## 2025-04-10 PROCEDURE — 1123F ACP DISCUSS/DSCN MKR DOCD: CPT | Performed by: INTERNAL MEDICINE

## 2025-04-10 PROCEDURE — 84155 ASSAY OF PROTEIN SERUM: CPT | Mod: 59,PARLAB

## 2025-04-10 PROCEDURE — 86334 IMMUNOFIX E-PHORESIS SERUM: CPT | Mod: PARLAB

## 2025-04-10 PROCEDURE — 82232 ASSAY OF BETA-2 PROTEIN: CPT | Mod: PARLAB

## 2025-04-10 PROCEDURE — 96372 THER/PROPH/DIAG INJ SC/IM: CPT

## 2025-04-10 PROCEDURE — 80053 COMPREHEN METABOLIC PANEL: CPT

## 2025-04-10 PROCEDURE — 83521 IG LIGHT CHAINS FREE EACH: CPT | Mod: PARLAB

## 2025-04-10 PROCEDURE — 96401 CHEMO ANTI-NEOPL SQ/IM: CPT

## 2025-04-10 PROCEDURE — 1157F ADVNC CARE PLAN IN RCRD: CPT | Performed by: INTERNAL MEDICINE

## 2025-04-10 PROCEDURE — 82784 ASSAY IGA/IGD/IGG/IGM EACH: CPT | Mod: PARLAB

## 2025-04-10 PROCEDURE — 36415 COLL VENOUS BLD VENIPUNCTURE: CPT

## 2025-04-10 PROCEDURE — 1126F AMNT PAIN NOTED NONE PRSNT: CPT | Performed by: INTERNAL MEDICINE

## 2025-04-10 RX ORDER — ALBUTEROL SULFATE 0.83 MG/ML
3 SOLUTION RESPIRATORY (INHALATION) AS NEEDED
OUTPATIENT
Start: 2025-05-08

## 2025-04-10 RX ORDER — DIPHENHYDRAMINE HYDROCHLORIDE 50 MG/ML
50 INJECTION, SOLUTION INTRAMUSCULAR; INTRAVENOUS AS NEEDED
OUTPATIENT
Start: 2025-05-08

## 2025-04-10 RX ORDER — EPINEPHRINE 0.3 MG/.3ML
0.3 INJECTION SUBCUTANEOUS EVERY 5 MIN PRN
OUTPATIENT
Start: 2025-05-08

## 2025-04-10 RX ORDER — FAMOTIDINE 10 MG/ML
20 INJECTION, SOLUTION INTRAVENOUS ONCE AS NEEDED
OUTPATIENT
Start: 2025-05-08

## 2025-04-10 RX ORDER — BORTEZOMIB 3.5 MG/1
1.3 INJECTION, POWDER, LYOPHILIZED, FOR SOLUTION INTRAVENOUS; SUBCUTANEOUS ONCE
Status: COMPLETED | OUTPATIENT
Start: 2025-04-10 | End: 2025-04-10

## 2025-04-10 RX ADMIN — BORTEZOMIB 2.1 MG: 1 INJECTION, POWDER, LYOPHILIZED, FOR SOLUTION INTRAVENOUS; SUBCUTANEOUS at 11:06

## 2025-04-10 RX ADMIN — DENOSUMAB 120 MG: 120 INJECTION SUBCUTANEOUS at 11:01

## 2025-04-10 ASSESSMENT — PAIN SCALES - GENERAL: PAINLEVEL_OUTOF10: 0-NO PAIN

## 2025-04-10 NOTE — PROGRESS NOTES
"LOCATION:  [facility] at [facility].     HEMATOLOGY & ONCOLOGY PROBLEMS:  1.  IgA kappa multiple myeloma        a.  Initial diagnosis of MGUS in Feb 2022.       b.  Disease progression to multiple myeloma in Oct 2022.       c.   Ist line therapy with single agent Velcade from Nov 2022 to June 2024.       d.  2nd line therapy with Darzalex/Velcade/Dex beginning July 2024.  2.   Hypercalcemia/lytic lesions.       a.  Maintained on Xgeva  3.  Macrocytic anemia.     CHIEF COMPLAINT:    The patient is in the clinic today for management of multiple myeloma and macrocytic anemia and for continuation of therapy and management of therapy related effects.     HISTORY:   Ms. Cali is a 89 year old pleasant female with multiple myeloma.  She has been in relatively good health for her age and is in fact still working.  Blood work from Feb 2022 showed a hemoglobin of 11.1 with MCV of 101.  WBC, platelets and metabolic  profile was unremarkable except for baseline creatinine of 1.4.  Additional work-up revealed a normal TSH but SPEP showed 0.7 g of IgA kappa monoclonal protein.  Initial evaluation was mainly suggestive of MGUS and she was followed closely.  But routine  follow-up blood work from October 2022 showed 1g of IgA kappa monoclonal protein and markedly elevated kappa free light chain assay.  Creatinine was stable at 1.3 but serum calcium was elevated at 13.1.  PET scan results were unremarkable but a bone marrow  biopsy confirmed plasma cell myeloma.  Cytogenetics and FISH testing results were unremarkable.  After discussions with the patient and family members she was treated with single agent Velcade with good initial response.  Clinical course was complicated by issues with fall leading to right leg surgery, recurrent C. difficile colitis and intermittent hypercalcemia related complications.  Later on she was also noted to have rising myeloma markers and treatment \"regimen has been changed to DVD " "protocol with addition of subcutaneous Darzalex.       INTERVAL HISTORY:  So far she is tolerating Velcade and Darzalex combination well.  Latest myeloma markers from Oct 2024 showed stable M protein and free kappa light chain level.  Lately she is starting to feel slightly better.  Repeat calcium level from today is 9.6.    PAST MEDICAL HISTORY:   1.  Multiple myeloma as detailed above  2.  Hypertension  3.  Hypothyroidism  4.  GERD  5.  Chronic kidney disease  6.  DJD   7.  Idiopathic neuropathy  8.  Lactose intolerance  9.  Vitamin D deficiency  10.  History of cholecystectomy/hysterectomy/tonsillectomy/left knee replacement surgeries     SOCIAL HISTORY:   Lives in [place] with her son.  Non smoker.  Rare social alcohol intake.  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  Born and raised in [place].     FAMILY HISTORY:    Parents  in their 90s from natural causes but a per the patient both probably had some kind of cancers.  5 siblings 1  from lung problems.  7 children, 12 grandkids and  14 great grandkids all alive and well. No other specific history of bleeding, clotting or malignant disorder in the family.     REVIEW OF SYSTEMS:  Pertinent finding as per the history above.  All other systems have been reviewed and generally negative and noncontributory.     ALLERGY & MEDICATIONS:  Allergies and latest outpatient medications list were reviewed in the EMR.    VITAL SIGNS  BSA: 1.59 meters squared  Pulse 64   Temp 36.1 °C (97 °F)   Resp 20   Ht 1.498 m (4' 10.98\")   Wt 61 kg (134 lb 7.7 oz)   SpO2 98%   BMI 27.18 kg/m²     PHYSICAL EXAMINATION:  Detailed examination not done.    LAB RESULTS:  CBC from today shows a hemoglobin of 10.3 with MCV of 99.   White cell count is 9.0 and platelet are 316.  CMP and other labs are pending.  Metabolic profile was essentially stable on 3/27/2025 with creatinine of 1.7 and calcium of 9.7.  Last myeloma markers from 2025 " showed a kappa light chain level of 6.07.  SPEP showed 0.4 g of IgA kappa monoclonal protein unchanged from before.    RADIOLOGY RESULT:  CT chest without contrast 7/8/2024   Impression:  CHEST  1.  New small 9 mm nodular density in the medial left lower lobe and a few tiny scattered pulmonary nodules and reticulonodular opacities in the bilateral upper lobes and right lower lobe. These may represent infectious/inflammatory or neoplastic nodules. Attention on short interval follow-up CT chest is recommended.  2. No dense area of consolidation to suggest pneumonia.  3. Tiny layering left pleural effusion with adjacent left basilar subsegmental atelectasis.  4. Other chronic findings, as detailed above.      ABDOMEN - PELVIS  1.  Diffuse colonic wall thickening and pericolonic stranding throughout the entire colon, concerning for acute colitis of infectious, inflammatory or ischemic etiology.  2.  Diffuse extensive diverticulosis again seen.  3.  Intermediate to high density bilateral renal lesions are indeterminate. These may represent hemorrhagic/proteinaceous renal cysts, however solid lesion is not excluded. This may be further assessed with nonemergent renal ultrasound and/or MRI.  4.  Additional chronic incidental findings, as detailed above.      PET/CT Myeloma Initial [Oct 21 2022 10:35AM]  Impression:  1. Multiple focal hypermetabolic pulmonary nodules throughout the bilateral lungs. Findings likely represent inflammatory process, versus less likely, malignancy. Recommend short-term follow-up CT to document resolution of nodules or tissue biopsy is clinically indicated.  2. Several hypermetabolic nonenlarged mediastinal lymph nodes, likely reactive in etiology.  3. Mild increased metabolic activity throughout the axial and appendicular skeleton, which is nonspecific and could be related to patient's anemia versus diffuse myeloma involvement. There is no focal hypermetabolic disease involvement within the  skeleton.     PATHOLOGY RESULTS:  Surgical Pathology [Oct 28 2022 1:58PM] ([id number])  Specimens: BONE MARROW CLOT /BONE MARROW CORE   Name SUMIT OTT   Accession #: [id number]    Pathologist: ADRIANA NATION MD   Date of Procedure: 10/25/2022   Submitting Physician: RENATE MELGAR MD   Location: [facility] Other External #   FINAL DIAGNOSIS   A&B:  BONE MARROW, ASPIRATE WITH CLOT AND CORE BIOPSY WITH TOUCH IMPRINT, RIGHT ILIAC CREST:   -- SLIGHTLY HYPERCELLULAR BONE MARROW WITH 15% KAPPA+ PLASMA CELLS CONSISTENT WITH PLASMA CELL MYELOMA   NOTE: Clinical correlation recommended.   Pending  Genetic/Molecular testing per Hematopathology protocol:   -Chromosome analysis: karyotype   -FISH: Myeloma panel   -Molecular: None   -Microarray: None   The results will be reported separately.   Touch Prep   Differential:  (Normal) %   Promyelocytes (1-5) 1   Myelocytes (5-10) 8   Metamyelocytes (10-25) 13   Bands (10-20) 9   Segmented forms (5-30) 17   Eosinophils (2-4) 11   Basophils (0-1) 1   Lymphocytes (5-25) 2   Monocytes (0-2)  0   Plasma Cells (0-2) 15   Blasts (0-1) 0   Total Erythroid (17-35) 23   Number of cells counted: 100   Cellularity: cellular   M:E Ratio: 2.7:1   The gross and/or microscopic findings were reviewed in conjunction with pathology  resident, Kirstin Gutierrez M.D.   Electronically Signed Out By ADRIANA NATION MD/[facility]   Microscopic Description:   CBC: WBC 18.8 x 10E9/L, RBC 2.77 x 10E12/L, Hgb 8.8 g/dl, Hct 26.2%, MCV 95 fL, RDW 13.6%, platelets  416 x 10E9/L.   A 100 cell manual differential count reveals: polys 61%, bands 8%, lymphocytes 13%, monocytes 9%, eosinophils 8%, basophils 1%.   PERIPHERAL SMEAR: Submitted   Red cells: Normocytic with rouleaux formation.   White cells: Eosinophilia    Platelets: Normal, adequate.   ASPIRATE SMEAR: Submitted   Specimen: Aspicular.   Comments: Hypocellular and hemodilute with few hematopoietic precursors and scattered atypical plasma  cells, some biunucleated.   TOUCH PREP: Submitted    Specimen: Paucicellular.   Erythropoiesis: Normal maturation.   Granulopoiesis: Normal maturation.   Megakaryocytes: Present. Morphology: Normal.   Comments: Numerous plasma cells (15%). Some very large atypical plasma cells seen.   ASPIRATE CLOT: Submitted   Specimen: Aspicular.   Comments: Rare hematopoietic progenitor cells noted in a hemodiluted sample.   CORE BIOPSY: Submitted   Specimen: Limited. Small subcortical with crush artifact   Cellularity: 40%.   Estimated M:E ratio: Consistent with aspirate smear.   Bony trabeculae: Normal.   Megakaryocytes: Adequate. Morphology: Normal.   Granulomas: Absent.   Lymphoid aggregates: Absent.   Comments: Few small aggregates of plasm cells noted.    SPECIAL STAINS:   Iron: Inadequate sample to evaluate   IMMUNOHISTOCHEMISTRY: Performed.    - Positive plasma cells approximately (15%)   Cyclin D1 - Negative   IH Kappa - positice in plasma cells   IH Lambda - rare plasma  cells positive.   FLOW CYTOMETRY: Performed, see separate report. A small clonal kappa+, CD19-, CD45- plasma cell population detected.   Immunostains were performed in addition to flow cytometry to fully characterize the phenotype, architecture,  and extent of the atypical population(s). This was medically necessary for the best possible diagnosis.      ASSESSMENT & PLAN:  1.  IgA kappa multiple myeloma.  Please refer to the details of initial presentation and management as outlined above. In summary, patient with finding of mild macrocytic  anemia with further work-up revealing 0.7g of IgA kappa monoclonal protein on SPEP in Feb 2022. Clinically she was essentially asymptomatic and overall functional for her age. She was initially followed with close observation.  Subsequent routine follow-up blood work from Oct 2022 showed 1gm of IgA kappa monoclonal protein and markedly elevated kappa free light chain assay.  Creatinine was stable at 1.3 but  serum calcium was elevated at 13.1.  PET scan results were unremarkable but a bone marrow biopsy  confirmed plasma cell myeloma.  Cytogenetics and FISH testing was unremarkable. After discussions with the patient and family members she was treated with single agent Velcade with good initial response.  Clinical course was complicated by issues with fall leading to right leg surgery, recurrent C. difficile colitis and intermittent hypercalcemia related complications.  Later on she was also noted to have rising myeloma markers and treatment regimen has been changed to DVD protocol with addition of subcutaneous Darzalex since July 2024.       Overall she is tolerating Velcade/Darzalex combination well.  Probable side effects of neuropathy, thrombocytopenia, weakness, fatigue, rash etc. were explained to her in detail.  Repeat myeloma markers from today are pending but they were essentially stable in October 2024.  It will be always a challenge to find a balance between myeloma activity, intensity of the treatment and quality of life issues, at her age.  Overall prognosis remains guarded.    2.  Hypercalcemia.  Secondary to baseline myeloma.  PET scan as such was negative for bony lytic lesions.  She will continue with Xgeva.     3. Follow up: Follow-up with me in about 4 weeks and will come to the clinic for for continuation of chemotherapy with Darzalex and Velcade combination as detailed above.  Advised to contact us immediately if there are any new questions or problems.     This note has been transcribed using Dragon voice recognition system and there is a possibility of unintentional typing misprints.

## 2025-04-11 ENCOUNTER — APPOINTMENT (OUTPATIENT)
Dept: PRIMARY CARE | Facility: CLINIC | Age: OVER 89
End: 2025-04-11
Payer: MEDICARE

## 2025-04-11 VITALS — DIASTOLIC BLOOD PRESSURE: 58 MMHG | SYSTOLIC BLOOD PRESSURE: 140 MMHG | BODY MASS INDEX: 27.49 KG/M2 | WEIGHT: 136 LBS

## 2025-04-11 DIAGNOSIS — N18.2 BENIGN HYPERTENSION WITH CKD (CHRONIC KIDNEY DISEASE), STAGE II: Primary | ICD-10-CM

## 2025-04-11 DIAGNOSIS — N18.32 ANEMIA DUE TO STAGE 3B CHRONIC KIDNEY DISEASE: ICD-10-CM

## 2025-04-11 DIAGNOSIS — D63.1 ANEMIA DUE TO STAGE 3B CHRONIC KIDNEY DISEASE: ICD-10-CM

## 2025-04-11 DIAGNOSIS — E03.9 HYPOTHYROIDISM, UNSPECIFIED TYPE: ICD-10-CM

## 2025-04-11 DIAGNOSIS — C90.00 MULTIPLE MYELOMA, REMISSION STATUS UNSPECIFIED (MULTI): ICD-10-CM

## 2025-04-11 DIAGNOSIS — I12.9 BENIGN HYPERTENSION WITH CKD (CHRONIC KIDNEY DISEASE), STAGE II: Primary | ICD-10-CM

## 2025-04-11 DIAGNOSIS — I27.20 PULMONARY HYPERTENSION (MULTI): ICD-10-CM

## 2025-04-11 DIAGNOSIS — J41.0 SIMPLE CHRONIC BRONCHITIS (MULTI): ICD-10-CM

## 2025-04-11 DIAGNOSIS — I10 ESSENTIAL HYPERTENSION: ICD-10-CM

## 2025-04-11 PROBLEM — A49.8 CLOSTRIDIOIDES DIFFICILE INFECTION: Status: RESOLVED | Noted: 2023-11-03 | Resolved: 2025-04-11

## 2025-04-11 PROBLEM — T81.49XA POSTOPERATIVE WOUND INFECTION: Status: RESOLVED | Noted: 2023-04-25 | Resolved: 2025-04-11

## 2025-04-11 PROBLEM — Z96.653 STATUS POST TOTAL BILATERAL KNEE REPLACEMENT: Status: RESOLVED | Noted: 2023-05-17 | Resolved: 2025-04-11

## 2025-04-11 PROBLEM — L98.499 SKIN ULCER (MULTI): Status: RESOLVED | Noted: 2024-01-24 | Resolved: 2025-04-11

## 2025-04-11 PROBLEM — L03.115 CELLULITIS AND ABSCESS OF RIGHT LOWER EXTREMITY: Status: RESOLVED | Noted: 2023-10-06 | Resolved: 2025-04-11

## 2025-04-11 PROBLEM — L02.415 CELLULITIS AND ABSCESS OF RIGHT LOWER EXTREMITY: Status: RESOLVED | Noted: 2023-10-06 | Resolved: 2025-04-11
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
## 2025-04-11 PROBLEM — A04.72 CLOSTRIDIUM DIFFICILE COLITIS: Status: RESOLVED | Noted: 2023-11-03 | Resolved: 2025-04-11

## 2025-04-11 LAB
KAPPA LC SERPL-MCNC: 7.27 MG/DL (ref 0.33–1.94)
KAPPA LC/LAMBDA SER: 11.36 {RATIO} (ref 0.26–1.65)
LAMBDA LC SERPL-MCNC: 0.64 MG/DL (ref 0.57–2.63)

## 2025-04-11 PROCEDURE — 1123F ACP DISCUSS/DSCN MKR DOCD: CPT | Performed by: INTERNAL MEDICINE

## 2025-04-11 PROCEDURE — 3078F DIAST BP <80 MM HG: CPT | Performed by: INTERNAL MEDICINE

## 2025-04-11 PROCEDURE — 1160F RVW MEDS BY RX/DR IN RCRD: CPT | Performed by: INTERNAL MEDICINE

## 2025-04-11 PROCEDURE — 3077F SYST BP >= 140 MM HG: CPT | Performed by: INTERNAL MEDICINE

## 2025-04-11 PROCEDURE — 1159F MED LIST DOCD IN RCRD: CPT | Performed by: INTERNAL MEDICINE

## 2025-04-11 PROCEDURE — G2211 COMPLEX E/M VISIT ADD ON: HCPCS | Performed by: INTERNAL MEDICINE

## 2025-04-11 PROCEDURE — 1157F ADVNC CARE PLAN IN RCRD: CPT | Performed by: INTERNAL MEDICINE

## 2025-04-11 PROCEDURE — 99214 OFFICE O/P EST MOD 30 MIN: CPT | Performed by: INTERNAL MEDICINE

## 2025-04-11 NOTE — PROGRESS NOTES
Chief complaint   Routine fu    HPI  Routine fu with Dr Gray. MM is stable, tolerating treatments.   No specific complaints.   ROS  Gen-no wt loss,wt gain,fevers,apnea at night,snoring  HEENT- no ear pain, no oral pain, no hearing loss no sinus pain or drainage  Pulm- no sob,weezing,cough,hemoptysis,productive mucous  Cardio-no cp,dash,orthopnea,palpitations  Gi- no diarrhea,constipation,n,v,hematemesis,dysphagia,change in bowel habits  Endo- no polydipsia,polyuria,wt gain,extreme fatigue,polyphagia,blurred vision  Neuro-no ha,loc,paresthesias,transient limb weakness,acute memory loss,visual changes  Heme- no blood loss,transfusions,unusual bleeding from skin and/or mucosa  Urology- no dysuria,hematuria,urinary frequency,bladder pain  Gyn- no pelvic pain,vaginal dc,vaginal bleeding,  Psych- no depression,mood swings,anxiety,insomnia,hallucinations  Skin- no rash,new discolorations  Vascular- no pain in calve w walkin,discolored painful toes      Vitals:    04/11/25 1015   BP: 140/58       Physical Exam    NAD  Lungs clear, good AE, no wheezing  Heart-normal S1-S2 no murmur gallop rub, rhythm normal  Abdomen- soft, nontender, pos bowel sounds,   Neck-no JVD, thyroid normal, no carotid bruits, no LN  Neuro alert and oriented ×3, cranial nerves II through XII normal, motor function symmetrical,sensory exam symmetrical ,gait nl  Extrem- no edema, no tenderness, no cords  Skin- nl turgor, no ulcers,no rash     or      Assessment/Plan   Diagnoses and all orders for this visit:  Benign hypertension with CKD (chronic kidney disease), stage II  Essential hypertension  Pulmonary hypertension (Multi)  Hypothyroidism, unspecified type  Multiple myeloma, remission status unspecified (Multi)  Simple chronic bronchitis (Multi)  Anemia due to stage 3b chronic kidney disease    Exam unremarkable.   Return in 3 months.

## 2025-04-14 LAB
ALBUMIN: 4.1 G/DL (ref 3.4–5)
ALPHA 1 GLOBULIN: 0.3 G/DL (ref 0.2–0.6)
ALPHA 2 GLOBULIN: 0.8 G/DL (ref 0.4–1.1)
BETA GLOBULIN: 1.1 G/DL (ref 0.5–1.2)
GAMMA GLOBULIN: 0.4 G/DL (ref 0.5–1.4)
IMMUNOFIXATION COMMENT: ABNORMAL
M-PROTEIN 1: 0.3 G/DL
M-PROTEIN 2: 0.1 G/DL
M-PROTEIN 3: 0.1 G/DL
PATH REVIEW - SERUM IMMUNOFIXATION: ABNORMAL
PATH REVIEW-SERUM PROTEIN ELECTROPHORESIS: ABNORMAL
PROTEIN ELECTROPHORESIS COMMENT: ABNORMAL

## 2025-04-23 RX ORDER — DEXAMETHASONE 6 MG/1
12 TABLET ORAL ONCE
Status: CANCELLED | OUTPATIENT
Start: 2025-04-24

## 2025-04-23 RX ORDER — BORTEZOMIB 3.5 MG/1
1.3 INJECTION, POWDER, LYOPHILIZED, FOR SOLUTION INTRAVENOUS; SUBCUTANEOUS ONCE
OUTPATIENT
Start: 2025-05-08 | End: 2025-05-08

## 2025-04-23 RX ORDER — MONTELUKAST SODIUM 10 MG/1
10 TABLET ORAL ONCE
Status: CANCELLED | OUTPATIENT
Start: 2025-04-24

## 2025-04-23 RX ORDER — ACETAMINOPHEN 325 MG/1
650 TABLET ORAL ONCE
Status: CANCELLED | OUTPATIENT
Start: 2025-04-24

## 2025-04-23 RX ORDER — DIPHENHYDRAMINE HCL 50 MG
50 CAPSULE ORAL ONCE
Status: CANCELLED | OUTPATIENT
Start: 2025-04-24

## 2025-04-24 ENCOUNTER — INFUSION (OUTPATIENT)
Dept: HEMATOLOGY/ONCOLOGY | Facility: CLINIC | Age: OVER 89
End: 2025-04-24
Payer: MEDICARE

## 2025-04-24 ENCOUNTER — LAB (OUTPATIENT)
Dept: LAB | Facility: CLINIC | Age: OVER 89
End: 2025-04-24
Payer: MEDICARE

## 2025-04-24 VITALS
SYSTOLIC BLOOD PRESSURE: 176 MMHG | HEART RATE: 63 BPM | HEIGHT: 59 IN | OXYGEN SATURATION: 96 % | BODY MASS INDEX: 26.62 KG/M2 | TEMPERATURE: 97.7 F | DIASTOLIC BLOOD PRESSURE: 82 MMHG | WEIGHT: 132.06 LBS | RESPIRATION RATE: 18 BRPM

## 2025-04-24 DIAGNOSIS — C90.00 MULTIPLE MYELOMA, REMISSION STATUS UNSPECIFIED (MULTI): ICD-10-CM

## 2025-04-24 DIAGNOSIS — C90.00 MULTIPLE MYELOMA, REMISSION STATUS UNSPECIFIED (MULTI): Primary | ICD-10-CM

## 2025-04-24 LAB
ALBUMIN SERPL BCP-MCNC: 4.1 G/DL (ref 3.4–5)
ALP SERPL-CCNC: 47 U/L (ref 33–136)
ALT SERPL W P-5'-P-CCNC: 11 U/L (ref 7–45)
ANION GAP SERPL CALC-SCNC: 13 MMOL/L (ref 10–20)
AST SERPL W P-5'-P-CCNC: 17 U/L (ref 9–39)
BASOPHILS # BLD AUTO: 0.06 X10*3/UL (ref 0–0.1)
BASOPHILS NFR BLD AUTO: 0.5 %
BILIRUB SERPL-MCNC: 0.4 MG/DL (ref 0–1.2)
BUN SERPL-MCNC: 51 MG/DL (ref 6–23)
CALCIUM SERPL-MCNC: 10.4 MG/DL (ref 8.6–10.3)
CHLORIDE SERPL-SCNC: 104 MMOL/L (ref 98–107)
CO2 SERPL-SCNC: 26 MMOL/L (ref 21–32)
CREAT SERPL-MCNC: 1.75 MG/DL (ref 0.5–1.05)
EGFRCR SERPLBLD CKD-EPI 2021: 27 ML/MIN/1.73M*2
EOSINOPHIL # BLD AUTO: 0.26 X10*3/UL (ref 0–0.4)
EOSINOPHIL NFR BLD AUTO: 2.4 %
ERYTHROCYTE [DISTWIDTH] IN BLOOD BY AUTOMATED COUNT: 13.9 % (ref 11.5–14.5)
GLUCOSE SERPL-MCNC: 112 MG/DL (ref 74–99)
HCT VFR BLD AUTO: 34 % (ref 36–46)
HGB BLD-MCNC: 11.1 G/DL (ref 12–16)
IMM GRANULOCYTES # BLD AUTO: 0.02 X10*3/UL (ref 0–0.5)
IMM GRANULOCYTES NFR BLD AUTO: 0.2 % (ref 0–0.9)
LYMPHOCYTES # BLD AUTO: 1.98 X10*3/UL (ref 0.8–3)
LYMPHOCYTES NFR BLD AUTO: 18 %
MCH RBC QN AUTO: 31.7 PG (ref 26–34)
MCHC RBC AUTO-ENTMCNC: 32.6 G/DL (ref 32–36)
MCV RBC AUTO: 97 FL (ref 80–100)
MONOCYTES # BLD AUTO: 1.01 X10*3/UL (ref 0.05–0.8)
MONOCYTES NFR BLD AUTO: 9.2 %
NEUTROPHILS # BLD AUTO: 7.68 X10*3/UL (ref 1.6–5.5)
NEUTROPHILS NFR BLD AUTO: 69.7 %
NRBC BLD-RTO: 0 /100 WBCS (ref 0–0)
PLATELET # BLD AUTO: 300 X10*3/UL (ref 150–450)
POTASSIUM SERPL-SCNC: 4.3 MMOL/L (ref 3.5–5.3)
PROT SERPL-MCNC: 6.9 G/DL (ref 6.4–8.2)
RBC # BLD AUTO: 3.5 X10*6/UL (ref 4–5.2)
SODIUM SERPL-SCNC: 139 MMOL/L (ref 136–145)
WBC # BLD AUTO: 11 X10*3/UL (ref 4.4–11.3)

## 2025-04-24 PROCEDURE — 80053 COMPREHEN METABOLIC PANEL: CPT

## 2025-04-24 PROCEDURE — 36415 COLL VENOUS BLD VENIPUNCTURE: CPT

## 2025-04-24 PROCEDURE — 85025 COMPLETE CBC W/AUTO DIFF WBC: CPT

## 2025-04-24 PROCEDURE — 96401 CHEMO ANTI-NEOPL SQ/IM: CPT

## 2025-04-24 PROCEDURE — 2500000004 HC RX 250 GENERAL PHARMACY W/ HCPCS (ALT 636 FOR OP/ED): Mod: JZ,TB | Performed by: INTERNAL MEDICINE

## 2025-04-24 RX ORDER — DIPHENHYDRAMINE HYDROCHLORIDE 50 MG/ML
50 INJECTION, SOLUTION INTRAMUSCULAR; INTRAVENOUS AS NEEDED
Status: DISCONTINUED | OUTPATIENT
Start: 2025-04-24 | End: 2025-04-24 | Stop reason: HOSPADM

## 2025-04-24 RX ORDER — FAMOTIDINE 10 MG/ML
20 INJECTION, SOLUTION INTRAVENOUS ONCE AS NEEDED
Status: DISCONTINUED | OUTPATIENT
Start: 2025-04-24 | End: 2025-04-24 | Stop reason: HOSPADM

## 2025-04-24 RX ORDER — BORTEZOMIB 3.5 MG/1
1.3 INJECTION, POWDER, LYOPHILIZED, FOR SOLUTION INTRAVENOUS; SUBCUTANEOUS ONCE
Status: COMPLETED | OUTPATIENT
Start: 2025-04-24 | End: 2025-04-24

## 2025-04-24 RX ORDER — EPINEPHRINE 0.3 MG/.3ML
0.3 INJECTION SUBCUTANEOUS EVERY 5 MIN PRN
Status: DISCONTINUED | OUTPATIENT
Start: 2025-04-24 | End: 2025-04-24 | Stop reason: HOSPADM

## 2025-04-24 RX ORDER — ALBUTEROL SULFATE 0.83 MG/ML
3 SOLUTION RESPIRATORY (INHALATION) AS NEEDED
Status: DISCONTINUED | OUTPATIENT
Start: 2025-04-24 | End: 2025-04-24 | Stop reason: HOSPADM

## 2025-04-24 RX ADMIN — DARATUMUMAB AND HYALURONIDASE-FIHJ (HUMAN RECOMBINANT) 1800 MG: 1800; 30000 INJECTION SUBCUTANEOUS at 11:08

## 2025-04-24 RX ADMIN — BORTEZOMIB 2.1 MG: 1 INJECTION, POWDER, LYOPHILIZED, FOR SOLUTION INTRAVENOUS; SUBCUTANEOUS at 11:07

## 2025-04-24 ASSESSMENT — PAIN SCALES - GENERAL: PAINLEVEL_OUTOF10: 0-NO PAIN

## 2025-04-24 NOTE — SIGNIFICANT EVENT
04/24/25 1016   Prechemo Checklist   Has the patient been in the hospital, ED, or urgent care since last date of service No   Chemo/Immuno Consent Completed and Signed Yes   Protocol/Indications Verified Yes   Confirmed to previous date/time of medication Yes   Compared to previous dose Yes   All medications are dated accurately Yes   Pregnancy Test Negative Not applicable   Parameters Met Yes   Provider Notified Yes   Is Patient Proceeding With Treatment? Yes   BSA/Weight-Height Verified Yes   Dose Calculations Verified (current, total, cumulative) Yes

## 2025-05-08 ENCOUNTER — INFUSION (OUTPATIENT)
Dept: HEMATOLOGY/ONCOLOGY | Facility: CLINIC | Age: OVER 89
End: 2025-05-08
Payer: MEDICARE

## 2025-05-08 ENCOUNTER — LAB (OUTPATIENT)
Dept: LAB | Facility: CLINIC | Age: OVER 89
End: 2025-05-08
Payer: MEDICARE

## 2025-05-08 VITALS
TEMPERATURE: 97.5 F | DIASTOLIC BLOOD PRESSURE: 78 MMHG | OXYGEN SATURATION: 96 % | HEART RATE: 65 BPM | WEIGHT: 132.72 LBS | BODY MASS INDEX: 26.76 KG/M2 | HEIGHT: 59 IN | SYSTOLIC BLOOD PRESSURE: 180 MMHG | RESPIRATION RATE: 18 BRPM

## 2025-05-08 DIAGNOSIS — C90.00 MULTIPLE MYELOMA NOT HAVING ACHIEVED REMISSION (MULTI): ICD-10-CM

## 2025-05-08 DIAGNOSIS — C90.00 MULTIPLE MYELOMA, REMISSION STATUS UNSPECIFIED (MULTI): ICD-10-CM

## 2025-05-08 DIAGNOSIS — E83.52 HYPERCALCEMIA: ICD-10-CM

## 2025-05-08 LAB
ALBUMIN SERPL BCP-MCNC: 4.2 G/DL (ref 3.4–5)
ALP SERPL-CCNC: 45 U/L (ref 33–136)
ALT SERPL W P-5'-P-CCNC: 13 U/L (ref 7–45)
ANION GAP SERPL CALC-SCNC: 11 MMOL/L (ref 10–20)
AST SERPL W P-5'-P-CCNC: 18 U/L (ref 9–39)
BASOPHILS # BLD AUTO: 0.04 X10*3/UL (ref 0–0.1)
BASOPHILS NFR BLD AUTO: 0.4 %
BILIRUB SERPL-MCNC: 0.3 MG/DL (ref 0–1.2)
BUN SERPL-MCNC: 47 MG/DL (ref 6–23)
CALCIUM SERPL-MCNC: 10.5 MG/DL (ref 8.6–10.3)
CHLORIDE SERPL-SCNC: 108 MMOL/L (ref 98–107)
CO2 SERPL-SCNC: 25 MMOL/L (ref 21–32)
CREAT SERPL-MCNC: 1.39 MG/DL (ref 0.5–1.05)
EGFRCR SERPLBLD CKD-EPI 2021: 36 ML/MIN/1.73M*2
EOSINOPHIL # BLD AUTO: 0.24 X10*3/UL (ref 0–0.4)
EOSINOPHIL NFR BLD AUTO: 2.4 %
ERYTHROCYTE [DISTWIDTH] IN BLOOD BY AUTOMATED COUNT: 14 % (ref 11.5–14.5)
GLUCOSE SERPL-MCNC: 85 MG/DL (ref 74–99)
HCT VFR BLD AUTO: 35.7 % (ref 36–46)
HGB BLD-MCNC: 11.5 G/DL (ref 12–16)
IMM GRANULOCYTES # BLD AUTO: 0.03 X10*3/UL (ref 0–0.5)
IMM GRANULOCYTES NFR BLD AUTO: 0.3 % (ref 0–0.9)
LYMPHOCYTES # BLD AUTO: 1.85 X10*3/UL (ref 0.8–3)
LYMPHOCYTES NFR BLD AUTO: 18.4 %
MCH RBC QN AUTO: 31.6 PG (ref 26–34)
MCHC RBC AUTO-ENTMCNC: 32.2 G/DL (ref 32–36)
MCV RBC AUTO: 98 FL (ref 80–100)
MONOCYTES # BLD AUTO: 1.01 X10*3/UL (ref 0.05–0.8)
MONOCYTES NFR BLD AUTO: 10 %
NEUTROPHILS # BLD AUTO: 6.9 X10*3/UL (ref 1.6–5.5)
NEUTROPHILS NFR BLD AUTO: 68.5 %
NRBC BLD-RTO: 0 /100 WBCS (ref 0–0)
PLATELET # BLD AUTO: 318 X10*3/UL (ref 150–450)
POTASSIUM SERPL-SCNC: 4.3 MMOL/L (ref 3.5–5.3)
PROT SERPL-MCNC: 7 G/DL (ref 6.4–8.2)
RBC # BLD AUTO: 3.64 X10*6/UL (ref 4–5.2)
SODIUM SERPL-SCNC: 140 MMOL/L (ref 136–145)
WBC # BLD AUTO: 10.1 X10*3/UL (ref 4.4–11.3)

## 2025-05-08 PROCEDURE — 80053 COMPREHEN METABOLIC PANEL: CPT

## 2025-05-08 PROCEDURE — 85025 COMPLETE CBC W/AUTO DIFF WBC: CPT

## 2025-05-08 PROCEDURE — 36415 COLL VENOUS BLD VENIPUNCTURE: CPT

## 2025-05-08 PROCEDURE — 96401 CHEMO ANTI-NEOPL SQ/IM: CPT

## 2025-05-08 PROCEDURE — 96372 THER/PROPH/DIAG INJ SC/IM: CPT

## 2025-05-08 PROCEDURE — 2500000004 HC RX 250 GENERAL PHARMACY W/ HCPCS (ALT 636 FOR OP/ED): Mod: JZ,TB | Performed by: INTERNAL MEDICINE

## 2025-05-08 RX ORDER — DIPHENHYDRAMINE HYDROCHLORIDE 50 MG/ML
50 INJECTION, SOLUTION INTRAMUSCULAR; INTRAVENOUS AS NEEDED
OUTPATIENT
Start: 2025-06-05

## 2025-05-08 RX ORDER — BORTEZOMIB 3.5 MG/1
1.3 INJECTION, POWDER, LYOPHILIZED, FOR SOLUTION INTRAVENOUS; SUBCUTANEOUS ONCE
Status: COMPLETED | OUTPATIENT
Start: 2025-05-08 | End: 2025-05-08

## 2025-05-08 RX ORDER — FAMOTIDINE 10 MG/ML
20 INJECTION, SOLUTION INTRAVENOUS ONCE AS NEEDED
OUTPATIENT
Start: 2025-06-05

## 2025-05-08 RX ORDER — EPINEPHRINE 0.3 MG/.3ML
0.3 INJECTION SUBCUTANEOUS EVERY 5 MIN PRN
OUTPATIENT
Start: 2025-06-05

## 2025-05-08 RX ORDER — ALBUTEROL SULFATE 0.83 MG/ML
3 SOLUTION RESPIRATORY (INHALATION) AS NEEDED
OUTPATIENT
Start: 2025-06-05

## 2025-05-08 RX ADMIN — DENOSUMAB 120 MG: 120 INJECTION SUBCUTANEOUS at 10:53

## 2025-05-08 RX ADMIN — BORTEZOMIB 2.1 MG: 1 INJECTION, POWDER, LYOPHILIZED, FOR SOLUTION INTRAVENOUS; SUBCUTANEOUS at 11:06

## 2025-05-08 ASSESSMENT — PAIN SCALES - GENERAL: PAINLEVEL_OUTOF10: 0-NO PAIN

## 2025-05-08 NOTE — SIGNIFICANT EVENT
05/08/25 1027   Prechemo Checklist   Has the patient been in the hospital, ED, or urgent care since last date of service No   Chemo/Immuno Consent Completed and Signed Yes   Protocol/Indications Verified Yes   Confirmed to previous date/time of medication Yes   Compared to previous dose Yes   All medications are dated accurately Yes   Pregnancy Test Negative Not applicable   Parameters Met Yes   Is Patient Proceeding With Treatment? Yes   BSA/Weight-Height Verified Yes   Dose Calculations Verified (current, total, cumulative) Yes        within normal limits

## 2025-05-14 ENCOUNTER — OFFICE VISIT (OUTPATIENT)
Dept: OPHTHALMOLOGY | Facility: CLINIC | Age: OVER 89
End: 2025-05-14
Payer: MEDICARE

## 2025-05-14 DIAGNOSIS — H52.223 REGULAR ASTIGMATISM OF BOTH EYES: ICD-10-CM

## 2025-05-14 DIAGNOSIS — H52.4 PRESBYOPIA: ICD-10-CM

## 2025-05-14 DIAGNOSIS — H52.03 HYPEROPIA OF BOTH EYES: ICD-10-CM

## 2025-05-14 DIAGNOSIS — H35.63 RETINAL HEMORRHAGE OF BOTH EYES: Primary | ICD-10-CM

## 2025-05-14 DIAGNOSIS — Z96.1 PSEUDOPHAKIA OF BOTH EYES: ICD-10-CM

## 2025-05-14 DIAGNOSIS — H53.8 BLURRED VISION: ICD-10-CM

## 2025-05-14 DIAGNOSIS — H35.433 PAVING STONE RETINAL DEGENERATION OF BOTH EYES: ICD-10-CM

## 2025-05-14 DIAGNOSIS — H04.123 DRY EYES: ICD-10-CM

## 2025-05-14 DIAGNOSIS — H35.54 RETINAL PIGMENT EPITHELIAL ATROPHY: ICD-10-CM

## 2025-05-14 DIAGNOSIS — H35.30 AMD (AGE-RELATED MACULAR DEGENERATION), BILATERAL: ICD-10-CM

## 2025-05-14 PROCEDURE — 92134 CPTRZ OPH DX IMG PST SGM RTA: CPT | Performed by: OPHTHALMOLOGY

## 2025-05-14 PROCEDURE — 92004 COMPRE OPH EXAM NEW PT 1/>: CPT | Performed by: OPHTHALMOLOGY

## 2025-05-14 PROCEDURE — 92015 DETERMINE REFRACTIVE STATE: CPT | Performed by: OPHTHALMOLOGY

## 2025-05-14 ASSESSMENT — ENCOUNTER SYMPTOMS
PSYCHIATRIC NEGATIVE: 0
GASTROINTESTINAL NEGATIVE: 0
CARDIOVASCULAR NEGATIVE: 0
NEUROLOGICAL NEGATIVE: 0
ALLERGIC/IMMUNOLOGIC NEGATIVE: 0
HEMATOLOGIC/LYMPHATIC NEGATIVE: 0
RESPIRATORY NEGATIVE: 0
ENDOCRINE NEGATIVE: 0
EYES NEGATIVE: 1
MUSCULOSKELETAL NEGATIVE: 0
CONSTITUTIONAL NEGATIVE: 0

## 2025-05-14 ASSESSMENT — REFRACTION_WEARINGRX
OD_CYLINDER: -1.50
OD_ADD: +3.00
OS_AXIS: 075
OD_AXIS: 100
OS_CYLINDER: -1.25
OD_SPHERE: +1.75
OS_ADD: +3.00
OS_SPHERE: +1.50

## 2025-05-14 ASSESSMENT — VISUAL ACUITY
CORRECTION_TYPE: GLASSES
METHOD: SNELLEN - LINEAR
OD_CC: J2
OS_CC: J3
OS_CC: 20/30
OD_CC: 20/25+2

## 2025-05-14 ASSESSMENT — SLIT LAMP EXAM - LIDS
COMMENTS: NORMAL
COMMENTS: NORMAL

## 2025-05-14 ASSESSMENT — REFRACTION_MANIFEST
OS_SPHERE: +1.75
OS_ADD: +3.00
OD_SPHERE: +2.00
OD_AXIS: 100
OD_ADD: +3.00
OD_CYLINDER: -1.75
OS_AXIS: 075
OS_CYLINDER: -0.75

## 2025-05-14 ASSESSMENT — EXTERNAL EXAM - LEFT EYE: OS_EXAM: NORMAL

## 2025-05-14 ASSESSMENT — TONOMETRY
IOP_METHOD: GOLDMANN APPLANATION
OD_IOP_MMHG: 14
OS_IOP_MMHG: 14

## 2025-05-14 ASSESSMENT — EXTERNAL EXAM - RIGHT EYE: OD_EXAM: NORMAL

## 2025-05-14 ASSESSMENT — CUP TO DISC RATIO
OD_RATIO: 0.65
OS_RATIO: 0.75

## 2025-05-14 NOTE — PROGRESS NOTES
Assessment/Plan   Diagnoses and all orders for this visit:  Retinal hemorrhage of both eyes  Age related macular degeneration both eyes  Cobblestone degeneration  Retinal pigment epithelial atrophy  Refer to Retina Service for evaluation and management    Blurred vision  Improved with refraction    Pseudophakia of both eyes  continue to monitor    Hyperopia of both eyes  Astigmatism of both eyes  Presbyopia  Refractive error  -give Rx for new glasses    Dry eyes  -Start artificial tears both eyes (OU) qid  -stress compliance    Return in   6  month(s) for follow up or sooner if having any problems

## 2025-05-21 RX ORDER — BORTEZOMIB 3.5 MG/1
1.3 INJECTION, POWDER, LYOPHILIZED, FOR SOLUTION INTRAVENOUS; SUBCUTANEOUS ONCE
OUTPATIENT
Start: 2025-06-05 | End: 2025-06-05

## 2025-05-21 RX ORDER — EPINEPHRINE 0.3 MG/.3ML
0.3 INJECTION SUBCUTANEOUS EVERY 5 MIN PRN
Status: CANCELLED | OUTPATIENT
Start: 2025-05-22

## 2025-05-21 RX ORDER — DIPHENHYDRAMINE HCL 50 MG
50 CAPSULE ORAL ONCE
Status: CANCELLED | OUTPATIENT
Start: 2025-05-22

## 2025-05-21 RX ORDER — FAMOTIDINE 10 MG/ML
20 INJECTION, SOLUTION INTRAVENOUS ONCE AS NEEDED
Status: CANCELLED | OUTPATIENT
Start: 2025-05-22

## 2025-05-21 RX ORDER — ALBUTEROL SULFATE 0.83 MG/ML
3 SOLUTION RESPIRATORY (INHALATION) AS NEEDED
Status: CANCELLED | OUTPATIENT
Start: 2025-05-22

## 2025-05-21 RX ORDER — MONTELUKAST SODIUM 10 MG/1
10 TABLET ORAL ONCE
Status: CANCELLED | OUTPATIENT
Start: 2025-05-22

## 2025-05-21 RX ORDER — DEXAMETHASONE 6 MG/1
12 TABLET ORAL ONCE
Status: CANCELLED | OUTPATIENT
Start: 2025-05-22

## 2025-05-21 RX ORDER — DIPHENHYDRAMINE HYDROCHLORIDE 50 MG/ML
50 INJECTION, SOLUTION INTRAMUSCULAR; INTRAVENOUS AS NEEDED
Status: CANCELLED | OUTPATIENT
Start: 2025-05-22

## 2025-05-21 RX ORDER — ACETAMINOPHEN 325 MG/1
650 TABLET ORAL ONCE
Status: CANCELLED | OUTPATIENT
Start: 2025-05-22

## 2025-05-22 ENCOUNTER — INFUSION (OUTPATIENT)
Dept: HEMATOLOGY/ONCOLOGY | Facility: CLINIC | Age: OVER 89
End: 2025-05-22
Payer: MEDICARE

## 2025-05-22 ENCOUNTER — OFFICE VISIT (OUTPATIENT)
Dept: HEMATOLOGY/ONCOLOGY | Facility: CLINIC | Age: OVER 89
End: 2025-05-22
Payer: MEDICARE

## 2025-05-22 ENCOUNTER — LAB (OUTPATIENT)
Dept: LAB | Facility: CLINIC | Age: OVER 89
End: 2025-05-22
Payer: MEDICARE

## 2025-05-22 VITALS
OXYGEN SATURATION: 97 % | DIASTOLIC BLOOD PRESSURE: 80 MMHG | TEMPERATURE: 97.3 F | RESPIRATION RATE: 18 BRPM | HEART RATE: 66 BPM | HEIGHT: 59 IN | WEIGHT: 136.24 LBS | BODY MASS INDEX: 27.47 KG/M2 | SYSTOLIC BLOOD PRESSURE: 188 MMHG

## 2025-05-22 DIAGNOSIS — C90.00 MULTIPLE MYELOMA NOT HAVING ACHIEVED REMISSION (MULTI): ICD-10-CM

## 2025-05-22 DIAGNOSIS — C90.00 MULTIPLE MYELOMA, REMISSION STATUS UNSPECIFIED (MULTI): ICD-10-CM

## 2025-05-22 DIAGNOSIS — C90.00 MULTIPLE MYELOMA, REMISSION STATUS UNSPECIFIED (MULTI): Primary | ICD-10-CM

## 2025-05-22 LAB
ALBUMIN SERPL BCP-MCNC: 4.1 G/DL (ref 3.4–5)
ALP SERPL-CCNC: 47 U/L (ref 33–136)
ALT SERPL W P-5'-P-CCNC: 13 U/L (ref 7–45)
ANION GAP SERPL CALC-SCNC: 12 MMOL/L (ref 10–20)
AST SERPL W P-5'-P-CCNC: 18 U/L (ref 9–39)
B2 MICROGLOB SERPL-MCNC: 7.2 MG/L (ref 0.7–2.2)
BASOPHILS # BLD AUTO: 0.04 X10*3/UL (ref 0–0.1)
BASOPHILS NFR BLD AUTO: 0.4 %
BILIRUB SERPL-MCNC: 0.4 MG/DL (ref 0–1.2)
BUN SERPL-MCNC: 39 MG/DL (ref 6–23)
CALCIUM SERPL-MCNC: 10.1 MG/DL (ref 8.6–10.3)
CHLORIDE SERPL-SCNC: 106 MMOL/L (ref 98–107)
CO2 SERPL-SCNC: 26 MMOL/L (ref 21–32)
CREAT SERPL-MCNC: 1.41 MG/DL (ref 0.5–1.05)
EGFRCR SERPLBLD CKD-EPI 2021: 35 ML/MIN/1.73M*2
EOSINOPHIL # BLD AUTO: 0.24 X10*3/UL (ref 0–0.4)
EOSINOPHIL NFR BLD AUTO: 2.7 %
ERYTHROCYTE [DISTWIDTH] IN BLOOD BY AUTOMATED COUNT: 13.9 % (ref 11.5–14.5)
GLUCOSE SERPL-MCNC: 89 MG/DL (ref 74–99)
HCT VFR BLD AUTO: 35.7 % (ref 36–46)
HGB BLD-MCNC: 11.3 G/DL (ref 12–16)
IGA SERPL-MCNC: 573 MG/DL (ref 70–400)
IGG SERPL-MCNC: 421 MG/DL (ref 700–1600)
IGM SERPL-MCNC: 12 MG/DL (ref 40–230)
IMM GRANULOCYTES # BLD AUTO: 0.03 X10*3/UL (ref 0–0.5)
IMM GRANULOCYTES NFR BLD AUTO: 0.3 % (ref 0–0.9)
LYMPHOCYTES # BLD AUTO: 1.76 X10*3/UL (ref 0.8–3)
LYMPHOCYTES NFR BLD AUTO: 19.5 %
MCH RBC QN AUTO: 30.8 PG (ref 26–34)
MCHC RBC AUTO-ENTMCNC: 31.7 G/DL (ref 32–36)
MCV RBC AUTO: 97 FL (ref 80–100)
MONOCYTES # BLD AUTO: 0.79 X10*3/UL (ref 0.05–0.8)
MONOCYTES NFR BLD AUTO: 8.7 %
NEUTROPHILS # BLD AUTO: 6.17 X10*3/UL (ref 1.6–5.5)
NEUTROPHILS NFR BLD AUTO: 68.4 %
NRBC BLD-RTO: 0 /100 WBCS (ref 0–0)
PLATELET # BLD AUTO: 294 X10*3/UL (ref 150–450)
POTASSIUM SERPL-SCNC: 4.2 MMOL/L (ref 3.5–5.3)
PROT SERPL-MCNC: 6.5 G/DL (ref 6.4–8.2)
PROT SERPL-MCNC: 6.8 G/DL (ref 6.4–8.2)
RBC # BLD AUTO: 3.67 X10*6/UL (ref 4–5.2)
SODIUM SERPL-SCNC: 140 MMOL/L (ref 136–145)
WBC # BLD AUTO: 9 X10*3/UL (ref 4.4–11.3)

## 2025-05-22 PROCEDURE — 80053 COMPREHEN METABOLIC PANEL: CPT

## 2025-05-22 PROCEDURE — 96401 CHEMO ANTI-NEOPL SQ/IM: CPT

## 2025-05-22 PROCEDURE — 99215 OFFICE O/P EST HI 40 MIN: CPT | Performed by: INTERNAL MEDICINE

## 2025-05-22 PROCEDURE — 2500000004 HC RX 250 GENERAL PHARMACY W/ HCPCS (ALT 636 FOR OP/ED): Mod: JZ,TB | Performed by: INTERNAL MEDICINE

## 2025-05-22 PROCEDURE — 84165 PROTEIN E-PHORESIS SERUM: CPT | Mod: PARLAB

## 2025-05-22 PROCEDURE — 36415 COLL VENOUS BLD VENIPUNCTURE: CPT

## 2025-05-22 PROCEDURE — 82784 ASSAY IGA/IGD/IGG/IGM EACH: CPT | Mod: PARLAB

## 2025-05-22 PROCEDURE — 83521 IG LIGHT CHAINS FREE EACH: CPT | Mod: PARLAB

## 2025-05-22 PROCEDURE — 85025 COMPLETE CBC W/AUTO DIFF WBC: CPT

## 2025-05-22 PROCEDURE — 84155 ASSAY OF PROTEIN SERUM: CPT | Mod: PARLAB

## 2025-05-22 PROCEDURE — 82232 ASSAY OF BETA-2 PROTEIN: CPT | Mod: PARLAB

## 2025-05-22 RX ORDER — MONTELUKAST SODIUM 10 MG/1
TABLET ORAL
Qty: 3 TABLET | Refills: 3 | Status: SHIPPED | OUTPATIENT
Start: 2025-05-22

## 2025-05-22 RX ORDER — BORTEZOMIB 3.5 MG/1
1.3 INJECTION, POWDER, LYOPHILIZED, FOR SOLUTION INTRAVENOUS; SUBCUTANEOUS ONCE
Status: COMPLETED | OUTPATIENT
Start: 2025-05-22 | End: 2025-05-22

## 2025-05-22 RX ORDER — MONTELUKAST SODIUM 10 MG/1
TABLET ORAL
Qty: 15 TABLET | Refills: 1 | Status: CANCELLED | OUTPATIENT
Start: 2025-05-22

## 2025-05-22 RX ADMIN — DARATUMUMAB AND HYALURONIDASE-FIHJ (HUMAN RECOMBINANT) 1800 MG: 1800; 30000 INJECTION SUBCUTANEOUS at 11:17

## 2025-05-22 RX ADMIN — BORTEZOMIB 2.1 MG: 1 INJECTION, POWDER, LYOPHILIZED, FOR SOLUTION INTRAVENOUS; SUBCUTANEOUS at 11:16

## 2025-05-22 ASSESSMENT — PAIN SCALES - GENERAL: PAINLEVEL_OUTOF10: 0-NO PAIN

## 2025-05-22 NOTE — SIGNIFICANT EVENT
05/22/25 1031   Prechemo Checklist   Has the patient been in the hospital, ED, or urgent care since last date of service No   Chemo/Immuno Consent Completed and Signed Yes   Protocol/Indications Verified Yes   Confirmed to previous date/time of medication Yes   Compared to previous dose Yes   All medications are dated accurately Yes   Pregnancy Test Negative Not applicable   Parameters Met Yes   Provider Notified Yes   Is Patient Proceeding With Treatment? Yes   BSA/Weight-Height Verified Yes   Dose Calculations Verified (current, total, cumulative) Yes

## 2025-05-22 NOTE — PROGRESS NOTES
"LOCATION:  [facility] at [facility].     HEMATOLOGY & ONCOLOGY PROBLEMS:  1.  IgA kappa multiple myeloma        a.  Initial diagnosis of MGUS in Feb 2022.       b.  Disease progression to multiple myeloma in Oct 2022.       c.   Ist line therapy with single agent Velcade from Nov 2022 to June 2024.       d.  2nd line therapy with Darzalex/Velcade/Dex beginning July 2024.  2.   Hypercalcemia/lytic lesions.       a.  Maintained on Xgeva  3.  Macrocytic anemia.     CHIEF COMPLAINT:    The patient is in the clinic today for management of multiple myeloma and macrocytic anemia and for continuation of therapy and management of therapy related effects.     HISTORY:   Ms. Cali is a 89 year old pleasant female with multiple myeloma.  She has been in relatively good health for her age and is in fact still working.  Blood work from Feb 2022 showed a hemoglobin of 11.1 with MCV of 101.  WBC, platelets and metabolic  profile was unremarkable except for baseline creatinine of 1.4.  Additional work-up revealed a normal TSH but SPEP showed 0.7 g of IgA kappa monoclonal protein.  Initial evaluation was mainly suggestive of MGUS and she was followed closely.  But routine  follow-up blood work from October 2022 showed 1g of IgA kappa monoclonal protein and markedly elevated kappa free light chain assay.  Creatinine was stable at 1.3 but serum calcium was elevated at 13.1.  PET scan results were unremarkable but a bone marrow  biopsy confirmed plasma cell myeloma.  Cytogenetics and FISH testing results were unremarkable.  After discussions with the patient and family members she was treated with single agent Velcade with good initial response.  Clinical course was complicated by issues with fall leading to right leg surgery, recurrent C. difficile colitis and intermittent hypercalcemia related complications.  Later on she was also noted to have rising myeloma markers and treatment \"regimen has been changed to DVD " protocol with addition of subcutaneous Darzalex.       INTERVAL HISTORY:  Tolerating Velcade and Darzalex combination well.  Latest myeloma markers from 2025 showed stable M protein and free kappa light chain levels.  Lately she is starting to feel slightly better.  Repeat calcium level from today is 10.1.    PAST MEDICAL HISTORY:   1.  Multiple myeloma as detailed above  2.  Hypertension  3.  Hypothyroidism  4.  GERD  5.  Chronic kidney disease  6.  DJD   7.  Idiopathic neuropathy  8.  Lactose intolerance  9.  Vitamin D deficiency  10.  History of cholecystectomy/hysterectomy/tonsillectomy/left knee replacement surgeries     SOCIAL HISTORY:   Lives in [place] with her son.  Non smoker.  Rare social alcohol intake.  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  Born and raised in [place].     FAMILY HISTORY:    Parents  in their 90s from natural causes but a per the patient both probably had some kind of cancers.  5 siblings 1  from lung problems.  7 children, 12 grandkids and  14 great grandkids all alive and well. No other specific history of bleeding, clotting or malignant disorder in the family.     REVIEW OF SYSTEMS:  Pertinent finding as per the history above.  All other systems have been reviewed and generally negative and noncontributory.     ALLERGY & MEDICATIONS:  Allergies and latest outpatient medications list were reviewed in the EMR.    VITAL SIGNS  BSA: There is no height or weight on file to calculate BSA.  There were no vitals taken for this visit.    PHYSICAL EXAMINATION:  Detailed examination not done.    LAB RESULTS:  CBC from today shows a hemoglobin of 11.3 with MCV of 97.   White cell count is 9.0 and platelet are 294.   Metabolic profile is essentially stable with creatinine of 1.4 and calcium of 10.1.  Last myeloma markers from 04/10/2025 showed a kappa light chain level of 7.2.  SPEP showed 0.4 g of IgA kappa monoclonal protein unchanged  from before.    RADIOLOGY RESULT:  CT chest without contrast 7/8/2024   Impression:  CHEST  1.  New small 9 mm nodular density in the medial left lower lobe and a few tiny scattered pulmonary nodules and reticulonodular opacities in the bilateral upper lobes and right lower lobe. These may represent infectious/inflammatory or neoplastic nodules. Attention on short interval follow-up CT chest is recommended.  2. No dense area of consolidation to suggest pneumonia.  3. Tiny layering left pleural effusion with adjacent left basilar subsegmental atelectasis.  4. Other chronic findings, as detailed above.      ABDOMEN - PELVIS  1.  Diffuse colonic wall thickening and pericolonic stranding throughout the entire colon, concerning for acute colitis of infectious, inflammatory or ischemic etiology.  2.  Diffuse extensive diverticulosis again seen.  3.  Intermediate to high density bilateral renal lesions are indeterminate. These may represent hemorrhagic/proteinaceous renal cysts, however solid lesion is not excluded. This may be further assessed with nonemergent renal ultrasound and/or MRI.  4.  Additional chronic incidental findings, as detailed above.      PET/CT Myeloma Initial [Oct 21 2022 10:35AM]  Impression:  1. Multiple focal hypermetabolic pulmonary nodules throughout the bilateral lungs. Findings likely represent inflammatory process, versus less likely, malignancy. Recommend short-term follow-up CT to document resolution of nodules or tissue biopsy is clinically indicated.  2. Several hypermetabolic nonenlarged mediastinal lymph nodes, likely reactive in etiology.  3. Mild increased metabolic activity throughout the axial and appendicular skeleton, which is nonspecific and could be related to patient's anemia versus diffuse myeloma involvement. There is no focal hypermetabolic disease involvement within the skeleton.     PATHOLOGY RESULTS:  Surgical Pathology [Oct 28 2022 1:58PM] ([id number])  Specimens:  BONE MARROW CLOT /BONE MARROW CORE   Name SUMIT OTT   Accession #: [id number]    Pathologist: ADRIANA NATION MD   Date of Procedure: 10/25/2022   Submitting Physician: RENATE MELGAR MD   Location: [facility] Other External #   FINAL DIAGNOSIS   A&B:  BONE MARROW, ASPIRATE WITH CLOT AND CORE BIOPSY WITH TOUCH IMPRINT, RIGHT ILIAC CREST:   -- SLIGHTLY HYPERCELLULAR BONE MARROW WITH 15% KAPPA+ PLASMA CELLS CONSISTENT WITH PLASMA CELL MYELOMA   NOTE: Clinical correlation recommended.   Pending  Genetic/Molecular testing per Hematopathology protocol:   -Chromosome analysis: karyotype   -FISH: Myeloma panel   -Molecular: None   -Microarray: None   The results will be reported separately.   Touch Prep   Differential:  (Normal) %   Promyelocytes (1-5) 1   Myelocytes (5-10) 8   Metamyelocytes (10-25) 13   Bands (10-20) 9   Segmented forms (5-30) 17   Eosinophils (2-4) 11   Basophils (0-1) 1   Lymphocytes (5-25) 2   Monocytes (0-2)  0   Plasma Cells (0-2) 15   Blasts (0-1) 0   Total Erythroid (17-35) 23   Number of cells counted: 100   Cellularity: cellular   M:E Ratio: 2.7:1   The gross and/or microscopic findings were reviewed in conjunction with pathology  resident, Kirstin Gutierrez M.D.   Electronically Signed Out By ADRIANA NATION MD/[facility]   Microscopic Description:   CBC: WBC 18.8 x 10E9/L, RBC 2.77 x 10E12/L, Hgb 8.8 g/dl, Hct 26.2%, MCV 95 fL, RDW 13.6%, platelets  416 x 10E9/L.   A 100 cell manual differential count reveals: polys 61%, bands 8%, lymphocytes 13%, monocytes 9%, eosinophils 8%, basophils 1%.   PERIPHERAL SMEAR: Submitted   Red cells: Normocytic with rouleaux formation.   White cells: Eosinophilia    Platelets: Normal, adequate.   ASPIRATE SMEAR: Submitted   Specimen: Aspicular.   Comments: Hypocellular and hemodilute with few hematopoietic precursors and scattered atypical plasma cells, some biunucleated.   TOUCH PREP: Submitted    Specimen: Paucicellular.   Erythropoiesis: Normal  maturation.   Granulopoiesis: Normal maturation.   Megakaryocytes: Present. Morphology: Normal.   Comments: Numerous plasma cells (15%). Some very large atypical plasma cells seen.   ASPIRATE CLOT: Submitted   Specimen: Aspicular.   Comments: Rare hematopoietic progenitor cells noted in a hemodiluted sample.   CORE BIOPSY: Submitted   Specimen: Limited. Small subcortical with crush artifact   Cellularity: 40%.   Estimated M:E ratio: Consistent with aspirate smear.   Bony trabeculae: Normal.   Megakaryocytes: Adequate. Morphology: Normal.   Granulomas: Absent.   Lymphoid aggregates: Absent.   Comments: Few small aggregates of plasm cells noted.    SPECIAL STAINS:   Iron: Inadequate sample to evaluate   IMMUNOHISTOCHEMISTRY: Performed.    - Positive plasma cells approximately (15%)   Cyclin D1 - Negative   IH Kappa - positice in plasma cells   IH Lambda - rare plasma  cells positive.   FLOW CYTOMETRY: Performed, see separate report. A small clonal kappa+, CD19-, CD45- plasma cell population detected.   Immunostains were performed in addition to flow cytometry to fully characterize the phenotype, architecture,  and extent of the atypical population(s). This was medically necessary for the best possible diagnosis.      ASSESSMENT & PLAN:  1.  IgA kappa multiple myeloma.  Please refer to the details of initial presentation and management as outlined above. In summary, patient with finding of mild macrocytic  anemia with further work-up revealing 0.7g of IgA kappa monoclonal protein on SPEP in Feb 2022. Clinically she was essentially asymptomatic and overall functional for her age. She was initially followed with close observation.  Subsequent routine follow-up blood work from Oct 2022 showed 1gm of IgA kappa monoclonal protein and markedly elevated kappa free light chain assay.  Creatinine was stable at 1.3 but serum calcium was elevated at 13.1.  PET scan results were unremarkable but a bone marrow biopsy   confirmed plasma cell myeloma.  Cytogenetics and FISH testing was unremarkable. After discussions with the patient and family members she was treated with single agent Velcade with good initial response.  Clinical course was complicated by issues with fall leading to right leg surgery, recurrent C. difficile colitis and intermittent hypercalcemia related complications.  Later on she was also noted to have rising myeloma markers and treatment regimen has been changed to DVD protocol with addition of subcutaneous Darzalex since July 2024.       Overall she is tolerating Velcade/Darzalex combination well.  Probable side effects of neuropathy, thrombocytopenia, weakness, fatigue, rash etc. were explained to her in detail.  Repeat myeloma markers from today are pending but they were essentially stable in October 2024.  It will be always a challenge to find a balance between myeloma activity, intensity of the treatment and quality of life issues, at her age.  Overall prognosis remains guarded.    2.  Hypercalcemia.  Secondary to baseline myeloma.  PET scan as such was negative for bony lytic lesions.  She will continue with Xgeva.     3. Follow up: Follow-up with me in about 4 weeks and will come to the clinic for for continuation of chemotherapy with Darzalex and Velcade combination as detailed above.  Advised to contact us immediately if there are any new questions or problems.     This note has been transcribed using Dragon voice recognition system and there is a possibility of unintentional typing misprints.

## 2025-05-23 LAB
KAPPA LC SERPL-MCNC: 7.05 MG/DL (ref 0.33–1.94)
KAPPA LC/LAMBDA SER: 10.22 {RATIO} (ref 0.26–1.65)
LAMBDA LC SERPL-MCNC: 0.69 MG/DL (ref 0.57–2.63)

## 2025-05-28 LAB
ALBUMIN: 4 G/DL (ref 3.4–5)
ALPHA 1 GLOBULIN: 0.3 G/DL (ref 0.2–0.6)
ALPHA 2 GLOBULIN: 0.7 G/DL (ref 0.4–1.1)
BETA GLOBULIN: 1.1 G/DL (ref 0.5–1.2)
GAMMA GLOBULIN: 0.4 G/DL (ref 0.5–1.4)
IMMUNOFIXATION COMMENT: ABNORMAL
M-PROTEIN 1: 0.3 G/DL (ref ?–0)
M-PROTEIN 2: 0.1 G/DL (ref ?–0)
M-PROTEIN 3: 0.1 G/DL (ref ?–0)
PATH REVIEW - SERUM IMMUNOFIXATION: ABNORMAL
PATH REVIEW-SERUM PROTEIN ELECTROPHORESIS: ABNORMAL
PROTEIN ELECTROPHORESIS COMMENT: ABNORMAL

## 2025-06-05 ENCOUNTER — LAB (OUTPATIENT)
Dept: LAB | Facility: CLINIC | Age: OVER 89
End: 2025-06-05
Payer: MEDICARE

## 2025-06-05 ENCOUNTER — INFUSION (OUTPATIENT)
Dept: HEMATOLOGY/ONCOLOGY | Facility: CLINIC | Age: OVER 89
End: 2025-06-05
Payer: MEDICARE

## 2025-06-05 ENCOUNTER — APPOINTMENT (OUTPATIENT)
Dept: OPHTHALMOLOGY | Facility: CLINIC | Age: OVER 89
End: 2025-06-05
Payer: MEDICARE

## 2025-06-05 VITALS
SYSTOLIC BLOOD PRESSURE: 190 MMHG | HEIGHT: 59 IN | RESPIRATION RATE: 18 BRPM | HEART RATE: 67 BPM | OXYGEN SATURATION: 95 % | WEIGHT: 137.13 LBS | BODY MASS INDEX: 27.64 KG/M2 | DIASTOLIC BLOOD PRESSURE: 80 MMHG | TEMPERATURE: 97.2 F

## 2025-06-05 DIAGNOSIS — C90.00 MULTIPLE MYELOMA NOT HAVING ACHIEVED REMISSION (MULTI): ICD-10-CM

## 2025-06-05 DIAGNOSIS — H35.63 RETINAL HEMORRHAGE OF BOTH EYES: Primary | ICD-10-CM

## 2025-06-05 DIAGNOSIS — E83.52 HYPERCALCEMIA: ICD-10-CM

## 2025-06-05 DIAGNOSIS — C90.00 MULTIPLE MYELOMA, REMISSION STATUS UNSPECIFIED (MULTI): ICD-10-CM

## 2025-06-05 LAB
ALBUMIN SERPL BCP-MCNC: 4.1 G/DL (ref 3.4–5)
ALP SERPL-CCNC: 41 U/L (ref 33–136)
ALT SERPL W P-5'-P-CCNC: 11 U/L (ref 7–45)
ANION GAP SERPL CALC-SCNC: 16 MMOL/L (ref 10–20)
AST SERPL W P-5'-P-CCNC: 17 U/L (ref 9–39)
BASOPHILS # BLD AUTO: 0.03 X10*3/UL (ref 0–0.1)
BASOPHILS NFR BLD AUTO: 0.4 %
BILIRUB SERPL-MCNC: 0.5 MG/DL (ref 0–1.2)
BUN SERPL-MCNC: 58 MG/DL (ref 6–23)
CALCIUM SERPL-MCNC: 10.3 MG/DL (ref 8.6–10.3)
CHLORIDE SERPL-SCNC: 106 MMOL/L (ref 98–107)
CO2 SERPL-SCNC: 23 MMOL/L (ref 21–32)
CREAT SERPL-MCNC: 1.56 MG/DL (ref 0.5–1.05)
EGFRCR SERPLBLD CKD-EPI 2021: 31 ML/MIN/1.73M*2
EOSINOPHIL # BLD AUTO: 0.24 X10*3/UL (ref 0–0.4)
EOSINOPHIL NFR BLD AUTO: 3.2 %
ERYTHROCYTE [DISTWIDTH] IN BLOOD BY AUTOMATED COUNT: 13.5 % (ref 11.5–14.5)
GLUCOSE SERPL-MCNC: 96 MG/DL (ref 74–99)
HCT VFR BLD AUTO: 33.6 % (ref 36–46)
HGB BLD-MCNC: 10.9 G/DL (ref 12–16)
IMM GRANULOCYTES # BLD AUTO: 0.01 X10*3/UL (ref 0–0.5)
IMM GRANULOCYTES NFR BLD AUTO: 0.1 % (ref 0–0.9)
LYMPHOCYTES # BLD AUTO: 2.01 X10*3/UL (ref 0.8–3)
LYMPHOCYTES NFR BLD AUTO: 26.8 %
MCH RBC QN AUTO: 31.3 PG (ref 26–34)
MCHC RBC AUTO-ENTMCNC: 32.4 G/DL (ref 32–36)
MCV RBC AUTO: 97 FL (ref 80–100)
MONOCYTES # BLD AUTO: 0.81 X10*3/UL (ref 0.05–0.8)
MONOCYTES NFR BLD AUTO: 10.8 %
NEUTROPHILS # BLD AUTO: 4.41 X10*3/UL (ref 1.6–5.5)
NEUTROPHILS NFR BLD AUTO: 58.7 %
NRBC BLD-RTO: 0 /100 WBCS (ref 0–0)
PLATELET # BLD AUTO: 303 X10*3/UL (ref 150–450)
POTASSIUM SERPL-SCNC: 4.1 MMOL/L (ref 3.5–5.3)
PROT SERPL-MCNC: 6.8 G/DL (ref 6.4–8.2)
RBC # BLD AUTO: 3.48 X10*6/UL (ref 4–5.2)
SODIUM SERPL-SCNC: 141 MMOL/L (ref 136–145)
WBC # BLD AUTO: 7.5 X10*3/UL (ref 4.4–11.3)

## 2025-06-05 PROCEDURE — 96401 CHEMO ANTI-NEOPL SQ/IM: CPT

## 2025-06-05 PROCEDURE — 2500000004 HC RX 250 GENERAL PHARMACY W/ HCPCS (ALT 636 FOR OP/ED): Performed by: INTERNAL MEDICINE

## 2025-06-05 PROCEDURE — 85025 COMPLETE CBC W/AUTO DIFF WBC: CPT

## 2025-06-05 PROCEDURE — 96372 THER/PROPH/DIAG INJ SC/IM: CPT

## 2025-06-05 PROCEDURE — 84075 ASSAY ALKALINE PHOSPHATASE: CPT

## 2025-06-05 PROCEDURE — 80053 COMPREHEN METABOLIC PANEL: CPT

## 2025-06-05 PROCEDURE — 36415 COLL VENOUS BLD VENIPUNCTURE: CPT

## 2025-06-05 RX ORDER — BORTEZOMIB 3.5 MG/1
1.3 INJECTION, POWDER, LYOPHILIZED, FOR SOLUTION INTRAVENOUS; SUBCUTANEOUS ONCE
Status: COMPLETED | OUTPATIENT
Start: 2025-06-05 | End: 2025-06-05

## 2025-06-05 RX ORDER — FAMOTIDINE 10 MG/ML
20 INJECTION, SOLUTION INTRAVENOUS ONCE AS NEEDED
OUTPATIENT
Start: 2025-07-03

## 2025-06-05 RX ORDER — DIPHENHYDRAMINE HYDROCHLORIDE 50 MG/ML
50 INJECTION, SOLUTION INTRAMUSCULAR; INTRAVENOUS AS NEEDED
OUTPATIENT
Start: 2025-07-03

## 2025-06-05 RX ORDER — EPINEPHRINE 0.3 MG/.3ML
0.3 INJECTION SUBCUTANEOUS EVERY 5 MIN PRN
OUTPATIENT
Start: 2025-07-03

## 2025-06-05 RX ORDER — ALBUTEROL SULFATE 0.83 MG/ML
3 SOLUTION RESPIRATORY (INHALATION) AS NEEDED
OUTPATIENT
Start: 2025-07-03

## 2025-06-05 RX ADMIN — DENOSUMAB 120 MG: 120 INJECTION SUBCUTANEOUS at 11:30

## 2025-06-05 RX ADMIN — BORTEZOMIB 2.1 MG: 1 INJECTION, POWDER, LYOPHILIZED, FOR SOLUTION INTRAVENOUS; SUBCUTANEOUS at 11:33

## 2025-06-05 ASSESSMENT — SLIT LAMP EXAM - LIDS
COMMENTS: NORMAL
COMMENTS: NORMAL

## 2025-06-05 ASSESSMENT — CUP TO DISC RATIO
OD_RATIO: 0.65
OS_RATIO: 0.75

## 2025-06-05 ASSESSMENT — EXTERNAL EXAM - RIGHT EYE: OD_EXAM: NORMAL

## 2025-06-05 ASSESSMENT — TONOMETRY
OD_IOP_MMHG: 12
OS_IOP_MMHG: 14

## 2025-06-05 ASSESSMENT — VISUAL ACUITY
OD_CC: 20/25
OD_CC+: - 1
METHOD: SNELLEN - LINEAR
CORRECTION_TYPE: GLASSES
OS_CC: 20/25
OS_CC+: - 1

## 2025-06-05 ASSESSMENT — PAIN SCALES - GENERAL: PAINLEVEL_OUTOF10: 0-NO PAIN

## 2025-06-05 ASSESSMENT — ENCOUNTER SYMPTOMS: EYES NEGATIVE: 1

## 2025-06-05 ASSESSMENT — EXTERNAL EXAM - LEFT EYE: OS_EXAM: NORMAL

## 2025-06-05 NOTE — PROGRESS NOTES
Assessment/Plan   Referral from Dr. Mejía, as halos recenly seen Dr. Lopez    #Age related macular degeneration both eyes, early dry OU  #Retina hemorrhages  Today on exam, 1-2 PP hemes OU, but midperipoheral hemes appear resolved  No history of DM, but currently getting treated for MM, and has HTN  Macula OCT OU with some RPE changes on exam, and parafoveal outer retinal thinning OU with mild drusen, but appears stable vs prior OCT's; good BCVA  Monitor for now - recommend follow up in 3 months with FAF, could consider FA in future if worsening/recurs

## 2025-06-17 RX ORDER — ACETAMINOPHEN 325 MG/1
650 TABLET ORAL ONCE
OUTPATIENT
Start: 2025-06-26

## 2025-06-17 RX ORDER — BORTEZOMIB 3.5 MG/1
1.3 INJECTION, POWDER, LYOPHILIZED, FOR SOLUTION INTRAVENOUS; SUBCUTANEOUS ONCE
OUTPATIENT
Start: 2025-07-10

## 2025-06-17 RX ORDER — DIPHENHYDRAMINE HYDROCHLORIDE 50 MG/ML
50 INJECTION, SOLUTION INTRAMUSCULAR; INTRAVENOUS AS NEEDED
OUTPATIENT
Start: 2025-07-03

## 2025-06-17 RX ORDER — EPINEPHRINE 0.3 MG/.3ML
0.3 INJECTION SUBCUTANEOUS EVERY 5 MIN PRN
Status: CANCELLED | OUTPATIENT
Start: 2025-06-19

## 2025-06-17 RX ORDER — EPINEPHRINE 0.3 MG/.3ML
0.3 INJECTION SUBCUTANEOUS EVERY 5 MIN PRN
OUTPATIENT
Start: 2025-07-10

## 2025-06-17 RX ORDER — ACETAMINOPHEN 325 MG/1
650 TABLET ORAL ONCE
OUTPATIENT
Start: 2025-07-10

## 2025-06-17 RX ORDER — ALBUTEROL SULFATE 0.83 MG/ML
3 SOLUTION RESPIRATORY (INHALATION) AS NEEDED
OUTPATIENT
Start: 2025-06-26

## 2025-06-17 RX ORDER — FAMOTIDINE 10 MG/ML
20 INJECTION, SOLUTION INTRAVENOUS ONCE AS NEEDED
Status: CANCELLED | OUTPATIENT
Start: 2025-06-19

## 2025-06-17 RX ORDER — DIPHENHYDRAMINE HCL 50 MG
50 CAPSULE ORAL ONCE
OUTPATIENT
Start: 2025-06-26

## 2025-06-17 RX ORDER — BORTEZOMIB 3.5 MG/1
1.3 INJECTION, POWDER, LYOPHILIZED, FOR SOLUTION INTRAVENOUS; SUBCUTANEOUS ONCE
OUTPATIENT
Start: 2025-07-03

## 2025-06-17 RX ORDER — ACETAMINOPHEN 325 MG/1
650 TABLET ORAL ONCE
OUTPATIENT
Start: 2025-07-03

## 2025-06-17 RX ORDER — FAMOTIDINE 10 MG/ML
20 INJECTION, SOLUTION INTRAVENOUS ONCE AS NEEDED
OUTPATIENT
Start: 2025-07-03

## 2025-06-17 RX ORDER — MONTELUKAST SODIUM 10 MG/1
10 TABLET ORAL ONCE
OUTPATIENT
Start: 2025-07-03

## 2025-06-17 RX ORDER — DIPHENHYDRAMINE HYDROCHLORIDE 50 MG/ML
50 INJECTION, SOLUTION INTRAMUSCULAR; INTRAVENOUS AS NEEDED
OUTPATIENT
Start: 2025-06-26

## 2025-06-17 RX ORDER — DIPHENHYDRAMINE HYDROCHLORIDE 50 MG/ML
50 INJECTION, SOLUTION INTRAMUSCULAR; INTRAVENOUS AS NEEDED
OUTPATIENT
Start: 2025-07-10

## 2025-06-17 RX ORDER — EPINEPHRINE 0.3 MG/.3ML
0.3 INJECTION SUBCUTANEOUS EVERY 5 MIN PRN
OUTPATIENT
Start: 2025-06-26

## 2025-06-17 RX ORDER — BORTEZOMIB 3.5 MG/1
1.3 INJECTION, POWDER, LYOPHILIZED, FOR SOLUTION INTRAVENOUS; SUBCUTANEOUS ONCE
OUTPATIENT
Start: 2025-06-26

## 2025-06-17 RX ORDER — EPINEPHRINE 0.3 MG/.3ML
0.3 INJECTION SUBCUTANEOUS EVERY 5 MIN PRN
OUTPATIENT
Start: 2025-07-03

## 2025-06-17 RX ORDER — ALBUTEROL SULFATE 0.83 MG/ML
3 SOLUTION RESPIRATORY (INHALATION) AS NEEDED
OUTPATIENT
Start: 2025-07-10

## 2025-06-17 RX ORDER — DIPHENHYDRAMINE HCL 50 MG
50 CAPSULE ORAL ONCE
OUTPATIENT
Start: 2025-07-10

## 2025-06-17 RX ORDER — FAMOTIDINE 10 MG/ML
20 INJECTION, SOLUTION INTRAVENOUS ONCE AS NEEDED
OUTPATIENT
Start: 2025-06-26

## 2025-06-17 RX ORDER — MONTELUKAST SODIUM 10 MG/1
10 TABLET ORAL ONCE
OUTPATIENT
Start: 2025-07-10

## 2025-06-17 RX ORDER — DIPHENHYDRAMINE HYDROCHLORIDE 50 MG/ML
50 INJECTION, SOLUTION INTRAMUSCULAR; INTRAVENOUS AS NEEDED
Status: CANCELLED | OUTPATIENT
Start: 2025-06-19

## 2025-06-17 RX ORDER — MONTELUKAST SODIUM 10 MG/1
10 TABLET ORAL ONCE
OUTPATIENT
Start: 2025-06-26

## 2025-06-17 RX ORDER — DIPHENHYDRAMINE HCL 50 MG
50 CAPSULE ORAL ONCE
OUTPATIENT
Start: 2025-07-03

## 2025-06-17 RX ORDER — ALBUTEROL SULFATE 0.83 MG/ML
3 SOLUTION RESPIRATORY (INHALATION) AS NEEDED
OUTPATIENT
Start: 2025-07-03

## 2025-06-17 RX ORDER — ALBUTEROL SULFATE 0.83 MG/ML
3 SOLUTION RESPIRATORY (INHALATION) AS NEEDED
Status: CANCELLED | OUTPATIENT
Start: 2025-06-19

## 2025-06-17 RX ORDER — FAMOTIDINE 10 MG/ML
20 INJECTION, SOLUTION INTRAVENOUS ONCE AS NEEDED
OUTPATIENT
Start: 2025-07-10

## 2025-06-19 ENCOUNTER — OFFICE VISIT (OUTPATIENT)
Dept: PAIN MEDICINE | Facility: CLINIC | Age: OVER 89
End: 2025-06-19
Payer: MEDICARE

## 2025-06-19 ENCOUNTER — LAB (OUTPATIENT)
Dept: LAB | Facility: CLINIC | Age: OVER 89
End: 2025-06-19
Payer: MEDICARE

## 2025-06-19 ENCOUNTER — INFUSION (OUTPATIENT)
Dept: HEMATOLOGY/ONCOLOGY | Facility: CLINIC | Age: OVER 89
End: 2025-06-19
Payer: MEDICARE

## 2025-06-19 VITALS
HEART RATE: 64 BPM | TEMPERATURE: 97.7 F | DIASTOLIC BLOOD PRESSURE: 76 MMHG | SYSTOLIC BLOOD PRESSURE: 166 MMHG | RESPIRATION RATE: 20 BRPM | OXYGEN SATURATION: 95 %

## 2025-06-19 VITALS
DIASTOLIC BLOOD PRESSURE: 80 MMHG | RESPIRATION RATE: 16 BRPM | BODY MASS INDEX: 27.4 KG/M2 | OXYGEN SATURATION: 99 % | SYSTOLIC BLOOD PRESSURE: 204 MMHG | WEIGHT: 135.9 LBS | HEART RATE: 67 BPM | HEIGHT: 59 IN | TEMPERATURE: 97.3 F

## 2025-06-19 DIAGNOSIS — C90.00 MULTIPLE MYELOMA, REMISSION STATUS UNSPECIFIED (MULTI): ICD-10-CM

## 2025-06-19 DIAGNOSIS — M47.27 LUMBOSACRAL SPONDYLOSIS WITH RADICULOPATHY: Primary | ICD-10-CM

## 2025-06-19 DIAGNOSIS — C90.00 MULTIPLE MYELOMA, REMISSION STATUS UNSPECIFIED (MULTI): Primary | ICD-10-CM

## 2025-06-19 DIAGNOSIS — L98.491 SKIN ULCER, LIMITED TO BREAKDOWN OF SKIN: ICD-10-CM

## 2025-06-19 LAB
ALBUMIN SERPL BCP-MCNC: 4.2 G/DL (ref 3.4–5)
ALP SERPL-CCNC: 49 U/L (ref 33–136)
ALT SERPL W P-5'-P-CCNC: 11 U/L (ref 7–45)
ANION GAP SERPL CALC-SCNC: 12 MMOL/L (ref 10–20)
AST SERPL W P-5'-P-CCNC: 18 U/L (ref 9–39)
BASOPHILS # BLD AUTO: 0.04 X10*3/UL (ref 0–0.1)
BASOPHILS NFR BLD AUTO: 0.5 %
BILIRUB SERPL-MCNC: 0.4 MG/DL (ref 0–1.2)
BUN SERPL-MCNC: 48 MG/DL (ref 6–23)
CALCIUM SERPL-MCNC: 9.9 MG/DL (ref 8.6–10.3)
CHLORIDE SERPL-SCNC: 104 MMOL/L (ref 98–107)
CO2 SERPL-SCNC: 27 MMOL/L (ref 21–32)
CREAT SERPL-MCNC: 1.5 MG/DL (ref 0.5–1.05)
EGFRCR SERPLBLD CKD-EPI 2021: 33 ML/MIN/1.73M*2
EOSINOPHIL # BLD AUTO: 0.23 X10*3/UL (ref 0–0.4)
EOSINOPHIL NFR BLD AUTO: 2.6 %
ERYTHROCYTE [DISTWIDTH] IN BLOOD BY AUTOMATED COUNT: 13.5 % (ref 11.5–14.5)
GLUCOSE SERPL-MCNC: 88 MG/DL (ref 74–99)
HBV CORE AB SER QL: NONREACTIVE
HBV SURFACE AB SER-ACNC: <3.1 MIU/ML
HBV SURFACE AG SERPL QL IA: NONREACTIVE
HCT VFR BLD AUTO: 35.6 % (ref 36–46)
HGB BLD-MCNC: 11.4 G/DL (ref 12–16)
IGA SERPL-MCNC: 592 MG/DL (ref 70–400)
IGG SERPL-MCNC: 429 MG/DL (ref 700–1600)
IGM SERPL-MCNC: 12 MG/DL (ref 40–230)
IMM GRANULOCYTES # BLD AUTO: 0.03 X10*3/UL (ref 0–0.5)
IMM GRANULOCYTES NFR BLD AUTO: 0.3 % (ref 0–0.9)
LYMPHOCYTES # BLD AUTO: 2.07 X10*3/UL (ref 0.8–3)
LYMPHOCYTES NFR BLD AUTO: 23.6 %
MCH RBC QN AUTO: 31.2 PG (ref 26–34)
MCHC RBC AUTO-ENTMCNC: 32 G/DL (ref 32–36)
MCV RBC AUTO: 98 FL (ref 80–100)
MONOCYTES # BLD AUTO: 0.96 X10*3/UL (ref 0.05–0.8)
MONOCYTES NFR BLD AUTO: 10.9 %
NEUTROPHILS # BLD AUTO: 5.44 X10*3/UL (ref 1.6–5.5)
NEUTROPHILS NFR BLD AUTO: 62.1 %
NRBC BLD-RTO: 0 /100 WBCS (ref 0–0)
PLATELET # BLD AUTO: 320 X10*3/UL (ref 150–450)
POTASSIUM SERPL-SCNC: 4.2 MMOL/L (ref 3.5–5.3)
PROT SERPL-MCNC: 6.8 G/DL (ref 6.4–8.2)
PROT SERPL-MCNC: 7.2 G/DL (ref 6.4–8.2)
RBC # BLD AUTO: 3.65 X10*6/UL (ref 4–5.2)
SODIUM SERPL-SCNC: 139 MMOL/L (ref 136–145)
WBC # BLD AUTO: 8.8 X10*3/UL (ref 4.4–11.3)

## 2025-06-19 PROCEDURE — 83521 IG LIGHT CHAINS FREE EACH: CPT | Mod: PARLAB

## 2025-06-19 PROCEDURE — 85025 COMPLETE CBC W/AUTO DIFF WBC: CPT

## 2025-06-19 PROCEDURE — 1159F MED LIST DOCD IN RCRD: CPT | Performed by: ANESTHESIOLOGY

## 2025-06-19 PROCEDURE — 82784 ASSAY IGA/IGD/IGG/IGM EACH: CPT | Mod: PARLAB

## 2025-06-19 PROCEDURE — 87340 HEPATITIS B SURFACE AG IA: CPT | Mod: PARLAB

## 2025-06-19 PROCEDURE — 96401 CHEMO ANTI-NEOPL SQ/IM: CPT

## 2025-06-19 PROCEDURE — 2500000004 HC RX 250 GENERAL PHARMACY W/ HCPCS (ALT 636 FOR OP/ED): Mod: JZ,TB | Performed by: INTERNAL MEDICINE

## 2025-06-19 PROCEDURE — 86704 HEP B CORE ANTIBODY TOTAL: CPT | Mod: PARLAB

## 2025-06-19 PROCEDURE — 1125F AMNT PAIN NOTED PAIN PRSNT: CPT | Performed by: ANESTHESIOLOGY

## 2025-06-19 PROCEDURE — 86334 IMMUNOFIX E-PHORESIS SERUM: CPT | Mod: PARLAB

## 2025-06-19 PROCEDURE — 1036F TOBACCO NON-USER: CPT | Performed by: ANESTHESIOLOGY

## 2025-06-19 PROCEDURE — 86706 HEP B SURFACE ANTIBODY: CPT | Mod: PARLAB

## 2025-06-19 PROCEDURE — 84075 ASSAY ALKALINE PHOSPHATASE: CPT

## 2025-06-19 PROCEDURE — 3079F DIAST BP 80-89 MM HG: CPT | Performed by: ANESTHESIOLOGY

## 2025-06-19 PROCEDURE — 99214 OFFICE O/P EST MOD 30 MIN: CPT | Performed by: ANESTHESIOLOGY

## 2025-06-19 PROCEDURE — 36415 COLL VENOUS BLD VENIPUNCTURE: CPT

## 2025-06-19 PROCEDURE — 84155 ASSAY OF PROTEIN SERUM: CPT | Mod: 59,PARLAB

## 2025-06-19 PROCEDURE — 3077F SYST BP >= 140 MM HG: CPT | Performed by: ANESTHESIOLOGY

## 2025-06-19 RX ORDER — DIPHENHYDRAMINE HCL 50 MG
50 CAPSULE ORAL ONCE
Status: DISCONTINUED | OUTPATIENT
Start: 2025-06-19 | End: 2025-06-19 | Stop reason: HOSPADM

## 2025-06-19 RX ORDER — ACETAMINOPHEN 325 MG/1
650 TABLET ORAL ONCE
Status: DISCONTINUED | OUTPATIENT
Start: 2025-06-19 | End: 2025-06-19 | Stop reason: HOSPADM

## 2025-06-19 RX ORDER — BORTEZOMIB 3.5 MG/1
1.3 INJECTION, POWDER, LYOPHILIZED, FOR SOLUTION INTRAVENOUS; SUBCUTANEOUS ONCE
Status: COMPLETED | OUTPATIENT
Start: 2025-06-19 | End: 2025-06-19

## 2025-06-19 RX ORDER — MONTELUKAST SODIUM 10 MG/1
10 TABLET ORAL ONCE
Status: DISCONTINUED | OUTPATIENT
Start: 2025-06-19 | End: 2025-06-19 | Stop reason: HOSPADM

## 2025-06-19 RX ADMIN — BORTEZOMIB 2.1 MG: 1 INJECTION, POWDER, LYOPHILIZED, FOR SOLUTION INTRAVENOUS; SUBCUTANEOUS at 11:23

## 2025-06-19 RX ADMIN — DARATUMUMAB AND HYALURONIDASE-FIHJ (HUMAN RECOMBINANT) 1800 MG: 1800; 30000 INJECTION SUBCUTANEOUS at 11:22

## 2025-06-19 ASSESSMENT — PAIN DESCRIPTION - DESCRIPTORS: DESCRIPTORS: ACHING;SHARP

## 2025-06-19 ASSESSMENT — PAIN SCALES - GENERAL
PAINLEVEL_OUTOF10: 0-NO PAIN
PAINLEVEL_OUTOF10: 3
PAINLEVEL_OUTOF10: 3

## 2025-06-19 ASSESSMENT — ENCOUNTER SYMPTOMS
LOSS OF SENSATION IN FEET: 1
DEPRESSION: 0
OCCASIONAL FEELINGS OF UNSTEADINESS: 1

## 2025-06-19 ASSESSMENT — PAIN - FUNCTIONAL ASSESSMENT: PAIN_FUNCTIONAL_ASSESSMENT: 0-10

## 2025-06-19 NOTE — PROGRESS NOTES
This is 89 y.o.  female with who has been treated for     Pain Medicine Chronic low back pain, unspecified back pain    Patient here interested in an injection , lower back pain and she has left hip and leg pain , and right side hip pain . Pain is worse, The pain is described as achiness and sharp and is relieved by Medications tylenol and Advil and stretching helps a little . Here for follow-up. Worst with getting up from bed .  Chief Complaint   Patient presents with    Follow-up     Would like to discuss having a injection .       Pain Therapies: Injections last injection was in 12/16/2024, no improvement

## 2025-06-19 NOTE — PATIENT INSTRUCTIONS
Gabapentin titration     Please take [id number] mg capsules as follows:              AM         PM    Bedtime       Day 1 0 0 3   Day 2 0 0 3   Day 3 0 1 3   Day 4 0 1 3   Day 5 1 1 3   Day 6 1 1 3   Day 7 1 1 3   Day 8 1 1 3   Day 9 1 1 3   Day 10 1 2 3   Day 11 1 2 3   Day 12 2 2 3   Day 13 2 2 3   Day 14 2 2 3   Day 15 2 2 3   Day 16 2 3 3   Day 17 2 3 3   Day 18 3 3 3   Day 19 3 3 3   Day 20 3 3 4   Day 21 3 3 4   Day 22 3 4 4   Day 23 3 4 4   Day 24 4 4 4     Do not exceed 3600mg per day.   Stop and maintain dose when symptoms resolve.  Reduce as instructed if side effects not tolerated.

## 2025-06-19 NOTE — PROGRESS NOTES
SUBJECTIVE:  This is 89 y.o.  female with PMH of multiple myeloma getting therapy for that, stage III kidney disease, GERD, peripheral neuropathy in addition to multiple joint arthritis with left reverse shoulder replacement, right TKA with multiple back complaints treated last year on 12/12/2024 with bilateral L2-3 ILESI who is here for follow-up with significant lower back pain and hip pain alternate between the right and the left with some time radiation of the pain to her left leg more than the right.  The patient failed lumbar SUSAN and self-directed physical therapy in addition to medical management.  The patient cannot take tramadol that caused her itching.  Her exam did not show any signs of left hip arthritis.  I need an MRI to figure out targeted therapy for her.  MRI ordered today and after the MRI we will decide the next step.  The patient takes ibuprofen 200 mg for her pain I told her to take 2 ibuprofen 200 mg and 2 acetaminophen 325 mg for her pain and she can take it with additional gabapentin 100 mg.  I gave her gabapentin titration schedule since she only take 3 of them at night so she can advance them during the day as well      Prior office visit:  12/12/2024: This is 89 y.o.  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  Her exam showed bilateral flank pain over the iliac crest with severe tenderness over the OARRS of her scoliosis.  I reviewed her CT scan from 2023 and the patient has really severe scoliosis with rotation and foraminal stenosis.  Will plan on bilateral L2-3 or L3-4 interlaminar SUSAN pending on the easier access.  Use every time we give her an injection her relief lasted  for a year or more.  Plan  Bilateral L2-3 or L3-4 at the level or below the level of the scoliosis interlaminar SUSAN     Gabapentin titration     Please take [id number] mg capsules as follows:              AM         PM    Bedtime       Day 1 0 0 3   Day 2 0 0 3   Day 3 0 1 3   Day 4 0 1 3   Day 5 1 1 3   Day 6 1 1 3   Day 7 1 1 3   Day 8 1 1 3   Day 9 1 1 3   Day 10 1 2 3   Day 11 1 2 3   Day 12 2 2 3   Day 13 2 2 3   Day 14 2 2 3   Day 15 2 2 3   Day 16 2 3 3   Day 17 2 3 3   Day 18 3 3 3   Day 19 3 3 3   Day 20 3 3 4   Day 21 3 3 4   Day 22 3 4 4   Day 23 3 4 4   Day 24 4 4 4     Do not exceed 3600mg per day.   Stop and maintain dose when symptoms resolve.  Reduce as instructed if side effects not tolerated.   Procedures:  12/16/2024 bilateral L2-3 ILESI the patient has had 0% improvement in pain function  8/22/2022 left SI joint injection and the patient has had 70% improvement in pain and function  3/28/2022 bilateral suprascapular NB 70% but only for a week  1/31/2022 left SI joint injection 80% for a year  In 2020 at left L3-4 and L4-5 by Alma Rosa Lackey that did not help  Multiple injection in 2011 lat [facility] ast one helped by         Portions of record reviewed for pertinent issues: active problem list, medication list, allergies, family history, social history, notes from last encounter, encounters, lab results, imaging and other available records.        I have personally reviewed the OARRS report for this patient. This report is scanned into the electronic medical record. I have considered the risks of abuse, dependence, addiction and diversion. It showed: Gabapentin 100 mg  OPIOID RISK ASSESSMENT SCORE 0/26  Aberrant behavior: none  My patient has no underlying substance abuse or alcohol abuse and there's no mental health conditions contributing to the patient's pain.        Diagnostic studies:  FINDINGS:  CT CHEST:     Heart size is enlarged. Enlarged main pulmonary artery  compatible  pulmonary hypertension. No growing hilar or mediastinal lymph nodes.  Mild mitral and aortic valve calcification. No central airway lesion.     No pneumothorax or pleural effusion infiltrate seen previously have  improved with some mild residual parenchymal scarring. Features may  reflect a component of mild edema as well. Demineralization. No  definite acute rib fracture        CT ABDOMEN/PELVIS:  No evidence of acute intra-abdominal hemorrhage. Stable hepatic  cysts. Robust abdominal aortic calcification. Extensive  diverticulosis. No diverticulitis. Stable renal cysts. No adrenal  mass. Pancreas and spleen otherwise unchanged. Distended urinary  bladder. Uterus is not seen suggesting prior removal.           CT THORACIC SPINE:     Scoliosis and multilevel degenerative disc disease  No findings of acute thoracic spine fracture demineralization of the  bones. Marrow is heterogeneous.     CT LUMBAR SPINE:  Marked scoliosis and degenerative disc disease. No findings of acute  lumbar spine fracture. Heterogeneous appearance of the marrow with  demineralization. Grade 1 anterolisthesis L5-S1.      Impression   CT CHEST/ABDOMEN/PELVIS:  Senescent changes. No evidence of acute intrathoracic pathology.  Enlarged heart and main pulmonary artery. Small sliding hiatal  hernia. No displaced rib fracture. Parenchymal scarring seen from  prior examination.  Extensive diverticulosis. No diverticulitis     CT THORACIC AND LUMBAR SPINE:  Multilevel degenerative disc disease with demineralization. No  evidence of acute thoracic or lumbar spine fracture.      Review of Systems   HENT: Negative.     Eyes: Negative.    Respiratory: Negative.     Cardiovascular: Negative.    Gastrointestinal: Negative.    Endocrine: Negative.    Genitourinary: Negative.    Musculoskeletal:  Positive for back pain, gait problem and myalgias.   Skin: Negative.    Hematological: Negative.    Psychiatric/Behavioral: Negative.           Physical  Exam  Vitals and nursing note reviewed.   Constitutional:       Appearance: Normal appearance.           Comments: Scoliosis with pain radiating to bilateral iliac crest flank area   HENT:      Head: Normocephalic and atraumatic.      Nose: Nose normal.   Eyes:      Extraocular Movements: Extraocular movements intact.      Conjunctiva/sclera: Conjunctivae normal.      Pupils: Pupils are equal, round, and reactive to light.   Cardiovascular:      Rate and Rhythm: Normal rate and regular rhythm.      Pulses: Normal pulses.      Heart sounds: Normal heart sounds.   Pulmonary:      Effort: Pulmonary effort is normal.      Breath sounds: Normal breath sounds.   Abdominal:      General: Abdomen is flat. Bowel sounds are normal.      Palpations: Abdomen is soft.   Skin:     General: Skin is warm.   Neurological:      Mental Status: She is alert.   Psychiatric:         Mood and Affect: Mood normal.         Behavior: Behavior normal.                                            Plan  At least 50% of the visit was involved in the discussion of the options for treatment. We discussed exercises, medication, interventional therapies and surgery. Healthy life style is essential with patient hard work to achieve the wellness. In addition; discussion with the patient and/or family about any of the diagnostic results, impressions and/or recommended diagnostic studies, prognosis, risks and benefits of treatment options, instructions for treatment and/or follow-up, importance of compliance with chosen treatment options, risk-factor reduction, and patient/family education.         Continue self-directed physical therapy at least daily exercises for minimum of 20-minute  Advance gabapentin 100 mg to titration schedule and take it with ibuprofen and acetaminophen as well  MRI of the lumbar spine  Healthy lifestyle and anti-inflammatory diet in addition to weight control discussed with the patient  Alternative chronic pain therapies was  discussed, encouraged and information was handed  Return to Clinic after MRI     *Please note this report has been produced using speech recognition software and may contain errors related to that system including grammar, punctuation and spelling as well as words and phrases that may be inappropriate. If there are questions or concerns, please feel free to contact me to clarify.    Dana Heck MD

## 2025-06-19 NOTE — SIGNIFICANT EVENT
06/19/25 1156   Prechemo Checklist   Has the patient been in the hospital, ED, or urgent care since last date of service No   Chemo/Immuno Consent Completed and Signed Yes   Protocol/Indications Verified Yes   Confirmed to previous date/time of medication Yes   Compared to previous dose Yes   All medications are dated accurately Yes   Pregnancy Test Negative Not applicable   Parameters Met Yes   Provider Notified Yes   Is Patient Proceeding With Treatment? Yes   BSA/Weight-Height Verified Yes   Dose Calculations Verified (current, total, cumulative) Yes

## 2025-06-20 LAB
KAPPA LC SERPL-MCNC: 7.69 MG/DL (ref 0.33–1.94)
KAPPA LC/LAMBDA SER: 13.73 {RATIO} (ref 0.26–1.65)
LAMBDA LC SERPL-MCNC: 0.56 MG/DL (ref 0.57–2.63)

## 2025-06-23 LAB
ALBUMIN: 4 G/DL (ref 3.4–5)
ALPHA 1 GLOBULIN: 0.4 G/DL (ref 0.2–0.6)
ALPHA 2 GLOBULIN: 0.9 G/DL (ref 0.4–1.1)
BETA GLOBULIN: 1.2 G/DL (ref 0.5–1.2)
GAMMA GLOBULIN: 0.4 G/DL (ref 0.5–1.4)
IMMUNOFIXATION COMMENT: ABNORMAL
M-PROTEIN 1: 0.3 G/DL (ref ?–0)
M-PROTEIN 2: 0.1 G/DL (ref ?–0)
M-PROTEIN 3: 0.1 G/DL (ref ?–0)
PATH REVIEW - SERUM IMMUNOFIXATION: ABNORMAL
PATH REVIEW-SERUM PROTEIN ELECTROPHORESIS: ABNORMAL
PROTEIN ELECTROPHORESIS COMMENT: ABNORMAL

## 2025-07-03 ENCOUNTER — LAB (OUTPATIENT)
Dept: LAB | Facility: CLINIC | Age: OVER 89
End: 2025-07-03
Payer: MEDICARE

## 2025-07-03 ENCOUNTER — INFUSION (OUTPATIENT)
Dept: HEMATOLOGY/ONCOLOGY | Facility: CLINIC | Age: OVER 89
End: 2025-07-03
Payer: MEDICARE

## 2025-07-03 VITALS
DIASTOLIC BLOOD PRESSURE: 78 MMHG | HEIGHT: 59 IN | RESPIRATION RATE: 18 BRPM | SYSTOLIC BLOOD PRESSURE: 178 MMHG | BODY MASS INDEX: 27.16 KG/M2 | WEIGHT: 134.7 LBS | OXYGEN SATURATION: 99 % | HEART RATE: 63 BPM | TEMPERATURE: 97.9 F

## 2025-07-03 DIAGNOSIS — C90.00 MULTIPLE MYELOMA NOT HAVING ACHIEVED REMISSION (MULTI): ICD-10-CM

## 2025-07-03 DIAGNOSIS — C90.00 MULTIPLE MYELOMA, REMISSION STATUS UNSPECIFIED (MULTI): ICD-10-CM

## 2025-07-03 DIAGNOSIS — E83.52 HYPERCALCEMIA: ICD-10-CM

## 2025-07-03 LAB
ALBUMIN SERPL BCP-MCNC: 4.1 G/DL (ref 3.4–5)
ALP SERPL-CCNC: 46 U/L (ref 33–136)
ALT SERPL W P-5'-P-CCNC: 11 U/L (ref 7–45)
ANION GAP SERPL CALC-SCNC: 12 MMOL/L (ref 10–20)
AST SERPL W P-5'-P-CCNC: 16 U/L (ref 9–39)
BASOPHILS # BLD AUTO: 0.04 X10*3/UL (ref 0–0.1)
BASOPHILS NFR BLD AUTO: 0.4 %
BILIRUB SERPL-MCNC: 0.4 MG/DL (ref 0–1.2)
BUN SERPL-MCNC: 61 MG/DL (ref 6–23)
CALCIUM SERPL-MCNC: 10.7 MG/DL (ref 8.6–10.3)
CHLORIDE SERPL-SCNC: 106 MMOL/L (ref 98–107)
CO2 SERPL-SCNC: 26 MMOL/L (ref 21–32)
CREAT SERPL-MCNC: 1.8 MG/DL (ref 0.5–1.05)
EGFRCR SERPLBLD CKD-EPI 2021: 26 ML/MIN/1.73M*2
EOSINOPHIL # BLD AUTO: 0.27 X10*3/UL (ref 0–0.4)
EOSINOPHIL NFR BLD AUTO: 2.8 %
ERYTHROCYTE [DISTWIDTH] IN BLOOD BY AUTOMATED COUNT: 13.2 % (ref 11.5–14.5)
GLUCOSE SERPL-MCNC: 98 MG/DL (ref 74–99)
HCT VFR BLD AUTO: 34.8 % (ref 36–46)
HGB BLD-MCNC: 10.8 G/DL (ref 12–16)
IMM GRANULOCYTES # BLD AUTO: 0.02 X10*3/UL (ref 0–0.5)
IMM GRANULOCYTES NFR BLD AUTO: 0.2 % (ref 0–0.9)
LYMPHOCYTES # BLD AUTO: 1.98 X10*3/UL (ref 0.8–3)
LYMPHOCYTES NFR BLD AUTO: 20.4 %
MCH RBC QN AUTO: 30.3 PG (ref 26–34)
MCHC RBC AUTO-ENTMCNC: 31 G/DL (ref 32–36)
MCV RBC AUTO: 98 FL (ref 80–100)
MONOCYTES # BLD AUTO: 0.78 X10*3/UL (ref 0.05–0.8)
MONOCYTES NFR BLD AUTO: 8 %
NEUTROPHILS # BLD AUTO: 6.63 X10*3/UL (ref 1.6–5.5)
NEUTROPHILS NFR BLD AUTO: 68.2 %
NRBC BLD-RTO: 0 /100 WBCS (ref 0–0)
PLATELET # BLD AUTO: 345 X10*3/UL (ref 150–450)
POTASSIUM SERPL-SCNC: 4.5 MMOL/L (ref 3.5–5.3)
PROT SERPL-MCNC: 6.8 G/DL (ref 6.4–8.2)
RBC # BLD AUTO: 3.56 X10*6/UL (ref 4–5.2)
SODIUM SERPL-SCNC: 139 MMOL/L (ref 136–145)
WBC # BLD AUTO: 9.7 X10*3/UL (ref 4.4–11.3)

## 2025-07-03 PROCEDURE — 84075 ASSAY ALKALINE PHOSPHATASE: CPT

## 2025-07-03 PROCEDURE — 96401 CHEMO ANTI-NEOPL SQ/IM: CPT

## 2025-07-03 PROCEDURE — 96372 THER/PROPH/DIAG INJ SC/IM: CPT

## 2025-07-03 PROCEDURE — 36415 COLL VENOUS BLD VENIPUNCTURE: CPT

## 2025-07-03 PROCEDURE — 85025 COMPLETE CBC W/AUTO DIFF WBC: CPT

## 2025-07-03 PROCEDURE — 2500000004 HC RX 250 GENERAL PHARMACY W/ HCPCS (ALT 636 FOR OP/ED): Mod: JZ,TB | Performed by: INTERNAL MEDICINE

## 2025-07-03 RX ORDER — ALBUTEROL SULFATE 0.83 MG/ML
3 SOLUTION RESPIRATORY (INHALATION) AS NEEDED
OUTPATIENT
Start: 2025-07-31

## 2025-07-03 RX ORDER — ALBUTEROL SULFATE 0.83 MG/ML
3 SOLUTION RESPIRATORY (INHALATION) AS NEEDED
OUTPATIENT
Start: 2025-07-03

## 2025-07-03 RX ORDER — EPINEPHRINE 0.3 MG/.3ML
0.3 INJECTION SUBCUTANEOUS EVERY 5 MIN PRN
OUTPATIENT
Start: 2025-07-03

## 2025-07-03 RX ORDER — BORTEZOMIB 3.5 MG/1
1.3 INJECTION, POWDER, LYOPHILIZED, FOR SOLUTION INTRAVENOUS; SUBCUTANEOUS ONCE
Status: COMPLETED | OUTPATIENT
Start: 2025-07-03 | End: 2025-07-03

## 2025-07-03 RX ORDER — FAMOTIDINE 10 MG/ML
20 INJECTION, SOLUTION INTRAVENOUS ONCE AS NEEDED
OUTPATIENT
Start: 2025-07-03

## 2025-07-03 RX ORDER — SODIUM CHLORIDE 9 MG/ML
1000 INJECTION, SOLUTION INTRAVENOUS CONTINUOUS
OUTPATIENT
Start: 2025-07-03

## 2025-07-03 RX ORDER — DIPHENHYDRAMINE HYDROCHLORIDE 50 MG/ML
50 INJECTION, SOLUTION INTRAMUSCULAR; INTRAVENOUS AS NEEDED
OUTPATIENT
Start: 2025-07-03

## 2025-07-03 RX ORDER — DIPHENHYDRAMINE HYDROCHLORIDE 50 MG/ML
50 INJECTION, SOLUTION INTRAMUSCULAR; INTRAVENOUS AS NEEDED
OUTPATIENT
Start: 2025-07-31

## 2025-07-03 RX ORDER — EPINEPHRINE 0.3 MG/.3ML
0.3 INJECTION SUBCUTANEOUS EVERY 5 MIN PRN
OUTPATIENT
Start: 2025-07-31

## 2025-07-03 RX ORDER — FAMOTIDINE 10 MG/ML
20 INJECTION, SOLUTION INTRAVENOUS ONCE AS NEEDED
OUTPATIENT
Start: 2025-07-31

## 2025-07-03 RX ADMIN — BORTEZOMIB 2.1 MG: 1 INJECTION, POWDER, LYOPHILIZED, FOR SOLUTION INTRAVENOUS; SUBCUTANEOUS at 11:01

## 2025-07-03 RX ADMIN — DENOSUMAB 120 MG: 120 INJECTION SUBCUTANEOUS at 11:03

## 2025-07-03 ASSESSMENT — PAIN SCALES - GENERAL: PAINLEVEL_OUTOF10: 0-NO PAIN

## 2025-07-03 NOTE — SIGNIFICANT EVENT
07/03/25 1131   Prechemo Checklist   Has the patient been in the hospital, ED, or urgent care since last date of service No   Chemo/Immuno Consent Completed and Signed Yes   Protocol/Indications Verified Yes   Confirmed to previous date/time of medication Yes   Compared to previous dose Yes   All medications are dated accurately Yes   Pregnancy Test Negative Not applicable   Parameters Met Yes   Is Patient Proceeding With Treatment? Yes   BSA/Weight-Height Verified Yes   Dose Calculations Verified (current, total, cumulative) Yes

## 2025-07-09 ENCOUNTER — TELEPHONE (OUTPATIENT)
Dept: HEMATOLOGY/ONCOLOGY | Facility: CLINIC | Age: OVER 89
End: 2025-07-09
Payer: MEDICARE

## 2025-07-09 DIAGNOSIS — C90.00 MULTIPLE MYELOMA, REMISSION STATUS UNSPECIFIED (MULTI): ICD-10-CM

## 2025-07-09 NOTE — TELEPHONE ENCOUNTER
Reason for Conversation  Appointment Request    Background   Pt had tx on 7/3 but left with no future appts scheduled. There are also no active scheduling orders to be done. Please contact pt with tx plan.    Disposition   No disposition on file.

## 2025-07-15 ENCOUNTER — HOSPITAL ENCOUNTER (OUTPATIENT)
Dept: RADIOLOGY | Facility: HOSPITAL | Age: OVER 89
Discharge: HOME | End: 2025-07-15
Payer: MEDICARE

## 2025-07-15 DIAGNOSIS — M47.27 LUMBOSACRAL SPONDYLOSIS WITH RADICULOPATHY: ICD-10-CM

## 2025-07-15 PROCEDURE — 72148 MRI LUMBAR SPINE W/O DYE: CPT | Performed by: RADIOLOGY

## 2025-07-15 PROCEDURE — 72148 MRI LUMBAR SPINE W/O DYE: CPT

## 2025-07-15 RX ORDER — FAMOTIDINE 10 MG/ML
20 INJECTION, SOLUTION INTRAVENOUS ONCE AS NEEDED
Status: CANCELLED | OUTPATIENT
Start: 2025-07-17

## 2025-07-15 RX ORDER — DIPHENHYDRAMINE HYDROCHLORIDE 50 MG/ML
50 INJECTION, SOLUTION INTRAMUSCULAR; INTRAVENOUS AS NEEDED
Status: CANCELLED | OUTPATIENT
Start: 2025-07-17

## 2025-07-15 RX ORDER — MONTELUKAST SODIUM 10 MG/1
10 TABLET ORAL ONCE
Status: CANCELLED | OUTPATIENT
Start: 2025-07-17

## 2025-07-15 RX ORDER — FAMOTIDINE 10 MG/ML
20 INJECTION, SOLUTION INTRAVENOUS ONCE AS NEEDED
OUTPATIENT
Start: 2025-07-31

## 2025-07-15 RX ORDER — DIPHENHYDRAMINE HCL 50 MG
50 CAPSULE ORAL ONCE
OUTPATIENT
Start: 2025-07-31

## 2025-07-15 RX ORDER — ALBUTEROL SULFATE 0.83 MG/ML
3 SOLUTION RESPIRATORY (INHALATION) AS NEEDED
Status: CANCELLED | OUTPATIENT
Start: 2025-07-17

## 2025-07-15 RX ORDER — DIPHENHYDRAMINE HCL 50 MG
50 CAPSULE ORAL ONCE
Status: CANCELLED | OUTPATIENT
Start: 2025-07-17

## 2025-07-15 RX ORDER — ALBUTEROL SULFATE 0.83 MG/ML
3 SOLUTION RESPIRATORY (INHALATION) AS NEEDED
OUTPATIENT
Start: 2025-07-31

## 2025-07-15 RX ORDER — BORTEZOMIB 3.5 MG/1
1.3 INJECTION, POWDER, LYOPHILIZED, FOR SOLUTION INTRAVENOUS; SUBCUTANEOUS ONCE
OUTPATIENT
Start: 2025-07-31

## 2025-07-15 RX ORDER — EPINEPHRINE 0.3 MG/.3ML
0.3 INJECTION SUBCUTANEOUS EVERY 5 MIN PRN
OUTPATIENT
Start: 2025-07-31

## 2025-07-15 RX ORDER — MONTELUKAST SODIUM 10 MG/1
10 TABLET ORAL ONCE
OUTPATIENT
Start: 2025-07-31

## 2025-07-15 RX ORDER — ACETAMINOPHEN 325 MG/1
650 TABLET ORAL ONCE
OUTPATIENT
Start: 2025-07-31

## 2025-07-15 RX ORDER — ACETAMINOPHEN 325 MG/1
650 TABLET ORAL ONCE
Status: CANCELLED | OUTPATIENT
Start: 2025-07-17

## 2025-07-15 RX ORDER — EPINEPHRINE 0.3 MG/.3ML
0.3 INJECTION SUBCUTANEOUS EVERY 5 MIN PRN
Status: CANCELLED | OUTPATIENT
Start: 2025-07-17

## 2025-07-15 RX ORDER — DIPHENHYDRAMINE HYDROCHLORIDE 50 MG/ML
50 INJECTION, SOLUTION INTRAMUSCULAR; INTRAVENOUS AS NEEDED
OUTPATIENT
Start: 2025-07-31

## 2025-07-17 ENCOUNTER — LAB (OUTPATIENT)
Dept: LAB | Facility: CLINIC | Age: OVER 89
End: 2025-07-17
Payer: MEDICARE

## 2025-07-17 ENCOUNTER — INFUSION (OUTPATIENT)
Dept: HEMATOLOGY/ONCOLOGY | Facility: CLINIC | Age: OVER 89
End: 2025-07-17
Payer: MEDICARE

## 2025-07-17 VITALS
WEIGHT: 135.14 LBS | TEMPERATURE: 98.1 F | BODY MASS INDEX: 27.24 KG/M2 | DIASTOLIC BLOOD PRESSURE: 82 MMHG | HEART RATE: 62 BPM | OXYGEN SATURATION: 99 % | RESPIRATION RATE: 20 BRPM | SYSTOLIC BLOOD PRESSURE: 168 MMHG | HEIGHT: 59 IN

## 2025-07-17 DIAGNOSIS — C90.00 MULTIPLE MYELOMA, REMISSION STATUS UNSPECIFIED (MULTI): ICD-10-CM

## 2025-07-17 DIAGNOSIS — C90.00 MULTIPLE MYELOMA, REMISSION STATUS UNSPECIFIED (MULTI): Primary | ICD-10-CM

## 2025-07-17 LAB
ALBUMIN SERPL BCP-MCNC: 4.2 G/DL (ref 3.4–5)
ALP SERPL-CCNC: 49 U/L (ref 33–136)
ALT SERPL W P-5'-P-CCNC: 12 U/L (ref 7–45)
ANION GAP SERPL CALC-SCNC: 14 MMOL/L (ref 10–20)
AST SERPL W P-5'-P-CCNC: 17 U/L (ref 9–39)
BASOPHILS # BLD AUTO: 0.03 X10*3/UL (ref 0–0.1)
BASOPHILS NFR BLD AUTO: 0.4 %
BILIRUB SERPL-MCNC: 0.4 MG/DL (ref 0–1.2)
BUN SERPL-MCNC: 48 MG/DL (ref 6–23)
CALCIUM SERPL-MCNC: 9.7 MG/DL (ref 8.6–10.3)
CHLORIDE SERPL-SCNC: 103 MMOL/L (ref 98–107)
CO2 SERPL-SCNC: 26 MMOL/L (ref 21–32)
CREAT SERPL-MCNC: 1.4 MG/DL (ref 0.5–1.05)
EGFRCR SERPLBLD CKD-EPI 2021: 35 ML/MIN/1.73M*2
EOSINOPHIL # BLD AUTO: 0.23 X10*3/UL (ref 0–0.4)
EOSINOPHIL NFR BLD AUTO: 3 %
ERYTHROCYTE [DISTWIDTH] IN BLOOD BY AUTOMATED COUNT: 13.6 % (ref 11.5–14.5)
GLUCOSE SERPL-MCNC: 103 MG/DL (ref 74–99)
HCT VFR BLD AUTO: 34 % (ref 36–46)
HGB BLD-MCNC: 10.9 G/DL (ref 12–16)
IGA SERPL-MCNC: 648 MG/DL (ref 70–400)
IGG SERPL-MCNC: 421 MG/DL (ref 700–1600)
IGM SERPL-MCNC: 12 MG/DL (ref 40–230)
IMM GRANULOCYTES # BLD AUTO: 0.02 X10*3/UL (ref 0–0.5)
IMM GRANULOCYTES NFR BLD AUTO: 0.3 % (ref 0–0.9)
LYMPHOCYTES # BLD AUTO: 1.86 X10*3/UL (ref 0.8–3)
LYMPHOCYTES NFR BLD AUTO: 24 %
MCH RBC QN AUTO: 31.3 PG (ref 26–34)
MCHC RBC AUTO-ENTMCNC: 32.1 G/DL (ref 32–36)
MCV RBC AUTO: 98 FL (ref 80–100)
MONOCYTES # BLD AUTO: 0.77 X10*3/UL (ref 0.05–0.8)
MONOCYTES NFR BLD AUTO: 9.9 %
NEUTROPHILS # BLD AUTO: 4.84 X10*3/UL (ref 1.6–5.5)
NEUTROPHILS NFR BLD AUTO: 62.4 %
NRBC BLD-RTO: 0 /100 WBCS (ref 0–0)
PLATELET # BLD AUTO: 274 X10*3/UL (ref 150–450)
POTASSIUM SERPL-SCNC: 4.1 MMOL/L (ref 3.5–5.3)
PROT SERPL-MCNC: 6.8 G/DL (ref 6.4–8.2)
PROT SERPL-MCNC: 6.9 G/DL (ref 6.4–8.2)
RBC # BLD AUTO: 3.48 X10*6/UL (ref 4–5.2)
SODIUM SERPL-SCNC: 139 MMOL/L (ref 136–145)
WBC # BLD AUTO: 7.8 X10*3/UL (ref 4.4–11.3)

## 2025-07-17 PROCEDURE — 36415 COLL VENOUS BLD VENIPUNCTURE: CPT

## 2025-07-17 PROCEDURE — 80053 COMPREHEN METABOLIC PANEL: CPT

## 2025-07-17 PROCEDURE — 85025 COMPLETE CBC W/AUTO DIFF WBC: CPT

## 2025-07-17 PROCEDURE — 84155 ASSAY OF PROTEIN SERUM: CPT | Mod: PARLAB

## 2025-07-17 PROCEDURE — 2500000004 HC RX 250 GENERAL PHARMACY W/ HCPCS (ALT 636 FOR OP/ED): Mod: JZ,TB | Performed by: INTERNAL MEDICINE

## 2025-07-17 PROCEDURE — 84165 PROTEIN E-PHORESIS SERUM: CPT | Mod: PARLAB

## 2025-07-17 PROCEDURE — 96401 CHEMO ANTI-NEOPL SQ/IM: CPT

## 2025-07-17 PROCEDURE — 82784 ASSAY IGA/IGD/IGG/IGM EACH: CPT | Mod: PARLAB

## 2025-07-17 PROCEDURE — 83521 IG LIGHT CHAINS FREE EACH: CPT | Mod: PARLAB

## 2025-07-17 RX ORDER — BORTEZOMIB 3.5 MG/1
1.3 INJECTION, POWDER, LYOPHILIZED, FOR SOLUTION INTRAVENOUS; SUBCUTANEOUS ONCE
Status: COMPLETED | OUTPATIENT
Start: 2025-07-17 | End: 2025-07-17

## 2025-07-17 RX ADMIN — DARATUMUMAB AND HYALURONIDASE-FIHJ (HUMAN RECOMBINANT) 1800 MG: 1800; 30000 INJECTION SUBCUTANEOUS at 11:01

## 2025-07-17 RX ADMIN — BORTEZOMIB 2.1 MG: 1 INJECTION, POWDER, LYOPHILIZED, FOR SOLUTION INTRAVENOUS; SUBCUTANEOUS at 11:32

## 2025-07-17 ASSESSMENT — PAIN SCALES - GENERAL: PAINLEVEL_OUTOF10: 0-NO PAIN

## 2025-07-17 NOTE — SIGNIFICANT EVENT
07/17/25 1531   Prechemo Checklist   Has the patient been in the hospital, ED, or urgent care since last date of service No   Chemo/Immuno Consent Completed and Signed Yes   Protocol/Indications Verified Yes   Confirmed to previous date/time of medication Yes   Compared to previous dose Yes   All medications are dated accurately Yes   Pregnancy Test Negative Not applicable   Parameters Met Yes   Is Patient Proceeding With Treatment? Yes   BSA/Weight-Height Verified Yes   Dose Calculations Verified (current, total, cumulative) Yes

## 2025-07-18 LAB
KAPPA LC SERPL-MCNC: 8.85 MG/DL (ref 0.33–1.94)
KAPPA LC/LAMBDA SER: 16.7 {RATIO} (ref 0.26–1.65)
LAMBDA LC SERPL-MCNC: 0.53 MG/DL (ref 0.57–2.63)

## 2025-07-22 ENCOUNTER — TELEPHONE (OUTPATIENT)
Dept: PRIMARY CARE | Facility: CLINIC | Age: OVER 89
End: 2025-07-22
Payer: MEDICARE

## 2025-07-22 DIAGNOSIS — M54.16 SPINAL STENOSIS OF LUMBAR REGION WITH RADICULOPATHY: Primary | ICD-10-CM

## 2025-07-22 DIAGNOSIS — M48.061 SPINAL STENOSIS OF LUMBAR REGION WITH RADICULOPATHY: Primary | ICD-10-CM

## 2025-07-22 LAB
ALBUMIN: 4 G/DL (ref 3.4–5)
ALPHA 1 GLOBULIN: 0.4 G/DL (ref 0.2–0.6)
ALPHA 2 GLOBULIN: 0.8 G/DL (ref 0.4–1.1)
BETA GLOBULIN: 1.2 G/DL (ref 0.5–1.2)
GAMMA GLOBULIN: 0.4 G/DL (ref 0.5–1.4)
IMMUNOFIXATION COMMENT: ABNORMAL
M-PROTEIN 1: 0.3 G/DL (ref ?–0)
M-PROTEIN 2: 0.2 G/DL (ref ?–0)
M-PROTEIN 3: 0.1 G/DL (ref ?–0)
PATH REVIEW - SERUM IMMUNOFIXATION: ABNORMAL
PATH REVIEW-SERUM PROTEIN ELECTROPHORESIS: ABNORMAL
PROTEIN ELECTROPHORESIS COMMENT: ABNORMAL

## 2025-07-22 NOTE — PROGRESS NOTES
7/15/2025 MRI of the lumbar spine showed bone marrow edema and endplate changes at L2-3 with Modic type I.  S-shaped scoliosis, severe degenerative changes with variable degree of foraminal stenosis:  T12-L1 moderate right foraminal stenosis  L1-2 severe right foraminal stenosis  L2-3 moderate right foraminal stenosis  L3-4 severe left foraminal stenosis and moderate right  L4-5 severe left foraminal stenosis and moderate right  L5-S1 moderate right foraminal stenosis  Will plan for right L2-3 and left L4-5 TFESI to help with her pain and the patient need to follow-up to discuss with her the extensive MRI findings

## 2025-07-31 ENCOUNTER — INFUSION (OUTPATIENT)
Dept: HEMATOLOGY/ONCOLOGY | Facility: CLINIC | Age: OVER 89
End: 2025-07-31
Payer: MEDICARE

## 2025-07-31 ENCOUNTER — LAB (OUTPATIENT)
Dept: LAB | Facility: CLINIC | Age: OVER 89
End: 2025-07-31
Payer: MEDICARE

## 2025-07-31 ENCOUNTER — OFFICE VISIT (OUTPATIENT)
Dept: HEMATOLOGY/ONCOLOGY | Facility: CLINIC | Age: OVER 89
End: 2025-07-31
Payer: MEDICARE

## 2025-07-31 ENCOUNTER — OFFICE VISIT (OUTPATIENT)
Dept: PAIN MEDICINE | Facility: CLINIC | Age: OVER 89
End: 2025-07-31
Payer: MEDICARE

## 2025-07-31 VITALS
HEIGHT: 59 IN | HEART RATE: 63 BPM | RESPIRATION RATE: 16 BRPM | SYSTOLIC BLOOD PRESSURE: 194 MMHG | TEMPERATURE: 98.1 F | WEIGHT: 130 LBS | DIASTOLIC BLOOD PRESSURE: 81 MMHG | OXYGEN SATURATION: 98 % | BODY MASS INDEX: 26.21 KG/M2

## 2025-07-31 VITALS
WEIGHT: 130.07 LBS | RESPIRATION RATE: 20 BRPM | HEART RATE: 68 BPM | OXYGEN SATURATION: 100 % | BODY MASS INDEX: 26.22 KG/M2 | TEMPERATURE: 97.5 F | HEIGHT: 59 IN

## 2025-07-31 DIAGNOSIS — C90.00 MULTIPLE MYELOMA NOT HAVING ACHIEVED REMISSION (MULTI): ICD-10-CM

## 2025-07-31 DIAGNOSIS — E83.52 HYPERCALCEMIA: ICD-10-CM

## 2025-07-31 DIAGNOSIS — C90.00 MULTIPLE MYELOMA, REMISSION STATUS UNSPECIFIED (MULTI): ICD-10-CM

## 2025-07-31 DIAGNOSIS — M47.816 LUMBAR SPONDYLOSIS: Primary | ICD-10-CM

## 2025-07-31 LAB
ALBUMIN SERPL BCP-MCNC: 4.1 G/DL (ref 3.4–5)
ALP SERPL-CCNC: 53 U/L (ref 33–136)
ALT SERPL W P-5'-P-CCNC: 13 U/L (ref 7–45)
ANION GAP SERPL CALC-SCNC: 14 MMOL/L (ref 10–20)
AST SERPL W P-5'-P-CCNC: 16 U/L (ref 9–39)
BASOPHILS # BLD AUTO: 0.05 X10*3/UL (ref 0–0.1)
BASOPHILS NFR BLD AUTO: 0.5 %
BILIRUB SERPL-MCNC: 0.3 MG/DL (ref 0–1.2)
BUN SERPL-MCNC: 61 MG/DL (ref 6–23)
CALCIUM SERPL-MCNC: 10.6 MG/DL (ref 8.6–10.3)
CHLORIDE SERPL-SCNC: 105 MMOL/L (ref 98–107)
CO2 SERPL-SCNC: 25 MMOL/L (ref 21–32)
CREAT SERPL-MCNC: 1.69 MG/DL (ref 0.5–1.05)
EGFRCR SERPLBLD CKD-EPI 2021: 28 ML/MIN/1.73M*2
EOSINOPHIL # BLD AUTO: 0.24 X10*3/UL (ref 0–0.4)
EOSINOPHIL NFR BLD AUTO: 2.3 %
ERYTHROCYTE [DISTWIDTH] IN BLOOD BY AUTOMATED COUNT: 14 % (ref 11.5–14.5)
GLUCOSE SERPL-MCNC: 110 MG/DL (ref 74–99)
HCT VFR BLD AUTO: 35.1 % (ref 36–46)
HGB BLD-MCNC: 11.1 G/DL (ref 12–16)
IMM GRANULOCYTES # BLD AUTO: 0.04 X10*3/UL (ref 0–0.5)
IMM GRANULOCYTES NFR BLD AUTO: 0.4 % (ref 0–0.9)
LYMPHOCYTES # BLD AUTO: 2.05 X10*3/UL (ref 0.8–3)
LYMPHOCYTES NFR BLD AUTO: 19.8 %
MCH RBC QN AUTO: 31.2 PG (ref 26–34)
MCHC RBC AUTO-ENTMCNC: 31.6 G/DL (ref 32–36)
MCV RBC AUTO: 99 FL (ref 80–100)
MONOCYTES # BLD AUTO: 0.85 X10*3/UL (ref 0.05–0.8)
MONOCYTES NFR BLD AUTO: 8.2 %
NEUTROPHILS # BLD AUTO: 7.12 X10*3/UL (ref 1.6–5.5)
NEUTROPHILS NFR BLD AUTO: 68.8 %
NRBC BLD-RTO: 0 /100 WBCS (ref 0–0)
PLATELET # BLD AUTO: 318 X10*3/UL (ref 150–450)
POTASSIUM SERPL-SCNC: 4 MMOL/L (ref 3.5–5.3)
PROT SERPL-MCNC: 6.9 G/DL (ref 6.4–8.2)
RBC # BLD AUTO: 3.56 X10*6/UL (ref 4–5.2)
SODIUM SERPL-SCNC: 140 MMOL/L (ref 136–145)
WBC # BLD AUTO: 10.4 X10*3/UL (ref 4.4–11.3)

## 2025-07-31 PROCEDURE — 84075 ASSAY ALKALINE PHOSPHATASE: CPT | Performed by: INTERNAL MEDICINE

## 2025-07-31 PROCEDURE — 99214 OFFICE O/P EST MOD 30 MIN: CPT | Performed by: ANESTHESIOLOGY

## 2025-07-31 PROCEDURE — 1126F AMNT PAIN NOTED NONE PRSNT: CPT | Performed by: INTERNAL MEDICINE

## 2025-07-31 PROCEDURE — 2500000004 HC RX 250 GENERAL PHARMACY W/ HCPCS (ALT 636 FOR OP/ED): Performed by: INTERNAL MEDICINE

## 2025-07-31 PROCEDURE — 96401 CHEMO ANTI-NEOPL SQ/IM: CPT

## 2025-07-31 PROCEDURE — 3079F DIAST BP 80-89 MM HG: CPT | Performed by: ANESTHESIOLOGY

## 2025-07-31 PROCEDURE — 85025 COMPLETE CBC W/AUTO DIFF WBC: CPT | Performed by: INTERNAL MEDICINE

## 2025-07-31 PROCEDURE — 1159F MED LIST DOCD IN RCRD: CPT | Performed by: ANESTHESIOLOGY

## 2025-07-31 PROCEDURE — 3077F SYST BP >= 140 MM HG: CPT | Performed by: ANESTHESIOLOGY

## 2025-07-31 PROCEDURE — 36415 COLL VENOUS BLD VENIPUNCTURE: CPT | Performed by: INTERNAL MEDICINE

## 2025-07-31 PROCEDURE — 1159F MED LIST DOCD IN RCRD: CPT | Performed by: INTERNAL MEDICINE

## 2025-07-31 PROCEDURE — 96372 THER/PROPH/DIAG INJ SC/IM: CPT

## 2025-07-31 PROCEDURE — 99214 OFFICE O/P EST MOD 30 MIN: CPT | Performed by: INTERNAL MEDICINE

## 2025-07-31 RX ORDER — DIPHENHYDRAMINE HYDROCHLORIDE 50 MG/ML
50 INJECTION, SOLUTION INTRAMUSCULAR; INTRAVENOUS AS NEEDED
OUTPATIENT
Start: 2025-08-28

## 2025-07-31 RX ORDER — ALBUTEROL SULFATE 0.83 MG/ML
3 SOLUTION RESPIRATORY (INHALATION) AS NEEDED
OUTPATIENT
Start: 2025-08-28

## 2025-07-31 RX ORDER — BORTEZOMIB 3.5 MG/1
1.3 INJECTION, POWDER, LYOPHILIZED, FOR SOLUTION INTRAVENOUS; SUBCUTANEOUS ONCE
Status: COMPLETED | OUTPATIENT
Start: 2025-07-31 | End: 2025-07-31

## 2025-07-31 RX ORDER — EPINEPHRINE 0.3 MG/.3ML
0.3 INJECTION SUBCUTANEOUS EVERY 5 MIN PRN
OUTPATIENT
Start: 2025-08-28

## 2025-07-31 RX ORDER — ALBUTEROL SULFATE 0.83 MG/ML
3 SOLUTION RESPIRATORY (INHALATION) AS NEEDED
Status: DISCONTINUED | OUTPATIENT
Start: 2025-07-31 | End: 2025-07-31 | Stop reason: HOSPADM

## 2025-07-31 RX ORDER — DIPHENHYDRAMINE HYDROCHLORIDE 50 MG/ML
50 INJECTION, SOLUTION INTRAMUSCULAR; INTRAVENOUS AS NEEDED
Status: DISCONTINUED | OUTPATIENT
Start: 2025-07-31 | End: 2025-07-31 | Stop reason: HOSPADM

## 2025-07-31 RX ORDER — EPINEPHRINE 0.3 MG/.3ML
0.3 INJECTION SUBCUTANEOUS EVERY 5 MIN PRN
Status: DISCONTINUED | OUTPATIENT
Start: 2025-07-31 | End: 2025-07-31 | Stop reason: HOSPADM

## 2025-07-31 RX ORDER — FAMOTIDINE 10 MG/ML
20 INJECTION, SOLUTION INTRAVENOUS ONCE AS NEEDED
OUTPATIENT
Start: 2025-08-28

## 2025-07-31 RX ORDER — FAMOTIDINE 10 MG/ML
20 INJECTION, SOLUTION INTRAVENOUS ONCE AS NEEDED
Status: DISCONTINUED | OUTPATIENT
Start: 2025-07-31 | End: 2025-07-31 | Stop reason: HOSPADM

## 2025-07-31 RX ADMIN — DENOSUMAB 120 MG: 120 INJECTION SUBCUTANEOUS at 12:30

## 2025-07-31 RX ADMIN — BORTEZOMIB 2.1 MG: 1 INJECTION, POWDER, LYOPHILIZED, FOR SOLUTION INTRAVENOUS; SUBCUTANEOUS at 12:31

## 2025-07-31 ASSESSMENT — PAIN - FUNCTIONAL ASSESSMENT: PAIN_FUNCTIONAL_ASSESSMENT: 0-10

## 2025-07-31 ASSESSMENT — PAIN SCALES - GENERAL
PAINLEVEL_OUTOF10: 5 - MODERATE PAIN
PAINLEVEL_OUTOF10: 0-NO PAIN
PAINLEVEL_OUTOF10: 5

## 2025-07-31 ASSESSMENT — ENCOUNTER SYMPTOMS
LOSS OF SENSATION IN FEET: 0
OCCASIONAL FEELINGS OF UNSTEADINESS: 0
DEPRESSION: 0

## 2025-07-31 ASSESSMENT — PAIN DESCRIPTION - DESCRIPTORS: DESCRIPTORS: ACHING

## 2025-07-31 NOTE — PROGRESS NOTES
This is 89 y.o.  female with who has been treated for Spinal stenosis of lumbar region with radiculopathy. Pain is unchanged, The pain is described as achiness and is relieved by Medications tylenol and Advil with who is here for follow-up No chief complaint on file.      Pain Therapies: Follow-up to discuss MRI results

## 2025-07-31 NOTE — SIGNIFICANT EVENT
07/31/25 1128   Prechemo Checklist   Has the patient been in the hospital, ED, or urgent care since last date of service No   Chemo/Immuno Consent Completed and Signed Yes   Protocol/Indications Verified Yes   Confirmed to previous date/time of medication Yes   Compared to previous dose Yes   All medications are dated accurately Yes   Pregnancy Test Negative Not applicable   Parameters Met Yes   Provider Notified Yes   Is Patient Proceeding With Treatment? Yes   BSA/Weight-Height Verified Yes   Dose Calculations Verified (current, total, cumulative) Yes

## 2025-07-31 NOTE — PROGRESS NOTES
SUBJECTIVE:  This is 89 y.o.  female with PMH of multiple myeloma getting therapy for that, stage III kidney disease, GERD, peripheral neuropathy in addition to multiple joint arthritis with left reverse shoulder replacement, right TKA with multiple back complaints now localized to the left side of her back patient had an MRI who is here for follow-up to discuss her MRI finding which showed really multiple degenerative changes but there is no significant canal but variable degree of foraminal stenosis but a lot of spondylosis.  I explained to her that her spine for her age is really great and there is nothing fearful about her back issues.  I recommended for her to start taking the gabapentin with the Tylenol and ibuprofen together and we will try left L3-5 medial branch block under fluoroscopy guidance to help her as diagnostic and therapeutic injection.    Prior office visit:  6/19/2025: This is 89 y.o.  female with PMH of multiple myeloma getting therapy for that, stage III kidney disease, GERD, peripheral neuropathy in addition to multiple joint arthritis with left reverse shoulder replacement, right TKA with multiple back complaints treated last year on 12/12/2024 with bilateral L2-3 ILESI who is here for follow-up with significant lower back pain and hip pain alternate between the right and the left with some time radiation of the pain to her left leg more than the right.  The patient failed lumbar SUSAN and self-directed physical therapy in addition to medical management.  The patient cannot take tramadol that caused her itching.  Her exam did not show any signs of left hip arthritis.  I need an MRI to figure out targeted therapy for her.  MRI ordered today and after the MRI we will decide the next step.  The patient takes ibuprofen 200 mg for her pain I told her to take 2 ibuprofen 200 mg and 2 acetaminophen 325 mg for her pain and she can take it with additional gabapentin 100 mg.  I gave her gabapentin  titration schedule since she only take 3 of them at night so she can advance them during the day as well  Plan  Advance gabapentin 100 mg to titration schedule and take it with ibuprofen and acetaminophen as well  MRI of the lumbar spine       Procedures:  2024 bilateral L2-3 ILESI the patient has had 0% improvement in pain function  2022 left SI joint injection and the patient has had 70% improvement in pain and function  3/28/2022 bilateral suprascapular NB 70% but only for a week  2022 left SI joint injection 80% for a year  In  at left L3-4 and L4-5 by Alma Rosa Lackey that did not help  Multiple injection in  lat [facility] ast one helped by         Portions of record reviewed for pertinent issues: active problem list, medication list, allergies, family history, social history, notes from last encounter, encounters, lab results, imaging and other available records.        I have personally reviewed the OARRS report for this patient. This report is scanned into the electronic medical record. I have considered the risks of abuse, dependence, addiction and diversion. It showed: Gabapentin 100 mg  OPIOID RISK ASSESSMENT SCORE 0/26  Aberrant behavior: none  My patient has no underlying substance abuse or alcohol abuse and there's no mental health conditions contributing to the patient's pain.        Diagnostic studies:  7/15/2025 MRI of the lumbar spine showed bone marrow edema and endplate changes at L2-3 with Modic type I.  S-shaped scoliosis, severe degenerative changes with variable degree of foraminal stenosis:  T12-L1 moderate right foraminal stenosis  L1-2 severe right foraminal stenosis  L2-3 moderate right foraminal stenosis  L3-4 severe left foraminal stenosis and moderate right  L4-5 severe left foraminal stenosis and moderate right  L5-S1 moderate right foraminal stenosis     FINDINGS:  NUMBERIN lumbar-type vertebral bodies; the last well-formed disc  space is labeled  L5-S1.      ALIGNMENT: Moderate S shaped curvature of the lumbar spine. Mild  retrolisthesis of L2-L3, L3-L4. Grade 1 anterolisthesis of L5-S1.      VERTEBRAE: Vertebral body heights are maintained. No suspicious  osseous lesions. Modic type 1 degenerative endplate changes at L2-L3.      CONUS: The lower thoracic cord appears unremarkable. The conus  terminates at L1. The cauda equina is unremarkable.      SOFT TISSUES: Unremarkable.      DISC LEVELS:      T12-L1: Disc bulge, bilateral facet arthropathy and ligamentum flavum  thickening. No spinal canal stenosis. Moderate right and mild left  foraminal stenosis.      L1-2: Disc bulge, bilateral facet arthropathy and ligamentum flavum  thickening. No spinal canal stenosis. Severe right foraminal stenosis.      L2-3: Progression of disc bulge, bilateral facet arthropathy and  ligamentum flavum thickening. No spinal canal stenosis. Moderate  right foraminal stenosis.      L3-4: Disc bulge, bilateral facet arthropathy and ligamentum flavum  thickening. No spinal canal stenosis. Moderate right and severe left  foraminal stenosis.      L4-5: Disc bulge, bilateral facet arthropathy and ligamentum flavum  thickening. No spinal canal stenosis. Mild right and severe left  foraminal stenosis.      L5-S1: Anterolisthesis. Disc bulge/uncovering of the disc, bilateral  facet arthropathy. No spinal canal stenosis. Severe bilateral  foraminal stenosis.      OTHER: Stable bilateral complex renal cysts. And      IMPRESSION:  1. Diffuse degenerative changes throughout the lumbar spine with mild  progression of the disc bulge at L2-L3. stable moderate right  foraminal stenosis. No significant spinal canal stenosis within the  lumbar spine.  2. Stable severe right L1-L2, left L3-L4, left L4-L5 and bilateral  L5-S1 foraminal stenosis.      I personally reviewed the images/study and I agree with the findings  as stated.      MACRO:  None      Signed by: Rekha Sapp 7/16/2025 12:29  PM    L2-3:    L5-S1:          Review of Systems   HENT: Negative.     Eyes: Negative.    Respiratory: Negative.     Cardiovascular: Negative.    Gastrointestinal: Negative.    Endocrine: Negative.    Genitourinary: Negative.    Musculoskeletal:  Positive for back pain, gait problem and myalgias.   Skin: Negative.    Hematological: Negative.    Psychiatric/Behavioral: Negative.           Physical Exam  Vitals and nursing note reviewed.   Constitutional:       Appearance: Normal appearance.           Comments: Scoliosis with pain radiating to bilateral iliac crest flank area   HENT:      Head: Normocephalic and atraumatic.      Nose: Nose normal.   Eyes:      Extraocular Movements: Extraocular movements intact.      Conjunctiva/sclera: Conjunctivae normal.      Pupils: Pupils are equal, round, and reactive to light.   Cardiovascular:      Rate and Rhythm: Normal rate and regular rhythm.      Pulses: Normal pulses.      Heart sounds: Normal heart sounds.   Pulmonary:      Effort: Pulmonary effort is normal.      Breath sounds: Normal breath sounds.   Abdominal:      General: Abdomen is flat. Bowel sounds are normal.      Palpations: Abdomen is soft.   Skin:     General: Skin is warm.   Neurological:      Mental Status: She is alert.   Psychiatric:         Mood and Affect: Mood normal.         Behavior: Behavior normal.          Plan  At least 50% of the visit was involved in the discussion of the options for treatment. We discussed exercises, medication, interventional therapies and surgery. Healthy life style is essential with patient hard work to achieve the wellness. In addition; discussion with the patient and/or family about any of the diagnostic results, impressions and/or recommended diagnostic studies, prognosis, risks and benefits of treatment options, instructions for treatment and/or follow-up, importance of compliance with chosen treatment options, risk-factor reduction, and patient/family education.          Continue self-directed physical therapy at least daily exercises for minimum of 20-minute  Referral to physical therapy  Referral to Iglesia levine  Left L3-5 MBB under fluoroscopy guidance for diagnostic and therapeutic purpose  Healthy lifestyle and anti-inflammatory diet in addition to weight control discussed with the patient  Alternative chronic pain therapies was discussed, encouraged and information was handed  Return to Clinic after injection     *Please note this report has been produced using speech recognition software and may contain errors related to that system including grammar, punctuation and spelling as well as words and phrases that may be inappropriate. If there are questions or concerns, please feel free to contact me to clarify.    Dana Heck MD

## 2025-07-31 NOTE — PROGRESS NOTES
"LOCATION:  [facility] at [facility].     HEMATOLOGY & ONCOLOGY PROBLEMS:  1.  IgA kappa multiple myeloma        a.  Initial diagnosis of MGUS in Feb 2022.       b.  Disease progression to multiple myeloma in Oct 2022.       c.   Ist line therapy with single agent Velcade from Nov 2022 to June 2024.       d.  2nd line therapy with Darzalex/Velcade/Dex beginning July 2024.  2.   Hypercalcemia/lytic lesions.       a.  Maintained on Xgeva  3.  Macrocytic anemia.     CHIEF COMPLAINT:    The patient is in the clinic today for management of multiple myeloma and macrocytic anemia and for continuation of therapy and management of therapy related effects.     HISTORY:   Ms. Cali is a 89 year old pleasant female with multiple myeloma.  She has been in relatively good health for her age and is in fact still working.  Blood work from Feb 2022 showed a hemoglobin of 11.1 with MCV of 101.  WBC, platelets and metabolic  profile was unremarkable except for baseline creatinine of 1.4.  Additional work-up revealed a normal TSH but SPEP showed 0.7 g of IgA kappa monoclonal protein.  Initial evaluation was mainly suggestive of MGUS and she was followed closely.  But routine  follow-up blood work from October 2022 showed 1g of IgA kappa monoclonal protein and markedly elevated kappa free light chain assay.  Creatinine was stable at 1.3 but serum calcium was elevated at 13.1.  PET scan results were unremarkable but a bone marrow  biopsy confirmed plasma cell myeloma.  Cytogenetics and FISH testing results were unremarkable.  After discussions with the patient and family members she was treated with single agent Velcade with good initial response.  Clinical course was complicated by issues with fall leading to right leg surgery, recurrent C. difficile colitis and intermittent hypercalcemia related complications.  Later on she was also noted to have rising myeloma markers and treatment \"regimen has been changed to DVD " "protocol with addition of subcutaneous Darzalex.       INTERVAL HISTORY:  Tolerating Velcade and Darzalex combination well.  Latest myeloma markers from 2025 showed essentially stable M protein and free kappa light chain levels.  Overall feel better.  Repeat calcium level from today is 10.6.  Clinically she has not noticed any signs and symptoms of dizziness or mental status changes lately.    PAST MEDICAL HISTORY:   1.  Multiple myeloma as detailed above  2.  Hypertension  3.  Hypothyroidism  4.  GERD  5.  Chronic kidney disease  6.  DJD   7.  Idiopathic neuropathy  8.  Lactose intolerance  9.  Vitamin D deficiency  10.  History of cholecystectomy/hysterectomy/tonsillectomy/left knee replacement surgeries     SOCIAL HISTORY:   Lives in [place] with her son.  Non smoker.  Rare social alcohol intake.  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  Born and raised in [place] [facility] [place].     FAMILY HISTORY:    Parents  in their 90s from natural causes but a per the patient both probably had some kind of cancers.  5 siblings 1  from lung problems.  7 children, 12 grandkids and  14 great grandkids all alive and well. No other specific history of bleeding, clotting or malignant disorder in the family.     REVIEW OF SYSTEMS:  Pertinent finding as per the history above.  All other systems have been reviewed and generally negative and noncontributory.     ALLERGY & MEDICATIONS:  Allergies and latest outpatient medications list were reviewed in the EMR.    VITAL SIGNS  BSA: 1.57 meters squared  Pulse 68   Temp 36.4 °C (97.5 °F)   Resp 20   Ht (!) 1.497 m (4' 10.94\")   Wt 59 kg (130 lb 1.1 oz)   SpO2 100%   BMI 26.33 kg/m²     PHYSICAL EXAMINATION:  Detailed examination not done.    LAB RESULTS:  CBC from today shows a hemoglobin of 11.1 with MCV of 99.   White cell count is 10.4 and platelet are 318.   Metabolic profile is essentially stable with creatinine of 1.6 and calcium of " 10.6.  Last myeloma markers from 07/17/2025 showed a kappa light chain level of 8.8.  SPEP showed 0.4 g of IgA kappa monoclonal protein unchanged from before.    RADIOLOGY RESULT:  CT chest without contrast 7/8/2024   Impression:  CHEST  1.  New small 9 mm nodular density in the medial left lower lobe and a few tiny scattered pulmonary nodules and reticulonodular opacities in the bilateral upper lobes and right lower lobe. These may represent infectious/inflammatory or neoplastic nodules. Attention on short interval follow-up CT chest is recommended.  2. No dense area of consolidation to suggest pneumonia.  3. Tiny layering left pleural effusion with adjacent left basilar subsegmental atelectasis.  4. Other chronic findings, as detailed above.      ABDOMEN - PELVIS  1.  Diffuse colonic wall thickening and pericolonic stranding throughout the entire colon, concerning for acute colitis of infectious, inflammatory or ischemic etiology.  2.  Diffuse extensive diverticulosis again seen.  3.  Intermediate to high density bilateral renal lesions are indeterminate. These may represent hemorrhagic/proteinaceous renal cysts, however solid lesion is not excluded. This may be further assessed with nonemergent renal ultrasound and/or MRI.  4.  Additional chronic incidental findings, as detailed above.      PET/CT Myeloma Initial [Oct 21 2022 10:35AM]  Impression:  1. Multiple focal hypermetabolic pulmonary nodules throughout the bilateral lungs. Findings likely represent inflammatory process, versus less likely, malignancy. Recommend short-term follow-up CT to document resolution of nodules or tissue biopsy is clinically indicated.  2. Several hypermetabolic nonenlarged mediastinal lymph nodes, likely reactive in etiology.  3. Mild increased metabolic activity throughout the axial and appendicular skeleton, which is nonspecific and could be related to patient's anemia versus diffuse myeloma involvement. There is no focal  hypermetabolic disease involvement within the skeleton.     PATHOLOGY RESULTS:  Surgical Pathology [Oct 28 2022 1:58PM] ([id number])  Specimens: BONE MARROW CLOT /BONE MARROW CORE   Name SUMIT OTT   Accession #: [id number]    Pathologist: ADRIANA NATION MD   Date of Procedure: 10/25/2022   Submitting Physician: RENATE MELGAR MD   Location: [facility] Other External #   FINAL DIAGNOSIS   A&B:  BONE MARROW, ASPIRATE WITH CLOT AND CORE BIOPSY WITH TOUCH IMPRINT, RIGHT ILIAC CREST:   -- SLIGHTLY HYPERCELLULAR BONE MARROW WITH 15% KAPPA+ PLASMA CELLS CONSISTENT WITH PLASMA CELL MYELOMA   NOTE: Clinical correlation recommended.   Pending  Genetic/Molecular testing per Hematopathology protocol:   -Chromosome analysis: karyotype   -FISH: Myeloma panel   -Molecular: None   -Microarray: None   The results will be reported separately.   Touch Prep   Differential:  (Normal) %   Promyelocytes (1-5) 1   Myelocytes (5-10) 8   Metamyelocytes (10-25) 13   Bands (10-20) 9   Segmented forms (5-30) 17   Eosinophils (2-4) 11   Basophils (0-1) 1   Lymphocytes (5-25) 2   Monocytes (0-2)  0   Plasma Cells (0-2) 15   Blasts (0-1) 0   Total Erythroid (17-35) 23   Number of cells counted: 100   Cellularity: cellular   M:E Ratio: 2.7:1   The gross and/or microscopic findings were reviewed in conjunction with pathology  resident, Kirstin Gutierrez M.D.   Electronically Signed Out By ADRIANA NATION MD/[facility]   Microscopic Description:   CBC: WBC 18.8 x 10E9/L, RBC 2.77 x 10E12/L, Hgb 8.8 g/dl, Hct 26.2%, MCV 95 fL, RDW 13.6%, platelets  416 x 10E9/L.   A 100 cell manual differential count reveals: polys 61%, bands 8%, lymphocytes 13%, monocytes 9%, eosinophils 8%, basophils 1%.   PERIPHERAL SMEAR: Submitted   Red cells: Normocytic with rouleaux formation.   White cells: Eosinophilia    Platelets: Normal, adequate.   ASPIRATE SMEAR: Submitted   Specimen: Aspicular.   Comments: Hypocellular and hemodilute with few hematopoietic  precursors and scattered atypical plasma cells, some biunucleated.   TOUCH PREP: Submitted    Specimen: Paucicellular.   Erythropoiesis: Normal maturation.   Granulopoiesis: Normal maturation.   Megakaryocytes: Present. Morphology: Normal.   Comments: Numerous plasma cells (15%). Some very large atypical plasma cells seen.   ASPIRATE CLOT: Submitted   Specimen: Aspicular.   Comments: Rare hematopoietic progenitor cells noted in a hemodiluted sample.   CORE BIOPSY: Submitted   Specimen: Limited. Small subcortical with crush artifact   Cellularity: 40%.   Estimated M:E ratio: Consistent with aspirate smear.   Bony trabeculae: Normal.   Megakaryocytes: Adequate. Morphology: Normal.   Granulomas: Absent.   Lymphoid aggregates: Absent.   Comments: Few small aggregates of plasm cells noted.    SPECIAL STAINS:   Iron: Inadequate sample to evaluate   IMMUNOHISTOCHEMISTRY: Performed.    - Positive plasma cells approximately (15%)   Cyclin D1 - Negative   IH Kappa - positice in plasma cells   IH Lambda - rare plasma  cells positive.   FLOW CYTOMETRY: Performed, see separate report. A small clonal kappa+, CD19-, CD45- plasma cell population detected.   Immunostains were performed in addition to flow cytometry to fully characterize the phenotype, architecture,  and extent of the atypical population(s). This was medically necessary for the best possible diagnosis.      ASSESSMENT & PLAN:  1.  IgA kappa multiple myeloma.  Please refer to the details of initial presentation and management as outlined above. In summary, patient with finding of mild macrocytic  anemia with further work-up revealing 0.7g of IgA kappa monoclonal protein on SPEP in Feb 2022. Clinically she was essentially asymptomatic and overall functional for her age. She was initially followed with close observation.  Subsequent routine follow-up blood work from Oct 2022 showed 1gm of IgA kappa monoclonal protein and markedly elevated kappa free light chain  assay.  Creatinine was stable at 1.3 but serum calcium was elevated at 13.1.  PET scan results were unremarkable but a bone marrow biopsy  confirmed plasma cell myeloma.  Cytogenetics and FISH testing was unremarkable. After discussions with the patient and family members she was treated with single agent Velcade with good initial response.  Clinical course was complicated by issues with fall leading to right leg surgery, recurrent C. difficile colitis and intermittent hypercalcemia related complications.  Later on she was also noted to have rising myeloma markers and treatment regimen has been changed to DVD protocol with addition of subcutaneous Darzalex since July 2024.       Overall she is tolerating Velcade/Darzalex combination well.  For now she will continue with the current combination at the current dose of Darzalex once a month and Velcade every 2 weeks schedule.  Probable side effects of neuropathy, thrombocytopenia, weakness, fatigue, rash etc. were explained to her in detail.  Repeat myeloma markers from today are pending but they were essentially stable in October 2024.  It will be always a challenge to find a balance between myeloma activity, intensity of the treatment and quality of life issues, at her age.  Overall prognosis remains guarded.    2.  Hypercalcemia.  Secondary to baseline myeloma.  PET scan as such was negative for bony lytic lesions.  She will continue with Xgeva.     3. Follow up: Follow-up with me in about 4 weeks and will come to the clinic for for continuation of chemotherapy with Darzalex and Velcade combination as detailed above.  Advised to contact us immediately if there are any new questions or problems.     This note has been transcribed using Dragon voice recognition system and there is a possibility of unintentional typing misprints.

## 2025-08-12 RX ORDER — DIPHENHYDRAMINE HYDROCHLORIDE 50 MG/ML
50 INJECTION, SOLUTION INTRAMUSCULAR; INTRAVENOUS AS NEEDED
OUTPATIENT
Start: 2025-08-28

## 2025-08-12 RX ORDER — ACETAMINOPHEN 325 MG/1
650 TABLET ORAL ONCE
OUTPATIENT
Start: 2025-08-28

## 2025-08-12 RX ORDER — FAMOTIDINE 10 MG/ML
20 INJECTION, SOLUTION INTRAVENOUS ONCE AS NEEDED
OUTPATIENT
Start: 2025-08-28

## 2025-08-12 RX ORDER — EPINEPHRINE 0.3 MG/.3ML
0.3 INJECTION SUBCUTANEOUS EVERY 5 MIN PRN
OUTPATIENT
Start: 2025-08-28

## 2025-08-12 RX ORDER — DIPHENHYDRAMINE HCL 50 MG
50 CAPSULE ORAL ONCE
OUTPATIENT
Start: 2025-08-28

## 2025-08-12 RX ORDER — BORTEZOMIB 3.5 MG/1
1.3 INJECTION, POWDER, LYOPHILIZED, FOR SOLUTION INTRAVENOUS; SUBCUTANEOUS ONCE
OUTPATIENT
Start: 2025-08-28

## 2025-08-12 RX ORDER — ALBUTEROL SULFATE 0.83 MG/ML
3 SOLUTION RESPIRATORY (INHALATION) AS NEEDED
OUTPATIENT
Start: 2025-08-28

## 2025-08-12 RX ORDER — MONTELUKAST SODIUM 10 MG/1
10 TABLET ORAL ONCE
OUTPATIENT
Start: 2025-08-28

## 2025-08-14 ENCOUNTER — LAB (OUTPATIENT)
Dept: LAB | Facility: CLINIC | Age: OVER 89
End: 2025-08-14
Payer: MEDICARE

## 2025-08-14 ENCOUNTER — INFUSION (OUTPATIENT)
Dept: HEMATOLOGY/ONCOLOGY | Facility: CLINIC | Age: OVER 89
End: 2025-08-14
Payer: MEDICARE

## 2025-08-14 VITALS
HEART RATE: 64 BPM | OXYGEN SATURATION: 99 % | TEMPERATURE: 97.5 F | SYSTOLIC BLOOD PRESSURE: 178 MMHG | DIASTOLIC BLOOD PRESSURE: 80 MMHG | RESPIRATION RATE: 18 BRPM | WEIGHT: 132.28 LBS | HEIGHT: 59 IN | BODY MASS INDEX: 26.67 KG/M2

## 2025-08-14 DIAGNOSIS — C90.00 MULTIPLE MYELOMA, REMISSION STATUS UNSPECIFIED (MULTI): Primary | ICD-10-CM

## 2025-08-14 DIAGNOSIS — C90.00 MULTIPLE MYELOMA NOT HAVING ACHIEVED REMISSION (MULTI): ICD-10-CM

## 2025-08-14 DIAGNOSIS — C90.00 MULTIPLE MYELOMA, REMISSION STATUS UNSPECIFIED (MULTI): ICD-10-CM

## 2025-08-14 LAB
ALBUMIN SERPL BCP-MCNC: 4.2 G/DL (ref 3.4–5)
ALP SERPL-CCNC: 50 U/L (ref 33–136)
ALT SERPL W P-5'-P-CCNC: 17 U/L (ref 7–45)
ANION GAP SERPL CALC-SCNC: 13 MMOL/L (ref 10–20)
AST SERPL W P-5'-P-CCNC: 15 U/L (ref 9–39)
BASOPHILS # BLD AUTO: 0.05 X10*3/UL (ref 0–0.1)
BASOPHILS NFR BLD AUTO: 0.5 %
BILIRUB SERPL-MCNC: 0.4 MG/DL (ref 0–1.2)
BUN SERPL-MCNC: 56 MG/DL (ref 6–23)
CALCIUM SERPL-MCNC: 9.7 MG/DL (ref 8.6–10.3)
CHLORIDE SERPL-SCNC: 107 MMOL/L (ref 98–107)
CO2 SERPL-SCNC: 25 MMOL/L (ref 21–32)
CREAT SERPL-MCNC: 1.68 MG/DL (ref 0.5–1.05)
EGFRCR SERPLBLD CKD-EPI 2021: 28 ML/MIN/1.73M*2
EOSINOPHIL # BLD AUTO: 0.22 X10*3/UL (ref 0–0.4)
EOSINOPHIL NFR BLD AUTO: 2.3 %
ERYTHROCYTE [DISTWIDTH] IN BLOOD BY AUTOMATED COUNT: 14 % (ref 11.5–14.5)
GLUCOSE SERPL-MCNC: 90 MG/DL (ref 74–99)
HCT VFR BLD AUTO: 34.5 % (ref 36–46)
HGB BLD-MCNC: 10.8 G/DL (ref 12–16)
IGA SERPL-MCNC: 703 MG/DL (ref 70–400)
IGG SERPL-MCNC: 425 MG/DL (ref 700–1600)
IGM SERPL-MCNC: 12 MG/DL (ref 40–230)
IMM GRANULOCYTES # BLD AUTO: 0.04 X10*3/UL (ref 0–0.5)
IMM GRANULOCYTES NFR BLD AUTO: 0.4 % (ref 0–0.9)
LYMPHOCYTES # BLD AUTO: 1.62 X10*3/UL (ref 0.8–3)
LYMPHOCYTES NFR BLD AUTO: 16.6 %
MCH RBC QN AUTO: 30.8 PG (ref 26–34)
MCHC RBC AUTO-ENTMCNC: 31.3 G/DL (ref 32–36)
MCV RBC AUTO: 98 FL (ref 80–100)
MONOCYTES # BLD AUTO: 0.83 X10*3/UL (ref 0.05–0.8)
MONOCYTES NFR BLD AUTO: 8.5 %
NEUTROPHILS # BLD AUTO: 7 X10*3/UL (ref 1.6–5.5)
NEUTROPHILS NFR BLD AUTO: 71.7 %
NRBC BLD-RTO: 0 /100 WBCS (ref 0–0)
PLATELET # BLD AUTO: 310 X10*3/UL (ref 150–450)
POTASSIUM SERPL-SCNC: 4.1 MMOL/L (ref 3.5–5.3)
PROT SERPL-MCNC: 7 G/DL (ref 6.4–8.2)
PROT SERPL-MCNC: 7.1 G/DL (ref 6.4–8.2)
RBC # BLD AUTO: 3.51 X10*6/UL (ref 4–5.2)
SODIUM SERPL-SCNC: 141 MMOL/L (ref 136–145)
WBC # BLD AUTO: 9.8 X10*3/UL (ref 4.4–11.3)

## 2025-08-14 PROCEDURE — 36415 COLL VENOUS BLD VENIPUNCTURE: CPT

## 2025-08-14 PROCEDURE — 82784 ASSAY IGA/IGD/IGG/IGM EACH: CPT | Mod: PARLAB

## 2025-08-14 PROCEDURE — 2500000004 HC RX 250 GENERAL PHARMACY W/ HCPCS (ALT 636 FOR OP/ED): Mod: JZ,TB | Performed by: INTERNAL MEDICINE

## 2025-08-14 PROCEDURE — 83521 IG LIGHT CHAINS FREE EACH: CPT | Mod: PARLAB

## 2025-08-14 PROCEDURE — 80053 COMPREHEN METABOLIC PANEL: CPT

## 2025-08-14 PROCEDURE — 85025 COMPLETE CBC W/AUTO DIFF WBC: CPT

## 2025-08-14 PROCEDURE — 96401 CHEMO ANTI-NEOPL SQ/IM: CPT

## 2025-08-14 PROCEDURE — 84155 ASSAY OF PROTEIN SERUM: CPT | Mod: 59,PARLAB

## 2025-08-14 PROCEDURE — 84165 PROTEIN E-PHORESIS SERUM: CPT | Mod: PARLAB

## 2025-08-14 RX ORDER — EPINEPHRINE 0.3 MG/.3ML
0.3 INJECTION SUBCUTANEOUS EVERY 5 MIN PRN
Status: DISCONTINUED | OUTPATIENT
Start: 2025-08-14 | End: 2025-08-14 | Stop reason: HOSPADM

## 2025-08-14 RX ORDER — BORTEZOMIB 3.5 MG/1
1.3 INJECTION, POWDER, LYOPHILIZED, FOR SOLUTION INTRAVENOUS; SUBCUTANEOUS ONCE
Status: COMPLETED | OUTPATIENT
Start: 2025-08-14 | End: 2025-08-14

## 2025-08-14 RX ORDER — DIPHENHYDRAMINE HCL 50 MG
50 CAPSULE ORAL ONCE
Status: DISCONTINUED | OUTPATIENT
Start: 2025-08-14 | End: 2025-08-14 | Stop reason: HOSPADM

## 2025-08-14 RX ORDER — FAMOTIDINE 10 MG/ML
20 INJECTION, SOLUTION INTRAVENOUS ONCE AS NEEDED
Status: DISCONTINUED | OUTPATIENT
Start: 2025-08-14 | End: 2025-08-14 | Stop reason: HOSPADM

## 2025-08-14 RX ORDER — ALBUTEROL SULFATE 0.83 MG/ML
3 SOLUTION RESPIRATORY (INHALATION) AS NEEDED
Status: DISCONTINUED | OUTPATIENT
Start: 2025-08-14 | End: 2025-08-14 | Stop reason: HOSPADM

## 2025-08-14 RX ORDER — MONTELUKAST SODIUM 10 MG/1
10 TABLET ORAL ONCE
Status: DISCONTINUED | OUTPATIENT
Start: 2025-08-14 | End: 2025-08-14 | Stop reason: HOSPADM

## 2025-08-14 RX ORDER — MONTELUKAST SODIUM 10 MG/1
TABLET ORAL
Qty: 3 TABLET | Refills: 11 | Status: SHIPPED | OUTPATIENT
Start: 2025-08-14

## 2025-08-14 RX ORDER — DIPHENHYDRAMINE HYDROCHLORIDE 50 MG/ML
50 INJECTION, SOLUTION INTRAMUSCULAR; INTRAVENOUS AS NEEDED
Status: DISCONTINUED | OUTPATIENT
Start: 2025-08-14 | End: 2025-08-14 | Stop reason: HOSPADM

## 2025-08-14 RX ORDER — ACETAMINOPHEN 325 MG/1
650 TABLET ORAL ONCE
Status: DISCONTINUED | OUTPATIENT
Start: 2025-08-14 | End: 2025-08-14 | Stop reason: HOSPADM

## 2025-08-14 RX ADMIN — BORTEZOMIB 2.1 MG: 1 INJECTION, POWDER, LYOPHILIZED, FOR SOLUTION INTRAVENOUS; SUBCUTANEOUS at 11:52

## 2025-08-14 RX ADMIN — DARATUMUMAB AND HYALURONIDASE-FIHJ (HUMAN RECOMBINANT) 1800 MG: 1800; 30000 INJECTION SUBCUTANEOUS at 11:53

## 2025-08-14 ASSESSMENT — PAIN SCALES - GENERAL: PAINLEVEL_OUTOF10: 0-NO PAIN

## 2025-08-15 ENCOUNTER — APPOINTMENT (OUTPATIENT)
Dept: PRIMARY CARE | Facility: CLINIC | Age: OVER 89
End: 2025-08-15
Payer: MEDICARE

## 2025-08-15 VITALS
BODY MASS INDEX: 26.81 KG/M2 | OXYGEN SATURATION: 96 % | HEART RATE: 66 BPM | HEIGHT: 59 IN | SYSTOLIC BLOOD PRESSURE: 198 MMHG | DIASTOLIC BLOOD PRESSURE: 68 MMHG | WEIGHT: 133 LBS

## 2025-08-15 DIAGNOSIS — N18.2 BENIGN HYPERTENSION WITH CKD (CHRONIC KIDNEY DISEASE), STAGE II: Primary | ICD-10-CM

## 2025-08-15 DIAGNOSIS — C90.00 MULTIPLE MYELOMA, REMISSION STATUS UNSPECIFIED (MULTI): ICD-10-CM

## 2025-08-15 DIAGNOSIS — I10 ESSENTIAL HYPERTENSION: ICD-10-CM

## 2025-08-15 DIAGNOSIS — I12.9 BENIGN HYPERTENSION WITH CKD (CHRONIC KIDNEY DISEASE), STAGE II: Primary | ICD-10-CM

## 2025-08-15 DIAGNOSIS — E03.9 HYPOTHYROIDISM, UNSPECIFIED TYPE: ICD-10-CM

## 2025-08-15 LAB
KAPPA LC SERPL-MCNC: 9.95 MG/DL (ref 0.33–1.94)
KAPPA LC/LAMBDA SER: 15.79 {RATIO} (ref 0.26–1.65)
LAMBDA LC SERPL-MCNC: 0.63 MG/DL (ref 0.57–2.63)

## 2025-08-15 PROCEDURE — 99214 OFFICE O/P EST MOD 30 MIN: CPT | Performed by: INTERNAL MEDICINE

## 2025-08-15 PROCEDURE — 1159F MED LIST DOCD IN RCRD: CPT | Performed by: INTERNAL MEDICINE

## 2025-08-15 PROCEDURE — 3078F DIAST BP <80 MM HG: CPT | Performed by: INTERNAL MEDICINE

## 2025-08-15 PROCEDURE — G0439 PPPS, SUBSEQ VISIT: HCPCS | Performed by: INTERNAL MEDICINE

## 2025-08-15 PROCEDURE — 3077F SYST BP >= 140 MM HG: CPT | Performed by: INTERNAL MEDICINE

## 2025-08-15 PROCEDURE — 1036F TOBACCO NON-USER: CPT | Performed by: INTERNAL MEDICINE

## 2025-08-15 PROCEDURE — 1160F RVW MEDS BY RX/DR IN RCRD: CPT | Performed by: INTERNAL MEDICINE

## 2025-08-18 ENCOUNTER — HOSPITAL ENCOUNTER (OUTPATIENT)
Dept: PAIN MEDICINE | Facility: CLINIC | Age: OVER 89
Discharge: HOME | End: 2025-08-18
Payer: MEDICARE

## 2025-08-18 VITALS
OXYGEN SATURATION: 97 % | HEART RATE: 72 BPM | TEMPERATURE: 98.1 F | RESPIRATION RATE: 18 BRPM | DIASTOLIC BLOOD PRESSURE: 81 MMHG | SYSTOLIC BLOOD PRESSURE: 192 MMHG | WEIGHT: 133 LBS | BODY MASS INDEX: 27.32 KG/M2

## 2025-08-18 DIAGNOSIS — M48.061 SPINAL STENOSIS OF LUMBAR REGION, UNSPECIFIED WHETHER NEUROGENIC CLAUDICATION PRESENT: ICD-10-CM

## 2025-08-18 DIAGNOSIS — I12.9 BENIGN HYPERTENSION WITH CKD (CHRONIC KIDNEY DISEASE), STAGE II: Primary | ICD-10-CM

## 2025-08-18 DIAGNOSIS — N18.2 BENIGN HYPERTENSION WITH CKD (CHRONIC KIDNEY DISEASE), STAGE II: Primary | ICD-10-CM

## 2025-08-18 DIAGNOSIS — I10 ESSENTIAL HYPERTENSION: ICD-10-CM

## 2025-08-18 DIAGNOSIS — M47.816 LUMBAR SPONDYLOSIS: ICD-10-CM

## 2025-08-18 PROCEDURE — 64494 INJ PARAVERT F JNT L/S 2 LEV: CPT | Performed by: ANESTHESIOLOGY

## 2025-08-18 PROCEDURE — 7100000010 HC PHASE TWO TIME - EACH INCREMENTAL 1 MINUTE

## 2025-08-18 PROCEDURE — 64493 INJ PARAVERT F JNT L/S 1 LEV: CPT | Performed by: ANESTHESIOLOGY

## 2025-08-18 PROCEDURE — 7100000009 HC PHASE TWO TIME - INITIAL BASE CHARGE

## 2025-08-18 PROCEDURE — 2500000004 HC RX 250 GENERAL PHARMACY W/ HCPCS (ALT 636 FOR OP/ED): Mod: JW | Performed by: ANESTHESIOLOGY

## 2025-08-18 RX ORDER — BUPIVACAINE HYDROCHLORIDE 7.5 MG/ML
INJECTION, SOLUTION EPIDURAL; RETROBULBAR AS NEEDED
Status: COMPLETED | OUTPATIENT
Start: 2025-08-18 | End: 2025-08-18

## 2025-08-18 RX ORDER — AMLODIPINE BESYLATE 2.5 MG/1
2.5 TABLET ORAL 2 TIMES DAILY
Qty: 180 TABLET | Refills: 2 | Status: SHIPPED | OUTPATIENT
Start: 2025-08-18 | End: 2025-08-19 | Stop reason: SDUPTHER

## 2025-08-18 RX ORDER — TRIAMCINOLONE ACETONIDE 40 MG/ML
INJECTION, SUSPENSION INTRA-ARTICULAR; INTRAMUSCULAR AS NEEDED
Status: COMPLETED | OUTPATIENT
Start: 2025-08-18 | End: 2025-08-18

## 2025-08-18 RX ORDER — LIDOCAINE HYDROCHLORIDE 5 MG/ML
INJECTION, SOLUTION INFILTRATION; INTRAVENOUS AS NEEDED
Status: COMPLETED | OUTPATIENT
Start: 2025-08-18 | End: 2025-08-18

## 2025-08-18 RX ADMIN — BUPIVACAINE HYDROCHLORIDE 10 ML: 7.5 INJECTION, SOLUTION EPIDURAL; RETROBULBAR at 08:30

## 2025-08-18 RX ADMIN — LIDOCAINE HYDROCHLORIDE 10 ML: 5 INJECTION, SOLUTION INFILTRATION at 08:30

## 2025-08-18 RX ADMIN — TRIAMCINOLONE ACETONIDE 40 MG: 40 INJECTION, SUSPENSION INTRA-ARTICULAR; INTRAMUSCULAR at 08:34

## 2025-08-18 ASSESSMENT — PAIN DESCRIPTION - DESCRIPTORS: DESCRIPTORS: ACHING

## 2025-08-18 ASSESSMENT — PAIN SCALES - GENERAL
PAINLEVEL_OUTOF10: 0 - NO PAIN
PAINLEVEL_OUTOF10: 6

## 2025-08-18 ASSESSMENT — PAIN - FUNCTIONAL ASSESSMENT: PAIN_FUNCTIONAL_ASSESSMENT: 0-10

## 2025-08-19 LAB
ALBUMIN: 4.1 G/DL (ref 3.4–5)
ALPHA 1 GLOBULIN: 0.4 G/DL (ref 0.2–0.6)
ALPHA 2 GLOBULIN: 0.9 G/DL (ref 0.4–1.1)
BETA GLOBULIN: 1.3 G/DL (ref 0.5–1.2)
GAMMA GLOBULIN: 0.4 G/DL (ref 0.5–1.4)
IMMUNOFIXATION COMMENT: ABNORMAL
M-PROTEIN 1: 0.4 G/DL (ref ?–0)
M-PROTEIN 2: 0.2 G/DL (ref ?–0)
M-PROTEIN 3: 0.1 G/DL (ref ?–0)
PATH REVIEW - SERUM IMMUNOFIXATION: ABNORMAL
PATH REVIEW-SERUM PROTEIN ELECTROPHORESIS: ABNORMAL
PROTEIN ELECTROPHORESIS COMMENT: ABNORMAL

## 2025-08-20 ENCOUNTER — TELEPHONE (OUTPATIENT)
Dept: HEMATOLOGY/ONCOLOGY | Facility: CLINIC | Age: OVER 89
End: 2025-08-20
Payer: MEDICARE

## 2025-08-21 RX ORDER — AMLODIPINE BESYLATE 2.5 MG/1
2.5 TABLET ORAL 2 TIMES DAILY
Qty: 180 TABLET | Refills: 0 | Status: SHIPPED | OUTPATIENT
Start: 2025-08-21

## 2025-08-28 ENCOUNTER — INFUSION (OUTPATIENT)
Dept: HEMATOLOGY/ONCOLOGY | Facility: CLINIC | Age: OVER 89
End: 2025-08-28
Payer: MEDICARE

## 2025-08-28 ENCOUNTER — LAB (OUTPATIENT)
Dept: LAB | Facility: CLINIC | Age: OVER 89
End: 2025-08-28
Payer: MEDICARE

## 2025-08-28 VITALS
WEIGHT: 132.94 LBS | RESPIRATION RATE: 18 BRPM | SYSTOLIC BLOOD PRESSURE: 180 MMHG | HEART RATE: 59 BPM | TEMPERATURE: 97.5 F | DIASTOLIC BLOOD PRESSURE: 74 MMHG | HEIGHT: 59 IN | OXYGEN SATURATION: 98 % | BODY MASS INDEX: 26.8 KG/M2

## 2025-08-28 DIAGNOSIS — E83.52 HYPERCALCEMIA: ICD-10-CM

## 2025-08-28 DIAGNOSIS — C90.00 MULTIPLE MYELOMA, REMISSION STATUS UNSPECIFIED (MULTI): ICD-10-CM

## 2025-08-28 DIAGNOSIS — C90.00 MULTIPLE MYELOMA NOT HAVING ACHIEVED REMISSION (MULTI): ICD-10-CM

## 2025-08-28 PROCEDURE — 96372 THER/PROPH/DIAG INJ SC/IM: CPT

## 2025-08-28 PROCEDURE — 96401 CHEMO ANTI-NEOPL SQ/IM: CPT

## 2025-08-28 PROCEDURE — 2500000004 HC RX 250 GENERAL PHARMACY W/ HCPCS (ALT 636 FOR OP/ED): Performed by: INTERNAL MEDICINE

## 2025-08-28 RX ORDER — ALBUTEROL SULFATE 0.83 MG/ML
3 SOLUTION RESPIRATORY (INHALATION) AS NEEDED
OUTPATIENT
Start: 2025-09-25

## 2025-08-28 RX ORDER — EPINEPHRINE 0.3 MG/.3ML
0.3 INJECTION SUBCUTANEOUS EVERY 5 MIN PRN
OUTPATIENT
Start: 2025-09-25

## 2025-08-28 RX ORDER — ALBUTEROL SULFATE 0.83 MG/ML
3 SOLUTION RESPIRATORY (INHALATION) AS NEEDED
Status: DISCONTINUED | OUTPATIENT
Start: 2025-08-28 | End: 2025-08-28 | Stop reason: HOSPADM

## 2025-08-28 RX ORDER — EPINEPHRINE 0.3 MG/.3ML
0.3 INJECTION SUBCUTANEOUS EVERY 5 MIN PRN
Status: DISCONTINUED | OUTPATIENT
Start: 2025-08-28 | End: 2025-08-28 | Stop reason: HOSPADM

## 2025-08-28 RX ORDER — FAMOTIDINE 10 MG/ML
20 INJECTION, SOLUTION INTRAVENOUS ONCE AS NEEDED
OUTPATIENT
Start: 2025-09-25

## 2025-08-28 RX ORDER — BORTEZOMIB 3.5 MG/1
1.3 INJECTION, POWDER, LYOPHILIZED, FOR SOLUTION INTRAVENOUS; SUBCUTANEOUS ONCE
Status: COMPLETED | OUTPATIENT
Start: 2025-08-28 | End: 2025-08-28

## 2025-08-28 RX ORDER — DIPHENHYDRAMINE HYDROCHLORIDE 50 MG/ML
50 INJECTION, SOLUTION INTRAMUSCULAR; INTRAVENOUS AS NEEDED
OUTPATIENT
Start: 2025-09-25

## 2025-08-28 RX ORDER — FAMOTIDINE 10 MG/ML
20 INJECTION, SOLUTION INTRAVENOUS ONCE AS NEEDED
Status: DISCONTINUED | OUTPATIENT
Start: 2025-08-28 | End: 2025-08-28 | Stop reason: HOSPADM

## 2025-08-28 RX ORDER — DIPHENHYDRAMINE HYDROCHLORIDE 50 MG/ML
50 INJECTION, SOLUTION INTRAMUSCULAR; INTRAVENOUS AS NEEDED
Status: DISCONTINUED | OUTPATIENT
Start: 2025-08-28 | End: 2025-08-28 | Stop reason: HOSPADM

## 2025-08-28 RX ADMIN — DENOSUMAB 120 MG: 120 INJECTION SUBCUTANEOUS at 10:18

## 2025-08-28 RX ADMIN — BORTEZOMIB 2.1 MG: 1 INJECTION, POWDER, LYOPHILIZED, FOR SOLUTION INTRAVENOUS; SUBCUTANEOUS at 10:17

## 2025-08-28 ASSESSMENT — PAIN SCALES - GENERAL: PAINLEVEL_OUTOF10: 0-NO PAIN

## 2025-09-05 ENCOUNTER — APPOINTMENT (OUTPATIENT)
Dept: OPHTHALMOLOGY | Facility: CLINIC | Age: OVER 89
End: 2025-09-05
Payer: MEDICARE

## 2025-10-02 ENCOUNTER — APPOINTMENT (OUTPATIENT)
Dept: PAIN MEDICINE | Facility: CLINIC | Age: OVER 89
End: 2025-10-02
Payer: MEDICARE

## 2025-10-03 ENCOUNTER — APPOINTMENT (OUTPATIENT)
Dept: PAIN MEDICINE | Facility: CLINIC | Age: OVER 89
End: 2025-10-03
Payer: MEDICARE

## 2025-11-19 ENCOUNTER — APPOINTMENT (OUTPATIENT)
Dept: OPHTHALMOLOGY | Facility: CLINIC | Age: OVER 89
End: 2025-11-19
Payer: MEDICARE

## 2025-12-12 ENCOUNTER — APPOINTMENT (OUTPATIENT)
Dept: PRIMARY CARE | Facility: CLINIC | Age: OVER 89
End: 2025-12-12
Payer: MEDICARE

## (undated) DEVICE — ELECTRODE, ELECTROSURGICAL, BLADE, INSULATED, ENT/IMA, STERILE

## (undated) DEVICE — Device

## (undated) DEVICE — BANDAGE, COFLEX, 4 X 5 YDS, TAN, STERILE, LF

## (undated) DEVICE — BANDAGE, ELASTIC, MATRIX, SELF-CLOSURE, 4 IN X 5 YD, LF

## (undated) DEVICE — THERAPY UNIT, 14-DAY PREVENA PLUS 125

## (undated) DEVICE — COVER, BACK TABLE, 65 X 90, HVY REINFORCED

## (undated) DEVICE — PROTECTOR, NERVE, ULNAR, PINK

## (undated) DEVICE — TRAY, MINOR, SINGLE BASIN, STERILE

## (undated) DEVICE — CATHETER, THORACIC, STRAIGHT, ADULT, 40FR, PVC

## (undated) DEVICE — DRAPE, SHEET, THREE QUARTER, FAN FOLD, 57 X 77 IN

## (undated) DEVICE — BANDAGE, COFLEX, 6 X 5 YDS, TAN, STERILE, LF

## (undated) DEVICE — COVER, C-ARM W/CLIPS, OEC GE

## (undated) DEVICE — BANDAGE, GAUZE, CONFORMING, KERLIX, 6 PLY, 4.5 IN X 4.1 YD

## (undated) DEVICE — DRAPE COVER, C ARM, FLOUROSCAN IMAGING SYS

## (undated) DEVICE — SUTURE, PDS II, 0, 18 IN, CT-1, VIOLET

## (undated) DEVICE — APPLICATOR, PREP, CHLORAPREP, W/ORANGE TINT, 10.5ML

## (undated) DEVICE — TOWEL, SURGICAL, NEURO, O/R, 16 X 26, BLUE, STERILE

## (undated) DEVICE — STAPLER, SKIN PROXIMATE, 35 WIDE

## (undated) DEVICE — IRRIGATION SET, Y, LARGE BORE

## (undated) DEVICE — DRAPE, SHEET, U, W/ADHESIVE STRIP, IMPERVIOUS, 60 X 70 IN, DISPOSABLE, LF, STERILE

## (undated) DEVICE — SUTURE, MONOCRYL, 2-0, 27 IN, SH/V-20 , UNDYED

## (undated) DEVICE — MANIFOLD, 4 PORT NEPTUNE STANDARD

## (undated) DEVICE — COVER, CART, 45 X 27 X 48 IN, CLEAR

## (undated) DEVICE — DRESSING, PREVENA, PEEL AND PLACE, 13CM